# Patient Record
Sex: FEMALE | Race: WHITE | URBAN - METROPOLITAN AREA
[De-identification: names, ages, dates, MRNs, and addresses within clinical notes are randomized per-mention and may not be internally consistent; named-entity substitution may affect disease eponyms.]

---

## 2017-01-01 ENCOUNTER — APPOINTMENT (OUTPATIENT)
Dept: GENERAL RADIOLOGY | Facility: CLINIC | Age: 62
DRG: 003 | End: 2017-01-01
Attending: OTOLARYNGOLOGY
Payer: COMMERCIAL

## 2017-01-01 ENCOUNTER — APPOINTMENT (OUTPATIENT)
Dept: PHYSICAL THERAPY | Facility: CLINIC | Age: 62
DRG: 003 | End: 2017-01-01
Attending: OTOLARYNGOLOGY
Payer: COMMERCIAL

## 2017-01-01 ENCOUNTER — APPOINTMENT (OUTPATIENT)
Dept: ULTRASOUND IMAGING | Facility: CLINIC | Age: 62
DRG: 003 | End: 2017-01-01
Attending: OTOLARYNGOLOGY
Payer: COMMERCIAL

## 2017-01-01 ENCOUNTER — APPOINTMENT (OUTPATIENT)
Dept: GENERAL RADIOLOGY | Facility: CLINIC | Age: 62
DRG: 439 | End: 2017-01-01
Attending: PHYSICIAN ASSISTANT
Payer: COMMERCIAL

## 2017-01-01 ENCOUNTER — OFFICE VISIT (OUTPATIENT)
Dept: INTERNAL MEDICINE | Facility: CLINIC | Age: 62
End: 2017-01-01
Payer: COMMERCIAL

## 2017-01-01 ENCOUNTER — ANESTHESIA (OUTPATIENT)
Dept: SURGERY | Facility: CLINIC | Age: 62
DRG: 003 | End: 2017-01-01
Payer: COMMERCIAL

## 2017-01-01 ENCOUNTER — ANESTHESIA EVENT (OUTPATIENT)
Dept: SURGERY | Facility: CLINIC | Age: 62
DRG: 003 | End: 2017-01-01
Payer: COMMERCIAL

## 2017-01-01 ENCOUNTER — OFFICE VISIT (OUTPATIENT)
Dept: OTOLARYNGOLOGY | Facility: CLINIC | Age: 62
End: 2017-01-01

## 2017-01-01 ENCOUNTER — APPOINTMENT (OUTPATIENT)
Dept: GENERAL RADIOLOGY | Facility: CLINIC | Age: 62
DRG: 003 | End: 2017-01-01
Attending: PHYSICIAN ASSISTANT
Payer: COMMERCIAL

## 2017-01-01 ENCOUNTER — ANESTHESIA (OUTPATIENT)
Dept: SURGERY | Facility: CLINIC | Age: 62
End: 2017-01-01
Payer: COMMERCIAL

## 2017-01-01 ENCOUNTER — TRANSFERRED RECORDS (OUTPATIENT)
Dept: HEALTH INFORMATION MANAGEMENT | Facility: CLINIC | Age: 62
End: 2017-01-01

## 2017-01-01 ENCOUNTER — ANESTHESIA (OUTPATIENT)
Dept: SURGERY | Facility: CLINIC | Age: 62
DRG: 907 | End: 2017-01-01
Payer: COMMERCIAL

## 2017-01-01 ENCOUNTER — PRE VISIT (OUTPATIENT)
Dept: OTOLARYNGOLOGY | Facility: CLINIC | Age: 62
End: 2017-01-01

## 2017-01-01 ENCOUNTER — APPOINTMENT (OUTPATIENT)
Dept: OCCUPATIONAL THERAPY | Facility: CLINIC | Age: 62
DRG: 003 | End: 2017-01-01
Attending: OTOLARYNGOLOGY
Payer: COMMERCIAL

## 2017-01-01 ENCOUNTER — TELEPHONE (OUTPATIENT)
Dept: FAMILY MEDICINE | Facility: CLINIC | Age: 62
End: 2017-01-01

## 2017-01-01 ENCOUNTER — APPOINTMENT (OUTPATIENT)
Dept: GENERAL RADIOLOGY | Facility: CLINIC | Age: 62
DRG: 907 | End: 2017-01-01
Payer: COMMERCIAL

## 2017-01-01 ENCOUNTER — APPOINTMENT (OUTPATIENT)
Dept: GENERAL RADIOLOGY | Facility: CLINIC | Age: 62
DRG: 003 | End: 2017-01-01
Attending: NURSE PRACTITIONER
Payer: COMMERCIAL

## 2017-01-01 ENCOUNTER — APPOINTMENT (OUTPATIENT)
Dept: ULTRASOUND IMAGING | Facility: CLINIC | Age: 62
DRG: 003 | End: 2017-01-01
Attending: SURGERY
Payer: COMMERCIAL

## 2017-01-01 ENCOUNTER — CARE COORDINATION (OUTPATIENT)
Dept: OTOLARYNGOLOGY | Facility: CLINIC | Age: 62
End: 2017-01-01

## 2017-01-01 ENCOUNTER — APPOINTMENT (OUTPATIENT)
Dept: CT IMAGING | Facility: CLINIC | Age: 62
DRG: 439 | End: 2017-01-01
Attending: PHYSICIAN ASSISTANT
Payer: COMMERCIAL

## 2017-01-01 ENCOUNTER — APPOINTMENT (OUTPATIENT)
Dept: CARDIOLOGY | Facility: CLINIC | Age: 62
DRG: 003 | End: 2017-01-01
Attending: OTOLARYNGOLOGY
Payer: COMMERCIAL

## 2017-01-01 ENCOUNTER — APPOINTMENT (OUTPATIENT)
Dept: CARDIOLOGY | Facility: CLINIC | Age: 62
DRG: 003 | End: 2017-01-01
Attending: PHYSICIAN ASSISTANT
Payer: COMMERCIAL

## 2017-01-01 ENCOUNTER — HOSPITAL ENCOUNTER (INPATIENT)
Facility: CLINIC | Age: 62
LOS: 20 days | Discharge: NURSING, REHAB OR LONG-TERM CARE | DRG: 003 | End: 2017-12-11
Attending: OTOLARYNGOLOGY | Admitting: OTOLARYNGOLOGY
Payer: COMMERCIAL

## 2017-01-01 ENCOUNTER — OFFICE VISIT (OUTPATIENT)
Dept: OTOLARYNGOLOGY | Facility: CLINIC | Age: 62
End: 2017-01-01
Payer: COMMERCIAL

## 2017-01-01 ENCOUNTER — ANESTHESIA EVENT (OUTPATIENT)
Dept: SURGERY | Facility: CLINIC | Age: 62
DRG: 907 | End: 2017-01-01
Payer: COMMERCIAL

## 2017-01-01 ENCOUNTER — HOSPITAL ENCOUNTER (INPATIENT)
Facility: CLINIC | Age: 62
LOS: 2 days | Discharge: NURSING, REHAB OR LONG-TERM CARE | DRG: 907 | End: 2017-12-14
Attending: OTOLARYNGOLOGY | Admitting: ANESTHESIOLOGY
Payer: COMMERCIAL

## 2017-01-01 ENCOUNTER — ALLIED HEALTH/NURSE VISIT (OUTPATIENT)
Dept: NEUROLOGY | Facility: CLINIC | Age: 62
DRG: 003 | End: 2017-01-01
Attending: PSYCHIATRY & NEUROLOGY
Payer: COMMERCIAL

## 2017-01-01 ENCOUNTER — OFFICE VISIT (OUTPATIENT)
Dept: FAMILY MEDICINE | Facility: CLINIC | Age: 62
End: 2017-01-01
Payer: COMMERCIAL

## 2017-01-01 ENCOUNTER — APPOINTMENT (OUTPATIENT)
Dept: SPEECH THERAPY | Facility: CLINIC | Age: 62
DRG: 003 | End: 2017-01-01
Attending: PHYSICIAN ASSISTANT
Payer: COMMERCIAL

## 2017-01-01 ENCOUNTER — TEAM CONFERENCE (OUTPATIENT)
Dept: OTOLARYNGOLOGY | Facility: CLINIC | Age: 62
End: 2017-01-01

## 2017-01-01 ENCOUNTER — APPOINTMENT (OUTPATIENT)
Dept: INTERVENTIONAL RADIOLOGY/VASCULAR | Facility: CLINIC | Age: 62
DRG: 907 | End: 2017-01-01
Payer: COMMERCIAL

## 2017-01-01 ENCOUNTER — ANESTHESIA EVENT (OUTPATIENT)
Dept: SURGERY | Facility: CLINIC | Age: 62
End: 2017-01-01
Payer: COMMERCIAL

## 2017-01-01 ENCOUNTER — TEAM CONFERENCE (OUTPATIENT)
Dept: OTOLARYNGOLOGY | Facility: CLINIC | Age: 62
End: 2017-01-01
Attending: OTOLARYNGOLOGY

## 2017-01-01 ENCOUNTER — HOSPITAL ENCOUNTER (OUTPATIENT)
Facility: CLINIC | Age: 62
Discharge: HOME | End: 2017-11-09
Attending: OTOLARYNGOLOGY | Admitting: OTOLARYNGOLOGY
Payer: COMMERCIAL

## 2017-01-01 ENCOUNTER — HOSPITAL ENCOUNTER (INPATIENT)
Facility: CLINIC | Age: 62
LOS: 1 days | Discharge: LEFT BEFORE DISCHARGE | DRG: 439 | End: 2017-08-29
Attending: PHYSICIAN ASSISTANT | Admitting: FAMILY MEDICINE
Payer: COMMERCIAL

## 2017-01-01 VITALS
HEART RATE: 78 BPM | OXYGEN SATURATION: 96 % | TEMPERATURE: 98.4 F | HEIGHT: 61 IN | SYSTOLIC BLOOD PRESSURE: 151 MMHG | BODY MASS INDEX: 17.27 KG/M2 | DIASTOLIC BLOOD PRESSURE: 72 MMHG | WEIGHT: 91.49 LBS | RESPIRATION RATE: 36 BRPM

## 2017-01-01 VITALS
WEIGHT: 91.49 LBS | SYSTOLIC BLOOD PRESSURE: 120 MMHG | DIASTOLIC BLOOD PRESSURE: 70 MMHG | OXYGEN SATURATION: 95 % | BODY MASS INDEX: 17.27 KG/M2 | TEMPERATURE: 99.5 F | HEART RATE: 96 BPM | RESPIRATION RATE: 16 BRPM | HEIGHT: 61 IN

## 2017-01-01 VITALS
HEART RATE: 113 BPM | SYSTOLIC BLOOD PRESSURE: 102 MMHG | BODY MASS INDEX: 15.78 KG/M2 | HEIGHT: 61 IN | TEMPERATURE: 97.8 F | DIASTOLIC BLOOD PRESSURE: 58 MMHG | OXYGEN SATURATION: 97 % | WEIGHT: 83.6 LBS

## 2017-01-01 VITALS
RESPIRATION RATE: 16 BRPM | SYSTOLIC BLOOD PRESSURE: 151 MMHG | TEMPERATURE: 97.5 F | OXYGEN SATURATION: 99 % | HEART RATE: 84 BPM | DIASTOLIC BLOOD PRESSURE: 72 MMHG

## 2017-01-01 VITALS
TEMPERATURE: 96.5 F | WEIGHT: 85.8 LBS | OXYGEN SATURATION: 97 % | SYSTOLIC BLOOD PRESSURE: 150 MMHG | BODY MASS INDEX: 16.21 KG/M2 | DIASTOLIC BLOOD PRESSURE: 70 MMHG | HEART RATE: 72 BPM

## 2017-01-01 VITALS — BODY MASS INDEX: 17.69 KG/M2 | WEIGHT: 93.6 LBS | HEART RATE: 107 BPM | OXYGEN SATURATION: 92 %

## 2017-01-01 VITALS
HEIGHT: 61 IN | RESPIRATION RATE: 20 BRPM | SYSTOLIC BLOOD PRESSURE: 164 MMHG | WEIGHT: 84.44 LBS | TEMPERATURE: 98.2 F | BODY MASS INDEX: 15.94 KG/M2 | DIASTOLIC BLOOD PRESSURE: 85 MMHG | OXYGEN SATURATION: 92 %

## 2017-01-01 VITALS — HEIGHT: 61 IN | WEIGHT: 82 LBS | BODY MASS INDEX: 15.48 KG/M2

## 2017-01-01 DIAGNOSIS — Z01.818 PREOP GENERAL PHYSICAL EXAM: Primary | ICD-10-CM

## 2017-01-01 DIAGNOSIS — Z98.890 STATUS POST NECK DISSECTION: ICD-10-CM

## 2017-01-01 DIAGNOSIS — D53.9 MACROCYTIC ANEMIA: ICD-10-CM

## 2017-01-01 DIAGNOSIS — C02.9 TONGUE CANCER (H): Primary | ICD-10-CM

## 2017-01-01 DIAGNOSIS — F17.200 CURRENT SMOKER: ICD-10-CM

## 2017-01-01 DIAGNOSIS — F10.20 ALCOHOLISM (H): ICD-10-CM

## 2017-01-01 DIAGNOSIS — E87.6 HYPOKALEMIA: ICD-10-CM

## 2017-01-01 DIAGNOSIS — R19.7 DIARRHEA, UNSPECIFIED TYPE: ICD-10-CM

## 2017-01-01 DIAGNOSIS — C01 MALIGNANT NEOPLASM OF BASE OF TONGUE (H): Primary | ICD-10-CM

## 2017-01-01 DIAGNOSIS — I51.89 DIASTOLIC DYSFUNCTION: ICD-10-CM

## 2017-01-01 DIAGNOSIS — C01 MALIGNANT NEOPLASM OF BASE OF TONGUE (H): ICD-10-CM

## 2017-01-01 DIAGNOSIS — I70.90 ARTERIOSCLEROTIC VASCULAR DISEASE: ICD-10-CM

## 2017-01-01 DIAGNOSIS — Z71.89 ADVANCED DIRECTIVES, COUNSELING/DISCUSSION: ICD-10-CM

## 2017-01-01 DIAGNOSIS — Z98.890 POSTOPERATIVE STATE: Primary | ICD-10-CM

## 2017-01-01 DIAGNOSIS — J96.21 ACUTE AND CHRONIC RESPIRATORY FAILURE WITH HYPOXIA (H): ICD-10-CM

## 2017-01-01 DIAGNOSIS — E63.9 NUTRITIONAL DEFICIENCY: ICD-10-CM

## 2017-01-01 DIAGNOSIS — K85.20 ALCOHOL-INDUCED ACUTE PANCREATITIS, UNSPECIFIED COMPLICATION STATUS: ICD-10-CM

## 2017-01-01 DIAGNOSIS — F41.9 ANXIETY: ICD-10-CM

## 2017-01-01 DIAGNOSIS — K13.79 ORAL HEMORRHAGE: ICD-10-CM

## 2017-01-01 DIAGNOSIS — K21.9 GASTROESOPHAGEAL REFLUX DISEASE WITHOUT ESOPHAGITIS: ICD-10-CM

## 2017-01-01 DIAGNOSIS — R06.2 WHEEZING: ICD-10-CM

## 2017-01-01 DIAGNOSIS — G89.18 ACUTE POST-OPERATIVE PAIN: Primary | ICD-10-CM

## 2017-01-01 DIAGNOSIS — R40.0 SOMNOLENCE: Primary | ICD-10-CM

## 2017-01-01 DIAGNOSIS — G89.18 ACUTE POST-OPERATIVE PAIN: ICD-10-CM

## 2017-01-01 DIAGNOSIS — I10 BENIGN ESSENTIAL HYPERTENSION: ICD-10-CM

## 2017-01-01 DIAGNOSIS — C02.9 MALIGNANT NEOPLASM OF TONGUE (H): Primary | ICD-10-CM

## 2017-01-01 DIAGNOSIS — R10.12 LUQ ABDOMINAL PAIN: ICD-10-CM

## 2017-01-01 DIAGNOSIS — G47.00 INSOMNIA, UNSPECIFIED TYPE: Primary | ICD-10-CM

## 2017-01-01 DIAGNOSIS — J69.0 ASPIRATION PNEUMONIA, UNSPECIFIED ASPIRATION PNEUMONIA TYPE, UNSPECIFIED LATERALITY, UNSPECIFIED PART OF LUNG (H): ICD-10-CM

## 2017-01-01 DIAGNOSIS — K52.9 COLITIS: ICD-10-CM

## 2017-01-01 DIAGNOSIS — I10 ESSENTIAL HYPERTENSION: ICD-10-CM

## 2017-01-01 LAB
ABO + RH BLD: NORMAL
ALBUMIN SERPL-MCNC: 1.7 G/DL (ref 3.4–5)
ALBUMIN SERPL-MCNC: 2 G/DL (ref 3.4–5)
ALBUMIN SERPL-MCNC: 2.1 G/DL (ref 3.4–5)
ALBUMIN SERPL-MCNC: 2.2 G/DL (ref 3.4–5)
ALBUMIN SERPL-MCNC: 2.2 G/DL (ref 3.4–5)
ALBUMIN SERPL-MCNC: 2.6 G/DL (ref 3.4–5)
ALBUMIN SERPL-MCNC: 2.7 G/DL (ref 3.4–5)
ALBUMIN SERPL-MCNC: 2.8 G/DL (ref 3.4–5)
ALBUMIN SERPL-MCNC: 3 G/DL (ref 3.4–5)
ALBUMIN SERPL-MCNC: 3 G/DL (ref 3.4–5)
ALBUMIN SERPL-MCNC: 3.3 G/DL (ref 3.4–5)
ALBUMIN UR-MCNC: 10 MG/DL
ALBUMIN UR-MCNC: NEGATIVE MG/DL
ALP BONE CFR SERPL: 35 U/L (ref 0–55)
ALP LIVER SERPL-CCNC: 172 U/L (ref 0–94)
ALP OTHER CFR SERPL: 0 U/L
ALP SERPL-CCNC: 156 U/L (ref 40–150)
ALP SERPL-CCNC: 170 U/L (ref 40–150)
ALP SERPL-CCNC: 186 U/L (ref 40–150)
ALP SERPL-CCNC: 207 U/L (ref 40–120)
ALP SERPL-CCNC: 216 U/L (ref 40–150)
ALP SERPL-CCNC: 224 U/L (ref 40–150)
ALP SERPL-CCNC: 246 U/L (ref 40–150)
ALP SERPL-CCNC: 248 U/L (ref 40–150)
ALP SERPL-CCNC: 285 U/L (ref 40–150)
ALP SERPL-CCNC: 286 U/L (ref 40–150)
ALP SERPL-CCNC: 296 U/L (ref 40–150)
ALP SERPL-CCNC: 358 U/L (ref 40–150)
ALP SERPL-CCNC: 443 U/L (ref 40–150)
ALP SERPL-CCNC: 480 U/L (ref 40–150)
ALP SERPL-CCNC: 502 U/L (ref 40–150)
ALP SERPL-CCNC: 55 U/L (ref 40–150)
ALP SERPL-CCNC: 58 U/L (ref 40–150)
ALP SERPL-CCNC: 86 U/L (ref 40–150)
ALT SERPL W P-5'-P-CCNC: 16 U/L (ref 0–50)
ALT SERPL W P-5'-P-CCNC: 22 U/L (ref 0–50)
ALT SERPL W P-5'-P-CCNC: 23 U/L (ref 0–50)
ALT SERPL W P-5'-P-CCNC: 26 U/L (ref 0–50)
ALT SERPL W P-5'-P-CCNC: 27 U/L (ref 0–50)
ALT SERPL W P-5'-P-CCNC: 28 U/L (ref 0–50)
ALT SERPL W P-5'-P-CCNC: 28 U/L (ref 0–50)
ALT SERPL W P-5'-P-CCNC: 32 U/L (ref 0–50)
ALT SERPL W P-5'-P-CCNC: 34 U/L (ref 0–50)
ALT SERPL W P-5'-P-CCNC: 46 U/L (ref 0–50)
ALT SERPL W P-5'-P-CCNC: 53 U/L (ref 0–50)
ALT SERPL W P-5'-P-CCNC: 54 U/L (ref 0–50)
ALT SERPL W P-5'-P-CCNC: 63 U/L (ref 0–50)
ALT SERPL W P-5'-P-CCNC: 75 U/L (ref 0–50)
ALT SERPL W P-5'-P-CCNC: 75 U/L (ref 0–50)
AMMONIA PLAS-SCNC: 27 UMOL/L (ref 10–50)
AMYLASE SERPL-CCNC: 32 U/L (ref 30–110)
ANGLE RATE OF CLOT STRENGTH: 71.3 DEGREES (ref 53–72)
ANION GAP SERPL CALCULATED.3IONS-SCNC: 10 MMOL/L (ref 3–14)
ANION GAP SERPL CALCULATED.3IONS-SCNC: 11 MMOL/L (ref 3–14)
ANION GAP SERPL CALCULATED.3IONS-SCNC: 12 MMOL/L (ref 3–14)
ANION GAP SERPL CALCULATED.3IONS-SCNC: 13 MMOL/L (ref 3–14)
ANION GAP SERPL CALCULATED.3IONS-SCNC: 13 MMOL/L (ref 3–14)
ANION GAP SERPL CALCULATED.3IONS-SCNC: 14 MMOL/L (ref 3–14)
ANION GAP SERPL CALCULATED.3IONS-SCNC: 15 MMOL/L (ref 3–14)
ANION GAP SERPL CALCULATED.3IONS-SCNC: 5 MMOL/L (ref 3–14)
ANION GAP SERPL CALCULATED.3IONS-SCNC: 6 MMOL/L (ref 3–14)
ANION GAP SERPL CALCULATED.3IONS-SCNC: 7 MMOL/L (ref 3–14)
ANION GAP SERPL CALCULATED.3IONS-SCNC: 8 MMOL/L (ref 3–14)
ANION GAP SERPL CALCULATED.3IONS-SCNC: 9 MMOL/L (ref 3–14)
ANISOCYTOSIS BLD QL SMEAR: ABNORMAL
ANISOCYTOSIS BLD QL SMEAR: SLIGHT
APPEARANCE UR: ABNORMAL
APPEARANCE UR: CLEAR
APTT PPP: 30 SEC (ref 22–37)
APTT PPP: 31 SEC (ref 22–37)
AST SERPL W P-5'-P-CCNC: 108 U/L (ref 0–45)
AST SERPL W P-5'-P-CCNC: 110 U/L (ref 0–45)
AST SERPL W P-5'-P-CCNC: 123 U/L (ref 0–45)
AST SERPL W P-5'-P-CCNC: 124 U/L (ref 0–45)
AST SERPL W P-5'-P-CCNC: 136 U/L (ref 0–45)
AST SERPL W P-5'-P-CCNC: 28 U/L (ref 0–45)
AST SERPL W P-5'-P-CCNC: 34 U/L (ref 0–45)
AST SERPL W P-5'-P-CCNC: 41 U/L (ref 0–45)
AST SERPL W P-5'-P-CCNC: 44 U/L (ref 0–45)
AST SERPL W P-5'-P-CCNC: 51 U/L (ref 0–45)
AST SERPL W P-5'-P-CCNC: 51 U/L (ref 0–45)
AST SERPL W P-5'-P-CCNC: 52 U/L (ref 0–45)
AST SERPL W P-5'-P-CCNC: 58 U/L (ref 0–45)
AST SERPL W P-5'-P-CCNC: 59 U/L (ref 0–45)
AST SERPL W P-5'-P-CCNC: 62 U/L (ref 0–45)
AST SERPL W P-5'-P-CCNC: 63 U/L (ref 0–45)
AST SERPL W P-5'-P-CCNC: 65 U/L (ref 0–45)
BACTERIA SPEC CULT: ABNORMAL
BACTERIA SPEC CULT: NO GROWTH
BACTERIA SPEC CULT: NORMAL
BASE DEFICIT BLDA-SCNC: 1.5 MMOL/L
BASE DEFICIT BLDA-SCNC: 11.1 MMOL/L
BASE DEFICIT BLDA-SCNC: 5.2 MMOL/L
BASE DEFICIT BLDA-SCNC: 5.3 MMOL/L
BASE DEFICIT BLDA-SCNC: 6.1 MMOL/L
BASE DEFICIT BLDA-SCNC: 6.4 MMOL/L
BASE DEFICIT BLDA-SCNC: 6.4 MMOL/L
BASE DEFICIT BLDA-SCNC: 7 MMOL/L
BASE DEFICIT BLDA-SCNC: 7.2 MMOL/L
BASE DEFICIT BLDA-SCNC: 8.3 MMOL/L
BASE DEFICIT BLDA-SCNC: 8.4 MMOL/L
BASE DEFICIT BLDA-SCNC: 8.8 MMOL/L
BASE DEFICIT BLDA-SCNC: 8.9 MMOL/L
BASE DEFICIT BLDA-SCNC: 9.2 MMOL/L
BASE DEFICIT BLDA-SCNC: 9.2 MMOL/L
BASE DEFICIT BLDA-SCNC: 9.4 MMOL/L
BASE DEFICIT BLDV-SCNC: 0.1 MMOL/L
BASE DEFICIT BLDV-SCNC: 4.9 MMOL/L
BASE EXCESS BLDA CALC-SCNC: 1.2 MMOL/L
BASE EXCESS BLDA CALC-SCNC: 1.9 MMOL/L
BASE EXCESS BLDA CALC-SCNC: 2 MMOL/L
BASE EXCESS BLDA CALC-SCNC: 2.7 MMOL/L
BASE EXCESS BLDA CALC-SCNC: 3.6 MMOL/L
BASE EXCESS BLDA CALC-SCNC: 3.7 MMOL/L
BASE EXCESS BLDA CALC-SCNC: 4.6 MMOL/L
BASE EXCESS BLDA CALC-SCNC: 5.2 MMOL/L
BASE EXCESS BLDA CALC-SCNC: 5.8 MMOL/L
BASE EXCESS BLDA CALC-SCNC: 6.6 MMOL/L
BASE EXCESS BLDV CALC-SCNC: 0.7 MMOL/L
BASE EXCESS BLDV CALC-SCNC: 1.5 MMOL/L
BASOPHILS # BLD AUTO: 0 10E9/L (ref 0–0.2)
BASOPHILS # BLD AUTO: 0.1 10E9/L (ref 0–0.2)
BASOPHILS # BLD AUTO: 0.2 10E9/L (ref 0–0.2)
BASOPHILS NFR BLD AUTO: 0 %
BASOPHILS NFR BLD AUTO: 0 %
BASOPHILS NFR BLD AUTO: 0.2 %
BASOPHILS NFR BLD AUTO: 0.6 %
BASOPHILS NFR BLD AUTO: 0.9 %
BILIRUB DIRECT SERPL-MCNC: 0.6 MG/DL (ref 0–0.2)
BILIRUB DIRECT SERPL-MCNC: 2.2 MG/DL (ref 0–0.2)
BILIRUB DIRECT SERPL-MCNC: 2.3 MG/DL (ref 0–0.2)
BILIRUB DIRECT SERPL-MCNC: 2.8 MG/DL (ref 0–0.2)
BILIRUB DIRECT SERPL-MCNC: 3.5 MG/DL (ref 0–0.2)
BILIRUB DIRECT SERPL-MCNC: 3.9 MG/DL (ref 0–0.2)
BILIRUB SERPL-MCNC: 0.6 MG/DL (ref 0.2–1.3)
BILIRUB SERPL-MCNC: 1 MG/DL (ref 0.2–1.3)
BILIRUB SERPL-MCNC: 1 MG/DL (ref 0.2–1.3)
BILIRUB SERPL-MCNC: 1.2 MG/DL (ref 0.2–1.3)
BILIRUB SERPL-MCNC: 1.4 MG/DL (ref 0.2–1.3)
BILIRUB SERPL-MCNC: 1.6 MG/DL (ref 0.2–1.3)
BILIRUB SERPL-MCNC: 1.8 MG/DL (ref 0.2–1.3)
BILIRUB SERPL-MCNC: 1.8 MG/DL (ref 0.2–1.3)
BILIRUB SERPL-MCNC: 2.2 MG/DL (ref 0.2–1.3)
BILIRUB SERPL-MCNC: 2.6 MG/DL (ref 0.2–1.3)
BILIRUB SERPL-MCNC: 3.1 MG/DL (ref 0.2–1.3)
BILIRUB SERPL-MCNC: 3.1 MG/DL (ref 0.2–1.3)
BILIRUB SERPL-MCNC: 3.3 MG/DL (ref 0.2–1.3)
BILIRUB SERPL-MCNC: 3.4 MG/DL (ref 0.2–1.3)
BILIRUB SERPL-MCNC: 4.6 MG/DL (ref 0.2–1.3)
BILIRUB SERPL-MCNC: 4.6 MG/DL (ref 0.2–1.3)
BILIRUB SERPL-MCNC: 5.1 MG/DL (ref 0.2–1.3)
BILIRUB UR QL STRIP: NEGATIVE
BLD GP AB SCN SERPL QL: NORMAL
BLD PROD TYP BPU: NORMAL
BLD UNIT ID BPU: 0
BLOOD BANK CMNT PATIENT-IMP: NORMAL
BLOOD PRODUCT CODE: NORMAL
BPU ID: NORMAL
BUN SERPL-MCNC: 10 MG/DL (ref 7–30)
BUN SERPL-MCNC: 12 MG/DL (ref 7–30)
BUN SERPL-MCNC: 12 MG/DL (ref 7–30)
BUN SERPL-MCNC: 13 MG/DL (ref 7–30)
BUN SERPL-MCNC: 13 MG/DL (ref 7–30)
BUN SERPL-MCNC: 15 MG/DL (ref 7–30)
BUN SERPL-MCNC: 16 MG/DL (ref 7–30)
BUN SERPL-MCNC: 17 MG/DL (ref 7–30)
BUN SERPL-MCNC: 19 MG/DL (ref 7–30)
BUN SERPL-MCNC: 20 MG/DL (ref 7–30)
BUN SERPL-MCNC: 21 MG/DL (ref 7–30)
BUN SERPL-MCNC: 22 MG/DL (ref 7–30)
BUN SERPL-MCNC: 24 MG/DL (ref 7–30)
BUN SERPL-MCNC: 26 MG/DL (ref 7–30)
BUN SERPL-MCNC: 27 MG/DL (ref 7–30)
BUN SERPL-MCNC: 28 MG/DL (ref 7–30)
BUN SERPL-MCNC: 32 MG/DL (ref 7–30)
BUN SERPL-MCNC: 35 MG/DL (ref 7–30)
BUN SERPL-MCNC: 37 MG/DL (ref 7–30)
BUN SERPL-MCNC: 38 MG/DL (ref 7–30)
BUN SERPL-MCNC: 38 MG/DL (ref 7–30)
BUN SERPL-MCNC: 40 MG/DL (ref 7–30)
BUN SERPL-MCNC: 41 MG/DL (ref 7–30)
BUN SERPL-MCNC: 43 MG/DL (ref 7–30)
C COLI+JEJUNI+LARI FUSA STL QL NAA+PROBE: NOT DETECTED
C DIFF TOX B STL QL: NEGATIVE
CA-I BLD-MCNC: 4.2 MG/DL (ref 4.4–5.2)
CA-I BLD-MCNC: 4.2 MG/DL (ref 4.4–5.2)
CA-I BLD-MCNC: 4.4 MG/DL (ref 4.4–5.2)
CA-I BLD-MCNC: 4.7 MG/DL (ref 4.4–5.2)
CA-I BLD-MCNC: 4.8 MG/DL (ref 4.4–5.2)
CA-I BLD-MCNC: 4.9 MG/DL (ref 4.4–5.2)
CA-I BLD-MCNC: 5 MG/DL (ref 4.4–5.2)
CA-I BLD-MCNC: 5.1 MG/DL (ref 4.4–5.2)
CA-I BLD-MCNC: 5.1 MG/DL (ref 4.4–5.2)
CA-I BLD-MCNC: 5.5 MG/DL (ref 4.4–5.2)
CALCIUM SERPL-MCNC: 6.6 MG/DL (ref 8.5–10.1)
CALCIUM SERPL-MCNC: 6.7 MG/DL (ref 8.5–10.1)
CALCIUM SERPL-MCNC: 6.8 MG/DL (ref 8.5–10.1)
CALCIUM SERPL-MCNC: 7.2 MG/DL (ref 8.5–10.1)
CALCIUM SERPL-MCNC: 7.2 MG/DL (ref 8.5–10.1)
CALCIUM SERPL-MCNC: 7.4 MG/DL (ref 8.5–10.1)
CALCIUM SERPL-MCNC: 7.7 MG/DL (ref 8.5–10.1)
CALCIUM SERPL-MCNC: 7.8 MG/DL (ref 8.5–10.1)
CALCIUM SERPL-MCNC: 7.8 MG/DL (ref 8.5–10.1)
CALCIUM SERPL-MCNC: 7.9 MG/DL (ref 8.5–10.1)
CALCIUM SERPL-MCNC: 8 MG/DL (ref 8.5–10.1)
CALCIUM SERPL-MCNC: 8 MG/DL (ref 8.5–10.1)
CALCIUM SERPL-MCNC: 8.1 MG/DL (ref 8.5–10.1)
CALCIUM SERPL-MCNC: 8.2 MG/DL (ref 8.5–10.1)
CALCIUM SERPL-MCNC: 8.3 MG/DL (ref 8.5–10.1)
CALCIUM SERPL-MCNC: 8.4 MG/DL (ref 8.5–10.1)
CALCIUM SERPL-MCNC: 8.5 MG/DL (ref 8.5–10.1)
CALCIUM SERPL-MCNC: 8.6 MG/DL (ref 8.5–10.1)
CALCIUM SERPL-MCNC: 8.6 MG/DL (ref 8.5–10.1)
CALCIUM SERPL-MCNC: 8.8 MG/DL (ref 8.5–10.1)
CALCIUM SERPL-MCNC: 8.9 MG/DL (ref 8.5–10.1)
CHLORIDE SERPL-SCNC: 102 MMOL/L (ref 94–109)
CHLORIDE SERPL-SCNC: 103 MMOL/L (ref 94–109)
CHLORIDE SERPL-SCNC: 103 MMOL/L (ref 94–109)
CHLORIDE SERPL-SCNC: 106 MMOL/L (ref 94–109)
CHLORIDE SERPL-SCNC: 107 MMOL/L (ref 94–109)
CHLORIDE SERPL-SCNC: 108 MMOL/L (ref 94–109)
CHLORIDE SERPL-SCNC: 109 MMOL/L (ref 94–109)
CHLORIDE SERPL-SCNC: 110 MMOL/L (ref 94–109)
CHLORIDE SERPL-SCNC: 111 MMOL/L (ref 94–109)
CHLORIDE SERPL-SCNC: 112 MMOL/L (ref 94–109)
CHLORIDE SERPL-SCNC: 113 MMOL/L (ref 94–109)
CHLORIDE SERPL-SCNC: 114 MMOL/L (ref 94–109)
CHLORIDE SERPL-SCNC: 115 MMOL/L (ref 94–109)
CHLORIDE SERPL-SCNC: 116 MMOL/L (ref 94–109)
CHLORIDE SERPL-SCNC: 116 MMOL/L (ref 94–109)
CHLORIDE SERPL-SCNC: 117 MMOL/L (ref 94–109)
CHLORIDE SERPL-SCNC: 118 MMOL/L (ref 94–109)
CHLORIDE SERPL-SCNC: 119 MMOL/L (ref 94–109)
CI HYPERCOAGULATION INDEX: 0.9 RATIO (ref 0–3)
CO2 BLDCOV-SCNC: 31 MMOL/L (ref 21–28)
CO2 SERPL-SCNC: 16 MMOL/L (ref 20–32)
CO2 SERPL-SCNC: 17 MMOL/L (ref 20–32)
CO2 SERPL-SCNC: 18 MMOL/L (ref 20–32)
CO2 SERPL-SCNC: 18 MMOL/L (ref 20–32)
CO2 SERPL-SCNC: 19 MMOL/L (ref 20–32)
CO2 SERPL-SCNC: 20 MMOL/L (ref 20–32)
CO2 SERPL-SCNC: 22 MMOL/L (ref 20–32)
CO2 SERPL-SCNC: 24 MMOL/L (ref 20–32)
CO2 SERPL-SCNC: 24 MMOL/L (ref 20–32)
CO2 SERPL-SCNC: 25 MMOL/L (ref 20–32)
CO2 SERPL-SCNC: 26 MMOL/L (ref 20–32)
CO2 SERPL-SCNC: 27 MMOL/L (ref 20–32)
CO2 SERPL-SCNC: 28 MMOL/L (ref 20–32)
CO2 SERPL-SCNC: 28 MMOL/L (ref 20–32)
CO2 SERPL-SCNC: 29 MMOL/L (ref 20–32)
CO2 SERPL-SCNC: 29 MMOL/L (ref 20–32)
CO2 SERPL-SCNC: 30 MMOL/L (ref 20–32)
CO2 SERPL-SCNC: 31 MMOL/L (ref 20–32)
CO2 SERPL-SCNC: 31 MMOL/L (ref 20–32)
COLOR UR AUTO: ABNORMAL
COLOR UR AUTO: YELLOW
COMPREHEN DRUG ANALYSIS UR: NORMAL
COPATH REPORT: NORMAL
CREAT SERPL-MCNC: 0.48 MG/DL (ref 0.52–1.04)
CREAT SERPL-MCNC: 0.54 MG/DL (ref 0.52–1.04)
CREAT SERPL-MCNC: 0.55 MG/DL (ref 0.52–1.04)
CREAT SERPL-MCNC: 0.58 MG/DL (ref 0.52–1.04)
CREAT SERPL-MCNC: 0.6 MG/DL (ref 0.52–1.04)
CREAT SERPL-MCNC: 0.6 MG/DL (ref 0.52–1.04)
CREAT SERPL-MCNC: 0.61 MG/DL (ref 0.52–1.04)
CREAT SERPL-MCNC: 0.64 MG/DL (ref 0.52–1.04)
CREAT SERPL-MCNC: 0.64 MG/DL (ref 0.52–1.04)
CREAT SERPL-MCNC: 0.66 MG/DL (ref 0.52–1.04)
CREAT SERPL-MCNC: 0.67 MG/DL (ref 0.52–1.04)
CREAT SERPL-MCNC: 0.7 MG/DL (ref 0.52–1.04)
CREAT SERPL-MCNC: 0.7 MG/DL (ref 0.52–1.04)
CREAT SERPL-MCNC: 0.72 MG/DL (ref 0.52–1.04)
CREAT SERPL-MCNC: 0.72 MG/DL (ref 0.52–1.04)
CREAT SERPL-MCNC: 0.73 MG/DL (ref 0.52–1.04)
CREAT SERPL-MCNC: 0.74 MG/DL (ref 0.52–1.04)
CREAT SERPL-MCNC: 0.75 MG/DL (ref 0.52–1.04)
CREAT SERPL-MCNC: 0.76 MG/DL (ref 0.52–1.04)
CREAT SERPL-MCNC: 0.77 MG/DL (ref 0.52–1.04)
CREAT SERPL-MCNC: 0.78 MG/DL (ref 0.52–1.04)
CREAT SERPL-MCNC: 0.79 MG/DL (ref 0.52–1.04)
CREAT SERPL-MCNC: 0.8 MG/DL (ref 0.52–1.04)
CREAT SERPL-MCNC: 0.81 MG/DL (ref 0.52–1.04)
CREAT SERPL-MCNC: 0.85 MG/DL (ref 0.52–1.04)
CREAT SERPL-MCNC: 0.85 MG/DL (ref 0.52–1.04)
CREAT SERPL-MCNC: 0.86 MG/DL (ref 0.52–1.04)
CREAT SERPL-MCNC: 0.87 MG/DL (ref 0.52–1.04)
CREAT SERPL-MCNC: 0.87 MG/DL (ref 0.52–1.04)
CREAT SERPL-MCNC: 0.9 MG/DL (ref 0.52–1.04)
CREAT SERPL-MCNC: 0.91 MG/DL (ref 0.52–1.04)
CREAT SERPL-MCNC: 0.94 MG/DL (ref 0.52–1.04)
CREAT SERPL-MCNC: 0.96 MG/DL (ref 0.52–1.04)
CREAT SERPL-MCNC: 0.97 MG/DL (ref 0.52–1.04)
CREAT SERPL-MCNC: 0.98 MG/DL (ref 0.52–1.04)
CREAT SERPL-MCNC: 1 MG/DL (ref 0.52–1.04)
CREAT SERPL-MCNC: 1 MG/DL (ref 0.52–1.04)
CREAT SERPL-MCNC: 1.15 MG/DL (ref 0.52–1.04)
CREAT UR-MCNC: 60 MG/DL
CRP SERPL-MCNC: 140 MG/L (ref 0–8)
CRP SERPL-MCNC: 19 MG/L (ref 0–8)
CRP SERPL-MCNC: 210 MG/L (ref 0–8)
CRP SERPL-MCNC: 260 MG/L (ref 0–8)
CRP SERPL-MCNC: 68 MG/L (ref 0–8)
DIFFERENTIAL METHOD BLD: ABNORMAL
EC STX1 GENE STL QL NAA+PROBE: NOT DETECTED
EC STX2 GENE STL QL NAA+PROBE: NOT DETECTED
EJECTION FRACTION: 60
ELASTASE PANC STL-MCNT: 66 UG/G
ENTERIC PATHOGEN COMMENT: NORMAL
EOSINOPHIL # BLD AUTO: 0 10E9/L (ref 0–0.7)
EOSINOPHIL # BLD AUTO: 0.1 10E9/L (ref 0–0.7)
EOSINOPHIL # BLD AUTO: 0.2 10E9/L (ref 0–0.7)
EOSINOPHIL # BLD AUTO: 0.2 10E9/L (ref 0–0.7)
EOSINOPHIL # BLD AUTO: 0.3 10E9/L (ref 0–0.7)
EOSINOPHIL NFR BLD AUTO: 0.1 %
EOSINOPHIL NFR BLD AUTO: 0.8 %
EOSINOPHIL NFR BLD AUTO: 0.9 %
EOSINOPHIL NFR BLD AUTO: 1 %
EOSINOPHIL NFR BLD AUTO: 1.4 %
ERYTHROCYTE [DISTWIDTH] IN BLOOD BY AUTOMATED COUNT: 17.2 % (ref 10–15)
ERYTHROCYTE [DISTWIDTH] IN BLOOD BY AUTOMATED COUNT: 17.4 % (ref 10–15)
ERYTHROCYTE [DISTWIDTH] IN BLOOD BY AUTOMATED COUNT: 17.6 % (ref 10–15)
ERYTHROCYTE [DISTWIDTH] IN BLOOD BY AUTOMATED COUNT: 17.8 % (ref 10–15)
ERYTHROCYTE [DISTWIDTH] IN BLOOD BY AUTOMATED COUNT: 18.2 % (ref 10–15)
ERYTHROCYTE [DISTWIDTH] IN BLOOD BY AUTOMATED COUNT: 18.3 % (ref 10–15)
ERYTHROCYTE [DISTWIDTH] IN BLOOD BY AUTOMATED COUNT: 18.4 % (ref 10–15)
ERYTHROCYTE [DISTWIDTH] IN BLOOD BY AUTOMATED COUNT: 18.5 % (ref 10–15)
ERYTHROCYTE [DISTWIDTH] IN BLOOD BY AUTOMATED COUNT: 18.6 % (ref 10–15)
ERYTHROCYTE [DISTWIDTH] IN BLOOD BY AUTOMATED COUNT: 18.7 % (ref 10–15)
ERYTHROCYTE [DISTWIDTH] IN BLOOD BY AUTOMATED COUNT: 19.2 % (ref 10–15)
ERYTHROCYTE [DISTWIDTH] IN BLOOD BY AUTOMATED COUNT: 19.2 % (ref 10–15)
ERYTHROCYTE [DISTWIDTH] IN BLOOD BY AUTOMATED COUNT: 19.3 % (ref 10–15)
ERYTHROCYTE [DISTWIDTH] IN BLOOD BY AUTOMATED COUNT: 19.4 % (ref 10–15)
ERYTHROCYTE [DISTWIDTH] IN BLOOD BY AUTOMATED COUNT: 19.6 % (ref 10–15)
ERYTHROCYTE [DISTWIDTH] IN BLOOD BY AUTOMATED COUNT: 19.7 % (ref 10–15)
ERYTHROCYTE [DISTWIDTH] IN BLOOD BY AUTOMATED COUNT: 19.8 % (ref 10–15)
ERYTHROCYTE [DISTWIDTH] IN BLOOD BY AUTOMATED COUNT: 20.4 % (ref 10–15)
ERYTHROCYTE [DISTWIDTH] IN BLOOD BY AUTOMATED COUNT: 20.5 % (ref 10–15)
ERYTHROCYTE [DISTWIDTH] IN BLOOD BY AUTOMATED COUNT: 20.7 % (ref 10–15)
ERYTHROCYTE [DISTWIDTH] IN BLOOD BY AUTOMATED COUNT: 20.9 % (ref 10–15)
ERYTHROCYTE [DISTWIDTH] IN BLOOD BY AUTOMATED COUNT: 20.9 % (ref 10–15)
ERYTHROCYTE [DISTWIDTH] IN BLOOD BY AUTOMATED COUNT: 21 % (ref 10–15)
ERYTHROCYTE [DISTWIDTH] IN BLOOD BY AUTOMATED COUNT: 21.4 % (ref 10–15)
ERYTHROCYTE [DISTWIDTH] IN BLOOD BY AUTOMATED COUNT: 21.9 % (ref 10–15)
ERYTHROCYTE [DISTWIDTH] IN BLOOD BY AUTOMATED COUNT: 22.5 % (ref 10–15)
ERYTHROCYTE [DISTWIDTH] IN BLOOD BY AUTOMATED COUNT: 22.8 % (ref 10–15)
ERYTHROCYTE [DISTWIDTH] IN BLOOD BY AUTOMATED COUNT: 23.4 % (ref 10–15)
ERYTHROCYTE [DISTWIDTH] IN BLOOD BY AUTOMATED COUNT: 23.6 % (ref 10–15)
ERYTHROCYTE [DISTWIDTH] IN BLOOD BY AUTOMATED COUNT: 23.9 % (ref 10–15)
ERYTHROCYTE [DISTWIDTH] IN BLOOD BY AUTOMATED COUNT: 24 % (ref 10–15)
ERYTHROCYTE [DISTWIDTH] IN BLOOD BY AUTOMATED COUNT: ABNORMAL % (ref 10–15)
ETHANOL SERPL-MCNC: 0.1 G/DL
FIBRINOGEN PPP-MCNC: 347 MG/DL (ref 200–420)
FIBRINOGEN PPP-MCNC: 431 MG/DL (ref 200–420)
FRACT EXCRET NA UR+SERPL-RTO: 0.2 %
G ACTUAL CLOT STRENGTH: 9.4 KD/SC (ref 4.5–11)
GFR SERPL CREATININE-BSD FRML MDRD: 47 ML/MIN/1.7M2
GFR SERPL CREATININE-BSD FRML MDRD: 56 ML/MIN/1.7M2
GFR SERPL CREATININE-BSD FRML MDRD: 56 ML/MIN/1.7M2
GFR SERPL CREATININE-BSD FRML MDRD: 57 ML/MIN/1.7M2
GFR SERPL CREATININE-BSD FRML MDRD: 58 ML/MIN/1.7M2
GFR SERPL CREATININE-BSD FRML MDRD: 58 ML/MIN/1.7M2
GFR SERPL CREATININE-BSD FRML MDRD: 60 ML/MIN/1.7M2
GFR SERPL CREATININE-BSD FRML MDRD: 62 ML/MIN/1.7M2
GFR SERPL CREATININE-BSD FRML MDRD: 63 ML/MIN/1.7M2
GFR SERPL CREATININE-BSD FRML MDRD: 65 ML/MIN/1.7M2
GFR SERPL CREATININE-BSD FRML MDRD: 66 ML/MIN/1.7M2
GFR SERPL CREATININE-BSD FRML MDRD: 66 ML/MIN/1.7M2
GFR SERPL CREATININE-BSD FRML MDRD: 67 ML/MIN/1.7M2
GFR SERPL CREATININE-BSD FRML MDRD: 68 ML/MIN/1.7M2
GFR SERPL CREATININE-BSD FRML MDRD: 71 ML/MIN/1.7M2
GFR SERPL CREATININE-BSD FRML MDRD: 72 ML/MIN/1.7M2
GFR SERPL CREATININE-BSD FRML MDRD: 73 ML/MIN/1.7M2
GFR SERPL CREATININE-BSD FRML MDRD: 74 ML/MIN/1.7M2
GFR SERPL CREATININE-BSD FRML MDRD: 75 ML/MIN/1.7M2
GFR SERPL CREATININE-BSD FRML MDRD: 76 ML/MIN/1.7M2
GFR SERPL CREATININE-BSD FRML MDRD: 77 ML/MIN/1.7M2
GFR SERPL CREATININE-BSD FRML MDRD: 79 ML/MIN/1.7M2
GFR SERPL CREATININE-BSD FRML MDRD: 80 ML/MIN/1.7M2
GFR SERPL CREATININE-BSD FRML MDRD: 81 ML/MIN/1.7M2
GFR SERPL CREATININE-BSD FRML MDRD: 81 ML/MIN/1.7M2
GFR SERPL CREATININE-BSD FRML MDRD: 84 ML/MIN/1.7M2
GFR SERPL CREATININE-BSD FRML MDRD: 84 ML/MIN/1.7M2
GFR SERPL CREATININE-BSD FRML MDRD: 88 ML/MIN/1.7M2
GFR SERPL CREATININE-BSD FRML MDRD: >90 ML/MIN/1.7M2
GLUCOSE BLD-MCNC: 130 MG/DL (ref 70–99)
GLUCOSE BLD-MCNC: 143 MG/DL (ref 70–99)
GLUCOSE BLD-MCNC: 151 MG/DL (ref 70–99)
GLUCOSE BLD-MCNC: 171 MG/DL (ref 70–99)
GLUCOSE BLDC GLUCOMTR-MCNC: 101 MG/DL (ref 70–99)
GLUCOSE BLDC GLUCOMTR-MCNC: 103 MG/DL (ref 70–99)
GLUCOSE BLDC GLUCOMTR-MCNC: 103 MG/DL (ref 70–99)
GLUCOSE BLDC GLUCOMTR-MCNC: 105 MG/DL (ref 70–99)
GLUCOSE BLDC GLUCOMTR-MCNC: 106 MG/DL (ref 70–99)
GLUCOSE BLDC GLUCOMTR-MCNC: 113 MG/DL (ref 70–99)
GLUCOSE BLDC GLUCOMTR-MCNC: 114 MG/DL (ref 70–99)
GLUCOSE BLDC GLUCOMTR-MCNC: 116 MG/DL (ref 70–99)
GLUCOSE BLDC GLUCOMTR-MCNC: 118 MG/DL (ref 70–99)
GLUCOSE BLDC GLUCOMTR-MCNC: 118 MG/DL (ref 70–99)
GLUCOSE BLDC GLUCOMTR-MCNC: 119 MG/DL (ref 70–99)
GLUCOSE BLDC GLUCOMTR-MCNC: 122 MG/DL (ref 70–99)
GLUCOSE BLDC GLUCOMTR-MCNC: 122 MG/DL (ref 70–99)
GLUCOSE BLDC GLUCOMTR-MCNC: 124 MG/DL (ref 70–99)
GLUCOSE BLDC GLUCOMTR-MCNC: 129 MG/DL (ref 70–99)
GLUCOSE BLDC GLUCOMTR-MCNC: 133 MG/DL (ref 70–99)
GLUCOSE BLDC GLUCOMTR-MCNC: 137 MG/DL (ref 70–99)
GLUCOSE BLDC GLUCOMTR-MCNC: 139 MG/DL (ref 70–99)
GLUCOSE BLDC GLUCOMTR-MCNC: 142 MG/DL (ref 70–99)
GLUCOSE BLDC GLUCOMTR-MCNC: 142 MG/DL (ref 70–99)
GLUCOSE BLDC GLUCOMTR-MCNC: 145 MG/DL (ref 70–99)
GLUCOSE BLDC GLUCOMTR-MCNC: 146 MG/DL (ref 70–99)
GLUCOSE BLDC GLUCOMTR-MCNC: 149 MG/DL (ref 70–99)
GLUCOSE BLDC GLUCOMTR-MCNC: 157 MG/DL (ref 70–99)
GLUCOSE BLDC GLUCOMTR-MCNC: 162 MG/DL (ref 70–99)
GLUCOSE BLDC GLUCOMTR-MCNC: 163 MG/DL (ref 70–99)
GLUCOSE BLDC GLUCOMTR-MCNC: 166 MG/DL (ref 70–99)
GLUCOSE BLDC GLUCOMTR-MCNC: 170 MG/DL (ref 70–99)
GLUCOSE BLDC GLUCOMTR-MCNC: 180 MG/DL (ref 70–99)
GLUCOSE BLDC GLUCOMTR-MCNC: 180 MG/DL (ref 70–99)
GLUCOSE BLDC GLUCOMTR-MCNC: 224 MG/DL (ref 70–99)
GLUCOSE BLDC GLUCOMTR-MCNC: 76 MG/DL (ref 70–99)
GLUCOSE BLDC GLUCOMTR-MCNC: 85 MG/DL (ref 70–99)
GLUCOSE BLDC GLUCOMTR-MCNC: 85 MG/DL (ref 70–99)
GLUCOSE BLDC GLUCOMTR-MCNC: 92 MG/DL (ref 70–99)
GLUCOSE BLDC GLUCOMTR-MCNC: 92 MG/DL (ref 70–99)
GLUCOSE BLDC GLUCOMTR-MCNC: 95 MG/DL (ref 70–99)
GLUCOSE BLDC GLUCOMTR-MCNC: 96 MG/DL (ref 70–99)
GLUCOSE BLDC GLUCOMTR-MCNC: 96 MG/DL (ref 70–99)
GLUCOSE SERPL-MCNC: 100 MG/DL (ref 70–99)
GLUCOSE SERPL-MCNC: 102 MG/DL (ref 70–99)
GLUCOSE SERPL-MCNC: 102 MG/DL (ref 70–99)
GLUCOSE SERPL-MCNC: 103 MG/DL (ref 70–99)
GLUCOSE SERPL-MCNC: 104 MG/DL (ref 70–99)
GLUCOSE SERPL-MCNC: 104 MG/DL (ref 70–99)
GLUCOSE SERPL-MCNC: 106 MG/DL (ref 70–99)
GLUCOSE SERPL-MCNC: 107 MG/DL (ref 70–99)
GLUCOSE SERPL-MCNC: 110 MG/DL (ref 70–99)
GLUCOSE SERPL-MCNC: 118 MG/DL (ref 70–99)
GLUCOSE SERPL-MCNC: 120 MG/DL (ref 70–99)
GLUCOSE SERPL-MCNC: 120 MG/DL (ref 70–99)
GLUCOSE SERPL-MCNC: 122 MG/DL (ref 70–99)
GLUCOSE SERPL-MCNC: 131 MG/DL (ref 70–99)
GLUCOSE SERPL-MCNC: 132 MG/DL (ref 70–99)
GLUCOSE SERPL-MCNC: 132 MG/DL (ref 70–99)
GLUCOSE SERPL-MCNC: 133 MG/DL (ref 70–99)
GLUCOSE SERPL-MCNC: 136 MG/DL (ref 70–99)
GLUCOSE SERPL-MCNC: 137 MG/DL (ref 70–99)
GLUCOSE SERPL-MCNC: 138 MG/DL (ref 70–99)
GLUCOSE SERPL-MCNC: 139 MG/DL (ref 70–99)
GLUCOSE SERPL-MCNC: 145 MG/DL (ref 70–99)
GLUCOSE SERPL-MCNC: 146 MG/DL (ref 70–99)
GLUCOSE SERPL-MCNC: 150 MG/DL (ref 70–99)
GLUCOSE SERPL-MCNC: 150 MG/DL (ref 70–99)
GLUCOSE SERPL-MCNC: 161 MG/DL (ref 70–99)
GLUCOSE SERPL-MCNC: 165 MG/DL (ref 70–99)
GLUCOSE SERPL-MCNC: 175 MG/DL (ref 70–99)
GLUCOSE SERPL-MCNC: 177 MG/DL (ref 70–99)
GLUCOSE SERPL-MCNC: 187 MG/DL (ref 70–99)
GLUCOSE SERPL-MCNC: 83 MG/DL (ref 70–99)
GLUCOSE SERPL-MCNC: 88 MG/DL (ref 70–99)
GLUCOSE SERPL-MCNC: 89 MG/DL (ref 70–99)
GLUCOSE SERPL-MCNC: 91 MG/DL (ref 70–99)
GLUCOSE SERPL-MCNC: 94 MG/DL (ref 70–99)
GLUCOSE SERPL-MCNC: 98 MG/DL (ref 70–99)
GLUCOSE UR STRIP-MCNC: NEGATIVE MG/DL
GRAM STN SPEC: ABNORMAL
GRAM STN SPEC: ABNORMAL
GRAM STN SPEC: NORMAL
HBA1C MFR BLD: NORMAL % (ref 4.3–6)
HCO3 BLD-SCNC: 15 MMOL/L (ref 21–28)
HCO3 BLD-SCNC: 17 MMOL/L (ref 21–28)
HCO3 BLD-SCNC: 18 MMOL/L (ref 21–28)
HCO3 BLD-SCNC: 19 MMOL/L (ref 21–28)
HCO3 BLD-SCNC: 20 MMOL/L (ref 21–28)
HCO3 BLD-SCNC: 20 MMOL/L (ref 21–28)
HCO3 BLD-SCNC: 22 MMOL/L (ref 21–28)
HCO3 BLD-SCNC: 22 MMOL/L (ref 21–28)
HCO3 BLD-SCNC: 23 MMOL/L (ref 21–28)
HCO3 BLD-SCNC: 26 MMOL/L (ref 21–28)
HCO3 BLD-SCNC: 27 MMOL/L (ref 21–28)
HCO3 BLD-SCNC: 29 MMOL/L (ref 21–28)
HCO3 BLD-SCNC: 31 MMOL/L (ref 21–28)
HCO3 BLD-SCNC: 31 MMOL/L (ref 21–28)
HCO3 BLDV-SCNC: 24 MMOL/L (ref 21–28)
HCO3 BLDV-SCNC: 26 MMOL/L (ref 21–28)
HCO3 BLDV-SCNC: 26 MMOL/L (ref 21–28)
HCO3 BLDV-SCNC: 27 MMOL/L (ref 21–28)
HCT VFR BLD AUTO: 19.6 % (ref 35–47)
HCT VFR BLD AUTO: 21.5 % (ref 35–47)
HCT VFR BLD AUTO: 21.7 % (ref 35–47)
HCT VFR BLD AUTO: 21.9 % (ref 35–47)
HCT VFR BLD AUTO: 22.1 % (ref 35–47)
HCT VFR BLD AUTO: 22.5 % (ref 35–47)
HCT VFR BLD AUTO: 22.5 % (ref 35–47)
HCT VFR BLD AUTO: 22.8 % (ref 35–47)
HCT VFR BLD AUTO: 22.8 % (ref 35–47)
HCT VFR BLD AUTO: 22.9 % (ref 35–47)
HCT VFR BLD AUTO: 23.1 % (ref 35–47)
HCT VFR BLD AUTO: 23.8 % (ref 35–47)
HCT VFR BLD AUTO: 23.9 % (ref 35–47)
HCT VFR BLD AUTO: 24 % (ref 35–47)
HCT VFR BLD AUTO: 24.1 % (ref 35–47)
HCT VFR BLD AUTO: 24.2 % (ref 35–47)
HCT VFR BLD AUTO: 24.4 % (ref 35–47)
HCT VFR BLD AUTO: 24.4 % (ref 35–47)
HCT VFR BLD AUTO: 24.7 % (ref 35–47)
HCT VFR BLD AUTO: 24.7 % (ref 35–47)
HCT VFR BLD AUTO: 24.9 % (ref 35–47)
HCT VFR BLD AUTO: 25.1 % (ref 35–47)
HCT VFR BLD AUTO: 25.2 % (ref 35–47)
HCT VFR BLD AUTO: 25.4 % (ref 35–47)
HCT VFR BLD AUTO: 25.5 % (ref 35–47)
HCT VFR BLD AUTO: 25.7 % (ref 35–47)
HCT VFR BLD AUTO: 25.9 % (ref 35–47)
HCT VFR BLD AUTO: 26.2 % (ref 35–47)
HCT VFR BLD AUTO: 26.5 % (ref 35–47)
HCT VFR BLD AUTO: 26.5 % (ref 35–47)
HCT VFR BLD AUTO: 26.7 % (ref 35–47)
HCT VFR BLD AUTO: 27 % (ref 35–47)
HCT VFR BLD AUTO: 27.5 % (ref 35–47)
HCT VFR BLD AUTO: 29.5 % (ref 35–47)
HCT VFR BLD AUTO: 34.3 % (ref 35–47)
HCT VFR BLD AUTO: 35.9 % (ref 35–47)
HCT VFR BLD AUTO: 40.4 % (ref 35–47)
HGB BLD-MCNC: 10.9 G/DL (ref 11.7–15.7)
HGB BLD-MCNC: 11.2 G/DL (ref 11.7–15.7)
HGB BLD-MCNC: 11.7 G/DL (ref 11.7–15.7)
HGB BLD-MCNC: 13.6 G/DL (ref 11.7–15.7)
HGB BLD-MCNC: 5.6 G/DL (ref 11.7–15.7)
HGB BLD-MCNC: 6.3 G/DL (ref 11.7–15.7)
HGB BLD-MCNC: 6.9 G/DL (ref 11.7–15.7)
HGB BLD-MCNC: 7.1 G/DL (ref 11.7–15.7)
HGB BLD-MCNC: 7.2 G/DL (ref 11.7–15.7)
HGB BLD-MCNC: 7.3 G/DL (ref 11.7–15.7)
HGB BLD-MCNC: 7.4 G/DL (ref 11.7–15.7)
HGB BLD-MCNC: 7.5 G/DL (ref 11.7–15.7)
HGB BLD-MCNC: 7.6 G/DL (ref 11.7–15.7)
HGB BLD-MCNC: 7.6 G/DL (ref 11.7–15.7)
HGB BLD-MCNC: 7.7 G/DL (ref 11.7–15.7)
HGB BLD-MCNC: 7.8 G/DL (ref 11.7–15.7)
HGB BLD-MCNC: 7.9 G/DL (ref 11.7–15.7)
HGB BLD-MCNC: 7.9 G/DL (ref 11.7–15.7)
HGB BLD-MCNC: 8 G/DL (ref 11.7–15.7)
HGB BLD-MCNC: 8.1 G/DL (ref 11.7–15.7)
HGB BLD-MCNC: 8.1 G/DL (ref 11.7–15.7)
HGB BLD-MCNC: 8.2 G/DL (ref 11.7–15.7)
HGB BLD-MCNC: 8.3 G/DL (ref 11.7–15.7)
HGB BLD-MCNC: 8.4 G/DL (ref 11.7–15.7)
HGB BLD-MCNC: 8.7 G/DL (ref 11.7–15.7)
HGB BLD-MCNC: 8.7 G/DL (ref 11.7–15.7)
HGB BLD-MCNC: 8.9 G/DL (ref 11.7–15.7)
HGB BLD-MCNC: 9.1 G/DL (ref 11.7–15.7)
HGB BLD-MCNC: 9.3 G/DL (ref 11.7–15.7)
HGB BLD-MCNC: 9.6 G/DL (ref 11.7–15.7)
HGB UR QL STRIP: NEGATIVE
HYALINE CASTS #/AREA URNS LPF: 16 /LPF (ref 0–2)
IMM GRANULOCYTES # BLD: 0 10E9/L (ref 0–0.4)
IMM GRANULOCYTES # BLD: 0 10E9/L (ref 0–0.4)
IMM GRANULOCYTES # BLD: 0.5 10E9/L (ref 0–0.4)
IMM GRANULOCYTES NFR BLD: 0.2 %
IMM GRANULOCYTES NFR BLD: 0.2 %
IMM GRANULOCYTES NFR BLD: 2.4 %
INR PPP: 1.08 (ref 0.86–1.14)
INR PPP: 1.1 (ref 0.86–1.14)
INR PPP: 1.12 (ref 0.86–1.14)
INR PPP: 1.19 (ref 0.86–1.14)
INTERPRETATION ECG - MUSE: NORMAL
K TIME TO SPEC CLOT STRENGTH: 1.2 MINUTE (ref 1–3)
KETONES UR STRIP-MCNC: NEGATIVE MG/DL
LACTATE BLD-SCNC: 0.5 MMOL/L (ref 0.7–2)
LACTATE BLD-SCNC: 0.6 MMOL/L (ref 0.7–2)
LACTATE BLD-SCNC: 0.8 MMOL/L (ref 0.7–2)
LACTATE BLD-SCNC: 0.9 MMOL/L (ref 0.7–2)
LACTATE BLD-SCNC: 0.9 MMOL/L (ref 0.7–2.1)
LACTATE BLD-SCNC: 1 MMOL/L (ref 0.7–2)
LACTATE BLD-SCNC: 1.1 MMOL/L (ref 0.7–2)
LACTATE BLD-SCNC: 1.1 MMOL/L (ref 0.7–2)
LACTATE BLD-SCNC: 1.8 MMOL/L (ref 0.7–2)
LACTATE BLD-SCNC: 2.1 MMOL/L (ref 0.7–2)
LACTATE BLD-SCNC: 2.6 MMOL/L (ref 0.7–2)
LACTATE BLD-SCNC: 2.9 MMOL/L (ref 0.7–2)
LEUKOCYTE ESTERASE UR QL STRIP: ABNORMAL
LEUKOCYTE ESTERASE UR QL STRIP: ABNORMAL
LEUKOCYTE ESTERASE UR QL STRIP: NEGATIVE
LEUKOCYTE ESTERASE UR QL STRIP: NEGATIVE
LIPASE SERPL-CCNC: 24 U/L (ref 73–393)
LIPASE SERPL-CCNC: 449 U/L (ref 73–393)
LIPASE SERPL-CCNC: 699 U/L (ref 73–393)
LY30 LYSIS AT 30 MINUTES: 0 % (ref 0–8)
LY60 LYSIS AT 60 MINUTES: 0.4 % (ref 0–15)
LYMPHOCYTES # BLD AUTO: 0.2 10E9/L (ref 0.8–5.3)
LYMPHOCYTES # BLD AUTO: 0.5 10E9/L (ref 0.8–5.3)
LYMPHOCYTES # BLD AUTO: 0.8 10E9/L (ref 0.8–5.3)
LYMPHOCYTES # BLD AUTO: 1.4 10E9/L (ref 0.8–5.3)
LYMPHOCYTES # BLD AUTO: 2 10E9/L (ref 0.8–5.3)
LYMPHOCYTES NFR BLD AUTO: 0.9 %
LYMPHOCYTES NFR BLD AUTO: 11.4 %
LYMPHOCYTES NFR BLD AUTO: 2.6 %
LYMPHOCYTES NFR BLD AUTO: 2.6 %
LYMPHOCYTES NFR BLD AUTO: 24 %
Lab: NORMAL
MA MAXIMUM CLOT STRENGTH: 65.2 MM (ref 50–70)
MACROCYTES BLD QL SMEAR: PRESENT
MACROCYTES BLD QL SMEAR: PRESENT
MAGNESIUM SERPL-MCNC: 1.2 MG/DL (ref 1.6–2.3)
MAGNESIUM SERPL-MCNC: 1.4 MG/DL (ref 1.6–2.3)
MAGNESIUM SERPL-MCNC: 1.5 MG/DL (ref 1.6–2.3)
MAGNESIUM SERPL-MCNC: 1.6 MG/DL (ref 1.6–2.3)
MAGNESIUM SERPL-MCNC: 1.6 MG/DL (ref 1.6–2.3)
MAGNESIUM SERPL-MCNC: 1.7 MG/DL (ref 1.6–2.3)
MAGNESIUM SERPL-MCNC: 1.8 MG/DL (ref 1.6–2.3)
MAGNESIUM SERPL-MCNC: 1.9 MG/DL (ref 1.6–2.3)
MAGNESIUM SERPL-MCNC: 1.9 MG/DL (ref 1.6–2.3)
MAGNESIUM SERPL-MCNC: 2 MG/DL (ref 1.6–2.3)
MAGNESIUM SERPL-MCNC: 2.1 MG/DL (ref 1.6–2.3)
MAGNESIUM SERPL-MCNC: 2.2 MG/DL (ref 1.6–2.3)
MAGNESIUM SERPL-MCNC: 2.3 MG/DL (ref 1.6–2.3)
MAGNESIUM SERPL-MCNC: 2.3 MG/DL (ref 1.6–2.3)
MAGNESIUM SERPL-MCNC: 2.7 MG/DL (ref 1.6–2.3)
MAGNESIUM SERPL-MCNC: 3.1 MG/DL (ref 1.6–2.3)
MCH RBC QN AUTO: 30.4 PG (ref 26.5–33)
MCH RBC QN AUTO: 30.8 PG (ref 26.5–33)
MCH RBC QN AUTO: 30.9 PG (ref 26.5–33)
MCH RBC QN AUTO: 32 PG (ref 26.5–33)
MCH RBC QN AUTO: 32 PG (ref 26.5–33)
MCH RBC QN AUTO: 32.2 PG (ref 26.5–33)
MCH RBC QN AUTO: 32.4 PG (ref 26.5–33)
MCH RBC QN AUTO: 32.4 PG (ref 26.5–33)
MCH RBC QN AUTO: 32.5 PG (ref 26.5–33)
MCH RBC QN AUTO: 32.6 PG (ref 26.5–33)
MCH RBC QN AUTO: 32.7 PG (ref 26.5–33)
MCH RBC QN AUTO: 33.2 PG (ref 26.5–33)
MCH RBC QN AUTO: 33.3 PG (ref 26.5–33)
MCH RBC QN AUTO: 33.5 PG (ref 26.5–33)
MCH RBC QN AUTO: 33.6 PG (ref 26.5–33)
MCH RBC QN AUTO: 33.8 PG (ref 26.5–33)
MCH RBC QN AUTO: 33.9 PG (ref 26.5–33)
MCH RBC QN AUTO: 34 PG (ref 26.5–33)
MCH RBC QN AUTO: 34.1 PG (ref 26.5–33)
MCH RBC QN AUTO: 34.2 PG (ref 26.5–33)
MCH RBC QN AUTO: 34.3 PG (ref 26.5–33)
MCH RBC QN AUTO: 34.4 PG (ref 26.5–33)
MCH RBC QN AUTO: 34.5 PG (ref 26.5–33)
MCH RBC QN AUTO: 34.6 PG (ref 26.5–33)
MCH RBC QN AUTO: 34.8 PG (ref 26.5–33)
MCH RBC QN AUTO: 34.9 PG (ref 26.5–33)
MCH RBC QN AUTO: 35.8 PG (ref 26.5–33)
MCH RBC QN AUTO: 35.9 PG (ref 26.5–33)
MCH RBC QN AUTO: 36.2 PG (ref 26.5–33)
MCH RBC QN AUTO: 36.3 PG (ref 26.5–33)
MCH RBC QN AUTO: 36.4 PG (ref 26.5–33)
MCH RBC QN AUTO: 36.4 PG (ref 26.5–33)
MCH RBC QN AUTO: 36.5 PG (ref 26.5–33)
MCH RBC QN AUTO: 36.7 PG (ref 26.5–33)
MCH RBC QN AUTO: 37.2 PG (ref 26.5–33)
MCHC RBC AUTO-ENTMCNC: 30.5 G/DL (ref 31.5–36.5)
MCHC RBC AUTO-ENTMCNC: 30.7 G/DL (ref 31.5–36.5)
MCHC RBC AUTO-ENTMCNC: 30.8 G/DL (ref 31.5–36.5)
MCHC RBC AUTO-ENTMCNC: 30.9 G/DL (ref 31.5–36.5)
MCHC RBC AUTO-ENTMCNC: 31 G/DL (ref 31.5–36.5)
MCHC RBC AUTO-ENTMCNC: 31.2 G/DL (ref 31.5–36.5)
MCHC RBC AUTO-ENTMCNC: 31.3 G/DL (ref 31.5–36.5)
MCHC RBC AUTO-ENTMCNC: 31.5 G/DL (ref 31.5–36.5)
MCHC RBC AUTO-ENTMCNC: 31.6 G/DL (ref 31.5–36.5)
MCHC RBC AUTO-ENTMCNC: 31.7 G/DL (ref 31.5–36.5)
MCHC RBC AUTO-ENTMCNC: 31.8 G/DL (ref 31.5–36.5)
MCHC RBC AUTO-ENTMCNC: 31.8 G/DL (ref 31.5–36.5)
MCHC RBC AUTO-ENTMCNC: 31.9 G/DL (ref 31.5–36.5)
MCHC RBC AUTO-ENTMCNC: 32.1 G/DL (ref 31.5–36.5)
MCHC RBC AUTO-ENTMCNC: 32.2 G/DL (ref 31.5–36.5)
MCHC RBC AUTO-ENTMCNC: 32.3 G/DL (ref 31.5–36.5)
MCHC RBC AUTO-ENTMCNC: 32.4 G/DL (ref 31.5–36.5)
MCHC RBC AUTO-ENTMCNC: 32.4 G/DL (ref 31.5–36.5)
MCHC RBC AUTO-ENTMCNC: 32.6 G/DL (ref 31.5–36.5)
MCHC RBC AUTO-ENTMCNC: 32.8 G/DL (ref 31.5–36.5)
MCHC RBC AUTO-ENTMCNC: 32.9 G/DL (ref 31.5–36.5)
MCHC RBC AUTO-ENTMCNC: 32.9 G/DL (ref 31.5–36.5)
MCHC RBC AUTO-ENTMCNC: 33 G/DL (ref 31.5–36.5)
MCHC RBC AUTO-ENTMCNC: 33.1 G/DL (ref 31.5–36.5)
MCHC RBC AUTO-ENTMCNC: 33.1 G/DL (ref 31.5–36.5)
MCHC RBC AUTO-ENTMCNC: 33.3 G/DL (ref 31.5–36.5)
MCHC RBC AUTO-ENTMCNC: 33.5 G/DL (ref 31.5–36.5)
MCHC RBC AUTO-ENTMCNC: 33.5 G/DL (ref 31.5–36.5)
MCHC RBC AUTO-ENTMCNC: 33.6 G/DL (ref 31.5–36.5)
MCHC RBC AUTO-ENTMCNC: 33.6 G/DL (ref 31.5–36.5)
MCHC RBC AUTO-ENTMCNC: 33.7 G/DL (ref 31.5–36.5)
MCHC RBC AUTO-ENTMCNC: 33.8 G/DL (ref 31.5–36.5)
MCHC RBC AUTO-ENTMCNC: 34 G/DL (ref 31.5–36.5)
MCV RBC AUTO: 100 FL (ref 78–100)
MCV RBC AUTO: 101 FL (ref 78–100)
MCV RBC AUTO: 102 FL (ref 78–100)
MCV RBC AUTO: 102 FL (ref 78–100)
MCV RBC AUTO: 103 FL (ref 78–100)
MCV RBC AUTO: 104 FL (ref 78–100)
MCV RBC AUTO: 105 FL (ref 78–100)
MCV RBC AUTO: 106 FL (ref 78–100)
MCV RBC AUTO: 111 FL (ref 78–100)
MCV RBC AUTO: 114 FL (ref 78–100)
MCV RBC AUTO: 115 FL (ref 78–100)
MCV RBC AUTO: 116 FL (ref 78–100)
MCV RBC AUTO: 117 FL (ref 78–100)
MCV RBC AUTO: 117 FL (ref 78–100)
MCV RBC AUTO: 118 FL (ref 78–100)
MCV RBC AUTO: 92 FL (ref 78–100)
MCV RBC AUTO: 93 FL (ref 78–100)
MCV RBC AUTO: 97 FL (ref 78–100)
MCV RBC AUTO: 97 FL (ref 78–100)
METAMYELOCYTES # BLD: 0.3 10E9/L
METAMYELOCYTES NFR BLD MANUAL: 0.9 %
MONOCYTES # BLD AUTO: 0.5 10E9/L (ref 0–1.3)
MONOCYTES # BLD AUTO: 0.9 10E9/L (ref 0–1.3)
MONOCYTES # BLD AUTO: 1.1 10E9/L (ref 0–1.3)
MONOCYTES # BLD AUTO: 1.5 10E9/L (ref 0–1.3)
MONOCYTES # BLD AUTO: 1.6 10E9/L (ref 0–1.3)
MONOCYTES NFR BLD AUTO: 1.7 %
MONOCYTES NFR BLD AUTO: 13 %
MONOCYTES NFR BLD AUTO: 14 %
MONOCYTES NFR BLD AUTO: 3.5 %
MONOCYTES NFR BLD AUTO: 7.2 %
MRSA DNA SPEC QL NAA+PROBE: NEGATIVE
MUCOUS THREADS #/AREA URNS LPF: PRESENT /LPF
MUCOUS THREADS #/AREA URNS LPF: PRESENT /LPF
MYELOCYTES # BLD: 0.5 10E9/L
MYELOCYTES NFR BLD MANUAL: 1.7 %
NEUTROPHILS # BLD AUTO: 18.1 10E9/L (ref 1.6–8.3)
NEUTROPHILS # BLD AUTO: 24.7 10E9/L (ref 1.6–8.3)
NEUTROPHILS # BLD AUTO: 27.3 10E9/L (ref 1.6–8.3)
NEUTROPHILS # BLD AUTO: 4.9 10E9/L (ref 1.6–8.3)
NEUTROPHILS # BLD AUTO: 8.9 10E9/L (ref 1.6–8.3)
NEUTROPHILS NFR BLD AUTO: 60.2 %
NEUTROPHILS NFR BLD AUTO: 73.8 %
NEUTROPHILS NFR BLD AUTO: 87.7 %
NEUTROPHILS NFR BLD AUTO: 91.3 %
NEUTROPHILS NFR BLD AUTO: 93.9 %
NITRATE UR QL: NEGATIVE
NOROV GI+II ORF1-ORF2 JNC STL QL NAA+PR: NOT DETECTED
NRBC # BLD AUTO: 0.1 10*3/UL
NRBC BLD AUTO-RTO: 0 /100
NUM BPU REQUESTED: 1
NUM BPU REQUESTED: 14
NUM BPU REQUESTED: 2
NUM BPU REQUESTED: 3
O2/TOTAL GAS SETTING VFR VENT: 100 %
O2/TOTAL GAS SETTING VFR VENT: 40 %
O2/TOTAL GAS SETTING VFR VENT: 45 %
O2/TOTAL GAS SETTING VFR VENT: 50 %
O2/TOTAL GAS SETTING VFR VENT: 50 %
O2/TOTAL GAS SETTING VFR VENT: 60 %
O2/TOTAL GAS SETTING VFR VENT: 60 %
O2/TOTAL GAS SETTING VFR VENT: 70 %
O2/TOTAL GAS SETTING VFR VENT: NORMAL %
OXYHGB MFR BLD: 93 % (ref 92–100)
OXYHGB MFR BLDV: 27 %
PCO2 BLD: 29 MM HG (ref 35–45)
PCO2 BLD: 33 MM HG (ref 35–45)
PCO2 BLD: 34 MM HG (ref 35–45)
PCO2 BLD: 36 MM HG (ref 35–45)
PCO2 BLD: 38 MM HG (ref 35–45)
PCO2 BLD: 39 MM HG (ref 35–45)
PCO2 BLD: 39 MM HG (ref 35–45)
PCO2 BLD: 40 MM HG (ref 35–45)
PCO2 BLD: 40 MM HG (ref 35–45)
PCO2 BLD: 41 MM HG (ref 35–45)
PCO2 BLD: 41 MM HG (ref 35–45)
PCO2 BLD: 42 MM HG (ref 35–45)
PCO2 BLD: 42 MM HG (ref 35–45)
PCO2 BLD: 43 MM HG (ref 35–45)
PCO2 BLD: 44 MM HG (ref 35–45)
PCO2 BLD: 44 MM HG (ref 35–45)
PCO2 BLD: 45 MM HG (ref 35–45)
PCO2 BLD: 45 MM HG (ref 35–45)
PCO2 BLD: 49 MM HG (ref 35–45)
PCO2 BLD: 52 MM HG (ref 35–45)
PCO2 BLD: 54 MM HG (ref 35–45)
PCO2 BLD: 64 MM HG (ref 35–45)
PCO2 BLDV: 33 MM HG (ref 40–50)
PCO2 BLDV: 40 MM HG (ref 40–50)
PCO2 BLDV: 42 MM HG (ref 40–50)
PCO2 BLDV: 55 MM HG (ref 40–50)
PCO2 BLDV: 64 MM HG (ref 40–50)
PH BLD: 7.17 PH (ref 7.35–7.45)
PH BLD: 7.22 PH (ref 7.35–7.45)
PH BLD: 7.23 PH (ref 7.35–7.45)
PH BLD: 7.24 PH (ref 7.35–7.45)
PH BLD: 7.24 PH (ref 7.35–7.45)
PH BLD: 7.25 PH (ref 7.35–7.45)
PH BLD: 7.26 PH (ref 7.35–7.45)
PH BLD: 7.26 PH (ref 7.35–7.45)
PH BLD: 7.27 PH (ref 7.35–7.45)
PH BLD: 7.3 PH (ref 7.35–7.45)
PH BLD: 7.3 PH (ref 7.35–7.45)
PH BLD: 7.33 PH (ref 7.35–7.45)
PH BLD: 7.38 PH (ref 7.35–7.45)
PH BLD: 7.39 PH (ref 7.35–7.45)
PH BLD: 7.39 PH (ref 7.35–7.45)
PH BLD: 7.41 PH (ref 7.35–7.45)
PH BLD: 7.42 PH (ref 7.35–7.45)
PH BLD: 7.42 PH (ref 7.35–7.45)
PH BLD: 7.44 PH (ref 7.35–7.45)
PH BLD: 7.46 PH (ref 7.35–7.45)
PH BLD: 7.49 PH (ref 7.35–7.45)
PH BLD: 7.51 PH (ref 7.35–7.45)
PH BLDV: 7.18 PH (ref 7.32–7.43)
PH BLDV: 7.29 PH (ref 7.32–7.43)
PH BLDV: 7.4 PH (ref 7.32–7.43)
PH BLDV: 7.42 PH (ref 7.32–7.43)
PH BLDV: 7.58 PH (ref 7.32–7.43)
PH UR STRIP: 6 PH (ref 5–7)
PH UR STRIP: 8 PH (ref 5–7)
PHOSPHATE SERPL-MCNC: 2.2 MG/DL (ref 2.5–4.5)
PHOSPHATE SERPL-MCNC: 2.2 MG/DL (ref 2.5–4.5)
PHOSPHATE SERPL-MCNC: 2.4 MG/DL (ref 2.5–4.5)
PHOSPHATE SERPL-MCNC: 2.5 MG/DL (ref 2.5–4.5)
PHOSPHATE SERPL-MCNC: 2.6 MG/DL (ref 2.5–4.5)
PHOSPHATE SERPL-MCNC: 2.7 MG/DL (ref 2.5–4.5)
PHOSPHATE SERPL-MCNC: 2.7 MG/DL (ref 2.5–4.5)
PHOSPHATE SERPL-MCNC: 2.8 MG/DL (ref 2.5–4.5)
PHOSPHATE SERPL-MCNC: 2.8 MG/DL (ref 2.5–4.5)
PHOSPHATE SERPL-MCNC: 3.3 MG/DL (ref 2.5–4.5)
PHOSPHATE SERPL-MCNC: 3.4 MG/DL (ref 2.5–4.5)
PHOSPHATE SERPL-MCNC: 3.5 MG/DL (ref 2.5–4.5)
PHOSPHATE SERPL-MCNC: 3.7 MG/DL (ref 2.5–4.5)
PHOSPHATE SERPL-MCNC: 3.7 MG/DL (ref 2.5–4.5)
PHOSPHATE SERPL-MCNC: 3.8 MG/DL (ref 2.5–4.5)
PHOSPHATE SERPL-MCNC: 3.9 MG/DL (ref 2.5–4.5)
PHOSPHATE SERPL-MCNC: 3.9 MG/DL (ref 2.5–4.5)
PHOSPHATE SERPL-MCNC: 4 MG/DL (ref 2.5–4.5)
PHOSPHATE SERPL-MCNC: 4.1 MG/DL (ref 2.5–4.5)
PHOSPHATE SERPL-MCNC: 4.2 MG/DL (ref 2.5–4.5)
PHOSPHATE SERPL-MCNC: 4.3 MG/DL (ref 2.5–4.5)
PHOSPHATE SERPL-MCNC: 4.6 MG/DL (ref 2.5–4.5)
PHOSPHATE SERPL-MCNC: 4.8 MG/DL (ref 2.5–4.5)
PHOSPHATE SERPL-MCNC: 5.2 MG/DL (ref 2.5–4.5)
PLATELET # BLD AUTO: 111 10E9/L (ref 150–450)
PLATELET # BLD AUTO: 126 10E9/L (ref 150–450)
PLATELET # BLD AUTO: 135 10E9/L (ref 150–450)
PLATELET # BLD AUTO: 139 10E9/L (ref 150–450)
PLATELET # BLD AUTO: 144 10E9/L (ref 150–450)
PLATELET # BLD AUTO: 148 10E9/L (ref 150–450)
PLATELET # BLD AUTO: 156 10E9/L (ref 150–450)
PLATELET # BLD AUTO: 160 10E9/L (ref 150–450)
PLATELET # BLD AUTO: 161 10E9/L (ref 150–450)
PLATELET # BLD AUTO: 165 10E9/L (ref 150–450)
PLATELET # BLD AUTO: 170 10E9/L (ref 150–450)
PLATELET # BLD AUTO: 177 10E9/L (ref 150–450)
PLATELET # BLD AUTO: 182 10E9/L (ref 150–450)
PLATELET # BLD AUTO: 186 10E9/L (ref 150–450)
PLATELET # BLD AUTO: 200 10E9/L (ref 150–450)
PLATELET # BLD AUTO: 201 10E9/L (ref 150–450)
PLATELET # BLD AUTO: 204 10E9/L (ref 150–450)
PLATELET # BLD AUTO: 206 10E9/L (ref 150–450)
PLATELET # BLD AUTO: 213 10E9/L (ref 150–450)
PLATELET # BLD AUTO: 222 10E9/L (ref 150–450)
PLATELET # BLD AUTO: 223 10E9/L (ref 150–450)
PLATELET # BLD AUTO: 224 10E9/L (ref 150–450)
PLATELET # BLD AUTO: 233 10E9/L (ref 150–450)
PLATELET # BLD AUTO: 237 10E9/L (ref 150–450)
PLATELET # BLD AUTO: 243 10E9/L (ref 150–450)
PLATELET # BLD AUTO: 245 10E9/L (ref 150–450)
PLATELET # BLD AUTO: 246 10E9/L (ref 150–450)
PLATELET # BLD AUTO: 246 10E9/L (ref 150–450)
PLATELET # BLD AUTO: 247 10E9/L (ref 150–450)
PLATELET # BLD AUTO: 248 10E9/L (ref 150–450)
PLATELET # BLD AUTO: 254 10E9/L (ref 150–450)
PLATELET # BLD AUTO: 257 10E9/L (ref 150–450)
PLATELET # BLD AUTO: 262 10E9/L (ref 150–450)
PLATELET # BLD AUTO: 277 10E9/L (ref 150–450)
PLATELET # BLD AUTO: 282 10E9/L (ref 150–450)
PLATELET # BLD AUTO: 284 10E9/L (ref 150–450)
PLATELET # BLD AUTO: 288 10E9/L (ref 150–450)
PLATELET # BLD AUTO: 288 10E9/L (ref 150–450)
PLATELET # BLD AUTO: 304 10E9/L (ref 150–450)
PLATELET # BLD EST: ABNORMAL 10*3/UL
PLATELET # BLD EST: ABNORMAL 10*3/UL
PO2 BLD: 101 MM HG (ref 80–105)
PO2 BLD: 101 MM HG (ref 80–105)
PO2 BLD: 104 MM HG (ref 80–105)
PO2 BLD: 109 MM HG (ref 80–105)
PO2 BLD: 114 MM HG (ref 80–105)
PO2 BLD: 190 MM HG (ref 80–105)
PO2 BLD: 369 MM HG (ref 80–105)
PO2 BLD: 47 MM HG (ref 80–105)
PO2 BLD: 57 MM HG (ref 80–105)
PO2 BLD: 60 MM HG (ref 80–105)
PO2 BLD: 60 MM HG (ref 80–105)
PO2 BLD: 61 MM HG (ref 80–105)
PO2 BLD: 61 MM HG (ref 80–105)
PO2 BLD: 62 MM HG (ref 80–105)
PO2 BLD: 72 MM HG (ref 80–105)
PO2 BLD: 75 MM HG (ref 80–105)
PO2 BLD: 76 MM HG (ref 80–105)
PO2 BLD: 80 MM HG (ref 80–105)
PO2 BLD: 81 MM HG (ref 80–105)
PO2 BLD: 82 MM HG (ref 80–105)
PO2 BLD: 83 MM HG (ref 80–105)
PO2 BLD: 84 MM HG (ref 80–105)
PO2 BLD: 85 MM HG (ref 80–105)
PO2 BLD: 89 MM HG (ref 80–105)
PO2 BLD: 97 MM HG (ref 80–105)
PO2 BLDV: 25 MM HG (ref 25–47)
PO2 BLDV: 32 MM HG (ref 25–47)
PO2 BLDV: 39 MM HG (ref 25–47)
PO2 BLDV: 40 MM HG (ref 25–47)
PO2 BLDV: 41 MM HG (ref 25–47)
POTASSIUM BLD-SCNC: 3.1 MMOL/L (ref 3.4–5.3)
POTASSIUM BLD-SCNC: 3.7 MMOL/L (ref 3.4–5.3)
POTASSIUM BLD-SCNC: 3.9 MMOL/L (ref 3.4–5.3)
POTASSIUM BLD-SCNC: 4.5 MMOL/L (ref 3.4–5.3)
POTASSIUM SERPL-SCNC: 2.6 MMOL/L (ref 3.4–5.3)
POTASSIUM SERPL-SCNC: 2.9 MMOL/L (ref 3.4–5.3)
POTASSIUM SERPL-SCNC: 2.9 MMOL/L (ref 3.4–5.3)
POTASSIUM SERPL-SCNC: 3.1 MMOL/L (ref 3.4–5.3)
POTASSIUM SERPL-SCNC: 3.2 MMOL/L (ref 3.4–5.3)
POTASSIUM SERPL-SCNC: 3.2 MMOL/L (ref 3.4–5.3)
POTASSIUM SERPL-SCNC: 3.3 MMOL/L (ref 3.4–5.3)
POTASSIUM SERPL-SCNC: 3.4 MMOL/L (ref 3.4–5.3)
POTASSIUM SERPL-SCNC: 3.4 MMOL/L (ref 3.4–5.3)
POTASSIUM SERPL-SCNC: 3.5 MMOL/L (ref 3.4–5.3)
POTASSIUM SERPL-SCNC: 3.5 MMOL/L (ref 3.4–5.3)
POTASSIUM SERPL-SCNC: 3.6 MMOL/L (ref 3.4–5.3)
POTASSIUM SERPL-SCNC: 3.7 MMOL/L (ref 3.4–5.3)
POTASSIUM SERPL-SCNC: 3.8 MMOL/L (ref 3.4–5.3)
POTASSIUM SERPL-SCNC: 3.9 MMOL/L (ref 3.4–5.3)
POTASSIUM SERPL-SCNC: 3.9 MMOL/L (ref 3.4–5.3)
POTASSIUM SERPL-SCNC: 4 MMOL/L (ref 3.4–5.3)
POTASSIUM SERPL-SCNC: 4.1 MMOL/L (ref 3.4–5.3)
POTASSIUM SERPL-SCNC: 4.1 MMOL/L (ref 3.4–5.3)
POTASSIUM SERPL-SCNC: 4.2 MMOL/L (ref 3.4–5.3)
POTASSIUM SERPL-SCNC: 4.2 MMOL/L (ref 3.4–5.3)
POTASSIUM SERPL-SCNC: 4.3 MMOL/L (ref 3.4–5.3)
POTASSIUM SERPL-SCNC: 4.6 MMOL/L (ref 3.4–5.3)
POTASSIUM SERPL-SCNC: 4.6 MMOL/L (ref 3.4–5.3)
POTASSIUM SERPL-SCNC: 4.7 MMOL/L (ref 3.4–5.3)
POTASSIUM SERPL-SCNC: 4.7 MMOL/L (ref 3.4–5.3)
POTASSIUM SERPL-SCNC: 5 MMOL/L (ref 3.4–5.3)
POTASSIUM SERPL-SCNC: NORMAL MMOL/L (ref 3.4–5.3)
PROCALCITONIN SERPL-MCNC: 2.43 NG/ML
PROMYELOCYTES # BLD MANUAL: 0.3 10E9/L
PROMYELOCYTES NFR BLD MANUAL: 0.9 %
PROT SERPL-MCNC: 5.2 G/DL (ref 6.8–8.8)
PROT SERPL-MCNC: 5.2 G/DL (ref 6.8–8.8)
PROT SERPL-MCNC: 5.4 G/DL (ref 6.8–8.8)
PROT SERPL-MCNC: 5.4 G/DL (ref 6.8–8.8)
PROT SERPL-MCNC: 5.5 G/DL (ref 6.8–8.8)
PROT SERPL-MCNC: 5.6 G/DL (ref 6.8–8.8)
PROT SERPL-MCNC: 5.6 G/DL (ref 6.8–8.8)
PROT SERPL-MCNC: 5.8 G/DL (ref 6.8–8.8)
PROT SERPL-MCNC: 6 G/DL (ref 6.8–8.8)
PROT SERPL-MCNC: 6.1 G/DL (ref 6.8–8.8)
PROT SERPL-MCNC: 6.2 G/DL (ref 6.8–8.8)
PROT SERPL-MCNC: 6.3 G/DL (ref 6.8–8.8)
PROT SERPL-MCNC: 6.3 G/DL (ref 6.8–8.8)
PROT SERPL-MCNC: 6.9 G/DL (ref 6.8–8.8)
PROT SERPL-MCNC: 7.1 G/DL (ref 6.8–8.8)
PROT SERPL-MCNC: 7.4 G/DL (ref 6.8–8.8)
PROT SERPL-MCNC: 7.8 G/DL (ref 6.8–8.8)
R TIME UNTIL CLOT FORMS: 6.6 MINUTE (ref 5–10)
RBC # BLD AUTO: 1.9 10E12/L (ref 3.8–5.2)
RBC # BLD AUTO: 2.03 10E12/L (ref 3.8–5.2)
RBC # BLD AUTO: 2.09 10E12/L (ref 3.8–5.2)
RBC # BLD AUTO: 2.12 10E12/L (ref 3.8–5.2)
RBC # BLD AUTO: 2.12 10E12/L (ref 3.8–5.2)
RBC # BLD AUTO: 2.13 10E12/L (ref 3.8–5.2)
RBC # BLD AUTO: 2.15 10E12/L (ref 3.8–5.2)
RBC # BLD AUTO: 2.17 10E12/L (ref 3.8–5.2)
RBC # BLD AUTO: 2.18 10E12/L (ref 3.8–5.2)
RBC # BLD AUTO: 2.2 10E12/L (ref 3.8–5.2)
RBC # BLD AUTO: 2.21 10E12/L (ref 3.8–5.2)
RBC # BLD AUTO: 2.21 10E12/L (ref 3.8–5.2)
RBC # BLD AUTO: 2.25 10E12/L (ref 3.8–5.2)
RBC # BLD AUTO: 2.26 10E12/L (ref 3.8–5.2)
RBC # BLD AUTO: 2.28 10E12/L (ref 3.8–5.2)
RBC # BLD AUTO: 2.29 10E12/L (ref 3.8–5.2)
RBC # BLD AUTO: 2.31 10E12/L (ref 3.8–5.2)
RBC # BLD AUTO: 2.32 10E12/L (ref 3.8–5.2)
RBC # BLD AUTO: 2.36 10E12/L (ref 3.8–5.2)
RBC # BLD AUTO: 2.39 10E12/L (ref 3.8–5.2)
RBC # BLD AUTO: 2.4 10E12/L (ref 3.8–5.2)
RBC # BLD AUTO: 2.41 10E12/L (ref 3.8–5.2)
RBC # BLD AUTO: 2.43 10E12/L (ref 3.8–5.2)
RBC # BLD AUTO: 2.44 10E12/L (ref 3.8–5.2)
RBC # BLD AUTO: 2.44 10E12/L (ref 3.8–5.2)
RBC # BLD AUTO: 2.45 10E12/L (ref 3.8–5.2)
RBC # BLD AUTO: 2.49 10E12/L (ref 3.8–5.2)
RBC # BLD AUTO: 2.5 10E12/L (ref 3.8–5.2)
RBC # BLD AUTO: 2.57 10E12/L (ref 3.8–5.2)
RBC # BLD AUTO: 2.84 10E12/L (ref 3.8–5.2)
RBC # BLD AUTO: 3.54 10E12/L (ref 3.8–5.2)
RBC # BLD AUTO: 3.85 10E12/L (ref 3.8–5.2)
RBC # BLD AUTO: 4.4 10E12/L (ref 3.8–5.2)
RBC #/AREA URNS AUTO: 2 /HPF (ref 0–2)
RBC #/AREA URNS AUTO: <1 /HPF (ref 0–2)
RVA NSP5 STL QL NAA+PROBE: NOT DETECTED
SALMONELLA SP RPOD STL QL NAA+PROBE: NOT DETECTED
SAO2 % BLDV FROM PO2: 83 %
SHIGELLA SP+EIEC IPAH STL QL NAA+PROBE: NOT DETECTED
SODIUM BLD-SCNC: 140 MMOL/L (ref 133–144)
SODIUM BLD-SCNC: 141 MMOL/L (ref 133–144)
SODIUM BLD-SCNC: 143 MMOL/L (ref 133–144)
SODIUM BLD-SCNC: 150 MMOL/L (ref 133–144)
SODIUM SERPL-SCNC: 136 MMOL/L (ref 133–144)
SODIUM SERPL-SCNC: 139 MMOL/L (ref 133–144)
SODIUM SERPL-SCNC: 139 MMOL/L (ref 133–144)
SODIUM SERPL-SCNC: 140 MMOL/L (ref 133–144)
SODIUM SERPL-SCNC: 141 MMOL/L (ref 133–144)
SODIUM SERPL-SCNC: 142 MMOL/L (ref 133–144)
SODIUM SERPL-SCNC: 143 MMOL/L (ref 133–144)
SODIUM SERPL-SCNC: 144 MMOL/L (ref 133–144)
SODIUM SERPL-SCNC: 145 MMOL/L (ref 133–144)
SODIUM SERPL-SCNC: 146 MMOL/L (ref 133–144)
SODIUM SERPL-SCNC: 147 MMOL/L (ref 133–144)
SODIUM SERPL-SCNC: 148 MMOL/L (ref 133–144)
SODIUM SERPL-SCNC: 149 MMOL/L (ref 133–144)
SODIUM SERPL-SCNC: 150 MMOL/L (ref 133–144)
SODIUM SERPL-SCNC: 150 MMOL/L (ref 133–144)
SODIUM UR-SCNC: 19 MMOL/L
SOURCE: ABNORMAL
SP GR UR STRIP: 1.01 (ref 1–1.03)
SP GR UR STRIP: 1.01 (ref 1–1.03)
SP GR UR STRIP: 1.02 (ref 1–1.03)
SP GR UR STRIP: 1.02 (ref 1–1.03)
SPECIMEN EXP DATE BLD: NORMAL
SPECIMEN SOURCE: ABNORMAL
SPECIMEN SOURCE: NORMAL
SQUAMOUS #/AREA URNS AUTO: 1 /HPF (ref 0–1)
SQUAMOUS #/AREA URNS AUTO: 3 /HPF (ref 0–1)
SQUAMOUS #/AREA URNS AUTO: 4 /HPF (ref 0–1)
TARGETS BLD QL SMEAR: ABNORMAL
TRANS CELLS #/AREA URNS HPF: <1 /HPF (ref 0–1)
TRANSFUSION STATUS PATIENT QL: NORMAL
TROPONIN I SERPL-MCNC: 0.04 UG/L (ref 0–0.04)
TROPONIN I SERPL-MCNC: 0.07 UG/L (ref 0–0.04)
UPPER EUS: NORMAL
UROBILINOGEN UR STRIP-MCNC: 2 MG/DL (ref 0–2)
UROBILINOGEN UR STRIP-MCNC: 4 MG/DL (ref 0–2)
UROBILINOGEN UR STRIP-MCNC: NORMAL MG/DL (ref 0–2)
UROBILINOGEN UR STRIP-MCNC: NORMAL MG/DL (ref 0–2)
V CHOL+PARA RFBL+TRKH+TNAA STL QL NAA+PR: NOT DETECTED
WBC # BLD AUTO: 11.1 10E9/L (ref 4–11)
WBC # BLD AUTO: 11.1 10E9/L (ref 4–11)
WBC # BLD AUTO: 12 10E9/L (ref 4–11)
WBC # BLD AUTO: 12.1 10E9/L (ref 4–11)
WBC # BLD AUTO: 12.4 10E9/L (ref 4–11)
WBC # BLD AUTO: 12.7 10E9/L (ref 4–11)
WBC # BLD AUTO: 13.2 10E9/L (ref 4–11)
WBC # BLD AUTO: 14.2 10E9/L (ref 4–11)
WBC # BLD AUTO: 14.6 10E9/L (ref 4–11)
WBC # BLD AUTO: 15.2 10E9/L (ref 4–11)
WBC # BLD AUTO: 15.3 10E9/L (ref 4–11)
WBC # BLD AUTO: 15.8 10E9/L (ref 4–11)
WBC # BLD AUTO: 15.8 10E9/L (ref 4–11)
WBC # BLD AUTO: 16.3 10E9/L (ref 4–11)
WBC # BLD AUTO: 17.6 10E9/L (ref 4–11)
WBC # BLD AUTO: 18.1 10E9/L (ref 4–11)
WBC # BLD AUTO: 18.3 10E9/L (ref 4–11)
WBC # BLD AUTO: 18.6 10E9/L (ref 4–11)
WBC # BLD AUTO: 19.1 10E9/L (ref 4–11)
WBC # BLD AUTO: 19.3 10E9/L (ref 4–11)
WBC # BLD AUTO: 20 10E9/L (ref 4–11)
WBC # BLD AUTO: 20.4 10E9/L (ref 4–11)
WBC # BLD AUTO: 20.6 10E9/L (ref 4–11)
WBC # BLD AUTO: 21 10E9/L (ref 4–11)
WBC # BLD AUTO: 21 10E9/L (ref 4–11)
WBC # BLD AUTO: 22 10E9/L (ref 4–11)
WBC # BLD AUTO: 22.4 10E9/L (ref 4–11)
WBC # BLD AUTO: 23.8 10E9/L (ref 4–11)
WBC # BLD AUTO: 25.7 10E9/L (ref 4–11)
WBC # BLD AUTO: 26.3 10E9/L (ref 4–11)
WBC # BLD AUTO: 28.7 10E9/L (ref 4–11)
WBC # BLD AUTO: 29.3 10E9/L (ref 4–11)
WBC # BLD AUTO: 29.9 10E9/L (ref 4–11)
WBC # BLD AUTO: 3.4 10E9/L (ref 4–11)
WBC # BLD AUTO: 4.5 10E9/L (ref 4–11)
WBC # BLD AUTO: 6.9 10E9/L (ref 4–11)
WBC # BLD AUTO: 8.2 10E9/L (ref 4–11)
WBC #/AREA URNS AUTO: 2 /HPF (ref 0–2)
WBC #/AREA URNS AUTO: 4 /HPF (ref 0–2)
WBC #/AREA URNS AUTO: 7 /HPF (ref 0–2)
WBC #/AREA URNS AUTO: <1 /HPF (ref 0–2)
Y ENTERO RECN STL QL NAA+PROBE: NOT DETECTED

## 2017-01-01 PROCEDURE — 82803 BLOOD GASES ANY COMBINATION: CPT | Performed by: STUDENT IN AN ORGANIZED HEALTH CARE EDUCATION/TRAINING PROGRAM

## 2017-01-01 PROCEDURE — 87040 BLOOD CULTURE FOR BACTERIA: CPT | Performed by: STUDENT IN AN ORGANIZED HEALTH CARE EDUCATION/TRAINING PROGRAM

## 2017-01-01 PROCEDURE — 94640 AIRWAY INHALATION TREATMENT: CPT

## 2017-01-01 PROCEDURE — 87070 CULTURE OTHR SPECIMN AEROBIC: CPT | Performed by: STUDENT IN AN ORGANIZED HEALTH CARE EDUCATION/TRAINING PROGRAM

## 2017-01-01 PROCEDURE — 97530 THERAPEUTIC ACTIVITIES: CPT | Mod: GO | Performed by: OCCUPATIONAL THERAPIST

## 2017-01-01 PROCEDURE — 25000132 ZZH RX MED GY IP 250 OP 250 PS 637: Performed by: NURSE PRACTITIONER

## 2017-01-01 PROCEDURE — 80053 COMPREHEN METABOLIC PANEL: CPT | Performed by: FAMILY MEDICINE

## 2017-01-01 PROCEDURE — 86923 COMPATIBILITY TEST ELECTRIC: CPT | Performed by: STUDENT IN AN ORGANIZED HEALTH CARE EDUCATION/TRAINING PROGRAM

## 2017-01-01 PROCEDURE — 84100 ASSAY OF PHOSPHORUS: CPT | Performed by: STUDENT IN AN ORGANIZED HEALTH CARE EDUCATION/TRAINING PROGRAM

## 2017-01-01 PROCEDURE — 25000128 H RX IP 250 OP 636: Performed by: PSYCHIATRY & NEUROLOGY

## 2017-01-01 PROCEDURE — 25000128 H RX IP 250 OP 636: Performed by: STUDENT IN AN ORGANIZED HEALTH CARE EDUCATION/TRAINING PROGRAM

## 2017-01-01 PROCEDURE — 82803 BLOOD GASES ANY COMBINATION: CPT | Performed by: PHYSICIAN ASSISTANT

## 2017-01-01 PROCEDURE — 25000125 ZZHC RX 250: Performed by: NURSE ANESTHETIST, CERTIFIED REGISTERED

## 2017-01-01 PROCEDURE — 36415 COLL VENOUS BLD VENIPUNCTURE: CPT | Performed by: OTOLARYNGOLOGY

## 2017-01-01 PROCEDURE — 85018 HEMOGLOBIN: CPT | Performed by: ANESTHESIOLOGY

## 2017-01-01 PROCEDURE — 36600 WITHDRAWAL OF ARTERIAL BLOOD: CPT

## 2017-01-01 PROCEDURE — 94003 VENT MGMT INPAT SUBQ DAY: CPT

## 2017-01-01 PROCEDURE — 88309 TISSUE EXAM BY PATHOLOGIST: CPT | Performed by: OTOLARYNGOLOGY

## 2017-01-01 PROCEDURE — 85027 COMPLETE CBC AUTOMATED: CPT | Performed by: STUDENT IN AN ORGANIZED HEALTH CARE EDUCATION/TRAINING PROGRAM

## 2017-01-01 PROCEDURE — 25000132 ZZH RX MED GY IP 250 OP 250 PS 637: Performed by: PHYSICIAN ASSISTANT

## 2017-01-01 PROCEDURE — 71010 XR CHEST PORT 1 VW: CPT

## 2017-01-01 PROCEDURE — 96374 THER/PROPH/DIAG INJ IV PUSH: CPT | Performed by: EMERGENCY MEDICINE

## 2017-01-01 PROCEDURE — 93010 ELECTROCARDIOGRAM REPORT: CPT | Performed by: INTERNAL MEDICINE

## 2017-01-01 PROCEDURE — 25000128 H RX IP 250 OP 636: Performed by: NURSE PRACTITIONER

## 2017-01-01 PROCEDURE — 83735 ASSAY OF MAGNESIUM: CPT | Performed by: STUDENT IN AN ORGANIZED HEALTH CARE EDUCATION/TRAINING PROGRAM

## 2017-01-01 PROCEDURE — 20000004 ZZH R&B ICU UMMC

## 2017-01-01 PROCEDURE — 76705 ECHO EXAM OF ABDOMEN: CPT

## 2017-01-01 PROCEDURE — 40000196 ZZH STATISTIC RAPCV CVP MONITORING

## 2017-01-01 PROCEDURE — 80048 BASIC METABOLIC PNL TOTAL CA: CPT | Performed by: STUDENT IN AN ORGANIZED HEALTH CARE EDUCATION/TRAINING PROGRAM

## 2017-01-01 PROCEDURE — 87040 BLOOD CULTURE FOR BACTERIA: CPT | Performed by: INTERNAL MEDICINE

## 2017-01-01 PROCEDURE — 25000132 ZZH RX MED GY IP 250 OP 250 PS 637: Performed by: STUDENT IN AN ORGANIZED HEALTH CARE EDUCATION/TRAINING PROGRAM

## 2017-01-01 PROCEDURE — 94002 VENT MGMT INPAT INIT DAY: CPT

## 2017-01-01 PROCEDURE — 99207 ZZC NON-BILLABLE SERV PER CHARTING: CPT | Performed by: SURGERY

## 2017-01-01 PROCEDURE — 83735 ASSAY OF MAGNESIUM: CPT | Performed by: OTOLARYNGOLOGY

## 2017-01-01 PROCEDURE — 84132 ASSAY OF SERUM POTASSIUM: CPT | Performed by: STUDENT IN AN ORGANIZED HEALTH CARE EDUCATION/TRAINING PROGRAM

## 2017-01-01 PROCEDURE — 25000128 H RX IP 250 OP 636: Performed by: NURSE ANESTHETIST, CERTIFIED REGISTERED

## 2017-01-01 PROCEDURE — 25000125 ZZHC RX 250: Performed by: OTOLARYNGOLOGY

## 2017-01-01 PROCEDURE — 25000128 H RX IP 250 OP 636: Performed by: OTOLARYNGOLOGY

## 2017-01-01 PROCEDURE — 82570 ASSAY OF URINE CREATININE: CPT | Performed by: STUDENT IN AN ORGANIZED HEALTH CARE EDUCATION/TRAINING PROGRAM

## 2017-01-01 PROCEDURE — 87493 C DIFF AMPLIFIED PROBE: CPT | Performed by: STUDENT IN AN ORGANIZED HEALTH CARE EDUCATION/TRAINING PROGRAM

## 2017-01-01 PROCEDURE — 25000132 ZZH RX MED GY IP 250 OP 250 PS 637: Performed by: SURGERY

## 2017-01-01 PROCEDURE — 40000986 XR CHEST PORT 1 VW

## 2017-01-01 PROCEDURE — 80053 COMPREHEN METABOLIC PANEL: CPT | Performed by: OTOLARYNGOLOGY

## 2017-01-01 PROCEDURE — 85027 COMPLETE CBC AUTOMATED: CPT | Performed by: OTOLARYNGOLOGY

## 2017-01-01 PROCEDURE — 25000128 H RX IP 250 OP 636: Performed by: PHYSICIAN ASSISTANT

## 2017-01-01 PROCEDURE — 27210995 ZZH RX 272: Performed by: OTOLARYNGOLOGY

## 2017-01-01 PROCEDURE — 40000986 XR ABDOMEN PORT F1 VW

## 2017-01-01 PROCEDURE — 88331 PATH CONSLTJ SURG 1 BLK 1SPC: CPT | Performed by: OTOLARYNGOLOGY

## 2017-01-01 PROCEDURE — 99214 OFFICE O/P EST MOD 30 MIN: CPT | Performed by: INTERNAL MEDICINE

## 2017-01-01 PROCEDURE — 0BJ08ZZ INSPECTION OF TRACHEOBRONCHIAL TREE, VIA NATURAL OR ARTIFICIAL OPENING ENDOSCOPIC: ICD-10-PCS | Performed by: OTOLARYNGOLOGY

## 2017-01-01 PROCEDURE — 93306 TTE W/DOPPLER COMPLETE: CPT

## 2017-01-01 PROCEDURE — 0W338ZZ CONTROL BLEEDING IN ORAL CAVITY AND THROAT, VIA NATURAL OR ARTIFICIAL OPENING ENDOSCOPIC: ICD-10-PCS | Performed by: OTOLARYNGOLOGY

## 2017-01-01 PROCEDURE — 27210437 ZZH NUTRITION PRODUCT SEMIELEM INTERMED LITER

## 2017-01-01 PROCEDURE — 40000014 ZZH STATISTIC ARTERIAL MONITORING DAILY

## 2017-01-01 PROCEDURE — 84145 PROCALCITONIN (PCT): CPT | Performed by: STUDENT IN AN ORGANIZED HEALTH CARE EDUCATION/TRAINING PROGRAM

## 2017-01-01 PROCEDURE — 40000133 ZZH STATISTIC OT WARD VISIT: Performed by: OCCUPATIONAL THERAPIST

## 2017-01-01 PROCEDURE — 00000146 ZZHCL STATISTIC GLUCOSE BY METER IP

## 2017-01-01 PROCEDURE — 86901 BLOOD TYPING SEROLOGIC RH(D): CPT | Performed by: SURGERY

## 2017-01-01 PROCEDURE — 84484 ASSAY OF TROPONIN QUANT: CPT | Performed by: STUDENT IN AN ORGANIZED HEALTH CARE EDUCATION/TRAINING PROGRAM

## 2017-01-01 PROCEDURE — 84080 ASSAY ALKALINE PHOSPHATASES: CPT | Performed by: OTOLARYNGOLOGY

## 2017-01-01 PROCEDURE — 05LY0ZZ OCCLUSION OF UPPER VEIN, OPEN APPROACH: ICD-10-PCS | Performed by: OTOLARYNGOLOGY

## 2017-01-01 PROCEDURE — 40000275 ZZH STATISTIC RCP TIME EA 10 MIN

## 2017-01-01 PROCEDURE — 40000281 ZZH STATISTIC TRANSPORT TIME EA 15 MIN

## 2017-01-01 PROCEDURE — 99291 CRITICAL CARE FIRST HOUR: CPT | Performed by: PHYSICIAN ASSISTANT

## 2017-01-01 PROCEDURE — 80048 BASIC METABOLIC PNL TOTAL CA: CPT | Performed by: SURGERY

## 2017-01-01 PROCEDURE — 84295 ASSAY OF SERUM SODIUM: CPT | Performed by: OTOLARYNGOLOGY

## 2017-01-01 PROCEDURE — 76536 US EXAM OF HEAD AND NECK: CPT

## 2017-01-01 PROCEDURE — 36415 COLL VENOUS BLD VENIPUNCTURE: CPT | Performed by: ANESTHESIOLOGY

## 2017-01-01 PROCEDURE — 94640 AIRWAY INHALATION TREATMENT: CPT | Mod: 76

## 2017-01-01 PROCEDURE — 25000131 ZZH RX MED GY IP 250 OP 636 PS 637: Performed by: NURSE PRACTITIONER

## 2017-01-01 PROCEDURE — 25000128 H RX IP 250 OP 636: Performed by: ANESTHESIOLOGY

## 2017-01-01 PROCEDURE — 80048 BASIC METABOLIC PNL TOTAL CA: CPT | Performed by: OTOLARYNGOLOGY

## 2017-01-01 PROCEDURE — 40000047 ZZH STATISTIC CTO2 CONT OXYGEN TECH TIME EA 90 MIN

## 2017-01-01 PROCEDURE — 80053 COMPREHEN METABOLIC PANEL: CPT | Performed by: STUDENT IN AN ORGANIZED HEALTH CARE EDUCATION/TRAINING PROGRAM

## 2017-01-01 PROCEDURE — 82803 BLOOD GASES ANY COMBINATION: CPT | Performed by: OTOLARYNGOLOGY

## 2017-01-01 PROCEDURE — 27210794 ZZH OR GENERAL SUPPLY STERILE: Performed by: OTOLARYNGOLOGY

## 2017-01-01 PROCEDURE — 86900 BLOOD TYPING SEROLOGIC ABO: CPT | Performed by: STUDENT IN AN ORGANIZED HEALTH CARE EDUCATION/TRAINING PROGRAM

## 2017-01-01 PROCEDURE — 25000125 ZZHC RX 250: Performed by: PHYSICIAN ASSISTANT

## 2017-01-01 PROCEDURE — 74020 XR ABDOMEN 2 VW: CPT | Mod: TC

## 2017-01-01 PROCEDURE — 25000132 ZZH RX MED GY IP 250 OP 250 PS 637: Performed by: OTOLARYNGOLOGY

## 2017-01-01 PROCEDURE — 99291 CRITICAL CARE FIRST HOUR: CPT | Mod: GC | Performed by: ANESTHESIOLOGY

## 2017-01-01 PROCEDURE — 40000141 ZZH STATISTIC PERIPHERAL IV START W/O US GUIDANCE

## 2017-01-01 PROCEDURE — 82330 ASSAY OF CALCIUM: CPT | Performed by: SURGERY

## 2017-01-01 PROCEDURE — 80076 HEPATIC FUNCTION PANEL: CPT | Performed by: OTOLARYNGOLOGY

## 2017-01-01 PROCEDURE — 84100 ASSAY OF PHOSPHORUS: CPT | Performed by: OTOLARYNGOLOGY

## 2017-01-01 PROCEDURE — 86901 BLOOD TYPING SEROLOGIC RH(D): CPT | Performed by: STUDENT IN AN ORGANIZED HEALTH CARE EDUCATION/TRAINING PROGRAM

## 2017-01-01 PROCEDURE — 85384 FIBRINOGEN ACTIVITY: CPT | Performed by: OTOLARYNGOLOGY

## 2017-01-01 PROCEDURE — 27210765 ZZH GLUE EMBOLI CR34

## 2017-01-01 PROCEDURE — 86900 BLOOD TYPING SEROLOGIC ABO: CPT | Performed by: OTOLARYNGOLOGY

## 2017-01-01 PROCEDURE — 37000009 ZZH ANESTHESIA TECHNICAL FEE, EACH ADDTL 15 MIN: Performed by: OTOLARYNGOLOGY

## 2017-01-01 PROCEDURE — 5A1955Z RESPIRATORY VENTILATION, GREATER THAN 96 CONSECUTIVE HOURS: ICD-10-PCS | Performed by: OTOLARYNGOLOGY

## 2017-01-01 PROCEDURE — 25000125 ZZHC RX 250: Performed by: STUDENT IN AN ORGANIZED HEALTH CARE EDUCATION/TRAINING PROGRAM

## 2017-01-01 PROCEDURE — 85027 COMPLETE CBC AUTOMATED: CPT | Performed by: SURGERY

## 2017-01-01 PROCEDURE — 82140 ASSAY OF AMMONIA: CPT | Performed by: STUDENT IN AN ORGANIZED HEALTH CARE EDUCATION/TRAINING PROGRAM

## 2017-01-01 PROCEDURE — 25000125 ZZHC RX 250: Performed by: ANESTHESIOLOGY

## 2017-01-01 PROCEDURE — 40000077 ZZH STATISTIC ICP MONITORING

## 2017-01-01 PROCEDURE — 76377 3D RENDER W/INTRP POSTPROCES: CPT

## 2017-01-01 PROCEDURE — 25000128 H RX IP 250 OP 636: Performed by: SURGERY

## 2017-01-01 PROCEDURE — 84295 ASSAY OF SERUM SODIUM: CPT | Performed by: STUDENT IN AN ORGANIZED HEALTH CARE EDUCATION/TRAINING PROGRAM

## 2017-01-01 PROCEDURE — 99211 OFF/OP EST MAY X REQ PHY/QHP: CPT

## 2017-01-01 PROCEDURE — 99233 SBSQ HOSP IP/OBS HIGH 50: CPT | Performed by: PHYSICIAN ASSISTANT

## 2017-01-01 PROCEDURE — 86850 RBC ANTIBODY SCREEN: CPT | Performed by: STUDENT IN AN ORGANIZED HEALTH CARE EDUCATION/TRAINING PROGRAM

## 2017-01-01 PROCEDURE — 80048 BASIC METABOLIC PNL TOTAL CA: CPT | Performed by: INTERNAL MEDICINE

## 2017-01-01 PROCEDURE — 27210995 ZZH RX 272: Performed by: STUDENT IN AN ORGANIZED HEALTH CARE EDUCATION/TRAINING PROGRAM

## 2017-01-01 PROCEDURE — 99221 1ST HOSP IP/OBS SF/LOW 40: CPT | Performed by: NURSE PRACTITIONER

## 2017-01-01 PROCEDURE — 83605 ASSAY OF LACTIC ACID: CPT | Performed by: STUDENT IN AN ORGANIZED HEALTH CARE EDUCATION/TRAINING PROGRAM

## 2017-01-01 PROCEDURE — 84132 ASSAY OF SERUM POTASSIUM: CPT | Performed by: OTOLARYNGOLOGY

## 2017-01-01 PROCEDURE — 82803 BLOOD GASES ANY COMBINATION: CPT | Performed by: NURSE PRACTITIONER

## 2017-01-01 PROCEDURE — 05LN0ZZ OCCLUSION OF LEFT INTERNAL JUGULAR VEIN, OPEN APPROACH: ICD-10-PCS | Performed by: OTOLARYNGOLOGY

## 2017-01-01 PROCEDURE — 93321 DOPPLER ECHO F-UP/LMTD STD: CPT | Mod: 26 | Performed by: INTERNAL MEDICINE

## 2017-01-01 PROCEDURE — S0028 INJECTION, FAMOTIDINE, 20 MG: HCPCS | Performed by: PHYSICIAN ASSISTANT

## 2017-01-01 PROCEDURE — 83605 ASSAY OF LACTIC ACID: CPT | Performed by: SURGERY

## 2017-01-01 PROCEDURE — 25000128 H RX IP 250 OP 636

## 2017-01-01 PROCEDURE — 36000059 ZZH SURGERY LEVEL 3 EA 15 ADDTL MIN UMMC: Performed by: OTOLARYNGOLOGY

## 2017-01-01 PROCEDURE — 97110 THERAPEUTIC EXERCISES: CPT | Mod: GO | Performed by: OCCUPATIONAL THERAPIST

## 2017-01-01 PROCEDURE — 99285 EMERGENCY DEPT VISIT HI MDM: CPT | Mod: 25 | Performed by: EMERGENCY MEDICINE

## 2017-01-01 PROCEDURE — 40000276 ZZH STATISTIC RCP TIME ED VENT EA 10 MIN

## 2017-01-01 PROCEDURE — 97535 SELF CARE MNGMENT TRAINING: CPT | Mod: GO | Performed by: OCCUPATIONAL THERAPIST

## 2017-01-01 PROCEDURE — 25000565 ZZH ISOFLURANE, EA 15 MIN: Performed by: OTOLARYNGOLOGY

## 2017-01-01 PROCEDURE — 85018 HEMOGLOBIN: CPT | Performed by: NURSE PRACTITIONER

## 2017-01-01 PROCEDURE — 93000 ELECTROCARDIOGRAM COMPLETE: CPT | Performed by: INTERNAL MEDICINE

## 2017-01-01 PROCEDURE — 85049 AUTOMATED PLATELET COUNT: CPT | Performed by: OTOLARYNGOLOGY

## 2017-01-01 PROCEDURE — 87081 CULTURE SCREEN ONLY: CPT | Performed by: FAMILY MEDICINE

## 2017-01-01 PROCEDURE — 25000566 ZZH SEVOFLURANE, EA 15 MIN: Performed by: OTOLARYNGOLOGY

## 2017-01-01 PROCEDURE — C9399 UNCLASSIFIED DRUGS OR BIOLOG: HCPCS | Performed by: NURSE ANESTHETIST, CERTIFIED REGISTERED

## 2017-01-01 PROCEDURE — 27210436 ZZH NUTRITION PRODUCT SEMIELEM INTERMED CAN

## 2017-01-01 PROCEDURE — 85025 COMPLETE CBC W/AUTO DIFF WBC: CPT | Performed by: STUDENT IN AN ORGANIZED HEALTH CARE EDUCATION/TRAINING PROGRAM

## 2017-01-01 PROCEDURE — P9016 RBC LEUKOCYTES REDUCED: HCPCS | Performed by: STUDENT IN AN ORGANIZED HEALTH CARE EDUCATION/TRAINING PROGRAM

## 2017-01-01 PROCEDURE — 40000225 ZZH STATISTIC SLP WARD VISIT

## 2017-01-01 PROCEDURE — 99291 CRITICAL CARE FIRST HOUR: CPT | Mod: GC | Performed by: SURGERY

## 2017-01-01 PROCEDURE — 82803 BLOOD GASES ANY COMBINATION: CPT

## 2017-01-01 PROCEDURE — 27210995 ZZH RX 272: Performed by: FAMILY MEDICINE

## 2017-01-01 PROCEDURE — 37000008 ZZH ANESTHESIA TECHNICAL FEE, 1ST 30 MIN: Performed by: OTOLARYNGOLOGY

## 2017-01-01 PROCEDURE — 36000057 ZZH SURGERY LEVEL 3 1ST 30 MIN - UMMC: Performed by: OTOLARYNGOLOGY

## 2017-01-01 PROCEDURE — C1760 CLOSURE DEV, VASC: HCPCS

## 2017-01-01 PROCEDURE — P9041 ALBUMIN (HUMAN),5%, 50ML: HCPCS | Performed by: NURSE ANESTHETIST, CERTIFIED REGISTERED

## 2017-01-01 PROCEDURE — 36569 INSJ PICC 5 YR+ W/O IMAGING: CPT

## 2017-01-01 PROCEDURE — C1887 CATHETER, GUIDING: HCPCS

## 2017-01-01 PROCEDURE — 85610 PROTHROMBIN TIME: CPT | Performed by: OTOLARYNGOLOGY

## 2017-01-01 PROCEDURE — 85027 COMPLETE CBC AUTOMATED: CPT | Performed by: FAMILY MEDICINE

## 2017-01-01 PROCEDURE — 85730 THROMBOPLASTIN TIME PARTIAL: CPT | Performed by: OTOLARYNGOLOGY

## 2017-01-01 PROCEDURE — 93005 ELECTROCARDIOGRAM TRACING: CPT

## 2017-01-01 PROCEDURE — 81001 URINALYSIS AUTO W/SCOPE: CPT | Performed by: INTERNAL MEDICINE

## 2017-01-01 PROCEDURE — 71000027 ZZH RECOVERY PHASE 2 EACH 15 MINS: Performed by: OTOLARYNGOLOGY

## 2017-01-01 PROCEDURE — 40000344 ZZHCL STATISTIC THAWING COMPONENT

## 2017-01-01 PROCEDURE — 27210907 ZZH KIT CR9

## 2017-01-01 PROCEDURE — 37000009 ZZH ANESTHESIA TECHNICAL FEE, EACH ADDTL 15 MIN

## 2017-01-01 PROCEDURE — 86850 RBC ANTIBODY SCREEN: CPT | Performed by: OTOLARYNGOLOGY

## 2017-01-01 PROCEDURE — 74176 CT ABD & PELVIS W/O CONTRAST: CPT

## 2017-01-01 PROCEDURE — 25000128 H RX IP 250 OP 636: Performed by: FAMILY MEDICINE

## 2017-01-01 PROCEDURE — 82947 ASSAY GLUCOSE BLOOD QUANT: CPT | Performed by: OTOLARYNGOLOGY

## 2017-01-01 PROCEDURE — 0J9530Z DRAINAGE OF LEFT NECK SUBCUTANEOUS TISSUE AND FASCIA WITH DRAINAGE DEVICE, PERCUTANEOUS APPROACH: ICD-10-PCS | Performed by: OTOLARYNGOLOGY

## 2017-01-01 PROCEDURE — 99000 SPECIMEN HANDLING OFFICE-LAB: CPT | Performed by: INTERNAL MEDICINE

## 2017-01-01 PROCEDURE — 82330 ASSAY OF CALCIUM: CPT | Performed by: OTOLARYNGOLOGY

## 2017-01-01 PROCEDURE — 25000132 ZZH RX MED GY IP 250 OP 250 PS 637: Performed by: INTERNAL MEDICINE

## 2017-01-01 PROCEDURE — 27210429 ZZH NUTRITION PRODUCT INTERMEDIATE LITER

## 2017-01-01 PROCEDURE — 40000341 ZZHCL STATISTIC BB TRANSF RXN INVEST: Performed by: OTOLARYNGOLOGY

## 2017-01-01 PROCEDURE — 84300 ASSAY OF URINE SODIUM: CPT | Performed by: STUDENT IN AN ORGANIZED HEALTH CARE EDUCATION/TRAINING PROGRAM

## 2017-01-01 PROCEDURE — 85025 COMPLETE CBC W/AUTO DIFF WBC: CPT | Performed by: OTOLARYNGOLOGY

## 2017-01-01 PROCEDURE — 61626 TCAT PERM OCCLS/EMBOL NONCNS: CPT

## 2017-01-01 PROCEDURE — 82330 ASSAY OF CALCIUM: CPT | Performed by: STUDENT IN AN ORGANIZED HEALTH CARE EDUCATION/TRAINING PROGRAM

## 2017-01-01 PROCEDURE — 71010 XR CHEST PORT 1 VW: CPT | Mod: 77

## 2017-01-01 PROCEDURE — 85610 PROTHROMBIN TIME: CPT | Performed by: ANESTHESIOLOGY

## 2017-01-01 PROCEDURE — 86140 C-REACTIVE PROTEIN: CPT | Performed by: OTOLARYNGOLOGY

## 2017-01-01 PROCEDURE — 99291 CRITICAL CARE FIRST HOUR: CPT | Mod: Z6 | Performed by: EMERGENCY MEDICINE

## 2017-01-01 PROCEDURE — 87640 STAPH A DNA AMP PROBE: CPT | Performed by: INTERNAL MEDICINE

## 2017-01-01 PROCEDURE — P9016 RBC LEUKOCYTES REDUCED: HCPCS | Performed by: SURGERY

## 2017-01-01 PROCEDURE — 87186 SC STD MICRODIL/AGAR DIL: CPT | Performed by: STUDENT IN AN ORGANIZED HEALTH CARE EDUCATION/TRAINING PROGRAM

## 2017-01-01 PROCEDURE — 94681 O2 UPTK CO2 OUTP % O2 XTRC: CPT

## 2017-01-01 PROCEDURE — 80076 HEPATIC FUNCTION PANEL: CPT | Performed by: STUDENT IN AN ORGANIZED HEALTH CARE EDUCATION/TRAINING PROGRAM

## 2017-01-01 PROCEDURE — 86923 COMPATIBILITY TEST ELECTRIC: CPT

## 2017-01-01 PROCEDURE — 40000193 ZZH STATISTIC PT WARD VISIT: Performed by: REHABILITATION PRACTITIONER

## 2017-01-01 PROCEDURE — 86850 RBC ANTIBODY SCREEN: CPT

## 2017-01-01 PROCEDURE — 36000062 ZZH SURGERY LEVEL 4 1ST 30 MIN - UMMC: Performed by: OTOLARYNGOLOGY

## 2017-01-01 PROCEDURE — 87077 CULTURE AEROBIC IDENTIFY: CPT | Performed by: STUDENT IN AN ORGANIZED HEALTH CARE EDUCATION/TRAINING PROGRAM

## 2017-01-01 PROCEDURE — 99291 CRITICAL CARE FIRST HOUR: CPT | Performed by: NURSE PRACTITIONER

## 2017-01-01 PROCEDURE — 25000125 ZZHC RX 250: Performed by: FAMILY MEDICINE

## 2017-01-01 PROCEDURE — 40000985 XR CHEST PORT 1 VW

## 2017-01-01 PROCEDURE — 93325 DOPPLER ECHO COLOR FLOW MAPG: CPT | Mod: 26 | Performed by: INTERNAL MEDICINE

## 2017-01-01 PROCEDURE — 25000131 ZZH RX MED GY IP 250 OP 636 PS 637: Performed by: STUDENT IN AN ORGANIZED HEALTH CARE EDUCATION/TRAINING PROGRAM

## 2017-01-01 PROCEDURE — 97110 THERAPEUTIC EXERCISES: CPT | Mod: GP | Performed by: REHABILITATION PRACTITIONER

## 2017-01-01 PROCEDURE — 86140 C-REACTIVE PROTEIN: CPT | Performed by: STUDENT IN AN ORGANIZED HEALTH CARE EDUCATION/TRAINING PROGRAM

## 2017-01-01 PROCEDURE — 87205 SMEAR GRAM STAIN: CPT | Performed by: STUDENT IN AN ORGANIZED HEALTH CARE EDUCATION/TRAINING PROGRAM

## 2017-01-01 PROCEDURE — 83690 ASSAY OF LIPASE: CPT | Performed by: STUDENT IN AN ORGANIZED HEALTH CARE EDUCATION/TRAINING PROGRAM

## 2017-01-01 PROCEDURE — 87800 DETECT AGNT MULT DNA DIREC: CPT | Performed by: STUDENT IN AN ORGANIZED HEALTH CARE EDUCATION/TRAINING PROGRAM

## 2017-01-01 PROCEDURE — 75894 X-RAYS TRANSCATH THERAPY: CPT

## 2017-01-01 PROCEDURE — P9041 ALBUMIN (HUMAN),5%, 50ML: HCPCS

## 2017-01-01 PROCEDURE — 12000003 ZZH R&B CRITICAL UMMC

## 2017-01-01 PROCEDURE — 86900 BLOOD TYPING SEROLOGIC ABO: CPT

## 2017-01-01 PROCEDURE — 99207 ZZC CONSULT E&M CHANGED TO INITIAL LEVEL: CPT | Performed by: NURSE PRACTITIONER

## 2017-01-01 PROCEDURE — 97165 OT EVAL LOW COMPLEX 30 MIN: CPT | Mod: GO | Performed by: OCCUPATIONAL THERAPIST

## 2017-01-01 PROCEDURE — 03LN3DZ OCCLUSION OF LEFT EXTERNAL CAROTID ARTERY WITH INTRALUMINAL DEVICE, PERCUTANEOUS APPROACH: ICD-10-PCS | Performed by: NEUROLOGICAL SURGERY

## 2017-01-01 PROCEDURE — 97162 PT EVAL MOD COMPLEX 30 MIN: CPT | Mod: GP | Performed by: REHABILITATION PRACTITIONER

## 2017-01-01 PROCEDURE — 75898 FOLLOW-UP ANGIOGRAPHY: CPT

## 2017-01-01 PROCEDURE — 25000125 ZZHC RX 250: Performed by: SURGERY

## 2017-01-01 PROCEDURE — 85025 COMPLETE CBC W/AUTO DIFF WBC: CPT | Performed by: PHYSICIAN ASSISTANT

## 2017-01-01 PROCEDURE — 25000132 ZZH RX MED GY IP 250 OP 250 PS 637: Performed by: FAMILY MEDICINE

## 2017-01-01 PROCEDURE — P9041 ALBUMIN (HUMAN),5%, 50ML: HCPCS | Performed by: STUDENT IN AN ORGANIZED HEALTH CARE EDUCATION/TRAINING PROGRAM

## 2017-01-01 PROCEDURE — 25000565 ZZH ISOFLURANE, EA 15 MIN

## 2017-01-01 PROCEDURE — 80053 COMPREHEN METABOLIC PANEL: CPT | Performed by: PHYSICIAN ASSISTANT

## 2017-01-01 PROCEDURE — 27210738 ZZH ACCESSORY CR2

## 2017-01-01 PROCEDURE — 87506 IADNA-DNA/RNA PROBE TQ 6-11: CPT | Performed by: FAMILY MEDICINE

## 2017-01-01 PROCEDURE — 93325 DOPPLER ECHO COLOR FLOW MAPG: CPT

## 2017-01-01 PROCEDURE — 83735 ASSAY OF MAGNESIUM: CPT | Performed by: PHYSICIAN ASSISTANT

## 2017-01-01 PROCEDURE — 87106 FUNGI IDENTIFICATION YEAST: CPT | Performed by: STUDENT IN AN ORGANIZED HEALTH CARE EDUCATION/TRAINING PROGRAM

## 2017-01-01 PROCEDURE — 88172 CYTP DX EVAL FNA 1ST EA SITE: CPT | Performed by: OTOLARYNGOLOGY

## 2017-01-01 PROCEDURE — 27210802 ZZH SHEATH CR1

## 2017-01-01 PROCEDURE — 25800025 ZZH RX 258: Performed by: OTOLARYNGOLOGY

## 2017-01-01 PROCEDURE — 0CJS8ZZ INSPECTION OF LARYNX, VIA NATURAL OR ARTIFICIAL OPENING ENDOSCOPIC: ICD-10-PCS | Performed by: OTOLARYNGOLOGY

## 2017-01-01 PROCEDURE — 36227 PLACE CATH XTRNL CAROTID: CPT

## 2017-01-01 PROCEDURE — 88305 TISSUE EXAM BY PATHOLOGIST: CPT | Performed by: OTOLARYNGOLOGY

## 2017-01-01 PROCEDURE — 92507 TX SP LANG VOICE COMM INDIV: CPT | Mod: GN

## 2017-01-01 PROCEDURE — 71000016 ZZH RECOVERY PHASE 1 LEVEL 3 FIRST HR: Performed by: OTOLARYNGOLOGY

## 2017-01-01 PROCEDURE — 71000015 ZZH RECOVERY PHASE 1 LEVEL 2 EA ADDTL HR: Performed by: OTOLARYNGOLOGY

## 2017-01-01 PROCEDURE — P9059 PLASMA, FRZ BETWEEN 8-24HOUR: HCPCS

## 2017-01-01 PROCEDURE — C1769 GUIDE WIRE: HCPCS

## 2017-01-01 PROCEDURE — 25000128 H RX IP 250 OP 636: Performed by: INTERNAL MEDICINE

## 2017-01-01 PROCEDURE — 40000193 ZZH STATISTIC PT WARD VISIT: Performed by: PHYSICAL THERAPIST

## 2017-01-01 PROCEDURE — 71000014 ZZH RECOVERY PHASE 1 LEVEL 2 FIRST HR: Performed by: OTOLARYNGOLOGY

## 2017-01-01 PROCEDURE — 84132 ASSAY OF SERUM POTASSIUM: CPT | Performed by: ANESTHESIOLOGY

## 2017-01-01 PROCEDURE — 00000155 ZZHCL STATISTIC H-CELL BLOCK W/STAIN: Performed by: OTOLARYNGOLOGY

## 2017-01-01 PROCEDURE — 83690 ASSAY OF LIPASE: CPT | Performed by: PHYSICIAN ASSISTANT

## 2017-01-01 PROCEDURE — 71010 XR CHEST PORT 1 VW: CPT | Mod: 76

## 2017-01-01 PROCEDURE — 36000064 ZZH SURGERY LEVEL 4 EA 15 ADDTL MIN - UMMC: Performed by: OTOLARYNGOLOGY

## 2017-01-01 PROCEDURE — 86901 BLOOD TYPING SEROLOGIC RH(D): CPT

## 2017-01-01 PROCEDURE — 87086 URINE CULTURE/COLONY COUNT: CPT | Performed by: STUDENT IN AN ORGANIZED HEALTH CARE EDUCATION/TRAINING PROGRAM

## 2017-01-01 PROCEDURE — 40000802 ZZH SITE CHECK

## 2017-01-01 PROCEDURE — 36223 PLACE CATH CAROTID/INOM ART: CPT

## 2017-01-01 PROCEDURE — 81001 URINALYSIS AUTO W/SCOPE: CPT | Performed by: STUDENT IN AN ORGANIZED HEALTH CARE EDUCATION/TRAINING PROGRAM

## 2017-01-01 PROCEDURE — 83605 ASSAY OF LACTIC ACID: CPT | Performed by: OTOLARYNGOLOGY

## 2017-01-01 PROCEDURE — 97530 THERAPEUTIC ACTIVITIES: CPT | Mod: GP | Performed by: REHABILITATION PRACTITIONER

## 2017-01-01 PROCEDURE — 86901 BLOOD TYPING SEROLOGIC RH(D): CPT | Performed by: OTOLARYNGOLOGY

## 2017-01-01 PROCEDURE — 83605 ASSAY OF LACTIC ACID: CPT

## 2017-01-01 PROCEDURE — 40000171 ZZH STATISTIC PRE-PROCEDURE ASSESSMENT III: Performed by: OTOLARYNGOLOGY

## 2017-01-01 PROCEDURE — 83520 IMMUNOASSAY QUANT NOS NONAB: CPT | Performed by: SURGERY

## 2017-01-01 PROCEDURE — 83690 ASSAY OF LIPASE: CPT | Performed by: FAMILY MEDICINE

## 2017-01-01 PROCEDURE — 0CB7XZZ EXCISION OF TONGUE, EXTERNAL APPROACH: ICD-10-PCS | Performed by: OTOLARYNGOLOGY

## 2017-01-01 PROCEDURE — 93321 DOPPLER ECHO F-UP/LMTD STD: CPT

## 2017-01-01 PROCEDURE — 86850 RBC ANTIBODY SCREEN: CPT | Performed by: SURGERY

## 2017-01-01 PROCEDURE — 27210888 ZZH ACCESSORY CR7

## 2017-01-01 PROCEDURE — 27210889 ZZH ACCESSORY CR8

## 2017-01-01 PROCEDURE — 0B110F4 BYPASS TRACHEA TO CUTANEOUS WITH TRACHEOSTOMY DEVICE, OPEN APPROACH: ICD-10-PCS | Performed by: OTOLARYNGOLOGY

## 2017-01-01 PROCEDURE — 87641 MR-STAPH DNA AMP PROBE: CPT | Performed by: INTERNAL MEDICINE

## 2017-01-01 PROCEDURE — 99285 EMERGENCY DEPT VISIT HI MDM: CPT | Mod: Z6 | Performed by: EMERGENCY MEDICINE

## 2017-01-01 PROCEDURE — 12000000 ZZH R&B MED SURG/OB

## 2017-01-01 PROCEDURE — 36415 COLL VENOUS BLD VENIPUNCTURE: CPT | Performed by: SURGERY

## 2017-01-01 PROCEDURE — 86900 BLOOD TYPING SEROLOGIC ABO: CPT | Performed by: SURGERY

## 2017-01-01 PROCEDURE — 92597 ORAL SPEECH DEVICE EVAL: CPT | Mod: GN

## 2017-01-01 PROCEDURE — 87040 BLOOD CULTURE FOR BACTERIA: CPT | Performed by: NURSE PRACTITIONER

## 2017-01-01 PROCEDURE — 27210732 ZZH ACCESSORY CR1

## 2017-01-01 PROCEDURE — 36415 COLL VENOUS BLD VENIPUNCTURE: CPT | Performed by: INTERNAL MEDICINE

## 2017-01-01 PROCEDURE — 83036 HEMOGLOBIN GLYCOSYLATED A1C: CPT | Performed by: OTOLARYNGOLOGY

## 2017-01-01 PROCEDURE — 86923 COMPATIBILITY TEST ELECTRIC: CPT | Performed by: SURGERY

## 2017-01-01 PROCEDURE — 97530 THERAPEUTIC ACTIVITIES: CPT | Mod: GP | Performed by: PHYSICAL THERAPIST

## 2017-01-01 PROCEDURE — 97530 THERAPEUTIC ACTIVITIES: CPT | Mod: GO

## 2017-01-01 PROCEDURE — 96375 TX/PRO/DX INJ NEW DRUG ADDON: CPT | Performed by: EMERGENCY MEDICINE

## 2017-01-01 PROCEDURE — 36415 COLL VENOUS BLD VENIPUNCTURE: CPT | Performed by: FAMILY MEDICINE

## 2017-01-01 PROCEDURE — P9016 RBC LEUKOCYTES REDUCED: HCPCS

## 2017-01-01 PROCEDURE — 44500 INTRO GASTROINTESTINAL TUBE: CPT

## 2017-01-01 PROCEDURE — 95822 EEG COMA OR SLEEP ONLY: CPT | Mod: ZF

## 2017-01-01 PROCEDURE — 82805 BLOOD GASES W/O2 SATURATION: CPT | Performed by: STUDENT IN AN ORGANIZED HEALTH CARE EDUCATION/TRAINING PROGRAM

## 2017-01-01 PROCEDURE — 81001 URINALYSIS AUTO W/SCOPE: CPT | Performed by: FAMILY MEDICINE

## 2017-01-01 PROCEDURE — 86140 C-REACTIVE PROTEIN: CPT | Performed by: PHYSICIAN ASSISTANT

## 2017-01-01 PROCEDURE — 99285 EMERGENCY DEPT VISIT HI MDM: CPT | Mod: 25

## 2017-01-01 PROCEDURE — 82150 ASSAY OF AMYLASE: CPT | Performed by: STUDENT IN AN ORGANIZED HEALTH CARE EDUCATION/TRAINING PROGRAM

## 2017-01-01 PROCEDURE — 88307 TISSUE EXAM BY PATHOLOGIST: CPT | Performed by: OTOLARYNGOLOGY

## 2017-01-01 PROCEDURE — 37000008 ZZH ANESTHESIA TECHNICAL FEE, 1ST 30 MIN

## 2017-01-01 PROCEDURE — 36430 TRANSFUSION BLD/BLD COMPNT: CPT

## 2017-01-01 PROCEDURE — 87040 BLOOD CULTURE FOR BACTERIA: CPT | Performed by: SURGERY

## 2017-01-01 PROCEDURE — 40000133 ZZH STATISTIC OT WARD VISIT

## 2017-01-01 PROCEDURE — 99223 1ST HOSP IP/OBS HIGH 75: CPT | Mod: AI | Performed by: FAMILY MEDICINE

## 2017-01-01 PROCEDURE — 27210197 ZZH KIT POWER PICC TRIPLE LUMEN

## 2017-01-01 PROCEDURE — 82565 ASSAY OF CREATININE: CPT | Performed by: STUDENT IN AN ORGANIZED HEALTH CARE EDUCATION/TRAINING PROGRAM

## 2017-01-01 PROCEDURE — 93308 TTE F-UP OR LMTD: CPT | Mod: 26 | Performed by: INTERNAL MEDICINE

## 2017-01-01 PROCEDURE — 88173 CYTOPATH EVAL FNA REPORT: CPT | Performed by: OTOLARYNGOLOGY

## 2017-01-01 PROCEDURE — 25000128 H RX IP 250 OP 636: Performed by: NEUROLOGICAL SURGERY

## 2017-01-01 PROCEDURE — 80307 DRUG TEST PRSMV CHEM ANLYZR: CPT | Mod: 90 | Performed by: INTERNAL MEDICINE

## 2017-01-01 PROCEDURE — 25500064 ZZH RX 255 OP 636: Performed by: OTOLARYNGOLOGY

## 2017-01-01 PROCEDURE — 80320 DRUG SCREEN QUANTALCOHOLS: CPT | Performed by: PHYSICIAN ASSISTANT

## 2017-01-01 PROCEDURE — 97110 THERAPEUTIC EXERCISES: CPT | Mod: GP | Performed by: PHYSICAL THERAPIST

## 2017-01-01 PROCEDURE — 82962 GLUCOSE BLOOD TEST: CPT

## 2017-01-01 PROCEDURE — 99214 OFFICE O/P EST MOD 30 MIN: CPT | Performed by: OTOLARYNGOLOGY

## 2017-01-01 PROCEDURE — 87493 C DIFF AMPLIFIED PROBE: CPT | Performed by: FAMILY MEDICINE

## 2017-01-01 PROCEDURE — 07T20ZZ RESECTION OF LEFT NECK LYMPHATIC, OPEN APPROACH: ICD-10-PCS | Performed by: OTOLARYNGOLOGY

## 2017-01-01 PROCEDURE — 87493 C DIFF AMPLIFIED PROBE: CPT | Performed by: OTOLARYNGOLOGY

## 2017-01-01 PROCEDURE — 85025 COMPLETE CBC W/AUTO DIFF WBC: CPT | Performed by: FAMILY MEDICINE

## 2017-01-01 PROCEDURE — 82565 ASSAY OF CREATININE: CPT | Performed by: ANESTHESIOLOGY

## 2017-01-01 PROCEDURE — P9037 PLATE PHERES LEUKOREDU IRRAD: HCPCS

## 2017-01-01 PROCEDURE — 85027 COMPLETE CBC AUTOMATED: CPT | Performed by: INTERNAL MEDICINE

## 2017-01-01 PROCEDURE — 99214 OFFICE O/P EST MOD 30 MIN: CPT | Performed by: FAMILY MEDICINE

## 2017-01-01 PROCEDURE — 85396 CLOTTING ASSAY WHOLE BLOOD: CPT | Performed by: STUDENT IN AN ORGANIZED HEALTH CARE EDUCATION/TRAINING PROGRAM

## 2017-01-01 RX ORDER — OXYCODONE HCL 5 MG/5 ML
2.5 SOLUTION, ORAL ORAL EVERY 4 HOURS PRN
Qty: 90 ML | Refills: 0 | Status: ON HOLD | DISCHARGE
Start: 2017-01-01 | End: 2017-01-01

## 2017-01-01 RX ORDER — ONDANSETRON 2 MG/ML
4 INJECTION INTRAMUSCULAR; INTRAVENOUS EVERY 30 MIN PRN
Status: DISCONTINUED | OUTPATIENT
Start: 2017-01-01 | End: 2017-01-01

## 2017-01-01 RX ORDER — FUROSEMIDE 10 MG/ML
20 INJECTION INTRAMUSCULAR; INTRAVENOUS EVERY 12 HOURS
Status: DISCONTINUED | OUTPATIENT
Start: 2017-01-01 | End: 2017-01-01

## 2017-01-01 RX ORDER — LIDOCAINE 40 MG/G
CREAM TOPICAL
Status: DISCONTINUED | OUTPATIENT
Start: 2017-01-01 | End: 2017-01-01

## 2017-01-01 RX ORDER — OXYCODONE HCL 5 MG/5 ML
2.5 SOLUTION, ORAL ORAL EVERY 4 HOURS PRN
Status: DISCONTINUED | OUTPATIENT
Start: 2017-01-01 | End: 2017-01-01 | Stop reason: HOSPADM

## 2017-01-01 RX ORDER — NALOXONE HYDROCHLORIDE 0.4 MG/ML
.1-.4 INJECTION, SOLUTION INTRAMUSCULAR; INTRAVENOUS; SUBCUTANEOUS
Status: CANCELLED | OUTPATIENT
Start: 2017-01-01 | End: 2017-01-01

## 2017-01-01 RX ORDER — AMOXICILLIN 250 MG
1 CAPSULE ORAL 2 TIMES DAILY PRN
Status: DISCONTINUED | OUTPATIENT
Start: 2017-01-01 | End: 2017-01-01 | Stop reason: HOSPADM

## 2017-01-01 RX ORDER — ONDANSETRON 2 MG/ML
INJECTION INTRAMUSCULAR; INTRAVENOUS PRN
Status: DISCONTINUED | OUTPATIENT
Start: 2017-01-01 | End: 2017-01-01

## 2017-01-01 RX ORDER — PROCHLORPERAZINE 25 MG
25 SUPPOSITORY, RECTAL RECTAL EVERY 12 HOURS PRN
Status: DISCONTINUED | OUTPATIENT
Start: 2017-01-01 | End: 2017-01-01 | Stop reason: HOSPADM

## 2017-01-01 RX ORDER — ONDANSETRON 4 MG/1
4 TABLET, ORALLY DISINTEGRATING ORAL EVERY 30 MIN PRN
Status: DISCONTINUED | OUTPATIENT
Start: 2017-01-01 | End: 2017-01-01

## 2017-01-01 RX ORDER — NALOXONE HYDROCHLORIDE 0.4 MG/ML
.1-.4 INJECTION, SOLUTION INTRAMUSCULAR; INTRAVENOUS; SUBCUTANEOUS
Status: DISCONTINUED | OUTPATIENT
Start: 2017-01-01 | End: 2017-01-01

## 2017-01-01 RX ORDER — CETIRIZINE HYDROCHLORIDE 10 MG/1
10 TABLET ORAL AT BEDTIME
Status: DISCONTINUED | OUTPATIENT
Start: 2017-01-01 | End: 2017-01-01

## 2017-01-01 RX ORDER — NALOXONE HYDROCHLORIDE 0.4 MG/ML
.1-.4 INJECTION, SOLUTION INTRAMUSCULAR; INTRAVENOUS; SUBCUTANEOUS
Status: DISCONTINUED | OUTPATIENT
Start: 2017-01-01 | End: 2017-01-01 | Stop reason: HOSPADM

## 2017-01-01 RX ORDER — ONDANSETRON 2 MG/ML
4 INJECTION INTRAMUSCULAR; INTRAVENOUS EVERY 6 HOURS PRN
Status: DISCONTINUED | OUTPATIENT
Start: 2017-01-01 | End: 2017-01-01 | Stop reason: HOSPADM

## 2017-01-01 RX ORDER — POTASSIUM CHLORIDE 750 MG/1
20-40 TABLET, EXTENDED RELEASE ORAL
Status: DISCONTINUED | OUTPATIENT
Start: 2017-01-01 | End: 2017-01-01 | Stop reason: HOSPADM

## 2017-01-01 RX ORDER — SODIUM CHLORIDE 9 MG/ML
INJECTION, SOLUTION INTRAVENOUS CONTINUOUS
Status: DISCONTINUED | OUTPATIENT
Start: 2017-01-01 | End: 2017-01-01

## 2017-01-01 RX ORDER — FENTANYL CITRATE 50 UG/ML
INJECTION, SOLUTION INTRAMUSCULAR; INTRAVENOUS PRN
Status: DISCONTINUED | OUTPATIENT
Start: 2017-01-01 | End: 2017-01-01

## 2017-01-01 RX ORDER — BISACODYL 10 MG
10 SUPPOSITORY, RECTAL RECTAL DAILY
Status: DISCONTINUED | OUTPATIENT
Start: 2017-01-01 | End: 2017-01-01

## 2017-01-01 RX ORDER — POTASSIUM CHLORIDE 1.5 G/1.58G
20-40 POWDER, FOR SOLUTION ORAL
Status: DISCONTINUED | OUTPATIENT
Start: 2017-01-01 | End: 2017-01-01 | Stop reason: HOSPADM

## 2017-01-01 RX ORDER — ALBUTEROL SULFATE 90 UG/1
2 AEROSOL, METERED RESPIRATORY (INHALATION) 4 TIMES DAILY PRN
Status: DISCONTINUED | OUTPATIENT
Start: 2017-01-01 | End: 2017-01-01 | Stop reason: HOSPADM

## 2017-01-01 RX ORDER — POTASSIUM CHLORIDE 750 MG/1
CAPSULE, EXTENDED RELEASE ORAL
Qty: 14 CAPSULE | Refills: 0 | OUTPATIENT
Start: 2017-01-01

## 2017-01-01 RX ORDER — POTASSIUM CHLORIDE 750 MG/1
20 TABLET, EXTENDED RELEASE ORAL DAILY
Status: DISCONTINUED | OUTPATIENT
Start: 2017-01-01 | End: 2017-01-01

## 2017-01-01 RX ORDER — SODIUM CHLORIDE, SODIUM LACTATE, POTASSIUM CHLORIDE, CALCIUM CHLORIDE 600; 310; 30; 20 MG/100ML; MG/100ML; MG/100ML; MG/100ML
INJECTION, SOLUTION INTRAVENOUS CONTINUOUS
Status: DISCONTINUED | OUTPATIENT
Start: 2017-01-01 | End: 2017-01-01 | Stop reason: HOSPADM

## 2017-01-01 RX ORDER — ACETAMINOPHEN 10 MG/ML
500 INJECTION, SOLUTION INTRAVENOUS EVERY 4 HOURS
Status: DISCONTINUED | OUTPATIENT
Start: 2017-01-01 | End: 2017-01-01

## 2017-01-01 RX ORDER — CHLORHEXIDINE GLUCONATE ORAL RINSE 1.2 MG/ML
15 SOLUTION DENTAL 2 TIMES DAILY
Status: DISCONTINUED | OUTPATIENT
Start: 2017-01-01 | End: 2017-01-01

## 2017-01-01 RX ORDER — THIAMINE MONONITRATE (VIT B1) 100 MG
TABLET ORAL
Qty: 30 TABLET | Refills: 0 | Status: ON HOLD | OUTPATIENT
Start: 2017-01-01 | End: 2017-01-01

## 2017-01-01 RX ORDER — QUETIAPINE FUMARATE 50 MG/1
50 TABLET, FILM COATED ORAL EVERY EVENING
Status: DISCONTINUED | OUTPATIENT
Start: 2017-01-01 | End: 2017-01-01 | Stop reason: HOSPADM

## 2017-01-01 RX ORDER — MINERAL OIL/HYDROPHIL PETROLAT
OINTMENT (GRAM) TOPICAL EVERY 8 HOURS
Status: DISCONTINUED | OUTPATIENT
Start: 2017-01-01 | End: 2017-01-01

## 2017-01-01 RX ORDER — GABAPENTIN 250 MG/5ML
100 SOLUTION ORAL EVERY 12 HOURS SCHEDULED
Status: DISCONTINUED | OUTPATIENT
Start: 2017-01-01 | End: 2017-01-01 | Stop reason: CLARIF

## 2017-01-01 RX ORDER — ALBUTEROL SULFATE 90 UG/1
2 AEROSOL, METERED RESPIRATORY (INHALATION) 4 TIMES DAILY PRN
DISCHARGE
Start: 2017-01-01

## 2017-01-01 RX ORDER — OXYCODONE HCL 5 MG/5 ML
2.5-5 SOLUTION, ORAL ORAL EVERY 4 HOURS PRN
Status: DISCONTINUED | OUTPATIENT
Start: 2017-01-01 | End: 2017-01-01

## 2017-01-01 RX ORDER — ACETAMINOPHEN 325 MG/1
650 TABLET ORAL EVERY 8 HOURS PRN
Qty: 100 TABLET | DISCHARGE
Start: 2017-01-01 | End: 2018-01-01

## 2017-01-01 RX ORDER — LORAZEPAM 2 MG/ML
1-2 INJECTION INTRAMUSCULAR
Status: COMPLETED | OUTPATIENT
Start: 2017-01-01 | End: 2017-01-01

## 2017-01-01 RX ORDER — FENTANYL CITRATE 50 UG/ML
25-50 INJECTION, SOLUTION INTRAMUSCULAR; INTRAVENOUS
Status: DISCONTINUED | OUTPATIENT
Start: 2017-01-01 | End: 2017-01-01

## 2017-01-01 RX ORDER — DEXTROSE MONOHYDRATE 25 G/50ML
25-50 INJECTION, SOLUTION INTRAVENOUS
Status: DISCONTINUED | OUTPATIENT
Start: 2017-01-01 | End: 2017-01-01

## 2017-01-01 RX ORDER — VANCOMYCIN HYDROCHLORIDE 1 G/200ML
1000 INJECTION, SOLUTION INTRAVENOUS ONCE
Status: DISCONTINUED | OUTPATIENT
Start: 2017-01-01 | End: 2017-01-01

## 2017-01-01 RX ORDER — LABETALOL HYDROCHLORIDE 5 MG/ML
10 INJECTION, SOLUTION INTRAVENOUS
Status: DISCONTINUED | OUTPATIENT
Start: 2017-01-01 | End: 2017-01-01 | Stop reason: HOSPADM

## 2017-01-01 RX ORDER — ASCORBIC ACID 500 MG
500 TABLET ORAL DAILY
Status: DISCONTINUED | OUTPATIENT
Start: 2017-01-01 | End: 2017-01-01 | Stop reason: HOSPADM

## 2017-01-01 RX ORDER — DIPHENHYDRAMINE HYDROCHLORIDE 50 MG/ML
INJECTION INTRAMUSCULAR; INTRAVENOUS PRN
Status: DISCONTINUED | OUTPATIENT
Start: 2017-01-01 | End: 2017-01-01

## 2017-01-01 RX ORDER — GUAR GUM
3 PACKET (EA) ORAL 3 TIMES DAILY
DISCHARGE
Start: 2017-01-01 | End: 2018-01-01

## 2017-01-01 RX ORDER — LANOLIN ALCOHOL/MO/W.PET/CERES
100 CREAM (GRAM) TOPICAL DAILY
DISCHARGE
Start: 2017-01-01 | End: 2018-01-01

## 2017-01-01 RX ORDER — OXYCODONE HCL 5 MG/5 ML
2.5 SOLUTION, ORAL ORAL EVERY 4 HOURS PRN
Qty: 90 ML | Refills: 0 | Status: SHIPPED | DISCHARGE
Start: 2017-01-01 | End: 2018-01-01

## 2017-01-01 RX ORDER — AMOXICILLIN 250 MG
2 CAPSULE ORAL 2 TIMES DAILY PRN
Status: DISCONTINUED | OUTPATIENT
Start: 2017-01-01 | End: 2017-01-01 | Stop reason: HOSPADM

## 2017-01-01 RX ORDER — DIPHENHYDRAMINE HYDROCHLORIDE 50 MG/ML
25 INJECTION INTRAMUSCULAR; INTRAVENOUS EVERY 6 HOURS PRN
Status: DISCONTINUED | OUTPATIENT
Start: 2017-01-01 | End: 2017-01-01

## 2017-01-01 RX ORDER — GINSENG 100 MG
CAPSULE ORAL EVERY 8 HOURS
Status: COMPLETED | OUTPATIENT
Start: 2017-01-01 | End: 2017-01-01

## 2017-01-01 RX ORDER — METOPROLOL TARTRATE 1 MG/ML
1-2 INJECTION, SOLUTION INTRAVENOUS EVERY 5 MIN PRN
Status: DISCONTINUED | OUTPATIENT
Start: 2017-01-01 | End: 2017-01-01 | Stop reason: HOSPADM

## 2017-01-01 RX ORDER — CLONIDINE HYDROCHLORIDE 0.1 MG/1
0.05 TABLET ORAL 2 TIMES DAILY
Status: DISCONTINUED | OUTPATIENT
Start: 2017-01-01 | End: 2017-01-01

## 2017-01-01 RX ORDER — ONDANSETRON 4 MG/1
4 TABLET, ORALLY DISINTEGRATING ORAL EVERY 6 HOURS PRN
Status: DISCONTINUED | OUTPATIENT
Start: 2017-01-01 | End: 2017-01-01

## 2017-01-01 RX ORDER — HEPARIN SODIUM,PORCINE 10 UNIT/ML
2-5 VIAL (ML) INTRAVENOUS
Status: DISCONTINUED | OUTPATIENT
Start: 2017-01-01 | End: 2017-01-01 | Stop reason: HOSPADM

## 2017-01-01 RX ORDER — FOLIC ACID 1 MG/1
1 TABLET ORAL DAILY
Status: DISCONTINUED | OUTPATIENT
Start: 2017-01-01 | End: 2017-01-01 | Stop reason: HOSPADM

## 2017-01-01 RX ORDER — ALBUTEROL SULFATE 0.83 MG/ML
2.5 SOLUTION RESPIRATORY (INHALATION) EVERY 4 HOURS PRN
Status: DISCONTINUED | OUTPATIENT
Start: 2017-01-01 | End: 2017-01-01 | Stop reason: HOSPADM

## 2017-01-01 RX ORDER — PROCHLORPERAZINE MALEATE 5 MG
5-10 TABLET ORAL EVERY 6 HOURS PRN
Status: DISCONTINUED | OUTPATIENT
Start: 2017-01-01 | End: 2017-01-01 | Stop reason: HOSPADM

## 2017-01-01 RX ORDER — LOPERAMIDE HYDROCHLORIDE 1 MG/5ML
2 SOLUTION ORAL 3 TIMES DAILY
Qty: 200 ML | Status: ON HOLD | DISCHARGE
Start: 2017-01-01 | End: 2017-01-01

## 2017-01-01 RX ORDER — GUAR GUM
1 PACKET (EA) ORAL EVERY 4 HOURS
Status: DISCONTINUED | OUTPATIENT
Start: 2017-01-01 | End: 2017-01-01

## 2017-01-01 RX ORDER — ACETAMINOPHEN 325 MG/1
650 TABLET ORAL EVERY 4 HOURS PRN
Qty: 25 TABLET | Refills: 0 | Status: ON HOLD | OUTPATIENT
Start: 2017-01-01 | End: 2017-01-01

## 2017-01-01 RX ORDER — GABAPENTIN 600 MG/1
300 TABLET ORAL 3 TIMES DAILY
Status: DISCONTINUED | OUTPATIENT
Start: 2017-01-01 | End: 2017-01-01

## 2017-01-01 RX ORDER — HYDROMORPHONE HYDROCHLORIDE 1 MG/ML
.3-.5 INJECTION, SOLUTION INTRAMUSCULAR; INTRAVENOUS; SUBCUTANEOUS
Status: DISCONTINUED | OUTPATIENT
Start: 2017-01-01 | End: 2017-01-01

## 2017-01-01 RX ORDER — HYDROMORPHONE HYDROCHLORIDE 1 MG/ML
.3-.5 INJECTION, SOLUTION INTRAMUSCULAR; INTRAVENOUS; SUBCUTANEOUS EVERY 5 MIN PRN
Status: DISCONTINUED | OUTPATIENT
Start: 2017-01-01 | End: 2017-01-01 | Stop reason: HOSPADM

## 2017-01-01 RX ORDER — MULTIVITAMIN,THERAPEUTIC
1 TABLET ORAL DAILY
Status: DISCONTINUED | OUTPATIENT
Start: 2017-01-01 | End: 2017-01-01

## 2017-01-01 RX ORDER — QUETIAPINE FUMARATE 25 MG/1
25 TABLET, FILM COATED ORAL ONCE
Status: COMPLETED | OUTPATIENT
Start: 2017-01-01 | End: 2017-01-01

## 2017-01-01 RX ORDER — NALOXONE HYDROCHLORIDE 0.4 MG/ML
.1-.4 INJECTION, SOLUTION INTRAMUSCULAR; INTRAVENOUS; SUBCUTANEOUS
Status: ACTIVE | OUTPATIENT
Start: 2017-01-01 | End: 2017-01-01

## 2017-01-01 RX ORDER — ONDANSETRON 4 MG/1
4 TABLET, ORALLY DISINTEGRATING ORAL EVERY 6 HOURS PRN
Status: DISCONTINUED | OUTPATIENT
Start: 2017-01-01 | End: 2017-01-01 | Stop reason: HOSPADM

## 2017-01-01 RX ORDER — LORAZEPAM 1 MG/1
1-4 TABLET ORAL EVERY 30 MIN PRN
Status: DISCONTINUED | OUTPATIENT
Start: 2017-01-01 | End: 2017-01-01

## 2017-01-01 RX ORDER — MAGNESIUM SULFATE HEPTAHYDRATE 40 MG/ML
4 INJECTION, SOLUTION INTRAVENOUS EVERY 4 HOURS PRN
Status: DISCONTINUED | OUTPATIENT
Start: 2017-01-01 | End: 2017-01-01 | Stop reason: HOSPADM

## 2017-01-01 RX ORDER — NICOTINE 21 MG/24HR
1 PATCH, TRANSDERMAL 24 HOURS TRANSDERMAL DAILY
Status: DISCONTINUED | OUTPATIENT
Start: 2017-01-01 | End: 2017-01-01 | Stop reason: HOSPADM

## 2017-01-01 RX ORDER — FENTANYL CITRATE 50 UG/ML
25-50 INJECTION, SOLUTION INTRAMUSCULAR; INTRAVENOUS
Status: DISCONTINUED | OUTPATIENT
Start: 2017-01-01 | End: 2017-01-01 | Stop reason: HOSPADM

## 2017-01-01 RX ORDER — GABAPENTIN 250 MG/5ML
100 SOLUTION ORAL EVERY 8 HOURS SCHEDULED
Status: DISCONTINUED | OUTPATIENT
Start: 2017-01-01 | End: 2017-01-01

## 2017-01-01 RX ORDER — FLUMAZENIL 0.1 MG/ML
0.2 INJECTION, SOLUTION INTRAVENOUS
Status: CANCELLED | OUTPATIENT
Start: 2017-01-01 | End: 2017-01-01

## 2017-01-01 RX ORDER — LANOLIN ALCOHOL/MO/W.PET/CERES
1000 CREAM (GRAM) TOPICAL DAILY
Status: DISCONTINUED | OUTPATIENT
Start: 2017-01-01 | End: 2017-01-01

## 2017-01-01 RX ORDER — AMOXICILLIN 250 MG
1-2 CAPSULE ORAL 2 TIMES DAILY
Status: DISCONTINUED | OUTPATIENT
Start: 2017-01-01 | End: 2017-01-01

## 2017-01-01 RX ORDER — LABETALOL HYDROCHLORIDE 5 MG/ML
20 INJECTION, SOLUTION INTRAVENOUS EVERY 4 HOURS PRN
Status: DISCONTINUED | OUTPATIENT
Start: 2017-01-01 | End: 2017-01-01 | Stop reason: HOSPADM

## 2017-01-01 RX ORDER — SODIUM CHLORIDE, SODIUM LACTATE, POTASSIUM CHLORIDE, CALCIUM CHLORIDE 600; 310; 30; 20 MG/100ML; MG/100ML; MG/100ML; MG/100ML
INJECTION, SOLUTION INTRAVENOUS CONTINUOUS PRN
Status: DISCONTINUED | OUTPATIENT
Start: 2017-01-01 | End: 2017-01-01

## 2017-01-01 RX ORDER — DEXAMETHASONE SODIUM PHOSPHATE 4 MG/ML
INJECTION, SOLUTION INTRA-ARTICULAR; INTRALESIONAL; INTRAMUSCULAR; INTRAVENOUS; SOFT TISSUE PRN
Status: DISCONTINUED | OUTPATIENT
Start: 2017-01-01 | End: 2017-01-01

## 2017-01-01 RX ORDER — GABAPENTIN 250 MG/5ML
300 SOLUTION ORAL 3 TIMES DAILY
Status: DISCONTINUED | OUTPATIENT
Start: 2017-01-01 | End: 2017-01-01

## 2017-01-01 RX ORDER — GUAR GUM
3 PACKET (EA) ORAL 3 TIMES DAILY
Status: DISCONTINUED | OUTPATIENT
Start: 2017-01-01 | End: 2017-01-01 | Stop reason: HOSPADM

## 2017-01-01 RX ORDER — ONDANSETRON 4 MG/1
4 TABLET, ORALLY DISINTEGRATING ORAL EVERY 30 MIN PRN
Status: DISCONTINUED | OUTPATIENT
Start: 2017-01-01 | End: 2017-01-01 | Stop reason: HOSPADM

## 2017-01-01 RX ORDER — HEPARIN SODIUM 5000 [USP'U]/.5ML
5000 INJECTION, SOLUTION INTRAVENOUS; SUBCUTANEOUS EVERY 8 HOURS
Status: DISCONTINUED | OUTPATIENT
Start: 2017-01-01 | End: 2017-01-01

## 2017-01-01 RX ORDER — ASCORBIC ACID 500 MG
500 TABLET ORAL DAILY
Qty: 30 TABLET | Status: ON HOLD | DISCHARGE
Start: 2017-01-01 | End: 2017-01-01

## 2017-01-01 RX ORDER — LORAZEPAM 2 MG/ML
1-4 INJECTION INTRAMUSCULAR
Status: DISCONTINUED | OUTPATIENT
Start: 2017-01-01 | End: 2017-01-01

## 2017-01-01 RX ORDER — QUETIAPINE FUMARATE 25 MG/1
25 TABLET, FILM COATED ORAL 2 TIMES DAILY
Status: DISCONTINUED | OUTPATIENT
Start: 2017-01-01 | End: 2017-01-01

## 2017-01-01 RX ORDER — AMPICILLIN AND SULBACTAM 1; .5 G/1; G/1
1.5 INJECTION, POWDER, FOR SOLUTION INTRAMUSCULAR; INTRAVENOUS
Status: COMPLETED | OUTPATIENT
Start: 2017-01-01 | End: 2017-01-01

## 2017-01-01 RX ORDER — IPRATROPIUM BROMIDE AND ALBUTEROL SULFATE 2.5; .5 MG/3ML; MG/3ML
3 SOLUTION RESPIRATORY (INHALATION) 4 TIMES DAILY
Qty: 360 ML | DISCHARGE
Start: 2017-01-01 | End: 2018-01-01

## 2017-01-01 RX ORDER — LANOLIN ALCOHOL/MO/W.PET/CERES
100 CREAM (GRAM) TOPICAL DAILY
Status: DISCONTINUED | OUTPATIENT
Start: 2017-01-01 | End: 2017-01-01 | Stop reason: HOSPADM

## 2017-01-01 RX ORDER — MEPERIDINE HYDROCHLORIDE 25 MG/ML
12.5 INJECTION INTRAMUSCULAR; INTRAVENOUS; SUBCUTANEOUS
Status: DISCONTINUED | OUTPATIENT
Start: 2017-01-01 | End: 2017-01-01 | Stop reason: HOSPADM

## 2017-01-01 RX ORDER — IODIXANOL 320 MG/ML
150 INJECTION, SOLUTION INTRAVASCULAR ONCE
Status: COMPLETED | OUTPATIENT
Start: 2017-01-01 | End: 2017-01-01

## 2017-01-01 RX ORDER — ALBUTEROL SULFATE 0.83 MG/ML
2.5 SOLUTION RESPIRATORY (INHALATION) EVERY 4 HOURS PRN
Qty: 360 ML | Status: ON HOLD | DISCHARGE
Start: 2017-01-01 | End: 2017-01-01

## 2017-01-01 RX ORDER — NICOTINE 21 MG/24HR
1 PATCH, TRANSDERMAL 24 HOURS TRANSDERMAL DAILY
Qty: 30 PATCH | DISCHARGE
Start: 2017-01-01 | End: 2018-01-01

## 2017-01-01 RX ORDER — LORAZEPAM 2 MG/ML
1-4 INJECTION INTRAMUSCULAR EVERY 12 HOURS PRN
Status: DISCONTINUED | OUTPATIENT
Start: 2017-01-01 | End: 2017-01-01

## 2017-01-01 RX ORDER — MINERAL OIL/HYDROPHIL PETROLAT
OINTMENT (GRAM) TOPICAL 3 TIMES DAILY
Qty: 50 G | DISCHARGE
Start: 2017-01-01 | End: 2018-01-01

## 2017-01-01 RX ORDER — NICOTINE 21 MG/24HR
1 PATCH, TRANSDERMAL 24 HOURS TRANSDERMAL DAILY
Qty: 30 PATCH | Status: ON HOLD | DISCHARGE
Start: 2017-01-01 | End: 2017-01-01

## 2017-01-01 RX ORDER — ACETAMINOPHEN 325 MG/1
650 TABLET ORAL EVERY 8 HOURS PRN
Qty: 100 TABLET | Status: ON HOLD | DISCHARGE
Start: 2017-01-01 | End: 2017-01-01

## 2017-01-01 RX ORDER — POTASSIUM CHLORIDE 750 MG/1
CAPSULE, EXTENDED RELEASE ORAL
Qty: 30 CAPSULE | Refills: 0 | Status: SHIPPED | OUTPATIENT
Start: 2017-01-01 | End: 2017-01-01

## 2017-01-01 RX ORDER — HALOPERIDOL 5 MG/ML
5 INJECTION INTRAMUSCULAR EVERY 6 HOURS PRN
Qty: 90 ML | Status: ON HOLD | DISCHARGE
Start: 2017-01-01 | End: 2017-01-01

## 2017-01-01 RX ORDER — POTASSIUM CHLORIDE 7.45 MG/ML
10 INJECTION INTRAVENOUS
Status: DISCONTINUED | OUTPATIENT
Start: 2017-01-01 | End: 2017-01-01 | Stop reason: HOSPADM

## 2017-01-01 RX ORDER — POTASSIUM CHLORIDE 29.8 MG/ML
20 INJECTION INTRAVENOUS
Status: DISCONTINUED | OUTPATIENT
Start: 2017-01-01 | End: 2017-01-01 | Stop reason: HOSPADM

## 2017-01-01 RX ORDER — ALUMINA, MAGNESIA, AND SIMETHICONE 2400; 2400; 240 MG/30ML; MG/30ML; MG/30ML
30 SUSPENSION ORAL EVERY 4 HOURS PRN
Status: DISCONTINUED | OUTPATIENT
Start: 2017-01-01 | End: 2017-01-01

## 2017-01-01 RX ORDER — CHLORDIAZEPOXIDE HYDROCHLORIDE 25 MG/1
CAPSULE, GELATIN COATED ORAL
Qty: 25 CAPSULE | Refills: 0 | Status: CANCELLED | OUTPATIENT
Start: 2017-01-01

## 2017-01-01 RX ORDER — ALBUTEROL SULFATE 90 UG/1
2 AEROSOL, METERED RESPIRATORY (INHALATION) 4 TIMES DAILY PRN
Status: ON HOLD | DISCHARGE
Start: 2017-01-01 | End: 2017-01-01

## 2017-01-01 RX ORDER — POTASSIUM CHLORIDE 750 MG/1
20-40 TABLET, EXTENDED RELEASE ORAL
Status: DISCONTINUED | OUTPATIENT
Start: 2017-01-01 | End: 2017-01-01

## 2017-01-01 RX ORDER — POTASSIUM CL/LIDO/0.9 % NACL 10MEQ/0.1L
10 INTRAVENOUS SOLUTION, PIGGYBACK (ML) INTRAVENOUS
Status: DISCONTINUED | OUTPATIENT
Start: 2017-01-01 | End: 2017-01-01

## 2017-01-01 RX ORDER — GABAPENTIN 250 MG/5ML
100 SOLUTION ORAL 3 TIMES DAILY
Status: DISCONTINUED | OUTPATIENT
Start: 2017-01-01 | End: 2017-01-01

## 2017-01-01 RX ORDER — DIPHENHYDRAMINE HCL 25 MG
25 CAPSULE ORAL EVERY 6 HOURS PRN
Status: DISCONTINUED | OUTPATIENT
Start: 2017-01-01 | End: 2017-01-01

## 2017-01-01 RX ORDER — FUROSEMIDE 10 MG/ML
20 INJECTION INTRAMUSCULAR; INTRAVENOUS
Status: DISCONTINUED | OUTPATIENT
Start: 2017-01-01 | End: 2017-01-01

## 2017-01-01 RX ORDER — MINERAL OIL/HYDROPHIL PETROLAT
OINTMENT (GRAM) TOPICAL 3 TIMES DAILY
Status: DISCONTINUED | OUTPATIENT
Start: 2017-01-01 | End: 2017-01-01 | Stop reason: HOSPADM

## 2017-01-01 RX ORDER — CEFTRIAXONE SODIUM 1 G/50ML
1 INJECTION, SOLUTION INTRAVENOUS EVERY 24 HOURS
Status: DISCONTINUED | OUTPATIENT
Start: 2017-01-01 | End: 2017-01-01

## 2017-01-01 RX ORDER — DEXTROSE MONOHYDRATE, SODIUM CHLORIDE, AND POTASSIUM CHLORIDE 50; 1.49; 4.5 G/1000ML; G/1000ML; G/1000ML
INJECTION, SOLUTION INTRAVENOUS CONTINUOUS
Status: DISCONTINUED | OUTPATIENT
Start: 2017-01-01 | End: 2017-01-01

## 2017-01-01 RX ORDER — LOPERAMIDE HYDROCHLORIDE 1 MG/5ML
2 SOLUTION ORAL 3 TIMES DAILY
Qty: 200 ML | DISCHARGE
Start: 2017-01-01 | End: 2018-01-01

## 2017-01-01 RX ORDER — CALCIUM CHLORIDE 100 MG/ML
INJECTION INTRAVENOUS; INTRAVENTRICULAR PRN
Status: DISCONTINUED | OUTPATIENT
Start: 2017-01-01 | End: 2017-01-01

## 2017-01-01 RX ORDER — SODIUM CHLORIDE, SODIUM LACTATE, POTASSIUM CHLORIDE, CALCIUM CHLORIDE 600; 310; 30; 20 MG/100ML; MG/100ML; MG/100ML; MG/100ML
INJECTION, SOLUTION INTRAVENOUS
Status: COMPLETED
Start: 2017-01-01 | End: 2017-01-01

## 2017-01-01 RX ORDER — FERROUS SULFATE 325(65) MG
325 TABLET ORAL 2 TIMES DAILY
Status: DISCONTINUED | OUTPATIENT
Start: 2017-01-01 | End: 2017-01-01

## 2017-01-01 RX ORDER — HYDRALAZINE HYDROCHLORIDE 20 MG/ML
10 INJECTION INTRAMUSCULAR; INTRAVENOUS EVERY 6 HOURS PRN
Status: DISCONTINUED | OUTPATIENT
Start: 2017-01-01 | End: 2017-01-01 | Stop reason: HOSPADM

## 2017-01-01 RX ORDER — BISACODYL 10 MG
10 SUPPOSITORY, RECTAL RECTAL DAILY PRN
Status: DISCONTINUED | OUTPATIENT
Start: 2017-01-01 | End: 2017-01-01 | Stop reason: HOSPADM

## 2017-01-01 RX ORDER — GABAPENTIN 250 MG/5ML
300 SOLUTION ORAL EVERY 8 HOURS SCHEDULED
Status: DISCONTINUED | OUTPATIENT
Start: 2017-01-01 | End: 2017-01-01

## 2017-01-01 RX ORDER — LIDOCAINE 40 MG/G
CREAM TOPICAL
Status: DISCONTINUED | OUTPATIENT
Start: 2017-01-01 | End: 2017-01-01 | Stop reason: HOSPADM

## 2017-01-01 RX ORDER — OXYCODONE HCL 5 MG/5 ML
5 SOLUTION, ORAL ORAL EVERY 4 HOURS PRN
Status: DISCONTINUED | OUTPATIENT
Start: 2017-01-01 | End: 2017-01-01

## 2017-01-01 RX ORDER — PROPOFOL 10 MG/ML
INJECTION, EMULSION INTRAVENOUS CONTINUOUS PRN
Status: DISCONTINUED | OUTPATIENT
Start: 2017-01-01 | End: 2017-01-01

## 2017-01-01 RX ORDER — LABETALOL HYDROCHLORIDE 5 MG/ML
20 INJECTION, SOLUTION INTRAVENOUS EVERY 4 HOURS PRN
Status: DISCONTINUED | OUTPATIENT
Start: 2017-01-01 | End: 2017-01-01

## 2017-01-01 RX ORDER — DEXTROSE MONOHYDRATE 25 G/50ML
25-50 INJECTION, SOLUTION INTRAVENOUS
Status: DISCONTINUED | OUTPATIENT
Start: 2017-01-01 | End: 2017-01-01 | Stop reason: HOSPADM

## 2017-01-01 RX ORDER — QUETIAPINE FUMARATE 50 MG/1
50 TABLET, FILM COATED ORAL EVERY EVENING
Qty: 120 TABLET | DISCHARGE
Start: 2017-01-01 | End: 2018-01-01

## 2017-01-01 RX ORDER — FUROSEMIDE 20 MG
20 TABLET ORAL DAILY
Qty: 30 TABLET | DISCHARGE
Start: 2017-01-01 | End: 2018-01-01

## 2017-01-01 RX ORDER — ALBUMIN, HUMAN INJ 5% 5 %
50 SOLUTION INTRAVENOUS ONCE
Status: COMPLETED | OUTPATIENT
Start: 2017-01-01 | End: 2017-01-01

## 2017-01-01 RX ORDER — DIPHENHYDRAMINE HYDROCHLORIDE 50 MG/ML
25 INJECTION INTRAMUSCULAR; INTRAVENOUS ONCE
Status: COMPLETED | OUTPATIENT
Start: 2017-01-01 | End: 2017-01-01

## 2017-01-01 RX ORDER — MAGNESIUM OXIDE 400 MG/1
400 TABLET ORAL 3 TIMES DAILY
Status: DISCONTINUED | OUTPATIENT
Start: 2017-01-01 | End: 2017-01-01

## 2017-01-01 RX ORDER — DIAZEPAM 5 MG
10 TABLET ORAL EVERY 30 MIN PRN
Status: DISCONTINUED | OUTPATIENT
Start: 2017-01-01 | End: 2017-01-01 | Stop reason: HOSPADM

## 2017-01-01 RX ORDER — FOLIC ACID 5 MG/ML
1 INJECTION, SOLUTION INTRAMUSCULAR; INTRAVENOUS; SUBCUTANEOUS ONCE
Status: COMPLETED | OUTPATIENT
Start: 2017-01-01 | End: 2017-01-01

## 2017-01-01 RX ORDER — QUETIAPINE FUMARATE 25 MG/1
25 TABLET, FILM COATED ORAL DAILY
Status: COMPLETED | OUTPATIENT
Start: 2017-01-01 | End: 2017-01-01

## 2017-01-01 RX ORDER — HYDRALAZINE HYDROCHLORIDE 20 MG/ML
10 INJECTION INTRAMUSCULAR; INTRAVENOUS EVERY 4 HOURS PRN
Status: DISCONTINUED | OUTPATIENT
Start: 2017-01-01 | End: 2017-01-01

## 2017-01-01 RX ORDER — ACETAZOLAMIDE 500 MG/5ML
250 INJECTION, POWDER, LYOPHILIZED, FOR SOLUTION INTRAVENOUS ONCE
Status: COMPLETED | OUTPATIENT
Start: 2017-01-01 | End: 2017-01-01

## 2017-01-01 RX ORDER — UBIDECARENONE 75 MG
CAPSULE ORAL
Qty: 30 TABLET | Refills: 0 | Status: ON HOLD | OUTPATIENT
Start: 2017-01-01 | End: 2017-01-01

## 2017-01-01 RX ORDER — SODIUM CHLORIDE 450 MG/100ML
INJECTION, SOLUTION INTRAVENOUS CONTINUOUS
Status: DISCONTINUED | OUTPATIENT
Start: 2017-01-01 | End: 2017-01-01 | Stop reason: HOSPADM

## 2017-01-01 RX ORDER — DIAZEPAM 10 MG/2ML
5-20 INJECTION, SOLUTION INTRAMUSCULAR; INTRAVENOUS SEE ADMIN INSTRUCTIONS
Status: DISCONTINUED | OUTPATIENT
Start: 2017-01-01 | End: 2017-01-01

## 2017-01-01 RX ORDER — ALBUMIN, HUMAN INJ 5% 5 %
SOLUTION INTRAVENOUS CONTINUOUS PRN
Status: DISCONTINUED | OUTPATIENT
Start: 2017-01-01 | End: 2017-01-01

## 2017-01-01 RX ORDER — CHLORDIAZEPOXIDE HYDROCHLORIDE 25 MG/1
CAPSULE, GELATIN COATED ORAL
Qty: 25 CAPSULE | Refills: 0 | Status: ON HOLD | OUTPATIENT
Start: 2017-01-01 | End: 2017-01-01

## 2017-01-01 RX ORDER — HYDRALAZINE HYDROCHLORIDE 20 MG/ML
INJECTION INTRAMUSCULAR; INTRAVENOUS
Status: COMPLETED
Start: 2017-01-01 | End: 2017-01-01

## 2017-01-01 RX ORDER — HYDRALAZINE HYDROCHLORIDE 20 MG/ML
INJECTION INTRAMUSCULAR; INTRAVENOUS
Status: DISCONTINUED
Start: 2017-01-01 | End: 2017-01-01 | Stop reason: HOSPADM

## 2017-01-01 RX ORDER — FUROSEMIDE 20 MG
20 TABLET ORAL DAILY
Qty: 30 TABLET | Status: ON HOLD | DISCHARGE
Start: 2017-01-01 | End: 2017-01-01

## 2017-01-01 RX ORDER — LIDOCAINE HYDROCHLORIDE 20 MG/ML
INJECTION, SOLUTION INFILTRATION; PERINEURAL PRN
Status: DISCONTINUED | OUTPATIENT
Start: 2017-01-01 | End: 2017-01-01

## 2017-01-01 RX ORDER — DIPHENHYDRAMINE HYDROCHLORIDE 50 MG/ML
25 INJECTION INTRAMUSCULAR; INTRAVENOUS ONCE
Status: DISCONTINUED | OUTPATIENT
Start: 2017-01-01 | End: 2017-01-01 | Stop reason: HOSPADM

## 2017-01-01 RX ORDER — ONDANSETRON 2 MG/ML
4 INJECTION INTRAMUSCULAR; INTRAVENOUS EVERY 30 MIN PRN
Status: DISCONTINUED | OUTPATIENT
Start: 2017-01-01 | End: 2017-01-01 | Stop reason: HOSPADM

## 2017-01-01 RX ORDER — LANOLIN ALCOHOL/MO/W.PET/CERES
1000 CREAM (GRAM) TOPICAL DAILY
Status: DISCONTINUED | OUTPATIENT
Start: 2017-01-01 | End: 2017-01-01 | Stop reason: HOSPADM

## 2017-01-01 RX ORDER — ALBUMIN, HUMAN INJ 5% 5 %
25 SOLUTION INTRAVENOUS ONCE
Status: COMPLETED | OUTPATIENT
Start: 2017-01-01 | End: 2017-01-01

## 2017-01-01 RX ORDER — CEFAZOLIN SODIUM 1 G/3ML
INJECTION, POWDER, FOR SOLUTION INTRAMUSCULAR; INTRAVENOUS PRN
Status: DISCONTINUED | OUTPATIENT
Start: 2017-01-01 | End: 2017-01-01

## 2017-01-01 RX ORDER — MIDAZOLAM (PF) 1 MG/ML IN 0.9 % SODIUM CHLORIDE INTRAVENOUS SOLUTION
1-2 CONTINUOUS
Status: DISCONTINUED | OUTPATIENT
Start: 2017-01-01 | End: 2017-01-01

## 2017-01-01 RX ORDER — CLONIDINE HYDROCHLORIDE 0.1 MG/1
0.05 TABLET ORAL EVERY 8 HOURS
Status: DISCONTINUED | OUTPATIENT
Start: 2017-01-01 | End: 2017-01-01

## 2017-01-01 RX ORDER — QUETIAPINE FUMARATE 25 MG/1
25 TABLET, FILM COATED ORAL EVERY EVENING
Status: DISCONTINUED | OUTPATIENT
Start: 2017-01-01 | End: 2017-01-01

## 2017-01-01 RX ORDER — FUROSEMIDE 20 MG
20 TABLET ORAL DAILY
Status: DISCONTINUED | OUTPATIENT
Start: 2017-01-01 | End: 2017-01-01

## 2017-01-01 RX ORDER — CHLORHEXIDINE GLUCONATE ORAL RINSE 1.2 MG/ML
15 SOLUTION DENTAL 4 TIMES DAILY
Status: DISCONTINUED | OUTPATIENT
Start: 2017-01-01 | End: 2017-01-01 | Stop reason: HOSPADM

## 2017-01-01 RX ORDER — DIPHENHYDRAMINE HYDROCHLORIDE 50 MG/ML
25 INJECTION INTRAMUSCULAR; INTRAVENOUS 2 TIMES DAILY PRN
Status: DISCONTINUED | OUTPATIENT
Start: 2017-01-01 | End: 2017-01-01 | Stop reason: HOSPADM

## 2017-01-01 RX ORDER — FOLIC ACID 1 MG/1
1 TABLET ORAL DAILY
Status: DISCONTINUED | OUTPATIENT
Start: 2017-01-01 | End: 2017-01-01

## 2017-01-01 RX ORDER — LANOLIN ALCOHOL/MO/W.PET/CERES
100 CREAM (GRAM) TOPICAL DAILY
Status: DISCONTINUED | OUTPATIENT
Start: 2017-01-01 | End: 2017-01-01

## 2017-01-01 RX ORDER — KETOROLAC TROMETHAMINE 15 MG/ML
15 INJECTION, SOLUTION INTRAMUSCULAR; INTRAVENOUS ONCE
Status: COMPLETED | OUTPATIENT
Start: 2017-01-01 | End: 2017-01-01

## 2017-01-01 RX ORDER — FUROSEMIDE 20 MG
20 TABLET ORAL DAILY
Status: DISCONTINUED | OUTPATIENT
Start: 2017-01-01 | End: 2017-01-01 | Stop reason: HOSPADM

## 2017-01-01 RX ORDER — MAGNESIUM SULFATE HEPTAHYDRATE 40 MG/ML
4 INJECTION, SOLUTION INTRAVENOUS EVERY 4 HOURS PRN
Status: DISCONTINUED | OUTPATIENT
Start: 2017-01-01 | End: 2017-01-01

## 2017-01-01 RX ORDER — KETAMINE HYDROCHLORIDE 10 MG/ML
INJECTION, SOLUTION INTRAMUSCULAR; INTRAVENOUS PRN
Status: DISCONTINUED | OUTPATIENT
Start: 2017-01-01 | End: 2017-01-01

## 2017-01-01 RX ORDER — LORAZEPAM 2 MG/ML
0.5 INJECTION INTRAMUSCULAR ONCE
Status: COMPLETED | OUTPATIENT
Start: 2017-01-01 | End: 2017-01-01

## 2017-01-01 RX ORDER — CLONIDINE HYDROCHLORIDE 0.1 MG/1
0.1 TABLET ORAL EVERY 8 HOURS
Status: DISCONTINUED | OUTPATIENT
Start: 2017-01-01 | End: 2017-01-01

## 2017-01-01 RX ORDER — LOPERAMIDE HYDROCHLORIDE 1 MG/5ML
2 SOLUTION ORAL 3 TIMES DAILY
Status: DISCONTINUED | OUTPATIENT
Start: 2017-01-01 | End: 2017-01-01 | Stop reason: HOSPADM

## 2017-01-01 RX ORDER — FUROSEMIDE 10 MG/ML
20 INJECTION INTRAMUSCULAR; INTRAVENOUS EVERY 12 HOURS
Status: COMPLETED | OUTPATIENT
Start: 2017-01-01 | End: 2017-01-01

## 2017-01-01 RX ORDER — GUAR GUM
3 PACKET (EA) ORAL 3 TIMES DAILY
Status: ON HOLD | DISCHARGE
Start: 2017-01-01 | End: 2017-01-01

## 2017-01-01 RX ORDER — FENTANYL CITRATE 50 UG/ML
25-50 INJECTION, SOLUTION INTRAMUSCULAR; INTRAVENOUS
Status: CANCELLED | OUTPATIENT
Start: 2017-01-01

## 2017-01-01 RX ORDER — LORAZEPAM 2 MG/ML
1-4 INJECTION INTRAMUSCULAR EVERY 4 HOURS PRN
Status: DISCONTINUED | OUTPATIENT
Start: 2017-01-01 | End: 2017-01-01

## 2017-01-01 RX ORDER — DEXMEDETOMIDINE HYDROCHLORIDE 4 UG/ML
0.2-0.7 INJECTION, SOLUTION INTRAVENOUS CONTINUOUS
Status: DISCONTINUED | OUTPATIENT
Start: 2017-01-01 | End: 2017-01-01

## 2017-01-01 RX ORDER — PROPOFOL 10 MG/ML
INJECTION, EMULSION INTRAVENOUS PRN
Status: DISCONTINUED | OUTPATIENT
Start: 2017-01-01 | End: 2017-01-01

## 2017-01-01 RX ORDER — MINERAL OIL/HYDROPHIL PETROLAT
OINTMENT (GRAM) TOPICAL 3 TIMES DAILY
Status: ACTIVE | OUTPATIENT
Start: 2017-01-01 | End: 2017-01-01

## 2017-01-01 RX ORDER — ACETAMINOPHEN 325 MG/1
650 TABLET ORAL EVERY 8 HOURS PRN
Status: DISCONTINUED | OUTPATIENT
Start: 2017-01-01 | End: 2017-01-01 | Stop reason: HOSPADM

## 2017-01-01 RX ORDER — HYDROMORPHONE HYDROCHLORIDE 1 MG/ML
.3-.5 INJECTION, SOLUTION INTRAMUSCULAR; INTRAVENOUS; SUBCUTANEOUS EVERY 4 HOURS PRN
Status: DISCONTINUED | OUTPATIENT
Start: 2017-01-01 | End: 2017-01-01

## 2017-01-01 RX ORDER — FUROSEMIDE 10 MG/ML
20 INJECTION INTRAMUSCULAR; INTRAVENOUS
Status: COMPLETED | OUTPATIENT
Start: 2017-01-01 | End: 2017-01-01

## 2017-01-01 RX ORDER — POTASSIUM CHLORIDE 1.5 G/1.58G
20-40 POWDER, FOR SOLUTION ORAL
Status: DISCONTINUED | OUTPATIENT
Start: 2017-01-01 | End: 2017-01-01

## 2017-01-01 RX ORDER — ACETAMINOPHEN 650 MG/1
650 SUPPOSITORY RECTAL EVERY 8 HOURS PRN
Status: DISCONTINUED | OUTPATIENT
Start: 2017-01-01 | End: 2017-01-01 | Stop reason: HOSPADM

## 2017-01-01 RX ORDER — POTASSIUM CHLORIDE 1500 MG/1
20-40 TABLET, EXTENDED RELEASE ORAL
Status: DISCONTINUED | OUTPATIENT
Start: 2017-01-01 | End: 2017-01-01 | Stop reason: HOSPADM

## 2017-01-01 RX ORDER — OXYCODONE HYDROCHLORIDE 5 MG/1
5-10 TABLET ORAL EVERY 4 HOURS PRN
Status: DISCONTINUED | OUTPATIENT
Start: 2017-01-01 | End: 2017-01-01

## 2017-01-01 RX ORDER — CEFTRIAXONE SODIUM 1 G/50ML
1 INJECTION, SOLUTION INTRAVENOUS EVERY 24 HOURS
Status: DISCONTINUED | OUTPATIENT
Start: 2017-01-01 | End: 2017-01-01 | Stop reason: RX

## 2017-01-01 RX ORDER — HALOPERIDOL 5 MG/ML
5 INJECTION INTRAMUSCULAR EVERY 6 HOURS PRN
Qty: 90 ML | DISCHARGE
Start: 2017-01-01 | End: 2018-01-01

## 2017-01-01 RX ORDER — SODIUM CHLORIDE 9 MG/ML
INJECTION, SOLUTION INTRAVENOUS
Status: DISCONTINUED
Start: 2017-01-01 | End: 2017-01-01 | Stop reason: HOSPADM

## 2017-01-01 RX ORDER — OXYCODONE HCL 5 MG/5 ML
5 SOLUTION, ORAL ORAL EVERY 8 HOURS PRN
Status: DISCONTINUED | OUTPATIENT
Start: 2017-01-01 | End: 2017-01-01

## 2017-01-01 RX ORDER — FUROSEMIDE 10 MG/ML
10 INJECTION INTRAMUSCULAR; INTRAVENOUS
Status: DISCONTINUED | OUTPATIENT
Start: 2017-01-01 | End: 2017-01-01

## 2017-01-01 RX ORDER — HEPARIN SODIUM 1000 [USP'U]/ML
INJECTION, SOLUTION INTRAVENOUS; SUBCUTANEOUS PRN
Status: DISCONTINUED | OUTPATIENT
Start: 2017-01-01 | End: 2017-01-01

## 2017-01-01 RX ORDER — OXYCODONE HYDROCHLORIDE 5 MG/1
5-10 TABLET ORAL EVERY 4 HOURS PRN
Qty: 30 TABLET | Refills: 0 | Status: ON HOLD | OUTPATIENT
Start: 2017-01-01 | End: 2017-01-01

## 2017-01-01 RX ORDER — LANOLIN ALCOHOL/MO/W.PET/CERES
100 CREAM (GRAM) TOPICAL DAILY
Status: ON HOLD | DISCHARGE
Start: 2017-01-01 | End: 2017-01-01

## 2017-01-01 RX ORDER — THIAMINE HYDROCHLORIDE 100 MG/ML
100 INJECTION, SOLUTION INTRAMUSCULAR; INTRAVENOUS ONCE
Status: COMPLETED | OUTPATIENT
Start: 2017-01-01 | End: 2017-01-01

## 2017-01-01 RX ORDER — EPHEDRINE SULFATE 50 MG/ML
INJECTION, SOLUTION INTRAMUSCULAR; INTRAVENOUS; SUBCUTANEOUS PRN
Status: DISCONTINUED | OUTPATIENT
Start: 2017-01-01 | End: 2017-01-01

## 2017-01-01 RX ORDER — FUROSEMIDE 10 MG/ML
10 INJECTION INTRAMUSCULAR; INTRAVENOUS ONCE
Status: COMPLETED | OUTPATIENT
Start: 2017-01-01 | End: 2017-01-01

## 2017-01-01 RX ORDER — FENTANYL CITRATE 50 UG/ML
25 INJECTION, SOLUTION INTRAMUSCULAR; INTRAVENOUS
Status: DISCONTINUED | OUTPATIENT
Start: 2017-01-01 | End: 2017-01-01

## 2017-01-01 RX ORDER — CARBOXYMETHYLCELLULOSE SODIUM 5 MG/ML
1 SOLUTION/ DROPS OPHTHALMIC 3 TIMES DAILY PRN
Status: DISCONTINUED | OUTPATIENT
Start: 2017-01-01 | End: 2017-01-01

## 2017-01-01 RX ORDER — POTASSIUM CHLORIDE 750 MG/1
10 CAPSULE, EXTENDED RELEASE ORAL 2 TIMES DAILY
COMMUNITY
Start: 2017-01-01 | End: 2017-01-01

## 2017-01-01 RX ORDER — ASCORBIC ACID 500 MG
500 TABLET ORAL DAILY
Qty: 30 TABLET | DISCHARGE
Start: 2017-01-01 | End: 2018-01-01

## 2017-01-01 RX ORDER — POTASSIUM CHLORIDE 1.5 G/1.58G
20 POWDER, FOR SOLUTION ORAL DAILY
Status: DISCONTINUED | OUTPATIENT
Start: 2017-01-01 | End: 2017-01-01 | Stop reason: HOSPADM

## 2017-01-01 RX ORDER — ACETAMINOPHEN 325 MG/1
975 TABLET ORAL EVERY 8 HOURS
Status: DISCONTINUED | OUTPATIENT
Start: 2017-01-01 | End: 2017-01-01

## 2017-01-01 RX ORDER — DIAZEPAM 5 MG
5-20 TABLET ORAL SEE ADMIN INSTRUCTIONS
Status: DISCONTINUED | OUTPATIENT
Start: 2017-01-01 | End: 2017-01-01

## 2017-01-01 RX ORDER — LABETALOL HYDROCHLORIDE 5 MG/ML
INJECTION, SOLUTION INTRAVENOUS
Status: DISCONTINUED
Start: 2017-01-01 | End: 2017-01-01 | Stop reason: HOSPADM

## 2017-01-01 RX ORDER — LORAZEPAM 2 MG/ML
INJECTION INTRAMUSCULAR PRN
Status: DISCONTINUED | OUTPATIENT
Start: 2017-01-01 | End: 2017-01-01

## 2017-01-01 RX ORDER — POTASSIUM CL/LIDO/0.9 % NACL 10MEQ/0.1L
10 INTRAVENOUS SOLUTION, PIGGYBACK (ML) INTRAVENOUS
Status: DISCONTINUED | OUTPATIENT
Start: 2017-01-01 | End: 2017-01-01 | Stop reason: HOSPADM

## 2017-01-01 RX ORDER — HALOPERIDOL 5 MG/ML
INJECTION INTRAMUSCULAR
Status: COMPLETED
Start: 2017-01-01 | End: 2017-01-01

## 2017-01-01 RX ORDER — ACETAMINOPHEN 325 MG/1
650 TABLET ORAL EVERY 4 HOURS PRN
Status: DISCONTINUED | OUTPATIENT
Start: 2017-01-01 | End: 2017-01-01

## 2017-01-01 RX ORDER — SODIUM CHLORIDE, SODIUM LACTATE, POTASSIUM CHLORIDE, CALCIUM CHLORIDE 600; 310; 30; 20 MG/100ML; MG/100ML; MG/100ML; MG/100ML
INJECTION, SOLUTION INTRAVENOUS CONTINUOUS
Status: DISCONTINUED | OUTPATIENT
Start: 2017-01-01 | End: 2017-01-01

## 2017-01-01 RX ORDER — GLYCOPYRROLATE 0.2 MG/ML
INJECTION, SOLUTION INTRAMUSCULAR; INTRAVENOUS PRN
Status: DISCONTINUED | OUTPATIENT
Start: 2017-01-01 | End: 2017-01-01

## 2017-01-01 RX ORDER — PROPOFOL 10 MG/ML
75 INJECTION, EMULSION INTRAVENOUS CONTINUOUS
Status: DISCONTINUED | OUTPATIENT
Start: 2017-01-01 | End: 2017-01-01 | Stop reason: CLARIF

## 2017-01-01 RX ORDER — RISPERIDONE 1 MG/ML
1 SOLUTION ORAL 2 TIMES DAILY
Status: DISCONTINUED | OUTPATIENT
Start: 2017-01-01 | End: 2017-01-01

## 2017-01-01 RX ORDER — HYDRALAZINE HYDROCHLORIDE 20 MG/ML
2.5-5 INJECTION INTRAMUSCULAR; INTRAVENOUS EVERY 10 MIN PRN
Status: DISCONTINUED | OUTPATIENT
Start: 2017-01-01 | End: 2017-01-01 | Stop reason: HOSPADM

## 2017-01-01 RX ORDER — POTASSIUM CHLORIDE 29.8 MG/ML
20 INJECTION INTRAVENOUS
Status: DISCONTINUED | OUTPATIENT
Start: 2017-01-01 | End: 2017-01-01

## 2017-01-01 RX ORDER — QUETIAPINE FUMARATE 25 MG/1
25 TABLET, FILM COATED ORAL AT BEDTIME
Status: DISCONTINUED | OUTPATIENT
Start: 2017-01-01 | End: 2017-01-01

## 2017-01-01 RX ORDER — HYDROMORPHONE HCL/0.9% NACL/PF 0.2MG/0.2
0.2 SYRINGE (ML) INTRAVENOUS
Status: DISCONTINUED | OUTPATIENT
Start: 2017-01-01 | End: 2017-01-01 | Stop reason: HOSPADM

## 2017-01-01 RX ORDER — IPRATROPIUM BROMIDE AND ALBUTEROL SULFATE 2.5; .5 MG/3ML; MG/3ML
3 SOLUTION RESPIRATORY (INHALATION)
Status: DISCONTINUED | OUTPATIENT
Start: 2017-01-01 | End: 2017-01-01 | Stop reason: HOSPADM

## 2017-01-01 RX ORDER — SODIUM CHLORIDE 9 MG/ML
INJECTION, SOLUTION INTRAVENOUS CONTINUOUS
Status: DISCONTINUED | OUTPATIENT
Start: 2017-01-01 | End: 2017-01-01 | Stop reason: HOSPADM

## 2017-01-01 RX ORDER — HYDROMORPHONE HCL/0.9% NACL/PF 0.2MG/0.2
0.2 SYRINGE (ML) INTRAVENOUS
Refills: 0 | Status: SHIPPED | DISCHARGE
Start: 2017-01-01 | End: 2018-01-01

## 2017-01-01 RX ORDER — FENTANYL CITRATE 50 UG/ML
50 INJECTION, SOLUTION INTRAMUSCULAR; INTRAVENOUS
Status: DISCONTINUED | OUTPATIENT
Start: 2017-01-01 | End: 2017-01-01

## 2017-01-01 RX ORDER — CEFTRIAXONE 1 G/1
1000 INJECTION, POWDER, FOR SOLUTION INTRAMUSCULAR; INTRAVENOUS DAILY
Qty: 10 ML | Refills: 0 | Status: ON HOLD | DISCHARGE
Start: 2017-01-01 | End: 2017-01-01

## 2017-01-01 RX ORDER — HYDROMORPHONE HCL/0.9% NACL/PF 0.2MG/0.2
0.2 SYRINGE (ML) INTRAVENOUS
Refills: 0 | Status: ON HOLD | DISCHARGE
Start: 2017-01-01 | End: 2017-01-01

## 2017-01-01 RX ORDER — HYDROMORPHONE HYDROCHLORIDE 1 MG/ML
0.2 INJECTION, SOLUTION INTRAMUSCULAR; INTRAVENOUS; SUBCUTANEOUS
Status: DISCONTINUED | OUTPATIENT
Start: 2017-01-01 | End: 2017-01-01 | Stop reason: HOSPADM

## 2017-01-01 RX ORDER — DIAZEPAM 10 MG/2ML
5-10 INJECTION, SOLUTION INTRAMUSCULAR; INTRAVENOUS EVERY 30 MIN PRN
Status: DISCONTINUED | OUTPATIENT
Start: 2017-01-01 | End: 2017-01-01 | Stop reason: HOSPADM

## 2017-01-01 RX ORDER — POTASSIUM CHLORIDE 29.8 MG/ML
20 INJECTION INTRAVENOUS
Status: DISCONTINUED | OUTPATIENT
Start: 2017-01-01 | End: 2017-01-01 | Stop reason: RX

## 2017-01-01 RX ORDER — HALOPERIDOL 5 MG/ML
5 INJECTION INTRAMUSCULAR EVERY 6 HOURS PRN
Status: DISCONTINUED | OUTPATIENT
Start: 2017-01-01 | End: 2017-01-01 | Stop reason: HOSPADM

## 2017-01-01 RX ORDER — NICOTINE POLACRILEX 4 MG
15-30 LOZENGE BUCCAL
Status: DISCONTINUED | OUTPATIENT
Start: 2017-01-01 | End: 2017-01-01 | Stop reason: HOSPADM

## 2017-01-01 RX ORDER — LABETALOL HYDROCHLORIDE 5 MG/ML
20 INJECTION, SOLUTION INTRAVENOUS EVERY 4 HOURS PRN
Qty: 4 ML | DISCHARGE
Start: 2017-01-01 | End: 2018-01-01

## 2017-01-01 RX ORDER — PIPERACILLIN SODIUM, TAZOBACTAM SODIUM 3; .375 G/15ML; G/15ML
INJECTION, POWDER, LYOPHILIZED, FOR SOLUTION INTRAVENOUS PRN
Status: DISCONTINUED | OUTPATIENT
Start: 2017-01-01 | End: 2017-01-01

## 2017-01-01 RX ORDER — QUETIAPINE FUMARATE 50 MG/1
50 TABLET, FILM COATED ORAL AT BEDTIME
Status: DISCONTINUED | OUTPATIENT
Start: 2017-01-01 | End: 2017-01-01

## 2017-01-01 RX ORDER — MINERAL OIL/HYDROPHIL PETROLAT
OINTMENT (GRAM) TOPICAL EVERY 8 HOURS
Status: DISCONTINUED | OUTPATIENT
Start: 2017-01-01 | End: 2017-01-01 | Stop reason: HOSPADM

## 2017-01-01 RX ORDER — POTASSIUM CHLORIDE 1500 MG/1
40 TABLET, EXTENDED RELEASE ORAL
Status: DISPENSED | OUTPATIENT
Start: 2017-01-01 | End: 2017-01-01

## 2017-01-01 RX ORDER — LABETALOL HYDROCHLORIDE 5 MG/ML
20 INJECTION, SOLUTION INTRAVENOUS EVERY 4 HOURS PRN
Qty: 4 ML | Status: ON HOLD | DISCHARGE
Start: 2017-01-01 | End: 2017-01-01

## 2017-01-01 RX ORDER — ALBUMIN, HUMAN INJ 5% 5 %
SOLUTION INTRAVENOUS
Status: COMPLETED
Start: 2017-01-01 | End: 2017-01-01

## 2017-01-01 RX ORDER — HYDRALAZINE HYDROCHLORIDE 20 MG/ML
10 INJECTION INTRAMUSCULAR; INTRAVENOUS EVERY 4 HOURS PRN
Status: DISCONTINUED | OUTPATIENT
Start: 2017-01-01 | End: 2017-01-01 | Stop reason: HOSPADM

## 2017-01-01 RX ORDER — POTASSIUM CHLORIDE 7.45 MG/ML
10 INJECTION INTRAVENOUS
Status: DISCONTINUED | OUTPATIENT
Start: 2017-01-01 | End: 2017-01-01

## 2017-01-01 RX ORDER — IPRATROPIUM BROMIDE AND ALBUTEROL SULFATE 2.5; .5 MG/3ML; MG/3ML
3 SOLUTION RESPIRATORY (INHALATION) 4 TIMES DAILY
Qty: 360 ML | Status: ON HOLD | DISCHARGE
Start: 2017-01-01 | End: 2017-01-01

## 2017-01-01 RX ORDER — POTASSIUM CHLORIDE 1500 MG/1
20 TABLET, EXTENDED RELEASE ORAL DAILY
Qty: 7 TABLET | Refills: 0 | Status: SHIPPED | OUTPATIENT
Start: 2017-01-01 | End: 2017-01-01

## 2017-01-01 RX ORDER — QUETIAPINE FUMARATE 50 MG/1
50 TABLET, FILM COATED ORAL EVERY EVENING
Qty: 120 TABLET | Status: ON HOLD | DISCHARGE
Start: 2017-01-01 | End: 2017-01-01

## 2017-01-01 RX ORDER — LORATADINE 10 MG/1
10 TABLET ORAL ONCE
Status: COMPLETED | OUTPATIENT
Start: 2017-01-01 | End: 2017-01-01

## 2017-01-01 RX ORDER — DEXAMETHASONE SODIUM PHOSPHATE 10 MG/ML
8 INJECTION, SOLUTION INTRAMUSCULAR; INTRAVENOUS EVERY 8 HOURS
Status: COMPLETED | OUTPATIENT
Start: 2017-01-01 | End: 2017-01-01

## 2017-01-01 RX ORDER — PROPOFOL 10 MG/ML
INJECTION, EMULSION INTRAVENOUS
Status: DISCONTINUED
Start: 2017-01-01 | End: 2017-01-01 | Stop reason: HOSPADM

## 2017-01-01 RX ORDER — FERROUS GLUCONATE 324(38)MG
324 TABLET ORAL
Status: DISCONTINUED | OUTPATIENT
Start: 2017-01-01 | End: 2017-01-01

## 2017-01-01 RX ORDER — SODIUM CHLORIDE 9 MG/ML
INJECTION, SOLUTION INTRAVENOUS CONTINUOUS PRN
Status: DISCONTINUED | OUTPATIENT
Start: 2017-01-01 | End: 2017-01-01

## 2017-01-01 RX ORDER — HALOPERIDOL 5 MG/ML
2 INJECTION INTRAMUSCULAR ONCE
Status: COMPLETED | OUTPATIENT
Start: 2017-01-01 | End: 2017-01-01

## 2017-01-01 RX ORDER — ALBUTEROL SULFATE 0.83 MG/ML
2.5 SOLUTION RESPIRATORY (INHALATION) EVERY 4 HOURS PRN
Qty: 360 ML | DISCHARGE
Start: 2017-01-01 | End: 2018-01-01

## 2017-01-01 RX ORDER — QUETIAPINE FUMARATE 25 MG/1
12.5 TABLET, FILM COATED ORAL DAILY
Qty: 60 TABLET | Status: ON HOLD | DISCHARGE
Start: 2017-01-01 | End: 2017-01-01

## 2017-01-01 RX ORDER — FOLIC ACID 1 MG/1
1 TABLET ORAL DAILY
Qty: 30 TABLET | DISCHARGE
Start: 2017-01-01 | End: 2018-01-01

## 2017-01-01 RX ORDER — POLYETHYLENE GLYCOL 3350 17 G/17G
17 POWDER, FOR SOLUTION ORAL DAILY PRN
Status: DISCONTINUED | OUTPATIENT
Start: 2017-01-01 | End: 2017-01-01 | Stop reason: HOSPADM

## 2017-01-01 RX ORDER — LABETALOL HYDROCHLORIDE 5 MG/ML
20 INJECTION, SOLUTION INTRAVENOUS ONCE
Status: COMPLETED | OUTPATIENT
Start: 2017-01-01 | End: 2017-01-01

## 2017-01-01 RX ORDER — POTASSIUM CHLORIDE 1500 MG/1
20 TABLET, EXTENDED RELEASE ORAL DAILY
Qty: 7 TABLET | Refills: 0 | Status: SHIPPED | OUTPATIENT
Start: 2017-01-01 | End: 2017-01-01 | Stop reason: ALTCHOICE

## 2017-01-01 RX ORDER — SODIUM CHLORIDE, SODIUM LACTATE, POTASSIUM CHLORIDE, CALCIUM CHLORIDE 600; 310; 30; 20 MG/100ML; MG/100ML; MG/100ML; MG/100ML
INJECTION, SOLUTION INTRAVENOUS
Status: DISCONTINUED
Start: 2017-01-01 | End: 2017-01-01 | Stop reason: HOSPADM

## 2017-01-01 RX ORDER — CEFEPIME 1 G/50ML
2 INJECTION, SOLUTION INTRAVENOUS EVERY 24 HOURS
Status: DISCONTINUED | OUTPATIENT
Start: 2017-01-01 | End: 2017-01-01

## 2017-01-01 RX ORDER — FOLIC ACID 1 MG/1
1 TABLET ORAL DAILY
Qty: 30 TABLET | Refills: 0 | Status: ON HOLD | DISCHARGE
Start: 2017-01-01 | End: 2017-01-01

## 2017-01-01 RX ORDER — QUETIAPINE FUMARATE 25 MG/1
12.5 TABLET, FILM COATED ORAL DAILY
Qty: 60 TABLET | DISCHARGE
Start: 2017-01-01 | End: 2018-01-01

## 2017-01-01 RX ORDER — NICOTINE POLACRILEX 4 MG
15-30 LOZENGE BUCCAL
Status: DISCONTINUED | OUTPATIENT
Start: 2017-01-01 | End: 2017-01-01

## 2017-01-01 RX ORDER — PROCHLORPERAZINE MALEATE 5 MG
10 TABLET ORAL EVERY 6 HOURS PRN
Status: DISCONTINUED | OUTPATIENT
Start: 2017-01-01 | End: 2017-01-01

## 2017-01-01 RX ORDER — ALBUTEROL SULFATE 90 UG/1
2 AEROSOL, METERED RESPIRATORY (INHALATION) 4 TIMES DAILY
Status: DISCONTINUED | OUTPATIENT
Start: 2017-01-01 | End: 2017-01-01

## 2017-01-01 RX ADMIN — ACETAMINOPHEN 650 MG: 325 TABLET, FILM COATED ORAL at 16:09

## 2017-01-01 RX ADMIN — POTASSIUM & SODIUM PHOSPHATES POWDER PACK 280-160-250 MG 1 PACKET: 280-160-250 PACK at 11:14

## 2017-01-01 RX ADMIN — CHLORHEXIDINE GLUCONATE 15 ML: 1.2 RINSE ORAL at 15:41

## 2017-01-01 RX ADMIN — ALBUTEROL SULFATE 2 PUFF: 90 AEROSOL, METERED RESPIRATORY (INHALATION) at 12:15

## 2017-01-01 RX ADMIN — CYANOCOBALAMIN TAB 1000 MCG 1000 MCG: 1000 TAB at 07:31

## 2017-01-01 RX ADMIN — Medication 1 PACKET: at 03:50

## 2017-01-01 RX ADMIN — IPRATROPIUM BROMIDE AND ALBUTEROL SULFATE 3 ML: .5; 3 SOLUTION RESPIRATORY (INHALATION) at 15:58

## 2017-01-01 RX ADMIN — Medication 2 G: at 11:39

## 2017-01-01 RX ADMIN — DEXMEDETOMIDINE HYDROCHLORIDE 0.6 MCG/KG/HR: 4 INJECTION, SOLUTION INTRAVENOUS at 00:06

## 2017-01-01 RX ADMIN — HEPARIN SODIUM 5000 UNITS: 5000 INJECTION, SOLUTION INTRAVENOUS; SUBCUTANEOUS at 20:27

## 2017-01-01 RX ADMIN — CHLORHEXIDINE GLUCONATE 15 ML: 1.2 RINSE ORAL at 16:16

## 2017-01-01 RX ADMIN — Medication 0.2 MG: at 15:55

## 2017-01-01 RX ADMIN — KETAMINE HCL-NACL SOLN PREF SY 50 MG/5ML-0.9% (10MG/ML) 10 MG: 10 SOLUTION PREFILLED SYRINGE at 09:45

## 2017-01-01 RX ADMIN — ALBUTEROL SULFATE 2 PUFF: 90 AEROSOL, METERED RESPIRATORY (INHALATION) at 15:09

## 2017-01-01 RX ADMIN — OXYCODONE HYDROCHLORIDE 2.5 MG: 5 SOLUTION ORAL at 08:47

## 2017-01-01 RX ADMIN — CHLORHEXIDINE GLUCONATE 15 ML: 1.2 RINSE ORAL at 19:57

## 2017-01-01 RX ADMIN — HYDROMORPHONE HYDROCHLORIDE 0.3 MG: 1 INJECTION, SOLUTION INTRAMUSCULAR; INTRAVENOUS; SUBCUTANEOUS at 18:28

## 2017-01-01 RX ADMIN — Medication 12.5 MG: at 09:43

## 2017-01-01 RX ADMIN — OXYCODONE HYDROCHLORIDE 5 MG: 5 SOLUTION ORAL at 16:27

## 2017-01-01 RX ADMIN — CEFTRIAXONE 1 G: 10 INJECTION, POWDER, FOR SOLUTION INTRAVENOUS at 12:23

## 2017-01-01 RX ADMIN — NOREPINEPHRINE BITARTRATE 0.06 MCG: 1 INJECTION INTRAVENOUS at 11:24

## 2017-01-01 RX ADMIN — CHLORHEXIDINE GLUCONATE 15 ML: 1.2 RINSE ORAL at 08:17

## 2017-01-01 RX ADMIN — KETAMINE HCL-NACL SOLN PREF SY 50 MG/5ML-0.9% (10MG/ML) 10 MG: 10 SOLUTION PREFILLED SYRINGE at 09:27

## 2017-01-01 RX ADMIN — POTASSIUM CHLORIDE 40 MEQ: 1.5 POWDER, FOR SOLUTION ORAL at 14:33

## 2017-01-01 RX ADMIN — Medication 5 MG: at 19:59

## 2017-01-01 RX ADMIN — OXYCODONE HYDROCHLORIDE 10 MG: 5 TABLET ORAL at 04:12

## 2017-01-01 RX ADMIN — FENTANYL CITRATE 50 MCG: 50 INJECTION, SOLUTION INTRAMUSCULAR; INTRAVENOUS at 16:23

## 2017-01-01 RX ADMIN — OXYCODONE HYDROCHLORIDE 5 MG: 5 SOLUTION ORAL at 12:23

## 2017-01-01 RX ADMIN — ACETAMINOPHEN 650 MG: 325 TABLET, FILM COATED ORAL at 01:21

## 2017-01-01 RX ADMIN — PHENYLEPHRINE HYDROCHLORIDE 200 MCG: 10 INJECTION, SOLUTION INTRAMUSCULAR; INTRAVENOUS; SUBCUTANEOUS at 10:33

## 2017-01-01 RX ADMIN — FUROSEMIDE 20 MG: 10 INJECTION, SOLUTION INTRAVENOUS at 22:13

## 2017-01-01 RX ADMIN — Medication: at 15:28

## 2017-01-01 RX ADMIN — CHLORHEXIDINE GLUCONATE 15 ML: 1.2 RINSE ORAL at 08:12

## 2017-01-01 RX ADMIN — Medication: at 03:23

## 2017-01-01 RX ADMIN — Medication: at 11:56

## 2017-01-01 RX ADMIN — ACETAMINOPHEN 650 MG: 325 TABLET, FILM COATED ORAL at 21:50

## 2017-01-01 RX ADMIN — Medication: at 08:24

## 2017-01-01 RX ADMIN — NICOTINE 1 PATCH: 14 PATCH, EXTENDED RELEASE TRANSDERMAL at 08:38

## 2017-01-01 RX ADMIN — ROCURONIUM BROMIDE 10 MG: 10 INJECTION INTRAVENOUS at 13:30

## 2017-01-01 RX ADMIN — PANTOPRAZOLE SODIUM 40 MG: 40 TABLET, DELAYED RELEASE ORAL at 20:32

## 2017-01-01 RX ADMIN — POTASSIUM CHLORIDE 40 MEQ: 1.5 POWDER, FOR SOLUTION ORAL at 05:59

## 2017-01-01 RX ADMIN — OXYCODONE HYDROCHLORIDE 2.5 MG: 5 SOLUTION ORAL at 12:07

## 2017-01-01 RX ADMIN — Medication: at 00:51

## 2017-01-01 RX ADMIN — DEXMEDETOMIDINE HYDROCHLORIDE 0.6 MCG/KG/HR: 4 INJECTION, SOLUTION INTRAVENOUS at 00:54

## 2017-01-01 RX ADMIN — INSULIN ASPART 1 UNITS: 100 INJECTION, SOLUTION INTRAVENOUS; SUBCUTANEOUS at 04:11

## 2017-01-01 RX ADMIN — ALBUTEROL SULFATE 2 PUFF: 90 AEROSOL, METERED RESPIRATORY (INHALATION) at 11:57

## 2017-01-01 RX ADMIN — PANTOPRAZOLE SODIUM 40 MG: 40 INJECTION, POWDER, FOR SOLUTION INTRAVENOUS at 07:50

## 2017-01-01 RX ADMIN — Medication: at 21:01

## 2017-01-01 RX ADMIN — Medication 100 MG: at 08:03

## 2017-01-01 RX ADMIN — Medication 1 PACKET: at 23:12

## 2017-01-01 RX ADMIN — FOLIC ACID 1 MG: 1 TABLET ORAL at 07:45

## 2017-01-01 RX ADMIN — MULTIVIT AND MINERALS-FERROUS GLUCONATE 9 MG IRON/15 ML ORAL LIQUID 15 ML: at 08:06

## 2017-01-01 RX ADMIN — QUETIAPINE FUMARATE 25 MG: 25 TABLET, FILM COATED ORAL at 20:31

## 2017-01-01 RX ADMIN — Medication 100 MG: at 08:26

## 2017-01-01 RX ADMIN — IODIXANOL 60 ML: 320 INJECTION, SOLUTION INTRAVASCULAR at 12:45

## 2017-01-01 RX ADMIN — Medication 10 MG: at 09:15

## 2017-01-01 RX ADMIN — Medication 20 MG: at 03:12

## 2017-01-01 RX ADMIN — OXYCODONE HYDROCHLORIDE 10 MG: 5 TABLET ORAL at 00:15

## 2017-01-01 RX ADMIN — WHITE PETROLATUM: 1.75 OINTMENT TOPICAL at 00:34

## 2017-01-01 RX ADMIN — POLYETHYLENE GLYCOL 3350 17 G: 17 POWDER, FOR SOLUTION ORAL at 09:24

## 2017-01-01 RX ADMIN — CEFTRIAXONE 1 G: 10 INJECTION, POWDER, FOR SOLUTION INTRAVENOUS at 10:52

## 2017-01-01 RX ADMIN — CHLORHEXIDINE GLUCONATE 15 ML: 1.2 RINSE ORAL at 16:57

## 2017-01-01 RX ADMIN — Medication: at 20:27

## 2017-01-01 RX ADMIN — Medication 1 PACKET: at 07:47

## 2017-01-01 RX ADMIN — CEFAZOLIN 1 G: 1 INJECTION, POWDER, FOR SOLUTION INTRAMUSCULAR; INTRAVENOUS at 10:25

## 2017-01-01 RX ADMIN — ROCURONIUM BROMIDE 35 MG: 10 INJECTION INTRAVENOUS at 11:51

## 2017-01-01 RX ADMIN — IPRATROPIUM BROMIDE AND ALBUTEROL SULFATE 3 ML: .5; 3 SOLUTION RESPIRATORY (INHALATION) at 08:21

## 2017-01-01 RX ADMIN — SENNOSIDES AND DOCUSATE SODIUM 1 TABLET: 8.6; 5 TABLET ORAL at 20:54

## 2017-01-01 RX ADMIN — Medication: at 10:02

## 2017-01-01 RX ADMIN — CHLORHEXIDINE GLUCONATE 15 ML: 1.2 RINSE ORAL at 20:21

## 2017-01-01 RX ADMIN — METOPROLOL TARTRATE 25 MG: 100 TABLET ORAL at 20:02

## 2017-01-01 RX ADMIN — THERA TABS 1 TABLET: TAB at 12:46

## 2017-01-01 RX ADMIN — Medication 100 MG: at 07:44

## 2017-01-01 RX ADMIN — METOPROLOL TARTRATE 25 MG: 100 TABLET ORAL at 08:08

## 2017-01-01 RX ADMIN — WHITE PETROLATUM: 1.75 OINTMENT TOPICAL at 11:30

## 2017-01-01 RX ADMIN — HALOPERIDOL LACTATE 5 MG: 5 INJECTION, SOLUTION INTRAMUSCULAR at 23:12

## 2017-01-01 RX ADMIN — Medication 100 MG: at 08:44

## 2017-01-01 RX ADMIN — Medication 0.2 MG: at 15:16

## 2017-01-01 RX ADMIN — DIAZEPAM 10 MG: 5 INJECTION, SOLUTION INTRAMUSCULAR; INTRAVENOUS at 03:43

## 2017-01-01 RX ADMIN — Medication 3 PACKET: at 20:24

## 2017-01-01 RX ADMIN — Medication 20 MG: at 11:03

## 2017-01-01 RX ADMIN — Medication 2 G: at 05:13

## 2017-01-01 RX ADMIN — PROPOFOL 120 MG: 10 INJECTION, EMULSION INTRAVENOUS at 12:57

## 2017-01-01 RX ADMIN — Medication: at 23:26

## 2017-01-01 RX ADMIN — POTASSIUM CHLORIDE 20 MEQ: 1.5 POWDER, FOR SOLUTION ORAL at 12:33

## 2017-01-01 RX ADMIN — CYANOCOBALAMIN TAB 1000 MCG 1000 MCG: 1000 TAB at 08:03

## 2017-01-01 RX ADMIN — HYDROMORPHONE HYDROCHLORIDE 0.2 MG: 1 INJECTION, SOLUTION INTRAMUSCULAR; INTRAVENOUS; SUBCUTANEOUS at 06:20

## 2017-01-01 RX ADMIN — PANTOPRAZOLE SODIUM 40 MG: 40 TABLET, DELAYED RELEASE ORAL at 20:42

## 2017-01-01 RX ADMIN — SULFUR HEXAFLUORIDE 5 ML: KIT at 10:24

## 2017-01-01 RX ADMIN — Medication: at 20:14

## 2017-01-01 RX ADMIN — PANTOPRAZOLE SODIUM 40 MG: 40 TABLET, DELAYED RELEASE ORAL at 21:08

## 2017-01-01 RX ADMIN — PANTOPRAZOLE SODIUM 40 MG: 40 TABLET, DELAYED RELEASE ORAL at 19:56

## 2017-01-01 RX ADMIN — POTASSIUM CHLORIDE 20 MEQ: 1.5 POWDER, FOR SOLUTION ORAL at 08:06

## 2017-01-01 RX ADMIN — PANTOPRAZOLE SODIUM 40 MG: 40 TABLET, DELAYED RELEASE ORAL at 20:13

## 2017-01-01 RX ADMIN — DIPHENHYDRAMINE HYDROCHLORIDE 25 MG: 50 INJECTION, SOLUTION INTRAMUSCULAR; INTRAVENOUS at 00:03

## 2017-01-01 RX ADMIN — CHLORHEXIDINE GLUCONATE 15 ML: 1.2 RINSE ORAL at 09:01

## 2017-01-01 RX ADMIN — Medication 10 MEQ: at 00:14

## 2017-01-01 RX ADMIN — PANTOPRAZOLE SODIUM 40 MG: 40 TABLET, DELAYED RELEASE ORAL at 08:28

## 2017-01-01 RX ADMIN — CHLORHEXIDINE GLUCONATE 15 ML: 1.2 RINSE ORAL at 07:49

## 2017-01-01 RX ADMIN — ENOXAPARIN SODIUM 30 MG: 30 INJECTION SUBCUTANEOUS at 15:30

## 2017-01-01 RX ADMIN — HEPARIN SODIUM 5000 UNITS: 5000 INJECTION, SOLUTION INTRAVENOUS; SUBCUTANEOUS at 20:34

## 2017-01-01 RX ADMIN — Medication: at 08:54

## 2017-01-01 RX ADMIN — ROCURONIUM BROMIDE 25 MG: 10 INJECTION INTRAVENOUS at 10:22

## 2017-01-01 RX ADMIN — OXYCODONE HYDROCHLORIDE 5 MG: 5 TABLET ORAL at 17:26

## 2017-01-01 RX ADMIN — FENTANYL CITRATE 50 MCG: 50 INJECTION, SOLUTION INTRAMUSCULAR; INTRAVENOUS at 00:09

## 2017-01-01 RX ADMIN — FENTANYL CITRATE 100 MCG: 50 INJECTION, SOLUTION INTRAMUSCULAR; INTRAVENOUS at 08:18

## 2017-01-01 RX ADMIN — Medication 100 MG: at 12:45

## 2017-01-01 RX ADMIN — Medication 40 MG: at 07:33

## 2017-01-01 RX ADMIN — PHENYLEPHRINE HYDROCHLORIDE 150 MCG: 10 INJECTION, SOLUTION INTRAMUSCULAR; INTRAVENOUS; SUBCUTANEOUS at 11:16

## 2017-01-01 RX ADMIN — CHLORHEXIDINE GLUCONATE 15 ML: 1.2 RINSE ORAL at 08:37

## 2017-01-01 RX ADMIN — HYDROMORPHONE HYDROCHLORIDE 0.5 MG: 10 INJECTION, SOLUTION INTRAMUSCULAR; INTRAVENOUS; SUBCUTANEOUS at 21:44

## 2017-01-01 RX ADMIN — HEPARIN SODIUM 5000 UNITS: 5000 INJECTION, SOLUTION INTRAVENOUS; SUBCUTANEOUS at 12:32

## 2017-01-01 RX ADMIN — PANTOPRAZOLE SODIUM 40 MG: 40 TABLET, DELAYED RELEASE ORAL at 20:00

## 2017-01-01 RX ADMIN — Medication 100 MG: at 08:17

## 2017-01-01 RX ADMIN — CEFTRIAXONE SODIUM 1 G: 10 INJECTION, POWDER, FOR SOLUTION INTRAVENOUS at 09:01

## 2017-01-01 RX ADMIN — ALBUTEROL SULFATE 2 PUFF: 90 AEROSOL, METERED RESPIRATORY (INHALATION) at 09:13

## 2017-01-01 RX ADMIN — OXYCODONE HYDROCHLORIDE 2.5 MG: 5 SOLUTION ORAL at 23:08

## 2017-01-01 RX ADMIN — SODIUM CHLORIDE: 9 INJECTION, SOLUTION INTRAVENOUS at 07:29

## 2017-01-01 RX ADMIN — Medication 0.05 MG: at 20:13

## 2017-01-01 RX ADMIN — Medication: at 12:24

## 2017-01-01 RX ADMIN — MULTIVIT AND MINERALS-FERROUS GLUCONATE 9 MG IRON/15 ML ORAL LIQUID 15 ML: at 07:44

## 2017-01-01 RX ADMIN — OXYCODONE HYDROCHLORIDE 2.5 MG: 5 SOLUTION ORAL at 10:03

## 2017-01-01 RX ADMIN — HEPARIN SODIUM 5000 UNITS: 5000 INJECTION, SOLUTION INTRAVENOUS; SUBCUTANEOUS at 20:14

## 2017-01-01 RX ADMIN — SODIUM CHLORIDE, POTASSIUM CHLORIDE, SODIUM LACTATE AND CALCIUM CHLORIDE: 600; 310; 30; 20 INJECTION, SOLUTION INTRAVENOUS at 14:54

## 2017-01-01 RX ADMIN — MULTIVIT AND MINERALS-FERROUS GLUCONATE 9 MG IRON/15 ML ORAL LIQUID 15 ML: at 12:45

## 2017-01-01 RX ADMIN — SODIUM CHLORIDE: 9 INJECTION, SOLUTION INTRAVENOUS at 08:20

## 2017-01-01 RX ADMIN — CHLORHEXIDINE GLUCONATE 15 ML: 1.2 RINSE ORAL at 21:07

## 2017-01-01 RX ADMIN — SENNOSIDES AND DOCUSATE SODIUM 1 TABLET: 8.6; 5 TABLET ORAL at 12:47

## 2017-01-01 RX ADMIN — PHENYLEPHRINE HYDROCHLORIDE 100 MCG: 10 INJECTION, SOLUTION INTRAMUSCULAR; INTRAVENOUS; SUBCUTANEOUS at 15:24

## 2017-01-01 RX ADMIN — MULTIVIT AND MINERALS-FERROUS GLUCONATE 9 MG IRON/15 ML ORAL LIQUID 15 ML: at 07:47

## 2017-01-01 RX ADMIN — PANCRELIPASE 2 TABLET: 10440; 39150; 39150 TABLET ORAL at 16:07

## 2017-01-01 RX ADMIN — CHLORHEXIDINE GLUCONATE 15 ML: 1.2 RINSE ORAL at 12:26

## 2017-01-01 RX ADMIN — INSULIN ASPART 1 UNITS: 100 INJECTION, SOLUTION INTRAVENOUS; SUBCUTANEOUS at 20:15

## 2017-01-01 RX ADMIN — FOLIC ACID 1 MG: 1 TABLET ORAL at 08:02

## 2017-01-01 RX ADMIN — RISPERIDONE 1 MG: 1 SOLUTION ORAL at 19:56

## 2017-01-01 RX ADMIN — FUROSEMIDE 20 MG: 20 TABLET ORAL at 08:03

## 2017-01-01 RX ADMIN — GABAPENTIN 300 MG: 250 SOLUTION ORAL at 22:13

## 2017-01-01 RX ADMIN — Medication 12.5 MG: at 08:19

## 2017-01-01 RX ADMIN — CYANOCOBALAMIN TAB 1000 MCG 1000 MCG: 1000 TAB at 14:31

## 2017-01-01 RX ADMIN — KETOROLAC TROMETHAMINE 15 MG: 15 INJECTION, SOLUTION INTRAMUSCULAR; INTRAVENOUS at 15:16

## 2017-01-01 RX ADMIN — Medication: at 20:21

## 2017-01-01 RX ADMIN — NOREPINEPHRINE BITARTRATE 0.06 MCG: 1 INJECTION INTRAVENOUS at 11:25

## 2017-01-01 RX ADMIN — HEPARIN SODIUM 5000 UNITS: 5000 INJECTION, SOLUTION INTRAVENOUS; SUBCUTANEOUS at 11:54

## 2017-01-01 RX ADMIN — HEPARIN SODIUM 5000 UNITS: 5000 INJECTION, SOLUTION INTRAVENOUS; SUBCUTANEOUS at 01:31

## 2017-01-01 RX ADMIN — Medication 100 MG: at 11:28

## 2017-01-01 RX ADMIN — POTASSIUM & SODIUM PHOSPHATES POWDER PACK 280-160-250 MG 1 PACKET: 280-160-250 PACK at 08:06

## 2017-01-01 RX ADMIN — OXYCODONE HYDROCHLORIDE 2.5 MG: 5 SOLUTION ORAL at 16:09

## 2017-01-01 RX ADMIN — BACITRACIN: 500 OINTMENT TOPICAL at 21:11

## 2017-01-01 RX ADMIN — Medication 1 PACKET: at 21:07

## 2017-01-01 RX ADMIN — CYANOCOBALAMIN TAB 1000 MCG 1000 MCG: 1000 TAB at 08:17

## 2017-01-01 RX ADMIN — PANTOPRAZOLE SODIUM 40 MG: 40 TABLET, DELAYED RELEASE ORAL at 20:21

## 2017-01-01 RX ADMIN — HYDROMORPHONE HYDROCHLORIDE 0.5 MG: 10 INJECTION, SOLUTION INTRAMUSCULAR; INTRAVENOUS; SUBCUTANEOUS at 06:02

## 2017-01-01 RX ADMIN — Medication: at 05:16

## 2017-01-01 RX ADMIN — OXYCODONE HYDROCHLORIDE 2.5 MG: 5 SOLUTION ORAL at 21:26

## 2017-01-01 RX ADMIN — QUETIAPINE FUMARATE 25 MG: 25 TABLET, FILM COATED ORAL at 10:58

## 2017-01-01 RX ADMIN — PROPOFOL 30 MG: 10 INJECTION, EMULSION INTRAVENOUS at 15:26

## 2017-01-01 RX ADMIN — FUROSEMIDE 20 MG: 10 INJECTION, SOLUTION INTRAMUSCULAR; INTRAVENOUS at 09:07

## 2017-01-01 RX ADMIN — NOREPINEPHRINE BITARTRATE 0.03 MCG/KG/MIN: 1 INJECTION INTRAVENOUS at 11:03

## 2017-01-01 RX ADMIN — MAGNESIUM SULFATE IN WATER 4 G: 40 INJECTION, SOLUTION INTRAVENOUS at 17:53

## 2017-01-01 RX ADMIN — MULTIVIT AND MINERALS-FERROUS GLUCONATE 9 MG IRON/15 ML ORAL LIQUID 15 ML: at 07:58

## 2017-01-01 RX ADMIN — LIDOCAINE HYDROCHLORIDE 60 MG: 20 INJECTION, SOLUTION INFILTRATION; PERINEURAL at 12:57

## 2017-01-01 RX ADMIN — Medication 20 MG: at 03:17

## 2017-01-01 RX ADMIN — Medication 20 MG: at 08:46

## 2017-01-01 RX ADMIN — FOLIC ACID 1 MG: 1 TABLET ORAL at 07:31

## 2017-01-01 RX ADMIN — GABAPENTIN 300 MG: 250 SOLUTION ORAL at 05:59

## 2017-01-01 RX ADMIN — HEPARIN SODIUM 5000 UNITS: 5000 INJECTION, SOLUTION INTRAVENOUS; SUBCUTANEOUS at 04:12

## 2017-01-01 RX ADMIN — HEPARIN SODIUM 5000 UNITS: 5000 INJECTION, SOLUTION INTRAVENOUS; SUBCUTANEOUS at 20:19

## 2017-01-01 RX ADMIN — OXYCODONE HYDROCHLORIDE 2.5 MG: 5 SOLUTION ORAL at 20:00

## 2017-01-01 RX ADMIN — CYANOCOBALAMIN TAB 1000 MCG 1000 MCG: 1000 TAB at 08:26

## 2017-01-01 RX ADMIN — METOPROLOL TARTRATE 25 MG: 100 TABLET ORAL at 11:18

## 2017-01-01 RX ADMIN — CYANOCOBALAMIN TAB 1000 MCG 1000 MCG: 1000 TAB at 08:06

## 2017-01-01 RX ADMIN — METOPROLOL TARTRATE 1 MG: 5 INJECTION INTRAVENOUS at 10:15

## 2017-01-01 RX ADMIN — NICOTINE 1 PATCH: 14 PATCH, EXTENDED RELEASE TRANSDERMAL at 07:50

## 2017-01-01 RX ADMIN — HYDRALAZINE HYDROCHLORIDE 10 MG: 20 INJECTION INTRAMUSCULAR; INTRAVENOUS at 14:29

## 2017-01-01 RX ADMIN — POTASSIUM & SODIUM PHOSPHATES POWDER PACK 280-160-250 MG 1 PACKET: 280-160-250 PACK at 20:14

## 2017-01-01 RX ADMIN — CHLORHEXIDINE GLUCONATE 15 ML: 1.2 RINSE ORAL at 08:16

## 2017-01-01 RX ADMIN — PHENYLEPHRINE HYDROCHLORIDE 100 MCG: 10 INJECTION, SOLUTION INTRAMUSCULAR; INTRAVENOUS; SUBCUTANEOUS at 15:15

## 2017-01-01 RX ADMIN — PANTOPRAZOLE SODIUM 40 MG: 40 TABLET, DELAYED RELEASE ORAL at 08:20

## 2017-01-01 RX ADMIN — MIDAZOLAM 1 MG: 1 INJECTION INTRAMUSCULAR; INTRAVENOUS at 11:15

## 2017-01-01 RX ADMIN — CHLORHEXIDINE GLUCONATE 15 ML: 1.2 RINSE ORAL at 21:33

## 2017-01-01 RX ADMIN — MIDAZOLAM 2 MG: 1 INJECTION INTRAMUSCULAR; INTRAVENOUS at 07:40

## 2017-01-01 RX ADMIN — ACETAMINOPHEN 650 MG: 325 TABLET, FILM COATED ORAL at 05:23

## 2017-01-01 RX ADMIN — METOPROLOL TARTRATE 25 MG: 100 TABLET ORAL at 08:27

## 2017-01-01 RX ADMIN — WHITE PETROLATUM: 1.75 OINTMENT TOPICAL at 13:53

## 2017-01-01 RX ADMIN — CHLORHEXIDINE GLUCONATE 15 ML: 1.2 RINSE ORAL at 07:45

## 2017-01-01 RX ADMIN — MIDAZOLAM 2 MG: 1 INJECTION INTRAMUSCULAR; INTRAVENOUS at 12:07

## 2017-01-01 RX ADMIN — MIDAZOLAM HYDROCHLORIDE 1 MG: 1 INJECTION, SOLUTION INTRAMUSCULAR; INTRAVENOUS at 12:07

## 2017-01-01 RX ADMIN — OXYCODONE HYDROCHLORIDE 2.5 MG: 5 SOLUTION ORAL at 14:22

## 2017-01-01 RX ADMIN — CHLORHEXIDINE GLUCONATE 15 ML: 1.2 RINSE ORAL at 20:32

## 2017-01-01 RX ADMIN — HEPARIN SODIUM 5000 UNITS: 5000 INJECTION, SOLUTION INTRAVENOUS; SUBCUTANEOUS at 10:39

## 2017-01-01 RX ADMIN — HYDRALAZINE HYDROCHLORIDE 10 MG: 20 INJECTION INTRAMUSCULAR; INTRAVENOUS at 15:27

## 2017-01-01 RX ADMIN — CYANOCOBALAMIN TAB 1000 MCG 1000 MCG: 1000 TAB at 07:47

## 2017-01-01 RX ADMIN — HALOPERIDOL LACTATE 5 MG: 5 INJECTION, SOLUTION INTRAMUSCULAR at 08:30

## 2017-01-01 RX ADMIN — ACETAMINOPHEN 650 MG: 325 TABLET, FILM COATED ORAL at 04:37

## 2017-01-01 RX ADMIN — PANTOPRAZOLE SODIUM 40 MG: 40 TABLET, DELAYED RELEASE ORAL at 08:02

## 2017-01-01 RX ADMIN — Medication: at 04:39

## 2017-01-01 RX ADMIN — FOLIC ACID 1 MG: 1 TABLET ORAL at 07:44

## 2017-01-01 RX ADMIN — ALBUTEROL SULFATE 2 PUFF: 90 AEROSOL, METERED RESPIRATORY (INHALATION) at 20:06

## 2017-01-01 RX ADMIN — FOLIC ACID 1 MG: 1 TABLET ORAL at 08:31

## 2017-01-01 RX ADMIN — METOPROLOL TARTRATE 25 MG: 100 TABLET ORAL at 20:32

## 2017-01-01 RX ADMIN — FUROSEMIDE 20 MG: 20 TABLET ORAL at 14:32

## 2017-01-01 RX ADMIN — Medication 1 PACKET: at 11:07

## 2017-01-01 RX ADMIN — Medication 5 MG: at 15:37

## 2017-01-01 RX ADMIN — Medication 2 G: at 05:35

## 2017-01-01 RX ADMIN — HYDRALAZINE HYDROCHLORIDE 10 MG: 20 INJECTION INTRAMUSCULAR; INTRAVENOUS at 01:06

## 2017-01-01 RX ADMIN — OXYCODONE HYDROCHLORIDE 2.5 MG: 5 SOLUTION ORAL at 10:52

## 2017-01-01 RX ADMIN — MIDAZOLAM HYDROCHLORIDE 1 MG: 1 INJECTION, SOLUTION INTRAMUSCULAR; INTRAVENOUS at 08:46

## 2017-01-01 RX ADMIN — PANTOPRAZOLE SODIUM 40 MG: 40 TABLET, DELAYED RELEASE ORAL at 20:03

## 2017-01-01 RX ADMIN — OXYCODONE HYDROCHLORIDE AND ACETAMINOPHEN 500 MG: 500 TABLET ORAL at 07:57

## 2017-01-01 RX ADMIN — Medication: at 12:02

## 2017-01-01 RX ADMIN — POTASSIUM CHLORIDE 20 MEQ: 1.5 POWDER, FOR SOLUTION ORAL at 09:19

## 2017-01-01 RX ADMIN — SUGAMMADEX 90 MG: 100 INJECTION, SOLUTION INTRAVENOUS at 15:55

## 2017-01-01 RX ADMIN — HYDRALAZINE HYDROCHLORIDE 10 MG: 20 INJECTION INTRAMUSCULAR; INTRAVENOUS at 16:52

## 2017-01-01 RX ADMIN — CHLORHEXIDINE GLUCONATE 15 ML: 1.2 RINSE ORAL at 15:55

## 2017-01-01 RX ADMIN — POTASSIUM CHLORIDE 20 MEQ: 1.5 POWDER, FOR SOLUTION ORAL at 13:26

## 2017-01-01 RX ADMIN — FENTANYL CITRATE 50 MCG/HR: 50 INJECTION, SOLUTION INTRAMUSCULAR; INTRAVENOUS at 04:26

## 2017-01-01 RX ADMIN — CHLORHEXIDINE GLUCONATE 15 ML: 1.2 RINSE ORAL at 11:04

## 2017-01-01 RX ADMIN — PHENYLEPHRINE HYDROCHLORIDE 100 MCG: 10 INJECTION, SOLUTION INTRAMUSCULAR; INTRAVENOUS; SUBCUTANEOUS at 09:41

## 2017-01-01 RX ADMIN — LORAZEPAM 1 MG: 2 INJECTION INTRAMUSCULAR; INTRAVENOUS at 01:30

## 2017-01-01 RX ADMIN — Medication 1 PACKET: at 11:29

## 2017-01-01 RX ADMIN — MIDAZOLAM HYDROCHLORIDE 1 MG: 1 INJECTION, SOLUTION INTRAMUSCULAR; INTRAVENOUS at 21:00

## 2017-01-01 RX ADMIN — IPRATROPIUM BROMIDE AND ALBUTEROL SULFATE 3 ML: .5; 3 SOLUTION RESPIRATORY (INHALATION) at 19:47

## 2017-01-01 RX ADMIN — Medication: at 11:57

## 2017-01-01 RX ADMIN — Medication 10 MEQ: at 05:49

## 2017-01-01 RX ADMIN — Medication 0.2 MG: at 01:18

## 2017-01-01 RX ADMIN — POTASSIUM & SODIUM PHOSPHATES POWDER PACK 280-160-250 MG 1 PACKET: 280-160-250 PACK at 14:42

## 2017-01-01 RX ADMIN — SODIUM CHLORIDE: 4.5 INJECTION, SOLUTION INTRAVENOUS at 10:27

## 2017-01-01 RX ADMIN — LIDOCAINE HYDROCHLORIDE 80 MG: 20 INJECTION, SOLUTION INFILTRATION; PERINEURAL at 15:06

## 2017-01-01 RX ADMIN — Medication 0.2 MG: at 20:30

## 2017-01-01 RX ADMIN — INSULIN ASPART 1 UNITS: 100 INJECTION, SOLUTION INTRAVENOUS; SUBCUTANEOUS at 15:59

## 2017-01-01 RX ADMIN — QUETIAPINE 25 MG: 25 TABLET, FILM COATED ORAL at 01:19

## 2017-01-01 RX ADMIN — FOLIC ACID 1 MG: 1 TABLET ORAL at 08:03

## 2017-01-01 RX ADMIN — PHENYLEPHRINE HYDROCHLORIDE 50 MCG: 10 INJECTION, SOLUTION INTRAMUSCULAR; INTRAVENOUS; SUBCUTANEOUS at 07:40

## 2017-01-01 RX ADMIN — Medication 100 MG: at 07:47

## 2017-01-01 RX ADMIN — Medication 1 PACKET: at 03:24

## 2017-01-01 RX ADMIN — VASOPRESSIN 1 UNITS: 20 INJECTION, SOLUTION INTRAMUSCULAR; SUBCUTANEOUS at 11:24

## 2017-01-01 RX ADMIN — WHITE PETROLATUM: 1.75 OINTMENT TOPICAL at 08:02

## 2017-01-01 RX ADMIN — FENTANYL CITRATE 50 MCG: 50 INJECTION, SOLUTION INTRAMUSCULAR; INTRAVENOUS at 16:24

## 2017-01-01 RX ADMIN — MIDAZOLAM HYDROCHLORIDE 2 MG: 1 INJECTION, SOLUTION INTRAMUSCULAR; INTRAVENOUS at 14:54

## 2017-01-01 RX ADMIN — ALBUTEROL SULFATE 2 PUFF: 90 AEROSOL, METERED RESPIRATORY (INHALATION) at 20:13

## 2017-01-01 RX ADMIN — PANTOPRAZOLE SODIUM 40 MG: 40 TABLET, DELAYED RELEASE ORAL at 07:45

## 2017-01-01 RX ADMIN — OXYCODONE HYDROCHLORIDE AND ACETAMINOPHEN 500 MG: 500 TABLET ORAL at 11:28

## 2017-01-01 RX ADMIN — HALOPERIDOL LACTATE 5 MG: 5 INJECTION, SOLUTION INTRAMUSCULAR at 21:50

## 2017-01-01 RX ADMIN — HYDROMORPHONE HYDROCHLORIDE 0.5 MG: 1 INJECTION, SOLUTION INTRAMUSCULAR; INTRAVENOUS; SUBCUTANEOUS at 16:21

## 2017-01-01 RX ADMIN — FUROSEMIDE 20 MG: 10 INJECTION, SOLUTION INTRAMUSCULAR; INTRAVENOUS at 15:43

## 2017-01-01 RX ADMIN — Medication 20 MG: at 00:08

## 2017-01-01 RX ADMIN — CHLORHEXIDINE GLUCONATE 15 ML: 1.2 RINSE ORAL at 11:28

## 2017-01-01 RX ADMIN — POTASSIUM PHOSPHATE, MONOBASIC AND POTASSIUM PHOSPHATE, DIBASIC 15 MMOL: 224; 236 INJECTION, SOLUTION INTRAVENOUS at 10:18

## 2017-01-01 RX ADMIN — IPRATROPIUM BROMIDE AND ALBUTEROL SULFATE 3 ML: .5; 3 SOLUTION RESPIRATORY (INHALATION) at 15:50

## 2017-01-01 RX ADMIN — Medication 1 PACKET: at 17:41

## 2017-01-01 RX ADMIN — POTASSIUM PHOSPHATE, MONOBASIC AND POTASSIUM PHOSPHATE, DIBASIC 10 MMOL: 224; 236 INJECTION, SOLUTION INTRAVENOUS at 13:26

## 2017-01-01 RX ADMIN — ALBUTEROL SULFATE 2 PUFF: 90 AEROSOL, METERED RESPIRATORY (INHALATION) at 13:08

## 2017-01-01 RX ADMIN — POTASSIUM CHLORIDE 20 MEQ: 29.8 INJECTION, SOLUTION INTRAVENOUS at 07:54

## 2017-01-01 RX ADMIN — CEFTRIAXONE SODIUM 1 G: 10 INJECTION, POWDER, FOR SOLUTION INTRAVENOUS at 08:46

## 2017-01-01 RX ADMIN — OXYCODONE HYDROCHLORIDE AND ACETAMINOPHEN 500 MG: 500 TABLET ORAL at 11:03

## 2017-01-01 RX ADMIN — HALOPERIDOL LACTATE 5 MG: 5 INJECTION, SOLUTION INTRAMUSCULAR at 14:26

## 2017-01-01 RX ADMIN — GABAPENTIN 100 MG: 250 SOLUTION ORAL at 19:56

## 2017-01-01 RX ADMIN — BISACODYL 10 MG: 10 SUPPOSITORY RECTAL at 09:12

## 2017-01-01 RX ADMIN — HEPARIN SODIUM 5000 UNITS: 5000 INJECTION, SOLUTION INTRAVENOUS; SUBCUTANEOUS at 12:23

## 2017-01-01 RX ADMIN — HYDROMORPHONE HYDROCHLORIDE 0.5 MG: 10 INJECTION, SOLUTION INTRAMUSCULAR; INTRAVENOUS; SUBCUTANEOUS at 16:43

## 2017-01-01 RX ADMIN — CEFEPIME 2 G: 1 INJECTION, SOLUTION INTRAVENOUS at 12:43

## 2017-01-01 RX ADMIN — CHLORHEXIDINE GLUCONATE 15 ML: 1.2 RINSE ORAL at 16:34

## 2017-01-01 RX ADMIN — OXYCODONE HYDROCHLORIDE 2.5 MG: 5 SOLUTION ORAL at 08:45

## 2017-01-01 RX ADMIN — FOLIC ACID 1 MG: 1 TABLET ORAL at 14:32

## 2017-01-01 RX ADMIN — CYANOCOBALAMIN TAB 1000 MCG 1000 MCG: 1000 TAB at 08:30

## 2017-01-01 RX ADMIN — CEFTRIAXONE SODIUM 1 G: 10 INJECTION, POWDER, FOR SOLUTION INTRAVENOUS at 08:04

## 2017-01-01 RX ADMIN — FENTANYL CITRATE 50 MCG/HR: 50 INJECTION, SOLUTION INTRAMUSCULAR; INTRAVENOUS at 22:34

## 2017-01-01 RX ADMIN — ENOXAPARIN SODIUM 30 MG: 30 INJECTION SUBCUTANEOUS at 16:57

## 2017-01-01 RX ADMIN — CHLORHEXIDINE GLUCONATE 15 ML: 1.2 RINSE ORAL at 21:02

## 2017-01-01 RX ADMIN — NICOTINE 1 PATCH: 14 PATCH, EXTENDED RELEASE TRANSDERMAL at 08:17

## 2017-01-01 RX ADMIN — Medication: at 17:08

## 2017-01-01 RX ADMIN — ACETAZOLAMIDE 250 MG: 250 TABLET ORAL at 09:03

## 2017-01-01 RX ADMIN — MIDAZOLAM HYDROCHLORIDE 1 MG: 1 INJECTION, SOLUTION INTRAMUSCULAR; INTRAVENOUS at 10:10

## 2017-01-01 RX ADMIN — MULTIVIT AND MINERALS-FERROUS GLUCONATE 9 MG IRON/15 ML ORAL LIQUID 15 ML: at 08:18

## 2017-01-01 RX ADMIN — Medication 20 MG: at 16:00

## 2017-01-01 RX ADMIN — ACETAZOLAMIDE 250 MG: 500 INJECTION, POWDER, LYOPHILIZED, FOR SOLUTION INTRAVENOUS at 12:23

## 2017-01-01 RX ADMIN — Medication: at 20:34

## 2017-01-01 RX ADMIN — CHLORHEXIDINE GLUCONATE 15 ML: 1.2 RINSE ORAL at 07:33

## 2017-01-01 RX ADMIN — DIAZEPAM 10 MG: 5 INJECTION, SOLUTION INTRAMUSCULAR; INTRAVENOUS at 23:10

## 2017-01-01 RX ADMIN — HYDRALAZINE HYDROCHLORIDE 10 MG: 20 INJECTION INTRAMUSCULAR; INTRAVENOUS at 07:56

## 2017-01-01 RX ADMIN — DEXMEDETOMIDINE HYDROCHLORIDE 0.3 MCG/KG/HR: 4 INJECTION, SOLUTION INTRAVENOUS at 09:06

## 2017-01-01 RX ADMIN — ACETAMINOPHEN 650 MG: 325 TABLET, FILM COATED ORAL at 12:06

## 2017-01-01 RX ADMIN — HYDROMORPHONE HYDROCHLORIDE 0.5 MG: 10 INJECTION, SOLUTION INTRAMUSCULAR; INTRAVENOUS; SUBCUTANEOUS at 05:53

## 2017-01-01 RX ADMIN — Medication: at 13:31

## 2017-01-01 RX ADMIN — FUROSEMIDE 20 MG: 10 INJECTION, SOLUTION INTRAMUSCULAR; INTRAVENOUS at 09:35

## 2017-01-01 RX ADMIN — ALBUTEROL SULFATE 2 PUFF: 90 AEROSOL, METERED RESPIRATORY (INHALATION) at 07:41

## 2017-01-01 RX ADMIN — INSULIN ASPART 1 UNITS: 100 INJECTION, SOLUTION INTRAVENOUS; SUBCUTANEOUS at 07:49

## 2017-01-01 RX ADMIN — MAGNESIUM SULFATE IN WATER 4 G: 40 INJECTION, SOLUTION INTRAVENOUS at 06:08

## 2017-01-01 RX ADMIN — SODIUM CHLORIDE: 9 INJECTION, SOLUTION INTRAVENOUS at 14:44

## 2017-01-01 RX ADMIN — METOPROLOL TARTRATE 25 MG: 100 TABLET ORAL at 08:44

## 2017-01-01 RX ADMIN — CHLORHEXIDINE GLUCONATE 15 ML: 1.2 RINSE ORAL at 20:27

## 2017-01-01 RX ADMIN — LOPERAMIDE HYDROCHLORIDE 2 MG: 1 SOLUTION ORAL at 14:01

## 2017-01-01 RX ADMIN — PHENYLEPHRINE HYDROCHLORIDE 200 MCG: 10 INJECTION, SOLUTION INTRAMUSCULAR; INTRAVENOUS; SUBCUTANEOUS at 11:20

## 2017-01-01 RX ADMIN — DIPHENHYDRAMINE HYDROCHLORIDE 25 MG: 50 INJECTION, SOLUTION INTRAMUSCULAR; INTRAVENOUS at 17:08

## 2017-01-01 RX ADMIN — FUROSEMIDE 20 MG: 10 INJECTION, SOLUTION INTRAVENOUS at 10:55

## 2017-01-01 RX ADMIN — POTASSIUM CHLORIDE 20 MEQ: 1.5 POWDER, FOR SOLUTION ORAL at 05:31

## 2017-01-01 RX ADMIN — GABAPENTIN 300 MG: 250 SOLUTION ORAL at 22:57

## 2017-01-01 RX ADMIN — ALBUTEROL SULFATE 2 PUFF: 90 AEROSOL, METERED RESPIRATORY (INHALATION) at 08:06

## 2017-01-01 RX ADMIN — PHENYLEPHRINE HYDROCHLORIDE 200 MCG: 10 INJECTION, SOLUTION INTRAMUSCULAR; INTRAVENOUS; SUBCUTANEOUS at 09:23

## 2017-01-01 RX ADMIN — INSULIN ASPART 1 UNITS: 100 INJECTION, SOLUTION INTRAVENOUS; SUBCUTANEOUS at 21:03

## 2017-01-01 RX ADMIN — ACETAMINOPHEN 650 MG: 325 TABLET, FILM COATED ORAL at 15:10

## 2017-01-01 RX ADMIN — HEPARIN SODIUM 5000 UNITS: 5000 INJECTION, SOLUTION INTRAVENOUS; SUBCUTANEOUS at 11:14

## 2017-01-01 RX ADMIN — HEPARIN SODIUM 5000 UNITS: 5000 INJECTION, SOLUTION INTRAVENOUS; SUBCUTANEOUS at 20:42

## 2017-01-01 RX ADMIN — CHLORHEXIDINE GLUCONATE 15 ML: 1.2 RINSE ORAL at 07:58

## 2017-01-01 RX ADMIN — HEPARIN SODIUM 5000 UNITS: 5000 INJECTION, SOLUTION INTRAVENOUS; SUBCUTANEOUS at 21:01

## 2017-01-01 RX ADMIN — QUETIAPINE 25 MG: 25 TABLET, FILM COATED ORAL at 22:12

## 2017-01-01 RX ADMIN — KETAMINE HCL-NACL SOLN PREF SY 50 MG/5ML-0.9% (10MG/ML) 10 MG: 10 SOLUTION PREFILLED SYRINGE at 10:05

## 2017-01-01 RX ADMIN — GABAPENTIN 100 MG: 250 SOLUTION ORAL at 14:30

## 2017-01-01 RX ADMIN — MIDAZOLAM 2 MG: 1 INJECTION INTRAMUSCULAR; INTRAVENOUS at 16:08

## 2017-01-01 RX ADMIN — MAGNESIUM OXIDE TAB 400 MG (241.3 MG ELEMENTAL MG) 400 MG: 400 (241.3 MG) TAB at 07:31

## 2017-01-01 RX ADMIN — Medication: at 04:01

## 2017-01-01 RX ADMIN — Medication 100 MG: at 09:06

## 2017-01-01 RX ADMIN — FOLIC ACID 1 MG: 1 TABLET ORAL at 08:22

## 2017-01-01 RX ADMIN — LORAZEPAM 0.5 MG: 2 INJECTION INTRAMUSCULAR; INTRAVENOUS at 19:53

## 2017-01-01 RX ADMIN — Medication 20 MG: at 14:49

## 2017-01-01 RX ADMIN — PANTOPRAZOLE SODIUM 40 MG: 40 TABLET, DELAYED RELEASE ORAL at 20:27

## 2017-01-01 RX ADMIN — FENTANYL CITRATE 25 MCG: 50 INJECTION, SOLUTION INTRAMUSCULAR; INTRAVENOUS at 15:48

## 2017-01-01 RX ADMIN — AMPICILLIN SODIUM AND SULBACTAM SODIUM 1.5 G: 1; .5 INJECTION, POWDER, FOR SOLUTION INTRAMUSCULAR; INTRAVENOUS at 13:15

## 2017-01-01 RX ADMIN — ACETAMINOPHEN 650 MG: 325 TABLET, FILM COATED ORAL at 21:04

## 2017-01-01 RX ADMIN — OXYCODONE HYDROCHLORIDE 5 MG: 5 SOLUTION ORAL at 00:32

## 2017-01-01 RX ADMIN — ACETAMINOPHEN 650 MG: 325 TABLET, FILM COATED ORAL at 14:43

## 2017-01-01 RX ADMIN — Medication 2 G: at 12:33

## 2017-01-01 RX ADMIN — HYDROMORPHONE HYDROCHLORIDE 0.2 MG: 1 INJECTION, SOLUTION INTRAMUSCULAR; INTRAVENOUS; SUBCUTANEOUS at 15:24

## 2017-01-01 RX ADMIN — METOPROLOL TARTRATE 25 MG: 100 TABLET ORAL at 20:42

## 2017-01-01 RX ADMIN — PANTOPRAZOLE SODIUM 40 MG: 40 TABLET, DELAYED RELEASE ORAL at 16:08

## 2017-01-01 RX ADMIN — Medication 1 PACKET: at 15:17

## 2017-01-01 RX ADMIN — PANTOPRAZOLE SODIUM 40 MG: 40 TABLET, DELAYED RELEASE ORAL at 08:26

## 2017-01-01 RX ADMIN — HALOPERIDOL LACTATE 5 MG: 5 INJECTION, SOLUTION INTRAMUSCULAR at 07:45

## 2017-01-01 RX ADMIN — PANTOPRAZOLE SODIUM 40 MG: 40 TABLET, DELAYED RELEASE ORAL at 11:18

## 2017-01-01 RX ADMIN — ALBUTEROL SULFATE 2 PUFF: 90 AEROSOL, METERED RESPIRATORY (INHALATION) at 17:23

## 2017-01-01 RX ADMIN — ALBUTEROL SULFATE 2 PUFF: 90 AEROSOL, METERED RESPIRATORY (INHALATION) at 16:48

## 2017-01-01 RX ADMIN — PANTOPRAZOLE SODIUM 40 MG: 40 TABLET, DELAYED RELEASE ORAL at 07:46

## 2017-01-01 RX ADMIN — IPRATROPIUM BROMIDE AND ALBUTEROL SULFATE 3 ML: .5; 3 SOLUTION RESPIRATORY (INHALATION) at 20:26

## 2017-01-01 RX ADMIN — FOLIC ACID 1 MG: 1 TABLET ORAL at 08:44

## 2017-01-01 RX ADMIN — HEPARIN SODIUM 5000 UNITS: 5000 INJECTION, SOLUTION INTRAVENOUS; SUBCUTANEOUS at 05:15

## 2017-01-01 RX ADMIN — WHITE PETROLATUM: 1.75 OINTMENT TOPICAL at 20:14

## 2017-01-01 RX ADMIN — CYANOCOBALAMIN TAB 1000 MCG 1000 MCG: 1000 TAB at 12:45

## 2017-01-01 RX ADMIN — Medication 20 MG: at 11:53

## 2017-01-01 RX ADMIN — Medication 100 MG: at 08:30

## 2017-01-01 RX ADMIN — Medication: at 23:13

## 2017-01-01 RX ADMIN — POTASSIUM CHLORIDE 20 MEQ: 1.5 POWDER, FOR SOLUTION ORAL at 11:38

## 2017-01-01 RX ADMIN — PHENYLEPHRINE HYDROCHLORIDE 100 MCG: 10 INJECTION, SOLUTION INTRAMUSCULAR; INTRAVENOUS; SUBCUTANEOUS at 10:07

## 2017-01-01 RX ADMIN — Medication 100 MG: at 07:45

## 2017-01-01 RX ADMIN — CHLORHEXIDINE GLUCONATE 15 ML: 1.2 RINSE ORAL at 12:14

## 2017-01-01 RX ADMIN — ROCURONIUM BROMIDE 10 MG: 10 INJECTION INTRAVENOUS at 14:18

## 2017-01-01 RX ADMIN — OXYCODONE HYDROCHLORIDE 2.5 MG: 5 SOLUTION ORAL at 04:47

## 2017-01-01 RX ADMIN — FENTANYL CITRATE 50 MCG: 50 INJECTION, SOLUTION INTRAMUSCULAR; INTRAVENOUS at 09:03

## 2017-01-01 RX ADMIN — CHLORHEXIDINE GLUCONATE 15 ML: 1.2 RINSE ORAL at 08:03

## 2017-01-01 RX ADMIN — Medication: at 04:18

## 2017-01-01 RX ADMIN — RISPERIDONE 1 MG: 1 SOLUTION ORAL at 09:01

## 2017-01-01 RX ADMIN — Medication: at 20:20

## 2017-01-01 RX ADMIN — Medication 1 PACKET: at 16:10

## 2017-01-01 RX ADMIN — Medication 10 MEQ: at 01:16

## 2017-01-01 RX ADMIN — Medication 0.5 MG: at 09:58

## 2017-01-01 RX ADMIN — HYDROMORPHONE HYDROCHLORIDE 0.3 MG: 10 INJECTION, SOLUTION INTRAMUSCULAR; INTRAVENOUS; SUBCUTANEOUS at 07:58

## 2017-01-01 RX ADMIN — CHLORHEXIDINE GLUCONATE 15 ML: 1.2 RINSE ORAL at 12:32

## 2017-01-01 RX ADMIN — ACETAMINOPHEN 650 MG: 325 TABLET, FILM COATED ORAL at 04:11

## 2017-01-01 RX ADMIN — LORAZEPAM 2 MG: 2 INJECTION INTRAMUSCULAR; INTRAVENOUS at 15:01

## 2017-01-01 RX ADMIN — CHLORHEXIDINE GLUCONATE 15 ML: 1.2 RINSE ORAL at 20:34

## 2017-01-01 RX ADMIN — Medication 0.2 MG: at 20:31

## 2017-01-01 RX ADMIN — FERROUS SULFATE TAB 325 MG (65 MG ELEMENTAL FE) 325 MG: 325 (65 FE) TAB at 12:44

## 2017-01-01 RX ADMIN — SODIUM CHLORIDE, POTASSIUM CHLORIDE, SODIUM LACTATE AND CALCIUM CHLORIDE: 600; 310; 30; 20 INJECTION, SOLUTION INTRAVENOUS at 17:38

## 2017-01-01 RX ADMIN — OXYCODONE HYDROCHLORIDE 2.5 MG: 5 SOLUTION ORAL at 11:39

## 2017-01-01 RX ADMIN — CHLORHEXIDINE GLUCONATE 15 ML: 1.2 RINSE ORAL at 08:26

## 2017-01-01 RX ADMIN — Medication: at 04:12

## 2017-01-01 RX ADMIN — Medication: at 03:37

## 2017-01-01 RX ADMIN — Medication 1 PACKET: at 03:39

## 2017-01-01 RX ADMIN — ACETAMINOPHEN 650 MG: 325 TABLET, FILM COATED ORAL at 20:31

## 2017-01-01 RX ADMIN — Medication: at 23:08

## 2017-01-01 RX ADMIN — ACETAMINOPHEN 650 MG: 325 TABLET, FILM COATED ORAL at 20:01

## 2017-01-01 RX ADMIN — QUETIAPINE FUMARATE 25 MG: 25 TABLET, FILM COATED ORAL at 20:27

## 2017-01-01 RX ADMIN — Medication 12.5 MG: at 07:57

## 2017-01-01 RX ADMIN — CHLORHEXIDINE GLUCONATE 15 ML: 1.2 RINSE ORAL at 15:43

## 2017-01-01 RX ADMIN — ACETAMINOPHEN 650 MG: 325 TABLET, FILM COATED ORAL at 21:15

## 2017-01-01 RX ADMIN — LORAZEPAM 1 MG: 2 INJECTION INTRAMUSCULAR; INTRAVENOUS at 10:16

## 2017-01-01 RX ADMIN — LORATADINE 10 MG: 10 TABLET ORAL at 17:48

## 2017-01-01 RX ADMIN — Medication 1 PACKET: at 15:58

## 2017-01-01 RX ADMIN — CYANOCOBALAMIN TAB 1000 MCG 1000 MCG: 1000 TAB at 08:22

## 2017-01-01 RX ADMIN — ALBUMIN HUMAN 25 G: 0.05 INJECTION, SOLUTION INTRAVENOUS at 15:32

## 2017-01-01 RX ADMIN — Medication 10 MEQ: at 22:15

## 2017-01-01 RX ADMIN — POTASSIUM CHLORIDE 20 MEQ: 1.5 POWDER, FOR SOLUTION ORAL at 04:25

## 2017-01-01 RX ADMIN — ROCURONIUM BROMIDE 50 MG: 10 INJECTION INTRAVENOUS at 12:57

## 2017-01-01 RX ADMIN — PANTOPRAZOLE SODIUM 40 MG: 40 TABLET, DELAYED RELEASE ORAL at 19:59

## 2017-01-01 RX ADMIN — ALBUTEROL SULFATE 2 PUFF: 90 AEROSOL, METERED RESPIRATORY (INHALATION) at 19:48

## 2017-01-01 RX ADMIN — HEPARIN SODIUM 5000 UNITS: 5000 INJECTION, SOLUTION INTRAVENOUS; SUBCUTANEOUS at 03:23

## 2017-01-01 RX ADMIN — POTASSIUM PHOSPHATE, MONOBASIC AND POTASSIUM PHOSPHATE, DIBASIC 15 MMOL: 224; 236 INJECTION, SOLUTION INTRAVENOUS at 06:30

## 2017-01-01 RX ADMIN — POTASSIUM & SODIUM PHOSPHATES POWDER PACK 280-160-250 MG 1 PACKET: 280-160-250 PACK at 08:00

## 2017-01-01 RX ADMIN — PANCRELIPASE 2 TABLET: 10440; 39150; 39150 TABLET ORAL at 05:01

## 2017-01-01 RX ADMIN — CYANOCOBALAMIN TAB 1000 MCG 1000 MCG: 1000 TAB at 07:57

## 2017-01-01 RX ADMIN — Medication 100 MG: at 08:00

## 2017-01-01 RX ADMIN — CYANOCOBALAMIN TAB 1000 MCG 1000 MCG: 1000 TAB at 07:45

## 2017-01-01 RX ADMIN — LOPERAMIDE HYDROCHLORIDE 2 MG: 1 SOLUTION ORAL at 08:03

## 2017-01-01 RX ADMIN — DIPHENHYDRAMINE HYDROCHLORIDE 25 MG: 25 CAPSULE ORAL at 05:07

## 2017-01-01 RX ADMIN — Medication: at 04:21

## 2017-01-01 RX ADMIN — LIDOCAINE HYDROCHLORIDE 4 ML: 10 INJECTION, SOLUTION INFILTRATION; PERINEURAL at 17:20

## 2017-01-01 RX ADMIN — CYANOCOBALAMIN TAB 1000 MCG 1000 MCG: 1000 TAB at 09:01

## 2017-01-01 RX ADMIN — NOREPINEPHRINE BITARTRATE 0.06 MCG: 1 INJECTION INTRAVENOUS at 11:50

## 2017-01-01 RX ADMIN — OXYCODONE HYDROCHLORIDE 2.5 MG: 5 SOLUTION ORAL at 07:47

## 2017-01-01 RX ADMIN — SODIUM CHLORIDE, POTASSIUM CHLORIDE, SODIUM LACTATE AND CALCIUM CHLORIDE 500 ML: 600; 310; 30; 20 INJECTION, SOLUTION INTRAVENOUS at 14:42

## 2017-01-01 RX ADMIN — FENTANYL CITRATE 50 MCG: 50 INJECTION, SOLUTION INTRAMUSCULAR; INTRAVENOUS at 15:58

## 2017-01-01 RX ADMIN — RISPERIDONE 1 MG: 1 SOLUTION ORAL at 20:20

## 2017-01-01 RX ADMIN — Medication 2 G: at 05:31

## 2017-01-01 RX ADMIN — Medication: at 19:55

## 2017-01-01 RX ADMIN — SODIUM CHLORIDE, POTASSIUM CHLORIDE, SODIUM LACTATE AND CALCIUM CHLORIDE: 600; 310; 30; 20 INJECTION, SOLUTION INTRAVENOUS at 09:58

## 2017-01-01 RX ADMIN — QUETIAPINE FUMARATE 25 MG: 25 TABLET, FILM COATED ORAL at 20:30

## 2017-01-01 RX ADMIN — CHLORHEXIDINE GLUCONATE 15 ML: 1.2 RINSE ORAL at 08:27

## 2017-01-01 RX ADMIN — QUETIAPINE 50 MG: 50 TABLET ORAL at 20:00

## 2017-01-01 RX ADMIN — CYANOCOBALAMIN TAB 1000 MCG 1000 MCG: 1000 TAB at 08:00

## 2017-01-01 RX ADMIN — FOLIC ACID 1 MG: 1 TABLET ORAL at 08:06

## 2017-01-01 RX ADMIN — ALBUMIN (HUMAN) 50 G: 12.5 SOLUTION INTRAVENOUS at 20:07

## 2017-01-01 RX ADMIN — ACETAMINOPHEN 650 MG: 325 TABLET, FILM COATED ORAL at 07:46

## 2017-01-01 RX ADMIN — DIPHENHYDRAMINE HYDROCHLORIDE 25 MG: 50 INJECTION, SOLUTION INTRAMUSCULAR; INTRAVENOUS at 16:45

## 2017-01-01 RX ADMIN — HALOPERIDOL 2 MG: 5 INJECTION INTRAMUSCULAR at 16:50

## 2017-01-01 RX ADMIN — Medication 2 G: at 06:46

## 2017-01-01 RX ADMIN — DEXMEDETOMIDINE HYDROCHLORIDE 1 MCG/KG/HR: 100 INJECTION, SOLUTION INTRAVENOUS at 09:25

## 2017-01-01 RX ADMIN — NICOTINE 1 PATCH: 14 PATCH, EXTENDED RELEASE TRANSDERMAL at 12:04

## 2017-01-01 RX ADMIN — CHLORHEXIDINE GLUCONATE 15 ML: 1.2 RINSE ORAL at 16:56

## 2017-01-01 RX ADMIN — VASOPRESSIN 2.4 UNITS/HR: 20 INJECTION, SOLUTION INTRAMUSCULAR; SUBCUTANEOUS at 04:20

## 2017-01-01 RX ADMIN — Medication: at 04:15

## 2017-01-01 RX ADMIN — Medication 1 PACKET: at 04:15

## 2017-01-01 RX ADMIN — ACETAMINOPHEN 650 MG: 325 TABLET, FILM COATED ORAL at 16:13

## 2017-01-01 RX ADMIN — OXYCODONE HYDROCHLORIDE AND ACETAMINOPHEN 500 MG: 500 TABLET ORAL at 07:45

## 2017-01-01 RX ADMIN — MIDAZOLAM HYDROCHLORIDE 0.5 MG: 1 INJECTION, SOLUTION INTRAMUSCULAR; INTRAVENOUS at 13:00

## 2017-01-01 RX ADMIN — MIDAZOLAM HYDROCHLORIDE 1 MG: 1 INJECTION, SOLUTION INTRAMUSCULAR; INTRAVENOUS at 06:33

## 2017-01-01 RX ADMIN — FAMOTIDINE 20 MG: 10 INJECTION, SOLUTION INTRAVENOUS at 18:32

## 2017-01-01 RX ADMIN — FENTANYL CITRATE 100 MCG: 50 INJECTION, SOLUTION INTRAMUSCULAR; INTRAVENOUS at 09:00

## 2017-01-01 RX ADMIN — FENTANYL CITRATE 50 MCG: 50 INJECTION, SOLUTION INTRAMUSCULAR; INTRAVENOUS at 12:57

## 2017-01-01 RX ADMIN — METOPROLOL TARTRATE 25 MG: 100 TABLET ORAL at 19:46

## 2017-01-01 RX ADMIN — Medication: at 20:15

## 2017-01-01 RX ADMIN — PHENYLEPHRINE HYDROCHLORIDE 100 MCG: 10 INJECTION, SOLUTION INTRAMUSCULAR; INTRAVENOUS; SUBCUTANEOUS at 09:15

## 2017-01-01 RX ADMIN — NICOTINE 1 PATCH: 14 PATCH, EXTENDED RELEASE TRANSDERMAL at 14:46

## 2017-01-01 RX ADMIN — CLONIDINE HYDROCHLORIDE 0.1 MG: 0.1 TABLET ORAL at 04:18

## 2017-01-01 RX ADMIN — PANCRELIPASE 2 TABLET: 10440; 39150; 39150 TABLET ORAL at 11:56

## 2017-01-01 RX ADMIN — ACETAMINOPHEN 500 MG: 10 INJECTION, SOLUTION INTRAVENOUS at 00:54

## 2017-01-01 RX ADMIN — POTASSIUM PHOSPHATE, MONOBASIC AND POTASSIUM PHOSPHATE, DIBASIC 15 MMOL: 224; 236 INJECTION, SOLUTION INTRAVENOUS at 22:02

## 2017-01-01 RX ADMIN — HALOPERIDOL LACTATE 5 MG: 5 INJECTION, SOLUTION INTRAMUSCULAR at 21:16

## 2017-01-01 RX ADMIN — CHLORHEXIDINE GLUCONATE 15 ML: 1.2 RINSE ORAL at 21:12

## 2017-01-01 RX ADMIN — POTASSIUM CHLORIDE 40 MEQ: 1.5 POWDER, FOR SOLUTION ORAL at 05:13

## 2017-01-01 RX ADMIN — OXYCODONE HYDROCHLORIDE 5 MG: 5 SOLUTION ORAL at 19:19

## 2017-01-01 RX ADMIN — POTASSIUM CHLORIDE 20 MEQ: 1.5 POWDER, FOR SOLUTION ORAL at 08:43

## 2017-01-01 RX ADMIN — OXYCODONE HYDROCHLORIDE 2.5 MG: 5 SOLUTION ORAL at 01:20

## 2017-01-01 RX ADMIN — HALOPERIDOL LACTATE 5 MG: 5 INJECTION, SOLUTION INTRAMUSCULAR at 00:47

## 2017-01-01 RX ADMIN — Medication 0.2 MG: at 17:32

## 2017-01-01 RX ADMIN — Medication 1 PACKET: at 08:09

## 2017-01-01 RX ADMIN — FENTANYL CITRATE 50 MCG: 50 INJECTION, SOLUTION INTRAMUSCULAR; INTRAVENOUS at 16:21

## 2017-01-01 RX ADMIN — WHITE PETROLATUM: 1.75 OINTMENT TOPICAL at 21:32

## 2017-01-01 RX ADMIN — CALCIUM GLUCONATE 2 G: 94 INJECTION, SOLUTION INTRAVENOUS at 07:49

## 2017-01-01 RX ADMIN — ONDANSETRON 4 MG: 2 INJECTION INTRAMUSCULAR; INTRAVENOUS at 15:21

## 2017-01-01 RX ADMIN — ALBUTEROL SULFATE 2.5 MG: 2.5 SOLUTION RESPIRATORY (INHALATION) at 02:44

## 2017-01-01 RX ADMIN — Medication 1 PACKET: at 20:00

## 2017-01-01 RX ADMIN — IPRATROPIUM BROMIDE AND ALBUTEROL SULFATE 3 ML: .5; 3 SOLUTION RESPIRATORY (INHALATION) at 12:36

## 2017-01-01 RX ADMIN — CLONIDINE HYDROCHLORIDE 0.1 MG: 0.1 TABLET ORAL at 11:12

## 2017-01-01 RX ADMIN — LIDOCAINE HYDROCHLORIDE 5 ML: 20 SOLUTION ORAL; TOPICAL at 12:02

## 2017-01-01 RX ADMIN — ROCURONIUM BROMIDE 50 MG: 10 INJECTION INTRAVENOUS at 08:20

## 2017-01-01 RX ADMIN — SODIUM CHLORIDE: 9 INJECTION, SOLUTION INTRAVENOUS at 17:22

## 2017-01-01 RX ADMIN — OXYCODONE HYDROCHLORIDE 2.5 MG: 5 SOLUTION ORAL at 14:59

## 2017-01-01 RX ADMIN — FENTANYL CITRATE 100 MCG/HR: 50 INJECTION, SOLUTION INTRAMUSCULAR; INTRAVENOUS at 01:37

## 2017-01-01 RX ADMIN — CHLORHEXIDINE GLUCONATE 15 ML: 1.2 RINSE ORAL at 12:20

## 2017-01-01 RX ADMIN — CHLORHEXIDINE GLUCONATE 15 ML: 1.2 RINSE ORAL at 16:28

## 2017-01-01 RX ADMIN — MIDAZOLAM HYDROCHLORIDE 2 MG: 1 INJECTION, SOLUTION INTRAMUSCULAR; INTRAVENOUS at 12:45

## 2017-01-01 RX ADMIN — SODIUM CHLORIDE, POTASSIUM CHLORIDE, SODIUM LACTATE AND CALCIUM CHLORIDE: 600; 310; 30; 20 INJECTION, SOLUTION INTRAVENOUS at 14:30

## 2017-01-01 RX ADMIN — HALOPERIDOL LACTATE 5 MG: 5 INJECTION, SOLUTION INTRAMUSCULAR at 08:18

## 2017-01-01 RX ADMIN — INSULIN ASPART 1 UNITS: 100 INJECTION, SOLUTION INTRAVENOUS; SUBCUTANEOUS at 00:12

## 2017-01-01 RX ADMIN — Medication 0.05 MG: at 12:25

## 2017-01-01 RX ADMIN — CHLORHEXIDINE GLUCONATE 15 ML: 1.2 RINSE ORAL at 12:03

## 2017-01-01 RX ADMIN — Medication 100 MG: at 08:16

## 2017-01-01 RX ADMIN — HYDROMORPHONE HYDROCHLORIDE 0.5 MG: 10 INJECTION, SOLUTION INTRAMUSCULAR; INTRAVENOUS; SUBCUTANEOUS at 23:06

## 2017-01-01 RX ADMIN — Medication: at 01:12

## 2017-01-01 RX ADMIN — FENTANYL CITRATE 50 MCG: 50 INJECTION, SOLUTION INTRAMUSCULAR; INTRAVENOUS at 14:54

## 2017-01-01 RX ADMIN — ALBUTEROL SULFATE 2 PUFF: 90 AEROSOL, METERED RESPIRATORY (INHALATION) at 12:31

## 2017-01-01 RX ADMIN — Medication 1 PACKET: at 12:05

## 2017-01-01 RX ADMIN — POTASSIUM & SODIUM PHOSPHATES POWDER PACK 280-160-250 MG 1 PACKET: 280-160-250 PACK at 15:46

## 2017-01-01 RX ADMIN — FOLIC ACID 1 MG: 1 TABLET ORAL at 07:57

## 2017-01-01 RX ADMIN — METOPROLOL TARTRATE 50 MG: 100 TABLET, FILM COATED ORAL at 07:46

## 2017-01-01 RX ADMIN — CEFTRIAXONE 1 G: 10 INJECTION, POWDER, FOR SOLUTION INTRAVENOUS at 12:02

## 2017-01-01 RX ADMIN — PANTOPRAZOLE SODIUM 40 MG: 40 TABLET, DELAYED RELEASE ORAL at 07:47

## 2017-01-01 RX ADMIN — Medication: at 20:43

## 2017-01-01 RX ADMIN — METOPROLOL TARTRATE 37.5 MG: 100 TABLET, FILM COATED ORAL at 19:59

## 2017-01-01 RX ADMIN — DEXAMETHASONE SODIUM PHOSPHATE 8 MG: 10 INJECTION, SOLUTION INTRAMUSCULAR; INTRAVENOUS at 16:43

## 2017-01-01 RX ADMIN — WHITE PETROLATUM: 1.75 OINTMENT TOPICAL at 08:00

## 2017-01-01 RX ADMIN — KETAMINE HCL-NACL SOLN PREF SY 50 MG/5ML-0.9% (10MG/ML) 20 MG: 10 SOLUTION PREFILLED SYRINGE at 07:40

## 2017-01-01 RX ADMIN — CHLORHEXIDINE GLUCONATE 15 ML: 1.2 RINSE ORAL at 15:00

## 2017-01-01 RX ADMIN — OXYCODONE HYDROCHLORIDE AND ACETAMINOPHEN 500 MG: 500 TABLET ORAL at 08:03

## 2017-01-01 RX ADMIN — ACETAMINOPHEN 650 MG: 325 TABLET, FILM COATED ORAL at 22:55

## 2017-01-01 RX ADMIN — CHLORHEXIDINE GLUCONATE 15 ML: 1.2 RINSE ORAL at 16:39

## 2017-01-01 RX ADMIN — POTASSIUM CHLORIDE 10 MEQ: 7.46 INJECTION, SOLUTION INTRAVENOUS at 19:23

## 2017-01-01 RX ADMIN — CYANOCOBALAMIN TAB 1000 MCG 1000 MCG: 1000 TAB at 07:44

## 2017-01-01 RX ADMIN — OXYCODONE HYDROCHLORIDE 2.5 MG: 5 SOLUTION ORAL at 03:16

## 2017-01-01 RX ADMIN — WHITE PETROLATUM: 1.75 OINTMENT TOPICAL at 20:21

## 2017-01-01 RX ADMIN — IPRATROPIUM BROMIDE AND ALBUTEROL SULFATE 3 ML: .5; 3 SOLUTION RESPIRATORY (INHALATION) at 16:17

## 2017-01-01 RX ADMIN — METOPROLOL TARTRATE 25 MG: 100 TABLET ORAL at 20:00

## 2017-01-01 RX ADMIN — Medication 20 MG: at 07:42

## 2017-01-01 RX ADMIN — KETAMINE HCL-NACL SOLN PREF SY 50 MG/5ML-0.9% (10MG/ML) 10 MG: 10 SOLUTION PREFILLED SYRINGE at 09:58

## 2017-01-01 RX ADMIN — DIPHENHYDRAMINE HYDROCHLORIDE 25 MG: 50 INJECTION, SOLUTION INTRAMUSCULAR; INTRAVENOUS at 06:39

## 2017-01-01 RX ADMIN — DEXAMETHASONE SODIUM PHOSPHATE 4 MG: 4 INJECTION, SOLUTION INTRA-ARTICULAR; INTRALESIONAL; INTRAMUSCULAR; INTRAVENOUS; SOFT TISSUE at 08:36

## 2017-01-01 RX ADMIN — Medication: at 16:39

## 2017-01-01 RX ADMIN — Medication 100 MG: at 08:02

## 2017-01-01 RX ADMIN — IPRATROPIUM BROMIDE AND ALBUTEROL SULFATE 3 ML: .5; 3 SOLUTION RESPIRATORY (INHALATION) at 12:08

## 2017-01-01 RX ADMIN — ACETAMINOPHEN 650 MG: 325 TABLET, FILM COATED ORAL at 08:31

## 2017-01-01 RX ADMIN — OXYCODONE HYDROCHLORIDE AND ACETAMINOPHEN 500 MG: 500 TABLET ORAL at 08:43

## 2017-01-01 RX ADMIN — QUETIAPINE 50 MG: 50 TABLET ORAL at 20:27

## 2017-01-01 RX ADMIN — OXYCODONE HYDROCHLORIDE AND ACETAMINOPHEN 500 MG: 500 TABLET ORAL at 14:44

## 2017-01-01 RX ADMIN — Medication 0.2 MG: at 22:55

## 2017-01-01 RX ADMIN — METOPROLOL TARTRATE 25 MG: 100 TABLET ORAL at 07:45

## 2017-01-01 RX ADMIN — Medication 1 PACKET: at 00:50

## 2017-01-01 RX ADMIN — MULTIVIT AND MINERALS-FERROUS GLUCONATE 9 MG IRON/15 ML ORAL LIQUID 15 ML: at 09:01

## 2017-01-01 RX ADMIN — SODIUM CHLORIDE, POTASSIUM CHLORIDE, SODIUM LACTATE AND CALCIUM CHLORIDE: 600; 310; 30; 20 INJECTION, SOLUTION INTRAVENOUS at 15:40

## 2017-01-01 RX ADMIN — MIDAZOLAM 2 MG: 1 INJECTION INTRAMUSCULAR; INTRAVENOUS at 07:29

## 2017-01-01 RX ADMIN — FOLIC ACID 1 MG: 1 TABLET ORAL at 11:28

## 2017-01-01 RX ADMIN — CLONIDINE HYDROCHLORIDE 0.1 MG: 0.1 TABLET ORAL at 20:18

## 2017-01-01 RX ADMIN — SODIUM CHLORIDE, POTASSIUM CHLORIDE, SODIUM LACTATE AND CALCIUM CHLORIDE: 600; 310; 30; 20 INJECTION, SOLUTION INTRAVENOUS at 12:45

## 2017-01-01 RX ADMIN — FOLIC ACID 1 MG: 1 TABLET ORAL at 08:17

## 2017-01-01 RX ADMIN — FOLIC ACID 1 MG: 1 TABLET ORAL at 12:46

## 2017-01-01 RX ADMIN — HYDROMORPHONE HYDROCHLORIDE 1 MG: 1 INJECTION, SOLUTION INTRAMUSCULAR; INTRAVENOUS; SUBCUTANEOUS at 09:28

## 2017-01-01 RX ADMIN — Medication 2 G: at 10:30

## 2017-01-01 RX ADMIN — PANTOPRAZOLE SODIUM 40 MG: 40 TABLET, DELAYED RELEASE ORAL at 08:22

## 2017-01-01 RX ADMIN — CHLORHEXIDINE GLUCONATE 15 ML: 1.2 RINSE ORAL at 07:46

## 2017-01-01 RX ADMIN — Medication 1 PACKET: at 15:03

## 2017-01-01 RX ADMIN — HEPARIN SODIUM 5000 UNITS: 5000 INJECTION, SOLUTION INTRAVENOUS; SUBCUTANEOUS at 05:23

## 2017-01-01 RX ADMIN — Medication: at 21:32

## 2017-01-01 RX ADMIN — VASOPRESSIN 2.4 UNITS/HR: 20 INJECTION, SOLUTION INTRAMUSCULAR; SUBCUTANEOUS at 15:06

## 2017-01-01 RX ADMIN — ONDANSETRON 4 MG: 2 INJECTION INTRAMUSCULAR; INTRAVENOUS at 08:36

## 2017-01-01 RX ADMIN — CHLORHEXIDINE GLUCONATE 15 ML: 1.2 RINSE ORAL at 20:42

## 2017-01-01 RX ADMIN — FOLIC ACID: 5 INJECTION, SOLUTION INTRAMUSCULAR; INTRAVENOUS; SUBCUTANEOUS at 23:13

## 2017-01-01 RX ADMIN — Medication 10 MG: at 09:40

## 2017-01-01 RX ADMIN — PIPERACILLIN SODIUM AND TAZOBACTAM SODIUM 3.38 G: 36; 4.5 INJECTION, POWDER, FOR SOLUTION INTRAVENOUS at 13:37

## 2017-01-01 RX ADMIN — NOREPINEPHRINE BITARTRATE 0.06 MCG: 1 INJECTION INTRAVENOUS at 11:26

## 2017-01-01 RX ADMIN — RISPERIDONE 1 MG: 1 SOLUTION ORAL at 08:20

## 2017-01-01 RX ADMIN — ONDANSETRON 4 MG: 2 INJECTION INTRAMUSCULAR; INTRAVENOUS at 16:06

## 2017-01-01 RX ADMIN — HALOPERIDOL LACTATE 5 MG: 5 INJECTION, SOLUTION INTRAMUSCULAR at 17:37

## 2017-01-01 RX ADMIN — INSULIN ASPART 1 UNITS: 100 INJECTION, SOLUTION INTRAVENOUS; SUBCUTANEOUS at 00:28

## 2017-01-01 RX ADMIN — Medication 12.5 MG: at 07:47

## 2017-01-01 RX ADMIN — FOLIC ACID 1 MG: 1 TABLET ORAL at 08:16

## 2017-01-01 RX ADMIN — ENOXAPARIN SODIUM 30 MG: 30 INJECTION SUBCUTANEOUS at 16:07

## 2017-01-01 RX ADMIN — Medication 100 MG: at 07:32

## 2017-01-01 RX ADMIN — FENTANYL CITRATE 100 MCG: 50 INJECTION, SOLUTION INTRAMUSCULAR; INTRAVENOUS at 09:20

## 2017-01-01 RX ADMIN — CEFAZOLIN 2 G: 1 INJECTION, POWDER, FOR SOLUTION INTRAMUSCULAR; INTRAVENOUS at 08:25

## 2017-01-01 RX ADMIN — Medication 5 MG: at 20:31

## 2017-01-01 RX ADMIN — CLONIDINE HYDROCHLORIDE 0.1 MG: 0.1 TABLET ORAL at 12:29

## 2017-01-01 RX ADMIN — Medication: at 08:38

## 2017-01-01 RX ADMIN — ONDANSETRON 4 MG: 2 INJECTION INTRAMUSCULAR; INTRAVENOUS at 15:34

## 2017-01-01 RX ADMIN — HYDROMORPHONE HYDROCHLORIDE 0.5 MG: 1 INJECTION, SOLUTION INTRAMUSCULAR; INTRAVENOUS; SUBCUTANEOUS at 16:24

## 2017-01-01 RX ADMIN — INSULIN ASPART 1 UNITS: 100 INJECTION, SOLUTION INTRAVENOUS; SUBCUTANEOUS at 08:22

## 2017-01-01 RX ADMIN — CEFTRIAXONE 1 G: 10 INJECTION, POWDER, FOR SOLUTION INTRAVENOUS at 07:54

## 2017-01-01 RX ADMIN — MIDAZOLAM 2 MG: 1 INJECTION INTRAMUSCULAR; INTRAVENOUS at 14:10

## 2017-01-01 RX ADMIN — SODIUM CHLORIDE, POTASSIUM CHLORIDE, SODIUM LACTATE AND CALCIUM CHLORIDE: 600; 310; 30; 20 INJECTION, SOLUTION INTRAVENOUS at 11:18

## 2017-01-01 RX ADMIN — POTASSIUM CHLORIDE 20 MEQ: 1.5 POWDER, FOR SOLUTION ORAL at 05:10

## 2017-01-01 RX ADMIN — Medication 1 PACKET: at 03:59

## 2017-01-01 RX ADMIN — Medication: at 15:57

## 2017-01-01 RX ADMIN — SODIUM CHLORIDE, POTASSIUM CHLORIDE, SODIUM LACTATE AND CALCIUM CHLORIDE: 600; 310; 30; 20 INJECTION, SOLUTION INTRAVENOUS at 09:25

## 2017-01-01 RX ADMIN — Medication 1 PACKET: at 03:41

## 2017-01-01 RX ADMIN — FENTANYL CITRATE 50 MCG: 50 INJECTION, SOLUTION INTRAMUSCULAR; INTRAVENOUS at 09:09

## 2017-01-01 RX ADMIN — Medication 0.2 MG: at 07:40

## 2017-01-01 RX ADMIN — WHITE PETROLATUM: 1.75 OINTMENT TOPICAL at 08:24

## 2017-01-01 RX ADMIN — SODIUM CHLORIDE: 9 INJECTION, SOLUTION INTRAVENOUS at 08:29

## 2017-01-01 RX ADMIN — HYDROMORPHONE HYDROCHLORIDE 0.5 MG: 10 INJECTION, SOLUTION INTRAMUSCULAR; INTRAVENOUS; SUBCUTANEOUS at 13:26

## 2017-01-01 RX ADMIN — Medication 1 PACKET: at 20:33

## 2017-01-01 RX ADMIN — CALCIUM GLUCONATE 2 G: 94 INJECTION, SOLUTION INTRAVENOUS at 16:27

## 2017-01-01 RX ADMIN — VASOPRESSIN 1 UNITS/HR: 20 INJECTION INTRAVENOUS at 11:50

## 2017-01-01 RX ADMIN — RISPERIDONE 1 MG: 1 SOLUTION ORAL at 08:22

## 2017-01-01 RX ADMIN — ACETAMINOPHEN 650 MG: 325 TABLET, FILM COATED ORAL at 08:07

## 2017-01-01 RX ADMIN — VASOPRESSIN 2.4 UNITS/HR: 20 INJECTION, SOLUTION INTRAMUSCULAR; SUBCUTANEOUS at 20:54

## 2017-01-01 RX ADMIN — POTASSIUM CHLORIDE 40 MEQ: 1.5 POWDER, FOR SOLUTION ORAL at 06:24

## 2017-01-01 RX ADMIN — Medication: at 12:33

## 2017-01-01 RX ADMIN — FUROSEMIDE 20 MG: 10 INJECTION, SOLUTION INTRAMUSCULAR; INTRAVENOUS at 16:29

## 2017-01-01 RX ADMIN — Medication 20 MG: at 11:15

## 2017-01-01 RX ADMIN — HEPARIN SODIUM 5000 UNITS: 5000 INJECTION, SOLUTION INTRAVENOUS; SUBCUTANEOUS at 11:03

## 2017-01-01 RX ADMIN — FUROSEMIDE 20 MG: 20 TABLET ORAL at 07:45

## 2017-01-01 RX ADMIN — CHLORHEXIDINE GLUCONATE 15 ML: 1.2 RINSE ORAL at 11:19

## 2017-01-01 RX ADMIN — HALOPERIDOL LACTATE 5 MG: 5 INJECTION, SOLUTION INTRAMUSCULAR at 09:34

## 2017-01-01 RX ADMIN — Medication 3 PACKET: at 07:59

## 2017-01-01 RX ADMIN — CHLORHEXIDINE GLUCONATE 15 ML: 1.2 RINSE ORAL at 12:51

## 2017-01-01 RX ADMIN — QUETIAPINE 50 MG: 50 TABLET ORAL at 20:11

## 2017-01-01 RX ADMIN — SODIUM CHLORIDE: 9 INJECTION, SOLUTION INTRAVENOUS at 05:49

## 2017-01-01 RX ADMIN — PANTOPRAZOLE SODIUM 40 MG: 40 TABLET, DELAYED RELEASE ORAL at 21:32

## 2017-01-01 RX ADMIN — CYANOCOBALAMIN TAB 1000 MCG 1000 MCG: 1000 TAB at 07:46

## 2017-01-01 RX ADMIN — OXYCODONE HYDROCHLORIDE 2.5 MG: 5 SOLUTION ORAL at 00:54

## 2017-01-01 RX ADMIN — FUROSEMIDE 20 MG: 20 TABLET ORAL at 13:52

## 2017-01-01 RX ADMIN — HALOPERIDOL LACTATE 5 MG: 5 INJECTION, SOLUTION INTRAMUSCULAR at 22:38

## 2017-01-01 RX ADMIN — MULTIVITAMIN 15 ML: LIQUID ORAL at 07:32

## 2017-01-01 RX ADMIN — OXYCODONE HYDROCHLORIDE 5 MG: 5 TABLET ORAL at 19:40

## 2017-01-01 RX ADMIN — SUGAMMADEX 150 MG: 100 INJECTION, SOLUTION INTRAVENOUS at 14:36

## 2017-01-01 RX ADMIN — DIAZEPAM 10 MG: 5 INJECTION, SOLUTION INTRAMUSCULAR; INTRAVENOUS at 06:02

## 2017-01-01 RX ADMIN — MULTIVIT AND MINERALS-FERROUS GLUCONATE 9 MG IRON/15 ML ORAL LIQUID 15 ML: at 07:46

## 2017-01-01 RX ADMIN — ALBUTEROL SULFATE 2 PUFF: 90 AEROSOL, METERED RESPIRATORY (INHALATION) at 18:09

## 2017-01-01 RX ADMIN — FENTANYL CITRATE 100 MCG: 50 INJECTION, SOLUTION INTRAMUSCULAR; INTRAVENOUS at 07:40

## 2017-01-01 RX ADMIN — MIDAZOLAM HYDROCHLORIDE 1 MG: 1 INJECTION, SOLUTION INTRAMUSCULAR; INTRAVENOUS at 20:28

## 2017-01-01 RX ADMIN — Medication 1 PACKET: at 20:03

## 2017-01-01 RX ADMIN — Medication 1 PACKET: at 12:14

## 2017-01-01 RX ADMIN — BACITRACIN: 500 OINTMENT TOPICAL at 13:02

## 2017-01-01 RX ADMIN — FUROSEMIDE 10 MG: 10 INJECTION, SOLUTION INTRAMUSCULAR; INTRAVENOUS at 15:55

## 2017-01-01 RX ADMIN — Medication: at 07:46

## 2017-01-01 RX ADMIN — NOREPINEPHRINE BITARTRATE 0.06 MCG: 1 INJECTION INTRAVENOUS at 12:09

## 2017-01-01 RX ADMIN — CYANOCOBALAMIN TAB 1000 MCG 1000 MCG: 1000 TAB at 08:16

## 2017-01-01 RX ADMIN — OXYCODONE HYDROCHLORIDE 5 MG: 5 SOLUTION ORAL at 20:19

## 2017-01-01 RX ADMIN — HEPARIN SODIUM 5000 UNITS: 5000 INJECTION, SOLUTION INTRAVENOUS; SUBCUTANEOUS at 04:00

## 2017-01-01 RX ADMIN — DIAZEPAM 10 MG: 5 INJECTION, SOLUTION INTRAMUSCULAR; INTRAVENOUS at 13:20

## 2017-01-01 RX ADMIN — Medication 3 PACKET: at 14:02

## 2017-01-01 RX ADMIN — OXYCODONE HYDROCHLORIDE 2.5 MG: 5 SOLUTION ORAL at 05:42

## 2017-01-01 RX ADMIN — CEFTRIAXONE 1 G: 10 INJECTION, POWDER, FOR SOLUTION INTRAVENOUS at 11:39

## 2017-01-01 RX ADMIN — POTASSIUM CHLORIDE 20 MEQ: 1.5 POWDER, FOR SOLUTION ORAL at 07:03

## 2017-01-01 RX ADMIN — ENOXAPARIN SODIUM 30 MG: 30 INJECTION SUBCUTANEOUS at 15:28

## 2017-01-01 RX ADMIN — PANTOPRAZOLE SODIUM 40 MG: 40 TABLET, DELAYED RELEASE ORAL at 20:34

## 2017-01-01 RX ADMIN — Medication 10 MEQ: at 23:14

## 2017-01-01 RX ADMIN — SODIUM CHLORIDE, POTASSIUM CHLORIDE, SODIUM LACTATE AND CALCIUM CHLORIDE: 600; 310; 30; 20 INJECTION, SOLUTION INTRAVENOUS at 23:53

## 2017-01-01 RX ADMIN — Medication 20 MG: at 13:56

## 2017-01-01 RX ADMIN — PANTOPRAZOLE SODIUM 40 MG: 40 TABLET, DELAYED RELEASE ORAL at 15:43

## 2017-01-01 RX ADMIN — Medication: at 16:15

## 2017-01-01 RX ADMIN — FUROSEMIDE 20 MG: 20 TABLET ORAL at 08:30

## 2017-01-01 RX ADMIN — ONDANSETRON 4 MG: 2 INJECTION INTRAMUSCULAR; INTRAVENOUS at 14:32

## 2017-01-01 RX ADMIN — PANTOPRAZOLE SODIUM 40 MG: 40 TABLET, DELAYED RELEASE ORAL at 19:46

## 2017-01-01 RX ADMIN — CYANOCOBALAMIN TAB 1000 MCG 1000 MCG: 1000 TAB at 08:02

## 2017-01-01 RX ADMIN — CEFTRIAXONE 1 G: 10 INJECTION, POWDER, FOR SOLUTION INTRAVENOUS at 14:44

## 2017-01-01 RX ADMIN — METOPROLOL TARTRATE 25 MG: 100 TABLET ORAL at 07:47

## 2017-01-01 RX ADMIN — CEFTRIAXONE SODIUM 1 G: 10 INJECTION, POWDER, FOR SOLUTION INTRAVENOUS at 11:07

## 2017-01-01 RX ADMIN — Medication 12.5 MG: at 14:46

## 2017-01-01 RX ADMIN — CLONIDINE HYDROCHLORIDE 0.1 MG: 0.1 TABLET ORAL at 20:27

## 2017-01-01 RX ADMIN — ACETAMINOPHEN 500 MG: 10 INJECTION, SOLUTION INTRAVENOUS at 05:45

## 2017-01-01 RX ADMIN — HALOPERIDOL LACTATE 5 MG: 5 INJECTION, SOLUTION INTRAMUSCULAR at 14:29

## 2017-01-01 RX ADMIN — POTASSIUM CHLORIDE 40 MEQ: 1500 TABLET, EXTENDED RELEASE ORAL at 23:20

## 2017-01-01 RX ADMIN — Medication 1 PACKET: at 12:55

## 2017-01-01 RX ADMIN — SENNOSIDES AND DOCUSATE SODIUM 2 TABLET: 8.6; 5 TABLET ORAL at 05:14

## 2017-01-01 RX ADMIN — MAGNESIUM SULFATE IN WATER 4 G: 40 INJECTION, SOLUTION INTRAVENOUS at 05:49

## 2017-01-01 RX ADMIN — FOLIC ACID 1 MG: 1 TABLET ORAL at 07:47

## 2017-01-01 RX ADMIN — Medication 5 MG: at 10:07

## 2017-01-01 RX ADMIN — PROPOFOL 140 MG: 10 INJECTION, EMULSION INTRAVENOUS at 15:06

## 2017-01-01 RX ADMIN — CHLORHEXIDINE GLUCONATE 15 ML: 1.2 RINSE ORAL at 16:20

## 2017-01-01 RX ADMIN — DIAZEPAM 10 MG: 5 INJECTION, SOLUTION INTRAMUSCULAR; INTRAVENOUS at 16:12

## 2017-01-01 RX ADMIN — FUROSEMIDE 20 MG: 20 TABLET ORAL at 07:57

## 2017-01-01 RX ADMIN — HALOPERIDOL LACTATE 5 MG: 5 INJECTION, SOLUTION INTRAMUSCULAR at 11:29

## 2017-01-01 RX ADMIN — CEFTRIAXONE 1 G: 10 INJECTION, POWDER, FOR SOLUTION INTRAVENOUS at 11:20

## 2017-01-01 RX ADMIN — CHLORHEXIDINE GLUCONATE 15 ML: 1.2 RINSE ORAL at 12:12

## 2017-01-01 RX ADMIN — CHLORHEXIDINE GLUCONATE 15 ML: 1.2 RINSE ORAL at 20:02

## 2017-01-01 RX ADMIN — CHLORHEXIDINE GLUCONATE 15 ML: 1.2 RINSE ORAL at 11:52

## 2017-01-01 RX ADMIN — Medication: at 23:11

## 2017-01-01 RX ADMIN — ALBUTEROL SULFATE 2 PUFF: 90 AEROSOL, METERED RESPIRATORY (INHALATION) at 07:52

## 2017-01-01 RX ADMIN — FENTANYL CITRATE 25 MCG: 50 INJECTION, SOLUTION INTRAMUSCULAR; INTRAVENOUS at 15:36

## 2017-01-01 RX ADMIN — Medication: at 11:21

## 2017-01-01 RX ADMIN — Medication: at 16:58

## 2017-01-01 RX ADMIN — SODIUM CHLORIDE, POTASSIUM CHLORIDE, SODIUM LACTATE AND CALCIUM CHLORIDE: 600; 310; 30; 20 INJECTION, SOLUTION INTRAVENOUS at 13:37

## 2017-01-01 RX ADMIN — CHLORHEXIDINE GLUCONATE 15 ML: 1.2 RINSE ORAL at 13:03

## 2017-01-01 RX ADMIN — HALOPERIDOL LACTATE 5 MG: 5 INJECTION, SOLUTION INTRAMUSCULAR at 02:42

## 2017-01-01 RX ADMIN — CHLORHEXIDINE GLUCONATE 15 ML: 1.2 RINSE ORAL at 16:19

## 2017-01-01 RX ADMIN — Medication 12.5 MG: at 21:08

## 2017-01-01 RX ADMIN — METOPROLOL TARTRATE 25 MG: 100 TABLET ORAL at 08:37

## 2017-01-01 RX ADMIN — DIPHENHYDRAMINE HYDROCHLORIDE 50 MG: 50 INJECTION, SOLUTION INTRAMUSCULAR; INTRAVENOUS at 12:10

## 2017-01-01 RX ADMIN — POTASSIUM CHLORIDE 10 MEQ: 7.46 INJECTION, SOLUTION INTRAVENOUS at 05:20

## 2017-01-01 RX ADMIN — Medication 0.2 MG: at 00:28

## 2017-01-01 RX ADMIN — POTASSIUM CHLORIDE 20 MEQ: 1.5 POWDER, FOR SOLUTION ORAL at 05:49

## 2017-01-01 RX ADMIN — ACETAMINOPHEN 650 MG: 325 TABLET, FILM COATED ORAL at 22:13

## 2017-01-01 RX ADMIN — HEPARIN SODIUM 2000 UNITS: 1000 INJECTION, SOLUTION INTRAVENOUS; SUBCUTANEOUS at 13:36

## 2017-01-01 RX ADMIN — Medication 10 MEQ: at 06:56

## 2017-01-01 RX ADMIN — OXYCODONE HYDROCHLORIDE AND ACETAMINOPHEN 500 MG: 500 TABLET ORAL at 08:30

## 2017-01-01 RX ADMIN — MIDAZOLAM HYDROCHLORIDE 0.5 MG: 1 INJECTION, SOLUTION INTRAMUSCULAR; INTRAVENOUS at 17:40

## 2017-01-01 RX ADMIN — ALBUTEROL SULFATE 2 PUFF: 90 AEROSOL, METERED RESPIRATORY (INHALATION) at 21:04

## 2017-01-01 RX ADMIN — FOLIC ACID 1 MG: 5 INJECTION, SOLUTION INTRAMUSCULAR; INTRAVENOUS; SUBCUTANEOUS at 00:36

## 2017-01-01 RX ADMIN — CHLORHEXIDINE GLUCONATE 15 ML: 1.2 RINSE ORAL at 08:09

## 2017-01-01 RX ADMIN — HYDROMORPHONE HYDROCHLORIDE 0.5 MG: 10 INJECTION, SOLUTION INTRAMUSCULAR; INTRAVENOUS; SUBCUTANEOUS at 03:27

## 2017-01-01 RX ADMIN — CHLORHEXIDINE GLUCONATE 15 ML: 1.2 RINSE ORAL at 20:20

## 2017-01-01 RX ADMIN — POTASSIUM & SODIUM PHOSPHATES POWDER PACK 280-160-250 MG 1 PACKET: 280-160-250 PACK at 20:27

## 2017-01-01 RX ADMIN — Medication 3 PACKET: at 16:57

## 2017-01-01 RX ADMIN — ONDANSETRON 4 MG: 4 TABLET, ORALLY DISINTEGRATING ORAL at 18:08

## 2017-01-01 RX ADMIN — HYDROCORTISONE SODIUM SUCCINATE 200 MG: 100 INJECTION, POWDER, FOR SOLUTION INTRAMUSCULAR; INTRAVENOUS at 16:56

## 2017-01-01 RX ADMIN — PHENYLEPHRINE HYDROCHLORIDE 150 MCG: 10 INJECTION, SOLUTION INTRAMUSCULAR; INTRAVENOUS; SUBCUTANEOUS at 11:12

## 2017-01-01 RX ADMIN — PHENYLEPHRINE HYDROCHLORIDE 200 MCG: 10 INJECTION, SOLUTION INTRAMUSCULAR; INTRAVENOUS; SUBCUTANEOUS at 09:19

## 2017-01-01 RX ADMIN — FOLIC ACID 1 MG: 1 TABLET ORAL at 09:08

## 2017-01-01 RX ADMIN — MAGNESIUM OXIDE TAB 400 MG (241.3 MG ELEMENTAL MG) 400 MG: 400 (241.3 MG) TAB at 20:54

## 2017-01-01 RX ADMIN — CHLORHEXIDINE GLUCONATE 15 ML: 1.2 RINSE ORAL at 12:28

## 2017-01-01 RX ADMIN — ROCURONIUM BROMIDE 50 MG: 10 INJECTION INTRAVENOUS at 07:43

## 2017-01-01 RX ADMIN — IPRATROPIUM BROMIDE AND ALBUTEROL SULFATE 3 ML: .5; 3 SOLUTION RESPIRATORY (INHALATION) at 15:11

## 2017-01-01 RX ADMIN — Medication 1 PACKET: at 23:13

## 2017-01-01 RX ADMIN — PANTOPRAZOLE SODIUM 40 MG: 40 TABLET, DELAYED RELEASE ORAL at 08:07

## 2017-01-01 RX ADMIN — Medication 1 MG/HR: at 21:43

## 2017-01-01 RX ADMIN — Medication 1 PACKET: at 16:58

## 2017-01-01 RX ADMIN — SODIUM CHLORIDE, POTASSIUM CHLORIDE, SODIUM LACTATE AND CALCIUM CHLORIDE 1000 ML: 600; 310; 30; 20 INJECTION, SOLUTION INTRAVENOUS at 05:31

## 2017-01-01 RX ADMIN — POTASSIUM CHLORIDE 20 MEQ: 1.5 POWDER, FOR SOLUTION ORAL at 10:21

## 2017-01-01 RX ADMIN — ACETAMINOPHEN 650 MG: 325 TABLET, FILM COATED ORAL at 01:18

## 2017-01-01 RX ADMIN — SUGAMMADEX 200 MG: 100 INJECTION, SOLUTION INTRAVENOUS at 09:00

## 2017-01-01 RX ADMIN — POTASSIUM CHLORIDE 20 MEQ: 1.5 POWDER, FOR SOLUTION ORAL at 10:24

## 2017-01-01 RX ADMIN — ALBUTEROL SULFATE 2 PUFF: 90 AEROSOL, METERED RESPIRATORY (INHALATION) at 16:30

## 2017-01-01 RX ADMIN — CHLORHEXIDINE GLUCONATE 15 ML: 1.2 RINSE ORAL at 15:57

## 2017-01-01 RX ADMIN — POTASSIUM & SODIUM PHOSPHATES POWDER PACK 280-160-250 MG 1 PACKET: 280-160-250 PACK at 12:24

## 2017-01-01 RX ADMIN — GABAPENTIN 300 MG: 250 SOLUTION ORAL at 16:27

## 2017-01-01 RX ADMIN — HEPARIN SODIUM 5000 UNITS: 5000 INJECTION, SOLUTION INTRAVENOUS; SUBCUTANEOUS at 12:28

## 2017-01-01 RX ADMIN — OXYCODONE HYDROCHLORIDE 2.5 MG: 5 SOLUTION ORAL at 20:30

## 2017-01-01 RX ADMIN — METOPROLOL TARTRATE 50 MG: 100 TABLET, FILM COATED ORAL at 20:14

## 2017-01-01 RX ADMIN — MULTIVIT AND MINERALS-FERROUS GLUCONATE 9 MG IRON/15 ML ORAL LIQUID 15 ML: at 09:00

## 2017-01-01 RX ADMIN — THIAMINE HYDROCHLORIDE 100 MG: 100 INJECTION, SOLUTION INTRAMUSCULAR; INTRAVENOUS at 23:17

## 2017-01-01 RX ADMIN — FENTANYL CITRATE 50 MCG: 50 INJECTION, SOLUTION INTRAMUSCULAR; INTRAVENOUS at 12:45

## 2017-01-01 RX ADMIN — ACETAMINOPHEN 650 MG: 325 TABLET, FILM COATED ORAL at 11:25

## 2017-01-01 RX ADMIN — ALBUMIN HUMAN 25 G: 50 SOLUTION INTRAVENOUS at 15:32

## 2017-01-01 RX ADMIN — MIDAZOLAM HYDROCHLORIDE 0.5 MG: 1 INJECTION, SOLUTION INTRAMUSCULAR; INTRAVENOUS at 04:39

## 2017-01-01 RX ADMIN — OXYCODONE HYDROCHLORIDE 5 MG: 5 SOLUTION ORAL at 00:09

## 2017-01-01 RX ADMIN — CHLORHEXIDINE GLUCONATE 15 ML: 1.2 RINSE ORAL at 08:22

## 2017-01-01 RX ADMIN — Medication 1 PACKET: at 16:39

## 2017-01-01 RX ADMIN — METOPROLOL TARTRATE 1 MG: 5 INJECTION INTRAVENOUS at 10:31

## 2017-01-01 RX ADMIN — Medication 100 MG: at 08:22

## 2017-01-01 RX ADMIN — GABAPENTIN 300 MG: 250 SOLUTION ORAL at 05:14

## 2017-01-01 RX ADMIN — ALBUMIN HUMAN: 0.05 INJECTION, SOLUTION INTRAVENOUS at 08:25

## 2017-01-01 RX ADMIN — Medication 100 MG: at 07:57

## 2017-01-01 RX ADMIN — WHITE PETROLATUM: 1.75 OINTMENT TOPICAL at 14:30

## 2017-01-01 RX ADMIN — PANTOPRAZOLE SODIUM 40 MG: 40 TABLET, DELAYED RELEASE ORAL at 08:17

## 2017-01-01 RX ADMIN — DEXAMETHASONE SODIUM PHOSPHATE 8 MG: 10 INJECTION, SOLUTION INTRAMUSCULAR; INTRAVENOUS at 10:28

## 2017-01-01 RX ADMIN — MAGNESIUM OXIDE TAB 400 MG (241.3 MG ELEMENTAL MG) 400 MG: 400 (241.3 MG) TAB at 13:08

## 2017-01-01 RX ADMIN — HALOPERIDOL LACTATE 5 MG: 5 INJECTION, SOLUTION INTRAMUSCULAR at 07:40

## 2017-01-01 RX ADMIN — MULTIVIT AND MINERALS-FERROUS GLUCONATE 9 MG IRON/15 ML ORAL LIQUID 15 ML: at 09:03

## 2017-01-01 RX ADMIN — PANTOPRAZOLE SODIUM 40 MG: 40 TABLET, DELAYED RELEASE ORAL at 08:44

## 2017-01-01 RX ADMIN — Medication 0.3 MG: at 09:27

## 2017-01-01 RX ADMIN — POTASSIUM CHLORIDE 40 MEQ: 1.5 POWDER, FOR SOLUTION ORAL at 05:32

## 2017-01-01 RX ADMIN — QUETIAPINE FUMARATE 25 MG: 25 TABLET, FILM COATED ORAL at 04:47

## 2017-01-01 RX ADMIN — Medication 1 PACKET: at 08:46

## 2017-01-01 RX ADMIN — Medication 0.2 MG: at 05:08

## 2017-01-01 RX ADMIN — Medication 0.2 MG: at 20:11

## 2017-01-01 RX ADMIN — HALOPERIDOL LACTATE 5 MG: 5 INJECTION, SOLUTION INTRAMUSCULAR at 13:55

## 2017-01-01 RX ADMIN — CHLORHEXIDINE GLUCONATE 15 ML: 1.2 RINSE ORAL at 17:03

## 2017-01-01 RX ADMIN — PANTOPRAZOLE SODIUM 40 MG: 40 TABLET, DELAYED RELEASE ORAL at 07:58

## 2017-01-01 RX ADMIN — CHLORHEXIDINE GLUCONATE 15 ML: 1.2 RINSE ORAL at 19:46

## 2017-01-01 RX ADMIN — Medication 3 PACKET: at 14:00

## 2017-01-01 RX ADMIN — METOPROLOL TARTRATE 1 MG: 5 INJECTION INTRAVENOUS at 10:56

## 2017-01-01 RX ADMIN — FENTANYL CITRATE 50 MCG: 50 INJECTION, SOLUTION INTRAMUSCULAR; INTRAVENOUS at 13:22

## 2017-01-01 RX ADMIN — HALOPERIDOL LACTATE 5 MG: 5 INJECTION, SOLUTION INTRAMUSCULAR at 04:38

## 2017-01-01 RX ADMIN — CHLORHEXIDINE GLUCONATE 15 ML: 1.2 RINSE ORAL at 12:45

## 2017-01-01 RX ADMIN — FOLIC ACID 1 MG: 1 TABLET ORAL at 08:26

## 2017-01-01 RX ADMIN — HYDROMORPHONE HYDROCHLORIDE 0.5 MG: 10 INJECTION, SOLUTION INTRAMUSCULAR; INTRAVENOUS; SUBCUTANEOUS at 01:29

## 2017-01-01 RX ADMIN — ALBUTEROL SULFATE 2.5 MG: 2.5 SOLUTION RESPIRATORY (INHALATION) at 13:26

## 2017-01-01 RX ADMIN — ENOXAPARIN SODIUM 30 MG: 30 INJECTION SUBCUTANEOUS at 16:20

## 2017-01-01 RX ADMIN — CALCIUM CHLORIDE 400 MG: 100 INJECTION, SOLUTION INTRAVENOUS at 08:09

## 2017-01-01 RX ADMIN — SODIUM CHLORIDE, POTASSIUM CHLORIDE, SODIUM LACTATE AND CALCIUM CHLORIDE: 600; 310; 30; 20 INJECTION, SOLUTION INTRAVENOUS at 16:21

## 2017-01-01 RX ADMIN — ACETAMINOPHEN 325 MG: 325 SOLUTION ORAL at 17:23

## 2017-01-01 RX ADMIN — KETAMINE HCL-NACL SOLN PREF SY 50 MG/5ML-0.9% (10MG/ML) 10 MG: 10 SOLUTION PREFILLED SYRINGE at 09:25

## 2017-01-01 RX ADMIN — BISACODYL 10 MG: 10 SUPPOSITORY RECTAL at 07:51

## 2017-01-01 RX ADMIN — OXYCODONE HYDROCHLORIDE 2.5 MG: 5 SOLUTION ORAL at 18:35

## 2017-01-01 RX ADMIN — POTASSIUM PHOSPHATE, MONOBASIC AND POTASSIUM PHOSPHATE, DIBASIC 10 MMOL: 224; 236 INJECTION, SOLUTION INTRAVENOUS at 07:31

## 2017-01-01 RX ADMIN — PHENYLEPHRINE HYDROCHLORIDE 200 MCG: 10 INJECTION, SOLUTION INTRAMUSCULAR; INTRAVENOUS; SUBCUTANEOUS at 09:27

## 2017-01-01 RX ADMIN — Medication: at 08:19

## 2017-01-01 RX ADMIN — Medication: at 03:22

## 2017-01-01 RX ADMIN — PIPERACILLIN SODIUM AND TAZOBACTAM SODIUM 3.38 G: .375; 3 INJECTION, POWDER, LYOPHILIZED, FOR SOLUTION INTRAVENOUS at 10:29

## 2017-01-01 RX ADMIN — ALBUMIN (HUMAN): 12.5 SOLUTION INTRAVENOUS at 14:12

## 2017-01-01 RX ADMIN — ACETAMINOPHEN 650 MG: 325 TABLET, FILM COATED ORAL at 20:19

## 2017-01-01 RX ADMIN — IPRATROPIUM BROMIDE AND ALBUTEROL SULFATE 3 ML: .5; 3 SOLUTION RESPIRATORY (INHALATION) at 08:32

## 2017-01-01 RX ADMIN — OXYCODONE HYDROCHLORIDE AND ACETAMINOPHEN 500 MG: 500 TABLET ORAL at 08:02

## 2017-01-01 RX ADMIN — METOPROLOL TARTRATE 37.5 MG: 100 TABLET, FILM COATED ORAL at 14:45

## 2017-01-01 RX ADMIN — HALOPERIDOL LACTATE 5 MG: 5 INJECTION, SOLUTION INTRAMUSCULAR at 01:25

## 2017-01-01 RX ADMIN — MIDAZOLAM HYDROCHLORIDE 2 MG: 1 INJECTION, SOLUTION INTRAMUSCULAR; INTRAVENOUS at 01:45

## 2017-01-01 RX ADMIN — Medication: at 11:04

## 2017-01-01 RX ADMIN — CHLORHEXIDINE GLUCONATE 15 ML: 1.2 RINSE ORAL at 08:45

## 2017-01-01 RX ADMIN — CHLORHEXIDINE GLUCONATE 15 ML: 1.2 RINSE ORAL at 11:22

## 2017-01-01 RX ADMIN — FENTANYL CITRATE 100 MCG: 50 INJECTION, SOLUTION INTRAMUSCULAR; INTRAVENOUS at 08:34

## 2017-01-01 RX ADMIN — MULTIVIT AND MINERALS-FERROUS GLUCONATE 9 MG IRON/15 ML ORAL LIQUID 15 ML: at 08:21

## 2017-01-01 RX ADMIN — DIAZEPAM 10 MG: 5 INJECTION, SOLUTION INTRAMUSCULAR; INTRAVENOUS at 15:42

## 2017-01-01 RX ADMIN — Medication 0.2 MG: at 13:58

## 2017-01-01 RX ADMIN — OXYCODONE HYDROCHLORIDE 2.5 MG: 5 SOLUTION ORAL at 15:06

## 2017-01-01 RX ADMIN — POTASSIUM CHLORIDE 20 MEQ: 1.5 POWDER, FOR SOLUTION ORAL at 16:53

## 2017-01-01 RX ADMIN — ACETAMINOPHEN 650 MG: 325 TABLET, FILM COATED ORAL at 18:55

## 2017-01-01 RX ADMIN — Medication 0.2 MG: at 10:55

## 2017-01-01 RX ADMIN — HEPARIN SODIUM 5000 UNITS: 5000 INJECTION, SOLUTION INTRAVENOUS; SUBCUTANEOUS at 17:24

## 2017-01-01 RX ADMIN — NOREPINEPHRINE BITARTRATE 0.15 MCG/KG/MIN: 1 INJECTION INTRAVENOUS at 19:54

## 2017-01-01 RX ADMIN — ALBUTEROL SULFATE 2.5 MG: 2.5 SOLUTION RESPIRATORY (INHALATION) at 12:09

## 2017-01-01 RX ADMIN — HYDROMORPHONE HYDROCHLORIDE 0.5 MG: 10 INJECTION, SOLUTION INTRAMUSCULAR; INTRAVENOUS; SUBCUTANEOUS at 23:16

## 2017-01-01 RX ADMIN — GABAPENTIN 300 MG: 250 SOLUTION ORAL at 14:42

## 2017-01-01 RX ADMIN — CHLORHEXIDINE GLUCONATE 15 ML: 1.2 RINSE ORAL at 13:27

## 2017-01-01 RX ADMIN — OXYCODONE HYDROCHLORIDE AND ACETAMINOPHEN 500 MG: 500 TABLET ORAL at 07:46

## 2017-01-01 RX ADMIN — ALBUTEROL SULFATE 2.5 MG: 2.5 SOLUTION RESPIRATORY (INHALATION) at 23:43

## 2017-01-01 RX ADMIN — HEPARIN SODIUM 5000 UNITS: 5000 INJECTION, SOLUTION INTRAVENOUS; SUBCUTANEOUS at 04:16

## 2017-01-01 RX ADMIN — ALBUTEROL SULFATE 2.5 MG: 2.5 SOLUTION RESPIRATORY (INHALATION) at 17:55

## 2017-01-01 RX ADMIN — FENTANYL CITRATE 50 MCG: 50 INJECTION, SOLUTION INTRAMUSCULAR; INTRAVENOUS at 11:25

## 2017-01-01 RX ADMIN — METOPROLOL TARTRATE 25 MG: 100 TABLET ORAL at 09:03

## 2017-01-01 RX ADMIN — PANTOPRAZOLE SODIUM 40 MG: 40 TABLET, DELAYED RELEASE ORAL at 08:37

## 2017-01-01 RX ADMIN — SENNOSIDES AND DOCUSATE SODIUM 1 TABLET: 8.6; 5 TABLET ORAL at 07:32

## 2017-01-01 RX ADMIN — MULTIVIT AND MINERALS-FERROUS GLUCONATE 9 MG IRON/15 ML ORAL LIQUID 15 ML: at 08:37

## 2017-01-01 RX ADMIN — PANCRELIPASE 2 TABLET: 10440; 39150; 39150 TABLET ORAL at 22:55

## 2017-01-01 RX ADMIN — SODIUM CHLORIDE, POTASSIUM CHLORIDE, SODIUM LACTATE AND CALCIUM CHLORIDE: 600; 310; 30; 20 INJECTION, SOLUTION INTRAVENOUS at 11:00

## 2017-01-01 RX ADMIN — HYDROMORPHONE HYDROCHLORIDE 0.2 MG: 1 INJECTION, SOLUTION INTRAMUSCULAR; INTRAVENOUS; SUBCUTANEOUS at 20:02

## 2017-01-01 RX ADMIN — PANTOPRAZOLE SODIUM 40 MG: 40 TABLET, DELAYED RELEASE ORAL at 09:02

## 2017-01-01 RX ADMIN — BACITRACIN: 500 OINTMENT TOPICAL at 03:28

## 2017-01-01 RX ADMIN — CHLORHEXIDINE GLUCONATE 15 ML: 1.2 RINSE ORAL at 20:15

## 2017-01-01 RX ADMIN — OXYCODONE HYDROCHLORIDE 2.5 MG: 5 SOLUTION ORAL at 17:05

## 2017-01-01 RX ADMIN — HYDROCORTISONE SODIUM SUCCINATE 100 MG: 100 INJECTION, POWDER, FOR SOLUTION INTRAMUSCULAR; INTRAVENOUS at 12:11

## 2017-01-01 RX ADMIN — CYANOCOBALAMIN TAB 1000 MCG 1000 MCG: 1000 TAB at 11:28

## 2017-01-01 RX ADMIN — POTASSIUM CHLORIDE 40 MEQ: 1500 TABLET, EXTENDED RELEASE ORAL at 16:29

## 2017-01-01 RX ADMIN — CALCIUM CHLORIDE 300 MG: 100 INJECTION, SOLUTION INTRAVENOUS at 08:29

## 2017-01-01 RX ADMIN — PIPERACILLIN SODIUM AND TAZOBACTAM SODIUM 3.38 G: 36; 4.5 INJECTION, POWDER, FOR SOLUTION INTRAVENOUS at 09:27

## 2017-01-01 RX ADMIN — METOPROLOL TARTRATE 37.5 MG: 100 TABLET, FILM COATED ORAL at 08:39

## 2017-01-01 RX ADMIN — ROCURONIUM BROMIDE 15 MG: 10 INJECTION INTRAVENOUS at 12:56

## 2017-01-01 RX ADMIN — HYDROMORPHONE HYDROCHLORIDE 0.2 MG: 1 INJECTION, SOLUTION INTRAMUSCULAR; INTRAVENOUS; SUBCUTANEOUS at 13:19

## 2017-01-01 RX ADMIN — POLYETHYLENE GLYCOL 3350 17 G: 17 POWDER, FOR SOLUTION ORAL at 08:15

## 2017-01-01 RX ADMIN — Medication 0.2 MG: at 14:31

## 2017-01-01 RX ADMIN — BACITRACIN: 500 OINTMENT TOPICAL at 20:05

## 2017-01-01 RX ADMIN — MIDAZOLAM HYDROCHLORIDE 0.5 MG: 1 INJECTION, SOLUTION INTRAMUSCULAR; INTRAVENOUS at 01:16

## 2017-01-01 RX ADMIN — ACETAMINOPHEN 500 MG: 10 INJECTION, SOLUTION INTRAVENOUS at 21:35

## 2017-01-01 RX ADMIN — ACETAMINOPHEN 650 MG: 325 TABLET, FILM COATED ORAL at 09:21

## 2017-01-01 RX ADMIN — FERROUS SULFATE TAB 325 MG (65 MG ELEMENTAL FE) 325 MG: 325 (65 FE) TAB at 20:55

## 2017-01-01 RX ADMIN — FENTANYL CITRATE 50 MCG: 50 INJECTION, SOLUTION INTRAMUSCULAR; INTRAVENOUS at 13:25

## 2017-01-01 RX ADMIN — OXYCODONE HYDROCHLORIDE 2.5 MG: 5 SOLUTION ORAL at 16:40

## 2017-01-01 RX ADMIN — HEPARIN SODIUM 5000 UNITS: 5000 INJECTION, SOLUTION INTRAVENOUS; SUBCUTANEOUS at 12:10

## 2017-01-01 RX ADMIN — FERROUS SULFATE TAB 325 MG (65 MG ELEMENTAL FE) 325 MG: 325 (65 FE) TAB at 07:32

## 2017-01-01 RX ADMIN — IPRATROPIUM BROMIDE AND ALBUTEROL SULFATE 3 ML: .5; 3 SOLUTION RESPIRATORY (INHALATION) at 08:15

## 2017-01-01 RX ADMIN — CYANOCOBALAMIN TAB 1000 MCG 1000 MCG: 1000 TAB at 08:43

## 2017-01-01 RX ADMIN — HYDROMORPHONE HYDROCHLORIDE 0.5 MG: 10 INJECTION, SOLUTION INTRAMUSCULAR; INTRAVENOUS; SUBCUTANEOUS at 08:42

## 2017-01-01 RX ADMIN — METOPROLOL TARTRATE 37.5 MG: 100 TABLET, FILM COATED ORAL at 07:58

## 2017-01-01 RX ADMIN — HEPARIN SODIUM 5000 UNITS: 5000 INJECTION, SOLUTION INTRAVENOUS; SUBCUTANEOUS at 12:55

## 2017-01-01 RX ADMIN — SODIUM CHLORIDE, POTASSIUM CHLORIDE, SODIUM LACTATE AND CALCIUM CHLORIDE: 600; 310; 30; 20 INJECTION, SOLUTION INTRAVENOUS at 13:05

## 2017-01-01 RX ADMIN — WHITE PETROLATUM: 1.75 OINTMENT TOPICAL at 15:43

## 2017-01-01 RX ADMIN — CALCIUM CHLORIDE 300 MG: 100 INJECTION, SOLUTION INTRAVENOUS at 08:58

## 2017-01-01 RX ADMIN — Medication 100 MG: at 14:32

## 2017-01-01 RX ADMIN — Medication: at 19:46

## 2017-01-01 RX ADMIN — METOPROLOL TARTRATE 12.5 MG: 25 TABLET, FILM COATED ORAL at 09:43

## 2017-01-01 RX ADMIN — ALBUMIN (HUMAN) 25 G: 12.5 SOLUTION INTRAVENOUS at 17:14

## 2017-01-01 RX ADMIN — PANTOPRAZOLE SODIUM 40 MG: 40 TABLET, DELAYED RELEASE ORAL at 08:18

## 2017-01-01 RX ADMIN — Medication 1 PACKET: at 19:59

## 2017-01-01 RX ADMIN — LORAZEPAM 2 MG: 2 INJECTION, SOLUTION INTRAMUSCULAR; INTRAVENOUS at 22:11

## 2017-01-01 RX ADMIN — REMIFENTANIL HYDROCHLORIDE 0.08 MCG/KG/MIN: 1 INJECTION, POWDER, LYOPHILIZED, FOR SOLUTION INTRAVENOUS at 13:30

## 2017-01-01 RX ADMIN — MIDAZOLAM 1 MG: 1 INJECTION INTRAMUSCULAR; INTRAVENOUS at 11:25

## 2017-01-01 RX ADMIN — PIPERACILLIN SODIUM AND TAZOBACTAM SODIUM 3.38 G: 36; 4.5 INJECTION, POWDER, FOR SOLUTION INTRAVENOUS at 20:26

## 2017-01-01 RX ADMIN — ACETAMINOPHEN 650 MG: 325 TABLET, FILM COATED ORAL at 21:43

## 2017-01-01 RX ADMIN — MIDAZOLAM HYDROCHLORIDE 1 MG: 1 INJECTION, SOLUTION INTRAMUSCULAR; INTRAVENOUS at 04:57

## 2017-01-01 RX ADMIN — ALBUTEROL SULFATE 2 PUFF: 90 AEROSOL, METERED RESPIRATORY (INHALATION) at 12:23

## 2017-01-01 RX ADMIN — Medication 1 MG: at 16:05

## 2017-01-01 RX ADMIN — CHLORHEXIDINE GLUCONATE 15 ML: 1.2 RINSE ORAL at 15:28

## 2017-01-01 RX ADMIN — MULTIVIT AND MINERALS-FERROUS GLUCONATE 9 MG IRON/15 ML ORAL LIQUID 15 ML: at 14:45

## 2017-01-01 RX ADMIN — VASOPRESSIN 2.4 UNITS/HR: 20 INJECTION, SOLUTION INTRAMUSCULAR; SUBCUTANEOUS at 22:39

## 2017-01-01 RX ADMIN — POTASSIUM CHLORIDE, DEXTROSE MONOHYDRATE AND SODIUM CHLORIDE: 150; 5; 450 INJECTION, SOLUTION INTRAVENOUS at 17:08

## 2017-01-01 RX ADMIN — IPRATROPIUM BROMIDE AND ALBUTEROL SULFATE 3 ML: .5; 3 SOLUTION RESPIRATORY (INHALATION) at 07:45

## 2017-01-01 RX ADMIN — INSULIN ASPART 1 UNITS: 100 INJECTION, SOLUTION INTRAVENOUS; SUBCUTANEOUS at 13:07

## 2017-01-01 RX ADMIN — MIDAZOLAM HYDROCHLORIDE 1 MG: 1 INJECTION, SOLUTION INTRAMUSCULAR; INTRAVENOUS at 15:30

## 2017-01-01 RX ADMIN — ROCURONIUM BROMIDE 25 MG: 10 INJECTION INTRAVENOUS at 12:04

## 2017-01-01 RX ADMIN — DIPHENHYDRAMINE HYDROCHLORIDE 25 MG: 50 INJECTION, SOLUTION INTRAMUSCULAR; INTRAVENOUS at 19:55

## 2017-01-01 RX ADMIN — Medication 12.5 MG: at 20:01

## 2017-01-01 RX ADMIN — GABAPENTIN 100 MG: 250 SOLUTION ORAL at 08:20

## 2017-01-01 RX ADMIN — ALBUTEROL SULFATE 2.5 MG: 2.5 SOLUTION RESPIRATORY (INHALATION) at 06:06

## 2017-01-01 RX ADMIN — POTASSIUM CHLORIDE 20 MEQ: 29.8 INJECTION, SOLUTION INTRAVENOUS at 04:16

## 2017-01-01 RX ADMIN — ALBUTEROL SULFATE 2 PUFF: 90 AEROSOL, METERED RESPIRATORY (INHALATION) at 17:31

## 2017-01-01 RX ADMIN — CEFTRIAXONE SODIUM 1 G: 10 INJECTION, POWDER, FOR SOLUTION INTRAVENOUS at 08:48

## 2017-01-01 RX ADMIN — DIPHENHYDRAMINE HYDROCHLORIDE 25 MG: 50 INJECTION, SOLUTION INTRAMUSCULAR; INTRAVENOUS at 21:25

## 2017-01-01 RX ADMIN — CHLORHEXIDINE GLUCONATE 15 ML: 1.2 RINSE ORAL at 12:07

## 2017-01-01 RX ADMIN — HEPARIN SODIUM 5000 UNITS: 5000 INJECTION, SOLUTION INTRAVENOUS; SUBCUTANEOUS at 19:54

## 2017-01-01 RX ADMIN — HALOPERIDOL LACTATE 5 MG: 5 INJECTION, SOLUTION INTRAMUSCULAR at 19:02

## 2017-01-01 RX ADMIN — Medication 5 MG: at 20:27

## 2017-01-01 RX ADMIN — HYDROMORPHONE HYDROCHLORIDE 0.2 MG: 1 INJECTION, SOLUTION INTRAMUSCULAR; INTRAVENOUS; SUBCUTANEOUS at 00:00

## 2017-01-01 RX ADMIN — Medication 1 PACKET: at 16:19

## 2017-01-01 RX ADMIN — POTASSIUM CHLORIDE 20 MEQ: 1.5 POWDER, FOR SOLUTION ORAL at 06:37

## 2017-01-01 RX ADMIN — MULTIVIT AND MINERALS-FERROUS GLUCONATE 9 MG IRON/15 ML ORAL LIQUID 15 ML: at 08:00

## 2017-01-01 RX ADMIN — SODIUM CHLORIDE, POTASSIUM CHLORIDE, SODIUM LACTATE AND CALCIUM CHLORIDE: 600; 310; 30; 20 INJECTION, SOLUTION INTRAVENOUS at 08:05

## 2017-01-01 RX ADMIN — IPRATROPIUM BROMIDE AND ALBUTEROL SULFATE 3 ML: .5; 3 SOLUTION RESPIRATORY (INHALATION) at 20:03

## 2017-01-01 RX ADMIN — Medication 0.2 MG: at 02:31

## 2017-01-01 RX ADMIN — Medication 0.05 MG: at 03:23

## 2017-01-01 RX ADMIN — CHLORHEXIDINE GLUCONATE 15 ML: 1.2 RINSE ORAL at 20:13

## 2017-01-01 RX ADMIN — RISPERIDONE 1 MG: 1 SOLUTION ORAL at 13:32

## 2017-01-01 RX ADMIN — HALOPERIDOL LACTATE 2 MG: 5 INJECTION, SOLUTION INTRAMUSCULAR at 16:50

## 2017-01-01 RX ADMIN — METOPROLOL TARTRATE 25 MG: 100 TABLET ORAL at 21:08

## 2017-01-01 RX ADMIN — HEPARIN SODIUM 5000 UNITS: 5000 INJECTION, SOLUTION INTRAVENOUS; SUBCUTANEOUS at 03:37

## 2017-01-01 RX ADMIN — OXYCODONE HYDROCHLORIDE 2.5 MG: 5 SOLUTION ORAL at 01:16

## 2017-01-01 RX ADMIN — Medication 1 PACKET: at 03:37

## 2017-01-01 RX ADMIN — METOPROLOL TARTRATE 37.5 MG: 100 TABLET, FILM COATED ORAL at 20:26

## 2017-01-01 RX ADMIN — Medication 1 PACKET: at 23:26

## 2017-01-01 RX ADMIN — Medication 2 G: at 05:50

## 2017-01-01 RX ADMIN — FENTANYL CITRATE 50 MCG/HR: 50 INJECTION, SOLUTION INTRAMUSCULAR; INTRAVENOUS at 13:54

## 2017-01-01 RX ADMIN — WHITE PETROLATUM: 1.75 OINTMENT TOPICAL at 08:01

## 2017-01-01 RX ADMIN — HYDROMORPHONE HYDROCHLORIDE 0.2 MG: 1 INJECTION, SOLUTION INTRAMUSCULAR; INTRAVENOUS; SUBCUTANEOUS at 11:20

## 2017-01-01 RX ADMIN — ROCURONIUM BROMIDE 50 MG: 10 INJECTION INTRAVENOUS at 08:13

## 2017-01-01 RX ADMIN — EPINEPHRINE 0.06 MCG/KG/MIN: 1 INJECTION INTRAMUSCULAR; INTRAVENOUS; SUBCUTANEOUS at 20:34

## 2017-01-01 RX ADMIN — PANCRELIPASE 2 TABLET: 10440; 39150; 39150 TABLET ORAL at 20:13

## 2017-01-01 RX ADMIN — Medication 100 MG: at 15:06

## 2017-01-01 RX ADMIN — NOREPINEPHRINE BITARTRATE 0.06 MCG: 1 INJECTION INTRAVENOUS at 12:15

## 2017-01-01 RX ADMIN — NICOTINE 1 PATCH: 14 PATCH, EXTENDED RELEASE TRANSDERMAL at 07:47

## 2017-01-01 RX ADMIN — CHLORHEXIDINE GLUCONATE 15 ML: 1.2 RINSE ORAL at 15:14

## 2017-01-01 RX ADMIN — HEPARIN SODIUM 5000 UNITS: 5000 INJECTION, SOLUTION INTRAVENOUS; SUBCUTANEOUS at 04:15

## 2017-01-01 RX ADMIN — NICOTINE 1 PATCH: 14 PATCH, EXTENDED RELEASE TRANSDERMAL at 08:00

## 2017-01-01 RX ADMIN — PHENYLEPHRINE HYDROCHLORIDE 100 MCG: 10 INJECTION, SOLUTION INTRAMUSCULAR; INTRAVENOUS; SUBCUTANEOUS at 09:07

## 2017-01-01 RX ADMIN — Medication: at 15:07

## 2017-01-01 RX ADMIN — CHLORHEXIDINE GLUCONATE 15 ML: 1.2 RINSE ORAL at 16:33

## 2017-01-01 RX ADMIN — PANTOPRAZOLE SODIUM 40 MG: 40 TABLET, DELAYED RELEASE ORAL at 07:52

## 2017-01-01 RX ADMIN — Medication 0.2 MG: at 10:03

## 2017-01-01 RX ADMIN — INSULIN ASPART 1 UNITS: 100 INJECTION, SOLUTION INTRAVENOUS; SUBCUTANEOUS at 17:02

## 2017-01-01 RX ADMIN — WHITE PETROLATUM: 1.75 OINTMENT TOPICAL at 08:18

## 2017-01-01 RX ADMIN — FENTANYL CITRATE 75 MCG/HR: 50 INJECTION, SOLUTION INTRAMUSCULAR; INTRAVENOUS at 09:35

## 2017-01-01 RX ADMIN — CHLORHEXIDINE GLUCONATE 15 ML: 1.2 RINSE ORAL at 20:14

## 2017-01-01 RX ADMIN — Medication 5 MG: at 16:02

## 2017-01-01 RX ADMIN — NICOTINE 1 PATCH: 14 PATCH, EXTENDED RELEASE TRANSDERMAL at 07:48

## 2017-01-01 RX ADMIN — Medication 5 MG: at 20:11

## 2017-01-01 RX ADMIN — PROPOFOL 35 MCG/KG/MIN: 10 INJECTION, EMULSION INTRAVENOUS at 08:48

## 2017-01-01 RX ADMIN — NOREPINEPHRINE BITARTRATE 0.05 MCG/KG/MIN: 1 INJECTION INTRAVENOUS at 14:10

## 2017-01-01 RX ADMIN — WHITE PETROLATUM: 1.75 OINTMENT TOPICAL at 09:02

## 2017-01-01 RX ADMIN — GABAPENTIN 100 MG: 250 SOLUTION ORAL at 10:40

## 2017-01-01 RX ADMIN — Medication 1000 MG: at 20:27

## 2017-01-01 RX ADMIN — SODIUM CHLORIDE, POTASSIUM CHLORIDE, SODIUM LACTATE AND CALCIUM CHLORIDE: 600; 310; 30; 20 INJECTION, SOLUTION INTRAVENOUS at 11:15

## 2017-01-01 RX ADMIN — SODIUM CHLORIDE, POTASSIUM CHLORIDE, SODIUM LACTATE AND CALCIUM CHLORIDE: 600; 310; 30; 20 INJECTION, SOLUTION INTRAVENOUS at 09:09

## 2017-01-01 RX ADMIN — PIPERACILLIN SODIUM AND TAZOBACTAM SODIUM 3.38 G: 36; 4.5 INJECTION, POWDER, FOR SOLUTION INTRAVENOUS at 01:49

## 2017-01-01 RX ADMIN — WHITE PETROLATUM: 1.75 OINTMENT TOPICAL at 20:26

## 2017-01-01 RX ADMIN — CHLORHEXIDINE GLUCONATE 15 ML: 1.2 RINSE ORAL at 12:33

## 2017-01-01 RX ADMIN — IPRATROPIUM BROMIDE AND ALBUTEROL SULFATE 3 ML: .5; 3 SOLUTION RESPIRATORY (INHALATION) at 16:02

## 2017-01-01 RX ADMIN — POTASSIUM CHLORIDE, DEXTROSE MONOHYDRATE AND SODIUM CHLORIDE: 150; 5; 450 INJECTION, SOLUTION INTRAVENOUS at 22:18

## 2017-01-01 RX ADMIN — ACETAMINOPHEN 650 MG: 325 TABLET, FILM COATED ORAL at 11:28

## 2017-01-01 RX ADMIN — Medication 0.2 MG: at 02:39

## 2017-01-01 RX ADMIN — PHENYLEPHRINE HYDROCHLORIDE 150 MCG: 10 INJECTION, SOLUTION INTRAMUSCULAR; INTRAVENOUS; SUBCUTANEOUS at 11:08

## 2017-01-01 RX ADMIN — CHLORHEXIDINE GLUCONATE 15 ML: 1.2 RINSE ORAL at 11:56

## 2017-01-01 RX ADMIN — POTASSIUM CHLORIDE 10 MEQ: 7.46 INJECTION, SOLUTION INTRAVENOUS at 18:25

## 2017-01-01 RX ADMIN — NOREPINEPHRINE BITARTRATE 0.06 MCG: 1 INJECTION INTRAVENOUS at 11:23

## 2017-01-01 RX ADMIN — OXYCODONE HYDROCHLORIDE 2.5 MG: 5 SOLUTION ORAL at 06:49

## 2017-01-01 RX ADMIN — HEPARIN SODIUM 5000 UNITS: 5000 INJECTION, SOLUTION INTRAVENOUS; SUBCUTANEOUS at 19:45

## 2017-01-01 RX ADMIN — CETIRIZINE HYDROCHLORIDE 10 MG: 10 TABLET, FILM COATED ORAL at 22:13

## 2017-01-01 RX ADMIN — Medication: at 12:28

## 2017-01-01 RX ADMIN — HYDRALAZINE HYDROCHLORIDE 10 MG: 20 INJECTION INTRAMUSCULAR; INTRAVENOUS at 05:58

## 2017-01-01 RX ADMIN — Medication 1 MG/HR: at 07:59

## 2017-01-01 RX ADMIN — Medication 100 MG: at 08:06

## 2017-01-01 RX ADMIN — PHENYLEPHRINE HYDROCHLORIDE 100 MCG: 10 INJECTION, SOLUTION INTRAMUSCULAR; INTRAVENOUS; SUBCUTANEOUS at 09:11

## 2017-01-01 RX ADMIN — HYDROMORPHONE HYDROCHLORIDE 0.2 MG: 1 INJECTION, SOLUTION INTRAMUSCULAR; INTRAVENOUS; SUBCUTANEOUS at 08:29

## 2017-01-01 RX ADMIN — POTASSIUM CHLORIDE 10 MEQ: 7.46 INJECTION, SOLUTION INTRAVENOUS at 06:32

## 2017-01-01 RX ADMIN — HEPARIN SODIUM 5000 UNITS: 5000 INJECTION, SOLUTION INTRAVENOUS; SUBCUTANEOUS at 13:24

## 2017-01-01 RX ADMIN — DEXAMETHASONE SODIUM PHOSPHATE 8 MG: 4 INJECTION, SOLUTION INTRA-ARTICULAR; INTRALESIONAL; INTRAMUSCULAR; INTRAVENOUS; SOFT TISSUE at 13:48

## 2017-01-01 RX ADMIN — Medication 1 PACKET: at 23:36

## 2017-01-01 RX ADMIN — Medication 1 PACKET: at 01:12

## 2017-01-01 RX ADMIN — KETAMINE HCL-NACL SOLN PREF SY 50 MG/5ML-0.9% (10MG/ML) 30 MG: 10 SOLUTION PREFILLED SYRINGE at 07:51

## 2017-01-01 RX ADMIN — HALOPERIDOL LACTATE 5 MG: 5 INJECTION, SOLUTION INTRAMUSCULAR at 08:41

## 2017-01-01 RX ADMIN — MULTIVIT AND MINERALS-FERROUS GLUCONATE 9 MG IRON/15 ML ORAL LIQUID 15 ML: at 08:17

## 2017-01-01 RX ADMIN — VASOPRESSIN 2.4 UNITS/HR: 20 INJECTION, SOLUTION INTRAMUSCULAR; SUBCUTANEOUS at 13:17

## 2017-01-01 RX ADMIN — CEFTRIAXONE SODIUM 1 G: 10 INJECTION, POWDER, FOR SOLUTION INTRAVENOUS at 08:21

## 2017-01-01 RX ADMIN — CLONIDINE HYDROCHLORIDE 0.1 MG: 0.1 TABLET ORAL at 04:12

## 2017-01-01 RX ADMIN — FENTANYL CITRATE 50 MCG: 50 INJECTION, SOLUTION INTRAMUSCULAR; INTRAVENOUS at 15:06

## 2017-01-01 RX ADMIN — PHENYLEPHRINE HYDROCHLORIDE 100 MCG: 10 INJECTION, SOLUTION INTRAMUSCULAR; INTRAVENOUS; SUBCUTANEOUS at 09:04

## 2017-01-01 RX ADMIN — Medication 1 PACKET: at 23:08

## 2017-01-01 RX ADMIN — Medication 2 G: at 06:37

## 2017-01-01 RX ADMIN — PANTOPRAZOLE SODIUM 40 MG: 40 TABLET, DELAYED RELEASE ORAL at 21:01

## 2017-01-01 RX ADMIN — FUROSEMIDE 20 MG: 20 TABLET ORAL at 07:47

## 2017-01-01 RX ADMIN — Medication 20 MG: at 18:46

## 2017-01-01 RX ADMIN — FENTANYL CITRATE 50 MCG: 50 INJECTION, SOLUTION INTRAMUSCULAR; INTRAVENOUS at 13:43

## 2017-01-01 RX ADMIN — OXYCODONE HYDROCHLORIDE 2.5 MG: 5 SOLUTION ORAL at 08:43

## 2017-01-01 RX ADMIN — Medication: at 05:23

## 2017-01-01 RX ADMIN — ONDANSETRON 4 MG: 2 SOLUTION INTRAMUSCULAR; INTRAVENOUS at 18:29

## 2017-01-01 RX ADMIN — QUETIAPINE FUMARATE 25 MG: 25 TABLET, FILM COATED ORAL at 08:00

## 2017-01-01 RX ADMIN — FOLIC ACID 1 MG: 1 TABLET ORAL at 08:00

## 2017-01-01 RX ADMIN — Medication 1 PACKET: at 11:28

## 2017-01-01 RX ADMIN — ACETAMINOPHEN 650 MG: 325 TABLET, FILM COATED ORAL at 04:16

## 2017-01-01 RX ADMIN — PANCRELIPASE 2 TABLET: 10440; 39150; 39150 TABLET ORAL at 07:42

## 2017-01-01 RX ADMIN — SODIUM CHLORIDE, POTASSIUM CHLORIDE, SODIUM LACTATE AND CALCIUM CHLORIDE: 600; 310; 30; 20 INJECTION, SOLUTION INTRAVENOUS at 02:20

## 2017-01-01 RX ADMIN — PANTOPRAZOLE SODIUM 40 MG: 40 TABLET, DELAYED RELEASE ORAL at 20:31

## 2017-01-01 RX ADMIN — PANTOPRAZOLE SODIUM 40 MG: 40 TABLET, DELAYED RELEASE ORAL at 20:20

## 2017-01-01 RX ADMIN — Medication 40 MG: at 15:53

## 2017-01-01 RX ADMIN — ALBUTEROL SULFATE 2 PUFF: 90 AEROSOL, METERED RESPIRATORY (INHALATION) at 20:50

## 2017-01-01 RX ADMIN — MIDAZOLAM HYDROCHLORIDE 0.5 MG: 1 INJECTION, SOLUTION INTRAMUSCULAR; INTRAVENOUS at 22:30

## 2017-01-01 ASSESSMENT — VISUAL ACUITY
OU: NO VISION
OU: NO VISION
OU: OTHER (SEE COMMENT)
OU: NOT TESTABLE
OU: NOT TESTABLE
OU: OTHER (SEE COMMENT)
OU: NO VISION
OU: OTHER (SEE COMMENT)
OU: OTHER (SEE COMMENT)
OU: NOT TESTABLE
OU: NOT TESTABLE
OU: OTHER (SEE COMMENT)
OU: OTHER (SEE COMMENT)
OU: NOT TESTABLE
OU: NOT TESTABLE
OU: OTHER (SEE COMMENT)
OU: NO VISION
OU: OTHER (SEE COMMENT)
OU: NO VISION
OU: NOT TESTABLE
OU: OTHER (SEE COMMENT)
OU: NOT TESTABLE
OU: NO VISION
OU: OTHER (SEE COMMENT)
OU: OTHER (SEE COMMENT)
OU: NOT TESTABLE
OU: NO VISION
OU: OTHER (SEE COMMENT)
OU: NOT TESTABLE
OU: OTHER (SEE COMMENT)
OU: NOT TESTABLE
OU: OTHER (SEE COMMENT)
OU: NOT TESTABLE
OU: OTHER (SEE COMMENT)
OU: NOT TESTABLE
OU: OTHER (SEE COMMENT)
OU: OTHER (SEE COMMENT)
OU: NOT TESTABLE
OU: OTHER (SEE COMMENT)
OU: OTHER (SEE COMMENT)
OU: NOT TESTABLE
OU: OTHER (SEE COMMENT)
OU: OTHER (SEE COMMENT)
OU: NOT TESTABLE
OU: NOT TESTABLE
OU: NO VISION
OU: OTHER (SEE COMMENT)
OU: NO VISION
OU: OTHER (SEE COMMENT)
OU: NOT TESTABLE
OU: OTHER (SEE COMMENT)
OU: NOT TESTABLE
OU: NOT TESTABLE
OU: NO VISION
OU: NOT TESTABLE
OU: OTHER (SEE COMMENT)
OU: NOT TESTABLE
OU: OTHER (SEE COMMENT)
OU: NOT TESTABLE
OU: OTHER (SEE COMMENT)
OU: OTHER (SEE COMMENT)
OU: NOT TESTABLE
OU: OTHER (SEE COMMENT)
OU: NOT TESTABLE
OU: NO VISION
OU: OTHER (SEE COMMENT)
OU: NOT TESTABLE
OU: NOT TESTABLE
OU: OTHER (SEE COMMENT)

## 2017-01-01 ASSESSMENT — PAIN DESCRIPTION - DESCRIPTORS
DESCRIPTORS: DISCOMFORT
DESCRIPTORS: DISCOMFORT
DESCRIPTORS: ACHING
DESCRIPTORS: DISCOMFORT
DESCRIPTORS: ACHING
DESCRIPTORS: DISCOMFORT
DESCRIPTORS: ACHING
DESCRIPTORS: ACHING;SORE
DESCRIPTORS: ACHING;DISCOMFORT
DESCRIPTORS: ACHING
DESCRIPTORS: DISCOMFORT
DESCRIPTORS: ACHING;SORE
DESCRIPTORS: ACHING
DESCRIPTORS: ACHING;DISCOMFORT
DESCRIPTORS: ACHING;SORE
DESCRIPTORS: DISCOMFORT
DESCRIPTORS: DISCOMFORT
DESCRIPTORS: ACHING
DESCRIPTORS: DISCOMFORT

## 2017-01-01 ASSESSMENT — ENCOUNTER SYMPTOMS
FEVER: 0
ABDOMINAL PAIN: 1
CONSTIPATION: 1
SHORTNESS OF BREATH: 1
CHILLS: 1
BACK PAIN: 1
HEMATURIA: 0
VOMITING: 0
DYSURIA: 0
BLOOD IN STOOL: 0
WOUND: 1
NAUSEA: 1
APPETITE CHANGE: 1

## 2017-01-01 ASSESSMENT — ACTIVITIES OF DAILY LIVING (ADL)
DRESS: 0-->INDEPENDENT
RETIRED_EATING: 0-->INDEPENDENT
BATHING: 0-->INDEPENDENT
COGNITION: 0 - NO COGNITION ISSUES REPORTED
SWALLOWING: 0-->SWALLOWS FOODS/LIQUIDS WITHOUT DIFFICULTY
NUMBER_OF_TIMES_PATIENT_HAS_FALLEN_WITHIN_LAST_SIX_MONTHS: 0
TRANSFERRING: 0-->INDEPENDENT
FALL_HISTORY_WITHIN_LAST_SIX_MONTHS: NO
TOILETING: 0-->INDEPENDENT
RETIRED_COMMUNICATION: 0-->UNDERSTANDS/COMMUNICATES WITHOUT DIFFICULTY
AMBULATION: 0-->INDEPENDENT

## 2017-01-01 ASSESSMENT — COPD QUESTIONNAIRES
CAT_SEVERITY: MODERATE
COPD: 1
COPD: 1

## 2017-01-01 ASSESSMENT — LIFESTYLE VARIABLES
TOBACCO_USE: 1
TOBACCO_USE: 1

## 2017-01-01 ASSESSMENT — PAIN SCALES - GENERAL
PAINLEVEL: WORST PAIN (10)
PAINLEVEL: SEVERE PAIN (6)

## 2017-01-23 ENCOUNTER — PRE VISIT (OUTPATIENT)
Dept: OTOLARYNGOLOGY | Facility: CLINIC | Age: 62
End: 2017-01-23

## 2017-01-23 ENCOUNTER — TELEPHONE (OUTPATIENT)
Dept: FAMILY MEDICINE | Facility: CLINIC | Age: 62
End: 2017-01-23

## 2017-01-23 NOTE — TELEPHONE ENCOUNTER
Hospital follow up    Admission date: 1/13/17     Discharge date: 1/23/17    Hospital: [facility]    Reason for hospitalization: alcohol withdrawal/pneumonia/swallowing issues/malnutrition                     (Note to staff: cut and paste discharge summary here, or go into Care                                     Everywhere, or have Pt sign JA and call for fax of information)    Discharge summary reviewed:       Medication changes:       Follow up needed for today:       Current status:

## 2017-01-23 NOTE — TELEPHONE ENCOUNTER
ENT PRE VISIT NOTE    On the phone call:    Date of call: January 23, 2017   Reason for call:   Chief Complaint   Patient presents with     Pre Visit Planning - Done     neck pain    Alexia Flor is a 61 year old female   Who made the initial call: (patient, parent, referral source) name and relationship to pt: Tiana Boyd RN  Who is the referring provider: (name, address and phone # if available): Referring Provider: Dr Brendan Abdullahi  How long have they had this condition:   Was the pt seen in an Emergency room: NO  What testing has been done:  laryngoscopy    After the phone call:    Request medical records: NO  Medical records in epic:Yes  If new pt, has the new pt pkt and map been sent by call center?: UNKNOWN    Prep for clinic:    Have outside records been received: Yes  Have records been given to physician if required before clinic:No  Is information already in EMR: Yes    Clinical Staff:    Was a pre visit call made: No  If yes were medications reviewed:No  Other pertinent information given:  Any further pre done prior to visit:      Tiana Boyd RN

## 2017-01-24 NOTE — TELEPHONE ENCOUNTER
ED / Discharge Outreach Protocol    Patient Contact    Attempt # 1    Was call answered?  No.  Left message on voicemail with information to call me back.  Anya Bañuelos RN

## 2017-01-25 NOTE — TELEPHONE ENCOUNTER
ED / Discharge Outreach Protocol    Patient Contact    Attempt # 2    Was call answered?  No.      Gertrudis Stratton, RN, BSN

## 2017-01-26 NOTE — TELEPHONE ENCOUNTER
ED / Discharge Outreach Protocol    Patient Contact    Attempt # 3    Was call answered?  No.    Closing this encounter.  Anya Bañuelos RN

## 2017-01-29 ENCOUNTER — TRANSFERRED RECORDS (OUTPATIENT)
Dept: HEALTH INFORMATION MANAGEMENT | Facility: CLINIC | Age: 62
End: 2017-01-29

## 2017-01-31 DIAGNOSIS — K21.9 GASTROESOPHAGEAL REFLUX DISEASE WITHOUT ESOPHAGITIS: Primary | ICD-10-CM

## 2017-02-01 ENCOUNTER — OFFICE VISIT (OUTPATIENT)
Dept: FAMILY MEDICINE | Facility: CLINIC | Age: 62
End: 2017-02-01
Payer: COMMERCIAL

## 2017-02-01 VITALS
RESPIRATION RATE: 20 BRPM | HEIGHT: 61 IN | HEART RATE: 112 BPM | BODY MASS INDEX: 16.99 KG/M2 | DIASTOLIC BLOOD PRESSURE: 50 MMHG | SYSTOLIC BLOOD PRESSURE: 84 MMHG | WEIGHT: 90 LBS | TEMPERATURE: 97.5 F | OXYGEN SATURATION: 95 %

## 2017-02-01 DIAGNOSIS — F41.9 ANXIETY: ICD-10-CM

## 2017-02-01 DIAGNOSIS — D53.9 MACROCYTIC ANEMIA: ICD-10-CM

## 2017-02-01 DIAGNOSIS — N76.0 VAGINITIS AND VULVOVAGINITIS: ICD-10-CM

## 2017-02-01 DIAGNOSIS — F10.20 ALCOHOLISM (H): Primary | ICD-10-CM

## 2017-02-01 DIAGNOSIS — K21.9 GASTROESOPHAGEAL REFLUX DISEASE, ESOPHAGITIS PRESENCE NOT SPECIFIED: ICD-10-CM

## 2017-02-01 DIAGNOSIS — F33.0 MILD EPISODE OF RECURRENT MAJOR DEPRESSIVE DISORDER (H): ICD-10-CM

## 2017-02-01 LAB
ANION GAP SERPL CALCULATED.3IONS-SCNC: 8 MMOL/L (ref 3–14)
BASOPHILS # BLD AUTO: 0.1 10E9/L (ref 0–0.2)
BASOPHILS NFR BLD AUTO: 0.3 %
BUN SERPL-MCNC: 7 MG/DL (ref 7–30)
CALCIUM SERPL-MCNC: 7.9 MG/DL (ref 8.5–10.1)
CHLORIDE SERPL-SCNC: 98 MMOL/L (ref 94–109)
CO2 SERPL-SCNC: 35 MMOL/L (ref 20–32)
CREAT SERPL-MCNC: 1.2 MG/DL (ref 0.52–1.04)
DIFFERENTIAL METHOD BLD: ABNORMAL
EOSINOPHIL # BLD AUTO: 0.3 10E9/L (ref 0–0.7)
EOSINOPHIL NFR BLD AUTO: 2.1 %
ERYTHROCYTE [DISTWIDTH] IN BLOOD BY AUTOMATED COUNT: 19.3 % (ref 10–15)
GFR SERPL CREATININE-BSD FRML MDRD: 45 ML/MIN/1.7M2
GLUCOSE SERPL-MCNC: 118 MG/DL (ref 70–99)
HCT VFR BLD AUTO: 28.8 % (ref 35–47)
HGB BLD-MCNC: 9.4 G/DL (ref 11.7–15.7)
IMM GRANULOCYTES # BLD: 0.1 10E9/L (ref 0–0.4)
IMM GRANULOCYTES NFR BLD: 0.4 %
LYMPHOCYTES # BLD AUTO: 1.7 10E9/L (ref 0.8–5.3)
LYMPHOCYTES NFR BLD AUTO: 10.3 %
MAGNESIUM SERPL-MCNC: 1.2 MG/DL (ref 1.6–2.3)
MCH RBC QN AUTO: 33.3 PG (ref 26.5–33)
MCHC RBC AUTO-ENTMCNC: 32.6 G/DL (ref 31.5–36.5)
MCV RBC AUTO: 102 FL (ref 78–100)
MONOCYTES # BLD AUTO: 1.3 10E9/L (ref 0–1.3)
MONOCYTES NFR BLD AUTO: 7.8 %
NEUTROPHILS # BLD AUTO: 12.8 10E9/L (ref 1.6–8.3)
NEUTROPHILS NFR BLD AUTO: 79.1 %
PLATELET # BLD AUTO: 375 10E9/L (ref 150–450)
POTASSIUM SERPL-SCNC: 2.7 MMOL/L (ref 3.4–5.3)
RBC # BLD AUTO: 2.82 10E12/L (ref 3.8–5.2)
SODIUM SERPL-SCNC: 141 MMOL/L (ref 133–144)
URATE SERPL-MCNC: 8.6 MG/DL (ref 2.6–6)
WBC # BLD AUTO: 16.1 10E9/L (ref 4–11)

## 2017-02-01 PROCEDURE — 85025 COMPLETE CBC W/AUTO DIFF WBC: CPT | Performed by: FAMILY MEDICINE

## 2017-02-01 PROCEDURE — 80048 BASIC METABOLIC PNL TOTAL CA: CPT | Performed by: FAMILY MEDICINE

## 2017-02-01 PROCEDURE — 36415 COLL VENOUS BLD VENIPUNCTURE: CPT | Performed by: FAMILY MEDICINE

## 2017-02-01 PROCEDURE — 99213 OFFICE O/P EST LOW 20 MIN: CPT | Performed by: FAMILY MEDICINE

## 2017-02-01 PROCEDURE — 83735 ASSAY OF MAGNESIUM: CPT | Performed by: FAMILY MEDICINE

## 2017-02-01 PROCEDURE — 84550 ASSAY OF BLOOD/URIC ACID: CPT | Performed by: FAMILY MEDICINE

## 2017-02-01 RX ORDER — OMEPRAZOLE 40 MG/1
40 CAPSULE, DELAYED RELEASE ORAL DAILY
Qty: 30 CAPSULE | Refills: 1 | Status: SHIPPED | OUTPATIENT
Start: 2017-02-01 | End: 2017-02-23

## 2017-02-01 RX ORDER — FLUCONAZOLE 150 MG/1
150 TABLET ORAL ONCE
Qty: 1 TABLET | Refills: 0 | Status: SHIPPED | OUTPATIENT
Start: 2017-02-01 | End: 2017-02-01

## 2017-02-01 RX ORDER — MIRTAZAPINE 15 MG/1
7.5 TABLET, FILM COATED ORAL AT BEDTIME
Qty: 30 TABLET | Refills: 0 | Status: SHIPPED | OUTPATIENT
Start: 2017-02-01 | End: 2017-02-23

## 2017-02-01 RX ORDER — HYDROXYZINE PAMOATE 25 MG/1
25 CAPSULE ORAL 3 TIMES DAILY PRN
Qty: 30 CAPSULE | Refills: 0 | Status: SHIPPED | OUTPATIENT
Start: 2017-02-01 | End: 2017-02-25

## 2017-02-01 NOTE — TELEPHONE ENCOUNTER
OMEPRAZOLE       Last Written Prescription Date: 12/1/16  Last Fill Quantity: 30,  # refills: 0   Last Office Visit with [facility], [facility] or [facility] prescribing provider: 12/22/16                                         Next 5 appointments (look out 90 days)     Feb 01, 2017  1:00 PM   Office Visit with Karan Smith MD   [facility] ([address] [address]   [phone]            Feb 08, 2017 11:00 AM   Office Visit with Karan Smith MD   [facility]    [address] [place] [id number]   [phone]                      Chasity Quinn MA 2/1/2017

## 2017-02-01 NOTE — MR AVS SNAPSHOT
After Visit Summary   2/1/2017    Alexia Flor    MRN: [id number]           Patient Information     Date Of Birth          1955        Visit Information        Provider Department      2/1/2017 1:00 PM Karan Smith MD [facility]        Today's Diagnoses     Alcoholism (H)    -  1     Anxiety         Gastroesophageal reflux disease, esophagitis presence not specified         Mild episode of recurrent major depressive disorder (H)         Macrocytic anemia         Vaginitis and vulvovaginitis            Follow-ups after your visit        Your next 10 appointments already scheduled     Feb 08, 2017 11:00 AM   [facility] Visit with [facility] MD   [facility]    [address] [place] [id number] [phone]           Bring a current list of meds and any records pertaining to this visit.  For Physicals, please bring immunization records and any forms needing to be filled out.  Please arrive 10 minutes early to complete paperwork.              Who to contact     If you have questions or need follow up information about today's clinic visit or your schedule please contact [facility] directly at [phone].  Normal or non-critical lab and imaging results will be communicated to you by [employer], letter or phone within 4 business days after the clinic has received the results. If you do not hear from us within 7 days, please contact the clinic through [employer] or phone. If you have a critical or abnormal lab result, we will notify you by phone as soon as possible.  Submit refill requests through [employer] or call your pharmacy and they will forward the refill request to us. Please allow 3 business days for your refill to be completed.          Additional Information About Your Visit        [employer] Information     [employer] lets you send messages to your doctor, view your test results, renew your prescriptions,  "schedule appointments and more. To sign up, go to [email or web address] . Click on \"Log in\" on the left side of the screen, which will take you to the Welcome page. Then click on \"Sign up Now\" on the right side of the page.     You will be asked to enter the access code listed below, as well as some personal information. Please follow the directions to create your username and password.     Your access code is: [email or web address]  Expires: 2017  2:30 PM     Your access code will  in 90 days. If you need help or a new code, please call your [facility] clinic or [phone].        Care EveryWhere ID     This is your Care EveryWhere ID. This could be used by other organizations to access your [facility] medical records  [id number]        Your Vitals Were     Pulse Temperature Respirations Height BMI (Body Mass Index) Pulse Oximetry    112 97.5  F (36.4  C) (Temporal) 20 5' 1\" (1.549 m) 17.01 kg/m2 95%       Blood Pressure from Last 3 Encounters:   17 84/50   16 122/76   16 106/72    Weight from Last 3 Encounters:   17 90 lb (40.824 kg)   16 94 lb 9.6 oz (42.91 kg)   16 92 lb (41.731 kg)              We Performed the Following     Basic metabolic panel  (Ca, Cl, CO2, Creat, Gluc, K, Na, BUN)     CBC with platelets differential     Magnesium     Uric acid          Today's Medication Changes          These changes are accurate as of: 17  4:40 PM.  If you have any questions, ask your nurse or doctor.               Start taking these medicines.        Dose/Directions    fluconazole 150 MG tablet   Commonly known as:  DIFLUCAN   Used for:  Vaginitis and vulvovaginitis   Started by:  Karan Smith MD        Dose:  150 mg   Take 1 tablet (150 mg) by mouth once for 1 dose   Quantity:  1 tablet   Refills:  0       hydrOXYzine 25 MG capsule   Commonly known as:  VISTARIL   Used for:  Anxiety   Started by:  Karan Smith MD        Dose:  25 mg   Take 1 capsule (25 mg) by " mouth 3 times daily as needed for anxiety   Quantity:  30 capsule   Refills:  0            Where to get your medicines      These medications were sent to [facility] - [place], [place] - [address]  [address], [facility] [address]     Phone:  [phone]    - fluconazole 150 MG tablet  - hydrOXYzine 25 MG capsule  - mirtazapine 15 MG tablet  - omeprazole 40 MG capsule             Primary Care Provider    None Specified       No primary provider on file.        Thank you!     Thank you for choosing [facility]  for your care. Our goal is always to provide you with excellent care. Hearing back from our patients is one way we can continue to improve our services. Please take a few minutes to complete the written survey that you may receive in the mail after your visit with us. Thank you!             Your Updated Medication List - Protect others around you: Learn how to safely use, store and throw away your medicines at www.disposemymeds.org.          This list is accurate as of: 2/1/17  4:40 PM.  Always use your most recent med list.                   Brand Name Dispense Instructions for use    albuterol 108 (90 BASE) MCG/ACT Inhaler    PROAIR HFA/PROVENTIL HFA/VENTOLIN HFA    3 Inhaler    Inhale 2 puffs into the lungs every 4 hours as needed for shortness of breath / dyspnea       B-12 1000 MCG Tbcr     30 tablet    Take 1 tablet by mouth daily       docusate sodium 100 MG tablet    COLACE    60 tablet    Take 100 mg by mouth daily       famotidine 40 MG tablet    PEPCID    30 tablet    Take 1 tablet (40 mg) by mouth At Bedtime       ferrous gluconate 324 (38 FE) MG tablet    FERGON    100 tablet    Take 1 tablet (324 mg) by mouth daily (with breakfast)       fluconazole 150 MG tablet    DIFLUCAN    1 tablet    Take 1 tablet (150 mg) by mouth once for 1 dose       folic acid 400 MCG tablet    FOLVITE    30 tablet    Take 2.5 tablets (1 mg) by mouth daily       furosemide 20 MG  tablet    LASIX    30 tablet    Take 1 tablet (20 mg) by mouth every morning       hydrOXYzine 25 MG capsule    VISTARIL    30 capsule    Take 1 capsule (25 mg) by mouth 3 times daily as needed for anxiety       magnesium oxide 400 MG tablet    MAG-OX    90 tablet    Take 1 tablet (400 mg) by mouth 2 times daily       mirtazapine 15 MG tablet    REMERON    30 tablet    Take 0.5 tablets (7.5 mg) by mouth At Bedtime       omeprazole 40 MG capsule    priLOSEC    30 capsule    Take 1 capsule (40 mg) by mouth daily       thiamine 100 MG tablet     30 tablet    Take 1 tablet (100 mg) by mouth daily

## 2017-02-01 NOTE — PROGRESS NOTES
SUBJECTIVE:                                                    Alexia Flor is a 61 year old female who presents to clinic today for the following health issues:          Hospital Follow-up Visit:    Hospital/Nursing Home/ Rehab Facility: [facility]  Date of Admission: 2-3 days ago (patient reported, however she states she was in the hospital for ten days)  Date of Discharge: Unknown  Reason(s) for Admission: GI bleed            Problems taking medications regularly:  None       Medication changes since discharge: None       Problems adhering to non-medication therapy:  None    Summary of hospitalization:  CareEverywhere information obtained and reviewed. 98 pages!  Diagnostic Tests/Treatments reviewed.  Follow up needed: Apparently followed by home care and recommended chemical dependency treatment.   Other Healthcare Providers Involved in Patient s Care:         Homecare  Update since discharge: stable.     Post Discharge Medication Reconciliation: discharge medications reconciled and changed, per note/orders (see AVS).  Plan of care communicated with patient and family     Coding guidelines for this visit:  Type of Medical   Decision Making Face-to-Face Visit       within 7 Days of discharge Face-to-Face Visit        within 14 days of discharge   Moderate Complexity [id number]   High Complexity 81098 [id number]                Problem list and histories reviewed & adjusted, as indicated.  Additional history: as documented    SUBJECTIVE:  Alexia  is a 61 year old female who presents for:  Follow-up from hospitalization at an outside hospital for 10 days. She came in intoxicated. She went through withdrawal there. She had a total of 23 diagnoses on the discharge summary. 98 pages of records were sent. Best I can tell she was noted to be anemic from her previous GI bleed she was apparently treated for gout states her swelling is down in her feet now, she states she has a probable yeast vaginal infection. Looks like we  need to recheck her magnesium potassium hemoglobin. Still having stomach discomfort and wants some more Prilosec. Needs something for sleep and I see she's been on Remeron before. (Vistaril for nausea and anxiety.    Past Medical History   Diagnosis Date     Congestive heart failure, unspecified      Anxiety      Hypokalemia      Alcoholic pancreatitis      Schafer's esophagus      Alcoholic hepatitis      Alcohol abuse      Tobacco abuse      COPD (chronic obstructive pulmonary disease) (H)      Non-adherence to medical treatment      Macrocytic anemia      Breast cancer (H)      rt, s/p radiation.     Past Surgical History   Procedure Laterality Date     Lumpectomy rt breast  2006       Social History   Substance Use Topics     Smoking status: Current Every Day Smoker -- 0.50 packs/day for 44 years     Types: Cigarettes     Smokeless tobacco: Never Used     Alcohol Use: 0.5 oz/week     1 Glasses of wine per week      Comment: weekends (reported; smelled of alcohol during clinic visit 4/2016)     Current Outpatient Prescriptions   Medication Sig Dispense Refill     hydrOXYzine (VISTARIL) 25 MG capsule Take 1 capsule (25 mg) by mouth 3 times daily as needed for anxiety 30 capsule 0     omeprazole (PRILOSEC) 40 MG capsule Take 1 capsule (40 mg) by mouth daily 30 capsule 1     mirtazapine (REMERON) 15 MG tablet Take 0.5 tablets (7.5 mg) by mouth At Bedtime 30 tablet 0     magnesium oxide (MAG-OX) 400 MG tablet Take 1 tablet (400 mg) by mouth 2 times daily 90 tablet 3     famotidine (PEPCID) 40 MG tablet Take 1 tablet (40 mg) by mouth At Bedtime 30 tablet 1     folic acid (FOLVITE) 400 MCG tablet Take 2.5 tablets (1 mg) by mouth daily 30 tablet 3     Cyanocobalamin (B-12) 1000 MCG TBCR Take 1 tablet by mouth daily 30 tablet 3     thiamine 100 MG tablet Take 1 tablet (100 mg) by mouth daily 30 tablet 3     albuterol (PROAIR HFA, PROVENTIL HFA, VENTOLIN HFA) 108 (90 BASE) MCG/ACT inhaler Inhale 2 puffs into the lungs  "every 4 hours as needed for shortness of breath / dyspnea 3 Inhaler 4     docusate sodium (COLACE) 100 MG tablet Take 100 mg by mouth daily 60 tablet 1     furosemide (LASIX) 20 MG tablet Take 1 tablet (20 mg) by mouth every morning 30 tablet 1     ferrous gluconate (FERGON) 324 (38 FE) MG tablet Take 1 tablet (324 mg) by mouth daily (with breakfast) 100 tablet 1     [DISCONTINUED] omeprazole (PRILOSEC) 40 MG capsule Take 1 capsule (40 mg) by mouth daily 30 capsule 0     [DISCONTINUED] mirtazapine (REMERON) 15 MG tablet Take 0.5 tablets (7.5 mg) by mouth At Bedtime 30 tablet 0       REVIEW OF SYSTEMS:   5 point ROS negative except as noted above in HPI, including Gen., Resp, CV, GI &  system review.     OBJECTIVE:  Vitals: BP 84/50 mmHg  Pulse 112  Temp(Src) 97.5  F (36.4  C) (Temporal)  Resp 20  Ht 5' 1\" (1.549 m)  Wt 90 lb (40.824 kg)  BMI 17.01 kg/m2  SpO2 95%  BMI= Body mass index is 17.01 kg/(m^2).  Appears to be sober at this time. Mucous membranes are moist. Heart with regular rhythm. Lungs are clear. Abdomen with bowel sounds present. No particular tenderness. Her feet look normal now.    ASSESSMENT:  #1 alcoholism #2 anxiety #3 GERD #4 apparent GI bleed    PLAN:  Reviewed the records from [facility] his best I could. Going to put her on some Vistaril as noted above omeprazole and Remeron. Sending her to the lab to check potassium and magnesium electrolytes hemoglobin. Will notify her with results. Did call in Diflucan 150 mg tablet for her apparent yeast vaginitis.        Karan Smith MD  [facility]                "

## 2017-02-01 NOTE — NURSING NOTE
"Chief Complaint   Patient presents with     Hospital F/U       Initial BP 84/50 mmHg  Pulse 112  Temp(Src) 97.5  F (36.4  C) (Temporal)  Resp 20  Ht 5' 1\" (1.549 m)  Wt 90 lb (40.824 kg)  BMI 17.01 kg/m2  SpO2 95% Estimated body mass index is 17.01 kg/(m^2) as calculated from the following:    Height as of this encounter: 5' 1\" (1.549 m).    Weight as of this encounter: 90 lb (40.824 kg).  BP completed using cuff size: regular    "

## 2017-02-02 RX ORDER — OMEPRAZOLE 40 MG/1
CAPSULE, DELAYED RELEASE ORAL
Qty: 30 CAPSULE | Refills: 11 | Status: ON HOLD | OUTPATIENT
Start: 2017-02-02 | End: 2017-01-01

## 2017-02-02 NOTE — PROGRESS NOTES
Quick Note:    Her labs yesterday from yesterday show her magnesium is low at 1.2. She needs to be on supplement I believe she has some. Potassium is very low at 2.7. Needs to take potassium 20 mEq twice a day. We will call some in if she does not have any. Her hemoglobin is 9.4. This is still low she should be taking iron supplement. Her white blood count was slightly elevated. She needs to make sure she is on all of these medications and we should check her labs back in a week.  ______

## 2017-02-03 NOTE — TELEPHONE ENCOUNTER
Prescription approved per [facility] Refill Protocol....................ZUNILDA Addison

## 2017-02-06 ENCOUNTER — TELEPHONE (OUTPATIENT)
Dept: FAMILY MEDICINE | Facility: CLINIC | Age: 62
End: 2017-02-06

## 2017-02-06 ENCOUNTER — TRANSFERRED RECORDS (OUTPATIENT)
Dept: HEALTH INFORMATION MANAGEMENT | Facility: CLINIC | Age: 62
End: 2017-02-06

## 2017-02-06 NOTE — TELEPHONE ENCOUNTER
Reason for Call:  Other FYI    Detailed comments: Audelia nurse  @ [employer] wanted to let Dr. Smith know that pt has case management services through her insurance.     Phone Number Patient can be reached at: Other phone number:  Audelia  # [phone]      Best Time: anytime    Can we leave a detailed message on this number? YES    Call taken on 2/6/2017 at 4:08 PM by Lucia Cassidy

## 2017-02-08 ENCOUNTER — TELEPHONE (OUTPATIENT)
Dept: FAMILY MEDICINE | Facility: CLINIC | Age: 62
End: 2017-02-08

## 2017-02-08 DIAGNOSIS — F10.20 ALCOHOLISM (H): Primary | ICD-10-CM

## 2017-02-08 NOTE — TELEPHONE ENCOUNTER
Called patient back and left a message to call the clinic back.  When she does call back please schedule her an appintment for 02/09/2017. Otherwise transfer her back to me.    Zulma Marc, SHAQUILLE

## 2017-02-08 NOTE — TELEPHONE ENCOUNTER
Reason for Call:  Same Day Appointment, Requested Provider:  Artem Meeks MD    PCP: No primary care provider on file.    Reason for visit: Patient stated that she has really bad foot pain.    Duration of symptoms: about 8 months    Have you been treated for this in the past? Yes    Additional comments: please call patient.    Can we leave a detailed message on this number? YES    Phone number patient can be reached at: Home number on file [phone] (home)    Best Time: any    Call taken on 2/8/2017 at 3:19 PM by Citlaly Waller

## 2017-02-09 ENCOUNTER — OFFICE VISIT (OUTPATIENT)
Dept: FAMILY MEDICINE | Facility: CLINIC | Age: 62
End: 2017-02-09
Payer: COMMERCIAL

## 2017-02-09 ENCOUNTER — CARE COORDINATION (OUTPATIENT)
Dept: CARE COORDINATION | Facility: CLINIC | Age: 62
End: 2017-02-09

## 2017-02-09 VITALS
TEMPERATURE: 98 F | HEART RATE: 140 BPM | BODY MASS INDEX: 16.81 KG/M2 | RESPIRATION RATE: 22 BRPM | WEIGHT: 88.9 LBS | OXYGEN SATURATION: 99 %

## 2017-02-09 DIAGNOSIS — F10.20 ALCOHOLISM (H): ICD-10-CM

## 2017-02-09 DIAGNOSIS — M10.9 ACUTE GOUTY ARTHRITIS: Primary | ICD-10-CM

## 2017-02-09 PROBLEM — Z76.89 HEALTH CARE HOME: Status: ACTIVE | Noted: 2017-02-09

## 2017-02-09 LAB
ALBUMIN SERPL-MCNC: 2.5 G/DL (ref 3.4–5)
ALP SERPL-CCNC: 406 U/L (ref 40–150)
ALT SERPL W P-5'-P-CCNC: 23 U/L (ref 0–50)
ANION GAP SERPL CALCULATED.3IONS-SCNC: 10 MMOL/L (ref 3–14)
AST SERPL W P-5'-P-CCNC: 64 U/L (ref 0–45)
BASOPHILS # BLD AUTO: 0 10E9/L (ref 0–0.2)
BASOPHILS NFR BLD AUTO: 0.2 %
BILIRUB SERPL-MCNC: 0.5 MG/DL (ref 0.2–1.3)
BUN SERPL-MCNC: 10 MG/DL (ref 7–30)
CALCIUM SERPL-MCNC: 7.8 MG/DL (ref 8.5–10.1)
CHLORIDE SERPL-SCNC: 112 MMOL/L (ref 94–109)
CO2 SERPL-SCNC: 25 MMOL/L (ref 20–32)
CREAT SERPL-MCNC: 0.93 MG/DL (ref 0.52–1.04)
DIFFERENTIAL METHOD BLD: ABNORMAL
EOSINOPHIL # BLD AUTO: 0 10E9/L (ref 0–0.7)
EOSINOPHIL NFR BLD AUTO: 0.1 %
ERYTHROCYTE [DISTWIDTH] IN BLOOD BY AUTOMATED COUNT: 19.3 % (ref 10–15)
GFR SERPL CREATININE-BSD FRML MDRD: 61 ML/MIN/1.7M2
GLUCOSE SERPL-MCNC: 198 MG/DL (ref 70–99)
HCT VFR BLD AUTO: 28.2 % (ref 35–47)
HGB BLD-MCNC: 9 G/DL (ref 11.7–15.7)
IMM GRANULOCYTES # BLD: 0.1 10E9/L (ref 0–0.4)
IMM GRANULOCYTES NFR BLD: 0.3 %
LYMPHOCYTES # BLD AUTO: 0.6 10E9/L (ref 0.8–5.3)
LYMPHOCYTES NFR BLD AUTO: 3.7 %
MAGNESIUM SERPL-MCNC: 1 MG/DL (ref 1.6–2.3)
MCH RBC QN AUTO: 33.8 PG (ref 26.5–33)
MCHC RBC AUTO-ENTMCNC: 31.9 G/DL (ref 31.5–36.5)
MCV RBC AUTO: 106 FL (ref 78–100)
MONOCYTES # BLD AUTO: 0.1 10E9/L (ref 0–1.3)
MONOCYTES NFR BLD AUTO: 0.7 %
NEUTROPHILS # BLD AUTO: 16.3 10E9/L (ref 1.6–8.3)
NEUTROPHILS NFR BLD AUTO: 95 %
PLATELET # BLD AUTO: 307 10E9/L (ref 150–450)
POTASSIUM SERPL-SCNC: 3.8 MMOL/L (ref 3.4–5.3)
PROT SERPL-MCNC: 7.7 G/DL (ref 6.8–8.8)
RBC # BLD AUTO: 2.66 10E12/L (ref 3.8–5.2)
SODIUM SERPL-SCNC: 147 MMOL/L (ref 133–144)
WBC # BLD AUTO: 17.2 10E9/L (ref 4–11)

## 2017-02-09 PROCEDURE — 99214 OFFICE O/P EST MOD 30 MIN: CPT | Performed by: FAMILY MEDICINE

## 2017-02-09 PROCEDURE — 80053 COMPREHEN METABOLIC PANEL: CPT | Performed by: FAMILY MEDICINE

## 2017-02-09 PROCEDURE — 83735 ASSAY OF MAGNESIUM: CPT | Performed by: FAMILY MEDICINE

## 2017-02-09 PROCEDURE — 85025 COMPLETE CBC W/AUTO DIFF WBC: CPT | Performed by: FAMILY MEDICINE

## 2017-02-09 PROCEDURE — 36415 COLL VENOUS BLD VENIPUNCTURE: CPT | Performed by: FAMILY MEDICINE

## 2017-02-09 RX ORDER — HYDROCODONE BITARTRATE AND ACETAMINOPHEN 5; 325 MG/1; MG/1
1 TABLET ORAL EVERY 4 HOURS PRN
Qty: 20 TABLET | Refills: 0 | Status: SHIPPED | OUTPATIENT
Start: 2017-02-09 | End: 2017-02-25

## 2017-02-09 RX ORDER — INDOMETHACIN 25 MG/1
25 CAPSULE ORAL 2 TIMES DAILY WITH MEALS
Qty: 42 CAPSULE | Refills: 1 | Status: SHIPPED | OUTPATIENT
Start: 2017-02-09 | End: 2017-02-25

## 2017-02-09 ASSESSMENT — PAIN SCALES - GENERAL: PAINLEVEL: EXTREME PAIN (8)

## 2017-02-09 NOTE — PROGRESS NOTES
Clinic Care Coordination Contact 2/9/17  Care Team Conversations-Social Work    Referral received from pt's MD stating the pt had questions about her transportation. This writer met with the pt and introduced self and role. This writer had previously been trying to connect with the pt to assist with the same matter, but was not able to get in touch with her. Pt states she missed her apt yesterday as she showed up 45 minutes for her apt (which was at 11:00) due to transportation not showing up on time. She rescheduled for today. This writer got verbal permission from the pt to contact her [employer] insurance to inquire. Spoke with Marco Antonio from [employer] who states there records indicate the  was at the house from approx. 10:15 to 10:45 before leaving the house. Pt contacted her insurance at 11:08 stating the transport never showed up. The company that day was [employer]. Today, the pt used [employer] transportation which she was pleased with.     Marco Antonio stated typically transport will arrive 1 hour prior to her scheduled apt time and they have a window of 30 minutes.     Discussion about the pt being able to chose which companies she prefers and does not prefer to assist her with her needs. Pt liked this idea and agreed to verbalize her preference. She has a scheduled apt on 2/23/17. This writer will contact her on the 27th to see how transportation went for her and if there is any more problem solving we can do together to maker her more successful with this.     Layla Falk, [facility]  Care Coordinator Social Work    [facility]  [phone]  2/9/2017 2:06 PM

## 2017-02-09 NOTE — MR AVS SNAPSHOT
"              After Visit Summary   2/9/2017    Alexia Flor    MRN: [id number]           Patient Information     Date Of Birth          1955        Visit Information        Provider Department      2/9/2017 12:40 PM Artem Meeks MD [facility]        Today's Diagnoses     Acute gouty arthritis    -  1        Follow-ups after your visit        Your next 10 appointments already scheduled     Feb 23, 2017  1:00 PM   SHORT with Anu Evans Mai, MD   [facility]    [address] [place] [id number] [phone]              Future tests that were ordered for you today     Open Future Orders        Priority Expected Expires Ordered    CBC with platelets and differential Routine  2/8/2018 2/8/2017    Comprehensive metabolic panel Routine  2/9/2018 2/8/2017    Magnesium Routine  2/8/2018 2/8/2017            Who to contact     If you have questions or need follow up information about today's clinic visit or your schedule please contact [facility] directly at [phone].  Normal or non-critical lab and imaging results will be communicated to you by MyChart, letter or phone within 4 business days after the clinic has received the results. If you do not hear from us within 7 days, please contact the clinic through MyChart or phone. If you have a critical or abnormal lab result, we will notify you by phone as soon as possible.  Submit refill requests through [email or web address] or call your pharmacy and they will forward the refill request to us. Please allow 3 business days for your refill to be completed.          Additional Information About Your Visit        NephroPlushart Information     [email or web address] lets you send messages to your doctor, view your test results, renew your prescriptions, schedule appointments and more. To sign up, go to [email or web address] . Click on \"Log in\" on the left side of the screen, which will take you to the Welcome " "page. Then click on \"Sign up Now\" on the right side of the page.     You will be asked to enter the access code listed below, as well as some personal information. Please follow the directions to create your username and password.     Your access code is: [email or web address]  Expires: 2017  2:30 PM     Your access code will  in 90 days. If you need help or a new code, please call your [place] clinic or [phone].        Care EveryWhere ID     This is your Care EveryWhere ID. This could be used by other organizations to access your [place] medical records  [id number]        Your Vitals Were     Pulse Temperature Respirations Pulse Oximetry          140 98  F (36.7  C) (Temporal) 22 99%         Blood Pressure from Last 3 Encounters:   17 84/50   16 122/76   16 106/72    Weight from Last 3 Encounters:   17 88 lb 14.4 oz (40.325 kg)   17 90 lb (40.824 kg)   16 94 lb 9.6 oz (42.91 kg)              Today, you had the following     No orders found for display         Today's Medication Changes          These changes are accurate as of: 17  1:54 PM.  If you have any questions, ask your nurse or doctor.               Start taking these medicines.        Dose/Directions    HYDROcodone-acetaminophen 5-325 MG per tablet   Commonly known as:  NORCO   Used for:  Acute gouty arthritis   Started by:  Artem Meeks MD        Dose:  1 tablet   Take 1 tablet by mouth every 4 hours as needed for pain   Quantity:  20 tablet   Refills:  0       indomethacin 25 MG capsule   Commonly known as:  INDOCIN   Used for:  Acute gouty arthritis   Started by:  Artem Meeks MD        Dose:  25 mg   Take 1 capsule (25 mg) by mouth 2 times daily (with meals)   Quantity:  42 capsule   Refills:  1            Where to get your medicines      These medications were sent to [facility] - [place], [place] - [address] AT [facility] OF [address]  [address], [address] " [place] [address]     Phone:  [phone]    - indomethacin 25 MG capsule      Some of these will need a paper prescription and others can be bought over the counter.  Ask your nurse if you have questions.     Bring a paper prescription for each of these medications    - HYDROcodone-acetaminophen 5-325 MG per tablet             Primary Care Provider    None Specified       No primary provider on file.        Thank you!     Thank you for choosing [facility]  for your care. Our goal is always to provide you with excellent care. Hearing back from our patients is one way we can continue to improve our services. Please take a few minutes to complete the written survey that you may receive in the mail after your visit with us. Thank you!             Your Updated Medication List - Protect others around you: Learn how to safely use, store and throw away your medicines at www.disposemymeds.org.          This list is accurate as of: 2/9/17  1:54 PM.  Always use your most recent med list.                   Brand Name Dispense Instructions for use    albuterol 108 (90 BASE) MCG/ACT Inhaler    PROAIR HFA/PROVENTIL HFA/VENTOLIN HFA    3 Inhaler    Inhale 2 puffs into the lungs every 4 hours as needed for shortness of breath / dyspnea       B-12 1000 MCG Tbcr     30 tablet    Take 1 tablet by mouth daily       docusate sodium 100 MG tablet    COLACE    60 tablet    Take 100 mg by mouth daily       famotidine 40 MG tablet    PEPCID    30 tablet    Take 1 tablet (40 mg) by mouth At Bedtime       ferrous gluconate 324 (38 FE) MG tablet    FERGON    100 tablet    Take 1 tablet (324 mg) by mouth daily (with breakfast)       folic acid 400 MCG tablet    FOLVITE    30 tablet    Take 2.5 tablets (1 mg) by mouth daily       furosemide 20 MG tablet    LASIX    30 tablet    Take 1 tablet (20 mg) by mouth every morning       HYDROcodone-acetaminophen 5-325 MG per tablet    NORCO    20 tablet    Take 1 tablet by mouth every 4  hours as needed for pain       hydrOXYzine 25 MG capsule    VISTARIL    30 capsule    Take 1 capsule (25 mg) by mouth 3 times daily as needed for anxiety       indomethacin 25 MG capsule    INDOCIN    42 capsule    Take 1 capsule (25 mg) by mouth 2 times daily (with meals)       magnesium oxide 400 MG tablet    MAG-OX    90 tablet    Take 1 tablet (400 mg) by mouth 2 times daily       mirtazapine 15 MG tablet    REMERON    30 tablet    Take 0.5 tablets (7.5 mg) by mouth At Bedtime       * omeprazole 40 MG capsule    priLOSEC    30 capsule    Take 1 capsule (40 mg) by mouth daily       * omeprazole 40 MG capsule    priLOSEC    30 capsule    TAKE 1 CAPSULE(40 MG) BY MOUTH DAILY       thiamine 100 MG tablet     30 tablet    Take 1 tablet (100 mg) by mouth daily       * Notice:  This list has 2 medication(s) that are the same as other medications prescribed for you. Read the directions carefully, and ask your doctor or other care provider to review them with you.

## 2017-02-09 NOTE — NURSING NOTE
"Chief Complaint   Patient presents with     Musculoskeletal Problem     pain in left foot       Initial Pulse 140  Temp(Src) 98  F (36.7  C) (Temporal)  Resp 22  Wt 88 lb 14.4 oz (40.325 kg)  SpO2 99% Estimated body mass index is 16.81 kg/(m^2) as calculated from the following:    Height as of 2/1/17: 5' 1\" (1.549 m).    Weight as of this encounter: 88 lb 14.4 oz (40.325 kg).  Medication Reconciliation: complete   Zulma Marc, SHAQUILLE     "

## 2017-02-09 NOTE — PROGRESS NOTES
SUBJECTIVE:                                                    Alexia Flor is a 61 year old female who presents to clinic today for the following health issues:  Left Toe pain  Duration of pain-2 months  Intensity- Currently 8/10, Worst 10/10  Radiating- Up the Left leg  What relives the pain-advil takes the edge off  Cause-Was seen at [facility] and they told her it was gout.    New patient to me today. A glance at her chart shows a long-standing concern for her alcoholism. She is complaining of ongoing left ankle and foot pain. She complains of swelling. She has been using occasional Advil which helps. Reviewed the [facility] discharge summary which mentioned gout. She is also very concerned about hoarseness which is a new problem. She did have significant alcohol withdrawal at her last hospitalization, not clear if she was intubated. She believes she has been evaluated by ENT, but can't recall the details.    mnpmp clear, no recent prescription        Problem list and histories reviewed & adjusted, as indicated.  Additional history: as documented        ROS:  Constitutional, HEENT, cardiovascular, pulmonary, gi and gu systems are negative, except as otherwise noted.    OBJECTIVE:                                                    Pulse 140  Temp(Src) 98  F (36.7  C) (Temporal)  Resp 22  Wt 88 lb 14.4 oz (40.325 kg)  SpO2 99%  Body mass index is 16.81 kg/(m^2).  Chronically ill and thin appearing disheveled female who is in no distress. She has poor recall. Heart is regular without murmur. Extremities are thin. Lungs are clear unlabored. Her left ankle and foot has some diffuse subcutaneous nonpitting mild edema. Right is normal. Passive range and ankle normal. Diffuse tenderness. No redness or warmth.    Diagnostic Test Results:  none      ASSESSMENT/PLAN:                                                              ICD-10-CM    1. Acute gouty arthritis M10.9 HYDROcodone-acetaminophen (NORCO) 5-325  MG per tablet     indomethacin (INDOCIN) 25 MG capsule   2. Alcoholism (H) F10.20 CBC with platelets and differential     Comprehensive metabolic panel     Magnesium       Given her prior diagnosis this could be ongoing gouty arthritis. She has a history of anemia possible GI bleed, so a course of anti-inflammatories would have to be short. I placed her on indomethacin but also Norco for breakthrough pain. I gave her strict instructions that should only be for a week and she needs to completely abstain from alcohol which she agreed to. She has a history of hypo-magnesium related to her alcoholism as well as the macrocytic anemia, again consistent with alcoholism, and this will be rechecked today. Follow up with her primary in 2 weeks.    Artem Meeks MD  [facility]

## 2017-02-15 NOTE — PROGRESS NOTES
Lab results still way off. Blood sugar was very high at 198. Elevated liver function tests. A museum low white blood count elevated hemoglobin low at 9. Needs to come in in a fasting state and that means no alcohol the night before for recheck on her CBC and differential and comprehensive chemistry panel magnesium and a hemoglobin A1c

## 2017-02-16 DIAGNOSIS — R73.09 ELEVATED GLUCOSE: ICD-10-CM

## 2017-02-16 DIAGNOSIS — F10.20 ALCOHOLISM (H): Primary | ICD-10-CM

## 2017-02-23 ENCOUNTER — OFFICE VISIT (OUTPATIENT)
Dept: FAMILY MEDICINE | Facility: CLINIC | Age: 62
End: 2017-02-23
Payer: COMMERCIAL

## 2017-02-23 ENCOUNTER — TELEPHONE (OUTPATIENT)
Dept: FAMILY MEDICINE | Facility: CLINIC | Age: 62
End: 2017-02-23

## 2017-02-23 VITALS
BODY MASS INDEX: 17.18 KG/M2 | TEMPERATURE: 97.4 F | DIASTOLIC BLOOD PRESSURE: 60 MMHG | SYSTOLIC BLOOD PRESSURE: 102 MMHG | WEIGHT: 90.9 LBS | RESPIRATION RATE: 16 BRPM | HEART RATE: 101 BPM | OXYGEN SATURATION: 97 %

## 2017-02-23 DIAGNOSIS — M10.9 ACUTE GOUTY ARTHRITIS: Primary | ICD-10-CM

## 2017-02-23 DIAGNOSIS — E83.42 HYPOMAGNESEMIA: ICD-10-CM

## 2017-02-23 DIAGNOSIS — J18.9 PNEUMONIA DUE TO INFECTIOUS ORGANISM, UNSPECIFIED LATERALITY, UNSPECIFIED PART OF LUNG: ICD-10-CM

## 2017-02-23 DIAGNOSIS — K21.9 GASTROESOPHAGEAL REFLUX DISEASE, ESOPHAGITIS PRESENCE NOT SPECIFIED: ICD-10-CM

## 2017-02-23 DIAGNOSIS — E87.6 HYPOKALEMIA: ICD-10-CM

## 2017-02-23 DIAGNOSIS — R73.09 ELEVATED GLUCOSE: ICD-10-CM

## 2017-02-23 DIAGNOSIS — D53.9 MACROCYTIC ANEMIA: ICD-10-CM

## 2017-02-23 DIAGNOSIS — R64 CACHEXIA (H): ICD-10-CM

## 2017-02-23 DIAGNOSIS — F33.0 MILD EPISODE OF RECURRENT MAJOR DEPRESSIVE DISORDER (H): ICD-10-CM

## 2017-02-23 DIAGNOSIS — R94.5 LIVER FUNCTION ABNORMALITY: ICD-10-CM

## 2017-02-23 DIAGNOSIS — F10.20 ALCOHOLISM (H): ICD-10-CM

## 2017-02-23 LAB
ALBUMIN SERPL-MCNC: 2.9 G/DL (ref 3.4–5)
ALP SERPL-CCNC: 299 U/L (ref 40–150)
ALT SERPL W P-5'-P-CCNC: 18 U/L (ref 0–50)
ANION GAP SERPL CALCULATED.3IONS-SCNC: 11 MMOL/L (ref 3–14)
AST SERPL W P-5'-P-CCNC: 41 U/L (ref 0–45)
BASOPHILS # BLD AUTO: 0.1 10E9/L (ref 0–0.2)
BASOPHILS NFR BLD AUTO: 0.6 %
BILIRUB SERPL-MCNC: 0.4 MG/DL (ref 0.2–1.3)
BUN SERPL-MCNC: 12 MG/DL (ref 7–30)
CALCIUM SERPL-MCNC: 7.6 MG/DL (ref 8.5–10.1)
CHLORIDE SERPL-SCNC: 111 MMOL/L (ref 94–109)
CO2 SERPL-SCNC: 24 MMOL/L (ref 20–32)
CREAT SERPL-MCNC: 0.8 MG/DL (ref 0.52–1.04)
DIFFERENTIAL METHOD BLD: ABNORMAL
EOSINOPHIL # BLD AUTO: 0.2 10E9/L (ref 0–0.7)
EOSINOPHIL NFR BLD AUTO: 1.8 %
ERYTHROCYTE [DISTWIDTH] IN BLOOD BY AUTOMATED COUNT: 21 % (ref 10–15)
GFR SERPL CREATININE-BSD FRML MDRD: 73 ML/MIN/1.7M2
GLUCOSE SERPL-MCNC: 109 MG/DL (ref 70–99)
HBA1C MFR BLD: 5.2 % (ref 4.3–6)
HCT VFR BLD AUTO: 23.9 % (ref 35–47)
HGB BLD-MCNC: 7.8 G/DL (ref 11.7–15.7)
IMM GRANULOCYTES # BLD: 0.1 10E9/L (ref 0–0.4)
IMM GRANULOCYTES NFR BLD: 0.8 %
LYMPHOCYTES # BLD AUTO: 1.7 10E9/L (ref 0.8–5.3)
LYMPHOCYTES NFR BLD AUTO: 14.9 %
MAGNESIUM SERPL-MCNC: 1.3 MG/DL (ref 1.6–2.3)
MCH RBC QN AUTO: 35.5 PG (ref 26.5–33)
MCHC RBC AUTO-ENTMCNC: 32.6 G/DL (ref 31.5–36.5)
MCV RBC AUTO: 109 FL (ref 78–100)
MONOCYTES # BLD AUTO: 1.1 10E9/L (ref 0–1.3)
MONOCYTES NFR BLD AUTO: 9.6 %
NEUTROPHILS # BLD AUTO: 8 10E9/L (ref 1.6–8.3)
NEUTROPHILS NFR BLD AUTO: 72.3 %
PLATELET # BLD AUTO: 313 10E9/L (ref 150–450)
POTASSIUM SERPL-SCNC: 4 MMOL/L (ref 3.4–5.3)
PROT SERPL-MCNC: 7.2 G/DL (ref 6.8–8.8)
RBC # BLD AUTO: 2.2 10E12/L (ref 3.8–5.2)
SODIUM SERPL-SCNC: 146 MMOL/L (ref 133–144)
WBC # BLD AUTO: 11.1 10E9/L (ref 4–11)

## 2017-02-23 PROCEDURE — 80053 COMPREHEN METABOLIC PANEL: CPT | Performed by: FAMILY MEDICINE

## 2017-02-23 PROCEDURE — 85025 COMPLETE CBC W/AUTO DIFF WBC: CPT | Performed by: FAMILY MEDICINE

## 2017-02-23 PROCEDURE — 99214 OFFICE O/P EST MOD 30 MIN: CPT | Performed by: FAMILY MEDICINE

## 2017-02-23 PROCEDURE — 36415 COLL VENOUS BLD VENIPUNCTURE: CPT | Performed by: FAMILY MEDICINE

## 2017-02-23 PROCEDURE — 83735 ASSAY OF MAGNESIUM: CPT | Performed by: FAMILY MEDICINE

## 2017-02-23 PROCEDURE — 83036 HEMOGLOBIN GLYCOSYLATED A1C: CPT | Performed by: FAMILY MEDICINE

## 2017-02-23 ASSESSMENT — PAIN SCALES - GENERAL: PAINLEVEL: NO PAIN (0)

## 2017-02-23 NOTE — TELEPHONE ENCOUNTER
Reason for Call:  Other call back    Detailed comments: Lili calling from home care, would like to talk to Dr. Perez or his nurse about Alexia's appt today, please call     Phone Number  can be reached at: Other phone number:  [phone]    Best Time: any    Can we leave a detailed message on this number? YES    Call taken on 2/23/2017 at 3:35 PM by Emilee Garcia

## 2017-02-23 NOTE — MR AVS SNAPSHOT
"              After Visit Summary   2/23/2017    Alexia Flor    MRN: [id number]           Patient Information     Date Of Birth          1955        Visit Information        Provider Department      2/23/2017 1:00 PM Anu Perez MD [facility]        Today's Diagnoses     Acute gouty arthritis    -  1    Elevated glucose        Alcoholism (H)        Cachexia (HCC)        Liver function abnormality        Macrocytic anemia        Mild episode of recurrent major depressive disorder (H)        Pneumonia due to infectious organism, unspecified laterality, unspecified part of lung        Hypomagnesemia        Hypokalemia        Gastroesophageal reflux disease, esophagitis presence not specified        Alcoholism /alcohol abuse (H)           Follow-ups after your visit        Follow-up notes from your care team     Return if symptoms worsen or fail to improve.      Who to contact     If you have questions or need follow up information about today's clinic visit or your schedule please contact [facility] directly at [phone].  Normal or non-critical lab and imaging results will be communicated to you by PlumTVhart, letter or phone within 4 business days after the clinic has received the results. If you do not hear from us within 7 days, please contact the clinic through PlumTVhart or phone. If you have a critical or abnormal lab result, we will notify you by phone as soon as possible.  Submit refill requests through [employer] or call your pharmacy and they will forward the refill request to us. Please allow 3 business days for your refill to be completed.          Additional Information About Your Visit        MyChart Information     [employer] lets you send messages to your doctor, view your test results, renew your prescriptions, schedule appointments and more. To sign up, go to [email or web address] . Click on \"Log in\" on the left side of the screen, which will take you to the Welcome " "page. Then click on \"Sign up Now\" on the right side of the page.     You will be asked to enter the access code listed below, as well as some personal information. Please follow the directions to create your username and password.     Your access code is: [email or web address]  Expires: 2017  7:49 PM     Your access code will  in 90 days. If you need help or a new code, please call your [place] clinic or [phone].        Care EveryWhere ID     This is your Care EveryWhere ID. This could be used by other organizations to access your [place] medical records  [id number]        Your Vitals Were     Pulse Temperature Respirations Pulse Oximetry Breastfeeding? BMI (Body Mass Index)    101 97.4  F (36.3  C) (Temporal) 16 97% No 17.18 kg/m2       Blood Pressure from Last 3 Encounters:   17 102/60   17 (!) 84/50   16 122/76    Weight from Last 3 Encounters:   17 90 lb 14.4 oz (41.2 kg)   17 88 lb 14.4 oz (40.3 kg)   17 90 lb (40.8 kg)              We Performed the Following     CBC with platelets and differential     Comprehensive metabolic panel     Hemoglobin A1c     Magnesium          Today's Medication Changes          These changes are accurate as of: 17 11:59 PM.  If you have any questions, ask your nurse or doctor.               Start taking these medicines.        Dose/Directions    potassium chloride SA 20 MEQ CR tablet   Commonly known as:  K-DUR/KLOR-CON M   Used for:  Hypokalemia   Started by:  Anu Perez MD        Dose:  20 mEq   Take 1 tablet (20 mEq) by mouth daily   Quantity:  30 tablet   Refills:  0         These medicines have changed or have updated prescriptions.        Dose/Directions    magnesium oxide 400 MG tablet   Commonly known as:  MAG-OX   This may have changed:  when to take this   Used for:  Hypomagnesemia   Changed by:  Anu Perez MD        Dose:  400 mg   Take 1 tablet (400 mg) by mouth 3 times daily   Quantity:  90 tablet   Refills:  3 "         Stop taking these medicines if you haven't already. Please contact your care team if you have questions.     docusate sodium 100 MG tablet   Commonly known as:  COLACE   Stopped by:  Anu Perez MD           ferrous gluconate 324 (38 FE) MG tablet   Commonly known as:  FERGON   Stopped by:  Anu Perez MD           HYDROcodone-acetaminophen 5-325 MG per tablet   Commonly known as:  NORCO   Stopped by:  Anu Perez MD           hydrOXYzine 25 MG capsule   Commonly known as:  VISTARIL   Stopped by:  Anu Perez MD           indomethacin 25 MG capsule   Commonly known as:  INDOCIN   Stopped by:  Anu Perez MD           mirtazapine 15 MG tablet   Commonly known as:  REMERON   Stopped by:  Anu Perez MD                Where to get your medicines      These medications were sent to [facility] - [place], [place] - [address] AT [facility] OF [facility] [address]  [address], [facility] [address]     Phone:  [phone]     famotidine 40 MG tablet    folic acid 400 MCG tablet    magnesium oxide 400 MG tablet    potassium chloride SA 20 MEQ CR tablet                Primary Care Provider    None Specified       No primary provider on file.        Thank you!     Thank you for choosing [facility]  for your care. Our goal is always to provide you with excellent care. Hearing back from our patients is one way we can continue to improve our services. Please take a few minutes to complete the written survey that you may receive in the mail after your visit with us. Thank you!             Your Updated Medication List - Protect others around you: Learn how to safely use, store and throw away your medicines at www.disposemymeds.org.          This list is accurate as of: 2/23/17 11:59 PM.  Always use your most recent med list.                   Brand Name Dispense Instructions for use    albuterol 108 (90 BASE) MCG/ACT Inhaler    PROAIR HFA/PROVENTIL HFA/VENTOLIN HFA    3 Inhaler     Inhale 2 puffs into the lungs every 4 hours as needed for shortness of breath / dyspnea       B-12 1000 MCG Tbcr     30 tablet    Take 1 tablet by mouth daily       famotidine 40 MG tablet    PEPCID    30 tablet    Take 1 tablet (40 mg) by mouth At Bedtime       folic acid 400 MCG tablet    FOLVITE    75 tablet    Take 2.5 tablets (1 mg) by mouth daily       furosemide 20 MG tablet    LASIX    30 tablet    Take 1 tablet (20 mg) by mouth every morning       magnesium oxide 400 MG tablet    MAG-OX    90 tablet    Take 1 tablet (400 mg) by mouth 3 times daily       omeprazole 40 MG capsule    priLOSEC    30 capsule    TAKE 1 CAPSULE(40 MG) BY MOUTH DAILY       potassium chloride SA 20 MEQ CR tablet    K-DUR/KLOR-CON M    30 tablet    Take 1 tablet (20 mEq) by mouth daily       thiamine 100 MG tablet     30 tablet    Take 1 tablet (100 mg) by mouth daily

## 2017-02-23 NOTE — PROGRESS NOTES
SUBJECTIVE:                                                    Alexia Flor is a 61 year old female who presents to clinic today for the following health issues:    Recheck - labs    Gout/ single inflamed joint      Onset: 2/9/17    Description:   Location: big toe - left  Joint Swelling: no   Redness: no   Pain: no     Intensity: improved    Progression of Symptoms:  improving    Accompanying Signs & Symptoms:  Fevers: no    History:   Trauma to the area: no   Previous history of gout: no    Recent illness:  no     Precipitating factors:   Diet-rich in purine: no  Alcohol use: YES  Diuretic use: YES    Alleviating factors:       Therapies Tried and outcome: narcotics, patient states it has improved      Alexia is here today with her boyfriend for follow-up for gouty arthritis on her foot with couple of other concerns. She saw Dr. Meeks couple weeks ago for the foot pain and was treated for gout arthritis. Overall she's feeling better. The acid uric acid level was high.  No history of gout. She has a long history of alcoholism and continue to drink hard liquor on a daily basis. She also is known to have alcohol-induced gastritis.  She was given Indocin to take as needed. Hasn't been taking the Indocin.    She was hospitalized recently in [place] for pneumonia. Stated that she was in the hospitalized for about 10 days. Multiple medications changed at that time and she is not sure what they are. Has been having home health nurse who has been setting up medication for her weekly. Stated that she takes about 30 pills but not real sure what they are. She is not very happy with the amount of pills that she is taking. Cough is resolving. No chest pain or shortness of breath.    She is known to have hypo-magnesium. Her last magnesium level couple weeks ago was 1.0. She been told to take the magnesium supplement but hasn't been compliant with that. She is not really sure if she is taking it since she has the home  skilled nurse. She is not sure if she got the magnesium replaced when she was in the hospital. No heart palpitation.     She also was found to have low potassium which has been replaced. The last potassium check was normal.    She has a long history of alcohol and hasbeen drinking hard liquor daily. Failed multiple rehabilitation. Not interested rehabilitation at this point. Stated to continue drinking to avoid withdrawal. Denies melena or hematochezia. She has macrocytic anemia. Was recently put on iron sulfate and she thinks caused her constipation.    She was also put on Remeron for depression and increased appetite. She is cachectic. She refused to take the Remeron. Status has good appetite. She does not feel she has depression. She had no problem with sleeping. No other concern today.    Problem list and histories reviewed & adjusted, as indicated.  Additional history: as documented    Patient Active Problem List   Diagnosis     Liver function abnormality     CARDIOVASCULAR SCREENING; LDL GOAL LESS THAN 130     Cachexia (HCC)     Anxiety     Alcoholic fatty liver     Alcoholism (H)     Arteriosclerotic vascular disease     Cholelithiasis     Diastolic dysfunction     Electrolyte imbalance     Macrocytic anemia     Major depressive disorder, recurrent episode (H)     Tubular adenoma     Cobalamin deficiency     Iron deficiency anemia due to chronic blood loss     Personal history of malignant neoplasm of breast     Health Care Home     Past Surgical History   Procedure Laterality Date     Lumpectomy rt breast  2006         Social History   Substance Use Topics     Smoking status: Current Every Day Smoker     Packs/day: 0.50     Years: 44.00     Types: Cigarettes     Smokeless tobacco: Never Used     Alcohol use 0.5 oz/week     1 Glasses of wine per week      Comment: weekends (reported; smelled of alcohol during clinic visit 4/2016)     Family History   Problem Relation Age of Onset     Coronary Artery Disease  "Father      Emphysema Father      CANCER Mother      renal cancer     Breast Cancer Maternal Grandmother          Current Outpatient Prescriptions   Medication Sig Dispense Refill     indomethacin (INDOCIN) 25 MG capsule Take 1 capsule (25 mg) by mouth 2 times daily (with meals) 42 capsule 1     omeprazole (PRILOSEC) 40 MG capsule TAKE 1 CAPSULE(40 MG) BY MOUTH DAILY 30 capsule 11     hydrOXYzine (VISTARIL) 25 MG capsule Take 1 capsule (25 mg) by mouth 3 times daily as needed for anxiety 30 capsule 0     magnesium oxide (MAG-OX) 400 MG tablet Take 1 tablet (400 mg) by mouth 2 times daily 90 tablet 3     famotidine (PEPCID) 40 MG tablet Take 1 tablet (40 mg) by mouth At Bedtime 30 tablet 1     folic acid (FOLVITE) 400 MCG tablet Take 2.5 tablets (1 mg) by mouth daily 30 tablet 3     Cyanocobalamin (B-12) 1000 MCG TBCR Take 1 tablet by mouth daily 30 tablet 3     thiamine 100 MG tablet Take 1 tablet (100 mg) by mouth daily 30 tablet 3     albuterol (PROAIR HFA, PROVENTIL HFA, VENTOLIN HFA) 108 (90 BASE) MCG/ACT inhaler Inhale 2 puffs into the lungs every 4 hours as needed for shortness of breath / dyspnea 3 Inhaler 4     docusate sodium (COLACE) 100 MG tablet Take 100 mg by mouth daily 60 tablet 1     furosemide (LASIX) 20 MG tablet Take 1 tablet (20 mg) by mouth every morning 30 tablet 1     HYDROcodone-acetaminophen (NORCO) 5-325 MG per tablet Take 1 tablet by mouth every 4 hours as needed for pain 20 tablet 0     [DISCONTINUED] omeprazole (PRILOSEC) 40 MG capsule Take 1 capsule (40 mg) by mouth daily 30 capsule 1     Allergies   Allergen Reactions     Codeine Anaphylaxis     Contrast Dye Itching and Swelling     7/10/2009 Patient reports history of contrast reaction when first diagnosed with breast cancer. \"I almost .\"   (Hives, swelling.) 2009 CT to be done without IV contrast per Dr. KAVIN Live     Moxifloxacin Anaphylaxis     Avelox     Nickel Itching and Rash     Codeine Sulfate Other (See Comments)     " shaking       ROS:  Constitutional, HEENT, cardiovascular, pulmonary, gi and gu systems are negative, except as otherwise noted.    OBJECTIVE:                                                    /60 (BP Location: Left arm, Patient Position: Chair, Cuff Size: Child)  Pulse 101  Temp 97.4  F (36.3  C) (Temporal)  Resp 16  Wt 90 lb 14.4 oz (41.2 kg)  SpO2 97%  Breastfeeding? No  BMI 17.18 kg/m2  Body mass index is 17.18 kg/(m^2).   GENERAL: healthy, alert and no distress. Speaking full sentences..    HENT: ear canals and TM's normal.  Nares are non-congested. Oropharynx is pink and moist. No tender with palpation to the sinuses.   NECK: no adenopathy, supple and thyroid normal to palpation  RESP: lungs clear to auscultation - no rales, rhonchi or wheezes.  Decreased breath sounds throughout.  CV: regular rate and rhythm, no murmur, no peripheral edema and peripheral pulses strong  ABDOMEN: soft, nontender, no palpable masses and bowel sounds normal  MS: no gross musculoskeletal defects noted, no edema.  No focal weakness   NEURO: Normal strength and tone, mentation intact and speech normal.  No asterixis.  No focal neurological deficit   PSYCH: mentation appears normal, affect normal/bright    Diagnostic Test Results:  Results for orders placed or performed in visit on 02/23/17   Hemoglobin A1c   Result Value Ref Range    Hemoglobin A1C 5.2 4.3 - 6.0 %   CBC with platelets and differential   Result Value Ref Range    WBC 11.1 (H) 4.0 - 11.0 10e9/L    RBC Count 2.20 (L) 3.8 - 5.2 10e12/L    Hemoglobin 7.8 (L) 11.7 - 15.7 g/dL    Hematocrit 23.9 (L) 35.0 - 47.0 %     (H) 78 - 100 fl    MCH 35.5 (H) 26.5 - 33.0 pg    MCHC 32.6 31.5 - 36.5 g/dL    RDW 21.0 (H) 10.0 - 15.0 %    Platelet Count 313 150 - 450 10e9/L    Diff Method Automated Method     % Neutrophils 72.3 %    % Lymphocytes 14.9 %    % Monocytes 9.6 %    % Eosinophils 1.8 %    % Basophils 0.6 %    % Immature Granulocytes 0.8 %    Absolute Neutrophil  8.0 1.6 - 8.3 10e9/L    Absolute Lymphocytes 1.7 0.8 - 5.3 10e9/L    Absolute Monocytes 1.1 0.0 - 1.3 10e9/L    Absolute Eosinophils 0.2 0.0 - 0.7 10e9/L    Absolute Basophils 0.1 0.0 - 0.2 10e9/L    Abs Immature Granulocytes 0.1 0 - 0.4 10e9/L   Comprehensive metabolic panel   Result Value Ref Range    Sodium 146 (H) 133 - 144 mmol/L    Potassium 4.0 3.4 - 5.3 mmol/L    Chloride 111 (H) 94 - 109 mmol/L    Carbon Dioxide 24 20 - 32 mmol/L    Anion Gap 11 3 - 14 mmol/L    Glucose 109 (H) 70 - 99 mg/dL    Urea Nitrogen 12 7 - 30 mg/dL    Creatinine 0.80 0.52 - 1.04 mg/dL    GFR Estimate 73 >60 mL/min/1.7m2    GFR Estimate If Black 88 >60 mL/min/1.7m2    Calcium 7.6 (L) 8.5 - 10.1 mg/dL    Bilirubin Total 0.4 0.2 - 1.3 mg/dL    Albumin 2.9 (L) 3.4 - 5.0 g/dL    Protein Total 7.2 6.8 - 8.8 g/dL    Alkaline Phosphatase 299 (H) 40 - 150 U/L    ALT 18 0 - 50 U/L    AST 41 0 - 45 U/L   Magnesium   Result Value Ref Range    Magnesium 1.3 (L) 1.6 - 2.3 mg/dL        ASSESSMENT/PLAN:                                                    1. Acute gouty arthritis  First episode and symptom is resolving. Uric acid level is high. Hesitate to start propranolol due to chronic alcohol use. Encouraged to stop drinking alcohol. Stop the Indocin due to gastritis.  History was educated about symptoms limited to be seen to call in.    2. Elevated glucose  Last lab work showed her blood sugar was high. No history of diabetes. Hemoglobin A1c is normal today. We'll continue to monitor.  - Hemoglobin A1c  - Comprehensive metabolic panel    3. Alcoholism (H)  She has a long history of heavy alcohol consumption for years. Failed multiple rehabs. Encouraged to minimize the use of alcohol. Not interested rehabilitation at this point. Lab as ordered.  - CBC with platelets and differential  - Comprehensive metabolic panel  - Magnesium    4. Cachexia (HCC)  She is quiteunderweight. She however has no concern of it. Discussed about the benefit of Remeron  which could be used as a appetite stimulator. She fee;s she has good appetite. Refused Remeron and therefore will stop it. Encouraged healthy diet. Also recommend Ensure supplement as needed.    5. Liver function abnormality  CMP showed both ALT and AST are normal. Will monitor.  Again, encouraged to stop drinking alcohol.    6. Macrocytic anemia  Hemoglobin has been stable. This is a chronic condition. She has pernicious anemia from alcohol abuse.  Encouraged to continue with folic acid, thiamine and B12.  Will stop the iron sulfate due to constipation side effect.    7. Mild episode of recurrent major depressive disorder (H)  She denied of being depressed. Did not want counseling. Did not want to be on any medication. Stop the Remeron.    8. Pneumonia due to infectious organism, unspecified laterality, unspecified part of lung  Resolving.    9. Hypomagnesemia  She has had this for a while. The magnesium level increased from 1.0 at the last check couple weeks ago to 1.3 today. Most likely she has been taking magnesium sulfate. Will try to get the most updated medication list from her home health skilled nurse and will consider to adjust her magnesium dose appropriately.  Emphasized importance of getting her magnesium level corrected    10. Hypokalemia  Replaced and normal. Will monitor.      Anu Evans Mai, MD  [facility]

## 2017-02-24 ENCOUNTER — MEDICAL CORRESPONDENCE (OUTPATIENT)
Dept: HEALTH INFORMATION MANAGEMENT | Facility: CLINIC | Age: 62
End: 2017-02-24

## 2017-02-24 ENCOUNTER — TELEPHONE (OUTPATIENT)
Dept: FAMILY MEDICINE | Facility: CLINIC | Age: 62
End: 2017-02-24

## 2017-02-24 NOTE — TELEPHONE ENCOUNTER
Patient calling would like a call back with results at #[phone] Patient is worried and would like a call back asap

## 2017-02-24 NOTE — TELEPHONE ENCOUNTER
Reason for Call:  Request for results:    Name of test or procedure: labs    Date of test of procedure: 2/23/17    Location of the test or procedure: [place]    OK to leave the result message on voice mail or with a family member? YES    Phone number Patient can be reached at:  Home number on file [phone] (home)    Additional comments: Patient really wants to be called today with her results    Call taken on 2/24/2017 at 1:43 PM by Sandy Soto

## 2017-02-24 NOTE — TELEPHONE ENCOUNTER
Home nurse states that she will fax another med list again. Lili (Home Nurse) states that she was there today already and set up her medication and Alexia would not let her set up the iron, stool softener, mirtazapine and indomethacin.

## 2017-02-25 ENCOUNTER — TELEPHONE (OUTPATIENT)
Dept: FAMILY MEDICINE | Facility: CLINIC | Age: 62
End: 2017-02-25

## 2017-02-25 DIAGNOSIS — R64 CACHEXIA (H): Primary | ICD-10-CM

## 2017-02-25 DIAGNOSIS — E83.42 HYPOMAGNESEMIA: ICD-10-CM

## 2017-02-25 DIAGNOSIS — E87.8 ELECTROLYTE IMBALANCE: ICD-10-CM

## 2017-02-25 DIAGNOSIS — D50.0 IRON DEFICIENCY ANEMIA DUE TO CHRONIC BLOOD LOSS: ICD-10-CM

## 2017-02-25 RX ORDER — POTASSIUM CHLORIDE 1500 MG/1
20 TABLET, EXTENDED RELEASE ORAL DAILY
Qty: 30 TABLET | Refills: 0 | Status: SHIPPED | OUTPATIENT
Start: 2017-02-25 | End: 2017-01-01

## 2017-02-25 RX ORDER — LANOLIN ALCOHOL/MO/W.PET/CERES
1 CREAM (GRAM) TOPICAL DAILY
Qty: 75 TABLET | Refills: 3 | Status: ON HOLD | OUTPATIENT
Start: 2017-02-25 | End: 2017-01-01

## 2017-02-25 RX ORDER — FAMOTIDINE 40 MG/1
40 TABLET, FILM COATED ORAL AT BEDTIME
Qty: 30 TABLET | Refills: 1 | Status: SHIPPED | OUTPATIENT
Start: 2017-02-25 | End: 2017-03-29

## 2017-02-25 RX ORDER — MAGNESIUM OXIDE 400 MG/1
400 TABLET ORAL 3 TIMES DAILY
Qty: 90 TABLET | Refills: 3 | Status: ON HOLD
Start: 2017-02-25 | End: 2017-01-01

## 2017-02-25 NOTE — TELEPHONE ENCOUNTER
Visit the patient and her home nurse know I would like to start the following medications: Colace, ferrous gluconate, Protonix, prednisone. Also stop the potassium chloride and a new prescription was sent to once a day. The medication list is updated and is that the medication that she needs to be on. He may send a copy of the medication list to the patient and to her nurse. Recommend to check the potassium and magnesium in a month. Again, she should be on the medications that on the current med list only. Thank you

## 2017-02-27 ENCOUNTER — CARE COORDINATION (OUTPATIENT)
Dept: FAMILY MEDICINE | Facility: CLINIC | Age: 62
End: 2017-02-27

## 2017-02-27 NOTE — PROGRESS NOTES
Clinic Care Coordination Contact 2/27/17  Care Team Conversations-Social Work    Phone call made to the pt to discuss how her transportation issues were going on since our last conversation. Pt states she had an apt on 2/23/17 and used the same transport company as her previous (Family...) and was very happy with them. We called her insurance company (who sets her transport up) at her last apt and asked them to continue using this company. Discussion about how the pt has been feeling otherwise. She states she has been feeling well, but is upset that no one from [place] has called her to discuss her labs she had drawn on 2/23/17. Chart review indicates the pt does not have a PCP as she sees multiple providers. This appears to be the reason why she may not have received a call about her labs as it was a bit confusing to see who was driving the care for her. Pt agrees to establish care and wants to have a provider that works in clinic often.     Conversation with Dr. Smith who states he will contact the pt tomorrow to discuss her lab results. The pt may ask him to be her PCP at that time.     [facility] will plan on following up with the pt in 1 week to be sure she established care and to assist with other needs.    Layla Falk, SHANIKA  Care Coordinator Social Work    [facility] and [place]  [phone]  2/27/2017 3:54 PM

## 2017-02-27 NOTE — TELEPHONE ENCOUNTER
Patient called again regarding the message below. Patient is very anxious, and worried. Pt states she needs to know today.    Thank you,   Lucia Cassidy   for [facility]

## 2017-02-27 NOTE — TELEPHONE ENCOUNTER
Patient called back. She wants the results of her lab work asap. Please call her back at [phone].  Thank you,  Erma Johnson   for [facility]

## 2017-02-27 NOTE — TELEPHONE ENCOUNTER
She want to provider to call and address these things to her as well as her lab results from the 23rd that has not been resulted yet. Please advise. I will send the other telephone encounter as well.    Chasity Quinn MA 2/27/2017

## 2017-02-27 NOTE — TELEPHONE ENCOUNTER
Spoke to patient she is very upset with the clinic that they are changing her meds without asking her. We had issues on the phone envolving her swaering and getting upset.

## 2017-03-01 NOTE — TELEPHONE ENCOUNTER
This will be routed to RN line and if their discussion with her is insufficient for patient, she should see an MD to discuss her issues. Review of her chart indicates she has very serious health issues. Before any discussion takes place, her current medication list needs review. Our record indicates omeprazole and Dr. Perez's indicates Protonix. It should be one or the other. It also lifts famotidine or Pepcid. This would be okay. I suspect Colace was started because of some constipation. Ferrous gluconate was started because she has very low hemoglobin/iron probably from some ongoing blood loss from gastritis or irritation of her stomach and poor dietary intake of iron. His ferrous gluconate form red blood cells. Protonix is ordered to reduce to gastritis and irritation of her stomach lining. Low hemoglobin was noted on the laboratory February 23. Her potassium level was adequate and therefore it appears he wants once daily potassium supplementation.Magnesium oxide, folic acid, cyanocobalamin, thiamine, albuterol, and furosemide remained appropriate and should be on her list.    Laboratory on February 23 showed continued problems with hemoglobin and slightly worse. White count had improved. These are indication of her underlying gastritis. Electrolytes are better and her fasting blood sugar was 109. Therefore reduction in potassium order was given. Kidney function remains adequate. Liver functions indicate poor nutrition but otherwise are improved.    Patient should be scheduled for follow-up when  returns or someone else sooner if necessary. Rodrigo Taylor M.D.

## 2017-03-01 NOTE — TELEPHONE ENCOUNTER
RN tried to reach patient, but NA. Left message to please call the clinic back. [phone].   I did review her labs with Dr. Smith, in DR. Perez's absence from office, who has been dealing with her as well, and he would like her to come in the end of this week or next week to recheck her CBC, and BMP, and Magnesium levels.  We need to see how her Hemoglobin is and her potassium, sodium, magnesium are,  etc.   Future labs ordered........................................ZUNILDA Addison

## 2017-03-01 NOTE — TELEPHONE ENCOUNTER
Second attempt to contact pt today, but NA. Unable to leave message on unidentified answering machine. Will try again tomorrow...........................................ZUNILDA Addison

## 2017-03-02 ENCOUNTER — TRANSFERRED RECORDS (OUTPATIENT)
Dept: HEALTH INFORMATION MANAGEMENT | Facility: CLINIC | Age: 62
End: 2017-03-02

## 2017-03-02 LAB
CREAT SERPL-MCNC: 1 MG/DL (ref 0.57–1.11)
GFR SERPL CREATININE-BSD FRML MDRD: 56 ML/MIN/1.73M2
GLUCOSE SERPL-MCNC: 89 MG/DL (ref 70–100)
POTASSIUM SERPL-SCNC: 3.9 MMOL/L (ref 3.5–5.1)

## 2017-03-02 NOTE — TELEPHONE ENCOUNTER
"Patient notified by RN that she needs to come in the end of this week or next week sometime to have her labs rechecked for her Hemoglobin,( CBC) , Electrolytes ( BMP) and Magnesium.  Future orders have been placed. She has been scheduled for lab draw on 3/8/17 at noon with appt to follow with Dr. Smith at 1 PM.  \" I can't come in tomorrow as I have some things I have to do. How about next week on Wednesday?  I usually need to set up a ride as I don't drive. \"   This is all set for 3/8/17 to get it all done in one day for her.........................................ZUNILDA Addison    "

## 2017-03-08 ENCOUNTER — OFFICE VISIT (OUTPATIENT)
Dept: FAMILY MEDICINE | Facility: CLINIC | Age: 62
End: 2017-03-08
Payer: COMMERCIAL

## 2017-03-08 ENCOUNTER — TELEPHONE (OUTPATIENT)
Dept: FAMILY MEDICINE | Facility: CLINIC | Age: 62
End: 2017-03-08

## 2017-03-08 VITALS
DIASTOLIC BLOOD PRESSURE: 70 MMHG | SYSTOLIC BLOOD PRESSURE: 120 MMHG | OXYGEN SATURATION: 99 % | HEART RATE: 72 BPM | TEMPERATURE: 97.3 F | WEIGHT: 87 LBS | RESPIRATION RATE: 20 BRPM | BODY MASS INDEX: 16.44 KG/M2

## 2017-03-08 DIAGNOSIS — E87.8 ELECTROLYTE IMBALANCE: ICD-10-CM

## 2017-03-08 DIAGNOSIS — E83.42 HYPOMAGNESEMIA: ICD-10-CM

## 2017-03-08 DIAGNOSIS — R64 CACHEXIA (H): Primary | ICD-10-CM

## 2017-03-08 DIAGNOSIS — D50.0 IRON DEFICIENCY ANEMIA DUE TO CHRONIC BLOOD LOSS: ICD-10-CM

## 2017-03-08 DIAGNOSIS — F10.20 ALCOHOLISM (H): ICD-10-CM

## 2017-03-08 DIAGNOSIS — R64 CACHEXIA (H): ICD-10-CM

## 2017-03-08 LAB
ANION GAP SERPL CALCULATED.3IONS-SCNC: 11 MMOL/L (ref 3–14)
BUN SERPL-MCNC: 19 MG/DL (ref 7–30)
CALCIUM SERPL-MCNC: 8.6 MG/DL (ref 8.5–10.1)
CHLORIDE SERPL-SCNC: 103 MMOL/L (ref 94–109)
CO2 SERPL-SCNC: 27 MMOL/L (ref 20–32)
CREAT SERPL-MCNC: 0.97 MG/DL (ref 0.52–1.04)
ERYTHROCYTE [DISTWIDTH] IN BLOOD BY AUTOMATED COUNT: 21.6 % (ref 10–15)
GFR SERPL CREATININE-BSD FRML MDRD: 58 ML/MIN/1.7M2
GLUCOSE SERPL-MCNC: 155 MG/DL (ref 70–99)
HCT VFR BLD AUTO: 25.9 % (ref 35–47)
HGB BLD-MCNC: 8.5 G/DL (ref 11.7–15.7)
MAGNESIUM SERPL-MCNC: 1.6 MG/DL (ref 1.6–2.3)
MCH RBC QN AUTO: 36.8 PG (ref 26.5–33)
MCHC RBC AUTO-ENTMCNC: 32.8 G/DL (ref 31.5–36.5)
MCV RBC AUTO: 112 FL (ref 78–100)
PLATELET # BLD AUTO: 342 10E9/L (ref 150–450)
POTASSIUM SERPL-SCNC: 4 MMOL/L (ref 3.4–5.3)
RBC # BLD AUTO: 2.31 10E12/L (ref 3.8–5.2)
SODIUM SERPL-SCNC: 141 MMOL/L (ref 133–144)
WBC # BLD AUTO: 8.7 10E9/L (ref 4–11)

## 2017-03-08 PROCEDURE — 85027 COMPLETE CBC AUTOMATED: CPT | Performed by: FAMILY MEDICINE

## 2017-03-08 PROCEDURE — 36415 COLL VENOUS BLD VENIPUNCTURE: CPT | Performed by: FAMILY MEDICINE

## 2017-03-08 PROCEDURE — 80048 BASIC METABOLIC PNL TOTAL CA: CPT | Performed by: FAMILY MEDICINE

## 2017-03-08 PROCEDURE — 83735 ASSAY OF MAGNESIUM: CPT | Performed by: FAMILY MEDICINE

## 2017-03-08 PROCEDURE — 99214 OFFICE O/P EST MOD 30 MIN: CPT | Performed by: FAMILY MEDICINE

## 2017-03-08 NOTE — TELEPHONE ENCOUNTER
Per Dr. Smith patient needs to see an eye doctor to have this prescribed. Dr. Smith does not prescribe restasis. KB/CMA

## 2017-03-08 NOTE — NURSING NOTE
"Chief Complaint   Patient presents with     RECHECK     labs     Palpitations     Patient reports that her heart stops all the time and then starts again. [facility] told her it is nothing to worry about.      Mass     Patient has a lump on her left arm and she is concerned it is cancer. It is not painful.        Initial /70 (Cuff Size: Adult Regular)  Pulse 72  Temp 97.3  F (36.3  C) (Temporal)  Resp 20  Wt 87 lb (39.5 kg)  SpO2 99%  BMI 16.44 kg/m2 Estimated body mass index is 16.44 kg/(m^2) as calculated from the following:    Height as of 2/1/17: 5' 1\" (1.549 m).    Weight as of this encounter: 87 lb (39.5 kg).  Medication Reconciliation: complete    "

## 2017-03-08 NOTE — PROGRESS NOTES
SUBJECTIVE:                                                    Alexia Flor is a 61 year old female who presents to clinic today for the following health issues:      Chief Complaint   Patient presents with     RECHECK     labs     Palpitations     Patient reports that her heart stops all the time and then starts again. [facility] told her it is nothing to worry about.      Mass     Patient has a lump on her left arm and she is concerned it is cancer. It is not painful.              Problem list and histories reviewed & adjusted, as indicated.  Additional history: as documented        Reviewed and updated as needed this visit by clinical staff  Tobacco  Allergies  Meds       Reviewed and updated as needed this visit by Provider        SUBJECTIVE:  Alexia  is a 61 year old female who presents for:  Follow-up of her multiple issues much  related to alcoholism. She was recently in the [place] emergency room again with some palpitations recently her labs which showed low magnesium and low potassium and low hemoglobin. She has been losing a lot of weight. She states she has gained some now and she is eating better. She is taking supplemental magnesium and iron. She does take Lasix but takes a potassium along with it when she does take it. She states she is feeling much better.    Past Medical History   Diagnosis Date     Alcohol abuse      Alcoholic hepatitis      Alcoholic pancreatitis      Anxiety      Schafer's esophagus      Breast cancer (H)      rt, s/p radiation.     Congestive heart failure, unspecified      COPD (chronic obstructive pulmonary disease) (H)      Hypokalemia      Macrocytic anemia      Non-adherence to medical treatment      Tobacco abuse      Past Surgical History   Procedure Laterality Date     Lumpectomy rt breast  2006       Social History   Substance Use Topics     Smoking status: Current Every Day Smoker     Packs/day: 0.50     Years: 44.00     Types: Cigarettes     Smokeless  tobacco: Never Used     Alcohol use 0.5 oz/week     1 Glasses of wine per week      Comment: weekends (reported; smelled of alcohol during clinic visit 4/2016)     Current Outpatient Prescriptions   Medication Sig Dispense Refill     furosemide (LASIX) 20 MG tablet TAKE 1 TABLET(20 MG) BY MOUTH EVERY MORNING 30 tablet 0     magnesium oxide (MAG-OX) 400 MG tablet Take 1 tablet (400 mg) by mouth 3 times daily 90 tablet 3     famotidine (PEPCID) 40 MG tablet Take 1 tablet (40 mg) by mouth At Bedtime 30 tablet 1     folic acid (FOLVITE) 400 MCG tablet Take 2.5 tablets (1 mg) by mouth daily 75 tablet 3     potassium chloride SA (K-DUR/KLOR-CON M) 20 MEQ CR tablet Take 1 tablet (20 mEq) by mouth daily 30 tablet 0     omeprazole (PRILOSEC) 40 MG capsule TAKE 1 CAPSULE(40 MG) BY MOUTH DAILY 30 capsule 11     Cyanocobalamin (B-12) 1000 MCG TBCR Take 1 tablet by mouth daily 30 tablet 3     thiamine 100 MG tablet Take 1 tablet (100 mg) by mouth daily 30 tablet 3     albuterol (PROAIR HFA, PROVENTIL HFA, VENTOLIN HFA) 108 (90 BASE) MCG/ACT inhaler Inhale 2 puffs into the lungs every 4 hours as needed for shortness of breath / dyspnea 3 Inhaler 4       REVIEW OF SYSTEMS:   5 point ROS negative except as noted above in HPI, including Gen., Resp, CV, GI &  system review.     OBJECTIVE:  Vitals: /70 (Cuff Size: Adult Regular)  Pulse 72  Temp 97.3  F (36.3  C) (Temporal)  Resp 20  Wt 87 lb (39.5 kg)  SpO2 99%  BMI 16.44 kg/m2  BMI= Body mass index is 16.44 kg/(m^2).  She is alert oriented and not intoxicated. Eyes PERRLA show no jaundice. Mucous membranes are moist. Lungs are clear. Heart with a regular rhythm no murmur. Electrocardiogram was reviewed from last week and was normal. Extremities show a firm lesion about 1 cm x 5 mm subcutaneous the left volar surface of her forearm. Not tender feels attached to muscle tissue her hemoglobin is up to 8.5. Magnesium is 1.6. Potassium was normal. The sugar was elevated at 155 but  she is not fasting.    ASSESSMENT:  #1 cachexia stable #2 iron deficiency anemia #3 alcoholism #4 hypomagnesemia    PLAN:  Continue to upgrade her diet. Continue on her iron supplement although some of this is due to chronic disease and a macrocytic anemia. Continue iron continue on magnesium supplementation. We'll keep an eye on this lesion in her arm. Follow up in 2 weeks we'll check comprehensive chemistry panel again in magnesium and CBC. Tobacco Cessation Action Plan: Self help information given to patient      Karan Smith MD  [facility]

## 2017-03-08 NOTE — MR AVS SNAPSHOT
"              After Visit Summary   3/8/2017    Alexia Flor    MRN: [id number]           Patient Information     Date Of Birth          1955        Visit Information        Provider Department      3/8/2017 1:00 PM Karan Smith MD [facility]        Today's Diagnoses     Cachexia (HCC)    -  1    Iron deficiency anemia due to chronic blood loss        Alcoholism (H)        Hypomagnesemia           Follow-ups after your visit        Who to contact     If you have questions or need follow up information about today's clinic visit or your schedule please contact [facility] directly at [phone].  Normal or non-critical lab and imaging results will be communicated to you by Your Policy Managerhart, letter or phone within 4 business days after the clinic has received the results. If you do not hear from us within 7 days, please contact the clinic through Your Policy Managerhart or phone. If you have a critical or abnormal lab result, we will notify you by phone as soon as possible.  Submit refill requests through Gnip or call your pharmacy and they will forward the refill request to us. Please allow 3 business days for your refill to be completed.          Additional Information About Your Visit        MyChart Information     Gnip lets you send messages to your doctor, view your test results, renew your prescriptions, schedule appointments and more. To sign up, go to [email or web address] . Click on \"Log in\" on the left side of the screen, which will take you to the Welcome page. Then click on \"Sign up Now\" on the right side of the page.     You will be asked to enter the access code listed below, as well as some personal information. Please follow the directions to create your username and password.     Your access code is: [email or web address]  Expires: 2017  7:49 PM     Your access code will  in 90 days. If you need help or a new code, please call your [facility] or [phone].      "   Care EveryWhere ID     This is your Care EveryWhere ID. This could be used by other organizations to access your [place] medical records  [id number]        Your Vitals Were     Pulse Temperature Respirations Pulse Oximetry BMI (Body Mass Index)       72 97.3  F (36.3  C) (Temporal) 20 99% 16.44 kg/m2        Blood Pressure from Last 3 Encounters:   03/08/17 120/70   02/23/17 102/60   02/01/17 (!) 84/50    Weight from Last 3 Encounters:   03/08/17 87 lb (39.5 kg)   02/23/17 90 lb 14.4 oz (41.2 kg)   02/09/17 88 lb 14.4 oz (40.3 kg)              Today, you had the following     No orders found for display       Primary Care Provider    None Specified       No primary provider on file.        Thank you!     Thank you for choosing [facility]  for your care. Our goal is always to provide you with excellent care. Hearing back from our patients is one way we can continue to improve our services. Please take a few minutes to complete the written survey that you may receive in the mail after your visit with us. Thank you!             Your Updated Medication List - Protect others around you: Learn how to safely use, store and throw away your medicines at www.disposemymeds.org.          This list is accurate as of: 3/8/17  1:55 PM.  Always use your most recent med list.                   Brand Name Dispense Instructions for use    albuterol 108 (90 BASE) MCG/ACT Inhaler    PROAIR HFA/PROVENTIL HFA/VENTOLIN HFA    3 Inhaler    Inhale 2 puffs into the lungs every 4 hours as needed for shortness of breath / dyspnea       B-12 1000 MCG Tbcr     30 tablet    Take 1 tablet by mouth daily       famotidine 40 MG tablet    PEPCID    30 tablet    Take 1 tablet (40 mg) by mouth At Bedtime       folic acid 400 MCG tablet    FOLVITE    75 tablet    Take 2.5 tablets (1 mg) by mouth daily       furosemide 20 MG tablet    LASIX    30 tablet    TAKE 1 TABLET(20 MG) BY MOUTH EVERY MORNING       magnesium oxide 400 MG tablet     MAG-OX    90 tablet    Take 1 tablet (400 mg) by mouth 3 times daily       omeprazole 40 MG capsule    priLOSEC    30 capsule    TAKE 1 CAPSULE(40 MG) BY MOUTH DAILY       potassium chloride SA 20 MEQ CR tablet    K-DUR/KLOR-CON M    30 tablet    Take 1 tablet (20 mEq) by mouth daily       thiamine 100 MG tablet     30 tablet    Take 1 tablet (100 mg) by mouth daily

## 2017-03-08 NOTE — TELEPHONE ENCOUNTER
Reason for Call:  Medication or medication refill:  New Rx    Do you use a [facility]?  Name of the pharmacy and phone number for the current request:  [facility]    Name of the medication requested: Pt would like Dr Smith to prescribe Restasis for her    Other request: NA    Can we leave a detailed message on this number? YES    Phone number patient can be reached at: Home number on file [phone] (home)    Best Time: any    Call taken on 3/8/2017 at 2:56 PM by Alessia Bullard

## 2017-03-13 ENCOUNTER — CARE COORDINATION (OUTPATIENT)
Dept: CARE COORDINATION | Facility: CLINIC | Age: 62
End: 2017-03-13

## 2017-03-13 NOTE — PROGRESS NOTES
Clinic Care Coordination Contact 3/13/17  [facility]-Social Work    Referral Source: PCP  Clinical Data: Care Coordinator Outreach  Outreach attempted x 1.  Left message on [employer] with call back information and requested return call.  Plan:  Care Coordinator will try to reach patient again in 3-5 business days.    UPDATE: Pt returned this writer's phone call. Conversation about if she had talked with Dr. Smith about making him her PCP. She stated she had not but she would like to do that. This writer will notify him of her request. She states she has otherwise been feeling better as she had pneumonia that she was hospitalized for in [place] and than had gout. She said she felt good enough that she sang karaoke last night. 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 She prefers to sing Fariha Laws.     She mentioned that she has an RN coming out to her home one time weekly to setup her meds, etc. The home care is out of [facility]. Discussion about her new transportation company she uses and she states things are going well with them. This writer will plan on calling her in approx one month and will assess at that time if there is a need for CC to continue to follow her.     Layla Falk, SHANIKA  Care Coordinator Social Work    [facility]  [phone]  3/13/2017 12:15 PM

## 2017-03-22 ENCOUNTER — OFFICE VISIT (OUTPATIENT)
Dept: FAMILY MEDICINE | Facility: CLINIC | Age: 62
End: 2017-03-22
Payer: COMMERCIAL

## 2017-03-22 ENCOUNTER — TELEPHONE (OUTPATIENT)
Dept: FAMILY MEDICINE | Facility: CLINIC | Age: 62
End: 2017-03-22

## 2017-03-22 VITALS
DIASTOLIC BLOOD PRESSURE: 64 MMHG | TEMPERATURE: 96.1 F | RESPIRATION RATE: 20 BRPM | HEART RATE: 112 BPM | SYSTOLIC BLOOD PRESSURE: 98 MMHG | BODY MASS INDEX: 16.67 KG/M2 | OXYGEN SATURATION: 96 % | WEIGHT: 88.2 LBS

## 2017-03-22 DIAGNOSIS — R10.13 ABDOMINAL PAIN, EPIGASTRIC: Primary | ICD-10-CM

## 2017-03-22 DIAGNOSIS — F10.20 ALCOHOLISM (H): ICD-10-CM

## 2017-03-22 LAB
ALBUMIN SERPL-MCNC: 3.5 G/DL (ref 3.4–5)
ALP SERPL-CCNC: 237 U/L (ref 40–150)
ALT SERPL W P-5'-P-CCNC: 27 U/L (ref 0–50)
ANION GAP SERPL CALCULATED.3IONS-SCNC: 14 MMOL/L (ref 3–14)
AST SERPL W P-5'-P-CCNC: 69 U/L (ref 0–45)
BASOPHILS # BLD AUTO: 0.1 10E9/L (ref 0–0.2)
BASOPHILS NFR BLD AUTO: 0.7 %
BILIRUB SERPL-MCNC: 0.4 MG/DL (ref 0.2–1.3)
BUN SERPL-MCNC: 19 MG/DL (ref 7–30)
CALCIUM SERPL-MCNC: 9 MG/DL (ref 8.5–10.1)
CHLORIDE SERPL-SCNC: 102 MMOL/L (ref 94–109)
CO2 SERPL-SCNC: 25 MMOL/L (ref 20–32)
CREAT SERPL-MCNC: 1.05 MG/DL (ref 0.52–1.04)
DIFFERENTIAL METHOD BLD: ABNORMAL
EOSINOPHIL # BLD AUTO: 0.2 10E9/L (ref 0–0.7)
EOSINOPHIL NFR BLD AUTO: 2.6 %
ERYTHROCYTE [DISTWIDTH] IN BLOOD BY AUTOMATED COUNT: 18.8 % (ref 10–15)
GFR SERPL CREATININE-BSD FRML MDRD: 53 ML/MIN/1.7M2
GLUCOSE SERPL-MCNC: 119 MG/DL (ref 70–99)
HCT VFR BLD AUTO: 28.5 % (ref 35–47)
HGB BLD-MCNC: 9.1 G/DL (ref 11.7–15.7)
IMM GRANULOCYTES # BLD: 0 10E9/L (ref 0–0.4)
IMM GRANULOCYTES NFR BLD: 0.3 %
LIPASE SERPL-CCNC: 414 U/L (ref 73–393)
LYMPHOCYTES # BLD AUTO: 1.4 10E9/L (ref 0.8–5.3)
LYMPHOCYTES NFR BLD AUTO: 18.9 %
MCH RBC QN AUTO: 36.4 PG (ref 26.5–33)
MCHC RBC AUTO-ENTMCNC: 31.9 G/DL (ref 31.5–36.5)
MCV RBC AUTO: 114 FL (ref 78–100)
MONOCYTES # BLD AUTO: 1.1 10E9/L (ref 0–1.3)
MONOCYTES NFR BLD AUTO: 15.1 %
NEUTROPHILS # BLD AUTO: 4.5 10E9/L (ref 1.6–8.3)
NEUTROPHILS NFR BLD AUTO: 62.4 %
PLATELET # BLD AUTO: 216 10E9/L (ref 150–450)
POTASSIUM SERPL-SCNC: 4.5 MMOL/L (ref 3.4–5.3)
PROT SERPL-MCNC: 8 G/DL (ref 6.8–8.8)
RBC # BLD AUTO: 2.5 10E12/L (ref 3.8–5.2)
SODIUM SERPL-SCNC: 141 MMOL/L (ref 133–144)
WBC # BLD AUTO: 7.2 10E9/L (ref 4–11)

## 2017-03-22 PROCEDURE — 83690 ASSAY OF LIPASE: CPT | Performed by: FAMILY MEDICINE

## 2017-03-22 PROCEDURE — 99214 OFFICE O/P EST MOD 30 MIN: CPT | Performed by: FAMILY MEDICINE

## 2017-03-22 PROCEDURE — 85025 COMPLETE CBC W/AUTO DIFF WBC: CPT | Performed by: FAMILY MEDICINE

## 2017-03-22 PROCEDURE — 36415 COLL VENOUS BLD VENIPUNCTURE: CPT | Performed by: FAMILY MEDICINE

## 2017-03-22 PROCEDURE — 80053 COMPREHEN METABOLIC PANEL: CPT | Performed by: FAMILY MEDICINE

## 2017-03-22 RX ORDER — CHLORDIAZEPOXIDE HYDROCHLORIDE 25 MG/1
CAPSULE, GELATIN COATED ORAL
Qty: 25 CAPSULE | Refills: 0 | Status: SHIPPED | OUTPATIENT
Start: 2017-03-22 | End: 2017-01-01

## 2017-03-22 ASSESSMENT — PAIN SCALES - GENERAL: PAINLEVEL: MODERATE PAIN (4)

## 2017-03-22 NOTE — MR AVS SNAPSHOT
"              After Visit Summary   3/22/2017    Alexia Flor    MRN: [id number]           Patient Information     Date Of Birth          1955        Visit Information        Provider Department      3/22/2017 11:20 AM Artem Meeks MD [facility]        Today's Diagnoses     Abdominal pain, epigastric    -  1    Alcoholism (H)           Follow-ups after your visit        Your next 10 appointments already scheduled     Mar 24, 2017  2:40 PM CDT   SHORT with Artem Meeks MD   [facility]    [address] [place] [address] [id number] [phone]              Who to contact     If you have questions or need follow up information about today's clinic visit or your schedule please contact [facility] directly at [phone].  Normal or non-critical lab and imaging results will be communicated to you by Anodyne Healthhart, letter or phone within 4 business days after the clinic has received the results. If you do not hear from us within 7 days, please contact the clinic through Anody[employer] Healthhart or phone. If you have a critical or abnormal lab result, we will notify you by phone as soon as possible.  Submit refill requests through Framebridge or call your pharmacy and they will forward the refill request to us. Please allow 3 business days for your refill to be completed.          Additional Information About Your Visit        MyChart Information     Framebridge lets you send messages to your doctor, view your test results, renew your prescriptions, schedule appointments and more. To sign up, go to [email or web address] . Click on \"Log in\" on the left side of the screen, which will take you to the Welcome page. Then click on \"Sign up Now\" on the right side of the page.     You will be asked to enter the access code listed below, as well as some personal information. Please follow the directions to create your username and password.     Your " access code is: [email or web address]  Expires: 2017  8:49 PM     Your access code will  in 90 days. If you need help or a new code, please call your [facility] or [phone].        Care EveryWhere ID     This is your Care EveryWhere ID. This could be used by other organizations to access your [place] medical records  [id number]        Your Vitals Were     Pulse Temperature Respirations Pulse Oximetry BMI (Body Mass Index)       112 96.1  F (35.6  C) (Temporal) 20 96% 16.67 kg/m2        Blood Pressure from Last 3 Encounters:   17 98/64   17 120/70   17 102/60    Weight from Last 3 Encounters:   17 88 lb 3.2 oz (40 kg)   17 87 lb (39.5 kg)   17 90 lb 14.4 oz (41.2 kg)              We Performed the Following     CBC with platelets differential     Comprehensive metabolic panel     Lipase          Today's Medication Changes          These changes are accurate as of: 3/22/17  4:02 PM.  If you have any questions, ask your nurse or doctor.               Start taking these medicines.        Dose/Directions    chlordiazePOXIDE 25 MG capsule   Commonly known as:  LIBRIUM   Used for:  Alcoholism (H)   Started by:  Artem Meeks MD        Take 50mg twice daily for 2 days, then take 25mg bid for days 3-5   Quantity:  25 capsule   Refills:  0            Where to get your medicines      Some of these will need a paper prescription and others can be bought over the counter.  Ask your nurse if you have questions.     Bring a paper prescription for each of these medications     chlordiazePOXIDE 25 MG capsule                Primary Care Provider Office Phone # Fax #    Karan Smith [phone]       [facility] [address] [place] [id number]        Thank you!     Thank you for choosing [facility]  for your care. Our goal is always to provide you with excellent care. Hearing back from our patients is one way we can  continue to improve our services. Please take a few minutes to complete the written survey that you may receive in the mail after your visit with us. Thank you!             Your Updated Medication List - Protect others around you: Learn how to safely use, store and throw away your medicines at www.disposemymeds.org.          This list is accurate as of: 3/22/17  4:02 PM.  Always use your most recent med list.                   Brand Name Dispense Instructions for use    albuterol 108 (90 BASE) MCG/ACT Inhaler    PROAIR HFA/PROVENTIL HFA/VENTOLIN HFA    3 Inhaler    Inhale 2 puffs into the lungs every 4 hours as needed for shortness of breath / dyspnea       B-12 1000 MCG Tbcr     30 tablet    Take 1 tablet by mouth daily       chlordiazePOXIDE 25 MG capsule    LIBRIUM    25 capsule    Take 50mg twice daily for 2 days, then take 25mg bid for days 3-5       famotidine 40 MG tablet    PEPCID    30 tablet    Take 1 tablet (40 mg) by mouth At Bedtime       folic acid 400 MCG tablet    FOLVITE    75 tablet    Take 2.5 tablets (1 mg) by mouth daily       furosemide 20 MG tablet    LASIX    30 tablet    TAKE 1 TABLET(20 MG) BY MOUTH EVERY MORNING       magnesium oxide 400 MG tablet    MAG-OX    90 tablet    Take 1 tablet (400 mg) by mouth 3 times daily       omeprazole 40 MG capsule    priLOSEC    30 capsule    TAKE 1 CAPSULE(40 MG) BY MOUTH DAILY       potassium chloride SA 20 MEQ CR tablet    K-DUR/KLOR-CON M    30 tablet    Take 1 tablet (20 mEq) by mouth daily       thiamine 100 MG tablet     30 tablet    Take 1 tablet (100 mg) by mouth daily

## 2017-03-22 NOTE — PROGRESS NOTES
SUBJECTIVE:                                                    Alexia Flor is a 61 year old female who presents to clinic today for the following health issues:      Location:Upper abdomen  Duration of pain-2-3 days  Intensity- Currently 4/10, Worst 10/10  Radiating- n/a  What relives the pain-when she took a bowel movement she stated that the pain went down but hasn't gone away fully  Cause-unknown    61-year-old female who is a known alcoholic who comes in with recurrent epigastric pain. This started 2-3 days ago and was quite severe. Today it is much improved. She has cut back on her drinking quite a bit, maybe a pint a day of vodka. Mild nausea originally, now better. She is taking fluids plenty. She was sober a few years back for several months. It helped to be on benzodiazepine therapy. She also was committed to an AA group at that time. However, that has fallen away. She is interested in sobriety again.        Problem list and histories reviewed & adjusted, as indicated.  Additional history: as documented        Reviewed and updated as needed this visit by clinical staff  Tobacco  Allergies       Reviewed and updated as needed this visit by Provider         ROS:  Constitutional, HEENT, cardiovascular, pulmonary, gi and gu systems are negative, except as otherwise noted.    OBJECTIVE:                                                    BP 98/64 (BP Location: Left arm, Patient Position: Chair, Cuff Size: Adult Regular)  Pulse 112  Temp 96.1  F (35.6  C) (Temporal)  Resp 20  Wt 88 lb 3.2 oz (40 kg)  SpO2 96%  BMI 16.67 kg/m2  Body mass index is 16.67 kg/(m^2).  Very thin, almost cachectic female who is in no distress. Heart is regular. Lungs are clear and unlabored. External is without edema. She has moderate epigastric pain to palpation, no rigidity or guarding.    Diagnostic Test Results:  none      ASSESSMENT/PLAN:                                                              ICD-10-CM    1.  Abdominal pain, epigastric R10.13 Comprehensive metabolic panel     CBC with platelets differential     Lipase   2. Alcoholism (H) F10.20 chlordiazePOXIDE (LIBRIUM) 25 MG capsule       Her lipase is mildly elevated for 40. She also has mildly elevated liver enzymes. Hard to tell if this is an acute finding or chronic. Based on her symptoms, I think things are improving, but she needs to remain off alcohol. She would like to try outpatient benzodiazepine therapy. I will start her on 50 mg of Librium taken twice daily for 2 days, then reduce to 25 mg twice daily for the next 3-4 days. She agrees to start AA. I'll see her back in a few days for recheck.    Artem Meeks MD  [facility]

## 2017-03-22 NOTE — TELEPHONE ENCOUNTER
Reason for call:  [facility]ara has not received the medication for chlordiazePOXIDE (LIBRIUM) 25 MG capsule. This can get sent over later today or tomorrow. Please advise.

## 2017-03-22 NOTE — NURSING NOTE
"Chief Complaint   Patient presents with     Abdominal Pain       Initial BP 98/64 (BP Location: Left arm, Patient Position: Chair, Cuff Size: Adult Regular)  Pulse 112  Temp 96.1  F (35.6  C) (Temporal)  Resp 20  Wt 88 lb 3.2 oz (40 kg)  SpO2 96%  BMI 16.67 kg/m2 Estimated body mass index is 16.67 kg/(m^2) as calculated from the following:    Height as of 2/1/17: 5' 1\" (1.549 m).    Weight as of this encounter: 88 lb 3.2 oz (40 kg).  Medication Reconciliation: complete   Zulma Marc CMA     "

## 2017-03-23 DIAGNOSIS — K21.00 GASTROESOPHAGEAL REFLUX DISEASE WITH ESOPHAGITIS: Primary | ICD-10-CM

## 2017-03-23 RX ORDER — PANTOPRAZOLE SODIUM 40 MG/1
TABLET, DELAYED RELEASE ORAL
Qty: 60 TABLET | Refills: 0 | Status: SHIPPED | OUTPATIENT
Start: 2017-03-23 | End: 2017-03-29

## 2017-03-23 NOTE — TELEPHONE ENCOUNTER
Pantoprazole      Last Written Prescription Date:  ?  Last Fill Quantity: ?,   # refills: ?  Last Office Visit with [facility], P or [facility] prescribing provider: 3/22/17  Future Office visit:    Next 5 appointments (look out 90 days)     Mar 24, 2017  2:40 PM CDT   [facility]    [address] [place] [id number] [phone]                   Routing refill request to provider for review/approval because:  Drug not active on patient's medication list

## 2017-03-27 ENCOUNTER — TELEPHONE (OUTPATIENT)
Dept: FAMILY MEDICINE | Facility: CLINIC | Age: 62
End: 2017-03-27

## 2017-03-27 NOTE — TELEPHONE ENCOUNTER
Reason for Call:  Other call back    Detailed comments: Audelia from [employer] called asking to speak with Dr. Smith's regarding patient's transportation. Audelia states [employer] has been providing transportation for the patient, but last time when the  went to pick patient up for the appointment, the patient was hitting, and swearing at the . Audelia states a warning has been sent to the patient, but is asking for any other options for transportation for the patient. Audelia is asking for advice from Dr. Smith and his nurse please advise.     Phone Number Patient can be reached at: Other phone number:  [phone]    Best Time: anytime    Can we leave a detailed message on this number? YES    Call taken on 3/27/2017 at 1:47 PM by Lucia Cassidy

## 2017-03-27 NOTE — TELEPHONE ENCOUNTER
Reason for Call:  Request for results:    Name of test or procedure: lab work    Date of test of procedure: 3/22    Location of the test or procedure: [place] to leave the result message on voice mail or with a family member? YES    Phone number Patient can be reached at:  Home number on file [phone] (home)    Additional comments:     Call taken on 3/27/2017 at 3:00 PM by Teresita Johnson

## 2017-03-29 ENCOUNTER — TELEPHONE (OUTPATIENT)
Dept: FAMILY MEDICINE | Facility: CLINIC | Age: 62
End: 2017-03-29

## 2017-03-29 ENCOUNTER — OFFICE VISIT (OUTPATIENT)
Dept: FAMILY MEDICINE | Facility: CLINIC | Age: 62
End: 2017-03-29
Payer: COMMERCIAL

## 2017-03-29 VITALS
SYSTOLIC BLOOD PRESSURE: 110 MMHG | BODY MASS INDEX: 17.01 KG/M2 | HEART RATE: 100 BPM | OXYGEN SATURATION: 96 % | DIASTOLIC BLOOD PRESSURE: 60 MMHG | RESPIRATION RATE: 20 BRPM | TEMPERATURE: 96.4 F | WEIGHT: 90 LBS

## 2017-03-29 DIAGNOSIS — R94.5 LIVER FUNCTION ABNORMALITY: ICD-10-CM

## 2017-03-29 DIAGNOSIS — K85.20 ALCOHOL-INDUCED ACUTE PANCREATITIS, UNSPECIFIED COMPLICATION STATUS: Primary | ICD-10-CM

## 2017-03-29 DIAGNOSIS — D50.0 IRON DEFICIENCY ANEMIA DUE TO CHRONIC BLOOD LOSS: ICD-10-CM

## 2017-03-29 DIAGNOSIS — E83.42 HYPOMAGNESEMIA: ICD-10-CM

## 2017-03-29 LAB
ALBUMIN SERPL-MCNC: 3.5 G/DL (ref 3.4–5)
ALP SERPL-CCNC: 209 U/L (ref 40–150)
ALT SERPL W P-5'-P-CCNC: 27 U/L (ref 0–50)
ANION GAP SERPL CALCULATED.3IONS-SCNC: 11 MMOL/L (ref 3–14)
AST SERPL W P-5'-P-CCNC: 77 U/L (ref 0–45)
BILIRUB SERPL-MCNC: 0.3 MG/DL (ref 0.2–1.3)
BUN SERPL-MCNC: 38 MG/DL (ref 7–30)
CALCIUM SERPL-MCNC: 8.9 MG/DL (ref 8.5–10.1)
CHLORIDE SERPL-SCNC: 105 MMOL/L (ref 94–109)
CO2 SERPL-SCNC: 25 MMOL/L (ref 20–32)
CREAT SERPL-MCNC: 1.93 MG/DL (ref 0.52–1.04)
ERYTHROCYTE [DISTWIDTH] IN BLOOD BY AUTOMATED COUNT: 17.4 % (ref 10–15)
GFR SERPL CREATININE-BSD FRML MDRD: 26 ML/MIN/1.7M2
GLUCOSE SERPL-MCNC: 126 MG/DL (ref 70–99)
HCT VFR BLD AUTO: 26.5 % (ref 35–47)
HGB BLD-MCNC: 8.6 G/DL (ref 11.7–15.7)
LIPASE SERPL-CCNC: 483 U/L (ref 73–393)
MAGNESIUM SERPL-MCNC: 2.3 MG/DL (ref 1.6–2.3)
MCH RBC QN AUTO: 37.2 PG (ref 26.5–33)
MCHC RBC AUTO-ENTMCNC: 32.5 G/DL (ref 31.5–36.5)
MCV RBC AUTO: 115 FL (ref 78–100)
PLATELET # BLD AUTO: 208 10E9/L (ref 150–450)
POTASSIUM SERPL-SCNC: 5.5 MMOL/L (ref 3.4–5.3)
PROT SERPL-MCNC: 7.7 G/DL (ref 6.8–8.8)
RBC # BLD AUTO: 2.31 10E12/L (ref 3.8–5.2)
SODIUM SERPL-SCNC: 141 MMOL/L (ref 133–144)
WBC # BLD AUTO: 7.3 10E9/L (ref 4–11)

## 2017-03-29 PROCEDURE — 36415 COLL VENOUS BLD VENIPUNCTURE: CPT | Performed by: FAMILY MEDICINE

## 2017-03-29 PROCEDURE — 80053 COMPREHEN METABOLIC PANEL: CPT | Performed by: FAMILY MEDICINE

## 2017-03-29 PROCEDURE — 85027 COMPLETE CBC AUTOMATED: CPT | Performed by: FAMILY MEDICINE

## 2017-03-29 PROCEDURE — 99214 OFFICE O/P EST MOD 30 MIN: CPT | Performed by: FAMILY MEDICINE

## 2017-03-29 PROCEDURE — 83735 ASSAY OF MAGNESIUM: CPT | Performed by: FAMILY MEDICINE

## 2017-03-29 PROCEDURE — 83690 ASSAY OF LIPASE: CPT | Performed by: FAMILY MEDICINE

## 2017-03-29 NOTE — TELEPHONE ENCOUNTER
Reason for Call:  Other appointment    Detailed comments: pt seen in clinic today states calling for follow up on results from todays visit. Please advise and contact pt in regards.    Phone Number Patient can be reached at: Home number on file [phone] (home)    Best Time: ANY    Can we leave a detailed message on this number? YES    Call taken on 3/29/2017 at 4:45 PM by Ginger Ya

## 2017-03-29 NOTE — NURSING NOTE
"Chief Complaint   Patient presents with     RECHECK       Initial /60 (Cuff Size: Adult Regular)  Pulse 100  Temp 96.4  F (35.8  C) (Temporal)  Resp 20  Wt 90 lb (40.8 kg)  SpO2 96%  BMI 17.01 kg/m2 Estimated body mass index is 17.01 kg/(m^2) as calculated from the following:    Height as of 2/1/17: 5' 1\" (1.549 m).    Weight as of this encounter: 90 lb (40.8 kg).  Medication Reconciliation: complete    "

## 2017-03-29 NOTE — MR AVS SNAPSHOT
"              After Visit Summary   3/29/2017    Alexia Flor    MRN: [id number]           Patient Information     Date Of Birth          1955        Visit Information        Provider Department      3/29/2017 2:40 PM Karan Smith MD [facility]        Today's Diagnoses     Alcohol-induced acute pancreatitis, unspecified complication status    -  1    Iron deficiency anemia due to chronic blood loss        Liver function abnormality        Hypomagnesemia           Follow-ups after your visit        Who to contact     If you have questions or need follow up information about today's clinic visit or your schedule please contact [facility] directly at [phone].  Normal or non-critical lab and imaging results will be communicated to you by [employer], letter or phone within 4 business days after the clinic has received the results. If you do not hear from us within 7 days, please contact the clinic through [employer] or phone. If you have a critical or abnormal lab result, we will notify you by phone as soon as possible.  Submit refill requests through [employer] or call your pharmacy and they will forward the refill request to us. Please allow 3 business days for your refill to be completed.          Additional Information About Your Visit        MyChart Information     [employer] lets you send messages to your doctor, view your test results, renew your prescriptions, schedule appointments and more. To sign up, go to [email or web address] . Click on \"Log in\" on the left side of the screen, which will take you to the Welcome page. Then click on \"Sign up Now\" on the right side of the page.     You will be asked to enter the access code listed below, as well as some personal information. Please follow the directions to create your username and password.     Your access code is: [email or web address]  Expires: 2017  8:49 PM     Your access code will  in 90 days. If you need help or a " new code, please call your Home clinic or [phone].        Care EveryWhere ID     This is your Care EveryWhere ID. This could be used by other organizations to access your Home medical records  [id number]        Your Vitals Were     Pulse Temperature Respirations Pulse Oximetry BMI (Body Mass Index)       100 96.4  F (35.8  C) (Temporal) 20 96% 17.01 kg/m2        Blood Pressure from Last 3 Encounters:   03/29/17 110/60   03/22/17 98/64   03/08/17 120/70    Weight from Last 3 Encounters:   03/29/17 90 lb (40.8 kg)   03/22/17 88 lb 3.2 oz (40 kg)   03/08/17 87 lb (39.5 kg)              We Performed the Following     CBC with platelets     Comprehensive metabolic panel (BMP + Alb, Alk Phos, ALT, AST, Total. Bili, TP)     Lipase     Magnesium        Primary Care Provider Office Phone # Fax #    Karan Smith [phone]       [facility] [address] [place] [address]        Thank you!     Thank you for choosing [facility]  for your care. Our goal is always to provide you with excellent care. Hearing back from our patients is one way we can continue to improve our services. Please take a few minutes to complete the written survey that you may receive in the mail after your visit with us. Thank you!             Your Updated Medication List - Protect others around you: Learn how to safely use, store and throw away your medicines at www.disposemymeds.org.          This list is accurate as of: 3/29/17  3:21 PM.  Always use your most recent med list.                   Brand Name Dispense Instructions for use    albuterol 108 (90 BASE) MCG/ACT Inhaler    PROAIR HFA/PROVENTIL HFA/VENTOLIN HFA    3 Inhaler    Inhale 2 puffs into the lungs every 4 hours as needed for shortness of breath / dyspnea       B-12 1000 MCG Tbcr     30 tablet    Take 1 tablet by mouth daily       chlordiazePOXIDE 25 MG capsule    LIBRIUM    25 capsule    Take 50mg twice daily  for 2 days, then take 25mg bid for days 3-5       folic acid 400 MCG tablet    FOLVITE    75 tablet    Take 2.5 tablets (1 mg) by mouth daily       furosemide 20 MG tablet    LASIX    30 tablet    TAKE 1 TABLET(20 MG) BY MOUTH EVERY MORNING       magnesium oxide 400 MG tablet    MAG-OX    90 tablet    Take 1 tablet (400 mg) by mouth 3 times daily       omeprazole 40 MG capsule    priLOSEC    30 capsule    TAKE 1 CAPSULE(40 MG) BY MOUTH DAILY       potassium chloride SA 20 MEQ CR tablet    K-DUR/KLOR-CON M    30 tablet    Take 1 tablet (20 mEq) by mouth daily       thiamine 100 MG tablet     30 tablet    Take 1 tablet (100 mg) by mouth daily

## 2017-03-30 ENCOUNTER — TELEPHONE (OUTPATIENT)
Dept: FAMILY MEDICINE | Facility: CLINIC | Age: 62
End: 2017-03-30

## 2017-03-30 DIAGNOSIS — R60.9 EDEMA, UNSPECIFIED TYPE: ICD-10-CM

## 2017-03-30 DIAGNOSIS — K85.20 ALCOHOL-INDUCED ACUTE PANCREATITIS, UNSPECIFIED COMPLICATION STATUS: Primary | ICD-10-CM

## 2017-03-30 DIAGNOSIS — D50.0 IRON DEFICIENCY ANEMIA DUE TO CHRONIC BLOOD LOSS: ICD-10-CM

## 2017-03-30 RX ORDER — FERROUS SULFATE 325(65) MG
325 TABLET ORAL 2 TIMES DAILY
Qty: 1 TABLET | Refills: 0 | Status: ON HOLD | COMMUNITY
Start: 2017-03-30 | End: 2017-01-01

## 2017-03-30 NOTE — TELEPHONE ENCOUNTER
Lasix       Last Written Prescription Date: 3/7/2017  Last Fill Quantity: 30, # refills: 0  Last Office Visit with [facility], [facility] or [facility] prescribing provider: 3/30/2017       Potassium   Date Value Ref Range Status   03/29/2017 5.5 (H) 3.4 - 5.3 mmol/L Final     Creatinine   Date Value Ref Range Status   03/29/2017 1.93 (H) 0.52 - 1.04 mg/dL Final     BP Readings from Last 3 Encounters:   03/29/17 110/60   03/22/17 98/64   03/08/17 120/70

## 2017-03-30 NOTE — TELEPHONE ENCOUNTER
"Home Health Nurse is calling to report they are at patient's house today and patient is reporting she saw PCP yesterday, 3/29/17, and had \"lots of med changes\", but she cannot remember what they were.  RN went through med list with Home Care nurse, Monique, and the only question they had was Iron pill.  Message handled by Nurse Triage with [facility] - provider name: Dr. Smith.  Patient should be taking 325 mg BID of Fe if she can handle it.  If she cannot handle it, she can just do 1 pill daily.    Monique is called and informed of this med. addition on SVM.  Fe is added to patient's medication list.  Closing this encounter.  Anya Bañuelos RN        "

## 2017-03-30 NOTE — TELEPHONE ENCOUNTER
Per Dr. Smith stay on her iron. Her lipase is elevated due to drinking alcohol. She should have these checked again in a few weeks. KB/CMA

## 2017-03-31 ENCOUNTER — TELEPHONE (OUTPATIENT)
Dept: FAMILY MEDICINE | Facility: CLINIC | Age: 62
End: 2017-03-31

## 2017-03-31 ENCOUNTER — CARE COORDINATION (OUTPATIENT)
Dept: CARE COORDINATION | Facility: CLINIC | Age: 62
End: 2017-03-31

## 2017-03-31 NOTE — PROGRESS NOTES
"Clinic Care Coordination Contact 3/31/17  Care Team Conversations-MIKE    RN notified this writer that the pt called in the clinic today and was inquiring about how to quit drinking. This writer called the pt to discuss treatment options. Pt explained that she was crabby and didn't want to talk about it at the moment. She explained that she is interested in treatment and has been to 13 treatment facilities in the past. She asked if we could discuss this later. This writer asked her to call me back when she was ready. Pt agreed.     UPDATE: Pt called this writer back and stated she was ready to talk about CD treatment. She explained that she is \"drinking to much and wants to stop because her health depends on it\". We talked about getting a rule 25 done through the [facility] to get things started. She was given the phone number. I also gave her the phone number to the detox center in case she wanted help sobering up before her rule 25 could be completed. She explained that she has a lot of stressors in her life which cause her to drink. She explained that her mother is mean to her and says hurtful things. We talked about placing healthy boundaries with relationships like this to protect her self. Counseling was discussed. This writer explained that [facility] has two counselors that she could meet with should she feel she wanted to. She sounded interested but was not ready at this time. This writer explained how to make apt's with them if she was interested.   Phone number to [facility] human services [phone] to schedule a rule 25  Detox available through [facility] [phone]    [facility] will plan on following up with her in approx 2 weeks to see how she is feeling and assist as needed.    Layla Falk, [facility]  Care Coordinator Social Work    [facility] [place] [facility] [place]  [phone]  3/31/2017 2:38 PM              "

## 2017-03-31 NOTE — TELEPHONE ENCOUNTER
"Patient is calling to say she got a message on her VM that she could not understand.  Patient is wanting to know what Lipase is and why it is elevated.  She is informed this is a chemical that is released in her body if the pancreas is not functioning well.  She would like to know if she is going to die.  RN told her, \"yes, If you do not stop drinking, you will die from your organs not working.\"  RN suggests that patient get help through a treatment plan.  She states she cannot stop drinking because she will go into cardiac arrest.  She is informed that programs [place] help her to slowly decrease and help her along the way with any medical intervention she would need to keep her out of severe withdrawal.    Patient agrees to this plan.  RN went to talk to MIKE Falk, and patient will be called about this plan.  PCP is informed of the patient's plan to try treatment for alcohol detox.  Closing this encounter.  Anya Bañuelos RN        "

## 2017-04-03 NOTE — TELEPHONE ENCOUNTER
She has worsening kidney and liver disease a result of her alcoholism. Please relay message. Needs an ov with primary to discuss. Needs to pick one md to act as primary

## 2017-04-04 NOTE — TELEPHONE ENCOUNTER
Routing refill request to provider for review/approval because:  Labs out of range:  K, Cr  Anya Bañuelos RN

## 2017-04-05 RX ORDER — FUROSEMIDE 20 MG
TABLET ORAL
Qty: 30 TABLET | Refills: 0 | Status: SHIPPED | OUTPATIENT
Start: 2017-04-05 | End: 2017-04-27

## 2017-04-06 NOTE — PROGRESS NOTES
Her recent labs show her lipase is still up from her pancreas. Her potassium is too high and her kidney function is declining. She should discontinue the Lasix and discontinue potassium. Does not need to take them anymore as this level was normal. She needs to be well hydrated but not with alcohol. We should see her back in a week or 2 and recheck these labs.

## 2017-04-12 ENCOUNTER — OFFICE VISIT (OUTPATIENT)
Dept: FAMILY MEDICINE | Facility: CLINIC | Age: 62
End: 2017-04-12
Payer: COMMERCIAL

## 2017-04-12 VITALS
RESPIRATION RATE: 22 BRPM | TEMPERATURE: 96.2 F | SYSTOLIC BLOOD PRESSURE: 110 MMHG | DIASTOLIC BLOOD PRESSURE: 60 MMHG | WEIGHT: 93 LBS | OXYGEN SATURATION: 97 % | HEART RATE: 88 BPM | BODY MASS INDEX: 17.57 KG/M2

## 2017-04-12 DIAGNOSIS — B08.1 MOLLUSCUM CONTAGIOSUM: ICD-10-CM

## 2017-04-12 DIAGNOSIS — F10.20 ALCOHOLISM (H): Primary | ICD-10-CM

## 2017-04-12 DIAGNOSIS — E83.42 HYPOMAGNESEMIA: ICD-10-CM

## 2017-04-12 DIAGNOSIS — D53.9 MACROCYTIC ANEMIA: ICD-10-CM

## 2017-04-12 DIAGNOSIS — R94.5 LIVER FUNCTION ABNORMALITY: ICD-10-CM

## 2017-04-12 LAB
ALBUMIN SERPL-MCNC: 3.2 G/DL (ref 3.4–5)
ALP SERPL-CCNC: 194 U/L (ref 40–150)
ALT SERPL W P-5'-P-CCNC: 25 U/L (ref 0–50)
ANION GAP SERPL CALCULATED.3IONS-SCNC: 13 MMOL/L (ref 3–14)
AST SERPL W P-5'-P-CCNC: 78 U/L (ref 0–45)
BILIRUB SERPL-MCNC: 0.4 MG/DL (ref 0.2–1.3)
BUN SERPL-MCNC: 16 MG/DL (ref 7–30)
CALCIUM SERPL-MCNC: 8.5 MG/DL (ref 8.5–10.1)
CHLORIDE SERPL-SCNC: 105 MMOL/L (ref 94–109)
CO2 SERPL-SCNC: 26 MMOL/L (ref 20–32)
CREAT SERPL-MCNC: 0.97 MG/DL (ref 0.52–1.04)
ERYTHROCYTE [DISTWIDTH] IN BLOOD BY AUTOMATED COUNT: 16.2 % (ref 10–15)
GFR SERPL CREATININE-BSD FRML MDRD: 58 ML/MIN/1.7M2
GLUCOSE SERPL-MCNC: 107 MG/DL (ref 70–99)
HCT VFR BLD AUTO: 25 % (ref 35–47)
HGB BLD-MCNC: 8.2 G/DL (ref 11.7–15.7)
LIPASE SERPL-CCNC: 204 U/L (ref 73–393)
MAGNESIUM SERPL-MCNC: 1.5 MG/DL (ref 1.6–2.3)
MCH RBC QN AUTO: 37.6 PG (ref 26.5–33)
MCHC RBC AUTO-ENTMCNC: 32.8 G/DL (ref 31.5–36.5)
MCV RBC AUTO: 115 FL (ref 78–100)
PLATELET # BLD AUTO: 229 10E9/L (ref 150–450)
POTASSIUM SERPL-SCNC: 3.4 MMOL/L (ref 3.4–5.3)
PROT SERPL-MCNC: 7.1 G/DL (ref 6.8–8.8)
RBC # BLD AUTO: 2.18 10E12/L (ref 3.8–5.2)
SODIUM SERPL-SCNC: 144 MMOL/L (ref 133–144)
WBC # BLD AUTO: 5.8 10E9/L (ref 4–11)

## 2017-04-12 PROCEDURE — 85027 COMPLETE CBC AUTOMATED: CPT | Performed by: FAMILY MEDICINE

## 2017-04-12 PROCEDURE — 36415 COLL VENOUS BLD VENIPUNCTURE: CPT | Performed by: FAMILY MEDICINE

## 2017-04-12 PROCEDURE — 83690 ASSAY OF LIPASE: CPT | Performed by: FAMILY MEDICINE

## 2017-04-12 PROCEDURE — 80053 COMPREHEN METABOLIC PANEL: CPT | Performed by: FAMILY MEDICINE

## 2017-04-12 PROCEDURE — 99214 OFFICE O/P EST MOD 30 MIN: CPT | Performed by: FAMILY MEDICINE

## 2017-04-12 PROCEDURE — 83735 ASSAY OF MAGNESIUM: CPT | Performed by: FAMILY MEDICINE

## 2017-04-12 NOTE — NURSING NOTE
"Chief Complaint   Patient presents with     RECHECK       Initial /60  Pulse 88  Temp 96.2  F (35.7  C) (Temporal)  Resp 22  Wt 93 lb (42.2 kg)  SpO2 97%  BMI 17.57 kg/m2 Estimated body mass index is 17.57 kg/(m^2) as calculated from the following:    Height as of 2/1/17: 5' 1\" (1.549 m).    Weight as of this encounter: 93 lb (42.2 kg).  Medication Reconciliation: complete    "

## 2017-04-12 NOTE — PROGRESS NOTES
SUBJECTIVE:                                                    Alexia Flor is a 61 year old female who presents to clinic today for the following health issues:      Chief Complaint   Patient presents with     RECHECK             Problem list and histories reviewed & adjusted, as indicated.  Additional history: as documented        Reviewed and updated as needed this visit by clinical staff  Tobacco  Allergies  Meds       Reviewed and updated as needed this visit by Provider        SUBJECTIVE:  Alexia  is a 61 year old female who presents for:  Follow up of her alcoholism, episode of pancreatitis and lab abnormalities and anemia. Last time she was showing acute renal failure and we stopped her Lasix and potassium. She has not had any fluid retention. She's been told many times to discontinue her alcohol use she states she is tapering down to prevent withdrawal symptoms. She has been suffering from an anemia which is macrocytic she has been taking iron. She's had an elevated lipase and a low magnesium because of her alcohol use.    Past Medical History:   Diagnosis Date     Alcohol abuse      Alcoholic hepatitis      Alcoholic pancreatitis      Anxiety      Schafer's esophagus      Breast cancer (H)     rt, s/p radiation.     Congestive heart failure, unspecified      COPD (chronic obstructive pulmonary disease) (H)      Hypokalemia      Macrocytic anemia      Non-adherence to medical treatment      Tobacco abuse      Past Surgical History:   Procedure Laterality Date     lumpectomy Rt breast  2006       Social History   Substance Use Topics     Smoking status: Current Every Day Smoker     Packs/day: 0.50     Years: 44.00     Types: Cigarettes     Smokeless tobacco: Never Used     Alcohol use 0.5 oz/week     1 Glasses of wine per week      Comment: weekends (reported; smelled of alcohol during clinic visit 4/2016)     Current Outpatient Prescriptions   Medication Sig Dispense Refill     furosemide (LASIX) 20 MG  tablet TAKE 1 TABLET(20 MG) BY MOUTH EVERY MORNING 30 tablet 0     ferrous sulfate (IRON) 325 (65 FE) MG tablet Take 1 tablet (325 mg) by mouth 2 times daily 1 tablet 0     chlordiazePOXIDE (LIBRIUM) 25 MG capsule Take 50mg twice daily for 2 days, then take 25mg bid for days 3-5 25 capsule 0     magnesium oxide (MAG-OX) 400 MG tablet Take 1 tablet (400 mg) by mouth 3 times daily 90 tablet 3     folic acid (FOLVITE) 400 MCG tablet Take 2.5 tablets (1 mg) by mouth daily 75 tablet 3     potassium chloride SA (K-DUR/KLOR-CON M) 20 MEQ CR tablet Take 1 tablet (20 mEq) by mouth daily 30 tablet 0     omeprazole (PRILOSEC) 40 MG capsule TAKE 1 CAPSULE(40 MG) BY MOUTH DAILY 30 capsule 11     Cyanocobalamin (B-12) 1000 MCG TBCR Take 1 tablet by mouth daily 30 tablet 3     thiamine 100 MG tablet Take 1 tablet (100 mg) by mouth daily 30 tablet 3     albuterol (PROAIR HFA, PROVENTIL HFA, VENTOLIN HFA) 108 (90 BASE) MCG/ACT inhaler Inhale 2 puffs into the lungs every 4 hours as needed for shortness of breath / dyspnea 3 Inhaler 4       REVIEW OF SYSTEMS:   5 point ROS negative except as noted above in HPI, including Gen., Resp, CV, GI &  system review.     OBJECTIVE:  Vitals: /60  Pulse 88  Temp 96.2  F (35.7  C) (Temporal)  Resp 22  Wt 93 lb (42.2 kg)  SpO2 97%  BMI 17.57 kg/m2  BMI= Body mass index is 17.57 kg/(m^2).  She appears well today. Cooperative. Her throat is clear mucous membranes are moist. Neck is supple. Lungs are clear. Heart regular rhythm. Abdomen no tenderness. Bowel sounds present. Skin shows multiple molluscum very small about 1 mm scattered about the base of her neck and shoulders on both sides.. Hemoglobin is down to 8.2. Lipase is now normal. Kidney function is now normal. Liver function tests are stable. Magnesium is just below normal.    ASSESSMENT:  #1 alcoholism #2 anemia #3 status post pancreatitis #4 low magnesium #5 molluscum contagiosum    PLAN:  She was instructed to continue on her  magnesium. She is taking folic acid and will continue on this. She continue on her iron but I think it's mainly in macrocytic anemia. She will continue on omeprazole. We will treat her with some Aldara cream for the molluscum. She should stay off her Lasix and potassium as now her kidney function test is back to normal. She had mentioned she is looking to get into an inpatient alcohol program at [place]. This was strongly encouraged. I recommended she follow-up in 2-3 weeks with some labs again to see where she is at.  Tobacco Cessation Action Plan: Self help information given to patient      Karan Smith MD  [facility]

## 2017-04-12 NOTE — MR AVS SNAPSHOT
"              After Visit Summary   2017    Alexia Flor    MRN: [id number]           Patient Information     Date Of Birth          1955        Visit Information        Provider Department      2017 1:00 PM Karan Smith MD [facility]        Today's Diagnoses     Alcoholism (H)    -  1       Follow-ups after your visit        Who to contact     If you have questions or need follow up information about today's clinic visit or your schedule please contact [facility] directly at [phone].  Normal or non-critical lab and imaging results will be communicated to you by [employer]olino Grillehart, letter or phone within 4 business days after the clinic has received the results. If you do not hear from us within 7 days, please contact the clinic through [employer]ol[employer] Grillehart or phone. If you have a critical or abnormal lab result, we will notify you by phone as soon as possible.  Submit refill requests through [employer] or call your pharmacy and they will forward the refill request to us. Please allow 3 business days for your refill to be completed.          Additional Information About Your Visit        MyChart Information     Adimab lets you send messages to your doctor, view your test results, renew your prescriptions, schedule appointments and more. To sign up, go to [email or web address] . Click on \"Log in\" on the left side of the screen, which will take you to the Welcome page. Then click on \"Sign up Now\" on the right side of the page.     You will be asked to enter the access code listed below, as well as some personal information. Please follow the directions to create your username and password.     Your access code is: [email or web address]  Expires: 2017  8:49 PM     Your access code will  in 90 days. If you need help or a new code, please call your [facility] or [phone].        Care EveryWhere ID     This is your Care EveryWhere ID. This could be used by other organizations " to access your [place] medical records  [id number]        Your Vitals Were     Pulse Temperature Respirations Pulse Oximetry BMI (Body Mass Index)       88 96.2  F (35.7  C) (Temporal) 22 97% 17.57 kg/m2        Blood Pressure from Last 3 Encounters:   04/12/17 110/60   03/29/17 110/60   03/22/17 98/64    Weight from Last 3 Encounters:   04/12/17 93 lb (42.2 kg)   03/29/17 90 lb (40.8 kg)   03/22/17 88 lb 3.2 oz (40 kg)              We Performed the Following     CBC with platelets     Comprehensive metabolic panel (BMP + Alb, Alk Phos, ALT, AST, Total. Bili, TP)     Lipase     Magnesium        Primary Care Provider Office Phone # Fax #    Karan Smith [phone]       [facility] [address] [place] [address]        Thank you!     Thank you for choosing [facility]  for your care. Our goal is always to provide you with excellent care. Hearing back from our patients is one way we can continue to improve our services. Please take a few minutes to complete the written survey that you may receive in the mail after your visit with us. Thank you!             Your Updated Medication List - Protect others around you: Learn how to safely use, store and throw away your medicines at www.disposemymeds.org.          This list is accurate as of: 4/12/17  1:44 PM.  Always use your most recent med list.                   Brand Name Dispense Instructions for use    albuterol 108 (90 BASE) MCG/ACT Inhaler    PROAIR HFA/PROVENTIL HFA/VENTOLIN HFA    3 Inhaler    Inhale 2 puffs into the lungs every 4 hours as needed for shortness of breath / dyspnea       B-12 1000 MCG Tbcr     30 tablet    Take 1 tablet by mouth daily       chlordiazePOXIDE 25 MG capsule    LIBRIUM    25 capsule    Take 50mg twice daily for 2 days, then take 25mg bid for days 3-5       ferrous sulfate 325 (65 FE) MG tablet    IRON    1 tablet    Take 1 tablet (325 mg) by mouth 2 times daily       folic  acid 400 MCG tablet    FOLVITE    75 tablet    Take 2.5 tablets (1 mg) by mouth daily       furosemide 20 MG tablet    LASIX    30 tablet    TAKE 1 TABLET(20 MG) BY MOUTH EVERY MORNING       magnesium oxide 400 MG tablet    MAG-OX    90 tablet    Take 1 tablet (400 mg) by mouth 3 times daily       omeprazole 40 MG capsule    priLOSEC    30 capsule    TAKE 1 CAPSULE(40 MG) BY MOUTH DAILY       potassium chloride SA 20 MEQ CR tablet    K-DUR/KLOR-CON M    30 tablet    Take 1 tablet (20 mEq) by mouth daily       thiamine 100 MG tablet     30 tablet    Take 1 tablet (100 mg) by mouth daily

## 2017-04-13 RX ORDER — IMIQUIMOD 12.5 MG/.25G
CREAM TOPICAL
Qty: 12 PACKET | Refills: 3 | Status: SHIPPED | OUTPATIENT
Start: 2017-04-13 | End: 2017-07-19

## 2017-04-25 ENCOUNTER — TELEPHONE (OUTPATIENT)
Dept: FAMILY MEDICINE | Facility: CLINIC | Age: 62
End: 2017-04-25

## 2017-04-25 NOTE — TELEPHONE ENCOUNTER
Reason for Call:  Other call back    Detailed comments: Going to close Alexia to case management, and HC agency has her name and phone number if they need to contact her with questions. Please call back and advise if there's questions    Phone Number Patient can be reached at: Other phone number:      Best Time: anytime    Can we leave a detailed message on this number? YES    Call taken on 4/25/2017 at 10:21 AM by Clarissa Smith

## 2017-04-26 ENCOUNTER — OFFICE VISIT (OUTPATIENT)
Dept: FAMILY MEDICINE | Facility: CLINIC | Age: 62
End: 2017-04-26
Payer: COMMERCIAL

## 2017-04-26 ENCOUNTER — CARE COORDINATION (OUTPATIENT)
Dept: CARE COORDINATION | Facility: CLINIC | Age: 62
End: 2017-04-26

## 2017-04-26 VITALS
BODY MASS INDEX: 17.95 KG/M2 | HEART RATE: 90 BPM | OXYGEN SATURATION: 96 % | WEIGHT: 95 LBS | DIASTOLIC BLOOD PRESSURE: 72 MMHG | TEMPERATURE: 96.7 F | SYSTOLIC BLOOD PRESSURE: 160 MMHG | RESPIRATION RATE: 26 BRPM

## 2017-04-26 DIAGNOSIS — K70.0 ALCOHOLIC FATTY LIVER: ICD-10-CM

## 2017-04-26 DIAGNOSIS — K85.20 ALCOHOL-INDUCED ACUTE PANCREATITIS, UNSPECIFIED COMPLICATION STATUS: Primary | ICD-10-CM

## 2017-04-26 DIAGNOSIS — R10.13 EPIGASTRIC PAIN: ICD-10-CM

## 2017-04-26 DIAGNOSIS — R05.9 COUGH: ICD-10-CM

## 2017-04-26 DIAGNOSIS — D50.0 IRON DEFICIENCY ANEMIA DUE TO CHRONIC BLOOD LOSS: ICD-10-CM

## 2017-04-26 LAB
ALBUMIN SERPL-MCNC: 3.2 G/DL (ref 3.4–5)
ALP SERPL-CCNC: 238 U/L (ref 40–150)
ALT SERPL W P-5'-P-CCNC: 26 U/L (ref 0–50)
ANION GAP SERPL CALCULATED.3IONS-SCNC: 11 MMOL/L (ref 3–14)
AST SERPL W P-5'-P-CCNC: 66 U/L (ref 0–45)
BASOPHILS # BLD AUTO: 0 10E9/L (ref 0–0.2)
BASOPHILS NFR BLD AUTO: 0.4 %
BILIRUB SERPL-MCNC: 0.5 MG/DL (ref 0.2–1.3)
BUN SERPL-MCNC: 13 MG/DL (ref 7–30)
CALCIUM SERPL-MCNC: 8.9 MG/DL (ref 8.5–10.1)
CHLORIDE SERPL-SCNC: 103 MMOL/L (ref 94–109)
CO2 SERPL-SCNC: 31 MMOL/L (ref 20–32)
CREAT SERPL-MCNC: 0.79 MG/DL (ref 0.52–1.04)
DIFFERENTIAL METHOD BLD: ABNORMAL
EOSINOPHIL # BLD AUTO: 0.1 10E9/L (ref 0–0.7)
EOSINOPHIL NFR BLD AUTO: 2 %
ERYTHROCYTE [DISTWIDTH] IN BLOOD BY AUTOMATED COUNT: 16 % (ref 10–15)
GFR SERPL CREATININE-BSD FRML MDRD: 73 ML/MIN/1.7M2
GLUCOSE SERPL-MCNC: 107 MG/DL (ref 70–99)
HCT VFR BLD AUTO: 23.2 % (ref 35–47)
HGB BLD-MCNC: 7.2 G/DL (ref 11.7–15.7)
IMM GRANULOCYTES # BLD: 0 10E9/L (ref 0–0.4)
IMM GRANULOCYTES NFR BLD: 0.3 %
LIPASE SERPL-CCNC: 3434 U/L (ref 73–393)
LYMPHOCYTES # BLD AUTO: 1.3 10E9/L (ref 0.8–5.3)
LYMPHOCYTES NFR BLD AUTO: 18.6 %
MAGNESIUM SERPL-MCNC: 2.1 MG/DL (ref 1.6–2.3)
MCH RBC QN AUTO: 37.5 PG (ref 26.5–33)
MCHC RBC AUTO-ENTMCNC: 31 G/DL (ref 31.5–36.5)
MCV RBC AUTO: 121 FL (ref 78–100)
MONOCYTES # BLD AUTO: 1 10E9/L (ref 0–1.3)
MONOCYTES NFR BLD AUTO: 14.5 %
NEUTROPHILS # BLD AUTO: 4.6 10E9/L (ref 1.6–8.3)
NEUTROPHILS NFR BLD AUTO: 64.2 %
PLATELET # BLD AUTO: 217 10E9/L (ref 150–450)
POTASSIUM SERPL-SCNC: 3.3 MMOL/L (ref 3.4–5.3)
PROT SERPL-MCNC: 7.2 G/DL (ref 6.8–8.8)
RBC # BLD AUTO: 1.92 10E12/L (ref 3.8–5.2)
SODIUM SERPL-SCNC: 145 MMOL/L (ref 133–144)
WBC # BLD AUTO: 7.2 10E9/L (ref 4–11)

## 2017-04-26 PROCEDURE — 83735 ASSAY OF MAGNESIUM: CPT | Performed by: FAMILY MEDICINE

## 2017-04-26 PROCEDURE — 99214 OFFICE O/P EST MOD 30 MIN: CPT | Performed by: FAMILY MEDICINE

## 2017-04-26 PROCEDURE — 85025 COMPLETE CBC W/AUTO DIFF WBC: CPT | Performed by: FAMILY MEDICINE

## 2017-04-26 PROCEDURE — 36415 COLL VENOUS BLD VENIPUNCTURE: CPT | Performed by: FAMILY MEDICINE

## 2017-04-26 PROCEDURE — 80053 COMPREHEN METABOLIC PANEL: CPT | Performed by: FAMILY MEDICINE

## 2017-04-26 PROCEDURE — 83690 ASSAY OF LIPASE: CPT | Performed by: FAMILY MEDICINE

## 2017-04-26 RX ORDER — AMOXICILLIN 875 MG
875 TABLET ORAL 2 TIMES DAILY
Qty: 20 TABLET | Refills: 0 | Status: SHIPPED | OUTPATIENT
Start: 2017-04-26 | End: 2017-05-08

## 2017-04-26 NOTE — MR AVS SNAPSHOT
After Visit Summary   4/26/2017    Alexia Flor    MRN: [id number]           Patient Information     Date Of Birth          1955        Visit Information        Provider Department      4/26/2017 1:20 PM Karan Smith MD [facility]        Today's Diagnoses     Epigastric pain    -  1    Cough        Iron deficiency anemia due to chronic blood loss        Alcoholic fatty liver           Follow-ups after your visit        Your next 10 appointments already scheduled     May 01, 2017  9:00 AM CDT   US ABDOMEN COMPLETE with PHUS1   [facility] ([facility])    [address] [place] [id number] [phone]           Please bring a list of your medicines (including vitamins, minerals and over-the-counter drugs). Also, tell your doctor about any allergies you may have. Wear comfortable clothes and leave your valuables at home.  Adults: No eating or drinking for 8 hours before the exam. You may take medicine with a small sip of water.  Children: - Children 6+ years: No food or drink for 6 hours before exam. - Children 1-5 years: No food or drink for 4 hours before exam. - Infants, breast-fed: may have breast milk up to 2 hours before exam. - Infants, formula: may have bottle until 4 hours before exam.  Please call the Imaging Department at your exam site with any questions.              Future tests that were ordered for you today     Open Future Orders        Priority Expected Expires Ordered    US Abdomen Complete Routine 7/25/2017 4/26/2018 4/26/2017            Who to contact     If you have questions or need follow up information about today's clinic visit or your schedule please contact [facility] directly at [phone].  Normal or non-critical lab and imaging results will be communicated to you by MyChart, letter or phone within 4 business days after the clinic has received the results. If you do not hear from us  "within 7 days, please contact the clinic through [employer] or phone. If you have a critical or abnormal lab result, we will notify you by phone as soon as possible.  Submit refill requests through [employer] or call your pharmacy and they will forward the refill request to us. Please allow 3 business days for your refill to be completed.          Additional Information About Your Visit        CloudSwitchharSnowman Information     Orugga lets you send messages to your doctor, view your test results, renew your prescriptions, schedule appointments and more. To sign up, go to [email or web address] . Click on \"Log in\" on the left side of the screen, which will take you to the Welcome page. Then click on \"Sign up Now\" on the right side of the page.     You will be asked to enter the access code listed below, as well as some personal information. Please follow the directions to create your username and password.     Your access code is: [email or web address]  Expires: 2017  8:49 PM     Your access code will  in 90 days. If you need help or a new code, please call your [facility] clinic or [phone].        Care EveryWhere ID     This is your Care EveryWhere ID. This could be used by other organizations to access your [facility] medical records  [id number]        Your Vitals Were     Pulse Temperature Respirations Pulse Oximetry BMI (Body Mass Index)       90 96.7  F (35.9  C) (Temporal) 26 96% 17.95 kg/m2        Blood Pressure from Last 3 Encounters:   17 160/72   17 110/60   17 110/60    Weight from Last 3 Encounters:   17 95 lb (43.1 kg)   17 93 lb (42.2 kg)   17 90 lb (40.8 kg)              We Performed the Following     CBC with platelets differential     Comprehensive metabolic panel (BMP + Alb, Alk Phos, ALT, AST, Total. Bili, TP)     Lipase     Magnesium          Today's Medication Changes          These changes are accurate as of: 17  1:57 PM.  If you have any questions, ask your nurse " or doctor.               Start taking these medicines.        Dose/Directions    amoxicillin 875 MG tablet   Commonly known as:  AMOXIL   Used for:  Cough   Started by:  Karan Smith MD        Dose:  875 mg   Take 1 tablet (875 mg) by mouth 2 times daily   Quantity:  20 tablet   Refills:  0            Where to get your medicines      These medications were sent to [id number] - [place], [place] - [address] AT [facility] [address]  [address], [place] [address]     Phone:  [phone]     amoxicillin 875 MG tablet                Primary Care Provider Office Phone # Fax #    Karan Smith [phone]       [facility] [address] [place] [address]        Thank you!     Thank you for choosing [facility]  for your care. Our goal is always to provide you with excellent care. Hearing back from our patients is one way we can continue to improve our services. Please take a few minutes to complete the written survey that you may receive in the mail after your visit with us. Thank you!             Your Updated Medication List - Protect others around you: Learn how to safely use, store and throw away your medicines at www.disposemymeds.org.          This list is accurate as of: 4/26/17  1:57 PM.  Always use your most recent med list.                   Brand Name Dispense Instructions for use    albuterol 108 (90 BASE) MCG/ACT Inhaler    PROAIR HFA/PROVENTIL HFA/VENTOLIN HFA    3 Inhaler    Inhale 2 puffs into the lungs every 4 hours as needed for shortness of breath / dyspnea       amoxicillin 875 MG tablet    AMOXIL    20 tablet    Take 1 tablet (875 mg) by mouth 2 times daily       B-12 1000 MCG Tbcr     30 tablet    Take 1 tablet by mouth daily       chlordiazePOXIDE 25 MG capsule    LIBRIUM    25 capsule    Take 50mg twice daily for 2 days, then take 25mg bid for days 3-5       ferrous sulfate 325 (65 FE) MG tablet    IRON     1 tablet    Take 1 tablet (325 mg) by mouth 2 times daily       folic acid 400 MCG tablet    FOLVITE    75 tablet    Take 2.5 tablets (1 mg) by mouth daily       furosemide 20 MG tablet    LASIX    30 tablet    TAKE 1 TABLET(20 MG) BY MOUTH EVERY MORNING       imiquimod 5 % cream    ALDARA    12 packet    Apply a small sized amount to warts or molluscum three times weekly at bedtime.   Wash off after 8 hours.   May use for up to 16 weeks.       magnesium oxide 400 MG tablet    MAG-OX    90 tablet    Take 1 tablet (400 mg) by mouth 3 times daily       omeprazole 40 MG capsule    priLOSEC    30 capsule    TAKE 1 CAPSULE(40 MG) BY MOUTH DAILY       potassium chloride SA 20 MEQ CR tablet    K-DUR/KLOR-CON M    30 tablet    Take 1 tablet (20 mEq) by mouth daily       thiamine 100 MG tablet     30 tablet    Take 1 tablet (100 mg) by mouth daily

## 2017-04-26 NOTE — NURSING NOTE
"Chief Complaint   Patient presents with     Throat Pain     Patient reports she saw ENT and they did not do anything about her throat and it still hurts     Back Pain     Rib Pain     Patient reports that she would like a CT done because she is having rib and back pain that hurts so bad she can hardly walk. It is causing her to not be able to sleep.      Depression     Patient would like to discuss medical marijauna. She is aware Dr. Smith does not prescribe this. She is also wanting to discuss Wellbutrin.      Musculoskeletal Problem     Patient thinks she has gout in her feet. The left is worse than the right.     Cough     Patient is requesting amoxicllin for her cough     Generalized Body Aches     patient reports everything hurts and that it is causing her to be beside herself and is causing severe depression.        Initial /72  Pulse 90  Temp 96.7  F (35.9  C) (Temporal)  Resp 26  Wt 95 lb (43.1 kg)  SpO2 96%  BMI 17.95 kg/m2 Estimated body mass index is 17.95 kg/(m^2) as calculated from the following:    Height as of 2/1/17: 5' 1\" (1.549 m).    Weight as of this encounter: 95 lb (43.1 kg).  Medication Reconciliation: complete    "

## 2017-04-26 NOTE — PROGRESS NOTES
Clinic Care Coordination Contact 4/26/17  [facility]/Vicky-    Referral Source: PCP  Clinical Data: Care Coordinator Outreach  Outreach attempted x 1.  Left message on voicemail with call back information and requested return call.  Plan: Care Coordinator will try to reach patient again in 3-5 business days.    SHANIKA Duenas  Care Coordinator Social Work    [facility] [place] [facility] [place]  [phone]  4/26/2017 9:46 AM

## 2017-04-26 NOTE — PROGRESS NOTES
SUBJECTIVE:                                                    Alexia Flor is a 61 year old female who presents to clinic today for the following health issues:      Chief Complaint   Patient presents with     Throat Pain     Patient reports she saw ENT and they did not do anything about her throat and it still hurts     Back Pain     Rib Pain     Patient reports that she would like a CT done because she is having rib and back pain that hurts so bad she can hardly walk. It is causing her to not be able to sleep.      Depression     Patient would like to discuss medical marijauna. She is aware Dr. Smith does not prescribe this. She is also wanting to discuss Wellbutrin.      Musculoskeletal Problem     Patient thinks she has gout in her feet. The left is worse than the right.     Cough     Patient is requesting amoxicllin for her cough     Generalized Body Aches     patient reports everything hurts and that it is causing her to be beside herself and is causing severe depression.              Problem list and histories reviewed & adjusted, as indicated.  Additional history: as documented        Reviewed and updated as needed this visit by clinical staff  Tobacco  Allergies  Meds       Reviewed and updated as needed this visit by Provider        SUBJECTIVE:  Alexia  is a 61 year old female who presents for:  Multiple complaints again today. As noted above. Complaining of throat pain. She states she saw ENT person in [place]. I cannot find accurate documentation of this there is some mention. Is complaining of rib and back pain more on the left side states it hurts sometimes she can hardly walk. Her feet are bothering her and wonders if she has gout. She is achy stating everything hurts. She states she wanted to discuss medical marijuana use.    Past Medical History:   Diagnosis Date     Alcohol abuse      Alcoholic hepatitis      Alcoholic pancreatitis      Anxiety      Schafer's esophagus      Breast cancer  (H)     rt, s/p radiation.     Congestive heart failure, unspecified      COPD (chronic obstructive pulmonary disease) (H)      Hypokalemia      Macrocytic anemia      Non-adherence to medical treatment      Tobacco abuse      Past Surgical History:   Procedure Laterality Date     lumpectomy Rt breast  2006       Social History   Substance Use Topics     Smoking status: Current Every Day Smoker     Packs/day: 0.50     Years: 44.00     Types: Cigarettes     Smokeless tobacco: Never Used     Alcohol use 0.5 oz/week     1 Glasses of wine per week      Comment: weekends (reported; smelled of alcohol during clinic visit 4/2016)     Current Outpatient Prescriptions   Medication Sig Dispense Refill     amoxicillin (AMOXIL) 875 MG tablet Take 1 tablet (875 mg) by mouth 2 times daily 20 tablet 0     imiquimod (ALDARA) 5 % cream Apply a small sized amount to warts or molluscum three times weekly at bedtime.   Wash off after 8 hours.   May use for up to 16 weeks. 12 packet 3     ferrous sulfate (IRON) 325 (65 FE) MG tablet Take 1 tablet (325 mg) by mouth 2 times daily 1 tablet 0     chlordiazePOXIDE (LIBRIUM) 25 MG capsule Take 50mg twice daily for 2 days, then take 25mg bid for days 3-5 25 capsule 0     magnesium oxide (MAG-OX) 400 MG tablet Take 1 tablet (400 mg) by mouth 3 times daily 90 tablet 3     folic acid (FOLVITE) 400 MCG tablet Take 2.5 tablets (1 mg) by mouth daily 75 tablet 3     potassium chloride SA (K-DUR/KLOR-CON M) 20 MEQ CR tablet Take 1 tablet (20 mEq) by mouth daily 30 tablet 0     omeprazole (PRILOSEC) 40 MG capsule TAKE 1 CAPSULE(40 MG) BY MOUTH DAILY 30 capsule 11     Cyanocobalamin (B-12) 1000 MCG TBCR Take 1 tablet by mouth daily 30 tablet 3     thiamine 100 MG tablet Take 1 tablet (100 mg) by mouth daily 30 tablet 3     albuterol (PROAIR HFA, PROVENTIL HFA, VENTOLIN HFA) 108 (90 BASE) MCG/ACT inhaler Inhale 2 puffs into the lungs every 4 hours as needed for shortness of breath / dyspnea 3 Inhaler 4      furosemide (LASIX) 20 MG tablet TAKE 1 TABLET(20 MG) BY MOUTH EVERY MORNING 30 tablet 0       REVIEW OF SYSTEMS:   5 point ROS negative except as noted above in HPI, including Gen., Resp, CV, GI &  system review.     OBJECTIVE:  Vitals: /72  Pulse 90  Temp 96.7  F (35.9  C) (Temporal)  Resp 26  Wt 95 lb (43.1 kg)  SpO2 96%  BMI 17.95 kg/m2  BMI= Body mass index is 17.95 kg/(m^2).  She is alert and appears really in no distress. Looks better today than many times I have seen her. Her mucous membranes are moist. Neck is supple. Some distant breath sounds. No rales heard. Abdomen soft she is tender in the epigastric area. Tender along the rib cage in the left side. No rashes noted. Feet show no redness or warmth. After she left labs came back with her lipase at 3434 and her hemoglobin has dropped to 7.2.    ASSESSMENT:  #1 acute alcohol-induced pancreatitis #2 anemia #3 back pain #4cough    PLAN:  She was notified over the phone of the results. Concerning that she may need hospitalization to keep her from drinking alcohol and get liquids into her and watch her hemoglobin seems to be dropping again. She was notified of this. Ultrasound of her right upper quadrant liver and pancreas as ordered.        Karan Smith MD  [facility]

## 2017-04-28 ENCOUNTER — TRANSFERRED RECORDS (OUTPATIENT)
Dept: HEALTH INFORMATION MANAGEMENT | Facility: CLINIC | Age: 62
End: 2017-04-28

## 2017-04-28 ENCOUNTER — TELEPHONE (OUTPATIENT)
Dept: FAMILY MEDICINE | Facility: CLINIC | Age: 62
End: 2017-04-28

## 2017-04-28 LAB
ALT SERPL-CCNC: 19 U/L (ref 8–45)
AST SERPL-CCNC: 79 U/L (ref 5–41)
CREAT SERPL-MCNC: 0.85 MG/DL (ref 0.57–1.11)
GLUCOSE SERPL-MCNC: 111 MG/DL (ref 70–100)
POTASSIUM SERPL-SCNC: 3.2 MMOL/L (ref 3.5–5.1)

## 2017-04-28 NOTE — PROGRESS NOTES
Lipase which is pancreatic enzyme is way up which indicates probable pancreatitis. Hemoglobin is dropping down to 7.2. The pancreatitis may be the cause of your rib and back pain and I would recommend possibly reporting to an emergency room for further follow-up of these abnormal labs you may need some hospitalization

## 2017-04-28 NOTE — TELEPHONE ENCOUNTER
Left VM for patient notifying her to report to the ED and to call us with any questions. KB/SHAQUILLE

## 2017-04-28 NOTE — TELEPHONE ENCOUNTER
----- Message from Karan Smith MD sent at 4/28/2017  2:09 PM CDT -----  Lipase which is pancreatic enzyme is way up which indicates probable pancreatitis. Hemoglobin is dropping down to 7.2. The pancreatitis may be the cause of your rib and back pain and I would recommend possibly reporting to an emergency room for further follow-up of these abnormal labs you may need some hospitalization

## 2017-05-01 ENCOUNTER — TELEPHONE (OUTPATIENT)
Dept: FAMILY MEDICINE | Facility: CLINIC | Age: 62
End: 2017-05-01

## 2017-05-01 NOTE — TELEPHONE ENCOUNTER
"Patient is called and is reporting she is wanting to get in to be seen with her PCP.  She states she was in the ED 3 times this weekend and they told her she has severe pancreatitis and a cerotic liver.  She states she just \"cannot believe that\".  She is informed this has been discussed with her many times that if she does not stop drinking, she will die from this.  She states she is going to check herself into rehab after 5/15/17 due to a hearing she has to go to.  She states she has an US at the [facility] tomorrow for her chest and abdomen.  She is informed if she is wanting to see PCP for these issues, she has been seen for this already and unless she stops drinking, there is not much more Dr. Smith can do for her.    She states she has a terrible cough and thinks she has pneumonia.  She states when she was in the ED in [place] 3 times over the weekend, she had imaging and informed she has bronchitis and is currently on abx for this.  She believes she has something way worse because she is not better yet.  She is informed it will take a few days for these to kick in and for her to start feeling better.  She is informed that her PCP will not do anything further with the cough/bronchitis until she has been on abx for a correct amount of time to see if there is no improvement.    She is also reporting back pain that she would like PCP to refer her to PT for.  She is given options for Friday morning, but started yelling about how she needs to arrange a ride and can only be seen in the afternoon.  She then apologized for yelling due to her pain being so bad.  She is informed PCP will have to look at his schedule to see when she can best be seen for this, but that he is not in office today or tomorrow, so she will not get a call back until at least Wed, if not later.  Patient understands and agrees to this plan.  Routing to PCP for further advice.    Anya Bañuelos RN      "

## 2017-05-01 NOTE — TELEPHONE ENCOUNTER
Reason for call:  Patient reporting a symptom    Symptom or request: back pain    Duration (how long have symptoms been present): couple weeks and getting worse    Have you been treated for this before? No    Additional comments: Pt states she has terrible back pain. I sent a message to Dr. Smith to see if he can work her in on Wed afternoon. But this back pain is so bad, very painful. Please call back and advise. She states she's taking Advil but that's not working. She did states she's going to [facility] tomorrow and having an US to see if she has gull stones. Please call back and advise.     Phone Number patient can be reached at:  Home number on file [phone] (home)    Best Time:  anytime    Can we leave a detailed message on this number:  YES    Call taken on 5/1/2017 at 1:06 PM by Clarissa Smith

## 2017-05-01 NOTE — TELEPHONE ENCOUNTER
Reason for Call:  Same Day Appointment, Requested Provider:  Karan Smith M.D.    PCP: Karan Smith    Reason for visit: Pt was in ER 3 times on Friday night. The ED told her she needs to f/u with her primary in a couple days. Can you work her in Wednesday afternoon. Pt is coming from [place]    Pt is having terrible back pain as well and would like to maybe be referred to Physical Therapy. She's not sleeping    Duration of symptoms:     Have you been treated for this in the past? Yes    Additional comments: Please call pt and advise if you can work her in Wednesday 5/3/17?     Can we leave a detailed message on this number? YES    Phone number patient can be reached at: Home number on file [phone] (home)    Best Time: anytime    Call taken on 5/1/2017 at 12:57 PM by Clarissa Smith

## 2017-05-01 NOTE — TELEPHONE ENCOUNTER
Reason for Call: Request for an order or referral:    Order or referral being requested: [facility] de[facility] called and said they received an order for an ultrasound for tomorrow, but the order they received was not signed by the doctor. Please sign and refax to [phone] and if you have any questions you can call Jessica [phone].    Date needed: as soon as possible    Has the patient been seen by the PCP for this problem? YES    Additional comments:     Phone number Patient can be reached at:  Other phone number:  [phone].    Best Time:  any    Can we leave a detailed message on this number?  YES    Call taken on 5/1/2017 at 8:28 AM by Teresita Johnson

## 2017-05-01 NOTE — TELEPHONE ENCOUNTER
[facility]  Rolly Suarez MA

## 2017-05-02 ENCOUNTER — TRANSFERRED RECORDS (OUTPATIENT)
Dept: HEALTH INFORMATION MANAGEMENT | Facility: CLINIC | Age: 62
End: 2017-05-02

## 2017-05-03 NOTE — TELEPHONE ENCOUNTER
Called pt and left a msg that we can see him today at 3:40. I asked that she call back and confirm appt otherwise she will have to keep her appt for tomorrow.  Anya Sommer MA

## 2017-05-04 ENCOUNTER — TELEPHONE (OUTPATIENT)
Dept: FAMILY MEDICINE | Facility: CLINIC | Age: 62
End: 2017-05-04

## 2017-05-04 NOTE — TELEPHONE ENCOUNTER
Reason for Call:  Other info on patient    Detailed comments: patient was seen today, by homecare. Homecare states patient smelled of alcohol, also states that she is not taking her meds, was seen in the ER in [place] multiple times, until they called the  as she was intoxicated. patient states that she would like to go to treatment and go to detox today. Patient was told to tell Dr. Perez this. Patient states that alcohol is the reason she is not happy and not taking her meds. Patient would like to get help.    Phone Number can be reached at: Other phone number: [phone] [facility]    Best Time: any     Can we leave a detailed message on this number? Not Applicable    Call taken on 5/4/2017 at 12:58 PM by Emilee Garcia

## 2017-05-08 ENCOUNTER — TRANSFERRED RECORDS (OUTPATIENT)
Dept: HEALTH INFORMATION MANAGEMENT | Facility: CLINIC | Age: 62
End: 2017-05-08

## 2017-05-08 ENCOUNTER — OFFICE VISIT (OUTPATIENT)
Dept: FAMILY MEDICINE | Facility: CLINIC | Age: 62
End: 2017-05-08
Payer: COMMERCIAL

## 2017-05-08 VITALS
SYSTOLIC BLOOD PRESSURE: 108 MMHG | TEMPERATURE: 96.1 F | RESPIRATION RATE: 22 BRPM | HEART RATE: 92 BPM | DIASTOLIC BLOOD PRESSURE: 60 MMHG | WEIGHT: 90 LBS | OXYGEN SATURATION: 93 % | BODY MASS INDEX: 17.01 KG/M2

## 2017-05-08 DIAGNOSIS — J20.9 ACUTE BRONCHITIS, UNSPECIFIED ORGANISM: ICD-10-CM

## 2017-05-08 DIAGNOSIS — F10.20 ALCOHOLISM (H): Primary | ICD-10-CM

## 2017-05-08 LAB
ALBUMIN SERPL-MCNC: 3 G/DL (ref 3.4–5)
ALP SERPL-CCNC: 410 U/L (ref 40–150)
ALT SERPL W P-5'-P-CCNC: 40 U/L (ref 0–50)
ANION GAP SERPL CALCULATED.3IONS-SCNC: 18 MMOL/L (ref 3–14)
AST SERPL W P-5'-P-CCNC: 183 U/L (ref 0–45)
BASOPHILS # BLD AUTO: 0.1 10E9/L (ref 0–0.2)
BASOPHILS NFR BLD AUTO: 0.6 %
BILIRUB SERPL-MCNC: 1 MG/DL (ref 0.2–1.3)
BUN SERPL-MCNC: 18 MG/DL (ref 7–30)
CALCIUM SERPL-MCNC: 7.5 MG/DL (ref 8.5–10.1)
CHLORIDE SERPL-SCNC: 100 MMOL/L (ref 94–109)
CO2 SERPL-SCNC: 22 MMOL/L (ref 20–32)
CREAT SERPL-MCNC: 0.88 MG/DL (ref 0.52–1.04)
DIFFERENTIAL METHOD BLD: ABNORMAL
EOSINOPHIL # BLD AUTO: 0.1 10E9/L (ref 0–0.7)
EOSINOPHIL NFR BLD AUTO: 0.9 %
ERYTHROCYTE [DISTWIDTH] IN BLOOD BY AUTOMATED COUNT: 16.5 % (ref 10–15)
GFR SERPL CREATININE-BSD FRML MDRD: 65 ML/MIN/1.7M2
GLUCOSE SERPL-MCNC: 65 MG/DL (ref 70–99)
HCT VFR BLD AUTO: 24.7 % (ref 35–47)
HGB BLD-MCNC: 8.1 G/DL (ref 11.7–15.7)
IMM GRANULOCYTES # BLD: 0.1 10E9/L (ref 0–0.4)
IMM GRANULOCYTES NFR BLD: 1.4 %
LIPASE SERPL-CCNC: 135 U/L (ref 73–393)
LYMPHOCYTES # BLD AUTO: 1.4 10E9/L (ref 0.8–5.3)
LYMPHOCYTES NFR BLD AUTO: 17.9 %
MCH RBC QN AUTO: 37.3 PG (ref 26.5–33)
MCHC RBC AUTO-ENTMCNC: 32.8 G/DL (ref 31.5–36.5)
MCV RBC AUTO: 114 FL (ref 78–100)
MONOCYTES # BLD AUTO: 0.8 10E9/L (ref 0–1.3)
MONOCYTES NFR BLD AUTO: 9.5 %
NEUTROPHILS # BLD AUTO: 5.5 10E9/L (ref 1.6–8.3)
NEUTROPHILS NFR BLD AUTO: 69.7 %
PLATELET # BLD AUTO: 242 10E9/L (ref 150–450)
POTASSIUM SERPL-SCNC: 4.4 MMOL/L (ref 3.4–5.3)
PROT SERPL-MCNC: 7.4 G/DL (ref 6.8–8.8)
RBC # BLD AUTO: 2.17 10E12/L (ref 3.8–5.2)
SODIUM SERPL-SCNC: 140 MMOL/L (ref 133–144)
WBC # BLD AUTO: 7.9 10E9/L (ref 4–11)

## 2017-05-08 PROCEDURE — 99214 OFFICE O/P EST MOD 30 MIN: CPT | Performed by: FAMILY MEDICINE

## 2017-05-08 PROCEDURE — 36415 COLL VENOUS BLD VENIPUNCTURE: CPT | Performed by: FAMILY MEDICINE

## 2017-05-08 PROCEDURE — 85025 COMPLETE CBC W/AUTO DIFF WBC: CPT | Performed by: FAMILY MEDICINE

## 2017-05-08 PROCEDURE — 80053 COMPREHEN METABOLIC PANEL: CPT | Performed by: FAMILY MEDICINE

## 2017-05-08 PROCEDURE — 83690 ASSAY OF LIPASE: CPT | Performed by: FAMILY MEDICINE

## 2017-05-08 RX ORDER — AZITHROMYCIN 250 MG/1
TABLET, FILM COATED ORAL
Qty: 6 TABLET | Refills: 0 | Status: SHIPPED | OUTPATIENT
Start: 2017-05-08 | End: 2017-06-02

## 2017-05-08 NOTE — NURSING NOTE
"Chief Complaint   Patient presents with     Back Pain     Cough     referral     Patient would like a referral to go to treatment in [place].        Initial /60  Pulse 92  Temp 96.1  F (35.6  C) (Temporal)  Resp 22  Wt 90 lb (40.8 kg)  SpO2 93%  BMI 17.01 kg/m2 Estimated body mass index is 17.01 kg/(m^2) as calculated from the following:    Height as of 2/1/17: 5' 1\" (1.549 m).    Weight as of this encounter: 90 lb (40.8 kg).  Medication Reconciliation: complete    "

## 2017-05-08 NOTE — PROGRESS NOTES
SUBJECTIVE:                                                    Alexia Flor is a 61 year old female who presents to clinic today for the following health issues:      Chief Complaint   Patient presents with     Back Pain     Cough     referral     Patient would like a referral to go to treatment in [place].              Problem list and histories reviewed & adjusted, as indicated.  Additional history: as documented        Reviewed and updated as needed this visit by clinical staff  Tobacco  Allergies  Meds  Soc Hx      Reviewed and updated as needed this visit by Provider        SUBJECTIVE:  Alexia  is a 62 year old female who presents for:  Follow-up of her pancreatitis and alcoholism and weakness. She states she is no appetite and forces herself to eat. In for some lab work today. She states she is motivated to get into alcohol inpatient treatment. She states she is not prepared to die.. Also complaining of a harsh cough today.    Past Medical History:   Diagnosis Date     Alcohol abuse      Alcoholic hepatitis      Alcoholic pancreatitis      Anxiety      Schafer's esophagus      Breast cancer (H)     rt, s/p radiation.     Congestive heart failure, unspecified      COPD (chronic obstructive pulmonary disease) (H)      Hypokalemia      Macrocytic anemia      Non-adherence to medical treatment      Tobacco abuse      Past Surgical History:   Procedure Laterality Date     lumpectomy Rt breast  2006       Social History   Substance Use Topics     Smoking status: Current Every Day Smoker     Packs/day: 0.50     Years: 44.00     Types: Cigarettes     Smokeless tobacco: Never Used     Alcohol use 0.5 oz/week     1 Glasses of wine per week      Comment: weekends (reported; smelled of alcohol during clinic visit 4/2016)     Current Outpatient Prescriptions   Medication Sig Dispense Refill     azithromycin (ZITHROMAX) 250 MG tablet Two tablets first day, then one tablet daily for four days. 6 tablet 0     furosemide  (LASIX) 20 MG tablet TAKE 1 TABLET(20 MG) BY MOUTH EVERY MORNING 30 tablet 0     imiquimod (ALDARA) 5 % cream Apply a small sized amount to warts or molluscum three times weekly at bedtime.   Wash off after 8 hours.   May use for up to 16 weeks. 12 packet 3     ferrous sulfate (IRON) 325 (65 FE) MG tablet Take 1 tablet (325 mg) by mouth 2 times daily 1 tablet 0     chlordiazePOXIDE (LIBRIUM) 25 MG capsule Take 50mg twice daily for 2 days, then take 25mg bid for days 3-5 25 capsule 0     magnesium oxide (MAG-OX) 400 MG tablet Take 1 tablet (400 mg) by mouth 3 times daily 90 tablet 3     folic acid (FOLVITE) 400 MCG tablet Take 2.5 tablets (1 mg) by mouth daily 75 tablet 3     potassium chloride SA (K-DUR/KLOR-CON M) 20 MEQ CR tablet Take 1 tablet (20 mEq) by mouth daily 30 tablet 0     omeprazole (PRILOSEC) 40 MG capsule TAKE 1 CAPSULE(40 MG) BY MOUTH DAILY 30 capsule 11     Cyanocobalamin (B-12) 1000 MCG TBCR Take 1 tablet by mouth daily 30 tablet 3     thiamine 100 MG tablet Take 1 tablet (100 mg) by mouth daily 30 tablet 3     albuterol (PROAIR HFA, PROVENTIL HFA, VENTOLIN HFA) 108 (90 BASE) MCG/ACT inhaler Inhale 2 puffs into the lungs every 4 hours as needed for shortness of breath / dyspnea 3 Inhaler 4       REVIEW OF SYSTEMS:   5 point ROS negative except as noted above in HPI, including Gen., Resp, CV, GI &  system review.     OBJECTIVE:  Vitals: /60  Pulse 92  Temp 96.1  F (35.6  C) (Temporal)  Resp 22  Wt 90 lb (40.8 kg)  SpO2 93%  BMI 17.01 kg/m2  BMI= Body mass index is 17.01 kg/(m^2).  She is alert very anxious. Fidgety. Does not smell of alcohol daily. Mucous membranes are moist. Her lungs with a few wheezes. Heart regular rhythm. Abdomen soft some tenderness noted especially in the epigastric area. 70s with no edema. Skin clear. Awaiting future labs.    ASSESSMENT:  One alcoholism #2 bronchitis #3 resolving pancreatitis #4 anemia secondary to blood loss    PLAN:  We'll notify her with her  lab results. They were getting better. She looks stable today. She needs to get into inpatient alcohol treatment. We've spoken with . There moving forward. Follow her up on a regular basis.  Tobacco Cessation Action Plan: Self help information given to patient  Weight management plan noted, stable and monitoring    Karan Smith MD  [facility]

## 2017-05-08 NOTE — MR AVS SNAPSHOT
"              After Visit Summary   5/8/2017    Alexia Flor    MRN: [id number]           Patient Information     Date Of Birth          1955        Visit Information        Provider Department      5/8/2017 4:40 PM Karan Smith MD [facility]        Today's Diagnoses     Alcoholism (H)    -  1       Follow-ups after your visit        Your next 10 appointments already scheduled     May 08, 2017  4:40 PM CDT   [facility]    [address] [place] [id number] [phone]           Bring a current list of meds and any records pertaining to this visit.  For Physicals, please bring immunization records and any forms needing to be filled out.  Please arrive 10 minutes early to complete paperwork.              Who to contact     If you have questions or need follow up information about today's clinic visit or your schedule please contact [facility] directly at [phone].  Normal or non-critical lab and imaging results will be communicated to you by [facility], letter or phone within 4 business days after the clinic has received the results. If you do not hear from us within 7 days, please contact the clinic through [email or web address] or phone. If you have a critical or abnormal lab result, we will notify you by phone as soon as possible.  Submit refill requests through [employer] or call your pharmacy and they will forward the refill request to us. Please allow 3 business days for your refill to be completed.          Additional Information About Your Visit        [facility] Information     [employer] lets you send messages to your doctor, view your test results, renew your prescriptions, schedule appointments and more. To sign up, go to [email or web address] . Click on \"Log in\" on the left side of the screen, which will take you to the Welcome page. Then click on \"Sign up Now\" on the right side of " the page.     You will be asked to enter the access code listed below, as well as some personal information. Please follow the directions to create your username and password.     Your access code is: [email or web address]  Expires: 2017  8:49 PM     Your access code will  in 90 days. If you need help or a new code, please call your [facility] or [phone].        Care EveryWhere ID     This is your Care EveryWhere ID. This could be used by other organizations to access your [facility] medical records  [id number]        Your Vitals Were     Pulse Temperature Respirations Pulse Oximetry BMI (Body Mass Index)       92 96.1  F (35.6  C) (Temporal) 22 93% 17.01 kg/m2        Blood Pressure from Last 3 Encounters:   17 108/60   17 160/72   17 110/60    Weight from Last 3 Encounters:   17 90 lb (40.8 kg)   17 95 lb (43.1 kg)   17 93 lb (42.2 kg)              We Performed the Following     CBC with platelets and differential     Comprehensive metabolic panel (BMP + Alb, Alk Phos, ALT, AST, Total. Bili, TP)     Lipase        Primary Care Provider Office Phone # Fax #    Karan Smith [phone]       [facility] [address] [place] [id number]        Thank you!     Thank you for choosing [facility]  for your care. Our goal is always to provide you with excellent care. Hearing back from our patients is one way we can continue to improve our services. Please take a few minutes to complete the written survey that you may receive in the mail after your visit with us. Thank you!             Your Updated Medication List - Protect others around you: Learn how to safely use, store and throw away your medicines at www.disposemymeds.org.          This list is accurate as of: 17  3:22 PM.  Always use your most recent med list.                   Brand Name Dispense Instructions for use    albuterol 108 (90 BASE) MCG/ACT Inhaler     PROAIR HFA/PROVENTIL HFA/VENTOLIN HFA    3 Inhaler    Inhale 2 puffs into the lungs every 4 hours as needed for shortness of breath / dyspnea       B-12 1000 MCG Tbcr     30 tablet    Take 1 tablet by mouth daily       chlordiazePOXIDE 25 MG capsule    LIBRIUM    25 capsule    Take 50mg twice daily for 2 days, then take 25mg bid for days 3-5       ferrous sulfate 325 (65 FE) MG tablet    IRON    1 tablet    Take 1 tablet (325 mg) by mouth 2 times daily       folic acid 400 MCG tablet    FOLVITE    75 tablet    Take 2.5 tablets (1 mg) by mouth daily       furosemide 20 MG tablet    LASIX    30 tablet    TAKE 1 TABLET(20 MG) BY MOUTH EVERY MORNING       imiquimod 5 % cream    ALDARA    12 packet    Apply a small sized amount to warts or molluscum three times weekly at bedtime.   Wash off after 8 hours.   May use for up to 16 weeks.       magnesium oxide 400 MG tablet    MAG-OX    90 tablet    Take 1 tablet (400 mg) by mouth 3 times daily       omeprazole 40 MG capsule    priLOSEC    30 capsule    TAKE 1 CAPSULE(40 MG) BY MOUTH DAILY       potassium chloride SA 20 MEQ CR tablet    K-DUR/KLOR-CON M    30 tablet    Take 1 tablet (20 mEq) by mouth daily       thiamine 100 MG tablet     30 tablet    Take 1 tablet (100 mg) by mouth daily

## 2017-05-09 ENCOUNTER — TELEPHONE (OUTPATIENT)
Dept: FAMILY MEDICINE | Facility: CLINIC | Age: 62
End: 2017-05-09

## 2017-05-09 NOTE — TELEPHONE ENCOUNTER
Reason for Call: Request for a referral:    Order or referral being requested: Patient states she needs help & requesting to be sent to [place] for alcoholism inpatient treatment, please advise asap.  Patient was advised that Dr Smith is not in clinic today    Date needed: as soon as possible    Has the patient been seen by the PCP for this problem? YES    Additional comments:     Phone number Patient can be reached at:  Home number on file [phone] (home)    Best Time:  any    Can we leave a detailed message on this number?  YES    Call taken on 5/9/2017 at 4:03 PM by Dai Montgomery

## 2017-05-10 ENCOUNTER — CARE COORDINATION (OUTPATIENT)
Dept: CARE COORDINATION | Facility: CLINIC | Age: 62
End: 2017-05-10

## 2017-05-10 ENCOUNTER — TELEPHONE (OUTPATIENT)
Dept: FAMILY MEDICINE | Facility: CLINIC | Age: 62
End: 2017-05-10

## 2017-05-10 NOTE — PROGRESS NOTES
Clinic Care Coordination Contact 5/10/17  [facility]/Vicky-    Referral Source: PCP  Clinical Data: Care Coordinator Outreach  Outreach attempted x 2.  Left message on voicemail with call back information and requested return call.  Plan: Care Coordinator will try to reach patient again in 3-5 business days.    SHANIKA Duenas  Care Coordinator Social Work    [facility] [place]  [phone]  5/10/2017 3:34 PM

## 2017-05-10 NOTE — LETTER
[place] CARE COORDINATION  [address] [facility] [address] [facility] [address]  [phone]      May 15, 2017      Alexia [address]S [address]  [facility] [address]    Dear Alexia,  I am the Clinic Care Coordinator that works with your primary care provider's clinic. I have been trying to reach you recently to introduce Clinic Care Coordination and to see if there was anything I could assist you with.  Below is a description of what Clinic Care Coordination is and how I can further assist you.     The Clinic Care Coordinator role is a Registered Nurse and/or  who understands the health care system. The goal of Clinic Care Coordination is to help you manage your health and improve access to the [facility] system in the most efficient manner.  The Registered Nurse can assist you in meeting your health care goals by providing education, coordinating services, and strengthening the communication among your providers. The  can assist you with financial, behavioral, psychosocial, and chemical dependency and counseling/psychiatric resources.    Please feel free to keep this letter and contact information to contact me at [phone] with any further questions or concerns that may arise. We at [facility] are focused on providing you with the highest-quality healthcare experience possible and that all starts with you.       Sincerely,       SHANIKA Duenas   Clinical Care Coordinator  [phone]

## 2017-05-10 NOTE — PROGRESS NOTES
Clinic Care Coordination Contact 5/10/17  [facility]/Vicky-    Referral Source: PCP  Clinical Data: Care Coordinator Outreach as pt was in clinic 2 days ago and wanted CD resources.   Outreach attempted x 1.  Left message on voicemail with call back information and requested return call.  Plan: Care Coordinator will try to reach patient again in 3-5 business days.    SHANIKA Duenas  Care Coordinator Social Work    [facility] and [place]  [phone]  5/10/2017 11:23 AM

## 2017-05-10 NOTE — TELEPHONE ENCOUNTER
Patient returned call, states she told Dr Smith at the time of her office visit on 5-8-17 that she wanted a referral to an inpatient treatment in [place]. Patient was very angry, was yelling on the phone wanting to know why nobody has taken care of this referral for her yet.

## 2017-05-10 NOTE — TELEPHONE ENCOUNTER
Reason for Call:  Other call back    Detailed comments: [facility] nurse called asking to speak with Dr. Smith or his team to talk about patient. And to inform Lili of any updates on patient.     Phone Number Patient can be reached at: Other phone number:  [phone]    Best Time: anytime    Can we leave a detailed message on this number? YES    Call taken on 5/10/2017 at 9:50 AM by Lucia Cassidy

## 2017-05-11 NOTE — TELEPHONE ENCOUNTER
Called Lili and informed her that our care coordination has been trying to reach her to get her into treatment and she doesn't answer or call back. Lili stated that she has a nurse going out today and will inform her. She also stated that they have a contract with her that if she is drunk when they go out there, they will dismiss her.  Anya Sommer MA

## 2017-05-15 NOTE — PROGRESS NOTES
Clinic Care Coordination Contact 5/15/17  [facility]/Vicky-    Referral Source: PCP  Clinical Data: Care Coordinator Outreach  Outreach attempted x 3.  Left message on [employer] with call back information and requested return call.  Plan: Care Coordinator mailed out care coordination introduction letter on 5/15/17. Care Coordinator will try to reach patient again in 3-5 business days.    SHANIKA Duenas  Care Coordinator [facility] Work    [facility] [place] and [place]  [phone]  5/15/2017 3:07 PM

## 2017-05-17 NOTE — PROGRESS NOTES
Clinic Care Coordination Contact 5/17/17  [facility]/Voicemail    Referral Source: PCP  Clinical Data: Care Coordinator Outreach  Outreach attempted x 4.  Left message on [employer] with call back information and requested return call.  Plan: Care Coordinator mailed out care coordination introduction letter on 5/15/17. Care Coordinator will try to reach patient again in 3-5 business days.    UPDATE: Pt returned this writer's phone call. She states she has an apt to obtain a rule 25 at [place] on 5/22/17. She explained that her pancreas was hurting a lot. This number encouraged her to connect with the triage RN to discuss her symptoms and determine if she should be seen. Phone number was given to her. This writer will plan on connecting with the pt next week to see how the rule 25 went and what proposed treatment programs she will engage in, if any.      SHANIKA Duenas  Care Coordinator Social Work    [facility] [place]  [phone]  5/17/2017 8:45 AM

## 2017-05-19 ENCOUNTER — CARE COORDINATION (OUTPATIENT)
Dept: CARE COORDINATION | Facility: CLINIC | Age: 62
End: 2017-05-19

## 2017-05-19 NOTE — PROGRESS NOTES
Clinic Care Coordination Contact  Care Team Conversations    Pt left a message asking for a return call to know why Layla had called her.  Returned call to pt and reviewed last note with her.  She said that her appointment is on Tuesday the 23rd at [facility] to see if she can get into treatment there.      Pt asked how this writer could assist and she declined any needs.  Will route message to Layla as an FYI.     SHANIKA Slater, Clinic Care Coordinator 5/19/2017   1:14 PM  [phone]

## 2017-05-22 NOTE — PROGRESS NOTES
Clinic Care Coordination Contact  Care Team Conversations    This writer also received a voice message from pt on Friday afternoon (writer was out of the office) stating that Layla had previously left a message for her and wanted to know why.  This writer reviewed pt's EMR and discovered that Lizabeth, covering , had already contacted the pt and discussed pt's needs.    Daisy Caicedo  Social Work Care Coordinator  [facility]  [phone]

## 2017-05-31 ENCOUNTER — CARE COORDINATION (OUTPATIENT)
Dept: CARE COORDINATION | Facility: CLINIC | Age: 62
End: 2017-05-31

## 2017-05-31 DIAGNOSIS — D50.0 IRON DEFICIENCY ANEMIA DUE TO CHRONIC BLOOD LOSS: ICD-10-CM

## 2017-05-31 NOTE — PROGRESS NOTES
Clinic Care Coordination Contact 5/31/17  Care Team Conversations-SW    Phone call made to pt to find out how she was doing with obtaining her rule 25. Pt states she will begin the IP program at [facility] on or after 6/21/17. Pt states other than that she has been doing pretty good. She complained about feeling achy in general and having a runny nose and itchy eyes. She has an apt with Dr. Smith on Friday and will discuss the possibility of allergies.     Conversation about how the pt has been doing and how her mother has been. She said things have been pretty good and that she and her mother had a big supper on Memorial Day. She said she likes to cook when she has company.     [facility] will plan on calling the pt in approx 1 month. I encouraged her to connect with me if she needed assistance prior to than. Pt agreed.    Layla Falk, \[facility]\""  Care Coordinator Social Work    [facility] [place]  [phone]  5/31/2017 1:21 PM

## 2017-05-31 NOTE — TELEPHONE ENCOUNTER
ferrous gluconate       Last Written Prescription Date:  NA  Last Fill Quantity: NA,   # refills: NA  Last Office Visit with [facility], [facility] or [facility] prescribing provider:   Future Office visit:    Next 5 appointments (look out 90 days)     Jun 02, 2017  2:00 PM CDT   Office Visit with Karan Smith MD   [facility]    [address] [place] [id number]   [phone]                   Routing refill request to provider for review/approval because:  Drug not on the [facility], [facility] or [facility] refill protocol or controlled substance  Chasity Quinn MA 5/31/2017

## 2017-06-01 RX ORDER — FERROUS GLUCONATE 324(38)MG
TABLET ORAL
Qty: 100 TABLET | Refills: 0 | Status: ON HOLD | OUTPATIENT
Start: 2017-06-01 | End: 2017-01-01

## 2017-06-02 ENCOUNTER — TRANSFERRED RECORDS (OUTPATIENT)
Dept: HEALTH INFORMATION MANAGEMENT | Facility: CLINIC | Age: 62
End: 2017-06-02

## 2017-06-02 ENCOUNTER — OFFICE VISIT (OUTPATIENT)
Dept: FAMILY MEDICINE | Facility: CLINIC | Age: 62
End: 2017-06-02
Payer: COMMERCIAL

## 2017-06-02 ENCOUNTER — TELEPHONE (OUTPATIENT)
Dept: FAMILY MEDICINE | Facility: CLINIC | Age: 62
End: 2017-06-02

## 2017-06-02 VITALS
OXYGEN SATURATION: 93 % | HEART RATE: 94 BPM | WEIGHT: 91 LBS | TEMPERATURE: 96.9 F | RESPIRATION RATE: 20 BRPM | BODY MASS INDEX: 17.19 KG/M2 | DIASTOLIC BLOOD PRESSURE: 60 MMHG | SYSTOLIC BLOOD PRESSURE: 100 MMHG

## 2017-06-02 DIAGNOSIS — L29.9 PRURITIC DISORDER: ICD-10-CM

## 2017-06-02 DIAGNOSIS — R94.5 LIVER FUNCTION ABNORMALITY: ICD-10-CM

## 2017-06-02 DIAGNOSIS — D53.9 MACROCYTIC ANEMIA: Primary | ICD-10-CM

## 2017-06-02 DIAGNOSIS — R05.9 COUGH: ICD-10-CM

## 2017-06-02 DIAGNOSIS — F10.20 ALCOHOLISM (H): ICD-10-CM

## 2017-06-02 LAB
ALBUMIN SERPL-MCNC: 3.1 G/DL (ref 3.4–5)
ALP SERPL-CCNC: 302 U/L (ref 40–150)
ALT SERPL W P-5'-P-CCNC: 46 U/L (ref 0–50)
ANION GAP SERPL CALCULATED.3IONS-SCNC: 11 MMOL/L (ref 3–14)
AST SERPL W P-5'-P-CCNC: 124 U/L (ref 0–45)
BASOPHILS # BLD AUTO: 0.1 10E9/L (ref 0–0.2)
BASOPHILS NFR BLD AUTO: 0.6 %
BILIRUB SERPL-MCNC: 0.7 MG/DL (ref 0.2–1.3)
BUN SERPL-MCNC: 15 MG/DL (ref 7–30)
CALCIUM SERPL-MCNC: 8.7 MG/DL (ref 8.5–10.1)
CHLORIDE SERPL-SCNC: 110 MMOL/L (ref 94–109)
CO2 SERPL-SCNC: 24 MMOL/L (ref 20–32)
CREAT SERPL-MCNC: 0.93 MG/DL (ref 0.52–1.04)
DIFFERENTIAL METHOD BLD: ABNORMAL
EOSINOPHIL # BLD AUTO: 0.1 10E9/L (ref 0–0.7)
EOSINOPHIL NFR BLD AUTO: 1.7 %
ERYTHROCYTE [DISTWIDTH] IN BLOOD BY AUTOMATED COUNT: 21.5 % (ref 10–15)
GFR SERPL CREATININE-BSD FRML MDRD: 61 ML/MIN/1.7M2
GLUCOSE SERPL-MCNC: 91 MG/DL (ref 70–99)
HCT VFR BLD AUTO: 22.4 % (ref 35–47)
HGB BLD-MCNC: 6.8 G/DL (ref 11.7–15.7)
IMM GRANULOCYTES # BLD: 0 10E9/L (ref 0–0.4)
IMM GRANULOCYTES NFR BLD: 0.3 %
LYMPHOCYTES # BLD AUTO: 1.8 10E9/L (ref 0.8–5.3)
LYMPHOCYTES NFR BLD AUTO: 22.5 %
MCH RBC QN AUTO: 38.4 PG (ref 26.5–33)
MCHC RBC AUTO-ENTMCNC: 30.4 G/DL (ref 31.5–36.5)
MCV RBC AUTO: 127 FL (ref 78–100)
MONOCYTES # BLD AUTO: 0.9 10E9/L (ref 0–1.3)
MONOCYTES NFR BLD AUTO: 10.8 %
NEUTROPHILS # BLD AUTO: 5.1 10E9/L (ref 1.6–8.3)
NEUTROPHILS NFR BLD AUTO: 64.1 %
PLATELET # BLD AUTO: 221 10E9/L (ref 150–450)
POTASSIUM SERPL-SCNC: 3.7 MMOL/L (ref 3.4–5.3)
PROT SERPL-MCNC: 7.5 G/DL (ref 6.8–8.8)
RBC # BLD AUTO: 1.77 10E12/L (ref 3.8–5.2)
SODIUM SERPL-SCNC: 145 MMOL/L (ref 133–144)
WBC # BLD AUTO: 7.9 10E9/L (ref 4–11)

## 2017-06-02 PROCEDURE — 80053 COMPREHEN METABOLIC PANEL: CPT | Performed by: FAMILY MEDICINE

## 2017-06-02 PROCEDURE — 36415 COLL VENOUS BLD VENIPUNCTURE: CPT | Performed by: FAMILY MEDICINE

## 2017-06-02 PROCEDURE — 85025 COMPLETE CBC W/AUTO DIFF WBC: CPT | Performed by: FAMILY MEDICINE

## 2017-06-02 PROCEDURE — 99214 OFFICE O/P EST MOD 30 MIN: CPT | Performed by: FAMILY MEDICINE

## 2017-06-02 RX ORDER — AZITHROMYCIN 250 MG/1
TABLET, FILM COATED ORAL
Qty: 6 TABLET | Refills: 0 | Status: SHIPPED | OUTPATIENT
Start: 2017-06-02 | End: 2017-06-12

## 2017-06-02 RX ORDER — CETIRIZINE HYDROCHLORIDE 10 MG/1
10 TABLET ORAL EVERY EVENING
Qty: 14 TABLET | Refills: 1 | Status: SHIPPED | OUTPATIENT
Start: 2017-06-02 | End: 2017-07-17

## 2017-06-02 ASSESSMENT — PAIN SCALES - GENERAL: PAINLEVEL: NO PAIN (0)

## 2017-06-02 NOTE — PROGRESS NOTES
SUBJECTIVE:                                                    Alexia Flor is a 62 year old female who presents to clinic today for the following health issues:      Fatigue         Problem list and histories reviewed & adjusted, as indicated.  Additional history:         Reviewed and updated as needed this visit by clinical staff  Allergies  Meds       Reviewed and updated as needed this visit by Provider        SUBJECTIVE:  Alexia  is a 62 year old female who presents for:  Vague multiple complaints. She states she is just fatigued for. States she is cut down on her drinking. She is set up for intake into [facility] on June 21. Has a cough. Dates her appetite is been okay. Denies abdominal pain. Complains of pruritus.    Past Medical History:   Diagnosis Date     Alcohol abuse      Alcoholic hepatitis      Alcoholic pancreatitis      Anxiety      Schafer's esophagus      Breast cancer (H)     rt, s/p radiation.     Congestive heart failure, unspecified      COPD (chronic obstructive pulmonary disease) (H)      Hypokalemia      Macrocytic anemia      Non-adherence to medical treatment      Tobacco abuse      Past Surgical History:   Procedure Laterality Date     lumpectomy Rt breast  2006       Social History   Substance Use Topics     Smoking status: Current Every Day Smoker     Packs/day: 0.50     Years: 44.00     Types: Cigarettes     Smokeless tobacco: Never Used     Alcohol use 0.5 oz/week     1 Glasses of wine per week      Comment: weekends (reported; smelled of alcohol during clinic visit 4/2016)     Current Outpatient Prescriptions   Medication Sig Dispense Refill     azithromycin (ZITHROMAX) 250 MG tablet Two tablets first day, then one tablet daily for four days. 6 tablet 0     cetirizine (ZYRTEC) 10 MG tablet Take 1 tablet (10 mg) by mouth every evening 14 tablet 1     ferrous gluconate (FERGON) 324 (38 FE) MG tablet TAKE 1 TABLET(324 MG) BY MOUTH DAILY WITH BREAKFAST 100 tablet 0     ferrous sulfate  (IRON) 325 (65 FE) MG tablet Take 1 tablet (325 mg) by mouth 2 times daily 1 tablet 0     chlordiazePOXIDE (LIBRIUM) 25 MG capsule Take 50mg twice daily for 2 days, then take 25mg bid for days 3-5 25 capsule 0     Cyanocobalamin (B-12) 1000 MCG TBCR Take 1 tablet by mouth daily 30 tablet 3     albuterol (PROAIR HFA, PROVENTIL HFA, VENTOLIN HFA) 108 (90 BASE) MCG/ACT inhaler Inhale 2 puffs into the lungs every 4 hours as needed for shortness of breath / dyspnea 3 Inhaler 4     furosemide (LASIX) 20 MG tablet TAKE 1 TABLET(20 MG) BY MOUTH EVERY MORNING 30 tablet 0     imiquimod (ALDARA) 5 % cream Apply a small sized amount to warts or molluscum three times weekly at bedtime.   Wash off after 8 hours.   May use for up to 16 weeks. 12 packet 3     magnesium oxide (MAG-OX) 400 MG tablet Take 1 tablet (400 mg) by mouth 3 times daily 90 tablet 3     folic acid (FOLVITE) 400 MCG tablet Take 2.5 tablets (1 mg) by mouth daily 75 tablet 3     potassium chloride SA (K-DUR/KLOR-CON M) 20 MEQ CR tablet Take 1 tablet (20 mEq) by mouth daily 30 tablet 0     omeprazole (PRILOSEC) 40 MG capsule TAKE 1 CAPSULE(40 MG) BY MOUTH DAILY 30 capsule 11     thiamine 100 MG tablet Take 1 tablet (100 mg) by mouth daily 30 tablet 3       REVIEW OF SYSTEMS:   5 point ROS negative except as noted above in HPI, including Gen., Resp, CV, GI &  system review.     OBJECTIVE:  Vitals: /60  Pulse 94  Temp 96.9  F (36.1  C) (Tympanic)  Resp 20  Wt 91 lb (41.3 kg)  SpO2 93%  BMI 17.19 kg/m2  BMI= Body mass index is 17.19 kg/(m^2).  Frail and thin but appears in no distress. Alert and oriented. Smells somewhat of alcohol. Throat is clear. Neck is supple. Lungs are with a few wheezes and rhonchi. Abdomen is soft bowel sounds present not really tender. Skin with few areas of fine papular red rash. After she had left her hemoglobin came back at 6.8. Liver function actually looks better.    ASSESSMENT:  #1 anemia #2 alcoholism #3 liver function  abnormalities #4 cough #5 pruritic disorder    PLAN:  She doesn't have a cell phone we tried calling her soon after she left here. Had to call her at home. Also notified her mother who is emergency contact of her anemia which is getting to the critical point. She has been down to 7.2 before but not that I can see below 7. She needs to get a transfusion. She was called and became angry and belligerent with the nurse. We told her how important this was. States she refuses to go to [place] because she will be arrested because of problems she has caused there before. Has home care and will contact them about the situation as well.  Tobacco Cessation Action Plan: Self help information given to patient  Weight management plan noted, stable and monitoring    Karan Smith MD  [facility]

## 2017-06-02 NOTE — TELEPHONE ENCOUNTER
Alexia called back to ask about the lab result.  Patient states she does not have a ride back to [place] and refuses to go to [facility] closer to home.    Alexia became beligerant at RN because we did not have her HGB result while she was here. RN hung up on Alexia due to rudeness.  Kristyn Smith RN

## 2017-06-02 NOTE — NURSING NOTE
"Chief Complaint   Patient presents with     Fatigue       Initial /60  Pulse 94  Temp 96.9  F (36.1  C) (Tympanic)  Resp 20  Wt 91 lb (41.3 kg)  SpO2 (!) 86%  BMI 17.19 kg/m2 Estimated body mass index is 17.19 kg/(m^2) as calculated from the following:    Height as of 2/1/17: 5' 1\" (1.549 m).    Weight as of this encounter: 91 lb (41.3 kg).  BP completed using cuff size: derick Sommer MA      "

## 2017-06-02 NOTE — TELEPHONE ENCOUNTER
I called pt and left a msg on her voice mail that Per  she really needs to go to the ER cause her hemoglobin is low and needs a blood transfusion. She can either go to [facility] or back her to [place]. I did also call her mom and informed her of this so that Alexia will get this msg.  Anya Sommer MA

## 2017-06-02 NOTE — MR AVS SNAPSHOT
"              After Visit Summary   2017    Alexia Flor    MRN: [id number]           Patient Information     Date Of Birth          1955        Visit Information        Provider Department      2017 2:00 PM Karan Smith MD [facility]        Today's Diagnoses     Macrocytic anemia    -  1    Liver function abnormality        Cough        Alcoholism (H)        Pruritic disorder           Follow-ups after your visit        Who to contact     If you have questions or need follow up information about today's clinic visit or your schedule please contact [facility] directly at [phone].  Normal or non-critical lab and imaging results will be communicated to you by [employer], letter or phone within 4 business days after the clinic has received the results. If you do not hear from us within 7 days, please contact the clinic through [employer] or phone. If you have a critical or abnormal lab result, we will notify you by phone as soon as possible.  Submit refill requests through [employer] or call your pharmacy and they will forward the refill request to us. Please allow 3 business days for your refill to be completed.          Additional Information About Your Visit        MyChart Information     [employer] lets you send messages to your doctor, view your test results, renew your prescriptions, schedule appointments and more. To sign up, go to [email or web address] . Click on \"Log in\" on the left side of the screen, which will take you to the Welcome page. Then click on \"Sign up Now\" on the right side of the page.     You will be asked to enter the access code listed below, as well as some personal information. Please follow the directions to create your username and password.     Your access code is: [email or web address]  Expires: 2017  4:38 PM     Your access code will  in 90 days. If you need help or a new code, please call your [facility] or [phone].      "   Care EveryWhere ID     This is your Care EveryWhere ID. This could be used by other organizations to access your [place] medical records  [id number]        Your Vitals Were     Pulse Temperature Respirations Pulse Oximetry BMI (Body Mass Index)       94 96.9  F (36.1  C) (Tympanic) 20 93% 17.19 kg/m2        Blood Pressure from Last 3 Encounters:   06/02/17 100/60   05/08/17 108/60   04/26/17 160/72    Weight from Last 3 Encounters:   06/02/17 91 lb (41.3 kg)   05/08/17 90 lb (40.8 kg)   04/26/17 95 lb (43.1 kg)              We Performed the Following     CBC with platelets differential     Comprehensive metabolic panel (BMP + Alb, Alk Phos, ALT, AST, Total. Bili, TP)          Today's Medication Changes          These changes are accurate as of: 6/2/17  4:38 PM.  If you have any questions, ask your nurse or doctor.               Start taking these medicines.        Dose/Directions    azithromycin 250 MG tablet   Commonly known as:  ZITHROMAX   Used for:  Cough   Started by:  Karan Smith MD        Two tablets first day, then one tablet daily for four days.   Quantity:  6 tablet   Refills:  0       cetirizine 10 MG tablet   Commonly known as:  zyrTEC   Used for:  Pruritic disorder   Started by:  Karan Smith MD        Dose:  10 mg   Take 1 tablet (10 mg) by mouth every evening   Quantity:  14 tablet   Refills:  1            Where to get your medicines      These medications were sent to [id number] [address] [place] - [address] AT [address] [place]  [address], [place] [address]     Phone:  [phone]     azithromycin 250 MG tablet    cetirizine 10 MG tablet                Primary Care Provider Office Phone # Fax #    Karan Smith [phone]       [place] [address] [place] [address]        Thank you!     Thank you for choosing [facility]  for your care. Our goal is always to provide you with  excellent care. Hearing back from our patients is one way we can continue to improve our services. Please take a few minutes to complete the written survey that you may receive in the mail after your visit with us. Thank you!             Your Updated Medication List - Protect others around you: Learn how to safely use, store and throw away your medicines at www.disposemymeds.org.          This list is accurate as of: 6/2/17  4:38 PM.  Always use your most recent med list.                   Brand Name Dispense Instructions for use    albuterol 108 (90 BASE) MCG/ACT Inhaler    PROAIR HFA/PROVENTIL HFA/VENTOLIN HFA    3 Inhaler    Inhale 2 puffs into the lungs every 4 hours as needed for shortness of breath / dyspnea       azithromycin 250 MG tablet    ZITHROMAX    6 tablet    Two tablets first day, then one tablet daily for four days.       B-12 1000 MCG Tbcr     30 tablet    Take 1 tablet by mouth daily       cetirizine 10 MG tablet    zyrTEC    14 tablet    Take 1 tablet (10 mg) by mouth every evening       chlordiazePOXIDE 25 MG capsule    LIBRIUM    25 capsule    Take 50mg twice daily for 2 days, then take 25mg bid for days 3-5       ferrous gluconate 324 (38 FE) MG tablet    FERGON    100 tablet    TAKE 1 TABLET(324 MG) BY MOUTH DAILY WITH BREAKFAST       ferrous sulfate 325 (65 FE) MG tablet    IRON    1 tablet    Take 1 tablet (325 mg) by mouth 2 times daily       folic acid 400 MCG tablet    FOLVITE    75 tablet    Take 2.5 tablets (1 mg) by mouth daily       furosemide 20 MG tablet    LASIX    30 tablet    TAKE 1 TABLET(20 MG) BY MOUTH EVERY MORNING       imiquimod 5 % cream    ALDARA    12 packet    Apply a small sized amount to warts or molluscum three times weekly at bedtime.   Wash off after 8 hours.   May use for up to 16 weeks.       magnesium oxide 400 MG tablet    MAG-OX    90 tablet    Take 1 tablet (400 mg) by mouth 3 times daily       omeprazole 40 MG capsule    priLOSEC    30 capsule    TAKE 1  CAPSULE(40 MG) BY MOUTH DAILY       potassium chloride SA 20 MEQ CR tablet    K-DUR/KLOR-CON M    30 tablet    Take 1 tablet (20 mEq) by mouth daily       thiamine 100 MG tablet     30 tablet    Take 1 tablet (100 mg) by mouth daily

## 2017-06-05 ENCOUNTER — TELEPHONE (OUTPATIENT)
Dept: FAMILY MEDICINE | Facility: CLINIC | Age: 62
End: 2017-06-05

## 2017-06-05 NOTE — TELEPHONE ENCOUNTER
Patient called to schedule appointment for a hospital follow-up    Patient was admitted to [facility]  Discharged date: 6/5  Reason for hospital admission:  Feeling weak and fatigued  Does patient have future appointment scheduled with provider? Yes   Date of future appointment:  6/12    **Anya from [facility] scheduled appointment, didn't talk to Alexia myself**    This information will be used to help the care team plan for the patients upcoming visit.  The triage RN may determine that a follow up call is necessary and reach out to the patient via the phone number listed in the chart.     Please route this message on routine priority to the Triage RN pool.

## 2017-06-05 NOTE — TELEPHONE ENCOUNTER
Per Dr. Smith please do hospital follow up call as patient was at [facility]

## 2017-06-05 NOTE — TELEPHONE ENCOUNTER
This is a duplicate encounter.  See other encounter.  Closing this encounter.  Anya Bañuelos RN

## 2017-06-07 NOTE — TELEPHONE ENCOUNTER
ED / Discharge Outreach Protocol    Patient Contact    Attempt # 2    Was call answered?  No.  Left message on voicemail with information to call me back.  Anya Bañuelos RN

## 2017-06-07 NOTE — TELEPHONE ENCOUNTER
"Hospital/TCU/ED for chronic condition Discharge Protocol    \"Hi, my name is Kristyn Smith, a registered nurse, and I am calling from [facility].  I am calling to follow up and see how things are going for you after your recent emergency visit/hospital/TCU stay.\"    Tell me how you are doing now that you are home?\" Im recovering.      Discharge Instructions    \"Let's review your discharge instructions.  What is/are the follow-up recommendations?  Pt. Response:\"not sure\"    \"Has an appointment with your primary care provider been scheduled?\"   Yes. (confirm)    \"When you see the provider, I would recommend that you bring your medications with you.\"    Medications    \"Tell me what changed about your medicines when you discharged?\"    Changes to chronic meds?    0-1    \"What questions do you have about your medications?\"    None     New diagnoses of heart failure, COPD, diabetes, or MI?    No              Medication reconciliation completed? No, due to Patient states she no longer has her paperwork from [facility].  Was MTM referral placed (*Make sure to put transitions as reason for referral)?  No  She states she is on Prednisone and something else that she can't drink on.  Call Summary    \"What questions or concerns do you have about your recent visit and your follow-up care?\"     none    \"If you have questions or things don't continue to improve, we encourage you contact us through the main clinic number (give number).  Even if the clinic is not open, triage nurses are available 24/7 to help you.     We would like you to know that our clinic has extended hours (provide information).  We also have urgent care (provide details on closest location and hours/contact info)\"      \"Thank you for your time and take care!\"             "

## 2017-06-08 ENCOUNTER — TELEPHONE (OUTPATIENT)
Dept: PHARMACY | Facility: OTHER | Age: 62
End: 2017-06-08

## 2017-06-08 DIAGNOSIS — R05.9 COUGH: ICD-10-CM

## 2017-06-08 RX ORDER — AMOXICILLIN 875 MG
875 TABLET ORAL 2 TIMES DAILY
Qty: 20 TABLET | Refills: 0 | Status: SHIPPED | OUTPATIENT
Start: 2017-06-08 | End: 2017-07-19

## 2017-06-08 RX ORDER — AZITHROMYCIN 250 MG/1
TABLET, FILM COATED ORAL
Qty: 6 TABLET | Refills: 0 | OUTPATIENT
Start: 2017-06-08

## 2017-06-08 NOTE — TELEPHONE ENCOUNTER
Alexia called stating that she needs her antibiotics refilled and then she continued to swear at me and scream that I don't know how to do my job. Patient states she has better things to do than be on the phone with people who don't know what they are doing. Please advise

## 2017-06-08 NOTE — TELEPHONE ENCOUNTER
MTM referral from: Transitions of Care (recent hospital discharge or ED visit)    MTM referral outreach attempt #1 on June 8, 2017 at 1:30 PM      Outcome: Spoke with patient who was confrontational and did not want MTM. She said she lost her antibiotics and needs a refill. She swore at me on and off  for  about 5 minutes and I eventually had to hang up. I told her I would send the message that she is missing her antibiotic back to Dr. Smith.    Sera Saeed MTM Coordinator

## 2017-06-08 NOTE — TELEPHONE ENCOUNTER
azithromycin       Last Written Prescription Date:  6/2/17  Last Fill Quantity: 6,   # refills: 0  Last Office Visit with [facility] or [facility] prescribing provider: 6/2/17  Future Office visit:    Next 5 appointments (look out 90 days)     Jun 12, 2017  1:00 PM CDT   Office Visit with Karan Smith MD   [facility] ([facility]    [address] [place] [id number] [phone]                   Routing refill request to provider for review/approval because:  Drug not on the [facility], [facility] or [facility] refill protocol or controlled substance  Chasity Quinn MA 6/8/2017

## 2017-06-09 ENCOUNTER — TELEPHONE (OUTPATIENT)
Dept: FAMILY MEDICINE | Facility: CLINIC | Age: 62
End: 2017-06-09

## 2017-06-09 NOTE — TELEPHONE ENCOUNTER
"RN has attempted to contact this patient by phone to return their call, but there is no response.  Left message to \"call clinic at earliest convenience\".  Will try again later.     Anya Bañuelos RN      "

## 2017-06-09 NOTE — TELEPHONE ENCOUNTER
Reason for Call:  Other call back    Detailed comments: Patient is requesting a call back. She stated that she put ear plugs in her ears really hard and is worried that she punctured her ear drums. She states that she can hardly hear. She stated she called ENT and they can't get her in till the 15th or 16th and she can't wait that long. She is requesting to talk to a nurse to find out if she could have punctured her ear drums or not. Please advise.    Phone Number Patient can be reached at: Home number on file [phone] (home)    Best Time: any     Can we leave a detailed message on this number? YES    Call taken on 6/9/2017 at 3:17 PM by Sedrick Navarrete

## 2017-06-12 ENCOUNTER — OFFICE VISIT (OUTPATIENT)
Dept: FAMILY MEDICINE | Facility: CLINIC | Age: 62
End: 2017-06-12
Payer: COMMERCIAL

## 2017-06-12 VITALS
WEIGHT: 90 LBS | OXYGEN SATURATION: 92 % | HEART RATE: 98 BPM | DIASTOLIC BLOOD PRESSURE: 60 MMHG | SYSTOLIC BLOOD PRESSURE: 100 MMHG | TEMPERATURE: 96.9 F | RESPIRATION RATE: 24 BRPM | BODY MASS INDEX: 17.01 KG/M2

## 2017-06-12 DIAGNOSIS — D53.9 MACROCYTIC ANEMIA: Primary | ICD-10-CM

## 2017-06-12 DIAGNOSIS — B37.31 YEAST INFECTION OF THE VAGINA: ICD-10-CM

## 2017-06-12 DIAGNOSIS — F10.20 ALCOHOLISM (H): ICD-10-CM

## 2017-06-12 DIAGNOSIS — J20.9 ACUTE BRONCHITIS, UNSPECIFIED ORGANISM: ICD-10-CM

## 2017-06-12 LAB — HGB BLD-MCNC: 9.4 G/DL (ref 11.7–15.7)

## 2017-06-12 PROCEDURE — 85018 HEMOGLOBIN: CPT | Performed by: FAMILY MEDICINE

## 2017-06-12 PROCEDURE — 99495 TRANSJ CARE MGMT MOD F2F 14D: CPT | Performed by: FAMILY MEDICINE

## 2017-06-12 PROCEDURE — 36415 COLL VENOUS BLD VENIPUNCTURE: CPT | Performed by: FAMILY MEDICINE

## 2017-06-12 RX ORDER — FLUCONAZOLE 150 MG/1
150 TABLET ORAL ONCE
Qty: 1 TABLET | Refills: 1 | Status: SHIPPED | OUTPATIENT
Start: 2017-06-12 | End: 2017-06-12

## 2017-06-12 NOTE — MR AVS SNAPSHOT
"              After Visit Summary   2017    Alexia Flor    MRN: [id number]           Patient Information     Date Of Birth          1955        Visit Information        Provider Department      2017 1:00 PM Karan Smith MD [facility]        Today's Diagnoses     Yeast infection of the vagina    -  1       Follow-ups after your visit        Who to contact     If you have questions or need follow up information about today's clinic visit or your schedule please contact [facility] directly at [phone].  Normal or non-critical lab and imaging results will be communicated to you by [employer], letter or phone within 4 business days after the clinic has received the results. If you do not hear from us within 7 days, please contact the clinic through [employer] or phone. If you have a critical or abnormal lab result, we will notify you by phone as soon as possible.  Submit refill requests through [employer] or call your pharmacy and they will forward the refill request to us. Please allow 3 business days for your refill to be completed.          Additional Information About Your Visit        MyChart Information     [employer] lets you send messages to your doctor, view your test results, renew your prescriptions, schedule appointments and more. To sign up, go to [email or web address] . Click on \"Log in\" on the left side of the screen, which will take you to the Welcome page. Then click on \"Sign up Now\" on the right side of the page.     You will be asked to enter the access code listed below, as well as some personal information. Please follow the directions to create your username and password.     Your access code is: [email or web address]  Expires: 2017  4:38 PM     Your access code will  in 90 days. If you need help or a new code, please call your [facility] or [phone].        Care EveryWhere ID     This is your Care EveryWhere ID. This could be used by other " organizations to access your [facility] medical records  [id number]        Your Vitals Were     Pulse Temperature Respirations Pulse Oximetry BMI (Body Mass Index)       98 96.9  F (36.1  C) (Temporal) 24 92% 17.01 kg/m2        Blood Pressure from Last 3 Encounters:   06/12/17 100/60   06/02/17 100/60   05/08/17 108/60    Weight from Last 3 Encounters:   06/12/17 90 lb (40.8 kg)   06/02/17 91 lb (41.3 kg)   05/08/17 90 lb (40.8 kg)              Today, you had the following     No orders found for display         Today's Medication Changes          These changes are accurate as of: 6/12/17  5:22 PM.  If you have any questions, ask your nurse or doctor.               Start taking these medicines.        Dose/Directions    fluconazole 150 MG tablet   Commonly known as:  DIFLUCAN   Used for:  Yeast infection of the vagina   Started by:  Karan Smith MD        Dose:  150 mg   Take 1 tablet (150 mg) by mouth once for 1 dose Repeat if necessary.   Quantity:  1 tablet   Refills:  1            Where to get your medicines      These medications were sent to [id number] - [place] - [address] AT [facility] [address] [facility] [address]  [address] [address]     Phone:  [phone]     fluconazole 150 MG tablet                Primary Care Provider Office Phone # Fax #    Karan Smith [phone]       [facility] [address] [place] [address]        Thank you!     Thank you for choosing [facility]  for your care. Our goal is always to provide you with excellent care. Hearing back from our patients is one way we can continue to improve our services. Please take a few minutes to complete the written survey that you may receive in the mail after your visit with us. Thank you!             Your Updated Medication List - Protect others around you: Learn how to safely use, store and throw away your medicines at www.disposemymeds.org.           This list is accurate as of: 6/12/17  5:22 PM.  Always use your most recent med list.                   Brand Name Dispense Instructions for use    albuterol 108 (90 BASE) MCG/ACT Inhaler    PROAIR HFA/PROVENTIL HFA/VENTOLIN HFA    3 Inhaler    Inhale 2 puffs into the lungs every 4 hours as needed for shortness of breath / dyspnea       amoxicillin 875 MG tablet    AMOXIL    20 tablet    Take 1 tablet (875 mg) by mouth 2 times daily       B-12 1000 MCG Tbcr     30 tablet    Take 1 tablet by mouth daily       cetirizine 10 MG tablet    zyrTEC    14 tablet    Take 1 tablet (10 mg) by mouth every evening       chlordiazePOXIDE 25 MG capsule    LIBRIUM    25 capsule    Take 50mg twice daily for 2 days, then take 25mg bid for days 3-5       ferrous gluconate 324 (38 FE) MG tablet    FERGON    100 tablet    TAKE 1 TABLET(324 MG) BY MOUTH DAILY WITH BREAKFAST       ferrous sulfate 325 (65 FE) MG tablet    IRON    1 tablet    Take 1 tablet (325 mg) by mouth 2 times daily       fluconazole 150 MG tablet    DIFLUCAN    1 tablet    Take 1 tablet (150 mg) by mouth once for 1 dose Repeat if necessary.       folic acid 400 MCG tablet    FOLVITE    75 tablet    Take 2.5 tablets (1 mg) by mouth daily       furosemide 20 MG tablet    LASIX    30 tablet    TAKE 1 TABLET(20 MG) BY MOUTH EVERY MORNING       imiquimod 5 % cream    ALDARA    12 packet    Apply a small sized amount to warts or molluscum three times weekly at bedtime.   Wash off after 8 hours.   May use for up to 16 weeks.       magnesium oxide 400 MG tablet    MAG-OX    90 tablet    Take 1 tablet (400 mg) by mouth 3 times daily       omeprazole 40 MG capsule    priLOSEC    30 capsule    TAKE 1 CAPSULE(40 MG) BY MOUTH DAILY       potassium chloride SA 20 MEQ CR tablet    K-DUR/KLOR-CON M    30 tablet    Take 1 tablet (20 mEq) by mouth daily       thiamine 100 MG tablet     30 tablet    Take 1 tablet (100 mg) by mouth daily

## 2017-06-12 NOTE — PROGRESS NOTES
SUBJECTIVE:                                                    Alexia Flor is a 62 year old female who presents to clinic today for the following health issues:          Hospital Follow-up Visit:    Hospital/Nursing Home/IP Rehab Facility: [facility]  Date of Admission: 6/2/17  Date of Discharge: 6/5/17  Reason(s) for Admission: Macrocytic anemia - given 1 unit of blood            Problems taking medications regularly:  None       Medication changes since discharge: None       Problems adhering to non-medication therapy:  None    Summary of hospitalization:  CareEverywhere information obtained and reviewed  Diagnostic Tests/Treatments reviewed.  Follow up needed: Inpatient chemical dependency treatment  Other Healthcare Providers Involved in Patient s Care:         None  Update since discharge: improved.     Post Discharge Medication Reconciliation: discharge medications reconciled and changed, per note/orders (see AVS).  Plan of care communicated with patient     Coding guidelines for this visit:  Type of Medical   Decision Making Face-to-Face Visit       within 7 Days of discharge Face-to-Face Visit        within 14 days of discharge   Moderate Complexity 27195 [id number]   High Complexity 27951 36028                Problem list and histories reviewed & adjusted, as indicated.  Additional history: as documented        Reviewed and updated as needed this visit by clinical staff  Allergies  Meds       Reviewed and updated as needed this visit by Provider        SUBJECTIVE:  Alexia  is a 62 year old female who presents for:  Follow-up of her hospitalization for anemia and also bronchial infection. She had been in our clinic last week and left before labs came back. Turned out her hemoglobin was 6.8. She has a history of alcoholism COPD. It is felt that mostly her anemia is due to alcoholism. She denied any melena or hematochezia. She had a cough and was supposed to start on an antibiotic. She was admitted to  the hospital in [place] for blood transfusion and monitoring of alcohol withdrawal. She is feeling much better after the transfusion. Still has a cough and some discomfort around her lower chest. She never started the antibiotic I issued her but we called in amoxicillin 5 days ago when she started on that. she is planning on entering chemical dependency treatment for alcoholism the 21st of this month. Complains of probable vaginal yeast infection    Past Medical History:   Diagnosis Date     Alcohol abuse      Alcoholic hepatitis      Alcoholic pancreatitis      Anxiety      Schafer's esophagus      Breast cancer (H)     rt, s/p radiation.     Congestive heart failure, unspecified      COPD (chronic obstructive pulmonary disease) (H)      Hypokalemia      Macrocytic anemia      Non-adherence to medical treatment      Tobacco abuse      Past Surgical History:   Procedure Laterality Date     lumpectomy Rt breast  2006       Social History   Substance Use Topics     Smoking status: Current Every Day Smoker     Packs/day: 0.50     Years: 44.00     Types: Cigarettes     Smokeless tobacco: Never Used     Alcohol use 0.5 oz/week     1 Glasses of wine per week      Comment: weekends (reported; smelled of alcohol during clinic visit 4/2016)     Current Outpatient Prescriptions   Medication Sig Dispense Refill     amoxicillin (AMOXIL) 875 MG tablet Take 1 tablet (875 mg) by mouth 2 times daily 20 tablet 0     cetirizine (ZYRTEC) 10 MG tablet Take 1 tablet (10 mg) by mouth every evening 14 tablet 1     ferrous gluconate (FERGON) 324 (38 FE) MG tablet TAKE 1 TABLET(324 MG) BY MOUTH DAILY WITH BREAKFAST 100 tablet 0     furosemide (LASIX) 20 MG tablet TAKE 1 TABLET(20 MG) BY MOUTH EVERY MORNING 30 tablet 0     imiquimod (ALDARA) 5 % cream Apply a small sized amount to warts or molluscum three times weekly at bedtime.   Wash off after 8 hours.   May use for up to 16 weeks. 12 packet 3     ferrous sulfate (IRON) 325 (65 FE) MG  tablet Take 1 tablet (325 mg) by mouth 2 times daily 1 tablet 0     chlordiazePOXIDE (LIBRIUM) 25 MG capsule Take 50mg twice daily for 2 days, then take 25mg bid for days 3-5 25 capsule 0     magnesium oxide (MAG-OX) 400 MG tablet Take 1 tablet (400 mg) by mouth 3 times daily 90 tablet 3     folic acid (FOLVITE) 400 MCG tablet Take 2.5 tablets (1 mg) by mouth daily 75 tablet 3     potassium chloride SA (K-DUR/KLOR-CON M) 20 MEQ CR tablet Take 1 tablet (20 mEq) by mouth daily 30 tablet 0     omeprazole (PRILOSEC) 40 MG capsule TAKE 1 CAPSULE(40 MG) BY MOUTH DAILY 30 capsule 11     Cyanocobalamin (B-12) 1000 MCG TBCR Take 1 tablet by mouth daily 30 tablet 3     thiamine 100 MG tablet Take 1 tablet (100 mg) by mouth daily 30 tablet 3     albuterol (PROAIR HFA, PROVENTIL HFA, VENTOLIN HFA) 108 (90 BASE) MCG/ACT inhaler Inhale 2 puffs into the lungs every 4 hours as needed for shortness of breath / dyspnea 3 Inhaler 4       REVIEW OF SYSTEMS:   5 point ROS negative except as noted above in HPI, including Gen., Resp, CV, GI &  system review.     OBJECTIVE:  Vitals: /60  Pulse 98  Temp 96.9  F (36.1  C) (Temporal)  Resp 24  Wt 90 lb (40.8 kg)  SpO2 92%  BMI 17.01 kg/m2  BMI= Body mass index is 17.01 kg/(m^2).  Thin frail appearing she is alert and oriented. No smell of alcohol on her today. Throat is clear. Her lungs with bilateral wheezing and rhonchi. Heart was regular rhythm. Skin is clear. Extremities with no edema. Hemoglobin is up to 9.5.    ASSESSMENT:  #1 anemia #2 bronchitis #3 alcoholism #4 yeast infection    PLAN:  She continues on the amoxicillin. She is to eat a high iron diet. She is complaining of yeast vaginitis so we will order her some Diflucan. Emphasized again she needs to follow-up with her plans to voluntarily enter alcohol treatment on the 21st.  Tobacco Cessation Action Plan: Self help information given to patient      Karan Smith MD  [facility]

## 2017-06-12 NOTE — NURSING NOTE
"Chief Complaint   Patient presents with     Hospital F/U     [facility] for anemia with blood transfusion       Initial /60  Pulse 98  Temp 96.9  F (36.1  C) (Temporal)  Resp 24  Wt 90 lb (40.8 kg)  SpO2 92%  BMI 17.01 kg/m2 Estimated body mass index is 17.01 kg/(m^2) as calculated from the following:    Height as of 2/1/17: 5' 1\" (1.549 m).    Weight as of this encounter: 90 lb (40.8 kg).  Medication Reconciliation: complete    "

## 2017-06-12 NOTE — NURSING NOTE
RN sent prescription of Diflucan 15o mg #1 with 1 refill to pharmacy of choice for PCP.  Anya Bañuelos RN

## 2017-06-13 ENCOUNTER — TELEPHONE (OUTPATIENT)
Dept: FAMILY MEDICINE | Facility: CLINIC | Age: 62
End: 2017-06-13

## 2017-06-13 DIAGNOSIS — L65.9 HAIR LOSS: Primary | ICD-10-CM

## 2017-06-13 NOTE — TELEPHONE ENCOUNTER
Reason for Call: Request for an order or referral:    Order or referral being requested: Thyroid lab    Date needed: as soon as possible    Has the patient been seen by the PCP for this problem? No    Additional comments: Pt states her hair has been falling out and would like her thyroid checked. Can you place that order? Please call and advise.     Phone number Patient can be reached at:  Home number on file [phone] (home)    Best Time:  anytime    Can we leave a detailed message on this number?  YES    Call taken on 6/13/2017 at 12:07 PM by Clarissa Oreilly

## 2017-06-14 ENCOUNTER — TRANSFERRED RECORDS (OUTPATIENT)
Dept: HEALTH INFORMATION MANAGEMENT | Facility: CLINIC | Age: 62
End: 2017-06-14

## 2017-06-14 NOTE — TELEPHONE ENCOUNTER
Patient called back. Info was given. She does not want to wait to have lab work done. Can you please place orders now so she can have her thyroid checked. Please call her back at [phone].  Thank you,  Erma Johnson   for [facility]

## 2017-06-14 NOTE — TELEPHONE ENCOUNTER
Her TSH was normal in 11/2016. She is probably losing hair due to being anemic and poor nutrition. Dr. Smith said he may consider a TSH at her next lab draw. Please notify patient. KB/CMA

## 2017-06-14 NOTE — TELEPHONE ENCOUNTER
Tried calling patient to notify her that Per Dr. Smith ok for TSH but insurance may not pay for it since she just had one done in November 2016. He again said that this is probably due to poor nutrition and being anemic. However she did not answer and the phone was picked up and hung up. Will try again tomorrow when we are back in clinic. TWAN/SHAQUILLE

## 2017-06-16 NOTE — TELEPHONE ENCOUNTER
Message below relayed to patient. Patient scheduled for LAB.     Thank you,   Lucia Cassidy   for [facility]

## 2017-06-18 DIAGNOSIS — R60.9 EDEMA, UNSPECIFIED TYPE: ICD-10-CM

## 2017-06-19 NOTE — TELEPHONE ENCOUNTER
Lasix       Last Written Prescription Date: 4/27/2017  Last Fill Quantity: 30, # refills: 0  Last Office Visit with [facility] or [facility] prescribing provider: 6/12/2017       Potassium   Date Value Ref Range Status   06/02/2017 3.7 3.4 - 5.3 mmol/L Final     Creatinine   Date Value Ref Range Status   06/02/2017 0.93 0.52 - 1.04 mg/dL Final     BP Readings from Last 3 Encounters:   06/12/17 100/60   06/02/17 100/60   05/08/17 108/60

## 2017-06-20 DIAGNOSIS — L65.9 HAIR LOSS: ICD-10-CM

## 2017-06-20 LAB
T4 FREE SERPL-MCNC: 0.82 NG/DL (ref 0.76–1.46)
TSH SERPL DL<=0.005 MIU/L-ACNC: 5.08 MU/L (ref 0.4–4)

## 2017-06-20 PROCEDURE — 84439 ASSAY OF FREE THYROXINE: CPT | Performed by: FAMILY MEDICINE

## 2017-06-20 PROCEDURE — 84443 ASSAY THYROID STIM HORMONE: CPT | Performed by: FAMILY MEDICINE

## 2017-06-20 PROCEDURE — 36415 COLL VENOUS BLD VENIPUNCTURE: CPT | Performed by: FAMILY MEDICINE

## 2017-06-20 RX ORDER — FUROSEMIDE 20 MG
TABLET ORAL
Qty: 30 TABLET | Refills: 10 | Status: ON HOLD | OUTPATIENT
Start: 2017-06-20 | End: 2017-01-01

## 2017-06-20 NOTE — TELEPHONE ENCOUNTER
Lasix  Prescription approved per [facility] Refill Protocol.    Trino Hartmann RN, BSN

## 2017-06-23 ENCOUNTER — TELEPHONE (OUTPATIENT)
Dept: FAMILY MEDICINE | Facility: CLINIC | Age: 62
End: 2017-06-23

## 2017-06-23 NOTE — TELEPHONE ENCOUNTER
Reason for Call:  Request for results:    Name of test or procedure: thyroid      Date of test of procedure: 6/20/17    Location of the test or procedure: [facility]    OK to leave the result message on voice mail or with a family member? NO    Phone number Patient can be reached at:  Home number on file [phone] (home)    Additional comments: Requesting lab results     Call taken on 6/23/2017 at 11:12 AM by Tatiana Smith

## 2017-06-23 NOTE — TELEPHONE ENCOUNTER
Karan Smith MD Blomgren, Kristi, MA                   Thyroid test was just borderline off . This is not often enough to cause any symptoms but we would want to recheck this in a month or so when you get out of treatment

## 2017-06-23 NOTE — PROGRESS NOTES
Thyroid test was just borderline off . This is not often enough to cause any symptoms but we would want to recheck this in a month or so when you get out of treatment

## 2017-07-11 ENCOUNTER — CARE COORDINATION (OUTPATIENT)
Dept: CARE COORDINATION | Facility: CLINIC | Age: 62
End: 2017-07-11

## 2017-07-11 NOTE — PROGRESS NOTES
Clinic Care Coordination Contact 7/11/17  [facility]/Vicky-    Referral Source: PCP  Clinical Data: Care Coordinator Outreach  Outreach attempted x 1.  Left message on voicemail with call back information and requested return call.  Plan:  Care Coordinator will try to reach patient again in 3-5 business days.    SHANIKA Deunas  Care Coordinator Social Work    [facility]  [phone]  7/11/2017 10:23 AM

## 2017-07-17 DIAGNOSIS — L29.9 PRURITIC DISORDER: ICD-10-CM

## 2017-07-18 NOTE — TELEPHONE ENCOUNTER
cetirizine (ZYRTEC) 10 MG tablet      Last Written Prescription Date: 6/2/17  Last Fill Quantity: 14,  # refills: 1   Last Office Visit with [facility], [facility] or [facility] prescribing provider: 6/12/17                                         Next 5 appointments (look out 90 days)     Jul 19, 2017  2:40 PM CDT   Office Visit with Karan Smith MD   [facility] ([facility])    [address] [place] [id number] [phone]

## 2017-07-19 ENCOUNTER — OFFICE VISIT (OUTPATIENT)
Dept: FAMILY MEDICINE | Facility: CLINIC | Age: 62
End: 2017-07-19
Payer: COMMERCIAL

## 2017-07-19 ENCOUNTER — HOSPITAL ENCOUNTER (OUTPATIENT)
Dept: MAMMOGRAPHY | Facility: CLINIC | Age: 62
Discharge: HOME | End: 2017-07-19
Attending: FAMILY MEDICINE | Admitting: FAMILY MEDICINE
Payer: COMMERCIAL

## 2017-07-19 VITALS
TEMPERATURE: 98.1 F | BODY MASS INDEX: 16.71 KG/M2 | HEART RATE: 80 BPM | SYSTOLIC BLOOD PRESSURE: 116 MMHG | WEIGHT: 88.5 LBS | DIASTOLIC BLOOD PRESSURE: 64 MMHG | HEIGHT: 61 IN | RESPIRATION RATE: 16 BRPM

## 2017-07-19 DIAGNOSIS — R05.9 COUGH: ICD-10-CM

## 2017-07-19 DIAGNOSIS — R07.0 THROAT PAIN: ICD-10-CM

## 2017-07-19 DIAGNOSIS — Z12.31 VISIT FOR SCREENING MAMMOGRAM: ICD-10-CM

## 2017-07-19 DIAGNOSIS — R79.89 ABNORMAL TSH: ICD-10-CM

## 2017-07-19 DIAGNOSIS — D50.0 IRON DEFICIENCY ANEMIA DUE TO CHRONIC BLOOD LOSS: Primary | ICD-10-CM

## 2017-07-19 DIAGNOSIS — R94.5 LIVER FUNCTION ABNORMALITY: ICD-10-CM

## 2017-07-19 LAB
ALBUMIN SERPL-MCNC: 3.3 G/DL (ref 3.4–5)
ALP SERPL-CCNC: 198 U/L (ref 40–150)
ALT SERPL W P-5'-P-CCNC: 35 U/L (ref 0–50)
ANION GAP SERPL CALCULATED.3IONS-SCNC: 12 MMOL/L (ref 3–14)
AST SERPL W P-5'-P-CCNC: 87 U/L (ref 0–45)
BASOPHILS # BLD AUTO: 0.1 10E9/L (ref 0–0.2)
BASOPHILS NFR BLD AUTO: 0.9 %
BILIRUB SERPL-MCNC: 0.4 MG/DL (ref 0.2–1.3)
BUN SERPL-MCNC: 15 MG/DL (ref 7–30)
CALCIUM SERPL-MCNC: 8.5 MG/DL (ref 8.5–10.1)
CHLORIDE SERPL-SCNC: 107 MMOL/L (ref 94–109)
CO2 SERPL-SCNC: 26 MMOL/L (ref 20–32)
CREAT SERPL-MCNC: 0.74 MG/DL (ref 0.52–1.04)
DIFFERENTIAL METHOD BLD: ABNORMAL
EOSINOPHIL # BLD AUTO: 0.2 10E9/L (ref 0–0.7)
EOSINOPHIL NFR BLD AUTO: 2.2 %
ERYTHROCYTE [DISTWIDTH] IN BLOOD BY AUTOMATED COUNT: 17.7 % (ref 10–15)
GFR SERPL CREATININE-BSD FRML MDRD: 79 ML/MIN/1.7M2
GLUCOSE SERPL-MCNC: 157 MG/DL (ref 70–99)
HCT VFR BLD AUTO: 29.5 % (ref 35–47)
HGB BLD-MCNC: 9.2 G/DL (ref 11.7–15.7)
IMM GRANULOCYTES # BLD: 0 10E9/L (ref 0–0.4)
IMM GRANULOCYTES NFR BLD: 0.1 %
LYMPHOCYTES # BLD AUTO: 1.9 10E9/L (ref 0.8–5.3)
LYMPHOCYTES NFR BLD AUTO: 24 %
MCH RBC QN AUTO: 36.8 PG (ref 26.5–33)
MCHC RBC AUTO-ENTMCNC: 31.2 G/DL (ref 31.5–36.5)
MCV RBC AUTO: 118 FL (ref 78–100)
MONOCYTES # BLD AUTO: 1 10E9/L (ref 0–1.3)
MONOCYTES NFR BLD AUTO: 13.4 %
NEUTROPHILS # BLD AUTO: 4.6 10E9/L (ref 1.6–8.3)
NEUTROPHILS NFR BLD AUTO: 59.4 %
PLATELET # BLD AUTO: 184 10E9/L (ref 150–450)
POTASSIUM SERPL-SCNC: 3.5 MMOL/L (ref 3.4–5.3)
PROT SERPL-MCNC: 7.3 G/DL (ref 6.8–8.8)
RBC # BLD AUTO: 2.5 10E12/L (ref 3.8–5.2)
SODIUM SERPL-SCNC: 145 MMOL/L (ref 133–144)
TSH SERPL DL<=0.005 MIU/L-ACNC: 3.87 MU/L (ref 0.4–4)
WBC # BLD AUTO: 7.7 10E9/L (ref 4–11)

## 2017-07-19 PROCEDURE — 99214 OFFICE O/P EST MOD 30 MIN: CPT | Performed by: FAMILY MEDICINE

## 2017-07-19 PROCEDURE — 80050 GENERAL HEALTH PANEL: CPT | Performed by: FAMILY MEDICINE

## 2017-07-19 PROCEDURE — G0202 SCR MAMMO BI INCL CAD: HCPCS

## 2017-07-19 PROCEDURE — 36415 COLL VENOUS BLD VENIPUNCTURE: CPT | Performed by: FAMILY MEDICINE

## 2017-07-19 RX ORDER — CETIRIZINE HYDROCHLORIDE 10 MG/1
TABLET ORAL
Qty: 14 TABLET | Refills: 5 | Status: ON HOLD | OUTPATIENT
Start: 2017-07-19 | End: 2017-01-01

## 2017-07-19 RX ORDER — ALBUTEROL SULFATE 90 UG/1
2 AEROSOL, METERED RESPIRATORY (INHALATION) EVERY 4 HOURS PRN
Qty: 3 INHALER | Refills: 4 | Status: SHIPPED | OUTPATIENT
Start: 2017-07-19 | End: 2017-01-01

## 2017-07-19 NOTE — TELEPHONE ENCOUNTER
Zyrtec  Prescription approved per [facility] Refill Protocol.    Trino Hartmann RN, BSN

## 2017-07-19 NOTE — PROGRESS NOTES
SUBJECTIVE:                                                    Alexia Flor is a 62 year old female who presents to clinic today for the following health issues:      Recheck on thyroid labs, still has left side throat pain. Would like medication to help with withdrawal before going to treatment in [place].        Problem list and histories reviewed & adjusted, as indicated.  Additional history: as documented        Reviewed and updated as needed this visit by clinical staff       Reviewed and updated as needed this visit by Provider        SUBJECTIVE:  Alexia  is a 62 year old female who presents for:  Follow-up of some abnormal lab plus she wants to pursue further evaluation by ENT. She continues to have throat pain. She has a lesion and had seen otolaryngology in [facility]. They were planning on doing surgery last summer but the day of the surgery she had a drink. She became very upset she hasn't followed up but still complaining of throat pain and wants to see ENT here. His been followed with low hemoglobin secondary to her alcoholism and other lab abnormalities. She did not go into treatment for her alcoholism like she said she was she stated they did not have a bed.    Past Medical History:   Diagnosis Date     Alcohol abuse      Alcoholic hepatitis      Alcoholic pancreatitis      Anxiety      Schafer's esophagus      Breast cancer (H)     rt, s/p radiation.     Congestive heart failure, unspecified      COPD (chronic obstructive pulmonary disease) (H)      Hypokalemia      Macrocytic anemia      Non-adherence to medical treatment      Tobacco abuse      Past Surgical History:   Procedure Laterality Date     lumpectomy Rt breast  2006       Social History   Substance Use Topics     Smoking status: Current Every Day Smoker     Packs/day: 0.50     Years: 44.00     Types: Cigarettes     Smokeless tobacco: Never Used     Alcohol use 0.5 oz/week     1 Glasses of wine per week      Comment: weekends (reported;  "smelled of alcohol during clinic visit 4/2016)     Current Outpatient Prescriptions   Medication Sig Dispense Refill     albuterol (PROAIR HFA/PROVENTIL HFA/VENTOLIN HFA) 108 (90 BASE) MCG/ACT Inhaler Inhale 2 puffs into the lungs every 4 hours as needed for shortness of breath / dyspnea 3 Inhaler 4     B-12 TR 1000 MCG TBCR TAKE 1 TABLET BY MOUTH DAILY 30 tablet 0     furosemide (LASIX) 20 MG tablet TAKE 1 TABLET(20 MG) BY MOUTH EVERY MORNING 30 tablet 10     ferrous gluconate (FERGON) 324 (38 FE) MG tablet TAKE 1 TABLET(324 MG) BY MOUTH DAILY WITH BREAKFAST 100 tablet 0     ferrous sulfate (IRON) 325 (65 FE) MG tablet Take 1 tablet (325 mg) by mouth 2 times daily 1 tablet 0     chlordiazePOXIDE (LIBRIUM) 25 MG capsule Take 50mg twice daily for 2 days, then take 25mg bid for days 3-5 25 capsule 0     magnesium oxide (MAG-OX) 400 MG tablet Take 1 tablet (400 mg) by mouth 3 times daily 90 tablet 3     potassium chloride SA (K-DUR/KLOR-CON M) 20 MEQ CR tablet Take 1 tablet (20 mEq) by mouth daily 30 tablet 0     omeprazole (PRILOSEC) 40 MG capsule TAKE 1 CAPSULE(40 MG) BY MOUTH DAILY 30 capsule 11     cetirizine (ZYRTEC) 10 MG tablet TAKE 1 TABLET BY MOUTH EVERY EVENING 14 tablet 5     folic acid (FOLVITE) 400 MCG tablet Take 2.5 tablets (1 mg) by mouth daily 75 tablet 3     thiamine 100 MG tablet Take 1 tablet (100 mg) by mouth daily (Patient not taking: Reported on 7/19/2017) 30 tablet 3       REVIEW OF SYSTEMS:   5 point ROS negative except as noted above in HPI, including Gen., Resp, CV, GI &  system review.     OBJECTIVE:  Vitals: /64  Pulse 80  Temp 98.1  F (36.7  C) (Temporal)  Resp 16  Ht 5' 1\" (1.549 m)  Wt 88 lb 8 oz (40.1 kg)  BMI 16.72 kg/m2  BMI= Body mass index is 16.72 kg/(m^2).  She is alert and appears sober today. Her throat shows no obvious abnormality in her neck is supple no thyromegaly no adenopathy. Lungs with some distant breath sounds and a few wheezes.. Heart regular rhythm. " Skin clear. Extremities normal.    ASSESSMENT:  #1 throat pain #2 anemia #3 liver function abnormalities #4 abnormal TSH #5 cough    PLAN:  We will redraw labs today and notify her with results. We will set her up for ENT consult. Again pressed her on the importance of getting her into an inpatient alcohol treatment plan.        Karan Smith MD  [facility]

## 2017-07-19 NOTE — MR AVS SNAPSHOT
After Visit Summary   7/19/2017    Alexia Flor    MRN: [id number]           Patient Information     Date Of Birth          1955        Visit Information        Provider Department      7/19/2017 2:40 PM Karan Smith MD [facility]        Today's Diagnoses     Iron deficiency anemia due to chronic blood loss    -  1    Cough        Throat pain        Abnormal TSH        Liver function abnormality          Care Instructions                      .                        Follow-ups after your visit        Additional Services     OTOLARYNGOLOGY REFERRAL       Your provider has referred you to: [facility]: [facility] [phone]   [email or web address]    Please be aware that coverage of these services is subject to the terms and limitations of your health insurance plan.  Call member services at your health plan with any benefit or coverage questions.      Please bring the following with you to your appointment:    (1) Any X-Rays, CTs or MRIs which have been performed.  Contact the facility where they were done to arrange for  prior to your scheduled appointment.   (2) List of current medications  (3) This referral request   (4) Any documents/labs given to you for this referral                  Your next 10 appointments already scheduled     Aug 21, 2017  2:30 PM CDT   [facility]   [facility]    [address] [place] [address] [phone]              Who to contact     If you have questions or need follow up information about today's clinic visit or your schedule please contact [facility] directly at [phone].  Normal or non-critical lab and imaging results will be communicated to you by MyChart, letter or phone within 4 business days after the clinic has received the results. If you do not hear from us within 7 days,  "please contact the clinic through KwiClick or phone. If you have a critical or abnormal lab result, we will notify you by phone as soon as possible.  Submit refill requests through KwiClick or call your pharmacy and they will forward the refill request to us. Please allow 3 business days for your refill to be completed.          Additional Information About Your Visit        ShnergleharRepair Report Information     KwiClick lets you send messages to your doctor, view your test results, renew your prescriptions, schedule appointments and more. To sign up, go to [email or web address] . Click on \"Log in\" on the left side of the screen, which will take you to the Welcome page. Then click on \"Sign up Now\" on the right side of the page.     You will be asked to enter the access code listed below, as well as some personal information. Please follow the directions to create your username and password.     Your access code is: [email or web address]  Expires: 2017  4:38 PM     Your access code will  in 90 days. If you need help or a new code, please call your [place] clinic or [phone].        Care EveryWhere ID     This is your Care EveryWhere ID. This could be used by other organizations to access your [facility] medical records  [id number]        Your Vitals Were     Pulse Temperature Respirations Height BMI (Body Mass Index)       80 98.1  F (36.7  C) (Temporal) 16 5' 1\" (1.549 m) 16.72 kg/m2        Blood Pressure from Last 3 Encounters:   17 116/64   17 100/60   17 100/60    Weight from Last 3 Encounters:   17 88 lb 8 oz (40.1 kg)   17 90 lb (40.8 kg)   17 91 lb (41.3 kg)              We Performed the Following     CBC with platelets differential     Comprehensive metabolic panel (BMP + Alb, Alk Phos, ALT, AST, Total. Bili, TP)     OTOLARYNGOLOGY REFERRAL     TSH with free T4 reflex          Where to get your medicines      These medications were sent to [facility], " [place] - [address]   [address] [facility] [place] [address]     Phone:  [phone]     albuterol 108 (90 BASE) MCG/ACT Inhaler          Primary Care Provider Office Phone # Fax #    Karan Smith [phone]       [address] [place] [address]        Equal Access to Services     [facility]: Hadii aad ku hadasho Soomaali, waaxda luqadaha, qaybta kaalmada adeegyada, waxay idiin hayaan adeeg kharash la'aan ah. So [facility] [phone].    ATENCIÓN: Si habla español, tiene a savage disposición servicios gratuitos de asistencia lingüística. Juliana al [phone].    We comply with applicable federal civil rights laws and [place] laws. We do not discriminate on the basis of race, color, national origin, age, disability sex, sexual orientation or gender identity.            Thank you!     Thank you for choosing [facility]  for your care. Our goal is always to provide you with excellent care. Hearing back from our patients is one way we can continue to improve our services. Please take a few minutes to complete the written survey that you may receive in the mail after your visit with us. Thank you!             Your Updated Medication List - Protect others around you: Learn how to safely use, store and throw away your medicines at www.disposemymeds.org.          This list is accurate as of: 7/19/17  3:47 PM.  Always use your most recent med list.                   Brand Name Dispense Instructions for use Diagnosis    albuterol 108 (90 BASE) MCG/ACT Inhaler    PROAIR HFA/PROVENTIL HFA/VENTOLIN HFA    3 Inhaler    Inhale 2 puffs into the lungs every 4 hours as needed for shortness of breath / dyspnea    Cough       B-12 TR 1000 MCG Tbcr   Generic drug:  cyanocobalamin     30 tablet    TAKE 1 TABLET BY MOUTH DAILY    Alcoholism /alcohol abuse (H)       cetirizine 10 MG tablet    zyrTEC    14 tablet    Take 1 tablet (10 mg) by mouth every evening     Pruritic disorder       chlordiazePOXIDE 25 MG capsule    LIBRIUM    25 capsule    Take 50mg twice daily for 2 days, then take 25mg bid for days 3-5    Alcoholism (H)       ferrous gluconate 324 (38 FE) MG tablet    FERGON    100 tablet    TAKE 1 TABLET(324 MG) BY MOUTH DAILY WITH BREAKFAST    Iron deficiency anemia due to chronic blood loss       ferrous sulfate 325 (65 FE) MG tablet    IRON    1 tablet    Take 1 tablet (325 mg) by mouth 2 times daily    Alcohol-induced acute pancreatitis, unspecified complication status, Iron deficiency anemia due to chronic blood loss       folic acid 400 MCG tablet    FOLVITE    75 tablet    Take 2.5 tablets (1 mg) by mouth daily    Alcoholism /alcohol abuse (H)       furosemide 20 MG tablet    LASIX    30 tablet    TAKE 1 TABLET(20 MG) BY MOUTH EVERY MORNING    Edema, unspecified type       magnesium oxide 400 MG tablet    MAG-OX    90 tablet    Take 1 tablet (400 mg) by mouth 3 times daily    Hypomagnesemia       omeprazole 40 MG capsule    priLOSEC    30 capsule    TAKE 1 CAPSULE(40 MG) BY MOUTH DAILY    Gastroesophageal reflux disease without esophagitis       potassium chloride SA 20 MEQ CR tablet    K-DUR/KLOR-CON M    30 tablet    Take 1 tablet (20 mEq) by mouth daily    Hypokalemia       thiamine 100 MG tablet     30 tablet    Take 1 tablet (100 mg) by mouth daily    Alcoholism /alcohol abuse (H)

## 2017-07-19 NOTE — NURSING NOTE
"Chief Complaint   Patient presents with     RECHECK       Initial /64  Pulse 80  Temp 98.1  F (36.7  C) (Temporal)  Resp 16  Ht 5' 1\" (1.549 m)  Wt 88 lb 8 oz (40.1 kg)  BMI 16.72 kg/m2 Estimated body mass index is 16.72 kg/(m^2) as calculated from the following:    Height as of this encounter: 5' 1\" (1.549 m).    Weight as of this encounter: 88 lb 8 oz (40.1 kg).  Medication Reconciliation: complete   Mavis Jalloh CMA    "

## 2017-07-20 ENCOUNTER — TELEPHONE (OUTPATIENT)
Dept: FAMILY MEDICINE | Facility: CLINIC | Age: 62
End: 2017-07-20

## 2017-07-20 NOTE — TELEPHONE ENCOUNTER
Patient already notified of results. Mammogram result letter was mailed to patient from radiology as well.  Cortney Khalil CMA ([facility])

## 2017-07-20 NOTE — TELEPHONE ENCOUNTER
Reason for Call:  Request for results:    Name of test or procedure: Lab & Mammo    Date of test of procedure: 7.19    Location of the test or procedure: [facility] to leave the result message on voice mail or with a family member? YES    Phone number Patient can be reached at:  Home number on file [phone] (home)    Additional comments:     Call taken on 7/20/2017 at 2:21 PM by Dai Montgomery

## 2017-07-20 NOTE — PROGRESS NOTES
Thyroid test was now normal. Liver tests were improved. Blood sugar was very high at 157. Hemoglobin is 9.2 which is about the same as it was a month ago.

## 2017-07-26 DIAGNOSIS — K21.00 GASTROESOPHAGEAL REFLUX DISEASE WITH ESOPHAGITIS: ICD-10-CM

## 2017-07-26 RX ORDER — PANTOPRAZOLE SODIUM 40 MG/1
TABLET, DELAYED RELEASE ORAL
Qty: 60 TABLET | Refills: 0 | Status: ON HOLD | OUTPATIENT
Start: 2017-07-26 | End: 2017-01-01

## 2017-07-26 NOTE — TELEPHONE ENCOUNTER
protonix      Last Written Prescription Date:  3/23/17  Last Fill Quantity: 60,   # refills: 0  Last Office Visit with G, P or [facility] prescribing provider: 7/19/17  Future Office visit:       Routing refill request to provider for review/approval because:  Drug not active on patient's medication list

## 2017-07-27 ENCOUNTER — TRANSFERRED RECORDS (OUTPATIENT)
Dept: HEALTH INFORMATION MANAGEMENT | Facility: CLINIC | Age: 62
End: 2017-07-27

## 2017-07-27 ENCOUNTER — TELEPHONE (OUTPATIENT)
Dept: FAMILY MEDICINE | Facility: CLINIC | Age: 62
End: 2017-07-27

## 2017-07-27 LAB
ALT SERPL-CCNC: 21 IU/L (ref 8–45)
AST SERPL-CCNC: 71 U/L (ref 5–41)
CREAT SERPL-MCNC: 0.74 MG/DL (ref 0.57–1.11)
GFR SERPL CREATININE-BSD FRML MDRD: >60 ML/MIN/1.73M2
GLUCOSE SERPL-MCNC: 80 MG/DL (ref 70–100)
POTASSIUM SERPL-SCNC: 3 MMOL/L (ref 3.5–5.1)

## 2017-07-27 NOTE — TELEPHONE ENCOUNTER
Reason for Call:  Same Day Appointment, Requested Provider:  Karan Smith M.D.    PCP: Karan Smith    Reason for visit: Emergency room follow up     Duration of symptoms: n/a    Have you been treated for this in the past? She didn't say     Additional comments: Patient called and needs to schedule an emergency room follow up with Dr. Smith. She states she was seen at the [facility] Emergency Department last night and was instructed to call to schedule an appointment with her primary in one to two days. Alexia is wondering if Dr. Smith would be able to work her into his schedule. She didn't state what she was seen for. Please advise.     Can we leave a detailed message on this number? YES    Phone number patient can be reached at: Home number on file [phone] (home)    Best Time: any     Call taken on 7/27/2017 at 9:07 AM by Sedrick Navarrete

## 2017-07-31 NOTE — TELEPHONE ENCOUNTER
B-12 TR 1000 MCG TBCR        Last Written Prescription Date: 6/21/17  Last Fill Quantity: 30,    # refills: 0  Last Office Visit with G, P or  Health prescribing provider:  7/19/17   Next 5 appointments (look out 90 days)     Aug 03, 2017  2:20 PM CDT   Office Visit with Karan Smith MD   [facility] ([facility]    [address] [place] [address] [phone]                   Lab Results   Component Value Date    WBC 7.7 07/19/2017     Lab Results   Component Value Date    RBC 2.50 07/19/2017     Lab Results   Component Value Date    HGB 9.2 07/19/2017     Lab Results   Component Value Date    HCT 29.5 07/19/2017     No components found for: MCT  Lab Results   Component Value Date     07/19/2017     Lab Results   Component Value Date    MCH 36.8 07/19/2017     Lab Results   Component Value Date    MCHC 31.2 07/19/2017     Lab Results   Component Value Date    RDW 17.7 07/19/2017     Lab Results   Component Value Date     07/19/2017     Lab Results   Component Value Date    AST 87 07/19/2017     Lab Results   Component Value Date    ALT 35 07/19/2017     Creatinine   Date Value Ref Range Status   07/19/2017 0.74 0.52 - 1.04 mg/dL Final

## 2017-08-01 RX ORDER — UBIDECARENONE 75 MG
CAPSULE ORAL
Qty: 30 TABLET | Refills: 0 | Status: SHIPPED | OUTPATIENT
Start: 2017-08-01 | End: 2017-01-01

## 2017-08-01 NOTE — TELEPHONE ENCOUNTER
Vitamin B12  Routing refill request to provider for review/approval because:  Drug not on the [facility] refill protocol   Labs out of range:  Hgb    Trino Hartmann RN, BSN

## 2017-08-01 NOTE — PROGRESS NOTES
Clinic Care Coordination Contact 8/1/17  [facility]-    Referral Source: PCP  Clinical Data: Care Coordinator Outreach  Outreach attempted x 2.  Left message on voicemail with call back information and requested return call.  Plan:  Care Coordinator will try to reach patient again in 3-5 business days.    SHANIKA Duenas  Care Coordinator Social Work    [facility] [place]  [phone]  8/1/2017 3:58 PM

## 2017-08-03 ENCOUNTER — OFFICE VISIT (OUTPATIENT)
Dept: FAMILY MEDICINE | Facility: CLINIC | Age: 62
End: 2017-08-03
Payer: COMMERCIAL

## 2017-08-03 VITALS
BODY MASS INDEX: 17.01 KG/M2 | HEART RATE: 108 BPM | SYSTOLIC BLOOD PRESSURE: 128 MMHG | RESPIRATION RATE: 16 BRPM | OXYGEN SATURATION: 95 % | DIASTOLIC BLOOD PRESSURE: 60 MMHG | WEIGHT: 90 LBS | TEMPERATURE: 97.6 F

## 2017-08-03 DIAGNOSIS — F10.20 ALCOHOLISM (H): Primary | ICD-10-CM

## 2017-08-03 DIAGNOSIS — R07.0 THROAT PAIN: ICD-10-CM

## 2017-08-03 DIAGNOSIS — E87.8 ELECTROLYTE IMBALANCE: ICD-10-CM

## 2017-08-03 LAB
HGB BLD-MCNC: 9.1 G/DL (ref 11.7–15.7)
POTASSIUM SERPL-SCNC: 4 MMOL/L (ref 3.4–5.3)

## 2017-08-03 PROCEDURE — 84132 ASSAY OF SERUM POTASSIUM: CPT | Performed by: FAMILY MEDICINE

## 2017-08-03 PROCEDURE — 99214 OFFICE O/P EST MOD 30 MIN: CPT | Performed by: FAMILY MEDICINE

## 2017-08-03 PROCEDURE — 36415 COLL VENOUS BLD VENIPUNCTURE: CPT | Performed by: FAMILY MEDICINE

## 2017-08-03 PROCEDURE — 85018 HEMOGLOBIN: CPT | Performed by: FAMILY MEDICINE

## 2017-08-03 RX ORDER — LORAZEPAM 0.5 MG/1
0.5 TABLET ORAL EVERY 8 HOURS PRN
Qty: 20 TABLET | Refills: 0 | OUTPATIENT
Start: 2017-08-03 | End: 2017-08-03

## 2017-08-03 RX ORDER — LORAZEPAM 0.5 MG/1
0.5 TABLET ORAL EVERY 8 HOURS PRN
Qty: 20 TABLET | Refills: 0 | Status: ON HOLD | OUTPATIENT
Start: 2017-08-03 | End: 2017-01-01

## 2017-08-03 NOTE — MR AVS SNAPSHOT
"              After Visit Summary   8/3/2017    Alexia Flor    MRN: [id number]           Patient Information     Date Of Birth          1955        Visit Information        Provider Department      8/3/2017 2:20 PM Karan Smith MD [facility]        Today's Diagnoses     Alcoholism (H)    -  1    Electrolyte imbalance        Throat pain           Follow-ups after your visit        Your next 10 appointments already scheduled     Aug 21, 2017  2:30 PM CDT   New Visit with Roddy Madden MD   [facility] ([facility])    [address] [place] [address] [phone]              Who to contact     If you have questions or need follow up information about today's clinic visit or your schedule please contact [facility] directly at [phone].  Normal or non-critical lab and imaging results will be communicated to you by Hashgohart, letter or phone within 4 business days after the clinic has received the results. If you do not hear from us within 7 days, please contact the clinic through MyChart or phone. If you have a critical or abnormal lab result, we will notify you by phone as soon as possible.  Submit refill requests through Accellos or call your pharmacy and they will forward the refill request to us. Please allow 3 business days for your refill to be completed.          Additional Information About Your Visit        MyChart Information     Accellos lets you send messages to your doctor, view your test results, renew your prescriptions, schedule appointments and more. To sign up, go to [email or web address] . Click on \"Log in\" on the left side of the screen, which will take you to the Welcome page. Then click on \"Sign up Now\" on the right side of the page.     You will be asked to enter the access code listed below, as well as some personal information. Please follow the directions to create your username and password.   "   Your access code is: [email or web address]  Expires: 2017  4:38 PM     Your access code will  in 90 days. If you need help or a new code, please call your [place] [facility] clinic or [phone].        Care EveryWhere ID     This is your Care EveryWhere ID. This could be used by other organizations to access your [facility] medical records  [id number]        Your Vitals Were     Pulse Temperature Respirations Pulse Oximetry BMI (Body Mass Index)       108 97.6  F (36.4  C) (Temporal) 16 95% 17.01 kg/m2        Blood Pressure from Last 3 Encounters:   17 128/60   17 116/64   17 100/60    Weight from Last 3 Encounters:   17 90 lb (40.8 kg)   17 88 lb 8 oz (40.1 kg)   17 90 lb (40.8 kg)              We Performed the Following     Hemoglobin     Potassium          Today's Medication Changes          These changes are accurate as of: 8/3/17 11:59 PM.  If you have any questions, ask your nurse or doctor.               Start taking these medicines.        Dose/Directions    LORazepam 0.5 MG tablet   Commonly known as:  ATIVAN   Used for:  Alcoholism (H)   Started by:  Karan Smith MD        Dose:  0.5 mg   Take 1 tablet (0.5 mg) by mouth every 8 hours as needed (for withdrawl)   Quantity:  20 tablet   Refills:  0            Where to get your medicines      Some of these will need a paper prescription and others can be bought over the counter.  Ask your nurse if you have questions.     Bring a paper prescription for each of these medications     LORazepam 0.5 MG tablet                Primary Care Provider Office Phone # Fax #    Karan Smith [phone]       [facility] [address] [place] [address]        Equal Access to Services     JANIE GRAY [facility]: Hadii dhruv liuo Soradha, waaxda luqadaha, qaybta kaalmada jennifer, michelle harris. So [facility] [phone].    ATENCIÓN: Si habla español, tiene a savage disposición  servicios gratuitos de asistencia lingüística. Juliana romero [phone].    We comply with applicable federal civil rights laws and [place] laws. We do not discriminate on the basis of race, color, national origin, age, disability sex, sexual orientation or gender identity.            Thank you!     Thank you for choosing [facility]  for your care. Our goal is always to provide you with excellent care. Hearing back from our patients is one way we can continue to improve our services. Please take a few minutes to complete the written survey that you may receive in the mail after your visit with us. Thank you!             Your Updated Medication List - Protect others around you: Learn how to safely use, store and throw away your medicines at www.disposemymeds.org.          This list is accurate as of: 8/3/17 11:59 PM.  Always use your most recent med list.                   Brand Name Dispense Instructions for use Diagnosis    albuterol 108 (90 BASE) MCG/ACT Inhaler    PROAIR HFA/PROVENTIL HFA/VENTOLIN HFA    3 Inhaler    Inhale 2 puffs into the lungs every 4 hours as needed for shortness of breath / dyspnea    Cough       B-12 TR 1000 MCG Tbcr   Generic drug:  cyanocobalamin     30 tablet    TAKE 1 TABLET BY MOUTH DAILY    Alcoholism /alcohol abuse (H)       cetirizine 10 MG tablet    zyrTEC    14 tablet    TAKE 1 TABLET BY MOUTH EVERY EVENING    Pruritic disorder       chlordiazePOXIDE 25 MG capsule    LIBRIUM    25 capsule    Take 50mg twice daily for 2 days, then take 25mg bid for days 3-5    Alcoholism (H)       ferrous gluconate 324 (38 FE) MG tablet    FERGON    100 tablet    TAKE 1 TABLET(324 MG) BY MOUTH DAILY WITH BREAKFAST    Iron deficiency anemia due to chronic blood loss       ferrous sulfate 325 (65 FE) MG tablet    IRON    1 tablet    Take 1 tablet (325 mg) by mouth 2 times daily    Alcohol-induced acute pancreatitis, unspecified complication status, Iron deficiency anemia due to chronic  blood loss       folic acid 400 MCG tablet    FOLVITE    75 tablet    Take 2.5 tablets (1 mg) by mouth daily    Alcoholism /alcohol abuse (H)       furosemide 20 MG tablet    LASIX    30 tablet    TAKE 1 TABLET(20 MG) BY MOUTH EVERY MORNING    Edema, unspecified type       LORazepam 0.5 MG tablet    ATIVAN    20 tablet    Take 1 tablet (0.5 mg) by mouth every 8 hours as needed (for withdrawl)    Alcoholism (H)       magnesium oxide 400 MG tablet    MAG-OX    90 tablet    Take 1 tablet (400 mg) by mouth 3 times daily    Hypomagnesemia       omeprazole 40 MG capsule    priLOSEC    30 capsule    TAKE 1 CAPSULE(40 MG) BY MOUTH DAILY    Gastroesophageal reflux disease without esophagitis       pantoprazole 40 MG EC tablet    PROTONIX    60 tablet    TAKE 1 TABLET(40 MG) BY MOUTH EVERY 12 HOURS    Gastroesophageal reflux disease with esophagitis       potassium chloride SA 20 MEQ CR tablet    K-DUR/KLOR-CON M    30 tablet    Take 1 tablet (20 mEq) by mouth daily    Hypokalemia       thiamine 100 MG tablet     30 tablet    TAKE 1 TABLET(100 MG) BY MOUTH DAILY    Alcoholism /alcohol abuse (H)

## 2017-08-03 NOTE — PROGRESS NOTES
SUBJECTIVE:                                                    Alexia Flor is a 62 year old female who presents to clinic today for the following health issues:       SUBJECTIVE:  Alexia  is a 62 year old female who presents for:  Follow-up in another emergency room visit to [place] where she was intoxicated. She is better today. Still hasn't got into any kind of rehabilitation. Her hemoglobin was stable her potassium is down again. States she's cut down on the alcohol. Asking for some Ativan to help her.    Past Medical History:   Diagnosis Date     Alcohol abuse      Alcoholic hepatitis      Alcoholic pancreatitis      Anxiety      Schafer's esophagus      Breast cancer (H)     rt, s/p radiation.     Congestive heart failure, unspecified      COPD (chronic obstructive pulmonary disease) (H)      Hypokalemia      Macrocytic anemia      Non-adherence to medical treatment      Tobacco abuse      Past Surgical History:   Procedure Laterality Date     lumpectomy Rt breast  2006       Social History   Substance Use Topics     Smoking status: Current Every Day Smoker     Packs/day: 0.50     Years: 44.00     Types: Cigarettes     Smokeless tobacco: Never Used     Alcohol use 0.5 oz/week     1 Glasses of wine per week      Comment: weekends (reported; smelled of alcohol during clinic visit 4/2016)     Current Outpatient Prescriptions   Medication Sig Dispense Refill     LORazepam (ATIVAN) 0.5 MG tablet Take 1 tablet (0.5 mg) by mouth every 8 hours as needed (for withdrawl) 20 tablet 0     B-12 TR 1000 MCG TBCR TAKE 1 TABLET BY MOUTH DAILY 30 tablet 0     pantoprazole (PROTONIX) 40 MG EC tablet TAKE 1 TABLET(40 MG) BY MOUTH EVERY 12 HOURS 60 tablet 0     VITAMIN B-1 100 MG tablet TAKE 1 TABLET(100 MG) BY MOUTH DAILY 30 tablet 0     cetirizine (ZYRTEC) 10 MG tablet TAKE 1 TABLET BY MOUTH EVERY EVENING 14 tablet 5     albuterol (PROAIR HFA/PROVENTIL HFA/VENTOLIN HFA) 108 (90 BASE) MCG/ACT Inhaler Inhale 2 puffs into the  lungs every 4 hours as needed for shortness of breath / dyspnea 3 Inhaler 4     furosemide (LASIX) 20 MG tablet TAKE 1 TABLET(20 MG) BY MOUTH EVERY MORNING 30 tablet 10     ferrous gluconate (FERGON) 324 (38 FE) MG tablet TAKE 1 TABLET(324 MG) BY MOUTH DAILY WITH BREAKFAST 100 tablet 0     ferrous sulfate (IRON) 325 (65 FE) MG tablet Take 1 tablet (325 mg) by mouth 2 times daily 1 tablet 0     chlordiazePOXIDE (LIBRIUM) 25 MG capsule Take 50mg twice daily for 2 days, then take 25mg bid for days 3-5 25 capsule 0     magnesium oxide (MAG-OX) 400 MG tablet Take 1 tablet (400 mg) by mouth 3 times daily 90 tablet 3     folic acid (FOLVITE) 400 MCG tablet Take 2.5 tablets (1 mg) by mouth daily 75 tablet 3     potassium chloride SA (K-DUR/KLOR-CON M) 20 MEQ CR tablet Take 1 tablet (20 mEq) by mouth daily 30 tablet 0     omeprazole (PRILOSEC) 40 MG capsule TAKE 1 CAPSULE(40 MG) BY MOUTH DAILY 30 capsule 11       REVIEW OF SYSTEMS:   5 point ROS negative except as noted above in HPI, including Gen., Resp, CV, GI &  system review.     OBJECTIVE:  Vitals: /60 (BP Location: Right arm, Patient Position: Chair, Cuff Size: Adult Regular)  Pulse 108  Temp 97.6  F (36.4  C) (Temporal)  Resp 16  Wt 90 lb (40.8 kg)  SpO2 95%  BMI 17.01 kg/m2  BMI= Body mass index is 17.01 kg/(m^2).  She is alert and oriented today. Frail and thin. No sign of intoxication at this time. Mucous membranes moist. Throat with no lesions that can be seen. Lungs are clear.  Heart Regular rhythm. Extremities with no edema. Skin generally clear some bruising.    ASSESSMENT:  One alcoholism #2 electrolyte imbalance #3 throat problem    PLAN:  We will repeat her hemoglobin the day and her potassium she increased her potassium per our instructions. She needs to get herself establish with a inpatient treatment program. Again emphasized the importance of this. She is to follow up with ENT for her throat problem she does have an appointment I told her  she needs to keep that.        Kraan Smith MD  [facility]

## 2017-08-04 ENCOUNTER — TELEPHONE (OUTPATIENT)
Dept: FAMILY MEDICINE | Facility: CLINIC | Age: 62
End: 2017-08-04

## 2017-08-04 DIAGNOSIS — L29.9 PRURITIC DISORDER: ICD-10-CM

## 2017-08-04 DIAGNOSIS — L65.9 HAIR LOSS: Primary | ICD-10-CM

## 2017-08-04 NOTE — TELEPHONE ENCOUNTER
Reason for call:  Patient reporting a symptom    Symptom or request: Patient would like to know who to discuss hair loss with, should she see a Family practice doctor or a specialist.  Please advise    Duration (how long have symptoms been present): since spring    Have you been treated for this before? Yes    Additional comments:     Phone Number patient can be reached at:  Home number on file [phone] (home)    Best Time:  any    Can we leave a detailed message on this number:  YES    Call taken on 8/4/2017 at 7:10 AM by Dai Montgomery

## 2017-08-04 NOTE — TELEPHONE ENCOUNTER
zyrtec      Last Written Prescription Date: 7/19/17  Last Fill Quantity: 14,  # refills: 5   Last Office Visit with G, [facility] or [facility] prescribing provider: 8/3/17

## 2017-08-04 NOTE — TELEPHONE ENCOUNTER
Jackelin spoke with patient to let her know she can go to [facility].

## 2017-08-07 ENCOUNTER — TELEPHONE (OUTPATIENT)
Dept: FAMILY MEDICINE | Facility: CLINIC | Age: 62
End: 2017-08-07

## 2017-08-07 NOTE — TELEPHONE ENCOUNTER
Reason for Call: Request for an order or referral:    Order or referral being requested: A1C    Date needed: as soon as possible    Has the patient been seen by the PCP for this problem? YES    Additional comments: Pt was told in the [facility] that she had high blood sugars. When she had her f/u appt with Luis, she's wondering why that never got checked? If it did, can you call and verify that with her? If not, can you place the order for her? She's worried she has diabetes. Please call and advise.     Phone number Patient can be reached at:  Home number on file [phone] (home)    Best Time:  anytime    Can we leave a detailed message on this number?  YES    Call taken on 8/7/2017 at 9:18 AM by Clarissa Oreilly

## 2017-08-07 NOTE — TELEPHONE ENCOUNTER
Lm for pt to call clinic back. Per Dr. Smith A1c in Feb. Was 5.2 which is normal, no diabetes. Blood sugars that have been tested in clinic in the past were generally normal. Sugars will be high when patient has been drinking. Mavis Jalloh, CMA

## 2017-08-08 RX ORDER — CETIRIZINE HYDROCHLORIDE 10 MG/1
TABLET ORAL
Qty: 14 TABLET | Refills: 5 | OUTPATIENT
Start: 2017-08-08

## 2017-08-14 ENCOUNTER — TELEPHONE (OUTPATIENT)
Dept: FAMILY MEDICINE | Facility: CLINIC | Age: 62
End: 2017-08-14

## 2017-08-14 ENCOUNTER — CARE COORDINATION (OUTPATIENT)
Dept: CARE COORDINATION | Facility: CLINIC | Age: 62
End: 2017-08-14

## 2017-08-14 NOTE — TELEPHONE ENCOUNTER
"Reason for Call:  Other call back    Detailed comments: patient is calling the clinic screaming, yelling and very upset because Layla called her. Patient states that she doesn't agree with someone calling her and asking if whatever they were speaking about was \"accurate.\" She continued to say \"why would she call to get me upset?\" I reminded her that it is never our intent to make her upset and that Layla was most likely just calling to make sure that everything was going well. Patient continued to mock me, swear at me, belittle me and continue to harass me for personal information which I told her I wouldn't discuss with a patient. She continued to ask \"what kind of doctor is Dr. Smith?\" and \"is he even an actual doctor?\" She continued to ask if I would see him and when I reminded her again that I don't discuss my personal life with people she called me a f**isaac c*nt why won't you just tell me? I ended the phone call when she wouldn't stop screaming, yelling and swearing at me. Requesting Dr. Smith's nurse to call her      Phone Number Patient can be reached at: Home number on file [phone] (home)    Best Time: any    Can we leave a detailed message on this number? YES    Call taken on 8/14/2017 at 11:04 AM by Maya Estrada      "

## 2017-08-14 NOTE — PROGRESS NOTES
"Clinic Care Coordination Contact 8/14/17  Care Team Conversations-SW    Phone call made to pt today to see how she was doing with her OP treatment. Pt states she has not attended yet as she had other things to do first. Discussion about how she was feeling. Pt states, in a sarcastic tone, that all of her levels were fine and the 's at [place] keep drawing on her. This writer asked her if she felt better than she had been feeling or if she was feeling ill as she had been for the past few months. She became defensive and asked this writer to clarify what she meant by that. She began yelling at this writer and asked, \"are you trying to scare me? You fu**ing B**t*...\" This writer explained that I was going to hang up. Phone call ended.    [facility] will no longer follow this pt. Pt often times forgets who I am upon my monthly calls and is confused by my VM's as she leaves me return messages asking who I am and what I am wanting.     Layla Falk, [facility]  Care Coordinator Social Work    [facility]  [phone]  8/14/2017 10:44 AM            "

## 2017-08-15 NOTE — TELEPHONE ENCOUNTER
Alexia called today. She said she would like to apologize to Layla for getting so upset with her yesterday.     Thank you  Sedrick Navarrete  Patient Representative

## 2017-08-21 ENCOUNTER — OFFICE VISIT (OUTPATIENT)
Dept: OTOLARYNGOLOGY | Facility: CLINIC | Age: 62
End: 2017-08-21
Payer: COMMERCIAL

## 2017-08-21 VITALS — OXYGEN SATURATION: 95 % | WEIGHT: 91.2 LBS | BODY MASS INDEX: 17.23 KG/M2 | HEART RATE: 107 BPM

## 2017-08-21 DIAGNOSIS — M54.2 CERVICALGIA: ICD-10-CM

## 2017-08-21 DIAGNOSIS — K14.8 TONGUE LESION: Primary | ICD-10-CM

## 2017-08-21 PROCEDURE — 88305 TISSUE EXAM BY PATHOLOGIST: CPT | Performed by: OTOLARYNGOLOGY

## 2017-08-21 PROCEDURE — 31575 DIAGNOSTIC LARYNGOSCOPY: CPT | Performed by: OTOLARYNGOLOGY

## 2017-08-21 PROCEDURE — 41105 BIOPSY OF TONGUE: CPT | Performed by: OTOLARYNGOLOGY

## 2017-08-21 PROCEDURE — 99203 OFFICE O/P NEW LOW 30 MIN: CPT | Mod: 25 | Performed by: OTOLARYNGOLOGY

## 2017-08-21 RX ORDER — KETOROLAC TROMETHAMINE 10 MG/1
10 TABLET, FILM COATED ORAL EVERY 6 HOURS PRN
Qty: 20 TABLET | Refills: 0 | Status: SHIPPED | OUTPATIENT
Start: 2017-08-21 | End: 2017-01-01

## 2017-08-21 NOTE — NURSING NOTE
"Chief Complaint   Patient presents with     Consult     Referring Dr.Steven Garcia     Throat Problem     Pain       Initial Pulse 107  Wt 41.4 kg (91 lb 3.2 oz)  SpO2 95%  BMI 17.23 kg/m2 Estimated body mass index is 17.23 kg/(m^2) as calculated from the following:    Height as of 7/19/17: 1.549 m (5' 1\").    Weight as of this encounter: 41.4 kg (91 lb 3.2 oz).  Medication Reconciliation: complete  "

## 2017-08-21 NOTE — PROGRESS NOTES
ENT Consultation    Alexia Flor is a 62 year old female who is seen in consultation at the request of Dr.Steven Smith.      History of Present Illness - Alexia Flor is a 62 year old female who presents with a sense of something in the throat since 18 months. The sensation is constant. It is worse nothing. Ibuprofen and sleep seems to make it better. The patient confirms heartburn or regurgitation, and has completed at least a 6 week trial of proton pump inhibitors twice daily. The patient confirms associated hoarseness.   The patient is a long time smoker and user of significant ETOH. She had prior evaluation with other ENTs and a CT of soft tissues of the neck which did not show any soft tissue disease but only degenerative disc disease. This scan was done over a year ago.  She admits to weight loss as well.   She is a lot of of pain sharp in submandibular area on the left.  Past Medical History -   Past Medical History:   Diagnosis Date     Alcohol abuse      Alcoholic hepatitis      Alcoholic pancreatitis      Anxiety      Schafer's esophagus      Breast cancer (H)     rt, s/p radiation.     Congestive heart failure, unspecified      COPD (chronic obstructive pulmonary disease) (H)      Hypokalemia      Macrocytic anemia      Non-adherence to medical treatment      Tobacco abuse        Current Medications -   Current Outpatient Prescriptions:      LORazepam (ATIVAN) 0.5 MG tablet, Take 1 tablet (0.5 mg) by mouth every 8 hours as needed (for withdrawl), Disp: 20 tablet, Rfl: 0     B-12 TR 1000 MCG TBCR, TAKE 1 TABLET BY MOUTH DAILY, Disp: 30 tablet, Rfl: 0     pantoprazole (PROTONIX) 40 MG EC tablet, TAKE 1 TABLET(40 MG) BY MOUTH EVERY 12 HOURS, Disp: 60 tablet, Rfl: 0     VITAMIN B-1 100 MG tablet, TAKE 1 TABLET(100 MG) BY MOUTH DAILY, Disp: 30 tablet, Rfl: 0     cetirizine (ZYRTEC) 10 MG tablet, TAKE 1 TABLET BY MOUTH EVERY EVENING, Disp: 14 tablet, Rfl: 5     albuterol (PROAIR HFA/PROVENTIL HFA/VENTOLIN  "HFA) 108 (90 BASE) MCG/ACT Inhaler, Inhale 2 puffs into the lungs every 4 hours as needed for shortness of breath / dyspnea, Disp: 3 Inhaler, Rfl: 4     furosemide (LASIX) 20 MG tablet, TAKE 1 TABLET(20 MG) BY MOUTH EVERY MORNING, Disp: 30 tablet, Rfl: 10     ferrous gluconate (FERGON) 324 (38 FE) MG tablet, TAKE 1 TABLET(324 MG) BY MOUTH DAILY WITH BREAKFAST, Disp: 100 tablet, Rfl: 0     ferrous sulfate (IRON) 325 (65 FE) MG tablet, Take 1 tablet (325 mg) by mouth 2 times daily, Disp: 1 tablet, Rfl: 0     chlordiazePOXIDE (LIBRIUM) 25 MG capsule, Take 50mg twice daily for 2 days, then take 25mg bid for days 3-5, Disp: 25 capsule, Rfl: 0     magnesium oxide (MAG-OX) 400 MG tablet, Take 1 tablet (400 mg) by mouth 3 times daily, Disp: 90 tablet, Rfl: 3     folic acid (FOLVITE) 400 MCG tablet, Take 2.5 tablets (1 mg) by mouth daily, Disp: 75 tablet, Rfl: 3     potassium chloride SA (K-DUR/KLOR-CON M) 20 MEQ CR tablet, Take 1 tablet (20 mEq) by mouth daily, Disp: 30 tablet, Rfl: 0     omeprazole (PRILOSEC) 40 MG capsule, TAKE 1 CAPSULE(40 MG) BY MOUTH DAILY, Disp: 30 capsule, Rfl: 11    Allergies -   Allergies   Allergen Reactions     Codeine Anaphylaxis     Contrast Dye Itching and Swelling     7/10/2009 Patient reports history of contrast reaction when first diagnosed with breast cancer. \"I almost .\"   (Hives, swelling.) 2009 CT to be done without IV contrast per Dr. KAVIN Live     Moxifloxacin Anaphylaxis     Avelox     Nickel Itching and Rash     Codeine Sulfate Other (See Comments)     shaking     Gabapentin Other (See Comments)     Patient reports that she got very shaky and she \"felt like she was going to die\"       Social History -   Social History     Social History     Marital status: Single     Spouse name: N/A     Number of children: N/A     Years of education: N/A     Social History Main Topics     Smoking status: Current Every Day Smoker     Packs/day: 0.50     Years: 44.00     Types: Cigarettes     " Smokeless tobacco: Never Used     Alcohol use 0.5 oz/week     1 Glasses of wine per week      Comment: weekends (reported; smelled of alcohol during clinic visit 4/2016)     Drug use: No     Sexual activity: Not Currently     Partners: Male     Other Topics Concern     Not on file     Social History Narrative    Lives in [place] in an apartment. Does not own a car.       Family History -   Family History   Problem Relation Age of Onset     Coronary Artery Disease Father      Emphysema Father      CANCER Mother      renal cancer     Breast Cancer Maternal Grandmother         Review of Systems -  General: negative  ENT: ear pain left    Physical Exam  There were no vitals taken for this visit.    General - The patient is well nourished and well developed, and appears to have good nutritional status.  Alert and oriented to person and place, answers questions and cooperates with examination appropriately.     SKIN - No suspicious lesions or rashes.  Respiration - No respiratory distress.    Head and Face - Normocephalic and atraumatic, with no gross asymmetry noted of the contour of the facial features.  The facial nerve is intact, with strong symmetric movements.    Voice and Breathing - The patient was breathing comfortably without the use of accessory muscles. There was no wheezing, stridor, or stertor.  The patients voice was raspy..    Ears - Bilateral pinna and EACs with normal appearing overlying skin. Tympanic membrane intact with good mobility on pneumatic otoscopy bilaterally. Bony landmarks of the ossicular chain are normal. The tympanic membranes are normal in appearance. No retraction, perforation, or masses.  No fluid or purulence was seen in the external canal or the middle ear.     Eyes - Extraocular movements intact.  Sclera were not icteric or injected, conjunctiva were pink and moist.    Mouth - Examination of the oral cavity showed pink, healthy oral mucosa. Left post tongue lesion about 1/2 cm  with leucoplakia  noted.  The tongue was mobile and midline, and the dentition were in good condition.      Throat - The walls of the oropharynx were smooth, pink, moist, symmetric, and had no lesions or ulcerations.  The tonsillar pillars and soft palate were symmetric.  The uvula was midline on elevation.    Neck - Normal midline excursion of the laryngotracheal complex during swallowing.  Full range of motion on passive movement.  Palpation of the occipital, submental, submandibular, internal jugular chain, and supraclavicular nodes did not demonstrate any abnormal lymph nodes or masses.  The carotid pulse was palpable bilaterally.  Palpation of the thyroid was soft and smooth, with no nodules or goiter appreciated.  The trachea was mobile and midline.  Pain elicited on palpation of left submandibular area that was otherwise supple and soft.      Nose - External contour is symmetric, no gross deflection or scars.  Nasal mucosa is pink and moist with no abnormal mucus.  The septum was midline and non-obstructive, turbinates of normal size and position.  No polyps, masses, or purulence noted on examination.    Procedure: Flexible Endoscopy  Indication: neck pain    Performed in clinic today:  Attempts at mirror laryngoscopy were not possible due to gag reflex.  Therefore I proceeded with a fiberoptic examination.  First I sprayed both sides of the nose with a mixture of lidocaine and neosynephrine.  I then passed the scope through the nasal cavity.  The nasal cavity was unremarkable.  The nasopharynx was mucosally covered and symmetric.  The Eustachian tube openings were unobstructed.  Going further down I had a clear view of the base of tongue which had normal appearing lingual tonsillar tissue.  The base of tongue was free of lesions, and the vallecula was open.  The epiglottis was smooth and mucosally covered.  The supraglottic larynx was then clearly visualized.  The vocal cords moved smoothly and  symmetrically, they were pearly white and no lesions were seen.  The pyriform sinuses were open, and the limited view of the postcricoid region did not show any lesions. Yellow - [id number] Optim ENTity  The only findigs were slight enlargement of right tongue base tongue area without ulceration or mucosal changes and slight Jose F's edema of the cords symmetrical.      Tongue biopsy:  After topical anasthetic with cetacaine and local with 1% lido and 1/100,000 epi 0.5 ml, we use punch biopsy to remove center of the lesion and cauterize with silvernitrate.      A/P - Alexia Flor is a 62 year old female with left tongue lesion and neck pain possibly related. Will await results of biopsy to make further plans of w/u  And therapy.   Will give her Toradol for 6 days and Magic mpouth wash in the meantime. Will see her back in 1 week.       smoking cessation deiscussed      Roddy Madden MD

## 2017-08-21 NOTE — MR AVS SNAPSHOT
"              After Visit Summary   8/21/2017    Alexia Flor    MRN: [id number]           Patient Information     Date Of Birth          1955        Visit Information        Provider Department      8/21/2017 2:30 PM Roddy Madden MD [facility]        Today's Diagnoses     Tongue lesion    -  1    Cervicalgia           Follow-ups after your visit        Your next 10 appointments already scheduled     Aug 28, 2017  1:00 PM CDT   Return Visit with Roddy Madden MD   [facility] ([facility]    [address] [place] [address] [id number] [phone]              Who to contact     If you have questions or need follow up information about today's clinic visit or your schedule please contact [facility] directly at [phone].  Normal or non-critical lab and imaging results will be communicated to you by MyChart, letter or phone within 4 business days after the clinic has received the results. If you do not hear from us within 7 days, please contact the clinic through MyChart or phone. If you have a critical or abnormal lab result, we will notify you by phone as soon as possible.  Submit refill requests through [employer] or call your pharmacy and they will forward the refill request to us. Please allow 3 business days for your refill to be completed.          Additional Information About Your Visit        MyChart Information     [employer] lets you send messages to your doctor, view your test results, renew your prescriptions, schedule appointments and more. To sign up, go to [email or web address] . Click on \"Log in\" on the left side of the screen, which will take you to the Welcome page. Then click on \"Sign up Now\" on the right side of the page.     You will be asked to enter the access code listed below, as well as some personal information. Please follow the directions to create your username and password.     Your access code is: " [email or web address]  Expires: 2017  4:38 PM     Your access code will  in 90 days. If you need help or a new code, please call your [facility] clinic or [phone].        Care EveryWhere ID     This is your Care EveryWhere ID. This could be used by other organizations to access your [facility] medical records  [id number]        Your Vitals Were     Pulse Pulse Oximetry BMI (Body Mass Index)             107 95% 17.23 kg/m2          Blood Pressure from Last 3 Encounters:   17 128/60   17 116/64   17 100/60    Weight from Last 3 Encounters:   17 41.4 kg (91 lb 3.2 oz)   17 40.8 kg (90 lb)   17 40.1 kg (88 lb 8 oz)              We Performed the Following     BIOPSY TONGUE, POSTERIOR 1/3     LARYNGOSCOPY FLEX FIBEROPTIC, DIAGNOSTIC     Surgical pathology exam          Today's Medication Changes          These changes are accurate as of: 17  3:48 PM.  If you have any questions, ask your nurse or doctor.               Start taking these medicines.        Dose/Directions    ketorolac 10 MG tablet   Commonly known as:  TORADOL   Used for:  Cervicalgia, Tongue lesion   Started by:  Roddy Madden MD        Dose:  10 mg   Take 1 tablet (10 mg) by mouth every 6 hours as needed for moderate pain   Quantity:  20 tablet   Refills:  0       MAGIC MOUTHWASH (ENTER INGREDIENTS IN COMMENTS)   Used for:  Tongue lesion   Started by:  Roddy Madden MD        Dose:  10 mL   Take 10 mLs by mouth every 4 hours as needed   Quantity:  480 mL   Refills:  1            Where to get your medicines      These medications were sent to [facility], [place] - [address], [facility] [address]     Phone:  [phone]     ketorolac 10 MG tablet         Some of these will need a paper prescription and others can be bought over the counter.  Ask your nurse if you have questions.     Bring a paper prescription for each of these medications     MAGIC MOUTHWASH  (ENTER INGREDIENTS IN COMMENTS)                Primary Care Provider Office Phone # Fax #    Karan Smith [phone]       [facility] [address] [place] [address]        Equal Access to Services     BA GRAY : Hadii dhruv ku hadfrancoo Soomaali, waaxda luqadaha, qaybta kaalmada adeegyada, michelle duboisn meli marcial ladomitilanae harris. So [facility] [phone].    ATENCIÓN: Si habla español, tiene a savage disposición servicios gratuitos de asistencia lingüística. Llame al [phone].    We comply with applicable federal civil rights laws and [place] laws. We do not discriminate on the basis of race, color, national origin, age, disability sex, sexual orientation or gender identity.            Thank you!     Thank you for choosing [facility]  for your care. Our goal is always to provide you with excellent care. Hearing back from our patients is one way we can continue to improve our services. Please take a few minutes to complete the written survey that you may receive in the mail after your visit with us. Thank you!             Your Updated Medication List - Protect others around you: Learn how to safely use, store and throw away your medicines at www.disposemymeds.org.          This list is accurate as of: 8/21/17  3:48 PM.  Always use your most recent med list.                   Brand Name Dispense Instructions for use Diagnosis    albuterol 108 (90 BASE) MCG/ACT Inhaler    PROAIR HFA/PROVENTIL HFA/VENTOLIN HFA    3 Inhaler    Inhale 2 puffs into the lungs every 4 hours as needed for shortness of breath / dyspnea    Cough       B-12 TR 1000 MCG Tbcr   Generic drug:  cyanocobalamin     30 tablet    TAKE 1 TABLET BY MOUTH DAILY    Alcoholism /alcohol abuse (H)       cetirizine 10 MG tablet    zyrTEC    14 tablet    TAKE 1 TABLET BY MOUTH EVERY EVENING    Pruritic disorder       chlordiazePOXIDE 25 MG capsule    LIBRIUM    25 capsule    Take 50mg twice daily  for 2 days, then take 25mg bid for days 3-5    Alcoholism (H)       ferrous gluconate 324 (38 FE) MG tablet    FERGON    100 tablet    TAKE 1 TABLET(324 MG) BY MOUTH DAILY WITH BREAKFAST    Iron deficiency anemia due to chronic blood loss       ferrous sulfate 325 (65 FE) MG tablet    IRON    1 tablet    Take 1 tablet (325 mg) by mouth 2 times daily    Alcohol-induced acute pancreatitis, unspecified complication status, Iron deficiency anemia due to chronic blood loss       folic acid 400 MCG tablet    FOLVITE    75 tablet    Take 2.5 tablets (1 mg) by mouth daily    Alcoholism /alcohol abuse (H)       furosemide 20 MG tablet    LASIX    30 tablet    TAKE 1 TABLET(20 MG) BY MOUTH EVERY MORNING    Edema, unspecified type       ketorolac 10 MG tablet    TORADOL    20 tablet    Take 1 tablet (10 mg) by mouth every 6 hours as needed for moderate pain    Cervicalgia, Tongue lesion       LORazepam 0.5 MG tablet    ATIVAN    20 tablet    Take 1 tablet (0.5 mg) by mouth every 8 hours as needed (for withdrawl)    Alcoholism (H)       MAGIC MOUTHWASH (ENTER INGREDIENTS IN COMMENTS)     480 mL    Take 10 mLs by mouth every 4 hours as needed    Tongue lesion       magnesium oxide 400 MG tablet    MAG-OX    90 tablet    Take 1 tablet (400 mg) by mouth 3 times daily    Hypomagnesemia       omeprazole 40 MG capsule    priLOSEC    30 capsule    TAKE 1 CAPSULE(40 MG) BY MOUTH DAILY    Gastroesophageal reflux disease without esophagitis       pantoprazole 40 MG EC tablet    PROTONIX    60 tablet    TAKE 1 TABLET(40 MG) BY MOUTH EVERY 12 HOURS    Gastroesophageal reflux disease with esophagitis       potassium chloride SA 20 MEQ CR tablet    K-DUR/KLOR-CON M    30 tablet    Take 1 tablet (20 mEq) by mouth daily    Hypokalemia       thiamine 100 MG tablet     30 tablet    TAKE 1 TABLET(100 MG) BY MOUTH DAILY    Alcoholism /alcohol abuse (H)

## 2017-08-23 LAB — COPATH REPORT: NORMAL

## 2017-08-25 NOTE — PROGRESS NOTES
History of Present Illness - Alexia Flor is a 62 year old female presenting in clinic today for a recheck on tongue lesion biopsy and Pathology results.        Present Symptoms include: painful swallowing and neck and facial pain and they are   getting worse .       Past Medical History -   Past Medical History:   Diagnosis Date     Alcohol abuse      Alcoholic hepatitis      Alcoholic pancreatitis      Anxiety      Schafer's esophagus      Breast cancer (H)     rt, s/p radiation.     Congestive heart failure, unspecified      COPD (chronic obstructive pulmonary disease) (H)      Hypokalemia      Macrocytic anemia      Non-adherence to medical treatment      Tobacco abuse        Current Medications -   Current Outpatient Prescriptions:      ketorolac (TORADOL) 10 MG tablet, Take 1 tablet (10 mg) by mouth every 6 hours as needed for moderate pain, Disp: 20 tablet, Rfl: 0     MAGIC MOUTHWASH, ENTER INGREDIENTS IN COMMENTS,, Take 10 mLs by mouth every 4 hours as needed, Disp: 480 mL, Rfl: 1     LORazepam (ATIVAN) 0.5 MG tablet, Take 1 tablet (0.5 mg) by mouth every 8 hours as needed (for withdrawl), Disp: 20 tablet, Rfl: 0     B-12 TR 1000 MCG TBCR, TAKE 1 TABLET BY MOUTH DAILY, Disp: 30 tablet, Rfl: 0     pantoprazole (PROTONIX) 40 MG EC tablet, TAKE 1 TABLET(40 MG) BY MOUTH EVERY 12 HOURS, Disp: 60 tablet, Rfl: 0     VITAMIN B-1 100 MG tablet, TAKE 1 TABLET(100 MG) BY MOUTH DAILY, Disp: 30 tablet, Rfl: 0     cetirizine (ZYRTEC) 10 MG tablet, TAKE 1 TABLET BY MOUTH EVERY EVENING, Disp: 14 tablet, Rfl: 5     albuterol (PROAIR HFA/PROVENTIL HFA/VENTOLIN HFA) 108 (90 BASE) MCG/ACT Inhaler, Inhale 2 puffs into the lungs every 4 hours as needed for shortness of breath / dyspnea, Disp: 3 Inhaler, Rfl: 4     furosemide (LASIX) 20 MG tablet, TAKE 1 TABLET(20 MG) BY MOUTH EVERY MORNING, Disp: 30 tablet, Rfl: 10     ferrous gluconate (FERGON) 324 (38 FE) MG tablet, TAKE 1 TABLET(324 MG) BY MOUTH DAILY WITH BREAKFAST, Disp:  "100 tablet, Rfl: 0     ferrous sulfate (IRON) 325 (65 FE) MG tablet, Take 1 tablet (325 mg) by mouth 2 times daily, Disp: 1 tablet, Rfl: 0     chlordiazePOXIDE (LIBRIUM) 25 MG capsule, Take 50mg twice daily for 2 days, then take 25mg bid for days 3-5, Disp: 25 capsule, Rfl: 0     magnesium oxide (MAG-OX) 400 MG tablet, Take 1 tablet (400 mg) by mouth 3 times daily, Disp: 90 tablet, Rfl: 3     folic acid (FOLVITE) 400 MCG tablet, Take 2.5 tablets (1 mg) by mouth daily, Disp: 75 tablet, Rfl: 3     potassium chloride SA (K-DUR/KLOR-CON M) 20 MEQ CR tablet, Take 1 tablet (20 mEq) by mouth daily, Disp: 30 tablet, Rfl: 0     omeprazole (PRILOSEC) 40 MG capsule, TAKE 1 CAPSULE(40 MG) BY MOUTH DAILY, Disp: 30 capsule, Rfl: 11    Allergies -   Allergies   Allergen Reactions     Codeine Anaphylaxis     Contrast Dye Itching and Swelling     7/10/2009 Patient reports history of contrast reaction when first diagnosed with breast cancer. \"I almost .\"   (Hives, swelling.) 2009 CT to be done without IV contrast per Dr. KAVIN Live     Moxifloxacin Anaphylaxis     Avelox     Nickel Itching and Rash     Codeine Sulfate Other (See Comments)     shaking     Gabapentin Other (See Comments)     Patient reports that she got very shaky and she \"felt like she was going to die\"       Social History -   Social History     Social History     Marital status: Single     Spouse name: N/A     Number of children: N/A     Years of education: N/A     Social History Main Topics     Smoking status: Current Every Day Smoker     Packs/day: 0.50     Years: 44.00     Types: Cigarettes     Smokeless tobacco: Never Used     Alcohol use 0.5 oz/week     1 Glasses of wine per week      Comment: weekends (reported; smelled of alcohol during clinic visit 2016)     Drug use: No     Sexual activity: Not Currently     Partners: Male     Other Topics Concern     Not on file     Social History Narrative    Lives in [place] in an apartment. Does not own a " car.       Family History -   Family History   Problem Relation Age of Onset     Coronary Artery Disease Father      Emphysema Father      CANCER Mother      renal cancer     Breast Cancer Maternal Grandmother        Review of Systems - As per HPI and PMHx, otherwise review of system review of the head and neck negative.    Physical Exam  There were no vitals taken for this visit.  BMI: There is no height or weight on file to calculate BMI.    General - The patient is well nourished and well developed, and appears to have good nutritional status.  Alert and oriented to person and place, answers questions and cooperates with examination appropriately.    SKIN - No suspicious lesions or rashes.  Respiration - No respiratory distress.     Head and Face - Normocephalic and atraumatic, with no gross asymmetry noted of the contour of the facial features.  The facial nerve is intact, with strong symmetric movements.    Voice and Breathing - The patient was breathing comfortably without the use of accessory muscles. There was no wheezing, stridor, or stertor.  The patients voice was clear and strong, and had appropriate pitch and quality.    Ears - Bilateral pinna and EACs with normal appearing overlying skin. Tympanic membrane intact with good mobility on pneumatic otoscopy bilaterally. Bony landmarks of the ossicular chain are normal. The tympanic membranes are normal in appearance. No retraction, perforation, or masses.  No fluid or purulence was seen in the external canal or the middle ear.     Eyes - Extraocular movements intact.  Sclera were not icteric or injected, conjunctiva were pink and moist.    Mouth - Examination of the oral cavity showed pink, healthy oral mucosa. Tongue biopsy site has some fibrin filling in the defect with similar irregularity to initial exam seen.    Throat - The walls of the oropharynx were smooth, pink, moist, symmetric, and had no lesions or ulcerations.  The tonsillar pillars and soft  palate were symmetric.  The uvula was midline on elevation.    Neck - Normal midline excursion of the laryngotracheal complex during swallowing.  Full range of motion on passive movement.  Palpation of the occipital, submental, submandibular, internal jugular chain, and supraclavicular nodes did not demonstrate any abnormal lymph nodes or masses.  The carotid pulse was palpable bilaterally.  Palpation of the thyroid was soft and smooth, with no nodules or goiter appreciated.  The trachea was mobile and midline.    Nose - External contour is symmetric, no gross deflection or scars.  Nasal mucosa is pink and moist with no abnormal mucus.  The septum was midline and non-obstructive, turbinates of normal size and position.  No polyps, masses, or purulence noted on examination.    Neuro - Nonfocal neuro exam is normal, CN 2 through 12 intact, normal gait and muscle tone.      Performed in clinic today:  No procedures preformed in clinic today.          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    Alexia should follow up in [facility] cancer clinic.      I spent 25 min out of 40 min visit counseling the patient on the findings and potential treatment option of her cancer as wellas smoking cessation.    Roddy Madden MD

## 2017-08-28 PROBLEM — K85.90 PANCREATITIS: Status: ACTIVE | Noted: 2017-01-01

## 2017-08-28 PROBLEM — C01 MALIGNANT NEOPLASM OF BASE OF TONGUE (H): Status: ACTIVE | Noted: 2017-01-01

## 2017-08-28 PROBLEM — K52.9 COLITIS: Status: ACTIVE | Noted: 2017-01-01

## 2017-08-28 PROBLEM — F17.200 CURRENT SMOKER: Status: ACTIVE | Noted: 2017-01-01

## 2017-08-28 PROBLEM — E87.6 HYPOKALEMIA: Status: ACTIVE | Noted: 2017-01-01

## 2017-08-28 NOTE — ED PROVIDER NOTES
"  History     Chief Complaint   Patient presents with     Abdominal Pain     The history is provided by the patient.     Alexia Flor is a 62 year old female who presents to the emergency department with complaints of left sided abdominal pain. The patient states that the pain goes through to her back and it hurts to breathe and cough. She says that she has the chills. She reports taking Milk of Magnesia yesterday but it didn't really do anything for her. She endorses that her last normal bowel movement was a couple of weeks ago and since has been having small \"rabbit pellet\" like bowel movements. She says that the abdominal pain started late last night and she hasn't been able to eat since yesterday because of the pain. The patient rates her pain as a 10 out of 10. She expresses that she has felt nauseated and hasn't been drinking much fluids, but was able to drink some juice this morning. The patient denies having blood in her stool, vomiting, dysuria, hematuria, previous kidney stones, current skin rashes and any known kidney problems or cardiac problems. She reports being a smoker. She says that she has taken nothing for pain today.   When asked about alcohol consumption, the patient does admit to drinking vodka daily. She was very vague in discussing the amount that she consumes. She did admit to drinking upwards of 0.75 L daily. She states that she has not had any alcoholic drinks today yet. She denies using any illicit drugs.     I have reviewed the Medications, Allergies, Past Medical and Surgical History, and Social History in the Epic system.    Patient Active Problem List   Diagnosis     Liver function abnormality     CARDIOVASCULAR SCREENING; LDL GOAL LESS THAN 130     Cachexia (HCC)     Anxiety     Alcoholic fatty liver     Alcoholism (H)     Arteriosclerotic vascular disease     Cholelithiasis     Diastolic dysfunction     Electrolyte imbalance     Macrocytic anemia     Major depressive disorder, " recurrent episode (H)     Tubular adenoma     Cobalamin deficiency     Iron deficiency anemia due to chronic blood loss     Personal history of malignant neoplasm of breast     Health Care Home     Hypomagnesemia     Alcohol-induced acute pancreatitis, unspecified complication status     Pancreatitis     Colitis     Hypokalemia     Malignant neoplasm of base of tongue (H) squamous cell per biopsy     Current smoker     Past Medical History:   Diagnosis Date     Alcohol abuse      Alcoholic hepatitis      Alcoholic pancreatitis      Anxiety      Schafer's esophagus      Breast cancer (H)     rt, s/p radiation.     Congestive heart failure, unspecified      COPD (chronic obstructive pulmonary disease) (H)      Hypokalemia      Macrocytic anemia      Non-adherence to medical treatment      Tobacco abuse      Past Surgical History:   Procedure Laterality Date     lumpectomy Rt breast  2006       Family History   Problem Relation Age of Onset     Coronary Artery Disease Father      Emphysema Father      CANCER Mother      renal cancer     Breast Cancer Maternal Grandmother      Social History   Substance Use Topics     Smoking status: Current Every Day Smoker     Packs/day: 0.50     Years: 44.00     Types: Cigarettes     Smokeless tobacco: Never Used     Alcohol use 0.5 oz/week     1 Glasses of wine per week      Comment: weekends (reported; smelled of alcohol during clinic visit 4/2016)      Immunization History   Administered Date(s) Administered     Influenza (IIV3) 10/30/2006, 10/15/2007, 09/29/2008, 08/24/2009, 10/01/2011     Influenza Vaccine, 3 YRS +, IM (QUADRIVALENT W/PRESERVATIVES) 10/10/2013, 11/15/2014     Pneumococcal 23 valent 10/13/2003, 10/10/2013     TD (ADULT, 7+) 01/01/2000     Tdap (Adacel,Boostrix) 08/14/2009     Allergies   Allergen Reactions     Codeine Anaphylaxis     Contrast Dye Itching and Swelling     7/10/2009 Patient reports history of contrast reaction when first diagnosed with breast  "cancer. \"I almost .\"   (Hives, swelling.) 2009 CT to be done without IV contrast per Dr. KAVIN Live     Moxifloxacin Anaphylaxis     Avelox     Nickel Itching and Rash     Codeine Sulfate Other (See Comments)     shaking     Gabapentin Other (See Comments)     Patient reports that she got very shaky and she \"felt like she was going to die\"     Current Outpatient Prescriptions   Medication Sig Dispense Refill     potassium chloride SA (K-DUR/KLOR-CON M) 20 MEQ CR tablet Take 1 tablet (20 mEq) by mouth daily 7 tablet 0     Review of Systems   Constitutional: Positive for appetite change and chills.   Gastrointestinal: Positive for abdominal pain, constipation and nausea. Negative for blood in stool and vomiting.   Genitourinary: Negative for dysuria and hematuria.   Musculoskeletal: Positive for back pain.   Skin: Negative for rash.   All other systems reviewed and are negative.    Physical Exam   BP: 142/89  Pulse: 101  Temp: 98.2  F (36.8  C)  Resp: 22  Weight: 43.5 kg (96 lb)  SpO2: 98 %  Physical Exam   Constitutional: She is oriented to person, place, and time. She appears well-developed and well-nourished. She appears distressed.   cachectic   HENT:   Head: Normocephalic and atraumatic.   Right Ear: External ear normal.   Left Ear: External ear normal.   Nose: Nose normal.   Mouth/Throat: Oropharynx is clear and moist. Mucous membranes are dry. No oropharyngeal exudate.   Eyes: Conjunctivae and EOM are normal. Pupils are equal, round, and reactive to light. Right eye exhibits no discharge. Left eye exhibits no discharge. No scleral icterus.   Neck: Normal range of motion. Neck supple.   Cardiovascular: Normal rate, regular rhythm, normal heart sounds and intact distal pulses.    No murmur heard.  Pulmonary/Chest: Effort normal and breath sounds normal. No respiratory distress. She has no wheezes. She has no rales. She exhibits no tenderness.   Abdominal: Soft. Bowel sounds are normal. She exhibits no " "mass. There is tenderness. There is rebound and guarding.   Musculoskeletal: Normal range of motion. She exhibits no edema, tenderness or deformity.   Lymphadenopathy:     She has no cervical adenopathy.   Neurological: She is alert and oriented to person, place, and time. She has normal reflexes. No cranial nerve deficit. Coordination normal.   Skin: Skin is warm and dry. No rash noted. No erythema. No pallor.   Psychiatric: She has a normal mood and affect. Her behavior is normal.   Nursing note and vitals reviewed.        ED Course     ED Course     5:37 PM - I spent 20 minutes with the patient discussing her CT, x-ray, and laboratory findings. We discussed that she has a case of pancreatitis, hypokalemia, and colitis. She only has received one of her potassium pills, and is not quite due to receive her second dose yet. She should have a potassium lab repeated 4 hours after administration of the potassium. We discussed that she needs inpatient treatment for her condition. She needs to be n.p.o., receive IV fluids, and IV pain medication to help calm her intestines and pancreas. She likely would need IV antibiotics as well. I stressed with her that she should be admitted for this concern. She stated \"I am going home\". We discussed that she has had significant risk for health decline and/or even death if her symptoms were to worsen without receiving appropriate medical treatment for her condition. Despite discussing the risks of her decision, she still wanted to return home. She states, \"I am going home no matter what!\". \"You are not going to change my mind \"   I questioned her if her desire to return home is that she can start her evening alcohol consumption. She stated no. I did discuss openly with her that we have medications to treat any withdrawal symptoms that she may experience well in the hospital. This did not change her decision at all. She does not appear intoxicated at this time. She speaks in clear " sentences and does not have any slurring of her speech. She does not appear impaired currently.   After our conversation, Dr. Jade came into the room as well to discuss the risks of leaving AMA. Please see her note for further details. Despite an additional 15 minutes of time spent with patient regarding her condition, the patient still states that she is leaving and she has not open to any other options.  She states that she feels itchy. This could possibly be related to the narcotic medication. I do not want to induce too much sedation by giving IV Benadryl prior to her leaving AMA. She was working on getting out of the bed even when we were in the exam room. Therefore, I will administer one-time dose of Claritin 10 mg p.o before she leaves the ED.      Procedures             Critical Care time:  none     Results for orders placed or performed during the hospital encounter of 08/28/17   XR Abdomen 2 Views    Narrative    ABDOMEN TWO VIEWS   8/28/2017 3:27 PM     HISTORY: Abdominal pain.    COMPARISON: Limited abdominal ultrasound dated 5/28/2013, chest x-rays  dated 12/22/2016.    FINDINGS:  There are no abnormally dilated, gas filled loops of bowel  to suggest obstruction.  Metallic densities projected over the left  upper abdomen was not definitely seen on the prior chest x-rays. This  is of uncertain clinical significance and etiology but could represent  an ingested structure. Probable calcified right intrarenal calculi  number at least 3 measure up to 1.0 cm. No definite left renal  calculus is identified. Calcific densities projected over the pelvis  likely represent phleboliths and less likely distal ureteral calculi.  Dextroconvex curvature of lower lumbar spine is noted. Visualized  portions of the adjacent lung are grossly clear.      Impression    IMPRESSION:  1. Probable right intrarenal calculi measure up to 1.0 cm.  2. Metallic foreign body projected over left upper abdomen could  represent a  postoperative clip or other structure. Recommend clinical  correlation. This was not seen on the prior chest x-rays dating as  recently as 12/22/2016.  3. No evidence for bowel obstruction or free intraperitoneal air.    ZACKERY JACKSON MD   CT Abdomen Pelvis w/o Contrast    Narrative    CT ABDOMEN AND PELVIS WITHOUT CONTRAST   8/28/2017  4:56 PM     HISTORY: Left-sided abdominal pain.    TECHNIQUE: Volumetric helical sections were acquired from the lung  bases through the ischial tuberosities without IV contrast. No IV  contrast was administered due to contrast allergy. Coronal images were  also reconstructed. Radiation dose for this scan was reduced using  automated exposure control, adjustment of the mA and/or kV according  to patient size, or iterative reconstruction technique.    COMPARISON: None.    FINDINGS: There is mild bowel wall thickening and moderate surrounding  fat stranding involving the splenic flexure of the colon, as well as a  small amount of fluid in the left paracolic gutter. Findings suggest a  focal colitis. Just proximal to this area of bowel wall thickening in  the splenic flexure, there is a dense ovoid structure in the lumen of  the distal transverse colon (series 3 image 18) measuring 1.4 cm,  which is of uncertain clinical significance. No evidence for bowel  obstruction. The appendix is not visualized. No free fluid in the  pelvis. Moderate atherosclerotic aortoiliac calcification. Several  calcified gallstones are noted within the gallbladder. There is  diffuse fatty infiltration of the liver. The liver, spleen, adrenal  glands, pancreas, and kidneys have otherwise unremarkable noncontrast  appearances. No hydronephrosis. Mild scattered scarring and/or  atelectasis at both lung bases.      Impression    IMPRESSION:   1. Focal bowel wall thickening with moderate associated fat stranding  involving the splenic flexure of the colon suggests a focal colitis,  and may be infectious,  inflammatory, or ischemic in etiology.  2. A 1.4 cm calcified structure within the colonic lumen just proximal  to the thickened splenic flexure is of uncertain clinical  significance, but does not appear to cause obstruction. This finding  appears calcified, and could possibly represent a medication tablet.  3. Cholelithiasis.  4. Diffuse fatty infiltration of the liver.                JAIME HERNANDEZ MD   UA with Microscopic   Result Value Ref Range    Color Urine Viviane     Appearance Urine Slightly Cloudy     Glucose Urine Negative NEG^Negative mg/dL    Bilirubin Urine Negative NEG^Negative    Ketones Urine Negative NEG^Negative mg/dL    Specific Gravity Urine 1.018 1.003 - 1.035    Blood Urine Negative NEG^Negative    pH Urine 6.0 5.0 - 7.0 pH    Protein Albumin Urine Negative NEG^Negative mg/dL    Urobilinogen mg/dL 2.0 0.0 - 2.0 mg/dL    Nitrite Urine Negative NEG^Negative    Leukocyte Esterase Urine Trace (A) NEG^Negative    Source Midstream Urine     WBC Urine 4 (H) 0 - 2 /HPF    RBC Urine <1 0 - 2 /HPF    Squamous Epithelial /HPF Urine 3 (H) 0 - 1 /HPF   CBC with platelets differential   Result Value Ref Range    WBC 12.1 (H) 4.0 - 11.0 10e9/L    RBC Count 2.31 (L) 3.8 - 5.2 10e12/L    Hemoglobin 8.4 (L) 11.7 - 15.7 g/dL    Hematocrit 26.7 (L) 35.0 - 47.0 %     (H) 78 - 100 fl    MCH 36.4 (H) 26.5 - 33.0 pg    MCHC 31.5 31.5 - 36.5 g/dL    RDW 17.2 (H) 10.0 - 15.0 %    Platelet Count 177 150 - 450 10e9/L    Diff Method Automated Method     % Neutrophils 73.8 %    % Lymphocytes 11.4 %    % Monocytes 13.0 %    % Eosinophils 1.4 %    % Basophils 0.2 %    % Immature Granulocytes 0.2 %    Absolute Neutrophil 8.9 (H) 1.6 - 8.3 10e9/L    Absolute Lymphocytes 1.4 0.8 - 5.3 10e9/L    Absolute Monocytes 1.6 (H) 0.0 - 1.3 10e9/L    Absolute Eosinophils 0.2 0.0 - 0.7 10e9/L    Absolute Basophils 0.0 0.0 - 0.2 10e9/L    Abs Immature Granulocytes 0.0 0 - 0.4 10e9/L   Comprehensive metabolic panel   Result Value  Ref Range    Sodium 143 133 - 144 mmol/L    Potassium 2.6 (LL) 3.4 - 5.3 mmol/L    Chloride 102 94 - 109 mmol/L    Carbon Dioxide 31 20 - 32 mmol/L    Anion Gap 10 3 - 14 mmol/L    Glucose 104 (H) 70 - 99 mg/dL    Urea Nitrogen 15 7 - 30 mg/dL    Creatinine 0.87 0.52 - 1.04 mg/dL    GFR Estimate 66 >60 mL/min/1.7m2    GFR Estimate If Black 80 >60 mL/min/1.7m2    Calcium 8.3 (L) 8.5 - 10.1 mg/dL    Bilirubin Total 1.0 0.2 - 1.3 mg/dL    Albumin 3.3 (L) 3.4 - 5.0 g/dL    Protein Total 7.4 6.8 - 8.8 g/dL    Alkaline Phosphatase 186 (H) 40 - 150 U/L    ALT 28 0 - 50 U/L    AST 62 (H) 0 - 45 U/L   Lipase   Result Value Ref Range    Lipase 699 (H) 73 - 393 U/L   CRP inflammation   Result Value Ref Range    CRP Inflammation 19.0 (H) 0.0 - 8.0 mg/L   Magnesium   Result Value Ref Range    Magnesium 1.7 1.6 - 2.3 mg/dL   Alcohol ethyl   Result Value Ref Range    Ethanol g/dL 0.10 (H) <0.01 g/dL          Medications   lidocaine 1 % 1 mL (not administered)   lidocaine (LMX4) kit (not administered)   sodium chloride (PF) 0.9% PF flush 3 mL (3 mLs Intracatheter Given 8/28/17 2038)   sodium chloride (PF) 0.9% PF flush 3 mL ( Intracatheter Canceled Entry 8/28/17 2253)   potassium chloride SA (K-DUR/KLOR-CON M) CR tablet 40 mEq (40 mEq Oral Not Given 8/28/17 2050)   naloxone (NARCAN) injection 0.1-0.4 mg (not administered)   HYDROmorphone (PF) (DILAUDID) injection 0.2 mg (0.2 mg Intravenous Given 8/29/17 0000)   ondansetron (ZOFRAN-ODT) ODT tab 4 mg (not administered)     Or   ondansetron (ZOFRAN) injection 4 mg (not administered)   prochlorperazine (COMPAZINE) injection 5-10 mg (not administered)     Or   prochlorperazine (COMPAZINE) tablet 5-10 mg (not administered)     Or   prochlorperazine (COMPAZINE) Suppository 25 mg (not administered)   diazepam (VALIUM) tablet 10 mg (not administered)     Or   diazepam (VALIUM) injection 5-10 mg (not administered)   NaCl 0.9 % 1,000 mL with multivitamin-ADULT (INFUVITE) 10 mL, thiamine 100  mg, folic acid 1 mg infusion ( Intravenous New Bag 8/28/17 2313)   potassium chloride SA (K-DUR/KLOR-CON M) CR tablet 20-40 mEq (40 mEq Oral Given 8/28/17 2320)   potassium chloride (KLOR-CON) Packet 20-40 mEq (not administered)   potassium chloride 10 mEq in 100 mL intermittent infusion (not administered)   potassium chloride 10 mEq in 100 mL intermittent infusion with 10 mg lidocaine (not administered)   0.9% sodium chloride infusion ( Intravenous Stopped 8/28/17 2309)   nicotine Patch in Place ( Transdermal Given 8/28/17 2318)   nicotine patch REMOVAL (not administered)   nicotine (NICODERM CQ) 14 MG/24HR 24 hr patch 1 patch (1 patch Transdermal Not Given 8/28/17 2314)   ketorolac (TORADOL) injection 15 mg (15 mg Intravenous Given 8/28/17 1516)   loratadine (CLARITIN) tablet 10 mg (10 mg Oral Given 8/28/17 1748)   famotidine (PEPCID) injection 20 mg (20 mg Intravenous Given 8/28/17 1832)   diphenhydrAMINE (BENADRYL) injection 25 mg (25 mg Intravenous Given 8/28/17 1955)   LORazepam (ATIVAN) injection 0.5 mg (0.5 mg Intravenous Given 8/1953)     Assessments & Plan (with Medical Decision Making)     Alcohol-induced acute pancreatitis, unspecified complication status  Colitis  Hypokalemia  LUQ abdominal pain     62 year old female presents for evaluation of left upper quadrant and left lower quadrant abdominal pain starting last evening. She rates the pain 10 on a scale of 10. She denies fevers, chills, vomiting, or diarrhea. She states that she has not had a bowel movement for the past 7 days, and she is concerned that she is constipated. On exam, the patient appears to be in distress secondary to pain. She is cachectic appearing. Vital signs show blood pressure 131/61, pulse 89, temperature 97.8, and oxygen saturations of 95%. She is very tender to palpation in the left upper quadrant and left lower quadrant with some guarding but no rebound. No masses palpated. Patient is neurologically intact and is not  slurring her words. X-ray performed given the concern for possible constipation, and she did not have significant stool retention. Laboratory levels performed and she had a mild elevation in her white blood cell count to 12,100. Hemoglobin low at 8.4. Hypokalemia down to 2.6. Further questioning regarding this displays that she has not taken her regular oral home potassium for over one week, as she states that her PCP did not refill it for her. This is the likely cause for her hypokalemia. Alkaline phosphatase level mildly elevated at 186. AST mildly elevated to 62. Lipase elevation to 699. EtOH level of 0.10 despite patient states that she has not had an EtOH drink this morning. On further questioning, the patient does admit to drinking up towards of 0.75 L of vodka on a daily basis. She states that she is going to admit herself to inpatient therapy sometime soon. She was given IV Dilaudid for pain management, and this did help her pain significantly. She did develop some itching after this, which could have been related to the Dilaudid. The itching was managed with IV Benadryl, and IV famotidine. She continued to be quite anxious during her ED stay, and she was eventually given 0.5 mg of IV Ativan, which did help calm her. The patient was insistent on leaving the ED. Please see the ED course notes above for full details of her visit. She wanted to leave [facility] as noted above. When the nurse was having her sign the forms, the patient changed her mind. This is a wise decision, as we had recommended inpatient evaluation for her condition. She'll need to be n.p.o., receive IV fluid, and manage her pain with IV medications.  Given that she does not have any fevers and has not had any diarrhea, it is unlikely that the colitis is related to infection. We will hold off on anti-antibiotics at this time. Later on during her ED stay, she did develop vomiting. It is likely that her oral potassium came up with that emesis, so she  will need IV potassium replacement as an inpatient.  Her nausea was improved with Zofran and Ativan. We will need to monitor for withdrawals for a closely given her chronic EtOH intake. I spoke with Dr. Longo, inpatient hospitalist, regarding the patient and he agreed to accept her care. Dr. Jade, ANDREI AMIN, was involved with her care as well and also evaluated the patient when the patient was requesting to leave AMA.      I have reviewed the nursing notes.    I have reviewed the findings, diagnosis, plan and need for follow up with the patient.       Current Discharge Medication List          Final diagnoses:   Alcohol-induced acute pancreatitis, unspecified complication status   Colitis   Hypokalemia   LUQ abdominal pain     This document serves as a record of services personally performed by Emeka Jean-Baptiste PA-C. It was created on their behalf by Lila Arroyo, a trained medical scribe. The creation of this record is based on the provider's personal observations and the statements of the patient. This document has been checked and approved by the attending provider.    Note: Chart documentation done in part with Dragon Voice Recognition software. Although reviewed after completion, some word and grammatical errors may remain.    8/28/2017   Emeka Jean-Baptiste PA-C   [facility] EMERGENCY DEPARTMENT     Emeka Jean-Baptiste PA-C  08/29/17 0033

## 2017-08-28 NOTE — ED NOTES
DATE:  8/28/2017   TIME OF RECEIPT FROM LAB:  1530  LAB TEST:  Potassium  LAB VALUE:  2.6  RESULTS GIVEN WITH READ-BACK TO (PROVIDER):  Emeka Jean-Baptiste PA*  TIME LAB VALUE REPORTED TO PROVIDER:   1535

## 2017-08-28 NOTE — NURSING NOTE
"Chief Complaint   Patient presents with     RECHECK     Tongue Lesion     Results     Biopsy path results       Initial Pulse 107  Wt 42.5 kg (93 lb 9.6 oz)  SpO2 92%  BMI 17.69 kg/m2 Estimated body mass index is 17.69 kg/(m^2) as calculated from the following:    Height as of 7/19/17: 1.549 m (5' 1\").    Weight as of this encounter: 42.5 kg (93 lb 9.6 oz).  Medication Reconciliation: complete  "

## 2017-08-28 NOTE — MR AVS SNAPSHOT
After Visit Summary   8/28/2017    Alexia Flor    MRN: [id number]           Patient Information     Date Of Birth          1955        Visit Information        Provider Department      8/28/2017 1:00 PM Roddy Madden MD [facility]        Today's Diagnoses     Malignant neoplasm of tongue (H)    -  1       Follow-ups after your visit        Additional Services     OTOLARYNGOLOGY REFERRAL       Your provider has referred you to: [facility]: Adult [facility] N[facility] Throat Clinic (Otolaryngology) [facility] [phone]  [email or web address]    Please be aware that coverage of these services is subject to the terms and limitations of your health insurance plan.  Call member services at your health plan with any benefit or coverage questions.      Please bring the following with you to your appointment:    (1) Any X-Rays, CTs or MRIs which have been performed.  Contact the facility where they were done to arrange for  prior to your scheduled appointment.   (2) List of current medications  (3) This referral request   (4) Any documents/labs given to you for this referral                  Your next 10 appointments already scheduled     Sep 01, 2017  1:15 PM CDT   MR SOFT TISSUE NECK W/O & W CONTRAST with PHMR1   [facility] ([facility])    [address] [place] [id number] [phone]           Take your medicines as usual, unless your doctor tells you not to. Bring a list of your current medicines to your exam (including vitamins, minerals and over-the-counter drugs).  You will be given intravenous contrast for this exam. To prepare:   The day before your exam, drink extra fluids at least six 8-ounce glasses (unless your doctor tells you to restrict your fluids).   Have a blood test (creatinine test) within 30 days of your exam. Go to your clinic or Diagnostic Imaging Department for this test.  The  MRI machine uses a strong magnet. Please wear clothes without metal (snaps, zippers). A sweatsuit works well, or we may give you a hospital gown.  Please remove any body piercings and hair extensions before you arrive. You will also remove watches, jewelry, hairpins, wallets, dentures, partial dental plates and hearing aids. You may wear contact lenses, and you may be able to wear your rings. We have a safe place to keep your personal items, but it is safer to leave them at home.   **IMPORTANT** THE INSTRUCTIONS BELOW ARE ONLY FOR THOSE PATIENTS WHO HAVE BEEN TOLD THEY WILL RECEIVE SEDATION OR GENERAL ANESTHESIA DURING THEIR MRI PROCEDURE:  IF YOU WILL RECEIVE SEDATION (take medicine to help you relax during your exam):   You must get the medicine from your doctor before you arrive. Bring the medicine to the exam. Do not take it at home.   Arrive one hour early. Bring someone who can take you home after the test. Your medicine will make you sleepy. After the exam, you may not drive, take a bus or take a taxi by yourself.   No eating 8 hours before your exam. You may have clear liquids up until 4 hours before your exam. (Clear liquids include water, clear tea, black coffee and fruit juice without pulp.)  IF YOU WILL RECEIVE ANESTHESIA (be asleep for your exam):   Arrive 1 1/2 hours early. Bring someone who can take you home after the test. You may not drive, take a bus or take a taxi by yourself.   No eating 8 hours before your exam. You may have clear liquids up until 4 hours before your exam. (Clear liquids include water, clear tea, black coffee and fruit juice without pulp.)  Please call the Imaging Department at your exam site with any questions.            Sep 01, 2017  2:00 PM CDT   XR CHEST 2 VIEWS with PHXR1   [facility] ([facility])    [address] [place] [id number] [phone]           Please bring a list of your current medicines to your exam. (Include  "vitamins, minerals and over-thecounter medicines.) Leave your valuables at home.  Tell your doctor if there is a chance you may be pregnant.  You do not need to do anything special for this exam.              Future tests that were ordered for you today     Open Future Orders        Priority Expected Expires Ordered    XR Chest 2 Views Routine 2017    MR Soft Tissue Neck w/o & w Contrast Routine  2018    MRI Neck face w/o contrast Routine  2018            Who to contact     If you have questions or need follow up information about today's clinic visit or your schedule please contact [facility] directly at [phone].  Normal or non-critical lab and imaging results will be communicated to you by Yebhihart, letter or phone within 4 business days after the clinic has received the results. If you do not hear from us within 7 days, please contact the clinic through Yebhihart or phone. If you have a critical or abnormal lab result, we will notify you by phone as soon as possible.  Submit refill requests through Matchpoint or call your pharmacy and they will forward the refill request to us. Please allow 3 business days for your refill to be completed.          Additional Information About Your Visit        Matchpoint Information     Matchpoint lets you send messages to your doctor, view your test results, renew your prescriptions, schedule appointments and more. To sign up, go to [email or web address] . Click on \"Log in\" on the left side of the screen, which will take you to the Welcome page. Then click on \"Sign up Now\" on the right side of the page.     You will be asked to enter the access code listed below, as well as some personal information. Please follow the directions to create your username and password.     Your access code is: [email or web address]  Expires: 2017  4:38 PM     Your access code will  in 90 days. If you need help or a new code, please " call your [place] clinic or [phone].        Care EveryWhere ID     This is your Care EveryWhere ID. This could be used by other organizations to access your [place] medical records  [id number]        Your Vitals Were     Pulse Pulse Oximetry BMI (Body Mass Index)             107 92% 17.69 kg/m2          Blood Pressure from Last 3 Encounters:   08/03/17 128/60   07/19/17 116/64   06/12/17 100/60    Weight from Last 3 Encounters:   08/28/17 42.5 kg (93 lb 9.6 oz)   08/21/17 41.4 kg (91 lb 3.2 oz)   08/03/17 40.8 kg (90 lb)              We Performed the Following     OTOLARYNGOLOGY REFERRAL        Primary Care Provider Office Phone # Fax #    Karan Smith [phone]       [facility] [address] [place] [address]        Equal Access to Services     BA GRAY : Hadii aad ku hadasho Soomaali, waaxda luqadaha, qaybta kaalmada adeegyada, waxay idiin haycoletten meli davalosarashagufta orellana . So [facility] [phone].    ATENCIÓN: Si habla español, tiene a savage disposición servicios gratuitos de asistencia lingüística. Llame al [phone].    We comply with applicable federal civil rights laws and [place] laws. We do not discriminate on the basis of race, color, national origin, age, disability sex, sexual orientation or gender identity.            Thank you!     Thank you for choosing [facility]  for your care. Our goal is always to provide you with excellent care. Hearing back from our patients is one way we can continue to improve our services. Please take a few minutes to complete the written survey that you may receive in the mail after your visit with us. Thank you!             Your Updated Medication List - Protect others around you: Learn how to safely use, store and throw away your medicines at www.disposemymeds.org.          This list is accurate as of: 8/28/17  2:14 PM.  Always use your most recent med list.                   Brand Name Dispense  Instructions for use Diagnosis    albuterol 108 (90 BASE) MCG/ACT Inhaler    PROAIR HFA/PROVENTIL HFA/VENTOLIN HFA    3 Inhaler    Inhale 2 puffs into the lungs every 4 hours as needed for shortness of breath / dyspnea    Cough       B-12 TR 1000 MCG Tbcr   Generic drug:  cyanocobalamin     30 tablet    TAKE 1 TABLET BY MOUTH DAILY    Alcoholism /alcohol abuse (H)       cetirizine 10 MG tablet    zyrTEC    14 tablet    TAKE 1 TABLET BY MOUTH EVERY EVENING    Pruritic disorder       chlordiazePOXIDE 25 MG capsule    LIBRIUM    25 capsule    Take 50mg twice daily for 2 days, then take 25mg bid for days 3-5    Alcoholism (H)       ferrous gluconate 324 (38 FE) MG tablet    FERGON    100 tablet    TAKE 1 TABLET(324 MG) BY MOUTH DAILY WITH BREAKFAST    Iron deficiency anemia due to chronic blood loss       ferrous sulfate 325 (65 FE) MG tablet    IRON    1 tablet    Take 1 tablet (325 mg) by mouth 2 times daily    Alcohol-induced acute pancreatitis, unspecified complication status, Iron deficiency anemia due to chronic blood loss       folic acid 400 MCG tablet    FOLVITE    75 tablet    Take 2.5 tablets (1 mg) by mouth daily    Alcoholism /alcohol abuse (H)       furosemide 20 MG tablet    LASIX    30 tablet    TAKE 1 TABLET(20 MG) BY MOUTH EVERY MORNING    Edema, unspecified type       ketorolac 10 MG tablet    TORADOL    20 tablet    Take 1 tablet (10 mg) by mouth every 6 hours as needed for moderate pain    Cervicalgia, Tongue lesion       LORazepam 0.5 MG tablet    ATIVAN    20 tablet    Take 1 tablet (0.5 mg) by mouth every 8 hours as needed (for withdrawl)    Alcoholism (H)       MAGIC MOUTHWASH (ENTER INGREDIENTS IN COMMENTS)     480 mL    Take 10 mLs by mouth every 4 hours as needed    Tongue lesion       magnesium oxide 400 MG tablet    MAG-OX    90 tablet    Take 1 tablet (400 mg) by mouth 3 times daily    Hypomagnesemia       omeprazole 40 MG capsule    priLOSEC    30 capsule    TAKE 1 CAPSULE(40 MG) BY MOUTH  DAILY    Gastroesophageal reflux disease without esophagitis       pantoprazole 40 MG EC tablet    PROTONIX    60 tablet    TAKE 1 TABLET(40 MG) BY MOUTH EVERY 12 HOURS    Gastroesophageal reflux disease with esophagitis       potassium chloride SA 20 MEQ CR tablet    K-DUR/KLOR-CON M    30 tablet    Take 1 tablet (20 mEq) by mouth daily    Hypokalemia       thiamine 100 MG tablet     30 tablet    TAKE 1 TABLET(100 MG) BY MOUTH DAILY    Alcoholism /alcohol abuse (H)

## 2017-08-29 NOTE — PROGRESS NOTES
"S-(situation): patient left AMA    B-(background): pancreatitis    A-(assessment): patient threatening to leave AMA multiple times today. Writer and charge nurse talked with patient of importance of staying and risks of leaving AMA, but patient adamant to leave today. Patient also found drinking out of a bottle she had in her purse, see previous note. Patient drops oxygen sats to mid 80's while sleeping on room air. Patient informed of this also but states \"didn't you hear me the before?! I am leaving today at 4:30 when my friend gets here!\", and \"I will just go to [facility] if I have any trouble.\" Patient had many outbursts where she would yell at and call writer and multiple staff members derogatory names throughout her stay, and resistive to many cares.     R-(recommendations): patient left AMA with friend.    "

## 2017-08-29 NOTE — PROGRESS NOTES
"S-(situation): Patient arrives to room 268 via cart from ED    B-(background): Abdominal pain, alcohol induced pancreatitis    A-(assessment): VSS on 4L O2 per nc.  Requesting pain meds upon transfer to [facility].  Disoriented to day, place and situation.  Unable to stay awake for admission questions, Pt vocalized frustration regarding \"being here, you being so loud, won't leave me alone.\"  Pt uncooperative with assessment, refusing to allow nurse to assess buttocks/hips.  CIWA- 7.  Placed Pt on capnography due to lethargy and refusal to keep Oxygen on, O2 sats dropped to 73% on RA.  Triggering bed alarm and yelling out, not using call light appropriately.  Adamant about getting ice water! Threatening to leave.  Charge RN and MD notified, denied ice and water.  Pain meds admin when Pt became more alert.  LS diminished.  Abdomen soft, nontender, bowel sounds normoactive x4Q.      R-(recommendations): Orders reviewed with Pt, RN unsure of what Pt retained, will review when more awake. Will monitor patient per MD orders.     Inpatient nursing criteria listed below were met:    Health care directives status obtained and documented: Yes  Core Measures assessed (SSI): Yes  SCD's Documented: Yes, refusing  Vaccine assessment done and vaccines ordered if appropriate: Yes  Skin issues/needs documented:No, Pt uncooperative and refusing to allow full skin assessment, unable to see buttocks, perineal area and hips, RN expressed reasoning to assess for any concerning areas especially along bony areas.    Isolation needs addressed, if appropriate: Yes  Fall Prevention: Care plan updated, Education given and documented Yes  MRSA swab completed for patient 55 years and older (exclude BONY and TKA): Pt refused.   My Chart patient sign up addressed and documented: Pt refused to answer  Care Plan initiated: Yes  Education Assessment documented:Partially, Pt uncooperative and lethargic  Education Documented (Pre-existing chronic infection " such as, MRSA/VRE need education on admission): Yes  New medication patient education completed and documented (Possible Side Effects of Common Medications handout): Yes  Home medications if not able to send immediately home with family stored here: Pt denies having any   Reminder note placed in discharge instructions: NA  Discharge planning review completed (admission navigator) Yes

## 2017-08-29 NOTE — DISCHARGE SUMMARY
Patient has been very resistant to treatment plan - was found to have vodka she was drinking in the room and is very upset at this being taken away from her, yet has also been demanding more pain medication because of abdominal pain is severe - requesting at least 1-2 mg of Dilaudid because it is the only thing that helps her pain.  As we are unwilling to give her the increased pain doses and allow her to continue to drink alcohol to avoid withdrawal, she has elected to leave [place] and is not interested in any further discussion.  Patient will not be formally seen today and may leave A when a ride arrives.      Electronically Signed:  Corrina Bentley MD

## 2017-08-29 NOTE — PROGRESS NOTES
RN notified writer that patient is drinking something that she took out of her purse and will not give or show to nurse.  Patient has orders for NPO. Writer approached patient and she stated that if she does not drink then it will be my fault that she goes through withdrawal.  Reviewed diagnosis of pancreatitis and that ETOH will contribute to her illness and prevent her from getting better. After several minutes of conversation about diagnosis, ability to choose to leave AMA and the prescribed treatment plan the patient did hand over a cranberry juice bottle from purse that had clear fluid in it that smell like vodka.  This was dumped down the drain. And purse and cigarettes placed in closet and patient agreed not to smoke in room.  Sedrick Alvarado RN

## 2017-08-29 NOTE — PLAN OF CARE
"Problem: Goal Outcome Summary  Goal: Goal Outcome Summary  Outcome: No Change  See admission note, Pt continues to yell out needing help.  Has not used the call light, despite education.  Pt has now started to have frequent loose stools, with urgency, triggering bed alarm.  Charge RN notified, initiated enteric precautions until cdiff sample collected.  CIWAs have been anywhere from 2-7.  Pt having behavioral episodes, cursing all staff for disbelief and anger towards plan of care.  IV dilaudid admin as needed, Pt lethargic first half of shift, this morning Pt more alert and upset with poor pain control.  Pt threatening to leave unless staff gives her ice water, charge RN notified and discussed with Pt her options.  Capnography has not alarmed at all with O2 on, but Pt had been much more lethargic and refusing to wear O2- O2 sats dipped to 74%.  Pt originally requested nicotine patch, then refused it when brought in, stating \"that nearly killed me!\"  At one point during the earlier part of the night, Pt having a discussion, pleading with female to give her \"one more drink, please!\" no one in the room, nor on the phone at this time, Pt was rudely but easily reoriented.  Refused MRSA swab and SCD application.  Refused full skin assessment, stating \"who, do you think you are!\" Staff explained importance of assessing skin on every Pt upon coming to the hospital, especially along bony prominences to ensure no wounds/ skin breakdown.  At shift change, Pt arguing with nurse whether or not staff admin pain meds at 0630.  Pt left in care of day nurse, will continue to monitor.        "

## 2017-08-29 NOTE — H&P
[facility]    History and Physical  Hospitalist       Date of Admission:  8/28/2017  Date of Service (when I saw the patient): 08/28/17    Assessment & Plan   Alexia Flor is a 62 year old female who presents with left-sided abdominal pain that started earlier today. Radiating to the back associated nausea, vomiting and describes having no stools for the past 2 weeks. Patient is a known alcoholic, drinking up to a half liter per day. In the emergency department she is mildly tachycardic with exam showing point tenderness in the left side of the abdomen.  showing a mild elevation or lipase at 699 and has a low potassium at 2.6. CT of the abdomen is showing bowel wall thickening with stranding involving a splenic flexure and gallstones without signs of cholecystitis. This time she either has pancreatitis or possible colitis. This, located by underlying alcoholism, poor nutritional status, smoking and the recent diagnosis of tongue cancer. Patient will be admitted to inpatient status with bowel rest with IV fluids, IV narcotics for pain, antinausea medications. Will monitor for alcohol withdrawal and will cover with IV vitamins at this time. Expect she will be in hospital for at least 2 days as it defines what's going on. We will replace her potassium tonight. Encouraged strongly to stop smoking and will start using a nicotine patch.    Active Problems:    Pancreatitis    Assessment: presenting with abdominal pain with nausea vomiting and chills with a mild elevation in her lipase. Likely induced by alcoholism. There were tests are mildly abnormal, CT of the abdomen is showing gallstones without signs of gallbladder duct dilatation.    Plan: bowel rest tonight. IV fluids with IV narcotics for pain. Recheck lipase in the morning.    Colitis    Assessment: noted on CT scan with localized pain there.    Plan: follow    Alcoholism (H)    Assessment: drinking daily. Unclear  whether she really wants to stop    Plan: cover with [facility] protocol tonight. 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    Raji Longo MD    Primary Care Physician   Karan Smith    Chief Complaint   52-year-old female with left-sided abdominal pain    History is obtained from the patient, electronic health record and emergency department physician    History of Present Illness   Alexia Flor is a 62 year old female who presents with left-sided abdominal pain that started today. Radiates to the back, hurts to breathe associated with chills and some nausea. She denies any diarrhea, rather stated she's not had a bowel movement for 2 weeks, last episode was described as rabbit pellets. He's an alcoholic, drinking 1/2 L vodka per day. She smokes and has been recently diagnosed with tongue cancer, squamous cell cancer, at the base of her tongue, being followed by ENT. She denies cough, shortness of breath, history of heart problems. Chart indicates that she had a history of low hemoglobin and low potassium in the past. In the room she is somewhat sleepy and cannot give a full review of symptoms due to sedation, probably from pain meds    Past Medical History    I have reviewed this patient's medical history and updated it with pertinent information if needed.   Past Medical History:   Diagnosis Date     Alcohol abuse      Alcoholic hepatitis       Alcoholic pancreatitis      Anxiety      Schafer's esophagus      Breast cancer (H)     rt, s/p radiation.     Congestive heart failure, unspecified      COPD (chronic obstructive pulmonary disease) (H)      Hypokalemia      Macrocytic anemia      Non-adherence to medical treatment      Tobacco abuse        Past Surgical History   I have reviewed this patient's surgical history and updated it with pertinent information if needed.  Past Surgical History:   Procedure Laterality Date     lumpectomy Rt breast  2006         Prior to Admission Medications   Prior to Admission Medications   Prescriptions Last Dose Informant Patient Reported? Taking?   B-12 TR 1000 MCG TBCR 8/27/2017 at 1200  No Yes   Sig: TAKE 1 TABLET BY MOUTH DAILY   LORazepam (ATIVAN) 0.5 MG tablet Past Week at Unknown time  No Yes   Sig: Take 1 tablet (0.5 mg) by mouth every 8 hours as needed (for withdrawl)   MAGIC MOUTHWASH, ENTER INGREDIENTS IN COMMENTS, not started  No No   Sig: Take 10 mLs by mouth every 4 hours as needed   VITAMIN B-1 100 MG tablet 8/27/2017 at f  No Yes   Sig: TAKE 1 TABLET(100 MG) BY MOUTH DAILY   albuterol (PROAIR HFA/PROVENTIL HFA/VENTOLIN HFA) 108 (90 BASE) MCG/ACT Inhaler More than a month at Unknown time  No No   Sig: Inhale 2 puffs into the lungs every 4 hours as needed for shortness of breath / dyspnea   cetirizine (ZYRTEC) 10 MG tablet 8/27/2017 at 1200  No Yes   Sig: TAKE 1 TABLET BY MOUTH EVERY EVENING   chlordiazePOXIDE (LIBRIUM) 25 MG capsule More than a month at Unknown time  No No   Sig: Take 50mg twice daily for 2 days, then take 25mg bid for days 3-5   ferrous gluconate (FERGON) 324 (38 FE) MG tablet Past Week at Unknown time  No Yes   Sig: TAKE 1 TABLET(324 MG) BY MOUTH DAILY WITH BREAKFAST   ferrous sulfate (IRON) 325 (65 FE) MG tablet 8/27/2017 at 1200  Yes Yes   Sig: Take 1 tablet (325 mg) by mouth 2 times daily   folic acid (FOLVITE) 400 MCG tablet   No No   Sig: Take 2.5 tablets (1 mg) by mouth daily  "  furosemide (LASIX) 20 MG tablet 2017 at 1200  No Yes   Sig: TAKE 1 TABLET(20 MG) BY MOUTH EVERY MORNING   ketorolac (TORADOL) 10 MG tablet 2017 at 2100  No Yes   Sig: Take 1 tablet (10 mg) by mouth every 6 hours as needed for moderate pain   magnesium oxide (MAG-OX) 400 MG tablet 2017 at 1200  No Yes   Sig: Take 1 tablet (400 mg) by mouth 3 times daily   omeprazole (PRILOSEC) 40 MG capsule 2017 at 1200  No Yes   Sig: TAKE 1 CAPSULE(40 MG) BY MOUTH DAILY   pantoprazole (PROTONIX) 40 MG EC tablet 2017 at 1200  No Yes   Sig: TAKE 1 TABLET(40 MG) BY MOUTH EVERY 12 HOURS      Facility-Administered Medications: None     Allergies   Allergies   Allergen Reactions     Codeine Anaphylaxis     Contrast Dye Itching and Swelling     7/10/2009 Patient reports history of contrast reaction when first diagnosed with breast cancer. \"I almost .\"   (Hives, swelling.) 2009 CT to be done without IV contrast per Dr. KAVIN Live     Moxifloxacin Anaphylaxis     Avelox     Nickel Itching and Rash     Codeine Sulfate Other (See Comments)     shaking     Gabapentin Other (See Comments)     Patient reports that she got very shaky and she \"felt like she was going to die\"       Social History   I have reviewed this patient's social history and updated it with pertinent information if needed. Alexia Flor  reports that she has been smoking Cigarettes.  She has a 22.00 pack-year smoking history. She has never used smokeless tobacco. She reports that she drinks about 0.5 oz of alcohol per week  She reports that she does not use illicit drugs.    Family History   I have reviewed this patient's family history and updated it with pertinent information if needed.   Family History   Problem Relation Age of Onset     Coronary Artery Disease Father      Emphysema Father      CANCER Mother      renal cancer     Breast Cancer Maternal Grandmother        Review of Systems   Review of systems not obtained due to patient " factors - sedation    Physical Exam   Temp: 97.3  F (36.3  C) Temp src: Axillary BP: 144/68 Pulse: 90 Heart Rate: 90 Resp: 18 SpO2: 96 % O2 Device: Nasal cannula Oxygen Delivery: 4 LPM  Vital Signs with Ranges  Temp:  [97.3  F (36.3  C)-98.2  F (36.8  C)] 97.3  F (36.3  C)  Pulse:  [] 90  Heart Rate:  [79-90] 90  Resp:  [16-22] 18  BP: (110-144)/(65-89) 144/68  SpO2:  [74 %-98 %] 96 %  96 lbs 0 oz    EXAM:  Constitutional: alert, pale and very thin, somewhat sleepy was slurred words   Cardiovascular: PMI normal. No lifts, heaves, or thrills. RRR. No murmurs, clicks gallops or rub  Respiratory: Percussion normal. Good diaphragmatic excursion. Lungs clear  Psychiatric: fatigued  Head: Normocephalic. No masses, lesions, tenderness or abnormalities  Neck: Neck supple. No adenopathy. Thyroid symmetric, normal size,  ENT: did not examine mouth  Abdomen: tender on the left side with mild guarding. No masses palpated  NEURO: nonfocal, unable to do full testing  SKIN: no suspicious lesions or rashes  LYMPH: Normal cervical lymph nodes  JOINT/EXTREMITIES: extremities normal- no gross deformities noted and normal muscle tone     Data   Data reviewed today:  I personally reviewed the abdominal x-ray image(s) showing No  maladies and the abdominal CT image(s) showing Per radiology, showing bowel wall thickening with stranding involving the splenic flexure. Incidental gallstones.    Recent Labs  Lab 08/28/17  1505   WBC 12.1*   HGB 8.4*   *         POTASSIUM 2.6*   CHLORIDE 102   CO2 31   BUN 15   CR 0.87   ANIONGAP 10   SARAH 8.3*   *   ALBUMIN 3.3*   PROTTOTAL 7.4   BILITOTAL 1.0   ALKPHOS 186*   ALT 28   AST 62*   LIPASE 699*       Recent Results (from the past 24 hour(s))   XR Abdomen 2 Views    Narrative    ABDOMEN TWO VIEWS   8/28/2017 3:27 PM     HISTORY: Abdominal pain.    COMPARISON: Limited abdominal ultrasound dated 5/28/2013, chest x-rays  dated 12/22/2016.    FINDINGS:  There are  no abnormally dilated, gas filled loops of bowel  to suggest obstruction.  Metallic densities projected over the left  upper abdomen was not definitely seen on the prior chest x-rays. This  is of uncertain clinical significance and etiology but could represent  an ingested structure. Probable calcified right intrarenal calculi  number at least 3 measure up to 1.0 cm. No definite left renal  calculus is identified. Calcific densities projected over the pelvis  likely represent phleboliths and less likely distal ureteral calculi.  Dextroconvex curvature of lower lumbar spine is noted. Visualized  portions of the adjacent lung are grossly clear.      Impression    IMPRESSION:  1. Probable right intrarenal calculi measure up to 1.0 cm.  2. Metallic foreign body projected over left upper abdomen could  represent a postoperative clip or other structure. Recommend clinical  correlation. This was not seen on the prior chest x-rays dating as  recently as 12/22/2016.  3. No evidence for bowel obstruction or free intraperitoneal air.    ZACKERY JACKSON MD   CT Abdomen Pelvis w/o Contrast    Narrative    CT ABDOMEN AND PELVIS WITHOUT CONTRAST   8/28/2017  4:56 PM     HISTORY: Left-sided abdominal pain.    TECHNIQUE: Volumetric helical sections were acquired from the lung  bases through the ischial tuberosities without IV contrast. No IV  contrast was administered due to contrast allergy. Coronal images were  also reconstructed. Radiation dose for this scan was reduced using  automated exposure control, adjustment of the mA and/or kV according  to patient size, or iterative reconstruction technique.    COMPARISON: None.    FINDINGS: There is mild bowel wall thickening and moderate surrounding  fat stranding involving the splenic flexure of the colon, as well as a  small amount of fluid in the left paracolic gutter. Findings suggest a  focal colitis. Just proximal to this area of bowel wall thickening in  the splenic flexure, there  is a dense ovoid structure in the lumen of  the distal transverse colon (series 3 image 18) measuring 1.4 cm,  which is of uncertain clinical significance. No evidence for bowel  obstruction. The appendix is not visualized. No free fluid in the  pelvis. Moderate atherosclerotic aortoiliac calcification. Several  calcified gallstones are noted within the gallbladder. There is  diffuse fatty infiltration of the liver. The liver, spleen, adrenal  glands, pancreas, and kidneys have otherwise unremarkable noncontrast  appearances. No hydronephrosis. Mild scattered scarring and/or  atelectasis at both lung bases.      Impression    IMPRESSION:   1. Focal bowel wall thickening with moderate associated fat stranding  involving the splenic flexure of the colon suggests a focal colitis,  and may be infectious, inflammatory, or ischemic in etiology.  2. A 1.4 cm calcified structure within the colonic lumen just proximal  to the thickened splenic flexure is of uncertain clinical  significance, but does not appear to cause obstruction. This finding  appears calcified, and could possibly represent a medication tablet.  3. Cholelithiasis.  4. Diffuse fatty infiltration of the liver.                JAIME HERNANDEZ MD     '

## 2017-08-29 NOTE — ED NOTES
Patient reports drinking 1/2 pint of vodka daily and she reports last drink at 0700. O2 4L/NC applied for 72% sats on RA while sleeping.

## 2017-08-29 NOTE — PROGRESS NOTES
Alexia Hernandez [facility]  Gender: female  : 1955  [address]   [facility] [address]  [phone] (home) none (work)    Medical Record: [id number]  Pharmacy:    [id number] [place] [facility], [place] - [address] [facility]  [id number] [place] [facility] - [address] [facility] - [place], [place] - [address] [facility] - [place] - [address]  [facility] - [facility] [place], [place] - [address] [place] [address] [id number] - [place], [place] - [address] [facility] - [place], [place] - [address] AT [facility] [address]  Primary Care Provider: Karan Smith    Parent's names are: Gloria Pearce (mother) and Data Unavailable (father).      [facility]  2017     Discharge Phone Call:  Key Words/Key Times    Left against medical advice.  Sedrick Alvarado RN

## 2017-08-30 NOTE — TELEPHONE ENCOUNTER
Inpatient Visit Date: 08/29/17  Diagnosis / Reason for Visit: Alcohol-Induced Acute Pancreatitis, Unspecified Complication Status

## 2017-08-30 NOTE — TELEPHONE ENCOUNTER
Patient has been very resistant to treatment plan - was found to have vodka she was drinking in the room and is very upset at this being taken away from her, yet has also been demanding more pain medication because of abdominal pain is severe - requesting at least 1-2 mg of Dilaudid because it is the only thing that helps her pain.  As we are unwilling to give her the increased pain doses and allow her to continue to drink alcohol to avoid withdrawal, she has elected to leave AMA and is not interested in any further discussion.  Patient will not be formally seen today and may leave AMA when a ride arrives.       Electronically Signed:  Corrina Bentley MD    ED / Discharge Outreach Protocol    Patient Contact    Attempt # 1    Was call answered?  No.  Left message on voicemail with information to call me back.  Anya Bañuelos RN

## 2017-10-09 NOTE — TELEPHONE ENCOUNTER
Vitamin B-1      Last Written Prescription Date: 7/24/2017  Last Fill Quantity: 30,  # refills: 0   Last Office Visit with [facility], [facility] or [facility] prescribing provider: 8/03/2017                                         Next 5 appointments (look out 90 days)     Oct 23, 2017  1:45 PM CDT   [facility]dy Madden [facility]   [facility]    [address] [place] [id number] [phone]

## 2017-10-10 NOTE — TELEPHONE ENCOUNTER
1.  Date/reason for appt: 10/16/2017, tongue cancer    2.  Referring provider: Maryanne    3.  Call to patient (Yes / No - short description): Yes, called patient to schedule consult    4.  Previous care at / records requested from: [facility] (requested slides be sent to [facility]), [facility] (records previously faxed - sent to HIM for scanning, PET scan pushed to pacs viewer - report sent to HIM for scanning)    5.  Other: All other records are in Epic

## 2017-10-10 NOTE — TELEPHONE ENCOUNTER
Yara asking for refill on potassium chloride SA (K-DUR/KLOR-CON M) 20 MEQ CR tablet as well.    Last fill was 8/28/17 quantity of 7

## 2017-10-12 NOTE — TELEPHONE ENCOUNTER
Routing refill request to provider for review/approval because:  Break in medication  Pharmacy also asking for potassium chloride. This was ordered by an ER physician  Elizabeth Alcantara RN              Potassium   Date Value Ref Range Status   08/29/2017 3.4 3.4 - 5.3 mmol/L Final

## 2017-10-16 NOTE — MR AVS SNAPSHOT
After Visit Summary   10/16/2017    Alexia Hernandez [facility]    MRN: [id number]           Patient Information     Date Of Birth          1955        Visit Information        Provider Department      10/16/2017 10:45 AM Heriberto George MD [facility] Ear Nose and Throat        Today's Diagnoses     Malignant neoplasm of base of tongue (H)    -  1      Care Instructions    Surgery to be scheduled  You will be called with date and time     Please call your primary MD to have a pre-op physical done asap  Call me if ?'s arise [phone]  Anya Solorzano RN          Follow-ups after your visit        Additional Services     GASTROENTEROLOGY ADULT REF CONSULT ONLY       Preferred Location: [facility] [phone]  Pt with tongue cancer   PET shows concern esophagus cancer  Please evaluate with upper endoscopy      Please be aware that coverage of these services is subject to the terms and limitations of your health insurance plan.  Call member services at your health plan with any benefit or coverage questions.  Any procedures must be performed at a [place] facility OR coordinated by your clinic's referral office.    Please bring the following with you to your appointment:    (1) Any X-Rays, CTs or MRIs which have been performed.  Contact the facility where they were done to arrange for  prior to your scheduled appointment.    (2) List of current medications   (3) This referral request   (4) Any documents/labs given to you for this referral            GASTROENTEROLOGY ADULT REF PROCEDURE ONLY       Pt with tongue cancer  PET shows concern for cancer esophagus   eval for maligancy    Last Lab Result: Creatinine (mg/dL)       Date                     Value                 08/29/2017               0.90             ----------  There is no height or weight on file to calculate BMI.     Needed:  No  Language:  English    Patient will be contacted to schedule procedure.     Please be aware that  coverage of these services is subject to the terms and limitations of your health insurance plan.  Call member services at your health plan with any benefit or coverage questions.  Any procedures must be performed at a [facility] OR coordinated by your clinic's referral office.    Please bring the following with you to your appointment:    (1) Any X-Rays, CTs or MRIs which have been performed.  Contact the facility where they were done to arrange for  prior to your scheduled appointment.    (2) List of current medications   (3) This referral request   (4) Any documents/labs given to you for this referral                  Your next 10 appointments already scheduled     2017   Procedure with Heriberto George MD   [facility] [place], Same Day Surgery (--)    [address] [facility] [place] [address] [phone]              Who to contact     Please call your clinic at [phone] to:    Ask questions about your health    Make or cancel appointments    Discuss your medicines    Learn about your test results    Speak to your doctor   If you have compliments or concerns about an experience at your clinic, or if you wish to file a complaint, please contact [facility] Patient Relations at [phone] or email us at [email or web address]         Additional Information About Your Visit        [employer] Information     WildBluet is an electronic gateway that provides easy, online access to your medical records. With BigTwist, you can request a clinic appointment, read your test results, renew a prescription or communicate with your care team.     To sign up for WildBluet visit the website at [email or web address]   You will be asked to enter the access code listed below, as well as some personal information. Please follow the directions to create your username and password.     Your access code is: [email or web address]  Expires: 2018  3:12 PM     Your access code will   "in 90 days. If you need help or a new code, please contact your [facility] or call [phone] for assistance.        Care EveryWhere ID     This is your Care EveryWhere ID. This could be used by other organizations to access your [facility] medical records  [id number]        Your Vitals Were     Height BMI (Body Mass Index)                1.537 m (5' 0.5\") 15.75 kg/m2           Blood Pressure from Last 3 Encounters:   10/26/17 102/58   08/29/17 120/70   08/03/17 128/60    Weight from Last 3 Encounters:   10/26/17 37.9 kg (83 lb 9.6 oz)   10/16/17 37.2 kg (82 lb)   08/28/17 41.5 kg (91 lb 7.9 oz)              We Performed the Following     GASTROENTEROLOGY ADULT REF CONSULT ONLY     GASTROENTEROLOGY ADULT REF PROCEDURE ONLY     Lety-Operative Worksheet (Head & Neck)        Primary Care Provider Office Phone # Fax #    Karan Smith [phone]       [place] [address] [place] [address]        Equal Access to Services     BA GRAY AH: Hadii aad ku hadasho Soomaali, waaxda luqadaha, qaybta kaalmada adeegyada, michelle byers haykaren orellana . So [place] [phone].    ATENCIÓN: Si habla español, tiene a savage disposición servicios gratuitos de asistencia lingüística. [facility] [phone].    We comply with applicable federal civil rights laws and [place] laws. We do not discriminate on the basis of race, color, national origin, age, disability, sex, sexual orientation, or gender identity.            Thank you!     Thank you for choosing [facility] EAR NOSE AND THROAT  for your care. Our goal is always to provide you with excellent care. Hearing back from our patients is one way we can continue to improve our services. Please take a few minutes to complete the written survey that you may receive in the mail after your visit with us. Thank you!             Your Updated Medication List - Protect others around you: Learn how to " safely use, store and throw away your medicines at www.disposemymeds.org.          This list is accurate as of: 10/16/17 11:59 PM.  Always use your most recent med list.                   Brand Name Dispense Instructions for use Diagnosis    albuterol 108 (90 BASE) MCG/ACT Inhaler    PROAIR HFA/PROVENTIL HFA/VENTOLIN HFA    3 Inhaler    Inhale 2 puffs into the lungs every 4 hours as needed for shortness of breath / dyspnea    Cough       B-12 TR 1000 MCG Tbcr   Generic drug:  cyanocobalamin     30 tablet    TAKE 1 TABLET BY MOUTH DAILY    Alcoholism /alcohol abuse (H)       cetirizine 10 MG tablet    zyrTEC    14 tablet    TAKE 1 TABLET BY MOUTH EVERY EVENING    Pruritic disorder       ferrous gluconate 324 (38 FE) MG tablet    FERGON    100 tablet    TAKE 1 TABLET(324 MG) BY MOUTH DAILY WITH BREAKFAST    Iron deficiency anemia due to chronic blood loss       ferrous sulfate 325 (65 FE) MG tablet    IRON    1 tablet    Take 1 tablet (325 mg) by mouth 2 times daily    Alcohol-induced acute pancreatitis, unspecified complication status, Iron deficiency anemia due to chronic blood loss       folic acid 400 MCG tablet    FOLVITE    75 tablet    Take 2.5 tablets (1 mg) by mouth daily    Alcoholism /alcohol abuse (H)       furosemide 20 MG tablet    LASIX    30 tablet    TAKE 1 TABLET(20 MG) BY MOUTH EVERY MORNING    Edema, unspecified type       ketorolac 10 MG tablet    TORADOL    20 tablet    Take 1 tablet (10 mg) by mouth every 6 hours as needed for moderate pain    Cervicalgia, Tongue lesion       LORazepam 0.5 MG tablet    ATIVAN    20 tablet    Take 1 tablet (0.5 mg) by mouth every 8 hours as needed (for withdrawl)    Alcoholism (H)       MAGIC MOUTHWASH (ENTER INGREDIENTS IN COMMENTS)     480 mL    Take 10 mLs by mouth every 4 hours as needed    Tongue lesion       magnesium oxide 400 MG tablet    MAG-OX    90 tablet    Take 1 tablet (400 mg) by mouth 3 times daily    Hypomagnesemia       omeprazole 40 MG capsule     priLOSEC    30 capsule    TAKE 1 CAPSULE(40 MG) BY MOUTH DAILY    Gastroesophageal reflux disease without esophagitis       pantoprazole 40 MG EC tablet    PROTONIX    60 tablet    TAKE 1 TABLET(40 MG) BY MOUTH EVERY 12 HOURS    Gastroesophageal reflux disease with esophagitis       thiamine 100 MG tablet     30 tablet    TAKE 1 TABLET(100 MG) BY MOUTH DAILY    Alcoholism /alcohol abuse (H)

## 2017-10-16 NOTE — LETTER
10/16/2017     RE: Alexia Flor  [address]  [place] [facility] [facility] [address]     Dear Colleague,    Thank you for referring your patient, Alexia Flor, to the [facility] EAR NOSE AND THROAT at [facility]. Please see a copy of my visit note below.    HISTORY OF PRESENT ILLNESS:  I had the pleasure of meeting Alexia Flor today.  She is a 62-year-old woman who was recently diagnosed with a left-sided posterior mobile tongue cancer.  The patient has had pain on that side for several weeks and had a biopsy performed by Dr. Madden.  She has not noticed any lumps or bumps in her neck but does have some recent weight loss although she is unsure of how much she has lost.  She has not noticed any lumps or bumps in her neck, but she does have otalgia and some odynophagia as a result.         Past Medical History:   Diagnosis Date     Alcohol abuse      Alcoholic hepatitis      Alcoholic pancreatitis      Anxiety      Schafer's esophagus      Breast cancer (H)     rt, s/p radiation.     Chemical dependency (H)      Congestive heart failure, unspecified      COPD (chronic obstructive pulmonary disease) (H)      Depressive disorder      History of radiation therapy      Hoarseness      Hypokalemia      Macrocytic anemia      Non-adherence to medical treatment      Tobacco abuse      Past Surgical History:   Procedure Laterality Date     lumpectomy Rt breast  2006         Current Outpatient Prescriptions:      VITAMIN B-1 100 MG tablet, TAKE 1 TABLET(100 MG) BY MOUTH DAILY, Disp: 30 tablet, Rfl: 0     potassium chloride (MICRO-K) 10 MEQ CR capsule, Take 1 capsule (10 mEq) by mouth 2 times daily Take 2 caps by mouth once daily to equal 20 meq Verbal order given to pharmacy per Dr. Smith., Disp: , Rfl:      ketorolac (TORADOL) 10 MG tablet, Take 1 tablet (10 mg) by mouth every 6 hours as needed for moderate pain, Disp: 20 tablet, Rfl: 0     MAGIC MOUTHWASH, ENTER INGREDIENTS IN COMMENTS,,  "Take 10 mLs by mouth every 4 hours as needed, Disp: 480 mL, Rfl: 1     LORazepam (ATIVAN) 0.5 MG tablet, Take 1 tablet (0.5 mg) by mouth every 8 hours as needed (for withdrawl), Disp: 20 tablet, Rfl: 0     B-12 TR 1000 MCG TBCR, TAKE 1 TABLET BY MOUTH DAILY, Disp: 30 tablet, Rfl: 0     pantoprazole (PROTONIX) 40 MG EC tablet, TAKE 1 TABLET(40 MG) BY MOUTH EVERY 12 HOURS, Disp: 60 tablet, Rfl: 0     cetirizine (ZYRTEC) 10 MG tablet, TAKE 1 TABLET BY MOUTH EVERY EVENING, Disp: 14 tablet, Rfl: 5     albuterol (PROAIR HFA/PROVENTIL HFA/VENTOLIN HFA) 108 (90 BASE) MCG/ACT Inhaler, Inhale 2 puffs into the lungs every 4 hours as needed for shortness of breath / dyspnea, Disp: 3 Inhaler, Rfl: 4     furosemide (LASIX) 20 MG tablet, TAKE 1 TABLET(20 MG) BY MOUTH EVERY MORNING, Disp: 30 tablet, Rfl: 10     ferrous gluconate (FERGON) 324 (38 FE) MG tablet, TAKE 1 TABLET(324 MG) BY MOUTH DAILY WITH BREAKFAST, Disp: 100 tablet, Rfl: 0     ferrous sulfate (IRON) 325 (65 FE) MG tablet, Take 1 tablet (325 mg) by mouth 2 times daily, Disp: 1 tablet, Rfl: 0     chlordiazePOXIDE (LIBRIUM) 25 MG capsule, Take 50mg twice daily for 2 days, then take 25mg bid for days 3-5, Disp: 25 capsule, Rfl: 0     magnesium oxide (MAG-OX) 400 MG tablet, Take 1 tablet (400 mg) by mouth 3 times daily, Disp: 90 tablet, Rfl: 3     omeprazole (PRILOSEC) 40 MG capsule, TAKE 1 CAPSULE(40 MG) BY MOUTH DAILY, Disp: 30 capsule, Rfl: 11     folic acid (FOLVITE) 400 MCG tablet, Take 2.5 tablets (1 mg) by mouth daily, Disp: 75 tablet, Rfl: 3  Allergies   Allergen Reactions     Codeine Anaphylaxis     Contrast Dye Itching and Swelling     7/10/2009 Patient reports history of contrast reaction when first diagnosed with breast cancer. \"I almost .\"   (Hives, swelling.) 2009 CT to be done without IV contrast per Dr. K. Live     Moxifloxacin Anaphylaxis     Avelox     Nickel Itching and Rash     Codeine Sulfate Other (See Comments)     shaking     Gabapentin " "Other (See Comments)     Patient reports that she got very shaky and she \"felt like she was going to die\"     Social History   Substance Use Topics     Smoking status: Heavy Tobacco Smoker     Packs/day: 1.00     Years: 40.00     Types: Cigarettes     Smokeless tobacco: Current User     Alcohol use 0.5 oz/week      Comment: weekends (reported; smelled of alcohol during clinic visit 4/2016)     Review Of Systems  Skin: negative  Eyes: negative  Ears/Nose/Throat: negative  Respiratory: No shortness of breath, dyspnea on exertion, cough, or hemoptysis  Cardiovascular: negative  Gastrointestinal: negative  Genitourinary: negative  Musculoskeletal: negative  Neurologic: negative  Psychiatric: negative  Hematologic/Lymphatic/Immunologic: negative  Endocrine: negative    PHYSICAL EXAMINATION:  She is well appearing and in no distress.  Her weight is 89 pounds.  Examination of the oral cavity reveals she has an approximately 1.5 cm lesion involving the posterior mobile tongue on the left side.  The area is extremely tender and very difficult to examine as a result. I am not able to assess the depth.  The anterior half of the mobile tongue appears unaffected, but again my exam is cursory because she is so tender here.  It does not appear to involve the palate although it does come towards the anterior pillar.  I am unable to assess the lateral floor of mouth due to pain.  The remainder of the oral cavity is unremarkable.  She cannot tolerate mirror exam.  Examination of the neck reveals no mass or lymphadenopathy.      IMAGING:  The patient has a PET scan and CT scan which was reviewed at our Tumor Board last week and was also concerning for some thickening of the esophagus distally.      IMPRESSION:   1.  Newly diagnosed mobile tongue cancer, T1 versus T2.   2.  Thickening of the esophagus, requiring biopsy.      PLAN:   1.  I think it would best for us to take the patient to the operating room for examination under " anesthesia just to get a better exam of the tongue tumor and also for an EGD at that time to assess the esophagus and determine if biopsy is needed as it is possible she could have a synchronous primary in her esophagus.  This could also be varices due to her significant alcohol use although she currently says she is drinking very little.  We will schedule this in the near future and then determine the nature of her treatment  which will most certainly include a partial glossectomy, neck dissection, and possibly a free flap.     Again, thank you for allowing me to participate in the care of your patient.      Sincerely,    Heriberto George MD

## 2017-10-16 NOTE — TELEPHONE ENCOUNTER
Reason for Call:  Same Day Appointment, Requested Provider:  Karan Smith M.D.    PCP: Karan Smith    Reason for visit: preop date of surgery not scheduled need pre op first    Duration of symptoms:     Have you been treated for this in the past? Yes    Additional comments: patient needs this ASAP, and it has to be faxed to the [facility].    Can we leave a detailed message on this number? YES    Phone number patient can be reached at: Home number on file [phone] (home)    Best Time:     Call taken on 10/16/2017 at 4:39 PM by Sandy Soto

## 2017-10-16 NOTE — NURSING NOTE
No chief complaint on file.    Chief Complaint   Patient presents with     Consult     tongue crescencio [facility]

## 2017-10-16 NOTE — PROGRESS NOTES
HISTORY OF PRESENT ILLNESS:  I had the pleasure of meeting Alexia Flor today.  She is a 62-year-old woman who was recently diagnosed with a left-sided posterior mobile tongue cancer.  The patient has had pain on that side for several weeks and had a biopsy performed by Dr. Madden.  She has not noticed any lumps or bumps in her neck but does have some recent weight loss although she is unsure of how much she has lost.  She has not noticed any lumps or bumps in her neck, but she does have otalgia and some odynophagia as a result.         Past Medical History:   Diagnosis Date     Alcohol abuse      Alcoholic hepatitis      Alcoholic pancreatitis      Anxiety      Schafer's esophagus      Breast cancer (H)     rt, s/p radiation.     Chemical dependency (H)      Congestive heart failure, unspecified      COPD (chronic obstructive pulmonary disease) (H)      Depressive disorder      History of radiation therapy      Hoarseness      Hypokalemia      Macrocytic anemia      Non-adherence to medical treatment      Tobacco abuse      Past Surgical History:   Procedure Laterality Date     lumpectomy Rt breast  2006         Current Outpatient Prescriptions:      VITAMIN B-1 100 MG tablet, TAKE 1 TABLET(100 MG) BY MOUTH DAILY, Disp: 30 tablet, Rfl: 0     potassium chloride (MICRO-K) 10 MEQ CR capsule, Take 1 capsule (10 mEq) by mouth 2 times daily Take 2 caps by mouth once daily to equal 20 meq Verbal order given to pharmacy per Dr. Smith., Disp: , Rfl:      ketorolac (TORADOL) 10 MG tablet, Take 1 tablet (10 mg) by mouth every 6 hours as needed for moderate pain, Disp: 20 tablet, Rfl: 0     MAGIC MOUTHWASH, ENTER INGREDIENTS IN COMMENTS,, Take 10 mLs by mouth every 4 hours as needed, Disp: 480 mL, Rfl: 1     LORazepam (ATIVAN) 0.5 MG tablet, Take 1 tablet (0.5 mg) by mouth every 8 hours as needed (for withdrawl), Disp: 20 tablet, Rfl: 0     B-12 TR 1000 MCG TBCR, TAKE 1 TABLET BY MOUTH DAILY, Disp: 30 tablet, Rfl: 0      "pantoprazole (PROTONIX) 40 MG EC tablet, TAKE 1 TABLET(40 MG) BY MOUTH EVERY 12 HOURS, Disp: 60 tablet, Rfl: 0     cetirizine (ZYRTEC) 10 MG tablet, TAKE 1 TABLET BY MOUTH EVERY EVENING, Disp: 14 tablet, Rfl: 5     albuterol (PROAIR HFA/PROVENTIL HFA/VENTOLIN HFA) 108 (90 BASE) MCG/ACT Inhaler, Inhale 2 puffs into the lungs every 4 hours as needed for shortness of breath / dyspnea, Disp: 3 Inhaler, Rfl: 4     furosemide (LASIX) 20 MG tablet, TAKE 1 TABLET(20 MG) BY MOUTH EVERY MORNING, Disp: 30 tablet, Rfl: 10     ferrous gluconate (FERGON) 324 (38 FE) MG tablet, TAKE 1 TABLET(324 MG) BY MOUTH DAILY WITH BREAKFAST, Disp: 100 tablet, Rfl: 0     ferrous sulfate (IRON) 325 (65 FE) MG tablet, Take 1 tablet (325 mg) by mouth 2 times daily, Disp: 1 tablet, Rfl: 0     chlordiazePOXIDE (LIBRIUM) 25 MG capsule, Take 50mg twice daily for 2 days, then take 25mg bid for days 3-5, Disp: 25 capsule, Rfl: 0     magnesium oxide (MAG-OX) 400 MG tablet, Take 1 tablet (400 mg) by mouth 3 times daily, Disp: 90 tablet, Rfl: 3     omeprazole (PRILOSEC) 40 MG capsule, TAKE 1 CAPSULE(40 MG) BY MOUTH DAILY, Disp: 30 capsule, Rfl: 11     folic acid (FOLVITE) 400 MCG tablet, Take 2.5 tablets (1 mg) by mouth daily, Disp: 75 tablet, Rfl: 3  Allergies   Allergen Reactions     Codeine Anaphylaxis     Contrast Dye Itching and Swelling     7/10/2009 Patient reports history of contrast reaction when first diagnosed with breast cancer. \"I almost .\"   (Hives, swelling.) 2009 CT to be done without IV contrast per Dr. KAVIN Live     Moxifloxacin Anaphylaxis     Avelox     Nickel Itching and Rash     Codeine Sulfate Other (See Comments)     shaking     Gabapentin Other (See Comments)     Patient reports that she got very shaky and she \"felt like she was going to die\"     Social History   Substance Use Topics     Smoking status: Heavy Tobacco Smoker     Packs/day: 1.00     Years: 40.00     Types: Cigarettes     Smokeless tobacco: Current User     " Alcohol use 0.5 oz/week      Comment: weekends (reported; smelled of alcohol during clinic visit 4/2016)     Review Of Systems  Skin: negative  Eyes: negative  Ears/Nose/Throat: negative  Respiratory: No shortness of breath, dyspnea on exertion, cough, or hemoptysis  Cardiovascular: negative  Gastrointestinal: negative  Genitourinary: negative  Musculoskeletal: negative  Neurologic: negative  Psychiatric: negative  Hematologic/Lymphatic/Immunologic: negative  Endocrine: negative    PHYSICAL EXAMINATION:  She is well appearing and in no distress.  Her weight is 89 pounds.  Examination of the oral cavity reveals she has an approximately 1.5 cm lesion involving the posterior mobile tongue on the left side.  The area is extremely tender and very difficult to examine as a result. I am not able to assess the depth.  The anterior half of the mobile tongue appears unaffected, but again my exam is cursory because she is so tender here.  It does not appear to involve the palate although it does come towards the anterior pillar.  I am unable to assess the lateral floor of mouth due to pain.  The remainder of the oral cavity is unremarkable.  She cannot tolerate mirror exam.  Examination of the neck reveals no mass or lymphadenopathy.      IMAGING:  The patient has a PET scan and CT scan which was reviewed at our Tumor Board last week and was also concerning for some thickening of the esophagus distally.      IMPRESSION:   1.  Newly diagnosed mobile tongue cancer, T1 versus T2.   2.  Thickening of the esophagus, requiring biopsy.      PLAN:   1.  I think it would best for us to take the patient to the operating room for examination under anesthesia just to get a better exam of the tongue tumor and also for an EGD at that time to assess the esophagus and determine if biopsy is needed as it is possible she could have a synchronous primary in her esophagus.  This could also be varices due to her significant alcohol use although  she currently says she is drinking very little.  We will schedule this in the near future and then determine the nature of her treatment  which will most certainly include a partial glossectomy, neck dissection, and possibly a free flap.

## 2017-10-16 NOTE — PATIENT INSTRUCTIONS
Surgery to be scheduled  You will be called with date and time     Please call your primary MD to have a pre-op physical done asap  Call me if ?'s arise [phone]  Anya Solorzano RN

## 2017-10-17 NOTE — TELEPHONE ENCOUNTER
Patient called back and stated that she needed a physical and not a per-op. She didn't want to argue with me and told me she knows what she needs. I told her I can schedule the physical but that is all the doctor will go over with her and nothing to do with the per-op if that is what she needs. I have her scheduled with Dr. Smith next week.     Thank you,  Citlaly Waller   for [facility]

## 2017-10-17 NOTE — TELEPHONE ENCOUNTER
Lm that we need to know that date of the surgery before we can schedule an appt as preops are only good for 30 days.

## 2017-10-26 NOTE — PROGRESS NOTES
[address] [address]  [phone]  Dept: [phone]    PRE-OP EVALUATION:  Today's date: 10/26/2017    Alexia Flor (: 1955) presents for pre-operative evaluation assessment as requested by Dr. Georeg.  She requires evaluation and anesthesia risk assessment prior to undergoing surgery/procedure for treatment of cancer .  Proposed procedure: LARYNGOSCOPY WITH BIOPSY(IES).Direct Laryngoscopy with Biopsies, Upper Endoscopic Ultrasound    Date of Surgery/ Procedure: 2018  Time of Surgery/ Procedure: 730  Hospital/Surgical Facility: [facility]     Primary Physician: Karan Smith  Type of Anesthesia Anticipated: General    Patient has a Health Care Directive or Living Will:  NO    1. NO - Do you have a history of heart attack, stroke, stent, bypass or surgery on an artery in the head, neck, heart or legs?  2. NO - Do you ever have any pain or discomfort in your chest?  3. NO - Do you have a history of  Heart Failure?  4. NO - Are you troubled by shortness of breath when: walking on the level, up a slight hill or at night?  5. NO - Do you currently have a cold, bronchitis or other respiratory infection?  6. NO - Do you have a cough, shortness of breath or wheezing?  7. NO - Do you sometimes get pains in the calves of your legs when you walk?  8. NO - Do you or anyone in your family have previous history of blood clots?  9. NO - Do you or does anyone in your family have a serious bleeding problem such as prolonged bleeding following surgeries or cuts?  10. NO - Have you ever had problems with anemia or been told to take iron pills?  11. NO - Have you had any abnormal blood loss such as black, tarry or bloody stools, or abnormal vaginal bleeding?  12. YES - Have you ever had a blood transfusion?  13. NO - Have you or any of your relatives ever had problems with anesthesia?  14. NO - Do you have sleep apnea, excessive snoring or daytime  drowsiness?  15. NO - Do you have any prosthetic heart valves?  16. NO - Do you have prosthetic joints?  17. NO - Is there any chance that you may be pregnant?        HPI:                                                      Brief HPI related to upcoming procedure: Has had pain with eating for quite some time finally saw ENT diagnosed tongue cancer on her left side. Plan is to do a more thorough evaluation under general anesthesia along with EGD.          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) squamous cell per biopsy 08/28/2017     Priority: Medium     Current smoker 08/28/2017     Priority: Medium     Alcohol-induced acute pancreatitis, unspecified complication status 03/29/2017     Priority: Medium     Hypomagnesemia 02/23/2017     Priority: Medium     Health Care Home 02/09/2017     Priority: Medium     Personal history of malignant neoplasm of breast 11/26/2016     Priority: Medium     Iron deficiency anemia due to chronic blood loss 10/17/2016     Priority: Medium     Alcoholism (H) 04/22/2016     Priority: Medium     Diastolic dysfunction 04/22/2016     Priority: Medium     Electrolyte imbalance 04/06/2015     Priority: Medium     H/o hypokalemia and hypomagnesemia, non-adherent with previously ordered supplementation.       Alcoholic fatty liver 03/20/2015     Priority: Medium     Cholelithiasis 03/20/2015     Priority: Medium     Arteriosclerotic vascular disease 02/12/2015     Priority: Medium     Overview:   Noted in aorta without AAA.       Cobalamin deficiency 02/12/2015     Priority: Medium     Tubular adenoma 01/23/2015     Priority: Medium     Overview:   Needs colonoscopy in 2016       Major depressive disorder, recurrent episode (H)  04/07/2014     Priority: Medium     Cachexia (HCC) 05/04/2013     Priority: Medium     Liver function abnormality 05/03/2013     Priority: Medium     CARDIOVASCULAR SCREENING; LDL GOAL LESS THAN 130 05/03/2013     Priority: Medium     Anxiety 02/21/2013     Priority: Medium     Macrocytic anemia 07/24/2012     Priority: Medium      Past Medical History:   Diagnosis Date     Alcohol abuse      Alcoholic hepatitis      Alcoholic pancreatitis      Anxiety      Schafer's esophagus      Breast cancer (H)     rt, s/p radiation.     Chemical dependency (H)      Congestive heart failure, unspecified      COPD (chronic obstructive pulmonary disease) (H)      Depressive disorder      History of radiation therapy      Hoarseness      Hypokalemia      Macrocytic anemia      Non-adherence to medical treatment      Tobacco abuse      Past Surgical History:   Procedure Laterality Date     lumpectomy Rt breast  2006       Current Outpatient Prescriptions   Medication Sig Dispense Refill     VITAMIN B-1 100 MG tablet TAKE 1 TABLET(100 MG) BY MOUTH DAILY 30 tablet 0     potassium chloride (MICRO-K) 10 MEQ CR capsule Take 1 capsule (10 mEq) by mouth 2 times daily Take 2 caps by mouth once daily to equal 20 meq Verbal order given to pharmacy per Dr. Smith.       ketorolac (TORADOL) 10 MG tablet Take 1 tablet (10 mg) by mouth every 6 hours as needed for moderate pain 20 tablet 0     MAGIC MOUTHWASH, ENTER INGREDIENTS IN COMMENTS, Take 10 mLs by mouth every 4 hours as needed 480 mL 1     LORazepam (ATIVAN) 0.5 MG tablet Take 1 tablet (0.5 mg) by mouth every 8 hours as needed (for withdrawl) 20 tablet 0     B-12 TR 1000 MCG TBCR TAKE 1 TABLET BY MOUTH DAILY 30 tablet 0     pantoprazole (PROTONIX) 40 MG EC tablet TAKE 1 TABLET(40 MG) BY MOUTH EVERY 12 HOURS 60 tablet 0     cetirizine (ZYRTEC) 10 MG tablet TAKE 1 TABLET BY MOUTH EVERY EVENING 14 tablet 5     albuterol (PROAIR HFA/PROVENTIL HFA/VENTOLIN HFA) 108 (90 BASE) MCG/ACT Inhaler  "Inhale 2 puffs into the lungs every 4 hours as needed for shortness of breath / dyspnea 3 Inhaler 4     furosemide (LASIX) 20 MG tablet TAKE 1 TABLET(20 MG) BY MOUTH EVERY MORNING 30 tablet 10     ferrous gluconate (FERGON) 324 (38 FE) MG tablet TAKE 1 TABLET(324 MG) BY MOUTH DAILY WITH BREAKFAST 100 tablet 0     ferrous sulfate (IRON) 325 (65 FE) MG tablet Take 1 tablet (325 mg) by mouth 2 times daily 1 tablet 0     chlordiazePOXIDE (LIBRIUM) 25 MG capsule Take 50mg twice daily for 2 days, then take 25mg bid for days 3-5 25 capsule 0     magnesium oxide (MAG-OX) 400 MG tablet Take 1 tablet (400 mg) by mouth 3 times daily 90 tablet 3     folic acid (FOLVITE) 400 MCG tablet Take 2.5 tablets (1 mg) by mouth daily 75 tablet 3     omeprazole (PRILOSEC) 40 MG capsule TAKE 1 CAPSULE(40 MG) BY MOUTH DAILY 30 capsule 11     OTC products: None, except as noted above    Allergies   Allergen Reactions     Codeine Anaphylaxis     Contrast Dye Itching and Swelling     7/10/2009 Patient reports history of contrast reaction when first diagnosed with breast cancer. \"I almost .\"   (Hives, swelling.) 2009 CT to be done without IV contrast per Dr. KAVIN Live     Moxifloxacin Anaphylaxis     Avelox     Nickel Itching and Rash     Codeine Sulfate Other (See Comments)     shaking     Gabapentin Other (See Comments)     Patient reports that she got very shaky and she \"felt like she was going to die\"      Latex Allergy: NO    Social History   Substance Use Topics     Smoking status: Heavy Tobacco Smoker     Packs/day: 1.00     Years: 40.00     Types: Cigarettes     Smokeless tobacco: Current User     Alcohol use 0.5 oz/week      Comment: weekends (reported; smelled of alcohol during clinic visit 2016)     History   Drug Use No       REVIEW OF SYSTEMS:                                                    C: NEGATIVE for fever, chills, change in weight  I: NEGATIVE for worrisome rashes, moles or lesions  E: NEGATIVE for vision changes " or irritation  ENT/MOUTH: Pain with eating  R: NEGATIVE for significant cough or SOB  B: NEGATIVE for masses, tenderness or discharge  CV: NEGATIVE for chest pain, palpitations or peripheral edema  GI: NEGATIVE for nausea, abdominal pain, heartburn, or change in bowel habits  : NEGATIVE for frequency, dysuria, or hematuria  M: NEGATIVE for significant arthralgias or myalgia  N: NEGATIVE for weakness, dizziness or paresthesias  E: NEGATIVE for temperature intolerance, skin/hair changes  H: NEGATIVE for bleeding problems  P: NEGATIVE for changes in mood or affect    EXAM:                                                    There were no vitals taken for this visit.    GENERAL APPEARANCE: cooperative and alert thin and frail     EYES: EOMI, PERRL     HENT: ear canals and TM's normal and usually not on the left side of tongue     NECK: no adenopathy, no asymmetry, masses, or scars and thyroid normal to palpation     RESP: lungs clear to auscultation - no rales, rhonchi or wheezes     CV: regular rates and rhythm, normal S1 S2, no S3 or S4 and no murmur, click or rub     ABDOMEN:  soft, nontender, no HSM or masses and bowel sounds normal     MS: extremities normal- no gross deformities noted, no evidence of inflammation in joints, FROM in all extremities.     SKIN: no suspicious lesions or rashes     NEURO: Normal strength and tone, sensory exam grossly normal, mentation intact and speech normal     PSYCH: mentation appears normal. and affect normal/bright     LYMPHATICS: No axillary, cervical, or supraclavicular nodes    DIAGNOSTICS:                                                      Labs Resulted Today:   Results for orders placed or performed in visit on 10/26/17   CBC with platelets differential   Result Value Ref Range    WBC 8.2 4.0 - 11.0 10e9/L    RBC Count 2.43 (L) 3.8 - 5.2 10e12/L    Hemoglobin 8.7 (L) 11.7 - 15.7 g/dL    Hematocrit 27.0 (L) 35.0 - 47.0 %     (H) 78 - 100 fl    MCH 35.8 (H) 26.5 -  33.0 pg    MCHC 32.2 31.5 - 36.5 g/dL    RDW 23.6 (H) 10.0 - 15.0 %    Platelet Count 243 150 - 450 10e9/L    Diff Method Automated Method     % Neutrophils 60.2 %    % Lymphocytes 24.0 %    % Monocytes 14.0 %    % Eosinophils 1.0 %    % Basophils 0.6 %    % Immature Granulocytes 0.2 %    Absolute Neutrophil 4.9 1.6 - 8.3 10e9/L    Absolute Lymphocytes 2.0 0.8 - 5.3 10e9/L    Absolute Monocytes 1.1 0.0 - 1.3 10e9/L    Absolute Eosinophils 0.1 0.0 - 0.7 10e9/L    Absolute Basophils 0.1 0.0 - 0.2 10e9/L    Abs Immature Granulocytes 0.0 0 - 0.4 10e9/L   Comprehensive metabolic panel (BMP + Alb, Alk Phos, ALT, AST, Total. Bili, TP)   Result Value Ref Range    Sodium 141 133 - 144 mmol/L    Potassium 4.3 3.4 - 5.3 mmol/L    Chloride 107 94 - 109 mmol/L    Carbon Dioxide 24 20 - 32 mmol/L    Anion Gap 10 3 - 14 mmol/L    Glucose 98 70 - 99 mg/dL    Urea Nitrogen 10 7 - 30 mg/dL    Creatinine 1.15 (H) 0.52 - 1.04 mg/dL    GFR Estimate 47 (L) >60 mL/min/1.7m2    GFR Estimate If Black 57 (L) >60 mL/min/1.7m2    Calcium 7.2 (L) 8.5 - 10.1 mg/dL    Bilirubin Total 0.6 0.2 - 1.3 mg/dL    Albumin 2.7 (L) 3.4 - 5.0 g/dL    Protein Total 7.8 6.8 - 8.8 g/dL    Alkaline Phosphatase 248 (H) 40 - 150 U/L    ALT 16 0 - 50 U/L    AST 41 0 - 45 U/L       Recent Labs   Lab Test  08/29/17   0317  08/28/17   1505   02/23/17   1351   05/07/13   1326   HGB  8.2*  8.4*   < >  7.8*   < >   --    PLT  161  177   < >  313   < >   --    INR   --    --    --    --    --   0.96   NA  149*  143   < >  146*   < >   --    POTASSIUM  3.4  2.6*   < >  4.0   < >   --    CR  0.90  0.87   < >  0.80   < >   --    A1C   --    --    --   5.2   --    --     < > = values in this interval not displayed.        IMPRESSION:                                                    Reason for surgery/procedure: Tongue cancer     The proposed surgical procedure is considered LOW risk.    REVISED CARDIAC RISK INDEX  The patient has the following serious cardiovascular risks  for perioperative complications such as (MI, PE, VFib and 3  AV Block):  No serious cardiac risks  INTERPRETATION: 0 risks: Class I (very low risk - 0.4% complication rate)    The patient has the following additional risks for perioperative complications:  Alcohol abuse with risk of withdrawal    No diagnosis found.    RECOMMENDATIONS:                                                              APPROVAL GIVEN to proceed with proposed procedure, without further diagnostic evaluation       Signed Electronically by: Karan Smith MD    Copy of this evaluation report is provided to requesting physician.    [place] Preop Guidelines

## 2017-10-26 NOTE — PROGRESS NOTES
"   SUBJECTIVE:   CC: Alexia Flor is an 62 year old woman who presents for preventive health visit.     Healthy Habits:    Do you get at least three servings of calcium containing foods daily (dairy, green leafy vegetables, etc.)? {YES/NO, DAIRY INTAKE:494718::\"yes\"}    Amount of exercise or daily activities, outside of work: {AMOUNT EXERCISE:898872}    Problems taking medications regularly {Yes /No default:994335::\"No\"}    Medication side effects: {Yes /No default.:448847::\"No\"}    Have you had an eye exam in the past two years? {YESNOBLANK:130890}    Do you see a dentist twice per year? {YESNOBLANK:114595}    Do you have sleep apnea, excessive snoring or daytime drowsiness?{YESNOBLANK:448679}    {Outside tests to abstract? :987389}    {additional problems to add (Optional):371247}    Today's PHQ-2 Score:   PHQ-2 ( 1999 [employer]) 2/23/2017 12/22/2016   Q1: Little interest or pleasure in doing things 1 2   Q2: Feeling down, depressed or hopeless 1 1   PHQ-2 Score 2 3     {PHQ-2 LOOK IN ASSESSMENTS (Optional) :412776}  Abuse: Current or Past(Physical, Sexual or Emotional)- {YES/NO/NA:533071}  Do you feel safe in your environment - {YES/NO/NA:909273}    Social History   Substance Use Topics     Smoking status: Heavy Tobacco Smoker     Packs/day: 1.00     Years: 40.00     Types: Cigarettes     Smokeless tobacco: Current User     Alcohol use 0.5 oz/week      Comment: weekends (reported; smelled of alcohol during clinic visit 4/2016)     {ETOH AUDIT:125662}    Reviewed orders with patient.  Reviewed health maintenance and updated orders accordingly - {Yes/No:362343::\"Yes\"}  {Chronicprobdata (Optional):979553}    {Mammo Decision Support (Optional):579927}    Pertinent mammograms are reviewed under the imaging tab.  History of abnormal Pap smear: {PAP HX:495213}    Reviewed and updated as needed this visit by clinical staff         Reviewed and updated as needed this visit by Provider        {HISTORY OPTIONS " "(Optional):657985}    ROS:  {FEMALE PREVENTATIVE ROS:370455}    OBJECTIVE:   There were no vitals taken for this visit.  EXAM:  {Exam Choices:547395}    ASSESSMENT/PLAN:   {Diag Picklist:693052}    COUNSELING:   {FEMALE COUNSELING MESSAGES:023558::\"Reviewed preventive health counseling, as reflected in patient instructions\"}    {BP Counseling- Complete if BP >= 120/80  (Optional):771563}     reports that she has been smoking Cigarettes.  She has a 40.00 pack-year smoking history. She uses smokeless tobacco.  {Tobacco Cessation -- Complete if patient is a smoker (Optional):152464}  Estimated body mass index is 15.75 kg/(m^2) as calculated from the following:    Height as of 10/16/17: 5' 0.5\" (1.537 m).    Weight as of 10/16/17: 82 lb (37.2 kg).   {Weight Management Plan (ACO) Complete if BMI is abnormal-  Ages 18-64  BMI >24.9.  Age 65+ with BMI <23 or >30 (Optional):472415}    Counseling Resources:  ATP IV Guidelines  Pooled Cohorts Equation Calculator  Breast Cancer Risk Calculator  FRAX Risk Assessment  ICSI Preventive Guidelines  Dietary Guidelines for Americans, 2010  USDA's MyPlate  ASA Prophylaxis  Lung CA Screening    Karan Smith MD  [facility]  "

## 2017-10-26 NOTE — NURSING NOTE
"Chief Complaint   Patient presents with     Pre-Op Exam     LARYNGOSCOPY WITH BIOPSY(IES).Direct Laryngoscopy with Biopsies, Upper Endoscopic Ultrasound         Initial /58 (BP Location: Right arm, Patient Position: Chair, Cuff Size: Adult Regular)  Pulse 113  Temp 97.8  F (36.6  C) (Tympanic)  Ht 5' 0.5\" (1.537 m)  Wt 83 lb 9.6 oz (37.9 kg)  SpO2 97%  BMI 16.06 kg/m2 Estimated body mass index is 16.06 kg/(m^2) as calculated from the following:    Height as of this encounter: 5' 0.5\" (1.537 m).    Weight as of this encounter: 83 lb 9.6 oz (37.9 kg).  Medication Reconciliation: complete    "

## 2017-10-26 NOTE — MR AVS SNAPSHOT
After Visit Summary   10/26/2017    Alexia Flor    MRN: [id number]           Patient Information     Date Of Birth          1955        Visit Information        Provider Department      10/26/2017 11:40 AM Karan Smith MD [facility]        Today's Diagnoses     Preop general physical exam    -  1    Malignant neoplasm of base of tongue (H) squamous cell per biopsy        Alcoholism (H)          Care Instructions      Preventive Health Recommendations  Female Ages 50 - 64    Yearly exam: See your health care provider every year in order to  o Review health changes.   o Discuss preventive care.    o Review your medicines if your doctor has prescribed any.      Get a Pap test every three years (unless you have an abnormal result and your provider advises testing more often).    If you get Pap tests with HPV test, you only need to test every 5 years, unless you have an abnormal result.     You do not need a Pap test if your uterus was removed (hysterectomy) and you have not had cancer.    You should be tested each year for STDs (sexually transmitted diseases) if you're at risk.     Have a mammogram every 1 to 2 years.    Have a colonoscopy at age 50, or have a yearly FIT test (stool test). These exams screen for colon cancer.      Have a cholesterol test every 5 years, or more often if advised.    Have a diabetes test (fasting glucose) every three years. If you are at risk for diabetes, you should have this test more often.     If you are at risk for osteoporosis (brittle bone disease), think about having a bone density scan (DEXA).    Shots: Get a flu shot each year. Get a tetanus shot every 10 years.    Nutrition:     Eat at least 5 servings of fruits and vegetables each day.    Eat whole-grain bread, whole-wheat pasta and brown rice instead of white grains and rice.    Talk to your provider about Calcium and Vitamin D.     Lifestyle    Exercise at least 150 minutes a week  (30 minutes a day, 5 days a week). This will help you control your weight and prevent disease.    Limit alcohol to one drink per day.    No smoking.     Wear sunscreen to prevent skin cancer.     See your dentist every six months for an exam and cleaning.    See your eye doctor every 1 to 2 years.    Before Your Surgery      Call your surgeon if there is any change in your health. This includes signs of a cold or flu (such as a sore throat, runny nose, cough, rash or fever).    Do not smoke, drink alcohol or take over the counter medicine (unless your surgeon or primary care doctor tells you to) for the 24 hours before and after surgery.    If you take prescribed drugs: Follow your doctor s orders about which medicines to take and which to stop until after surgery.    Eating and drinking prior to surgery: follow the instructions from your surgeon    Take a shower or bath the night before surgery. Use the soap your surgeon gave you to gently clean your skin. If you do not have soap from your surgeon, use your regular soap. Do not shave or scrub the surgery site.  Wear clean pajamas and have clean sheets on your bed.           Follow-ups after your visit        Your next 10 appointments already scheduled     Nov 09, 2017   Procedure with Heriberto George MD   [facility], [place], Same Day Surgery (--)    [address] [facility] [address] [phone]              Who to contact     If you have questions or need follow up information about today's clinic visit or your schedule please contact [facility] directly at [phone].  Normal or non-critical lab and imaging results will be communicated to you by MyChart, letter or phone within 4 business days after the clinic has received the results. If you do not hear from us within 7 days, please contact the clinic through MyChart or phone. If you have a critical or abnormal lab result, we will notify you by phone as soon as possible.  Submit refill  "requests through [employer] or call your pharmacy and they will forward the refill request to us. Please allow 3 business days for your refill to be completed.          Additional Information About Your Visit        [employer] Information     [employer] lets you send messages to your doctor, view your test results, renew your prescriptions, schedule appointments and more. To sign up, go to [email or web address] . Click on \"Log in\" on the left side of the screen, which will take you to the Welcome page. Then click on \"Sign up Now\" on the right side of the page.     You will be asked to enter the access code listed below, as well as some personal information. Please follow the directions to create your username and password.     Your access code is: [email or web address]  Expires: 2018  3:12 PM     Your access code will  in 90 days. If you need help or a new code, please call your [facility] clinic or [phone].        Care EveryWhere ID     This is your Care EveryWhere ID. This could be used by other organizations to access your [facility] medical records  [id number]        Your Vitals Were     Pulse Temperature Height Pulse Oximetry BMI (Body Mass Index)       113 97.8  F (36.6  C) (Tympanic) 5' 0.5\" (1.537 m) 97% 16.06 kg/m2        Blood Pressure from Last 3 Encounters:   10/26/17 102/58   17 120/70   17 128/60    Weight from Last 3 Encounters:   10/26/17 83 lb 9.6 oz (37.9 kg)   10/16/17 82 lb (37.2 kg)   17 91 lb 7.9 oz (41.5 kg)              We Performed the Following     CBC with platelets differential     Comprehensive metabolic panel (BMP + Alb, Alk Phos, ALT, AST, Total. Bili, TP)          Where to get your medicines      Some of these will need a paper prescription and others can be bought over the counter.  Ask your nurse if you have questions.     Bring a paper prescription for each of these medications     chlordiazePOXIDE 25 MG capsule          Primary Care Provider Office Phone # Fax #    " Karan Smith [phone]       [facility] [place] [facility] [address] [place] [address]        Equal Access to Services     BA GRAY : Hadii dhruv sosa alexao Sorosalbaali, waaxda luqadaha, qaybta kaalmada adegerada, michelle lyles maria del carmenmateusz marcial ladomitilanae steven. So [facility] [phone].    ATENCIÓN: Si habla español, tiene a savage disposición servicios gratuitos de asistencia lingüística. Llame al [phone].    We comply with applicable federal civil rights laws and [place] laws. We do not discriminate on the basis of race, color, national origin, age, disability, sex, sexual orientation, or gender identity.            Thank you!     Thank you for choosing [facility]  for your care. Our goal is always to provide you with excellent care. Hearing back from our patients is one way we can continue to improve our services. Please take a few minutes to complete the written survey that you may receive in the mail after your visit with us. Thank you!             Your Updated Medication List - Protect others around you: Learn how to safely use, store and throw away your medicines at www.disposemymeds.org.          This list is accurate as of: 10/26/17  1:17 PM.  Always use your most recent med list.                   Brand Name Dispense Instructions for use Diagnosis    albuterol 108 (90 BASE) MCG/ACT Inhaler    PROAIR HFA/PROVENTIL HFA/VENTOLIN HFA    3 Inhaler    Inhale 2 puffs into the lungs every 4 hours as needed for shortness of breath / dyspnea    Cough       B-12 TR 1000 MCG Tbcr   Generic drug:  cyanocobalamin     30 tablet    TAKE 1 TABLET BY MOUTH DAILY    Alcoholism /alcohol abuse (H)       cetirizine 10 MG tablet    zyrTEC    14 tablet    TAKE 1 TABLET BY MOUTH EVERY EVENING    Pruritic disorder       chlordiazePOXIDE 25 MG capsule    LIBRIUM    25 capsule    Take 50mg twice daily for 2 days, then take 25mg bid for days 3-5    Alcoholism (H)       ferrous gluconate 324  (38 FE) MG tablet    FERGON    100 tablet    TAKE 1 TABLET(324 MG) BY MOUTH DAILY WITH BREAKFAST    Iron deficiency anemia due to chronic blood loss       ferrous sulfate 325 (65 FE) MG tablet    IRON    1 tablet    Take 1 tablet (325 mg) by mouth 2 times daily    Alcohol-induced acute pancreatitis, unspecified complication status, Iron deficiency anemia due to chronic blood loss       folic acid 400 MCG tablet    FOLVITE    75 tablet    Take 2.5 tablets (1 mg) by mouth daily    Alcoholism /alcohol abuse (H)       furosemide 20 MG tablet    LASIX    30 tablet    TAKE 1 TABLET(20 MG) BY MOUTH EVERY MORNING    Edema, unspecified type       ketorolac 10 MG tablet    TORADOL    20 tablet    Take 1 tablet (10 mg) by mouth every 6 hours as needed for moderate pain    Cervicalgia, Tongue lesion       LORazepam 0.5 MG tablet    ATIVAN    20 tablet    Take 1 tablet (0.5 mg) by mouth every 8 hours as needed (for withdrawl)    Alcoholism (H)       MAGIC MOUTHWASH (ENTER INGREDIENTS IN COMMENTS)     480 mL    Take 10 mLs by mouth every 4 hours as needed    Tongue lesion       magnesium oxide 400 MG tablet    MAG-OX    90 tablet    Take 1 tablet (400 mg) by mouth 3 times daily    Hypomagnesemia       omeprazole 40 MG capsule    priLOSEC    30 capsule    TAKE 1 CAPSULE(40 MG) BY MOUTH DAILY    Gastroesophageal reflux disease without esophagitis       pantoprazole 40 MG EC tablet    PROTONIX    60 tablet    TAKE 1 TABLET(40 MG) BY MOUTH EVERY 12 HOURS    Gastroesophageal reflux disease with esophagitis       potassium chloride 10 MEQ CR capsule    MICRO-K     Take 1 capsule (10 mEq) by mouth 2 times daily Take 2 caps by mouth once daily to equal 20 meq Verbal order given to pharmacy per Dr. Smith.    Hypokalemia       thiamine 100 MG tablet     30 tablet    TAKE 1 TABLET(100 MG) BY MOUTH DAILY    Alcoholism /alcohol abuse (H)

## 2017-10-27 NOTE — TELEPHONE ENCOUNTER
Reason for Call:  Request for results:    Name of test or procedure: lab work    Date of test of procedure: 10/27/17    Location of the test or procedure: [facility]    OK to leave the result message on voice mail or with a family member? YES    Phone number Patient can be reached at:  Home number on file [phone] (home)    Additional comments: Alexia is wanting to make sure she gets her results as soon as possible and that they will be sent to the [facility].     Call taken on 10/27/2017 at 11:20 AM by Huan Tello

## 2017-10-27 NOTE — PROGRESS NOTES
Hemoglobin is 8.7 up from 8.2.  Potassium is normal. One of the kidney function test is off just a little bit and one of the liver function tests as going up again. Should be okay for the surgery though

## 2017-10-27 NOTE — TELEPHONE ENCOUNTER
potassium chloride (MICRO-K) 10 MEQ CR capsule     Last Written Prescription Date:  10/12/17  Last Fill Quantity: unknown,   # refills: unknown  Future Office visit:   No appointment scheduled at this time.    Routing refill request to provider for review/approval because:  Drug not on the [facility] or [facility] refill protocol or controlled substance

## 2017-10-27 NOTE — TELEPHONE ENCOUNTER
This has been taken care of.  Does not need to be faxed anywhere as its [facility]. Patient given lab results over the phone

## 2017-10-27 NOTE — TELEPHONE ENCOUNTER
Reason for Call:  Other prescription    Detailed comments: patient calling to inquire about the status of this refill request    Phone Number Patient can be reached at: Home number on file [phone] (home)    Best Time: anytime    Can we leave a detailed message on this number? YES    Call taken on 10/27/2017 at 2:37 PM by Berta Wilson

## 2017-10-30 NOTE — TELEPHONE ENCOUNTER
Reason for Call:  Medication or medication refill: chlordiazePOXIDE (LIBRIUM) 25 MG capsule    Name of the pharmacy and phone number for the current request:  [facility] [phone]    Name of the medication requested: chlordiazePOXIDE (LIBRIUM) 25 MG capsule Chelsea from [facility] calling to request the medication be sent there. Originally sent to [facility] but since controlled medication, it needs to be sent to the new pharmacy.      Other request:     Can we leave a detailed message on this number? YES    Phone number patient can be reached at: Other phone number:  [phone]    Best Time:     Call taken on 10/30/2017 at 11:43 AM by Berta Wilson

## 2017-11-02 NOTE — TELEPHONE ENCOUNTER
Dr. Smith will not be filling this medication as this was meant to be short term use for help with alcohol withdrawal.  Also, he has never perscribed this to the patient and doesn't know who did.

## 2017-11-02 NOTE — TELEPHONE ENCOUNTER
B-12 TR 1000 MCG TBCR        Last Written Prescription Date: 8/1/17  Last Fill Quantity: 30,    # refills: 0  Last Office Visit with [facility] or [facility] prescribing provider:  10/26/17      Lab Results   Component Value Date    WBC 8.2 10/26/2017     Lab Results   Component Value Date    RBC 2.43 10/26/2017     Lab Results   Component Value Date    HGB 8.7 10/26/2017     Lab Results   Component Value Date    HCT 27.0 10/26/2017     No components found for: MCT  Lab Results   Component Value Date     10/26/2017     Lab Results   Component Value Date    MCH 35.8 10/26/2017     Lab Results   Component Value Date    MCHC 32.2 10/26/2017     Lab Results   Component Value Date    RDW 23.6 10/26/2017     Lab Results   Component Value Date     10/26/2017     Lab Results   Component Value Date    AST 41 10/26/2017     Lab Results   Component Value Date    ALT 16 10/26/2017     Creatinine   Date Value Ref Range Status   10/26/2017 1.15 (H) 0.52 - 1.04 mg/dL Final

## 2017-11-07 NOTE — TELEPHONE ENCOUNTER
Routing refill request to provider for review/approval because:  Labs out of range:  Hemoglobin    Will route to covering provider to review in absence of PCP.     Radha Shipman RN  [facility] [place]

## 2017-11-09 NOTE — PROGRESS NOTES
Patient showed up to the clinic to check in for her surgery with Dr. George. Patient upset and agitated. Went to lobby to discuss with patient. Advised her that her biopsies today are at the hospital. Directed patient and family member to the shuttle to take them to the hospital. Patient indicated that she has consumed a lot of water this morning and also just took a Librium tablet. Discussed with patient that water is okay until 11:00am but unsure of the Librium tablet. Called to report to 3C nurse to make them aware that patient took the Librium tablet at 10:30am. Patient proceeded to the shuttle to go to the hospital to check in for her surgery.     Elizabeth Barrientos, RN, BSN

## 2017-11-09 NOTE — ANESTHESIA PREPROCEDURE EVALUATION
"ANESTHESIA PREOP EVALUATION    HPI: Alexia Flor is a 62 year old female who presents for Procedure(s):  Direct Laryngoscopy with Biopsies, Upper Endoscopic Ultrasound  - Wound Class: II-Clean Contaminated   - Wound Class: II-Clean Contaminated      No personal or family h/o anesthesia problems.  GERD well-controlled with PPI. Has ETOH dependence, but denies hospitalization nor seizure as a complication of ETOH withdrawal.      PMHx/PSHx/ROS:  Past Medical History:   Diagnosis Date     Alcohol abuse      Alcoholic hepatitis      Alcoholic pancreatitis      Anxiety      Schafer's esophagus      Breast cancer (H)     rt, s/p radiation.     Chemical dependency (H)      Congestive heart failure, unspecified      COPD (chronic obstructive pulmonary disease) (H)      Depressive disorder      History of radiation therapy      Hoarseness      Hypokalemia      Macrocytic anemia      Non-adherence to medical treatment      Tobacco abuse      Tongue cancer (H)        Past Surgical History:   Procedure Laterality Date     lumpectomy Rt breast  2006         Soc Hx:   Social History   Substance Use Topics     Smoking status: Heavy Tobacco Smoker     Packs/day: 0.50     Years: 40.00     Types: Cigarettes     Smokeless tobacco: Current User     Alcohol use 0.5 oz/week      Comment: weekends (reported; smelled of alcohol during clinic visit 2016)       Allergies:   Allergies   Allergen Reactions     Codeine Anaphylaxis     Contrast Dye Itching and Swelling     7/10/2009 Patient reports history of contrast reaction when first diagnosed with breast cancer. \"I almost .\"   (Hives, swelling.) 2009 CT to be done without IV contrast per Dr. KAVIN Live     Moxifloxacin Anaphylaxis     Avelox     Nickel Itching and Rash     Codeine Sulfate Other (See Comments)     shaking     Gabapentin Other (See Comments)     Patient reports that she got very shaky and she \"felt like she was going to die\"       Meds:     No current " facility-administered medications on file prior to encounter.   Current Outpatient Prescriptions on File Prior to Encounter:  VITAMIN B-1 100 MG tablet TAKE 1 TABLET(100 MG) BY MOUTH DAILY   MAGIC MOUTHWASH, ENTER INGREDIENTS IN COMMENTS, Take 10 mLs by mouth every 4 hours as needed   LORazepam (ATIVAN) 0.5 MG tablet Take 1 tablet (0.5 mg) by mouth every 8 hours as needed (for withdrawl)   pantoprazole (PROTONIX) 40 MG EC tablet TAKE 1 TABLET(40 MG) BY MOUTH EVERY 12 HOURS   cetirizine (ZYRTEC) 10 MG tablet TAKE 1 TABLET BY MOUTH EVERY EVENING (Patient not taking: Reported on 10/26/2017)   furosemide (LASIX) 20 MG tablet TAKE 1 TABLET(20 MG) BY MOUTH EVERY MORNING   ferrous gluconate (FERGON) 324 (38 FE) MG tablet TAKE 1 TABLET(324 MG) BY MOUTH DAILY WITH BREAKFAST   ferrous sulfate (IRON) 325 (65 FE) MG tablet Take 1 tablet (325 mg) by mouth 2 times daily   magnesium oxide (MAG-OX) 400 MG tablet Take 1 tablet (400 mg) by mouth 3 times daily   folic acid (FOLVITE) 400 MCG tablet Take 2.5 tablets (1 mg) by mouth daily   omeprazole (PRILOSEC) 40 MG capsule TAKE 1 CAPSULE(40 MG) BY MOUTH DAILY       Labs:    Blood Bank:  Lab Results   Component Value Date    ABO A 10/17/2016    RH  Pos 10/17/2016    AS Neg 10/17/2016     BMP:  Recent Labs   Lab Test  10/26/17   1215   NA  141   POTASSIUM  4.3   CHLORIDE  107   CO2  24   BUN  10   CR  1.15*   GLC  98   SARAH  7.2*     CBC:   Recent Labs   Lab Test  10/26/17   1215   WBC  8.2   RBC  2.43*   HGB  8.7*   HCT  27.0*   MCV  111*   MCH  35.8*   MCHC  32.2   RDW  23.6*   PLT  243     Coags:  Recent Labs   Lab Test  05/07/13   1326   INR  0.96       No results found for this or any previous visit.  No results found for this or any previous visit (from the past 8760 hour(s)).                                Anesthesia Plan      History & Physical Review  History and physical reviewed and following examination; no interval change.    ASA Status:  3 .    NPO Status:  > 8 hours (>2h  for clear liquids (vodka))    Plan for General, ETT and RSI with Intravenous and Propofol induction. Maintenance will be Balanced.    PONV prophylaxis:  Ondansetron (or other 5HT-3) and Dexamethasone or Solumedrol  Pt with known EtOH dependence on librium that misunderstood that she was to have surgery today.  Drank 1/2 pint vodka at 1030am DOS.  Despite this, given her likely tumor, cachexia, and difficulty with finding responsible adults to stay with her and her transportation issues in getting from [place], it seems prudent to continue with the case.  She is answering questions appropriately and has decisionmaking capacity to consent to anesthesia.  I discussed the risks and benefits of general anesthesia with the patient.  Questions were sought and answered.      Ramesh Garcia MD  Attending Anesthesiologist        Postoperative Care  Postoperative pain management:  IV analgesics and Oral pain medications.      Consents  Anesthetic plan, risks, benefits and alternatives discussed with:  Patient..                          .

## 2017-11-09 NOTE — IP AVS SNAPSHOT
Post Anesthesia Care Unit [address] [address]    Phone:  [phone]                                       After Visit Summary   11/9/2017    Alexia Flor    MRN: [id number]           After Visit Summary Signature Page     I have received my discharge instructions, and my questions have been answered. I have discussed any challenges I see with this plan with the nurse or doctor.    ..........................................................................................................................................  Patient/Patient Representative Signature      ..........................................................................................................................................  Patient Representative Print Name and Relationship to Patient    ..................................................               ................................................  Date                                            Time    ..........................................................................................................................................  Reviewed by Signature/Title    ...................................................              ..............................................  Date                                                            Time

## 2017-11-09 NOTE — BRIEF OP NOTE
[facility]    Brief Operative Note    Pre-operative diagnosis: Oral Cancer, rule out Esophageal Cancer   Post-operative diagnosis * Gastrohepatic ligament lymph nodes. Narrow esophageal inlet. Normal esophagus *  Procedure: Procedure(s):  Direct Laryngoscopy with Biopsies, Upper Endoscopic Ultrasound and Fine Needle Aspiration - Wound Class: II-Clean Contaminated   - Wound Class: II-Clean Contaminated  Surgeon: Surgeon(s) and Role:  Panel 1:     * Heriberto George MD - Primary    Panel 2:     * Yo Bhat MD - Primary  Anesthesia: General   Estimated blood loss: Minimal  Drains: None  Specimens: * No specimens in log *  Findings:   The esophageal inlet was narrow with fullness in the region but normal overlying mucosa. A mass was seen involving the left posterior tongue - see ENT note.  Pediatric endscope and endobronchial ultrasound scopes were needed to complete the exam due to the narrowing.  The esophagus was endoscopically and sonographically normal without suggestion of mass or esophagitis.  Several relatively isoechoic lymph nodes were seen the gastrohepatic ligament/left gastric artery region. Needle aspiration was performed using a 22 ga Olympus needle. Doppler excluded intervening vessels.  Four passes were made. Preliminary cytology showed lesional tissue.  No focal lesions were seen in the left lobe of the liver.  The left adrenal was not seen.  Several small gastric varices were seen. It was not possible to reliably assess the splenic vein.    The pancreas was markedly abnormal with lobularity suggestive of chronic pancreatitis. The tip of the tail was rounded and more homogenous. This was not amenable to needle aspiration due to the intervening splenic vessels.  Complications: None.  Implants: None.    TARYN Bhat MD  Associate Professor of Medicine  Division of Gastroenterology, Hepatology and Nutrition  [facility]

## 2017-11-09 NOTE — PROGRESS NOTES
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 Staging PET CT demonstrated activity in distal esophagus as well as enlarged perigastric lymph nodes. Scheduled for evaluation under anesthesia by ENT. Also scheduled for endoscopy and endoscopic ultrasound by myself due to the PET findings.    Pt initially presented to the wrong institution this am and was directed here. Has significant issues with chronic anxiety, chemical dependency, chronic benzodiazepine use and alcohol use. Pt reported to intake nurse, somewhere around 10 am, that on way here she stopped at home and had a drink of vodka. Initially I was told that pt also took a librium tablet, however she has denied this to me. Reports 1-2 inches in a glass, however clearly unable to reliably assess.    On arrival, pt was anxious and agitated. After reassurance and settling she was cooperative. Had absolutely no clinic signs of inebriation. Clear speech, alert, oriented x 3, cooperative. We discussed the planned procedure, including rationale. She recalled that this was as a follow-up to an abnormal scan showing an abnormality in her esophagus. Denies prior history of esophageal disease and was unaware of listed diagnosis of Schafer's. Denied heartburn since taking omeprazole past few years. We discussed the planned procedure (EGD + EUS) and she was able to repeat this. Was able to re-list the three main complications discussed (perforation, bleeding and reaction to medication).     In my opinion, pt was competent to consent and safe for the planned procedure. Despite reported ingestion, she demonstrated no signs of intoxication. Was also assessed by ENT staff and anesthesia staff (Dr. Garcia) who on hearing the initial story was hesitant but after meeting the patient agreed that she was competent, unimpaired and able to consent and proceed.    There was delay in performing the procedure due to questions raised re competency  and a request for discussion with , both from [facility] and [place]. During the delay, she became progressively agitated. On reassessment, however, I continue to believe that she is competent. She was cranky and uncooperative with questions re the procedure and risk, however then during the conversation clearly alluded to performing a camera examination of the esophagus, risk of bleeding and risk of perforation. When asked to repeat these, she would not comply I believe out of beligerance and frustration. I repeatedly told her that I was planning on performing the procedure. She then stated that she would leave. When I finally stated that we would reschedule the exam, she became tearful, apologized and requested the procedure.    I believe that delaying the procedure is unlikely to result in her reliably admitting to any substance ingestion prior to any rescheduled exam. If the desire is to guarantee absolutely no ingestion of benzodiazepines or alcohol, she would likely need to pursue inpatient chemical dependency treatment due to likely withdrawal and this would unnecessarily delay treatment of her cancer and would not be in her best interest. I believe the most medically ethical and safe approach is to proceed today. Again, I have no doubts whatsoever that she understands the planned procedure, risks and alternatives (no procedure, with delay in cancer therapy) and am convinced that she is not clinically intoxicated/impaired to make this decision. I believe she has emotional/anxiety issues which may interfere with her care but do not preclude proceeding.    TARYN Bhat MD  Associate Professor of Medicine  Division of Gastroenterology, Hepatology and Nutrition  [facility]

## 2017-11-09 NOTE — OR NURSING
"Patient first went to clinic for procedure.  When she was told she needed to come to hospital she told me \"to be honest, I was going into withdrawal so I drank some alcohol \"  She stated she  had  1/2 pint of vodka and a librium at 10:30.  Dr Valdes notified.  "

## 2017-11-09 NOTE — IP AVS SNAPSHOT
MRN:[id number]                      After Visit Summary   11/9/2017    Alexia Flor    MRN: [id number]           Thank you!     Thank you for choosing [facility] for your care. Our goal is always to provide you with excellent care. Hearing back from our patients is one way we can continue to improve our services. Please take a few minutes to complete the written survey that you may receive in the mail after you visit with us. Thank you!        Patient Information     Date Of Birth          1955        About your hospital stay     You were admitted on:  November 9, 2017 You last received care in the:  Post Anesthesia Care Unit [facility]    You were discharged on:  November 9, 2017       Who to Call     For medical emergencies, please call 911.  For non-urgent questions about your medical care, please call your primary care provider or clinic, [phone]  For questions related to your surgery, please call your surgery clinic        Attending Provider     Provider Heriberto Tran MD Otolaryngology       Primary Care Provider Office Phone # Fax #    Karan Smith [phone]      After Care Instructions     Diet Instructions       Resume pre procedure diet            Discharge Instructions       No driving or operating machinery until the day after procedure.            Discharge Instructions       Recommend that a responsible adult remain with the patient at home for 24 hours post discharge.            Discharge Instructions       Start with clear liquids, sips of water 1 hour after procedure. If no abdominal pain and gag reflex has returned, advance as tolerated to pre-procedure diet.              Discharge Instructions       Restart home medications.            Discharge Instructions       Check with your Provider when to start anticoagulant medication.            Discharge Instructions       No ALCOHOL 24 hours post procedure.            No Alcohol        For 24 hours following procedure or while using narcotic pain medications            No driving or operating machinery       until the day after procedure or while using narcotic pain medications                  Further instructions from your care team       [facility], [place]  Same-Day Surgery   Adult Discharge Orders & Instructions     For 24 hours after surgery    1. Get plenty of rest.  A responsible adult must stay with you for at least 24 hours after you leave the hospital.   2. Do not drive or use heavy equipment.  If you have weakness or tingling, don't drive or use heavy equipment until this feeling goes away.  3. Do not drink alcohol.  4. Avoid strenuous or risky activities.  Ask for help when climbing stairs.   5. You may feel lightheaded.  IF so, sit for a few minutes before standing.  Have someone help you get up.   6. If you have nausea (feel sick to your stomach): Drink only clear liquids such as apple juice, ginger ale, broth or 7-Up.  Rest may also help.  Be sure to drink enough fluids.  Move to a regular diet as you feel able.  7. You may have a slight fever. Call the doctor if your fever is over 100 F (37.7 C) (taken under the tongue) or lasts longer than 24 hours.  8. You may have a dry mouth, a sore throat, muscle aches or trouble sleeping.  These should go away after 24 hours.  9. Do not make important or legal decisions.   Call your doctor for any of the followin.  Signs of infection (fever, growing tenderness at the surgery site, a large amount of drainage or bleeding, severe pain, foul-smelling drainage, redness, swelling).    2. It has been over 8 to 10 hours since surgery and you are still not able to urinate (pass water).    3.  Headache for over 24 hours.    4.  Numbness, tingling or weakness the day after surgery (if you had spinal anesthesia).  To contact a doctor, call Dr Bhat's office at [phone]  or:        [phone] and ask for  "the resident on call for   Gastroenterology (answered 24 hours a day)      Emergency Department:    [facility]: [phone]       (TTY for hearing impaired: [phone])          Pending Results     No orders found from 2017 to 11/10/2017.            Admission Information     Date & Time Provider Department Dept. Phone    2017 Heriberto George MD Post Anesthesia Care Unit [facility] [phone]      Your Vitals Were     Blood Pressure Temperature Respirations Height Weight Pulse Oximetry    127/86 96.8  F (36  C) 18 1.549 m (5' 1\") 38.3 kg (84 lb 7 oz) 93%    BMI (Body Mass Index)                   15.95 kg/m2           [facility] Information     [employer] lets you send messages to your doctor, view your test results, renew your prescriptions, schedule appointments and more. To sign up, go to [email or web address] . Click on \"Log in\" on the left side of the screen, which will take you to the Welcome page. Then click on \"Sign up Now\" on the right side of the page.     You will be asked to enter the access code listed below, as well as some personal information. Please follow the directions to create your username and password.     Your access code is: [email or web address]  Expires: 2018  2:12 PM     Your access code will  in 90 days. If you need help or a new code, please call your [place] clinic or [phone].        Care EveryWhere ID     This is your Care EveryWhere ID. This could be used by other organizations to access your [place] medical records  [id number]        Equal Access to Services     JANIE GRAY : Hadii dhruv Velasco, wamichael barrios, qaybta chelsialmichelle stafford. So [facility] [place] [facility] [phone].    ATENCIÓN: Si habla español, tiene a savage disposición servicios gratuitos de asistencia lingüística. Llame al [phone].    We comply with applicable federal civil rights laws and [place] laws. We do not discriminate on the basis of " race, color, national origin, age, disability, sex, sexual orientation, or gender identity.               Review of your medicines      START taking        Dose / Directions    acetaminophen 325 MG tablet   Commonly known as:  TYLENOL   Used for:  Postoperative state        Dose:  650 mg   Take 2 tablets (650 mg) by mouth every 4 hours as needed for other (mild pain)   Quantity:  25 tablet   Refills:  0         CONTINUE these medicines which have NOT CHANGED        Dose / Directions    B-12 TR 1000 MCG Tbcr   Used for:  Alcoholism /alcohol abuse (H)   Generic drug:  cyanocobalamin        TAKE 1 TABLET BY MOUTH DAILY   Quantity:  30 tablet   Refills:  0       cetirizine 10 MG tablet   Commonly known as:  zyrTEC   Used for:  Pruritic disorder        TAKE 1 TABLET BY MOUTH EVERY EVENING   Quantity:  14 tablet   Refills:  5       chlordiazePOXIDE 25 MG capsule   Commonly known as:  LIBRIUM   Used for:  Alcoholism (H)        Take 50mg twice daily for 2 days, then take 25mg bid for days 3-5   Quantity:  25 capsule   Refills:  0       ferrous gluconate 324 (38 FE) MG tablet   Commonly known as:  FERGON   Used for:  Iron deficiency anemia due to chronic blood loss        TAKE 1 TABLET(324 MG) BY MOUTH DAILY WITH BREAKFAST   Quantity:  100 tablet   Refills:  0       ferrous sulfate 325 (65 FE) MG tablet   Commonly known as:  IRON   Used for:  Alcohol-induced acute pancreatitis, unspecified complication status, Iron deficiency anemia due to chronic blood loss        Dose:  325 mg   Take 1 tablet (325 mg) by mouth 2 times daily   Quantity:  1 tablet   Refills:  0       folic acid 400 MCG tablet   Commonly known as:  FOLVITE   Used for:  Alcoholism /alcohol abuse (H)        Dose:  1 mg   Take 2.5 tablets (1 mg) by mouth daily   Quantity:  75 tablet   Refills:  3       furosemide 20 MG tablet   Commonly known as:  LASIX   Used for:  Edema, unspecified type        TAKE 1 TABLET(20 MG) BY MOUTH EVERY MORNING   Quantity:  30 tablet    Refills:  10       LORazepam 0.5 MG tablet   Commonly known as:  ATIVAN   Used for:  Alcoholism (H)        Dose:  0.5 mg   Take 1 tablet (0.5 mg) by mouth every 8 hours as needed (for withdrawl)   Quantity:  20 tablet   Refills:  0       MAGIC MOUTHWASH (ENTER INGREDIENTS IN COMMENTS)   Used for:  Tongue lesion        Dose:  10 mL   Take 10 mLs by mouth every 4 hours as needed   Quantity:  480 mL   Refills:  1       magnesium oxide 400 MG tablet   Commonly known as:  MAG-OX   Used for:  Hypomagnesemia        Dose:  400 mg   Take 1 tablet (400 mg) by mouth 3 times daily   Quantity:  90 tablet   Refills:  3       omeprazole 40 MG capsule   Commonly known as:  priLOSEC   Used for:  Gastroesophageal reflux disease without esophagitis        TAKE 1 CAPSULE(40 MG) BY MOUTH DAILY   Quantity:  30 capsule   Refills:  11       pantoprazole 40 MG EC tablet   Commonly known as:  PROTONIX   Used for:  Gastroesophageal reflux disease with esophagitis        TAKE 1 TABLET(40 MG) BY MOUTH EVERY 12 HOURS   Quantity:  60 tablet   Refills:  0       potassium chloride 10 MEQ CR capsule   Commonly known as:  MICRO-K   Used for:  Hypokalemia        TAKE 2 CAPSULES BY MOUTH DAILY   Quantity:  30 capsule   Refills:  0       thiamine 100 MG tablet   Used for:  Alcoholism /alcohol abuse (H)        TAKE 1 TABLET(100 MG) BY MOUTH DAILY   Quantity:  30 tablet   Refills:  0            Where to get your medicines      These medications were sent to [facility] [place], [place] - [address] [place] [address]     Phone:  [phone]     acetaminophen 325 MG tablet                Protect others around you: Learn how to safely use, store and throw away your medicines at www.disposemymeds.org.             Medication List: This is a list of all your medications and when to take them. Check marks below indicate your daily home schedule. Keep this list as a reference.      Medications            Morning Afternoon Evening Bedtime As Needed    acetaminophen 325 MG tablet   Commonly known as:  TYLENOL   Take 2 tablets (650 mg) by mouth every 4 hours as needed for other (mild pain)                                B-12 TR 1000 MCG Tbcr   TAKE 1 TABLET BY MOUTH DAILY   Generic drug:  cyanocobalamin                                cetirizine 10 MG tablet   Commonly known as:  zyrTEC   TAKE 1 TABLET BY MOUTH EVERY EVENING                                chlordiazePOXIDE 25 MG capsule   Commonly known as:  LIBRIUM   Take 50mg twice daily for 2 days, then take 25mg bid for days 3-5                                ferrous gluconate 324 (38 FE) MG tablet   Commonly known as:  FERGON   TAKE 1 TABLET(324 MG) BY MOUTH DAILY WITH BREAKFAST                                ferrous sulfate 325 (65 FE) MG tablet   Commonly known as:  IRON   Take 1 tablet (325 mg) by mouth 2 times daily                                folic acid 400 MCG tablet   Commonly known as:  FOLVITE   Take 2.5 tablets (1 mg) by mouth daily                                furosemide 20 MG tablet   Commonly known as:  LASIX   TAKE 1 TABLET(20 MG) BY MOUTH EVERY MORNING                                LORazepam 0.5 MG tablet   Commonly known as:  ATIVAN   Take 1 tablet (0.5 mg) by mouth every 8 hours as needed (for withdrawl)                                MAGIC MOUTHWASH (ENTER INGREDIENTS IN COMMENTS)   Take 10 mLs by mouth every 4 hours as needed                                magnesium oxide 400 MG tablet   Commonly known as:  MAG-OX   Take 1 tablet (400 mg) by mouth 3 times daily                                omeprazole 40 MG capsule   Commonly known as:  priLOSEC   TAKE 1 CAPSULE(40 MG) BY MOUTH DAILY                                pantoprazole 40 MG EC tablet   Commonly known as:  PROTONIX   TAKE 1 TABLET(40 MG) BY MOUTH EVERY 12 HOURS                                potassium chloride 10 MEQ CR capsule   Commonly known as:  MICRO-K   TAKE 2 CAPSULES BY  MOUTH DAILY                                thiamine 100 MG tablet   TAKE 1 TABLET(100 MG) BY MOUTH DAILY

## 2017-11-09 NOTE — ANESTHESIA POSTPROCEDURE EVALUATION
Patient: Alexia Flor    Procedure(s):  Direct Laryngoscopy,  Upper Endoscopic Ultrasound and Fine Needle Aspiration - Wound Class: II-Clean Contaminated   - Wound Class: II-Clean Contaminated    Diagnosis:Oral Cancer, rule out Esophageal Cancer   Diagnosis Additional Information: No value filed.    Anesthesia Type:  General, ETT, RSI    Note:  Anesthesia Post Evaluation    Patient location during evaluation: PACU  Patient participation: Able to fully participate in evaluation  Level of consciousness: awake and alert  Pain management: satisfactory to patient  Airway patency: patent  Cardiovascular status: blood pressure returned to baseline and hemodynamically stable  Respiratory status: room air  Hydration status: euvolemic  PONV: none     Anesthetic complications: None          Last vitals:  Vitals:    11/09/17 1645 11/09/17 1700 11/09/17 1715   BP: 138/83 127/86 (!) 132/91   Resp: 18 18 18   Temp: 36.5  C (97.7  F) 36  C (96.8  F) 36.7  C (98  F)   SpO2: 100% 93% 94%         Electronically Signed By: Madi Velazquez MD  November 9, 2017  5:47 PM

## 2017-11-09 NOTE — OR NURSING
Dr Bhat came and talked with patient at approx 1420 to determine if patient understood procedure being done today. He stated he would  Go ahead with the surgery. OR desk notified

## 2017-11-09 NOTE — BRIEF OP NOTE
[facility], [place]    Brief Operative Note    Pre-operative diagnosis: Oral Cancer, rule out Esophageal Cancer   Post-operative diagnosis: Oral Cancer, rule out Esophageal Cancer  Procedure: Procedure(s):  Direct Laryngoscopy,  Upper Endoscopic Ultrasound and Fine Needle Aspiration - Wound Class: II-Clean Contaminated   - Wound Class: II-Clean Contaminated  Surgeon: Surgeon(s) and Role:  Panel 1:     * Heriberto George MD - Primary     * Joe Orozco MD - Resident - Assisting    Panel 2:     * Yo Bhat MD - Primary  Anesthesia: General   Estimated blood loss: Minimal  Drains: None  Specimens: * No specimens in log *  Findings:   Posterolateral tongue mass ~ 2cm in diameter. See full operative report for details..  Complications: None.  Implants: None.

## 2017-11-09 NOTE — LETTER
Patient:  Peyton Flor  :   1955  MRN:     [id number]        Ms.Lana Mary Anthony  [address]   [place] [address]        2017    Dear ,    We are writing to inform you of your test results. The biopsies from your lymph node that I performed during your recent endoscopic examination returned and showed normal lymph node tissue. There was no evidence of tumor cells in the lymph node. This is very reassuring. Although a normal biopsy can never completely prove that no tumor is present, my level of suspicion for this lymph node is very low and I do not believe that this requires additional testing.    Please follow-up with your ENT team as previously scheduled.    Please feel free to call if you have any questions about this letter. I can be reached at [phone].    TARYN Chowdhury MD  Associate Professor of Medicine  Division of Gastroenterology, Hepatology and Nutrition  [facility]      Resulted Orders   Fine needle aspiration   Result Value Ref Range    Copath Report       Patient Name: PEYTON FLOR  MR#: [id number]  Specimen #: [id number]  Collected: 2017  Received: 11/10/2017  Reported: 2017 14:27  Ordering Phy(s): JOSEPH GRAY  Additional Phy(s): CRAIG CHOWDHURY    For improved result formatting, select 'View Enhanced Report Format'  under Linked Documents section.    SPECIMEN/STAIN PROCESS:  FNA-EUS guided, Gastro Hepatic Ligament Lymph Node       Pap-Cyto x 2, Diff Quick Stain-cyto x 2, Cell Block w/ H&E-Cyto x  1, Save Ribbon, 1 x 1, Cell Block, Level 2 x 1, Save Ribbon, 2 x 1, Cell  Block, Level 3 x 1    ----------------------------------------------------------------    CYTOLOGIC INTERPRETATION:    Lymph node, Gastro hepatic ligament, Endoscopic ultrasound guided fine  needle aspiration:   Polymorphous lymphoid population consistent with  lymph node. Negative for malignancy  Specimen Adequacy: Satisfactory for  evaluation.    I have personally reviewed all specimens and/or slides, including the  listed special stains, and used  them with my medical judgement to  determine or confirm the final diagnosis.    Electronically signed out by:  Cade Lund M.D., [facility]    Processed and screened at [facility]    CLINICAL HISTORY:  The patient is a 62-year-old female with a history of tongue cancer.    ,    GROSS:  FNA-EUS guided, Gastro Hepatic Ligament Lymph Node: Received are 2 fixed  slides, processed for Pap stain, 2 air dried slides, processed for Diff  Quik stain, and material in formalin, processed for one hematoxylin  stained cell block.    INTRAOPERATIVE CONSULTATION:  FNA Performance: Fine needle aspiration was not performed by [facility],   Pathology staff.  Immediate Adequacy: On site specimen adequacy evaluation was performed  by Dr. MICHELLE Lazo MD via telepathology.    Onsite adequacy/interpretation:  Pass A1-A2 adequate. Pass A3-A4 put directly into formalin.    MICROSCOPIC:  Microscopic examination is performed.    Cedric Lopez MD, [facility] opathology Fellow, and Cade Lund MD    CPT Codes:  A: 68844-QFVZ, 95010-FHQJ-JMX, 15009-TWFD-KSX, 36443-TWO, HCB    TESTING LAB LOCATION:  [facility], [address]   [id number] [phone]    COLLECTION SITE:  Client:  [facility]  Location:  [facility] ([place])    Resident  IF

## 2017-11-09 NOTE — DISCHARGE INSTRUCTIONS
[facility]  Same-Day Surgery   Adult Discharge Orders & Instructions     For 24 hours after surgery    1. Get plenty of rest.  A responsible adult must stay with you for at least 24 hours after you leave the hospital.   2. Do not drive or use heavy equipment.  If you have weakness or tingling, don't drive or use heavy equipment until this feeling goes away.  3. Do not drink alcohol.  4. Avoid strenuous or risky activities.  Ask for help when climbing stairs.   5. You may feel lightheaded.  IF so, sit for a few minutes before standing.  Have someone help you get up.   6. If you have nausea (feel sick to your stomach): Drink only clear liquids such as apple juice, ginger ale, broth or 7-Up.  Rest may also help.  Be sure to drink enough fluids.  Move to a regular diet as you feel able.  7. You may have a slight fever. Call the doctor if your fever is over 100 F (37.7 C) (taken under the tongue) or lasts longer than 24 hours.  8. You may have a dry mouth, a sore throat, muscle aches or trouble sleeping.  These should go away after 24 hours.  9. Do not make important or legal decisions.   Call your doctor for any of the followin.  Signs of infection (fever, growing tenderness at the surgery site, a large amount of drainage or bleeding, severe pain, foul-smelling drainage, redness, swelling).    2. It has been over 8 to 10 hours since surgery and you are still not able to urinate (pass water).    3.  Headache for over 24 hours.    4.  Numbness, tingling or weakness the day after surgery (if you had spinal anesthesia).  To contact a doctor, call Dr Bhat's office at [phone]  or:        [phone] and ask for the resident on call for   Gastroenterology (answered 24 hours a day)      Emergency Department:    [facility]: [phone]       (TTY for hearing impaired: [phone])

## 2017-11-09 NOTE — TELEPHONE ENCOUNTER
Reason for Call:  Other call back    Detailed comments: Priti from  spoke with Alexia and is wondering what her care plan is and if Priti can offer some support. Priti states claims and incidents are chaotic and just wants to discuss further with Dr. Smith. Priti was informed Dr. Smith will be back in clinic next week.      Phone Number Patient can be reached at: Other phone number:  [phone]    Best Time: any    Can we leave a detailed message on this number? YES    Call taken on 11/9/2017 at 3:36 PM by Priti Wilson

## 2017-11-09 NOTE — OR NURSING
Dr Valdes Talked with both  Dr doty and Dr. Angeles about the alcohol intake.  Dr Valdes and Dr Angeles have talked with patient and at this time plan is to continue with the planned surgery.

## 2017-11-09 NOTE — ANESTHESIA CARE TRANSFER NOTE
Patient: Alexia Flor    Procedure(s):  Direct Laryngoscopy with Biopsies, Upper Endoscopic Ultrasound and Fine Needle Aspiration - Wound Class: II-Clean Contaminated   - Wound Class: II-Clean Contaminated    Diagnosis: Oral Cancer, rule out Esophageal Cancer   Diagnosis Additional Information: No value filed.    Anesthesia Type:   General, ETT, RSI     Note:  Airway :Face Mask  Patient transferred to:PACU  Comments: Anesthesia Care Transfer Note    Patient: Alexia Flor    Transferred to: PACU    Patient vital signs: stable    Airway: none    Monitors on, VSS, pt. Stable, Report given to PACU RN.     Magdi Mejia CRNA  11/9/2017 4:44 PM      Handoff Report: Identifed the Patient, Identified the Reponsible Provider, Reviewed the pertinent medical history, Discussed the surgical course, Reviewed Intra-OP anesthesia mangement and issues during anesthesia, Set expectations for post-procedure period and Allowed opportunity for questions and acknowledgement of understanding      Vitals: (Last set prior to Anesthesia Care Transfer)    CRNA VITALS  11/9/2017 1605 - 11/9/2017 1644      11/9/2017             Pulse: 75    SpO2: 100 %    Resp Rate (observed): 27                Electronically Signed By: ANA LAURA Bradley CRNA  November 9, 2017  4:44 PM

## 2017-11-10 NOTE — TELEPHONE ENCOUNTER
Head & Neck Tumor Conference Note     Status: New  Staff: Dr. doty    Tumor Site: Left base of tongue  Tumor Stage: T2N0M0    Brief History: 62 y.o woman with history of alcohol dependence and tobacco use has biopsy proven cancer of the tongue. She underwent a direct laryngoscopy on 11/9/17 which revealed Left posterolateral tongue lesion- ~ 2cm in diameter, firm, ulcerated. Patient also underwent GI endoscopy with ultrasound guided fine needle aspiration of nodes on 11/10/17- pathology pending    Reason for Review: Review Path and POC    Imaging:   No new imaging    Pathology:   Collected: 10/11/2017   Received: 10/11/2017   Reported: 10/12/2017 10:54   Ordering Phy(s): BRENDAN HERNANDEZ     For improved result formatting, select 'View Enhanced Report Format'   under Linked Documents section.     TEST(S):   3 Slides, case #[id number]     FINAL DIAGNOSIS:   CASE FROM [facility], [place], [place]   ([id number], OBTAINED 08/21/2017):   TONGUE, LEFT POSTERIOR, BIOPSY   - INVASIVE SQUAMOUS CELL CARCINOMA, moderately differentiated.     I have personally reviewed all specimens and/or slides, including the   listed special stains, and used them with my medical judgement to   determine or confirm the final diagnosis    Tumor Board Recommendation:   Plan: Will discuss next week    Mavis Loredo MD PGY-3  Otolaryngology- Head and Neck Surgery

## 2017-11-10 NOTE — OP NOTE
DATE OF SERVICE:  11/09/2017       ATTENDING SURGEON:  Heriberto George MD       ASSISTANT: Joe Orozco MD - Resident      PREOPERATIVE DIAGNOSIS: Oral cancer      POSTOPERATIVE DIAGNOSIS: Oral cancer       PROCEDURES:   1. Direct laryngoscopy      ANESTHESIA:  General.       OPERATIVE INDICATIONS:  Ms. Flor is a 62-year-old female with a PMH significant for left posterior oral tongue cancer. Patient initialy presented with tongue pain, otalgia, odynophagia, and weight loss. Biopsy of the tongue revealed invasive SCC. Patient was discussed at tumor board and the decision was made to proceed with direct laryngoscopy and examination of her oral cavity, as she does not tolerate examination in the clinic setting.      OPERATIVE FINDINGS:  1. Left posterolateral tongue lesion- ~ 2cm in diameter, firm, ulcerated      OPERATIVE PROCEDURE:  Written consent was obtained in the preoperative holding area. Dr. Bhat of Gastroenterology performed the first part of the procedure in the operating room. Once Dr. Bhat had completed his portions, visual inspection and manual examination of patient's oral cavity was performed. A bite block was placed to facilitate examination. Examination revealed a firm, ulcerative lesion ~ 2cm in diameter in the left posterolateral aspect of the oral tongue. No other masses or lesions were appreciated on examination of the oral cavity. Direct laryngoscopy was performed, which revealed no masses or lesions. The laryngoscope and bite block were removed. This concluded the procedure. Patient's care was turned over to Anesthesia. The patient was extubated without complication and transferred to PACU in stable condition.    ESTIMATED BLOOD LOSS: Minimal      Dr. George was present for the entire procedure.

## 2017-11-10 NOTE — OR NURSING
Patient refusing to wear blood pressure cuff or continuous pulse oximeter. Patient refusing to have her temp taken again. Patient states she wishes to go home at this time. States she will be staying with friend Phu Solano.

## 2017-11-10 NOTE — OR NURSING
Both ENT and GI surgical MDs spoke with patient in 3C. Patient signed out from Cecil ESPITIA. RN reviewed discharge instructions with patient's friend Phu Solano [phone] and the patient herself. States he will be staying with her tonight. Patient agrees to plan of care and is anxious to discharge at this time.

## 2017-11-16 NOTE — TELEPHONE ENCOUNTER
Per Dr. Smith patient can be seen at 1020 AM on Monday 11/20/17  as she is having surgery on Tuesday. She needs to be seen for a PREOP. She needs to be here by 10 AM to do labs. Attempted to call patient but her VM is ful. Please inform her of this when she calls back

## 2017-11-16 NOTE — TELEPHONE ENCOUNTER
Reason for Call:  Same Day Appointment, Requested Provider:  Karan Smith M.D.    PCP: Karan Smith    Reason for visit: Check her pancreas to make sure there's no infection before her surgery on 11/21    Duration of symptoms:     Have you been treated for this in the past? Yes    Additional comments: Can you work her in on Monday? Anytime works, but she needs about an hour notice. Please call her and advise. She is nervous about this.     Can we leave a detailed message on this number? YES    Phone number patient can be reached at: Home number on file [phone] (home)    Best Time: anytime    Call taken on 11/16/2017 at 1:31 PM by Clarissa Oreilly

## 2017-11-16 NOTE — PROGRESS NOTES
Dr. George reviewed Terrence Lymph node, Gastro hepatic ligament biopsy results   Alexia was called and notified of the results.  We discussed surgical procedure for tongue cancer   She wrote down date time and location   I reinforced NPO after midnight including alcohol.      Relevant Diagnosis: tongue cancer  Teaching Topic:partial glossectomy neck dissection and possible feeding tube placement    Person(s) involved in teaching:  Patient     Teaching Concerns Addressed:  Pre op teaching included the need for an H&P, NPO status pre op, hospital routines, expected recovery, activity  restrictions, antimicrobial scrub, s/s of infection, pain control methods and the importance of follow up appointments.  The patient voiced an understanding of all instructions and will call with questions.     Motivation Level:  Asks Questions:   Yes  Eager to Learn:   Yes  Cooperative:   Yes  Receptive (willing/able to accept information):   Yes     Patient  demonstrates understanding of the following:  Reason for the appointment, diagnosis and treatment plan:   Yes  Knowledge of proper use of medications and conditions for which they are ordered (with special attention to potential side effects or drug interactions):   Yes  Which situations necessitate calling provider and whom to contact:   Yes        Proper use and care of  (medical equip, care aids, etc.):   NA  Nutritional needs and diet plan:   Yes  Pain management techniques:   Yes  Patient instructed on hand hygiene:  Yes  How and/when to access community resources:   NA     Infection Prevention:  Patient   demonstrates understanding of the following:  Surgical procedure site care taught   Signs and symptoms of infection taught Yes  Wound care taught Yes     Instructional Materials Used/Given: Pre op booklet.

## 2017-11-17 NOTE — TELEPHONE ENCOUNTER
Alexia calls back to state she needs to know when her appt is going to be ASAP because she needs to order her ride today for Monday.

## 2017-11-17 NOTE — TELEPHONE ENCOUNTER
Head & Neck Tumor Conference Note     Status: Return  Staff: Dr. doty    Tumor Site: Left base of tongue  Tumor Stage: T2N0M0    Brief History: 62 y.o woman with history of alcohol dependence and tobacco use has biopsy proven cancer of the tongue.   Reason for Review: Review Path and POC    Imaging:   No new imaging    Pathology:   Received: 11/10/2017   Reported: 11/13/2017 14:27   Ordering Phy(s): JOSEPH DOTY   Additional Phy(s): CRAIG CHOWDHURY     For improved result formatting, select 'View Enhanced Report Format'   under Linked Documents section.     SPECIMEN/STAIN PROCESS:   FNA-EUS guided, Gastro Hepatic Ligament Lymph Node        Pap-Cyto x 2, Diff Quick Stain-cyto x 2, Cell Block w/ H&E-Cyto x   1, Save Ribbon, 1 x 1, Cell Block, Level 2 x 1, Save Ribbon, 2 x 1, Cell   Block, Level 3 x 1     ----------------------------------------------------------------     CYTOLOGIC INTERPRETATION:     Lymph node, Gastro hepatic ligament, Endoscopic ultrasound guided fine   needle aspiration:   Polymorphous lymphoid population consistent with   lymph node. Negative for malignancy   Specimen Adequacy: Satisfactory for evaluation.     Received: 10/11/2017   Reported: 10/12/2017 10:54   Ordering Phy(s): BRENDAN HERNANDEZ     For improved result formatting, select 'View Enhanced Report Format'   under Linked Documents section.     TEST(S):   3 Slides, case #[id number]     FINAL DIAGNOSIS:   CASE FROM [facility], [place], [place]   ([id number], OBTAINED 08/21/2017):   TONGUE, LEFT POSTERIOR, BIOPSY   - INVASIVE SQUAMOUS CELL CARCINOMA, moderately differentiated.     I have personally reviewed all specimens and/or slides, including the   listed special stains, and used them with my medical judgement to   determine or confirm the final diagnosis.   Tumor Board Recommendation:   Discussion:   Anticipated plan:   -Proceed with partial glossectomy and neck dissection  - Note: patient has  significant history of alcohol use and is at high risk of withdrawal post-op      Mavis Loredo MD PGY-3  Otolaryngology- Head and Neck Surgery

## 2017-11-17 NOTE — TELEPHONE ENCOUNTER
Patient will not pedro able to come into the clinic for her pre op Monday morning. She is demanding that it be in the afternoon. Mornings are too hard. Will forward this message on to Dr. Smith to see where else she may be worked in.  Patient was surprised and angry to know that she needed a preop. States she was never told that she needed another one.

## 2017-11-17 NOTE — MR AVS SNAPSHOT
After Visit Summary   11/10/2017    Alexia Flor    MRN: [id number]           Patient Information     Date Of Birth          1955        Visit Information        Provider Department      11/10/2017 11:30 AM Heriberto George MD ENT Tumor Conference         Follow-ups after your visit        Your next 10 appointments already scheduled     2017   Procedure with Heriberto George MD   [facility], [place], Same Day Surgery (--)    [address]  [place] [id number] [phone]              Who to contact     Please call your clinic at [phone] to:    Ask questions about your health    Make or cancel appointments    Discuss your medicines    Learn about your test results    Speak to your doctor   If you have compliments or concerns about an experience at your clinic, or if you wish to file a complaint, please contact [facility] Patient Relations at [phone] or email us at [email or web address]         Additional Information About Your Visit        MyChart Information     [employer] is an electronic gateway that provides easy, online access to your medical records. With [employer], you can request a clinic appointment, read your test results, renew a prescription or communicate with your care team.     To sign up for [employer] visit the website at [email or web address]   You will be asked to enter the access code listed below, as well as some personal information. Please follow the directions to create your username and password.     Your access code is: [email or web address]  Expires: 2018  2:12 PM     Your access code will  in 90 days. If you need help or a new code, please contact your [facility] Clinic or call [phone] for assistance.        Care EveryWhere ID     This is your Care EveryWhere ID. This could be used by other organizations to access your [place] medical records  [id number]         Blood Pressure from  Last 3 Encounters:   10/26/17 102/58   08/29/17 120/70   08/03/17 128/60    Weight from Last 3 Encounters:   10/26/17 37.9 kg (83 lb 9.6 oz)   10/16/17 37.2 kg (82 lb)   08/28/17 41.5 kg (91 lb 7.9 oz)              Today, you had the following     No orders found for display       Primary Care Provider Office Phone # Fax #    Karan Smith [phone]       [facility] [address] [place] [id number]        Equal Access to Services     [facility]: Hadii aad ku hadasho Soomaali, waaxda luqadaha, qaybta kaalmada adeegyada, michelle orellana . So [facility] [phone].    ATENCIÓN: Si habla español, tiene a savage disposición servicios gratuitos de asistencia lingüística. Ll[facility] [phone].    We comply with applicable federal civil rights laws and [place] laws. We do not discriminate on the basis of race, color, national origin, age, disability, sex, sexual orientation, or gender identity.            Thank you!     Thank you for choosing [facility]  for your care. Our goal is always to provide you with excellent care. Hearing back from our patients is one way we can continue to improve our services. Please take a few minutes to complete the written survey that you may receive in the mail after your visit with us. Thank you!             Your Updated Medication List - Protect others around you: Learn how to safely use, store and throw away your medicines at www.disposemymeds.org.          This list is accurate as of: 11/9/17 10:04 AM.  Always use your most recent med list.                   Brand Name Dispense Instructions for use Diagnosis    B-12 TR 1000 MCG Tbcr   Generic drug:  cyanocobalamin     30 tablet    TAKE 1 TABLET BY MOUTH DAILY    Alcoholism /alcohol abuse (H)       cetirizine 10 MG tablet    zyrTEC    14 tablet    TAKE 1 TABLET BY MOUTH EVERY EVENING    Pruritic disorder       chlordiazePOXIDE 25 MG capsule    LIBRIUM    25  capsule    Take 50mg twice daily for 2 days, then take 25mg bid for days 3-5    Alcoholism (H)       ferrous gluconate 324 (38 FE) MG tablet    FERGON    100 tablet    TAKE 1 TABLET(324 MG) BY MOUTH DAILY WITH BREAKFAST    Iron deficiency anemia due to chronic blood loss       ferrous sulfate 325 (65 FE) MG tablet    IRON    1 tablet    Take 1 tablet (325 mg) by mouth 2 times daily    Alcohol-induced acute pancreatitis, unspecified complication status, Iron deficiency anemia due to chronic blood loss       folic acid 400 MCG tablet    FOLVITE    75 tablet    Take 2.5 tablets (1 mg) by mouth daily    Alcoholism /alcohol abuse (H)       furosemide 20 MG tablet    LASIX    30 tablet    TAKE 1 TABLET(20 MG) BY MOUTH EVERY MORNING    Edema, unspecified type       LORazepam 0.5 MG tablet    ATIVAN    20 tablet    Take 1 tablet (0.5 mg) by mouth every 8 hours as needed (for withdrawl)    Alcoholism (H)       MAGIC MOUTHWASH (ENTER INGREDIENTS IN COMMENTS)     480 mL    Take 10 mLs by mouth every 4 hours as needed    Tongue lesion       magnesium oxide 400 MG tablet    MAG-OX    90 tablet    Take 1 tablet (400 mg) by mouth 3 times daily    Hypomagnesemia       omeprazole 40 MG capsule    priLOSEC    30 capsule    TAKE 1 CAPSULE(40 MG) BY MOUTH DAILY    Gastroesophageal reflux disease without esophagitis       pantoprazole 40 MG EC tablet    PROTONIX    60 tablet    TAKE 1 TABLET(40 MG) BY MOUTH EVERY 12 HOURS    Gastroesophageal reflux disease with esophagitis       potassium chloride 10 MEQ CR capsule    MICRO-K    30 capsule    TAKE 2 CAPSULES BY MOUTH DAILY    Hypokalemia       thiamine 100 MG tablet     30 tablet    TAKE 1 TABLET(100 MG) BY MOUTH DAILY    Alcoholism /alcohol abuse (H)

## 2017-11-20 PROBLEM — Z71.89 ADVANCED DIRECTIVES, COUNSELING/DISCUSSION: Status: ACTIVE | Noted: 2017-01-01

## 2017-11-20 NOTE — PROGRESS NOTES
Called pt for pre-op call--pt states she does not think she is able to get a ride to pre-op exam.  Pt became increasingly agitated and did not want to continue pre-op instructions--she became belligerent and the call was terminated by this writer due to foul language.  Service has been updated

## 2017-11-20 NOTE — MR AVS SNAPSHOT
After Visit Summary   11/20/2017    Alexia Flor    MRN: [id number]           Patient Information     Date Of Birth          1955        Visit Information        Provider Department      11/20/2017 1:40 PM Kyaw Shaikh DO [facility]        Today's Diagnoses     Preop general physical exam    -  1    Malignant neoplasm of base of tongue (H) squamous cell per biopsy        Alcoholism (H)        Arteriosclerotic vascular disease        Macrocytic anemia        Advanced directives, counseling/discussion          Care Instructions      Before Your Surgery      Call your surgeon if there is any change in your health. This includes signs of a cold or flu (such as a sore throat, runny nose, cough, rash or fever).    Do not smoke, drink alcohol or take over the counter medicine (unless your surgeon or primary care doctor tells you to) for the 24 hours before and after surgery.    If you take prescribed drugs: Follow your doctor s orders about which medicines to take and which to stop until after surgery.    Eating and drinking prior to surgery: follow the instructions from your surgeon    Take a shower or bath the night before surgery. Use the soap your surgeon gave you to gently clean your skin. If you do not have soap from your surgeon, use your regular soap. Do not shave or scrub the surgery site.  Wear clean pajamas and have clean sheets on your bed.           Follow-ups after your visit        Your next 10 appointments already scheduled     Nov 21, 2017   Procedure with Heriberto George MD   [facility], Same Day Surgery (--)    [address] [address]   [phone]            Dec 04, 2017 12:00 PM CST   (Arrive by 11:45 AM)   RETURN TUMOR VISIT with Heriberto George MD   [facility] ([facility])    [address]  [address] [facility] [id number] [phone]              Who to contact     If you  "have questions or need follow up information about today's clinic visit or your schedule please contact [facility] directly at [phone].  Normal or non-critical lab and imaging results will be communicated to you by MyChart, letter or phone within 4 business days after the clinic has received the results. If you do not hear from us within 7 days, please contact the clinic through [employer] or phone. If you have a critical or abnormal lab result, we will notify you by phone as soon as possible.  Submit refill requests through [employer] or call your pharmacy and they will forward the refill request to us. Please allow 3 business days for your refill to be completed.          Additional Information About Your Visit        [employer] Information     [employer] lets you send messages to your doctor, view your test results, renew your prescriptions, schedule appointments and more. To sign up, go to [email or web address] . Click on \"Log in\" on the left side of the screen, which will take you to the Welcome page. Then click on \"Sign up Now\" on the right side of the page.     You will be asked to enter the access code listed below, as well as some personal information. Please follow the directions to create your username and password.     Your access code is: [email or web address]  Expires: 2018  2:12 PM     Your access code will  in 90 days. If you need help or a new code, please call your [place] clinic or [phone].        Care EveryWhere ID     This is your Care EveryWhere ID. This could be used by other organizations to access your [place] medical records  [id number]        Your Vitals Were     Pulse Temperature Pulse Oximetry BMI (Body Mass Index)          72 96.5  F (35.8  C) (Temporal) 97% 16.21 kg/m2         Blood Pressure from Last 3 Encounters:   17 150/70   17 164/85   10/26/17 102/58    Weight from Last 3 Encounters:   17 85 lb 12.8 oz (38.9 kg)   17 84 lb 7 oz (38.3 " kg)   10/26/17 83 lb 9.6 oz (37.9 kg)              We Performed the Following     *UA reflex to Microscopic and Culture ([place]; [facility]; [facility]; [facility]; [facility]; [facility]; [place]; [place])     Basic metabolic panel     CBC with platelets     Drug  Screen Comprehensive, Urine w/o Reported Meds (Pain Care Package)     EKG 12-lead complete w/read - Clinics        Primary Care Provider Office Phone # Fax #    Kraan Smith [phone]       [facility] [address] [place] [address]        Equal Access to Services     BA GRAY : Hadii aad ku hadasho Soomaali, waaxda luqadaha, qaybta kaalmada adeegyada, waxjovita orellana . So [facility] [phone].    ATENCIÓN: Si habla español, tiene a savage disposición servicios gratuitos de asistencia lingüística. Llame al [phone].    We comply with applicable federal civil rights laws and [place] laws. We do not discriminate on the basis of race, color, national origin, age, disability, sex, sexual orientation, or gender identity.            Thank you!     Thank you for choosing [facility]  for your care. Our goal is always to provide you with excellent care. Hearing back from our patients is one way we can continue to improve our services. Please take a few minutes to complete the written survey that you may receive in the mail after your visit with us. Thank you!             Your Updated Medication List - Protect others around you: Learn how to safely use, store and throw away your medicines at www.disposemymeds.org.          This list is accurate as of: 11/20/17  3:15 PM.  Always use your most recent med list.                   Brand Name Dispense Instructions for use Diagnosis    acetaminophen 325 MG tablet    TYLENOL    25 tablet    Take 2 tablets (650 mg) by mouth every 4 hours as needed for other (mild pain)    Postoperative state       B-12 TR 1000  MCG Tbcr   Generic drug:  cyanocobalamin     30 tablet    TAKE 1 TABLET BY MOUTH DAILY    Alcoholism /alcohol abuse (H)       cetirizine 10 MG tablet    zyrTEC    14 tablet    TAKE 1 TABLET BY MOUTH EVERY EVENING    Pruritic disorder       chlordiazePOXIDE 25 MG capsule    LIBRIUM    25 capsule    Take 50mg twice daily for 2 days, then take 25mg bid for days 3-5    Alcoholism (H)       ferrous gluconate 324 (38 FE) MG tablet    FERGON    100 tablet    TAKE 1 TABLET(324 MG) BY MOUTH DAILY WITH BREAKFAST    Iron deficiency anemia due to chronic blood loss       ferrous sulfate 325 (65 FE) MG tablet    IRON    1 tablet    Take 1 tablet (325 mg) by mouth 2 times daily    Alcohol-induced acute pancreatitis, unspecified complication status, Iron deficiency anemia due to chronic blood loss       folic acid 400 MCG tablet    FOLVITE    75 tablet    Take 2.5 tablets (1 mg) by mouth daily    Alcoholism /alcohol abuse (H)       furosemide 20 MG tablet    LASIX    30 tablet    TAKE 1 TABLET(20 MG) BY MOUTH EVERY MORNING    Edema, unspecified type       LORazepam 0.5 MG tablet    ATIVAN    20 tablet    Take 1 tablet (0.5 mg) by mouth every 8 hours as needed (for withdrawl)    Alcoholism (H)       MAGIC MOUTHWASH (ENTER INGREDIENTS IN COMMENTS)     480 mL    Take 10 mLs by mouth every 4 hours as needed    Tongue lesion       magnesium oxide 400 MG tablet    MAG-OX    90 tablet    Take 1 tablet (400 mg) by mouth 3 times daily    Hypomagnesemia       omeprazole 40 MG capsule    priLOSEC    30 capsule    TAKE 1 CAPSULE(40 MG) BY MOUTH DAILY    Gastroesophageal reflux disease without esophagitis       pantoprazole 40 MG EC tablet    PROTONIX    60 tablet    TAKE 1 TABLET(40 MG) BY MOUTH EVERY 12 HOURS    Gastroesophageal reflux disease with esophagitis       potassium chloride 10 MEQ CR capsule    MICRO-K    30 capsule    TAKE 2 CAPSULES BY MOUTH DAILY    Hypokalemia       thiamine 100 MG tablet     30 tablet    TAKE 1 TABLET(100 MG)  BY MOUTH DAILY    Alcoholism /alcohol abuse (H)

## 2017-11-20 NOTE — NURSING NOTE
"Chief Complaint   Patient presents with     Pre-Op Exam       Initial /70 (BP Location: Right arm, Patient Position: Chair, Cuff Size: Adult Small)  Pulse 72  Temp 96.5  F (35.8  C) (Temporal)  Wt 85 lb 12.8 oz (38.9 kg)  SpO2 97%  BMI 16.21 kg/m2 Estimated body mass index is 16.21 kg/(m^2) as calculated from the following:    Height as of 11/9/17: 5' 1\" (1.549 m).    Weight as of this encounter: 85 lb 12.8 oz (38.9 kg).  Medication Reconciliation: complete  Cira NINO    "

## 2017-11-20 NOTE — PROGRESS NOTES
[address] [address]  [phone]  Dept: [phone]    PRE-OP EVALUATION:  Today's date: 2017    Alexia Flor (: 1955) presents for pre-operative evaluation assessment as requested by Dr. George.  She requires evaluation and anesthesia risk assessment prior to undergoing surgery/procedure for treatment of tongue cancer .  Proposed procedure: Left Partial Glossectomy And Left Neck Dissection, Feeding Tube Placement     Date of Surgery/ Procedure: 17  Time of Surgery/ Procedure: 12:20  Hospital/Surgical Facility: [facility]  Primary Physician: Karan Smith  Type of Anesthesia Anticipated: General    Patient has a Health Care Directive or Living Will:  NO    1. NO - Do you have a history of heart attack, stroke, stent, bypass or surgery on an artery in the head, neck, heart or legs?  2. NO - Do you ever have any pain or discomfort in your chest?  3. NO - Do you have a history of  Heart Failure?  4. NO - Are you troubled by shortness of breath when: walking on the level, up a slight hill or at night?  5. NO - Do you currently have a cold, bronchitis or other respiratory infection?  6. NO - Do you have a cough, shortness of breath or wheezing?  7. NO - Do you sometimes get pains in the calves of your legs when you walk?  8. NO - Do you or anyone in your family have previous history of blood clots?  9. NO - Do you or does anyone in your family have a serious bleeding problem such as prolonged bleeding following surgeries or cuts?  10. NO - Have you ever had problems with anemia or been told to take iron pills?  11. NO - Have you had any abnormal blood loss such as black, tarry or bloody stools, or abnormal vaginal bleeding?  12. YES - Have you ever had a blood transfusion?  13. NO - Have you or any of your relatives ever had problems with anesthesia?  14. NO - Do you have sleep apnea, excessive snoring or daytime drowsiness?  15.  NO - Do you have any prosthetic heart valves?  16. NO - Do you have prosthetic joints?  17. NO - Is there any chance that you may be pregnant?           HPI:                                                      Brief HPI related to upcoming procedure: Patient has a history of recently documented malignant neoplasm of the base of the tongue.  It is squamous cell per biopsy and requires surgical intervention.      See problem list for active medical problems.  Problems all longstanding and stable, except as noted/documented.  See ROS for pertinent symptoms related to these conditions.                                                                                                  .    MEDICAL HISTORY:                                                    Patient Active Problem List    Diagnosis Date Noted     Advanced directives, counseling/discussion 11/20/2017     Priority: Medium     declined       Pancreatitis 08/28/2017     Priority: Medium     Colitis 08/28/2017     Priority: Medium     Hypokalemia 08/28/2017     Priority: Medium     Malignant neoplasm of base of tongue (H) squamous cell per biopsy 08/28/2017     Priority: Medium     Current smoker 08/28/2017     Priority: Medium     Alcohol-induced acute pancreatitis, unspecified complication status 03/29/2017     Priority: Medium     Hypomagnesemia 02/23/2017     Priority: Medium     Health Care Home 02/09/2017     Priority: Medium     Personal history of malignant neoplasm of breast 11/26/2016     Priority: Medium     Iron deficiency anemia due to chronic blood loss 10/17/2016     Priority: Medium     Alcoholism (H) 04/22/2016     Priority: Medium     Diastolic dysfunction 04/22/2016     Priority: Medium     Electrolyte imbalance 04/06/2015     Priority: Medium     H/o hypokalemia and hypomagnesemia, non-adherent with previously ordered supplementation.       Alcoholic fatty liver 03/20/2015     Priority: Medium     Cholelithiasis 03/20/2015     Priority: Medium      Arteriosclerotic vascular disease 02/12/2015     Priority: Medium     Overview:   Noted in aorta without AAA.       Cobalamin deficiency 02/12/2015     Priority: Medium     Tubular adenoma 01/23/2015     Priority: Medium     Overview:   Needs colonoscopy in 2016       Major depressive disorder, recurrent episode (H) 04/07/2014     Priority: Medium     Cachexia (HCC) 05/04/2013     Priority: Medium     Liver function abnormality 05/03/2013     Priority: Medium     CARDIOVASCULAR SCREENING; LDL GOAL LESS THAN 130 05/03/2013     Priority: Medium     Anxiety 02/21/2013     Priority: Medium     Macrocytic anemia 07/24/2012     Priority: Medium      Past Medical History:   Diagnosis Date     Alcohol abuse      Alcoholic hepatitis      Alcoholic pancreatitis      Anxiety      Schafer's esophagus      Breast cancer (H)     rt, s/p radiation.     Chemical dependency (H)      Congestive heart failure, unspecified      COPD (chronic obstructive pulmonary disease) (H)      Depressive disorder      History of radiation therapy      Hoarseness      Hypokalemia      Macrocytic anemia      Non-adherence to medical treatment      Tobacco abuse      Tongue cancer (H)      Past Surgical History:   Procedure Laterality Date     ESOPHAGOSCOPY, GASTROSCOPY, DUODENOSCOPY (EGD), COMBINED N/A 11/9/2017    Procedure: COMBINED ENDOSCOPIC ULTRASOUND, ESOPHAGOSCOPY, GASTROSCOPY, DUODENOSCOPY (EGD), FINE NEEDLE ASPIRATE/BIOPSY;;  Surgeon: Yo Bhat MD;  Location: [facility] OR     LARYNGOSCOPY WITH BIOPSY(IES) N/A 11/9/2017    Procedure: LARYNGOSCOPY WITH BIOPSY(IES);  Direct Laryngoscopy,  Upper Endoscopic Ultrasound and Fine Needle Aspiration;  Surgeon: Heriberto George MD;  Location: [facility] OR     lumpectomy Rt breast  2006       Current Outpatient Prescriptions   Medication Sig Dispense Refill     acetaminophen (TYLENOL) 325 MG tablet Take 2 tablets (650 mg) by mouth every 4 hours as needed for other (mild pain) 25 tablet 0     B-12  "TR 1000 MCG TBCR TAKE 1 TABLET BY MOUTH DAILY 30 tablet 0     potassium chloride (MICRO-K) 10 MEQ CR capsule TAKE 2 CAPSULES BY MOUTH DAILY 30 capsule 0     chlordiazePOXIDE (LIBRIUM) 25 MG capsule Take 50mg twice daily for 2 days, then take 25mg bid for days 3-5 25 capsule 0     VITAMIN B-1 100 MG tablet TAKE 1 TABLET(100 MG) BY MOUTH DAILY 30 tablet 0     MAGIC MOUTHWASH, ENTER INGREDIENTS IN COMMENTS, Take 10 mLs by mouth every 4 hours as needed 480 mL 1     LORazepam (ATIVAN) 0.5 MG tablet Take 1 tablet (0.5 mg) by mouth every 8 hours as needed (for withdrawl) 20 tablet 0     pantoprazole (PROTONIX) 40 MG EC tablet TAKE 1 TABLET(40 MG) BY MOUTH EVERY 12 HOURS 60 tablet 0     cetirizine (ZYRTEC) 10 MG tablet TAKE 1 TABLET BY MOUTH EVERY EVENING 14 tablet 5     furosemide (LASIX) 20 MG tablet TAKE 1 TABLET(20 MG) BY MOUTH EVERY MORNING 30 tablet 10     ferrous gluconate (FERGON) 324 (38 FE) MG tablet TAKE 1 TABLET(324 MG) BY MOUTH DAILY WITH BREAKFAST 100 tablet 0     ferrous sulfate (IRON) 325 (65 FE) MG tablet Take 1 tablet (325 mg) by mouth 2 times daily 1 tablet 0     magnesium oxide (MAG-OX) 400 MG tablet Take 1 tablet (400 mg) by mouth 3 times daily 90 tablet 3     omeprazole (PRILOSEC) 40 MG capsule TAKE 1 CAPSULE(40 MG) BY MOUTH DAILY 30 capsule 11     folic acid (FOLVITE) 400 MCG tablet Take 2.5 tablets (1 mg) by mouth daily 75 tablet 3     OTC products: None, except as noted above    Allergies   Allergen Reactions     Codeine Anaphylaxis     Contrast Dye Itching and Swelling     7/10/2009 Patient reports history of contrast reaction when first diagnosed with breast cancer. \"I almost .\"   (Hives, swelling.) 2009 CT to be done without IV contrast per Dr. KAVIN Live     Moxifloxacin Anaphylaxis     Avelox     Nickel Itching and Rash     Codeine Sulfate Other (See Comments)     shaking     Gabapentin Other (See Comments)     Patient reports that she got very shaky and she \"felt like she was going to " "die\"      Latex Allergy: NO    Social History   Substance Use Topics     Smoking status: Heavy Tobacco Smoker     Packs/day: 0.50     Years: 40.00     Types: Cigarettes     Smokeless tobacco: Current User     Alcohol use 0.5 oz/week      Comment: weekends (reported; smelled of alcohol during clinic visit 4/2016)     History   Drug Use No       REVIEW OF SYSTEMS:                                                    CONSTITUTIONAL:Patient notes that she frequently experiences symptoms alcoholic withdrawal.  She has been given some Librium to take to the hospital with her in case she goes into withdrawal.  I've explained to her that the hospitalist will be more than happy to manage her medications and that she should not take her medications.  I: NEGATIVE for worrisome rashes, moles or lesions  E: NEGATIVE for vision changes or irritation  E/M: NEGATIVE for ear, mouth and throat problems  R: NEGATIVE for significant cough or SOB  B: NEGATIVE for masses, tenderness or discharge  CV: NEGATIVE for chest pain, palpitations or peripheral edema  GI: NEGATIVE for nausea, abdominal pain, heartburn, or change in bowel habits  : NEGATIVE for frequency, dysuria, or hematuria  M: NEGATIVE for significant arthralgias or myalgia  N: NEGATIVE for weakness, dizziness or paresthesias  E: NEGATIVE for temperature intolerance, skin/hair changes  H: NEGATIVE for bleeding problems  PSYCHIATRIC: Patient has a history of significant alcoholism.  She notes that she drinks \"5 ounces\" a day.  I anticipate this may be a bit of an underestimate.    EXAM:                                                    /70 (BP Location: Right arm, Patient Position: Chair, Cuff Size: Adult Small)  Pulse 72  Temp 96.5  F (35.8  C) (Temporal)  Wt 85 lb 12.8 oz (38.9 kg)  SpO2 97%  BMI 16.21 kg/m2    GENERAL APPEARANCE: The patient is alert and appropriate.  She is underweight and rather disheveled     EYES: EOMI, PERRL     HENT: ear canals and TM's " normal and nose and mouth without ulcers or lesions     NECK: no adenopathy, no asymmetry, masses, or scars and thyroid normal to palpation     RESP: Breath sounds are decreased in all fields without stridor or distress breath sounds are clear     CV: regular rates and rhythm, normal S1 S2, no S3 or S4 and no murmur, click or rub     ABDOMEN:  soft, nontender, no HSM or masses and bowel sounds normal     MS: extremities normal- no gross deformities noted, no evidence of inflammation in joints, FROM in all extremities.     SKIN: no suspicious lesions or rashes     NEURO: Normal strength and tone, sensory exam grossly normal, mentation intact and speech normal     PSYCH: The patient is alert and appropriate.  She is extremely defensive when it comes to discussing her alcohol use.  I've explained to her that this is necessary for her postoperative care.     LYMPHATICS: No axillary, cervical, or supraclavicular nodes    DIAGNOSTICS:                                                    EKG: appears normal, NSR, normal axis, normal intervals, no acute ST/T changes c/w ischemia, no LVH by voltage criteria, unchanged from previous tracings    Pending , But soon to be available on the EMR  IMPRESSION:                                                    Reason for surgery/procedure: Squamous cell carcinoma of the base of the tongue requiring surgical intervention  Diagnosis/reason for consult: Perioperative risk stratification in a 62-year-old female with:  1. Preop general physical exam  - EKG 12-lead complete w/read - Clinics  - CBC with platelets  - Drug  Screen Comprehensive, Urine w/o Reported Meds (Pain Care Package)  - *UA reflex to Microscopic and Culture ([place]; [facility]; [facility]; [facility]; [facility]; [facility]; [place]; [place])  2. Malignant neoplasm of base of tongue (H) squamous cell per biopsy  3. Alcoholism (H)  - Basic metabolic panel  4. Arteriosclerotic vascular disease  Stable  at this time.  5. Macrocytic anemia  Hemoglobin pending  6. Advanced directives, counseling/discussion  Discussed with nursing staff          The proposed surgical procedure is considered INTERMEDIATE risk.    REVISED CARDIAC RISK INDEX  The patient has the following serious cardiovascular risks for perioperative complications such as (MI, PE, VFib and 3  AV Block):  No serious cardiac risks  INTERPRETATION: 2 risks: Class III (moderate risk - 6.6% complication rate)    The patient has the following additional risks for perioperative complications:  Alcohol abuse with risk of withdrawal      RECOMMENDATIONS:                                                      --Consult hospital rounder / IM to assist post-op medical management    --Patient is to take all scheduled medications on the day of surgery.      APPROVAL GIVEN to proceed with proposed procedure, without further diagnostic evaluation       Signed Electronically by: Kyaw Shaikh DO    Copy of this evaluation report is provided to requesting physician.    [facility] Preop Guidelines

## 2017-11-20 NOTE — TELEPHONE ENCOUNTER
Pt is Luis's patient and he is out of clinic today, pt is scheduled at 320 today for preop, can you work her in today around the 330 time? So she can keep her ride. Please call her as soon as you know.

## 2017-11-21 PROBLEM — Z98.890 STATUS POST NECK DISSECTION: Status: ACTIVE | Noted: 2017-01-01

## 2017-11-21 NOTE — IP AVS SNAPSHOT
MRN:[id number]                      After Visit Summary   11/21/2017    Alexia Flor    MRN: [id number]           Thank you!     Thank you for choosing [facility] for your care. Our goal is always to provide you with excellent care. Hearing back from our patients is one way we can continue to improve our services. Please take a few minutes to complete the written survey that you may receive in the mail after you visit with us. Thank you!        Patient Information     Date Of Birth          1955        About your hospital stay     You were admitted on:  November 21, 2017 You last received care in the:  [address] [facility]    You were discharged on:  December 11, 2017        Reason for your hospital stay       Post-operative care following partial glossectomy and neck dissection complicated by alcohol withdrawal, multiple hematomas at the surgical site, and aspiration pneumonia.                  Who to Call     For medical emergencies, please call 911.  For non-urgent questions about your medical care, please call your primary care provider or clinic, [phone]  For questions related to your surgery, please call your surgery clinic        Attending Provider     Provider Specialty    Heriberto George MD Otolaryngology       Primary Care Provider Office Phone # Fax #    Karan Smith [phone]      After Care Instructions     Activity - Up with nursing assistance           Advance Diet as Tolerated       Follow this diet upon discharge: Orders Placed This Encounter      Adult Formula Drip Feeding: Continuous Peptamen 1.5; Nasojejunal; Goal Rate: 45; mL/hr; Medication - Tube Feeding Flush Frequency: At least 15-30 mL water before and after medication administration and with tube clogging; Amout to Send (Nutrition ...      NPO for Medical/Clinical Reasons Except for: Meds, Ice Chips            Daily weights           General info for SNF       Length of Stay Estimate:  Short Term Care: Estimated # of Days <30  Condition at Discharge: Improving  Level of care:skilled   Rehabilitation Potential: Good  Admission H&P remains valid and up-to-date: Yes  Recent Chemotherapy: N/A  Use LTACH Standing Orders: Yes            Intake and output       Every shift            Oxygen - Trach dome       With humidity to keep O2 sats % >  90%. Must wear humidified trach dome at all times, if patient does not require supplement oxygen then should have humidified room air to keep trach secretions thin.            Tracheostomy care by nursing       Standard Cares. Suction trach every 8 hours and as needed for secretion management. Change inner cannula at least once every 8 hours and as needed for secretion build up.            Ventilator       Ventilator support as needed: Pressure support via tracheostomy tube. PEEP 5. Minimum pressure support 5, maximum pressure support 15. Wean FiO2 as able to keep spO2 saturations > 92%            Wound care (specify)       Site:   Trach  Instructions:  Place Optifoam dressing under tracheostomy tube flange and change daily and as needed if soiled by secretions. Place another piece of Optifoam or mepilex under the trach ties over the left neck to prevent further skin breakdown.    Site: Neck  Instructions: Apply Aquaphor ointment to neck incision three times daily.                  Follow-up Appointments     Follow Up and recommended labs and tests       Follow up in ENT clinic with Dr. George once discharged from [facility]. Please call the clinic with to schedule this appointment or with questions/concerns: [phone].    Otolaryngology/ENT Clinic:  [facility]  [address] [place] [id number]    Follow up on pancreatic elastace fecal test placed on 12/11.    If LFTs do not fully normalize, follow up PCP at discharge from [facility]                  Additional Services     Nutrition Services Adult IP  "Consult       Reason:  Continuous tube feeding via nasojejunostomy tube, Formula: Peptamen 1.5, rate of 45 mL/h. Nothing to eat or drink by mouth until cleared by speech language pathology.            Occupational Therapy Adult Consult       Evaluate and treat as clinically indicated.    Reason:  Physical deconditioning            Physical Therapy Adult Consult       Evaluate and treat as clinically indicated.    Reason:  Physical deconditioning            Speech Language Path Adult Consult       Evaluate and treat as clinically indicated.    Reason:  Passy shanel speaking valve as tolerated. Once medically appropriate perform bedside swallow evaluation prior to initiating any oral intake.                  Additional Information     If you use hormonal birth control (such as the pill, patch, ring or implants): You'll need a second form of birth control for 7 days (condoms, a diaphragm or contraceptive foam). While in the hospital, you received a medicine called Bridion. Your normal birth control will not work as well for a week after taking this medicine.          Pending Results     Date and Time Order Name Status Description    12/10/2017 0835 EKG 12-lead, complete Preliminary     11/27/2017 0001 ABO/Rh type and screen In process     11/23/2017 1945 Transfusion reaction evaluation In process             Statement of Approval     Ordered          12/11/17 1322  I have reviewed and agree with all the recommendations and orders detailed in this document.  EFFECTIVE NOW     Approved and electronically signed by:  Jessica Hernández, [place]             Admission Information     Date & Time Provider Department Dept. Phone    11/21/2017 Heriberto George MD [address] [facility] [phone]      Your Vitals Were     Blood Pressure Pulse Temperature Respirations Height Weight    138/67 78 98.6  F (37  C) (Axillary) 21 1.55 m (5' 1.02\") 41.5 kg (91 lb 7.9 oz)    Pulse Oximetry BMI (Body Mass Index)                98% 17.27 " "kg/m2          MyChart Information     Amen. lets you send messages to your doctor, view your test results, renew your prescriptions, schedule appointments and more. To sign up, go to [email or web address] . Click on \"Log in\" on the left side of the screen, which will take you to the Welcome page. Then click on \"Sign up Now\" on the right side of the page.     You will be asked to enter the access code listed below, as well as some personal information. Please follow the directions to create your username and password.     Your access code is: [email or web address]  Expires: 2018  2:12 PM     Your access code will  in 90 days. If you need help or a new code, please call your [place] clinic or [phone].        Care EveryWhere ID     This is your Care EveryWhere ID. This could be used by other organizations to access your [facility] medical records  [id number]        Equal Access to Services     JANIE GRAY : Hadii dhruv Velasco, waaxda luosiel, qaybta kaalmada adeneelima, michelle orellana . So [facility] [phone].    ATENCIÓN: Si habla español, tiene a savage disposición servicios gratuitos de asistencia lingüística. Llame al [phone].    We comply with applicable federal civil rights laws and [place] laws. We do not discriminate on the basis of race, color, national origin, age, disability, sex, sexual orientation, or gender identity.               Review of your medicines      START taking        Dose / Directions    * albuterol 108 (90 BASE) MCG/ACT Inhaler   Commonly known as:  PROAIR HFA/PROVENTIL HFA/VENTOLIN HFA   Used for:  Acute and chronic respiratory failure with hypoxia (H)        Dose:  2 puff   Inhale 2 puffs into the lungs 4 times daily as needed for other (dyspnea)   Refills:  0       * albuterol (2.5 MG/3ML) 0.083% neb solution   Used for:  Acute and chronic respiratory failure with hypoxia (H)        Dose:  2.5 mg   Take 1 vial (2.5 mg) by nebulization " every 4 hours as needed for wheezing   Quantity:  360 mL   Refills:  0       ascorbic acid 500 MG Tabs        Dose:  500 mg   Start taking on:  12/12/2017   1 tablet (500 mg) by Oral or Feeding Tube route daily   Quantity:  30 tablet   Refills:  0       cefTRIAXone 1 GM vial   Commonly known as:  ROCEPHIN   Used for:  Aspiration pneumonia, unspecified aspiration pneumonia type, unspecified laterality, unspecified part of lung (H)        Dose:  1000 mg   Inject 1 g (1,000 mg) into the vein daily for 2 days   Quantity:  10 mL   Refills:  0       cyanocobalamin 1000 MCG Tabs   Replaces:  B-12 TR 1000 MCG Tbcr        Dose:  1000 mcg   Start taking on:  12/12/2017   1,000 mcg by Oral or NG Tube route daily   Quantity:  30 tablet   Refills:  0       enoxaparin 30 MG/0.3ML injection   Commonly known as:  LOVENOX        Dose:  30 mg   Inject 0.3 mLs (30 mg) Subcutaneous every 24 hours   Refills:  0       fiber modular packet   Used for:  Diarrhea, unspecified type        Dose:  3 packet   3 packets by Per Feeding Tube route 3 times daily   Refills:  0       haloperidol lactate 5 MG/ML injection   Commonly known as:  HALDOL   Used for:  Anxiety        Dose:  5 mg   Inject 1 mL (5 mg) into the vein every 6 hours as needed for agitation   Quantity:  90 mL   Refills:  0       HYDROmorphone 0.2 MG/0.2 ML Soln injection   Commonly known as:  DILAUDID   Used for:  Acute post-operative pain        Dose:  0.2 mg   Inject 0.2 mLs (0.2 mg) into the vein every 2 hours as needed for moderate to severe pain   Refills:  0       ipratropium - albuterol 0.5 mg/2.5 mg/3 mL 0.5-2.5 (3) MG/3ML neb solution   Commonly known as:  DUONEB   Used for:  Acute and chronic respiratory failure with hypoxia (H)        Dose:  3 mL   Take 1 vial (3 mLs) by nebulization 4 times daily   Quantity:  360 mL   Refills:  0       labetalol 5 MG/ML injection   Commonly known as:  NORMODYNE/TRANDATE   Used for:  Essential hypertension        Dose:  20 mg   Inject 4  mLs (20 mg) into the vein every 4 hours as needed for high blood pressure (SBP > 160)   Quantity:  4 mL   Refills:  0       loperamide 1 MG/5ML liquid   Commonly known as:  IMODIUM   Used for:  Diarrhea, unspecified type        Dose:  2 mg   Take 10 mLs (2 mg) by mouth 3 times daily   Quantity:  200 mL   Refills:  0       melatonin 5 MG tablet        Dose:  5 mg   1 tablet (5 mg) by Oral or Feeding Tube route every evening   Refills:  0       metoprolol 10 mg/mL Susp   Commonly known as:  LOPRESSOR   Used for:  Essential hypertension        Dose:  37.5 mg   3.75 mLs (37.5 mg) by Oral or Feeding Tube route 2 times daily   Refills:  0       multivitamins with minerals Liqd liquid        Dose:  15 mL   Start taking on:  12/12/2017   15 mLs by Per Feeding Tube route daily   Refills:  0       nicotine 14 MG/24HR 24 hr patch   Commonly known as:  NICODERM CQ   Used for:  Current smoker        Dose:  1 patch   Start taking on:  12/12/2017   Place 1 patch onto the skin daily   Quantity:  30 patch   Refills:  0       * oxyCODONE IR 5 MG tablet   Commonly known as:  ROXICODONE   Used for:  Acute post-operative pain        Dose:  5-10 mg   Take 1-2 tablets (5-10 mg) by mouth every 4 hours as needed for moderate to severe pain   Quantity:  30 tablet   Refills:  0       * oxyCODONE 5 MG/5ML solution   Commonly known as:  ROXICODONE   Used for:  Acute post-operative pain        Dose:  2.5 mg   Take 2.5 mLs (2.5 mg) by mouth every 4 hours as needed for moderate to severe pain   Quantity:  90 mL   Refills:  0       pantoprazole Susp suspension   Commonly known as:  PROTONIX   Replaces:  pantoprazole 40 MG EC tablet        Dose:  40 mg   20 mLs (40 mg) by Oral or Feeding Tube route 2 times daily (before meals)   Refills:  0       * QUEtiapine 50 MG tablet   Commonly known as:  SEROquel   Used for:  Anxiety        Dose:  50 mg   1 tablet (50 mg) by Oral or Feeding Tube route every evening   Quantity:  120 tablet   Refills:  0       *  QUEtiapine 25 MG tablet   Commonly known as:  SEROquel   Used for:  Anxiety        Dose:  12.5 mg   Start taking on:  12/12/2017   0.5 tablets (12.5 mg) by Oral or Feeding Tube route daily   Quantity:  60 tablet   Refills:  0       * Notice:  This list has 6 medication(s) that are the same as other medications prescribed for you. Read the directions carefully, and ask your doctor or other care provider to review them with you.      CONTINUE these medicines which may have CHANGED, or have new prescriptions. If we are uncertain of the size of tablets/capsules you have at home, strength may be listed as something that might have changed.        Dose / Directions    acetaminophen 325 MG tablet   Commonly known as:  TYLENOL   This may have changed:    - how to take this  - when to take this  - reasons to take this   Used for:  Acute post-operative pain        Dose:  650 mg   2 tablets (650 mg) by Oral or Feeding Tube route every 8 hours as needed for mild pain   Quantity:  100 tablet   Refills:  0       folic acid 1 MG tablet   Commonly known as:  FOLVITE   This may have changed:    - medication strength  - how to take this        Dose:  1 mg   Start taking on:  12/12/2017   1 tablet (1 mg) by Oral or Feeding Tube route daily   Quantity:  30 tablet   Refills:  0       furosemide 20 MG tablet   Commonly known as:  LASIX   This may have changed:  See the new instructions.   Used for:  Diastolic dysfunction        Dose:  20 mg   Start taking on:  12/12/2017   1 tablet (20 mg) by Oral or Feeding Tube route daily   Quantity:  30 tablet   Refills:  0       thiamine 100 MG tablet   This may have changed:  See the new instructions.   Used for:  Alcohol-induced acute pancreatitis, unspecified complication status        Dose:  100 mg   Start taking on:  12/12/2017   1 tablet (100 mg) by Oral or Feeding Tube route daily   Refills:  0         STOP taking     B-12 TR 1000 MCG Tbcr   Generic drug:  cyanocobalamin   Replaced by:   cyanocobalamin 1000 MCG Tabs           cetirizine 10 MG tablet   Commonly known as:  zyrTEC           chlordiazePOXIDE 25 MG capsule   Commonly known as:  LIBRIUM           ferrous gluconate 324 (38 FE) MG tablet   Commonly known as:  FERGON           ferrous sulfate 325 (65 FE) MG tablet   Commonly known as:  IRON           IBUPROFEN PO           LORazepam 0.5 MG tablet   Commonly known as:  ATIVAN           MAGIC MOUTHWASH (ENTER INGREDIENTS IN COMMENTS)           magnesium oxide 400 MG tablet   Commonly known as:  MAG-OX           omeprazole 40 MG capsule   Commonly known as:  priLOSEC           pantoprazole 40 MG EC tablet   Commonly known as:  PROTONIX   Replaced by:  pantoprazole Susp suspension           potassium chloride 10 MEQ CR capsule   Commonly known as:  MICRO-K                Where to get your medicines      Some of these will need a paper prescription and others can be bought over the counter. Ask your nurse if you have questions.     Bring a paper prescription for each of these medications     oxyCODONE IR 5 MG tablet       You don't need a prescription for these medications     acetaminophen 325 MG tablet    albuterol (2.5 MG/3ML) 0.083% neb solution    albuterol 108 (90 BASE) MCG/ACT Inhaler    ascorbic acid 500 MG Tabs    cefTRIAXone 1 GM vial    cyanocobalamin 1000 MCG Tabs    enoxaparin 30 MG/0.3ML injection    fiber modular packet    folic acid 1 MG tablet    furosemide 20 MG tablet    haloperidol lactate 5 MG/ML injection    ipratropium - albuterol 0.5 mg/2.5 mg/3 mL 0.5-2.5 (3) MG/3ML neb solution    labetalol 5 MG/ML injection    loperamide 1 MG/5ML liquid    melatonin 5 MG tablet    metoprolol 10 mg/mL Susp    multivitamins with minerals Liqd liquid    nicotine 14 MG/24HR 24 hr patch    pantoprazole Susp suspension    QUEtiapine 25 MG tablet    QUEtiapine 50 MG tablet    thiamine 100 MG tablet         Information about where to get these medications is not yet available     ! Ask your  nurse or doctor about these medications     HYDROmorphone 0.2 MG/0.2 ML Soln injection    oxyCODONE 5 MG/5ML solution               ANTIBIOTIC INSTRUCTION     You've Been Prescribed an Antibiotic - Now What?  Your healthcare team thinks that you or your loved one might have an infection. Some infections can be treated with antibiotics, which are powerful, life-saving drugs. Like all medications, antibiotics have side effects and should only be used when necessary. There are some important things you should know about your antibiotic treatment.      Your healthcare team may run tests before you start taking an antibiotic.    Your team may take samples (e.g., from your blood, urine or other areas) to run tests to look for bacteria. These test can be important to determine if you need an antibiotic at all and, if you do, which antibiotic will work best.      Within a few days, your healthcare team might change or even stop your antibiotic.    Your team may start you on an antibiotic while they are working to find out what is making you sick.    Your team might change your antibiotic because test results show that a different antibiotic would be better to treat your infection.    In some cases, once your team has more information, they learn that you do not need an antibiotic at all. They may find out that you don't have an infection, or that the antibiotic you're taking won't work against your infection. For example, an infection caused by a virus can't be treated with antibiotics. Staying on an antibiotic when you don't need it is more likely to be harmful than helpful.      You may experience side effects from your antibiotic.    Like all medications, antibiotics have side effects. Some of these can be serious.    Let you healthcare team know if you have any known allergies when you are admitted to the hospital.    One significant side effect of nearly all antibiotics is the risk of severe and sometimes deadly  diarrhea caused by Clostridium difficile (C. Difficile). This occurs when a person takes antibiotics because some good germs are destroyed. Antibiotic use allows C. diificile to take over, putting patients at high risk for this serious infection.    As a patient or caregiver, it is important to understand your or your loved one's antibiotic treatment. It is especially important for caregivers to speak up when patients can't speak for themselves. Here are some important questions to ask your healthcare team.    What infection is this antibiotic treating and how do you know I have that infection?    What side effects might occur from this antibiotic?    How long will I need to take this antibiotic?    Is it safe to take this antibiotic with other medications or supplements (e.g., vitamins) that I am taking?     Are there any special directions I need to know about taking this antibiotic? For example, should I take it with food?    How will I be monitored to know whether my infection is responding to the antibiotic?    What tests may help to make sure the right antibiotic is prescribed for me?      Information provided by:  www.cdc.gov/getsmart  U.S. Department of Health and Human Services  Centers for disease Control and Prevention  National Center for Emerging and Zoonotic Infectious Diseases  Division of Healthcare Quality Promotion         Protect others around you: Learn how to safely use, store and throw away your medicines at www.disposemymeds.org.             Medication List: This is a list of all your medications and when to take them. Check marks below indicate your daily home schedule. Keep this list as a reference.      Medications           Morning Afternoon Evening Bedtime As Needed    acetaminophen 325 MG tablet   Commonly known as:  TYLENOL   2 tablets (650 mg) by Oral or Feeding Tube route every 8 hours as needed for mild pain   Last time this was given:  650 mg on 12/11/2017  4:37 AM                                 * albuterol 108 (90 BASE) MCG/ACT Inhaler   Commonly known as:  PROAIR HFA/PROVENTIL HFA/VENTOLIN HFA   Inhale 2 puffs into the lungs 4 times daily as needed for other (dyspnea)   Last time this was given:  2 puffs on 12/9/2017  7:52 AM                                * albuterol (2.5 MG/3ML) 0.083% neb solution   Take 1 vial (2.5 mg) by nebulization every 4 hours as needed for wheezing   Last time this was given:  2.5 mg on 12/11/2017  2:44 AM                                ascorbic acid 500 MG Tabs   1 tablet (500 mg) by Oral or Feeding Tube route daily   Start taking on:  12/12/2017   Last time this was given:  500 mg on 12/11/2017  8:03 AM                                cefTRIAXone 1 GM vial   Commonly known as:  ROCEPHIN   Inject 1 g (1,000 mg) into the vein daily for 2 days   Last time this was given:  1 g on 12/11/2017 12:23 PM                                cyanocobalamin 1000 MCG Tabs   1,000 mcg by Oral or NG Tube route daily   Start taking on:  12/12/2017   Last time this was given:  1,000 mcg on 12/11/2017  8:03 AM                                enoxaparin 30 MG/0.3ML injection   Commonly known as:  LOVENOX   Inject 0.3 mLs (30 mg) Subcutaneous every 24 hours   Last time this was given:  30 mg on 12/10/2017  3:30 PM                                fiber modular packet   3 packets by Per Feeding Tube route 3 times daily   Last time this was given:  3 packets on 12/11/2017  2:02 PM                                folic acid 1 MG tablet   Commonly known as:  FOLVITE   1 tablet (1 mg) by Oral or Feeding Tube route daily   Start taking on:  12/12/2017   Last time this was given:  1 mg on 12/11/2017  8:03 AM                                furosemide 20 MG tablet   Commonly known as:  LASIX   1 tablet (20 mg) by Oral or Feeding Tube route daily   Start taking on:  12/12/2017   Last time this was given:  20 mg on 12/11/2017  8:03 AM                                haloperidol lactate 5 MG/ML injection    Commonly known as:  HALDOL   Inject 1 mL (5 mg) into the vein every 6 hours as needed for agitation   Last time this was given:  5 mg on 12/11/2017  4:38 AM                                HYDROmorphone 0.2 MG/0.2 ML Soln injection   Commonly known as:  DILAUDID   Inject 0.2 mLs (0.2 mg) into the vein every 2 hours as needed for moderate to severe pain   Last time this was given:  0.2 mg on 12/10/2017 10:03 AM                                ipratropium - albuterol 0.5 mg/2.5 mg/3 mL 0.5-2.5 (3) MG/3ML neb solution   Commonly known as:  DUONEB   Take 1 vial (3 mLs) by nebulization 4 times daily   Last time this was given:  3 mLs on 12/11/2017 12:36 PM                                labetalol 5 MG/ML injection   Commonly known as:  NORMODYNE/TRANDATE   Inject 4 mLs (20 mg) into the vein every 4 hours as needed for high blood pressure (SBP > 160)   Last time this was given:  20 mg on 12/11/2017  1:56 PM                                loperamide 1 MG/5ML liquid   Commonly known as:  IMODIUM   Take 10 mLs (2 mg) by mouth 3 times daily   Last time this was given:  2 mg on 12/11/2017  2:01 PM                                melatonin 5 MG tablet   1 tablet (5 mg) by Oral or Feeding Tube route every evening   Last time this was given:  5 mg on 12/10/2017  7:59 PM                                metoprolol 10 mg/mL Susp   Commonly known as:  LOPRESSOR   3.75 mLs (37.5 mg) by Oral or Feeding Tube route 2 times daily   Last time this was given:  37.5 mg on 12/11/2017  8:39 AM                                multivitamins with minerals Liqd liquid   15 mLs by Per Feeding Tube route daily   Start taking on:  12/12/2017   Last time this was given:  15 mLs on 12/11/2017  8:18 AM                                nicotine 14 MG/24HR 24 hr patch   Commonly known as:  NICODERM CQ   Place 1 patch onto the skin daily   Start taking on:  12/12/2017   Last time this was given:  1 patch on 12/11/2017  8:17 AM                                *  oxyCODONE IR 5 MG tablet   Commonly known as:  ROXICODONE   Take 1-2 tablets (5-10 mg) by mouth every 4 hours as needed for moderate to severe pain   Last time this was given:  10 mg on 11/23/2017  4:12 AM                                * oxyCODONE 5 MG/5ML solution   Commonly known as:  ROXICODONE   Take 2.5 mLs (2.5 mg) by mouth every 4 hours as needed for moderate to severe pain   Last time this was given:  2.5 mg on 12/11/2017  8:45 AM                                pantoprazole Susp suspension   Commonly known as:  PROTONIX   20 mLs (40 mg) by Oral or Feeding Tube route 2 times daily (before meals)   Last time this was given:  40 mg on 12/11/2017  8:18 AM                                * QUEtiapine 50 MG tablet   Commonly known as:  SEROquel   1 tablet (50 mg) by Oral or Feeding Tube route every evening   Last time this was given:  12.5 mg on 12/11/2017  8:19 AM                                * QUEtiapine 25 MG tablet   Commonly known as:  SEROquel   0.5 tablets (12.5 mg) by Oral or Feeding Tube route daily   Start taking on:  12/12/2017   Last time this was given:  12.5 mg on 12/11/2017  8:19 AM                                thiamine 100 MG tablet   1 tablet (100 mg) by Oral or Feeding Tube route daily   Start taking on:  12/12/2017   Last time this was given:  100 mg on 12/11/2017  8:03 AM                                * Notice:  This list has 6 medication(s) that are the same as other medications prescribed for you. Read the directions carefully, and ask your doctor or other care provider to review them with you.

## 2017-11-21 NOTE — ANESTHESIA PREPROCEDURE EVALUATION
Anesthesia Evaluation     . Pt has had prior anesthetic. Type: General           ROS/MED HX    ENT/Pulmonary:     (+)vocal cord abnormalities- Horseness, other ENT (tongue cancer)- tobacco use, Current use 1 packs/day  , . .    Neurologic:     (+)dementia,     Cardiovascular:         METS/Exercise Tolerance:     Hematologic:     (+) History of Transfusion no previous transfusion reaction -      Musculoskeletal:         GI/Hepatic:     (+) Other GI/Hepatic pamcreatitis      Renal/Genitourinary:  - ROS Renal section negative       Endo:  - neg endo ROS       Psychiatric:     (+) psychiatric history       Infectious Disease:  - neg infectious disease ROS       Malignancy:         Other:                     Physical Exam  Normal systems: cardiovascular and pulmonary    Airway   Mallampati: II  TM distance: >3 FB  Neck ROM: full    Dental   (+) missing    Cardiovascular       Pulmonary                     Anesthesia Plan      History & Physical Review  History and physical reviewed and following examination; no interval change.    ASA Status:  4 .    NPO Status:  > 4 hours    Plan for General, RSI and ETT with Propofol induction. Maintenance will be Balanced.    PONV prophylaxis:  Ondansetron (or other 5HT-3)  Additional equipment: 2nd IV and Arterial Line Pt drank 4 oz vodka at 1000 because she felt she was withdrawing.  Spoke with surgeon who feels compelled to proceed on with surgery; pt is irresponsible and may not return for surgery.  We discussed high risk for alcohol withdrawal seizures and the need for post-op ICU for monitoring.  I will proceed with inducing anesthesia although I have concerns.      Postoperative Care  Postoperative pain management:  IV analgesics.      Consents  Anesthetic plan, risks, benefits and alternatives discussed with:  Patient.  Use of blood products discussed: Yes.   Use of blood products discussed with Patient.  Consented to blood products.  .                          .

## 2017-11-21 NOTE — CONSULTS
[facility], [place]    Internal Medicine Consult Note - [facility] Service    Requesting clinician - Javy Wong MD  Reason for consultation - Concern for alcohol withdrawal, general medical management       Date of Admission:  11/21/2017    Assessment & Plan   Alexia Flor is a 62 year old female  admitted on 11/21/2017. She has a history of alcohol abuse, diastolic heart failure, COPD, breast cancer s/p radiation and chemotherapy and is admitted following a left partial glossectomy and left neck dissection for invasive SCC.  Medicine has been consulted for general medical management but in particular due to concerns for alcohol withdrawal.    1) S/p left partial glossectomy and left neck dissection - Patient has a history of SCC of the base of her tongue with concern for activity on PET scan in her distal esophagus and a perigastric lymph node.  - ENT managing as primary team    2) History of alcohol abuse - Reported drinking 5 oz of alcohol per day during pre-op eval.  A review of her chart suggests a history of alcohol and benzodiazapine abuse and she has shown up to procedures after drinking vodka.  Nursing has already found rum stashed in her belongings.  Very high potential for alcohol withdrawal with potential for ICU admission.  Patient currently too sedated to discuss if she has required intubation in the past.  Last drink was 10 am 11/21.  - [facility] protocol for now (ordered)  - ENT is also considering ordering whiskey TID but given her level of use this may not entirely avoid withdrawal.    3) History of COPD   - Does not appear to be on any medications.  Medicine team will readdress if she uses any inhalers in the morning.    4) History of diastolic heart failure - Euvolemic on exam.  EF 60% on latest echo.  - Continue lasix, potassium (already ordered)    5) Nutritional status - Patient appears borderline emaciated.  Given her alcohol use I suspect she is significantly  malnourished.  Will need to discuss nutrition options with ENT service.  - Multivitamin, folic acid, thiamine (ordered)    Diet: NPO per Anesthesia Guidelines for Procedure/Surgery Except for: Meds  Fluids: None  DVT Prophylaxis: Defer to primary service    Jamari Hughes NP  Internal Medicine Staff Hospitalist Service  [facility]  Pager: [phone]  Please see sticky note for cross cover information  ______________________________________________________________________    Chief Complaint   S/p left partial glossectomy and left neck dissection for invasive SCC    History is obtained from the chart as the patient is currently very sedated.    History of Present Illness   Alexia Flor is a 62 year old female  admitted on 11/21/2017. She has a history of alcohol abuse, diastolic heart failure, COPD, breast cancer s/p radiation and chemotherapy and is admitted following a left partial glossectomy and left neck dissection for invasive SCC.  Medicine has been consulted for general medical management but in particular due to concerns for alcohol withdrawal.    When I saw the patient she had just received fentanyl in PACU and was only able to waken briefly and was not interested in speaking with me.  I reviewed her chart and spoke with nursing staff.    From the records I have available it appears she was diagnosed with invasive SCC of the tongue and came in today for a left partial glossectomy and left neck dissection.  Post op she notes some itching.    Of particular concern, she has a history of alcohol and benzodiazepine abuse with concerning behavior including drinking prior to appointments, sneaking alcohol into the hospital and drinking prior to procedures.  She spilled rum out of her purse in pre-op today which she apparently brought in case we didn't have any.  She has previously left AMA and is very resistant to discussions of treatment.  She has reported ~5 oz of alcohol daily at times but  also reported half a liter daily.    Review of Systems   Review of systems not obtained due to patient factors - sedation    Past Medical History    I have reviewed this patient's medical history and updated it with pertinent information if needed.   Past Medical History:   Diagnosis Date     Alcohol abuse      Alcoholic hepatitis      Alcoholic pancreatitis      Anxiety      Schafer's esophagus      Breast cancer (H)     rt, s/p radiation.     Chemical dependency (H)      Congestive heart failure, unspecified      COPD (chronic obstructive pulmonary disease) (H)      Depressive disorder      History of radiation therapy      Hoarseness      Hypokalemia      Macrocytic anemia      Non-adherence to medical treatment      Tobacco abuse      Tongue cancer (H)       Past Surgical History   I have reviewed this patient's surgical history and updated it with pertinent information if needed.  Past Surgical History:   Procedure Laterality Date     ESOPHAGOSCOPY, GASTROSCOPY, DUODENOSCOPY (EGD), COMBINED N/A 11/9/2017    Procedure: COMBINED ENDOSCOPIC ULTRASOUND, ESOPHAGOSCOPY, GASTROSCOPY, DUODENOSCOPY (EGD), FINE NEEDLE ASPIRATE/BIOPSY;;  Surgeon: Yo Bhat MD;  Location: [facility] OR     LARYNGOSCOPY WITH BIOPSY(IES) N/A 11/9/2017    Procedure: LARYNGOSCOPY WITH BIOPSY(IES);  Direct Laryngoscopy,  Upper Endoscopic Ultrasound and Fine Needle Aspiration;  Surgeon: Heriberto George MD;  Location: [facility] OR     lumpectomy Rt breast  2006        Social History   Social History   Substance Use Topics     Smoking status: Heavy Tobacco Smoker     Packs/day: 0.50     Years: 40.00     Types: Cigarettes     Smokeless tobacco: Current User     Alcohol use 0.5 oz/week      Comment: weekends (reported; smelled of alcohol during clinic visit 4/2016)       Family History   I have reviewed this patient's family history and updated it with pertinent information if needed.   Family History   Problem Relation Age of Onset     Coronary  Artery Disease Father      Emphysema Father      CANCER Mother      renal cancer     Breast Cancer Maternal Grandmother      CANCER Maternal Grandmother      Substance Abuse Maternal Grandfather      CANCER Maternal Grandfather      Substance Abuse Cousin      CANCER Cousin      Substance Abuse Brother      DIABETES Brother      Prior to Admission Medications   Prior to Admission Medications   Prescriptions Last Dose Informant Patient Reported? Taking?   B-12 TR 1000 MCG TBCR 11/20/2017 at 0800  No Yes   Sig: TAKE 1 TABLET BY MOUTH DAILY   IBUPROFEN PO 11/20/2017 at 2000  Yes Yes   Sig: Take 1 tablet by mouth At Bedtime   LORazepam (ATIVAN) 0.5 MG tablet More than a month at Unknown time  No No   Sig: Take 1 tablet (0.5 mg) by mouth every 8 hours as needed (for withdrawl)   MAGIC MOUTHWASH, ENTER INGREDIENTS IN COMMENTS, More than a month at Unknown time  No No   Sig: Take 10 mLs by mouth every 4 hours as needed   VITAMIN B-1 100 MG tablet 11/20/2017 at 0800  No Yes   Sig: TAKE 1 TABLET(100 MG) BY MOUTH DAILY   acetaminophen (TYLENOL) 325 MG tablet More than a month at Unknown time  No No   Sig: Take 2 tablets (650 mg) by mouth every 4 hours as needed for other (mild pain)   cetirizine (ZYRTEC) 10 MG tablet 11/20/2017 at 2000  No Yes   Sig: TAKE 1 TABLET BY MOUTH EVERY EVENING   chlordiazePOXIDE (LIBRIUM) 25 MG capsule Unknown at Unknown time  No No   Sig: Take 50mg twice daily for 2 days, then take 25mg bid for days 3-5   ferrous gluconate (FERGON) 324 (38 FE) MG tablet Past Week at Unknown time  No Yes   Sig: TAKE 1 TABLET(324 MG) BY MOUTH DAILY WITH BREAKFAST   ferrous sulfate (IRON) 325 (65 FE) MG tablet Past Week at Unknown time  Yes Yes   Sig: Take 1 tablet (325 mg) by mouth 2 times daily   folic acid (FOLVITE) 400 MCG tablet 11/20/2017 at 2000  No Yes   Sig: Take 2.5 tablets (1 mg) by mouth daily   furosemide (LASIX) 20 MG tablet Past Week at Unknown time  No Yes   Sig: TAKE 1 TABLET(20 MG) BY MOUTH EVERY  "MORNING   magnesium oxide (MAG-OX) 400 MG tablet 2017 at 0800  No Yes   Sig: Take 1 tablet (400 mg) by mouth 3 times daily   omeprazole (PRILOSEC) 40 MG capsule 2017 at 0800  No Yes   Sig: TAKE 1 CAPSULE(40 MG) BY MOUTH DAILY   pantoprazole (PROTONIX) 40 MG EC tablet More than a month at Unknown time  No No   Sig: TAKE 1 TABLET(40 MG) BY MOUTH EVERY 12 HOURS   potassium chloride (MICRO-K) 10 MEQ CR capsule Unknown at Unknown time  No No   Sig: TAKE 2 CAPSULES BY MOUTH DAILY      Facility-Administered Medications: None     Allergies   Allergies   Allergen Reactions     Codeine Anaphylaxis     Contrast Dye Itching and Swelling     7/10/2009 Patient reports history of contrast reaction when first diagnosed with breast cancer. \"I almost .\"   (Hives, swelling.) 2009 CT to be done without IV contrast per Dr. KAVIN Live     Moxifloxacin Anaphylaxis     Avelox     Nickel Itching and Rash     Codeine Sulfate Other (See Comments)     shaking     Gabapentin Other (See Comments)     Patient reports that she got very shaky and she \"felt like she was going to die\"       Physical Exam   Vital Signs: Temp: 99.9  F (37.7  C) Temp src: Oral BP: 139/81   Heart Rate: 85 Resp: 16 SpO2: 98 % O2 Device: None (Room air)    Weight: 85 lbs 15.67 oz    Physical Exam   Constitutional:   Emaciated older woman lying in bed   Head: Dried blood in mouth.  Neck:   No adenopathy, no bony tenderness  Cardiovascular: Regular rate and rhythm without murmurs or gallops  Pulmonary/Chest: Clear to auscultation bilaterally, with no wheezes or retractions. No respiratory distress.  GI: Soft with good bowel sounds.  Non-tender, non-distended, with no guarding, no rebound, no peritoneal signs.  Back:  No bony or CVA tenderness   Musculoskeletal:  No edema or clubbing   Skin: Skin is warm and dry. No rash noted.   Neurological: Sedated post-op.  Wakes to voice and shoulder squeezing but quickly falls asleep.  Moves all " extremities.  Psychiatric:  Unable to assess.      Data   Data reviewed today: I reviewed all medications, new labs and imaging results over the last 24 hours.

## 2017-11-21 NOTE — BRIEF OP NOTE
[facility], [place]    Brief Operative Note    Pre-operative diagnosis: Tongue Cancer   Post-operative diagnosis Same  Procedure: Procedure(s):  Left Partial Glossectomy And Left Neck Dissection,  - Wound Class: II-Clean Contaminated   - Wound Class: I-Clean  Surgeon: Surgeon(s) and Role:     * Heriberto George MD - Primary     * Thien Ewing MD - Resident - Assisting  Anesthesia: General   Estimated blood loss: 50 ml  Drains: Fred-Bedolla  Specimens:   ID Type Source Tests Collected by Time Destination   A : Left Partial Glossectomy-Stitch Anterior Tissue Tongue SURGICAL PATHOLOGY EXAM Heriberto George MD 11/21/2017  1:34 PM    B : Anterior Lateral Margin Tissue Tongue SURGICAL PATHOLOGY EXAM Heriberto George MD 11/21/2017  1:36 PM    C : Posterior Lateral Margin Tissue Tongue SURGICAL PATHOLOGY EXAM Heriberto George MD 11/21/2017  1:36 PM    D : Anterior Margin Tissue Tongue SURGICAL PATHOLOGY EXAM Heriberto George MD 11/21/2017  1:37 PM    E : Anterior Medial Margin Tissue Tongue SURGICAL PATHOLOGY EXAM Heriberto George MD 11/21/2017  1:37 PM    F : Posterior Medial Margin Tissue Tongue SURGICAL PATHOLOGY EXAM Heriberto George MD 11/21/2017  1:37 PM    G : Posterior Margin Tissue Tongue SURGICAL PATHOLOGY EXAM Heriberto George MD 11/21/2017  1:38 PM    H : Deep Margin Tissue Tongue SURGICAL PATHOLOGY EXAM Heriberto George MD 11/21/2017  1:38 PM    I : Left Neck Dissection Level 2A Tissue Neck SURGICAL PATHOLOGY EXAM Heriberto George MD 11/21/2017  3:03 PM    J : Left Neck Dissection, Level 2B Tissue Neck SURGICAL PATHOLOGY EXAM Heriberto George MD 11/21/2017  3:16 PM    K : Left Neck Dissection Level 3 Tissue Neck SURGICAL PATHOLOGY EXAM Heriberto George MD 11/21/2017  3:03 PM    L : Left Neck Dissection Level 4 Tissue Neck SURGICAL PATHOLOGY EXAM Heriberto George MD 11/21/2017  3:03 PM    M : Left Neck Dissection, Level 1B  Tissue Neck SURGICAL PATHOLOGY EXAM Heriberto George MD 11/21/2017  3:03 PM      Findings:   Left lateral mobile tongue mass  Complications: None.  Implants: None.

## 2017-11-21 NOTE — ANESTHESIA CARE TRANSFER NOTE
Patient: Alexia Hernandez [facility]    Procedure(s):  Left Partial Glossectomy And Left Neck Dissection,  - Wound Class: II-Clean Contaminated   - Wound Class: I-Clean    Diagnosis: Tongue Cancer   Diagnosis Additional Information: No value filed.    Anesthesia Type:   General, RSI, ETT     Note:  Airway :Nasal Cannula  Patient transferred to:PACU  Comments: Pt transferred to PACU.  Maintaining airway and oxygenation on NC.  VSS.  Pt agitated.  1mg of lorazepam given upon arrival to PACU.  Report to RN.  All questions answered. Handoff Report: Identifed the Patient, Identified the Reponsible Provider, Reviewed the pertinent medical history, Discussed the surgical course, Reviewed Intra-OP anesthesia mangement and issues during anesthesia, Set expectations for post-procedure period and Allowed opportunity for questions and acknowledgement of understanding      Vitals: (Last set prior to Anesthesia Care Transfer)    CRNA VITALS  11/21/2017 1532 - 11/21/2017 1619      11/21/2017             Pulse: 136    SpO2: 99 %    EKG: Sinus rhythm                Electronically Signed By: ANA LAURA Torre CRNA  November 21, 2017  4:19 PM

## 2017-11-21 NOTE — OR NURSING
Dr. George aware pt had 4oz vodka at 10:30 this AM.  Per Dr. George, pt is an alcoholic and he is okay with proceeding with surgery.

## 2017-11-21 NOTE — ANESTHESIA POSTPROCEDURE EVALUATION
Patient: Alexia Anthony    Procedure(s):  Left Partial Glossectomy And Left Neck Dissection,  - Wound Class: II-Clean Contaminated   - Wound Class: I-Clean    Diagnosis:Tongue Cancer   Diagnosis Additional Information: No value filed.    Anesthesia Type:  General, RSI, ETT    Note:  Anesthesia Post Evaluation    Patient location during evaluation: PACU  Patient participation: Able to fully participate in evaluation  Level of consciousness: awake  Pain management: adequate  Airway patency: patent  Cardiovascular status: blood pressure returned to baseline and hemodynamically stable  Respiratory status: nasal cannula  Hydration status: euvolemic  PONV: none     Anesthetic complications: None          Last vitals:  Vitals:    11/21/17 1615 11/21/17 1630 11/21/17 1645   BP: 116/88 (!) 83/59 107/75   Resp: 18 12 12   Temp:  36.7  C (98  F)    SpO2: 92% 92% 92%         Electronically Signed By: Madi Velazquez MD  November 21, 2017  4:58 PM

## 2017-11-22 NOTE — H&P
SURGICAL ICU ADMISSION NOTE  11/22/2017    PMH:  Alcohol use disorder  MDD  Breast cancer  Tongue cancer  Alcoholic fatty liver  COPD  Diastolic Heart Failure    ASSESSMENT: Alexia Flor is a 62-year-old female with PMH alcohol use disorder, MDD, Breast Cancer, tongue cancer, alcoholic fatty liver, COPD, and diastolic heart failure now POD #1 s/p left partial glossectomy with primary closure and left neck dissection for invasive SCC.  Admitted to SICU for assistance with EtOH withdrawal, possible need for ativan gtt, and respiratory monitoring.    PLAN:   Neuro/ pain/ sedation:  -Monitor neurological status. Notify the MD for any acute changes in exam.  -Scheduled tylenol, PRN oxycodone, PRN dilaudid for pain.  -EtOH withdrawal: patient unable to tolerate PO whiskey TID as prescribed on floor. Placing NG tube and will administer enterally once placement confirmed. Ativan IV ordered as well if needed.     Pulmonary care:   -Supplemental oxygen to keep saturation above 92 %.  -Incentive spirometer every 15- 30 minutes when awake.  -Patient on 9L oxymask on admission to SICU  -ABG pending  -No indication for antibiotics at this time     HEENT:  -Incision cares: clean with 0.9% nacl and apply bacitracin x24h then transition to Aquaphor q8h  -Monitor and record CAMACHO drain output qshift  -Peridex oral rinses 4x daily  -PTA zyrtec  -Decadron for tongue swelling     Cardiovascular:    -Monitor hemodynamic status.  -Hx diastolic heart failure. EF 60% on latest echo 9/3/2017. Holding home lasix, K supplementation.     GI care:   -NPO except ice chips and medications  -Omeprazole 40 mg daily -- PTA med  -Senna-docusate bowel regimen  -NG placement for alcohol administration     Renal/  Fluids/ Electrolytes/ Nutrition:     -Urine output not closely tracked over last 24 hours.  Will continue to monitor.  -D5-1/2 NS + 20 meq KCL @ 75 cc/hr for IV fluid hydration  -No indication for parenteral nutrition.  -Will continue to monitor  intake and output.  -Thiamine/Folate/multivitamin -- poor nutritional status at baseline  -If patient unable to tolerate PO once stabilized will consider TFs via NJ.  -Nutrition consult     Endocrine:    -No management indication.  -Sliding scale for diabetes management.     ID/ Antibiotics:  -Received Unasyn perioperatively.  -Continue to monitor  -WBC 20, but postop. Tmax 98.7.     Heme:     -Hemoglobin 8.0, stable postoperatively  -Platelets 200     Prophylaxis:    -Mechanical prophylaxis for DVT.  -Heparin subq DVT ppx, per primary     MSK:   -PT/OT Ordered     Lines/ tubes/ drains:  -PIVs  -NG  -CAMACHO x2     Disposition:  -Surgical ICU.    Patient seen, findings and plan discussed with surgical ICU staff Veronica.    Hans Rizo MD  PGY-1  Orthopaedic Surgery  - - - - - - - - - - - - - - - - - - - - - - - - - - - - - - - - - - - - - - - - - - - - - - - - - - - - - - - - - - - - - - - - - - - - - -     PRIMARY TEAM: ENT  PRIMARY PHYSICIAN: Dr. George    REASON FOR CRITICAL CARE ADMISSION: Alcohol withdrawal with possible need for ativan gtt and respiratory distress  ADMITTING PHYSICIAN: Dr. Martin    HISTORY PRESENTING ILLNESS: Alexia Flor is a 62-year-old female with PMH alcohol use disorder, MDD, Breast Cancer, tongue cancer, alcoholic fatty liver, COPD, and diastolic heart failure now POD #1 s/p left partial glossectomy with primary closure and left neck dissection for invasive SCC.    Providers were called to the bedside this morning as she was needing increased O2 requirements and was unable to tolerate PO alcohol for withdrawal.  She was becoming increasingly agitated as well.  Given her possible requirement for ativan gtt given her inability to tolerate PO alcohol the decision was made to transfer her to the SICU for close cardiorespiratory monitoring, NG placement for administration of EtOH and possible benzodiazepine gtt.      REVIEW OF SYSTEMS: As noted above.  She denies chest pain,  "abdominal pain.  She is tremulous and agitated.    PAST MEDICAL HISTORY:   Past Medical History:   Diagnosis Date     Alcohol abuse      Alcoholic hepatitis      Alcoholic pancreatitis      Anxiety      Schafer's esophagus      Breast cancer (H)     rt, s/p radiation.     Chemical dependency (H)      Congestive heart failure, unspecified      COPD (chronic obstructive pulmonary disease) (H)      Depressive disorder      History of radiation therapy      Hoarseness      Hypokalemia      Macrocytic anemia      Non-adherence to medical treatment      Tobacco abuse      Tongue cancer (H)        SURGICAL HISTORY:   Past Surgical History:   Procedure Laterality Date     DISSECTION RADICAL NECK Left 2017    Procedure: DISSECTION RADICAL NECK;;  Surgeon: Heriberto George MD;  Location: [facility] OR     ESOPHAGOSCOPY, GASTROSCOPY, DUODENOSCOPY (EGD), COMBINED N/A 2017    Procedure: COMBINED ENDOSCOPIC ULTRASOUND, ESOPHAGOSCOPY, GASTROSCOPY, DUODENOSCOPY (EGD), FINE NEEDLE ASPIRATE/BIOPSY;;  Surgeon: Yo Bhat MD;  Location: [facility] OR     GLOSSECTOMY PARTIAL N/A 2017    Procedure: GLOSSECTOMY PARTIAL;  Left Partial Glossectomy And Left Neck Dissection, ;  Surgeon: Heriberto George MD;  Location: [facility] OR     LARYNGOSCOPY WITH BIOPSY(IES) N/A 2017    Procedure: LARYNGOSCOPY WITH BIOPSY(IES);  Direct Laryngoscopy,  Upper Endoscopic Ultrasound and Fine Needle Aspiration;  Surgeon: Heriberto George MD;  Location: [facility] OR     lumpectomy Rt breast  2006         SOCIAL HISTORY: Tobacco 20 pack years . Etoh - 1/2 liter per day.    FAMILY HISTORY: Mom with hx of renal cancer, Dad w/ CAD and emphysema.    ALLERGIES:      Allergies   Allergen Reactions     Codeine Anaphylaxis     Contrast Dye Itching and Swelling     7/10/2009 Patient reports history of contrast reaction when first diagnosed with breast cancer. \"I almost .\"   (Hives, swelling.) 2009 CT to be done without IV contrast per Dr. KAVIN Live " "    Moxifloxacin Anaphylaxis     Avelox     Nickel Itching and Rash     Codeine Sulfate Other (See Comments)     shaking     Gabapentin Other (See Comments)     Patient reports that she got very shaky and she \"felt like she was going to die\"       MEDICATIONS:    No current facility-administered medications on file prior to encounter.   Current Outpatient Prescriptions on File Prior to Encounter:  B-12 TR 1000 MCG TBCR TAKE 1 TABLET BY MOUTH DAILY   VITAMIN B-1 100 MG tablet TAKE 1 TABLET(100 MG) BY MOUTH DAILY   cetirizine (ZYRTEC) 10 MG tablet TAKE 1 TABLET BY MOUTH EVERY EVENING   furosemide (LASIX) 20 MG tablet TAKE 1 TABLET(20 MG) BY MOUTH EVERY MORNING   ferrous gluconate (FERGON) 324 (38 FE) MG tablet TAKE 1 TABLET(324 MG) BY MOUTH DAILY WITH BREAKFAST   ferrous sulfate (IRON) 325 (65 FE) MG tablet Take 1 tablet (325 mg) by mouth 2 times daily   magnesium oxide (MAG-OX) 400 MG tablet Take 1 tablet (400 mg) by mouth 3 times daily   folic acid (FOLVITE) 400 MCG tablet Take 2.5 tablets (1 mg) by mouth daily   omeprazole (PRILOSEC) 40 MG capsule TAKE 1 CAPSULE(40 MG) BY MOUTH DAILY   acetaminophen (TYLENOL) 325 MG tablet Take 2 tablets (650 mg) by mouth every 4 hours as needed for other (mild pain)   chlordiazePOXIDE (LIBRIUM) 25 MG capsule Take 50mg twice daily for 2 days, then take 25mg bid for days 3-5   MAGIC MOUTHWASH, ENTER INGREDIENTS IN COMMENTS, Take 10 mLs by mouth every 4 hours as needed   LORazepam (ATIVAN) 0.5 MG tablet Take 1 tablet (0.5 mg) by mouth every 8 hours as needed (for withdrawl)   pantoprazole (PROTONIX) 40 MG EC tablet TAKE 1 TABLET(40 MG) BY MOUTH EVERY 12 HOURS       PHYSICAL EXAMINATION:  Temp:  [97.3  F (36.3  C)-98.7  F (37.1  C)] 98.7  F (37.1  C)  Heart Rate:  [] 97  Resp:  [12-24] 22  BP: ()/() 130/64  SpO2:  [90 %-97 %] 93 %  General: Lying in hospital bed.  HEENT: Normocephalic, atraumatic. oxymask in place, NG tube in place.  Neck: 2 CAMACHO drains in place with " serosanguinous output.  Chest wall: Symmetric thorax. No ttp.  Respiratory: 9L NC.  Non-labored breathing.  CTAB.  Cardiovascular: Tachycardic, regular rhythm.  Gastrointestinal: Abdomen soft, non-distended, non-tender to palpation.  Extremities: Moving all four extremities. No limb deformities. No pedal edema.  Skin: As noted above. No rashes or lesions appreciated.  Neuro: dysarthric 2/2 tongue surgery. Hands are tremulous.    LABS: Reviewed.   Arterial Blood Gases     Recent Labs  Lab 11/22/17  1141   PH 7.39   PCO2 45   PO2 97   HCO3 27     Complete Blood Count     Recent Labs  Lab 11/22/17  1157 11/21/17  2125 11/20/17  1421   WBC 20.0*  --  6.9   HGB 8.0*  --  9.3*    237 248     Basic Metabolic Panel    Recent Labs  Lab 11/20/17  1421      POTASSIUM 3.2*   CHLORIDE 103   CO2 26   BUN 12   CR 0.78   GLC 83     Liver Function Tests  No lab results found in last 7 days.  Pancreatic Enzymes  No lab results found in last 7 days.  Coagulation Profile  No lab results found in last 7 days.  Lactate  Invalid input(s): LACTATE    IMAGING:  Results for orders placed or performed during the hospital encounter of 11/21/17   XR Chest Port 1 View    Narrative    Exam: XR CHEST PORT 1 VW, 11/22/2017 1:38 AM    Indication: respiratory distress;     Comparison: 12/20/2016    Findings:   Bibasilar streaky opacity. Small right pleural effusion which appears  new from prior. Central airways midline. Cardiac mediastinal  silhouette is within normal limits. Scarring over the right pleural  apex. Emphysematous changes of the lungs. Subtle nodules over the  lower lobes bilaterally. Surgical clips over the right hemithorax and  left upper quadrant.      Impression    Impression:   1. Bibasilar streaky opacities, favoring atelectasis or consolidation.  2. New small right pleural effusion.  3. Partially visualized bilateral lower lung pulmonary nodules as seen  on PET/CT.  4. Lung emphysema.    I have personally reviewed  the examination and initial interpretation  and I agree with the findings.    SHAWNA OTT MD   XR Chest Port 1 View    Narrative    1. Bibasilar opacities with interval increase in right-sided basilar  and perihilar opacity, which may represent atelectasis versus  infection versus aspiration.  2. Small right pleural effusion.

## 2017-11-22 NOTE — PROGRESS NOTES
[facility]  Internal Medicine Progress Note - [facility]    Patient: Alexia Flor  MRN: [id number]  Admission Date: 11/21/2017  Hospital Day # 1    Assessment & Plan   Alexia Flor is a 62 year old female  admitted on 11/21/2017. She has a history of alcohol abuse, diastolic heart failure, COPD, breast cancer s/p radiation and chemotherapy and is admitted following a left partial glossectomy and left neck dissection for invasive SCC.  Medicine has been consulted for general medical management but in particular due to concerns for alcohol withdrawal.     S/p left partial glossectomy and left neck dissection. Patient has a history of SCC of the base of her tongue with concern for activity on PET scan in her distal esophagus and a perigastric lymph node. ENT primary.      History of alcohol abuse. Reported drinking 5 oz of alcohol per day during pre-op eval.  A review of her chart suggests a history of alcohol and benzodiazapine abuse and she showed up to procedures after drinking vodka. Nursing also found rum in her belongings. Patient with worsening withdrawal and agitation overnight. She required IV ativan twice, with increased somnolence following. She was started on TID whiskey this morning, but pt could not swallow fully and there was concern for aspiration. ENT placed NG bedside to start TID whiskey via feeding tube. She continues to be intermittently agitated, confused, and somnolent. Placed in soft restraints. I do not believe she is safe to stay on the floor while requiring IV ativan and with worsening hypoxia and ongoing agitation. Patient will transfer to ICU for further cares. May need precedex gtt.     Acute hypoxic respiratory failure. Likely multifactorial from med effect (benzos), likely aspiration pneumonia, atelectasis. Patient with post-op hypoxia that was expected. Her O2 needs improved from requiring 6L overnight to 4L this morning, but then up to 10-15L  facemask currently. Patient attempted to drink whiskey this morning to help with withdrawals, but RN reported that patient felt she could not swallow and there was definite concern for aspiration. She also is becoming more and more agitated with withdrawal and requiring increasing doses of benzodiazepines, which is likely suppressing her breathing. She also becomes agitated and starts screaming out, which is associate with acute drop in O2 sats, but then recovers to previous baseline slowly. Currently requiring 10L facemask. Rhonchi and scant crackles appreciated in bases R>L. CXR today showed increase in R sided basilar and perihilar opacity concerning for atelectasis vs aspiration vs infection. Patient will transfer to ICU for further cares. Will likely need abx for aspiration pneumonia, will defer to ICU team as she is transferring now.      History of COPD. Not on any medications PTA. Unable to address with patient her baseline respiratory status. Concerning worsening hypoxia as above with concern for aspiration.  No wheezing on exam.      History of diastolic heart failure. Euvolemic on exam.  EF 60% on latest echo. Maintained on lasix and potassium supp prior to admission, would hold for now given soft BPs, no po intake, and acute withdrawal.     Nutritional status. Patient appears borderline emaciated, she is likely severely malnourished in setting of her alcohol abuse. Started MV, thiamine and folic acid 11/21.If unable to tolerate po intake, consider TFs via NJ.     Diet: NPO  Fluids: none  DVT Prophylaxis: per primary team  Code Status: FULL code    The patient's care was discussed with the Attending Physician, Dr. Cole, Bedside Nurse, Patient and ENT Team.    Monique Malloy  Internal Medicine Staff Hospitalist Service  [facility]  Pager: [phone]  Please see sticky note for cross cover information    Interval History   Patient is very agitated at this time. She is not  "answering questions appropriately. She intermittent screaming out in pain and sometimes very somnolent.     Data reviewed today: I reviewed all medications, new labs and imaging results over the last 24 hours. I personally reviewed labs, imaging, and vitals.     Physical Exam   /57 (BP Location: Right arm)  Temp 97.9  F (36.6  C) (Axillary)  Resp 24  Ht 1.55 m (5' 1.02\")  Wt 39 kg (85 lb 15.7 oz)  SpO2 93%  Breastfeeding? No  BMI 16.23 kg/m2     GENERAL: Intermittently agitated and intermittently somnolent. Responds to name. Arouses to touch.   HEENT: NJ in place, bridled. See ENT note.   CV: RRR.  RESPIRATORY: Effort normal on 10L facemask. Lungs CTAB with no wheezing and rhonchi in bases R>L.   GI: Abdomen soft and non distended with normoactive bowel sounds present in all quadrants. No tenderness, rebound, guarding.   NEUROLOGICAL: No focal deficits. Moves all extremities. Mild tremors in UE.    EXTREMITIES: No peripheral edema.   SKIN: No jaundice or rashes on exposed areas of skin .       "

## 2017-11-22 NOTE — OP NOTE
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      PROCEDURES:   1.  Left partial glossectomy.   2.  Left selective neck dissection of levels 1B, 2A, 2B, 3 and 4.      INDICATIONS FOR PROCEDURE:  Ms. Alexia Flor is a 62-year-old female who was noted to have a squamous cell carcinoma of her tongue.  The lesion size was almost 2 cm hence the decision was made to also perform a neck dissection at the time of wide local excision of the left posterior lateral tongue lesion.  Risks and benefits of surgery were discussed in length with the patient and she elected to proceed with surgery and consent was obtained.      ANESTHESIA:  General endotracheal.      FINDINGS:  Lesion involving left posterior lateral tongue.      ESTIMATED BLOOD LOSS:  50 mL.      SPECIMENS:   1.  Left partial glossectomy.   2.  Anterior lateral margin.   3.  Posterior lateral margin.   4.  Anterior margin.   5.  Anterior medial margin.   6.  Posterior medial margin.   7.  Posterior margin.   8.  Deep margin.   9.  Left neck dissection level 1B.   10.  Left neck dissection, level 2A.   11.  Left and dissection level to 2B   12.  Left neck dissection, level 3.   13.  Left neck dissection level 4.      COMPLICATIONS:  None.      CONDITION AT THE END OF SURGERY:  Stable.      DESCRIPTION OF PROCEDURE:  The patient was brought to the operating room and laid supine on the operating table.  General anesthesia was induced by the anesthesia team and patient was orotracheally intubated.  The bed was then turned 180 degrees.  A shoulder roll was then placed.  An incision was then marked in a neck crease and in this incision was injected with 1:100,000 epinephrine.  The patient was then prepped and draped in the usual  sterile fashion.  A timeout was conducted, identifying patient and procedure and all were in agreement.  A stitch was placed anteriorly on her tongue to use for retraction and the lesion was palpated and a wide local excision was performed with the use of Bovie cautery going around the mass circumferentially with 1 cm margins .  Hemostasis was achieved with the use of bipolar cautery.  Margins were then taken from wound bed and the specimen and margins were sent off for pathology.  The margins were sent for frozen section and while awaiting results we ensured that we had complete hemostasis with the use of bipolar.  The margins were clear hence, we began primary closuree tongue.  Interrupted  horizontal mattress sutures with the use of 3-0 Vicryls was used to close the defect in a linear fashion.  This completed our wide local excision of the tongue lesion and we turned our attention to the neck dissection.  Incision was made in the neck with the use of a 15 blade.  Subplatysmal flaps were raised inferiorly and superiorly.  We then began dissection of marginal mandibular nerve and this was encountered, found and protected.  We then began our level 1B dissection.  Anteriorly we used anterior belly of digastric as our level and we began to dissect out the perifacial lymph nodes, ensuring that we protected the marginal mandibular nerve.  We then used a Bovie cautery and cautery along the anterior belly of the digastric muscle.  We then started to free up the submandibular gland inferiorly.  We then encountered mylohyoid muscle and this was retracted and we found the lingual nerve and submandibular ganglion and the submandibular ganglion was divided.  Surgical clips were used.  We then found and protected hypoglossal nerve.  We then used finger dissection and isolated the duct and this was clipped and divided.  We then continued dissection until we had level 1B contents freed anteriorly and superiorly.  We then turned  our attention to performing levels 2 through 4.  We began dissecting along the SCM fascia and we continued to dissect medially.  Cranial nerve XI was found and protected.  We followed this up to digastric freeing the tissue superiorly.  We followed the internal jugular vein superiorly and we began to divide the tissue and start to pull it inferiorly.  We then continued our dissection inferiorly until we identified omohyoid muscle and continued further inferiorly to include level 4.  We continued our dissection medially until we encountered cervical roots and then we made our conscious fascial cut.  We continued to free the lymph node packet anteriorly and freed off the great vessels.  The ansa cervicalis and ansa hypoglossi were encountered and divided as needed.  We continued to free the lymph node packet inferiorly we identified the internal jugular vein.  We ensured that we ligated tissue inferiorly to prevent a chyle leak.  We continued to free the tissue off the great vessels and then medially freed the lymph packet off the strap musculature.  The specimen was then handed off to be sent to pathology for permanent section.  The wound was then irrigated and suctioned out.  Valsalva was performed and we did not see any bleeding or signs of chyle leak.  Two CAMACHO drains were placed in the neck, one in the SCM gutter and one superficially.  The wound was then closed in multiple layers.  The platysma layer was closed and then the subcutaneous tissue layer was closed with 3-0 vicryl and then skin was closed with a running 4-0 nylon.  This completed the procedure.  The patient was handed back over to Anesthesia, awoken, extubated and taken to PACU in stable condition.      Dr. Gray was present for all critical portions of this procedure.         JOSEPH GRAY MD       As dictated by NEVILLE RAMESH MD            D: 11/22/2017 09:42   T: 11/22/2017 10:06   MT: YESICA      Name:     PEYTON TORRES   MRN:       [id number]        Account:        [id number]   :      1955           Procedure Date: 2017      Document: [id number]

## 2017-11-22 NOTE — PROGRESS NOTES
ENT Brief Progress Note  11/22/2017     Paged by RN regarding multiple issues. Patient unable to tolerate anything PO. She's also very agitated and appeared to be delirious, concerning for alcohol withdrawal. She tried taking alcohol per order but coughed up. She also had difficulty keeping oxygen saturation requiring 6L of nasal cannula oxygen. Due to the her coughing and concern of aspiration, NPO for now. Changed PO meds to IV including Thiamine, Folic acid, and Ativan. Hold off sedating medications due to poor respiratory status. Ordered stat chest XR to evaluate etiology of respiratory distress. Also request 1:1 sitting to re-orient patient.    Addendum:  CXR showed likely atelectasis and small right pleural effusion. Patient calmed and more comfortable after 1 mg of IV Ativan. Plan to continue current plan and continue monitoring.    Panchito Mendes  Otolaryngology Resident - [id number]  [phone]  Please page ENT with questions by dialing [phone] and entering job code [phone] when prompted

## 2017-11-22 NOTE — PLAN OF CARE
Problem: Patient Care Overview  Goal: Plan of Care/Patient Progress Review  Outcome: Declining  Pt POD #1 left neck dissection and left partial glossectomy. Incision with sutures and dried drainage, scheduled bacitracin applied as ordered. x2 CAMACHO's to left neck. Tongue swollen. Garbled speech. D/o to time. Incontinent urine x1. No BM. PRN IV dilaudid given for c/o neck pain. MSSA score of 9. IV ativan given for coverage. Pt had episode of agitation, anxiety, and low oxygenation. Pt stated she needed more whiskey (recieving scheduled alcohol) because she felt she was going through withdrawals. O2 stats low 80's at this time. Face mask applied at 15L/min. Was still stating upper 80's after 5 minutes. Rapid response called. RT NT suctioned for large amounts of bloody, thick sputum. Lungs still sound coarse with RR of 24. IV infusing 75ml/hr. NG placed as pt is high aspiration risk if pt did not already aspirate at some point throughout admission. Chest xray ordered. MD's decided to tx patient to ICU d/t withdrawals and respiratory status. Report given to RN and pt assisted down with ICU RN and MD's. Continue with POC.

## 2017-11-22 NOTE — PROVIDER NOTIFICATION
Dr. Mendes notified regarding pt agitation, inability to swallow without coughing/gurgling, and declining respiratory status.  Pt yelling at staff, thrashing in bed at times, difficult to calm down.  IV Benadryl given by floor RN, Hermes, for itching with minimal relief.  Required 6L 02 via NC to keep sats in low 90's for extended period of time while trying to drink alcohol (which was ordered).  Each swallow resulted in pt gurgling, coughing, and requiring suctioning (small blood present as well).  Pt refused to stop drinking even though at risk for aspiration.  Cxray obtained.  MSSA score high but Ativan held due to 02 sats.  Once 02 sats improved and re-discussed with Dr. Mendes, 1 mg IV Ativan given for agitation/high MSSA score with good results.  Sitter at bedside for 2 hours for pt safety and then removed as pt calmed down.  Will continue to monitor.

## 2017-11-22 NOTE — PROGRESS NOTES
ENT overhead paged STAT to bedside, rapid response called due to patient desaturation.     On ENT arrival patient on 15L O2 by face mask satting 93%, no stridor or stertor. /59, tachycardic with . Patient agitated and pulling at lines. Calmed with 1mg Ativan given IV and oxygenation improved, weaned to 11L O2. Asking for whiskey. Per RN and RT at bedside patient was suctioned orally and nasally with return of thick bloody secretions.     - STAT CXR, EKG, and ABG  - Transfer to SICU for close airway and EtOH withdrawal monitoring. Unable to administer PRN IV ativan for withdrawal on the floor.   - Patient and above plan discussed with SICU and internal medicine teams  - ENT to place NG tube at bedside and bridle. Will continue whiskey 45ml TID given EtOH history   - ENT will continue to monitor closely     Dr. George updated on change in patient status and transfer    Jessica Hernández PA-C  Otolaryngology-Head & Neck Surgery  Please contact ENT by dialing [phone] and entering job code [phone].

## 2017-11-22 NOTE — PROGRESS NOTES
"Otolaryngology Progress Note  November 22, 2017    S: Patient agitated overnight. Unable to take any oral intake due to difficulty swallowing and spitting up. She received ~1/2 of her 45 mL alcohol last evening and spit almost all up this morning. Calmed after 1mg IV ativan overnight. Continues to require supplemental O2, weaned from 6L to 4L this morning.     O: BP 91/56 (BP Location: Right arm)  Temp 97.9  F (36.6  C) (Axillary)  Resp 16  Ht 1.55 m (5' 1.02\")  Wt 39 kg (85 lb 15.7 oz)  SpO2 91%  Breastfeeding? No  BMI 16.23 kg/m2   General: Alert and oriented x 3, mildly agitated when attempting to examine the oral cavity but otherwise no acute distress   HEENT: EOMI. No facial droop. Oral tongue edematous. Left lateral tongue incision c/d/i. Left neck incision c/d/i, neck soft and non-fluctuant. CAMACHO drains x 2 holding bulb suction with sanguinous drainage.    Pulmonary: Breathing non-labored on 3.5L O2 via NC, no stridor, no accessory muscle use.      Intake/Output Summary (Last 24 hours) at 11/22/17 1004  Last data filed at 11/22/17 0800   Gross per 24 hour   Intake          2090.42 ml   Output              517 ml   Net          1573.42 ml     CAMACHO drain output(s): (last 24 hours)/(last shift)  1 Left neck: 45 / 50 = 95 mL  2 Left neck 22 / 50 = 72 mL    LABS:  ROUTINE IP LABS (Last four results)  BMP  Recent Labs  Lab 11/20/17  1421      POTASSIUM 3.2*   CHLORIDE 103   SARAH 7.8*   CO2 26   BUN 12   CR 0.78   GLC 83     CBC  Recent Labs  Lab 11/21/17  2125 11/20/17  1421   WBC  --  6.9   RBC  --  2.57*   HGB  --  9.3*   HCT  --  29.5*   MCV  --  115*   MCH  --  36.2*   MCHC  --  31.5   RDW  --  18.7*    248     INRNo lab results found in last 7 days.    UA: negative  A/P: Alexia Flor is a 62 year old female with a past medical history of alcohol abuse, diastolic heart failure, COPD, breast cancer s/p chemoradiation, and a left tongue SCC now POD#1 s/p left partial glossectomy with primary " closure and left MRND.     Neuro:  - Pain control:  - Alcoholism: high concern for EtOH withdrawal.    - MSSA protocol ordered.    - Whiskey 45 mL TID - may consider increasing as needed   - Thiamine and folate, B12, multivitamin     HEENT:  - Incision cares: clean with 0.9% sodium chloride and apply bacitracin x 24h, then transition to Aquaphor Q8H   - Monitor and record CAMACHO drain output Qshift  - Peridex oral rinses 4 times daily   - PTA Zyrtec    Respiratory:  - supplemental O2 PRN to keep sats >92%, wean as able  - Hx COPD, no PTA inhalers/meds in EPIC. Appreciate medicine recommendations.     CV/heme:  - hemodynamically stable  - Acute on chronic anemia: continue PTA Iron supplement  - Hx diastolic heart failure: resume PRA lasix     FEN/GI:  - Clear liquid diet - not tolerating well due to difficulty swallowing 2/2 tongue swelling. Consider placing NG tube today until PO intake improves  - mIVF: D5 1/2NS +KCl 20 @ 75 mL/h   - Bowel regimen: pericolace BID  - omeprazole   - Hx hypokalemia: resume PTA potassium chloride tab 20 mEq daily   - Electrolyte replacement per protocol     :  - voiding independently    Endo  - Decadron 8mg Q8H x 2 doses for tongue swelling    ID:  - Perioperative Unasyn, complete  - Monitor for s/s of infection     PPX:  - SQ  Heparin 5,000 units Q8H  - SCDs  - IS    Disposition Plan   Expected discharge in 1-2 days to prior living arrangement once weaned off supplemental oxygen, tolerating oral intake and/or tube feeding at goal, pain controlled on oral medication only, drain outputs decreasing, surgical wounds stable.     Entered: Jessica Hernández 11/22/2017, 10:14 AM       -- Patient and above plan discussed with Dr. Doris Hernández, PA-C  Otolaryngology-Head & Neck Surgery  Please contact ENT with questions by dialing [phone] and entering job code [phone] when prompted.

## 2017-11-22 NOTE — PROGRESS NOTES
CLINICAL NUTRITION SERVICES - ASSESSMENT NOTE     Nutrition Prescription    RECOMMENDATIONS FOR MDs/PROVIDERS TO ORDER:  1.  If Phos < 2.5 mg/dL, recommend order enteral Phos supplementation (NeutraPhos 1 pkt TID-QID) in addition to IV replacement.     2.  ADAT only per MD/ENT discretion back to Mechanical Soft.      3.  ONLY once diet able to advance > CLs, order Ensure Plus Shake supplement BID between meals (each supplement provides 480 kcals, 15 gms PRO)    4.  ONLY once diet able to advance > CLs, order Calorie Counts to help evaluate oral intakes and ongoing approp of TF therapy.     Malnutrition Status:    Severe malnutrition in the context of acute on chronic illness (and possibly social/environmental circumstances with EtOH abuse)    Recommendations already ordered by Registered Dietitian (RD):  1.  Ordered to begin/slowly adv (high risk for refeeding) to the following initial goal TF:  --Only once K+ and Mg++ WNL and Phos remains >1.9 mg/dL (once BMP labs available and recheck Mg++ post completion of replacement), begin Isosource 1.5 @ 10 ml/hr and adv by 10 ml q 12 hrs to initial goal 40 ml/hr (960 ml/day) to provide 1440 kcals (37 kcal/kg/day), 65 g PRO (1.7 g/kg/day), 730 ml free H2O, 169 g CHO and 14 g Fiber daily.   --Ordered to HOLD advance of TF rate if K+/Mg++ < nrml and Phos <2 mg/dL.      2.  Ensure K+/Mg++/Phos ordered daily until TFs adv to goal infusion to evaluate for sx of refeeding with nutrition received, need for lyte replacement per already-ordered lyte protocols and approp to continue to adv nutritional provisions.     3.  Continue with enteral Folate/Thiamine.  Changed Theravite-M to suspension multivitamin/mineral (Certavite 15 ml/day via FT) to help ensure micronutrient needs being met with suspected hypermetabolic demands, EtOH hx/suspected deficiencies/highr refeeding risk and potential interruptions to TF infusions.     Future/Additional Recommendations:  Once tolerating  continuous goal TF and approp to transition to [place] Bolus regimen, recommend do so as follows: begin first bolus with 0.5 cans (125 ml) and if tolerates after approx 4 hrs without GI complaints and/or residuals < 500 ml, rec adv each subsequent bolus by 0.5 cans (125 ml) every ~4 hrs until reach goal regimen of Isosource 1.5, 1 cans (250 ml) QID = 4 cans daily (1000 ml/day) = 1500 kcals (38 kcal/kg), 68 g PRO (1.7 g/kg/day), 760 ml H2O, 176 g CHO and 15 g Fiber daily.   *Do not give feedings at night while pt asleep (during the day only).  *Change H2O flushes to 60 ml H2O before and after each bolus QID (to provide an addtl 480 ml H2O) to meet est fld needs.        REASON FOR ASSESSMENT  Alexia Flor is a/an 62 year old female assessed by the dietitian for Provider Order - Registered Dietitian to Assess and Order TF per Medical Nutrition Therapy Protocol    NUTRITION HISTORY  -Unable to obtain hx from pt with altered mentation/EtOH withdrawal now warranting sedation + administration of EtOH down FT.    CURRENT NUTRITION ORDERS  -Diet: NPO (changed to today with EtOH withdrawal warranting transfer/admission to the ICU) - noted postop status (op note reviewed) and previous had Mechanical Soft diet ordered but only briefly (11/21-11/22/17)    -Enteral access: ENT just placed NGT (12 Fr, 109 cm long Entriflex) + Bridle (12 Fr) today and AXR confirmed in stomach.    LABS  Mg++ 1.2, Phos 2.7 (recheck today ~11:57); no BMP since 11/20 but note just ordered BMP with events warranting transfer to the SICU    MEDICATIONS  Spirits (noted in ENT notes providing Whiskey) 45 ml TID - to be delivered now via FT as pt noted no plans to quit drinking.    Thiamine (previously received IV dose x1 early this AM, now changed to FT/enteral route today - 100 mg daily, no stop date)    Folate (previously received IV dose x1 early this AM, now changed to FT/enteral route today - 100 mg daily, no stop date)    ANTHROPOMETRICS  Height:  "155 cm (5' 1.024\")  Most Recent Weight: 39 kg (85 lb 15.7 oz) upon adm 11/21/17, noted 38.9 kg on 11/20/17 (preop/PTA)  IBW: 48 kg (@ 81%)  BMI: 16.2 kg/m2 =  Underweight BMI <18.5  Weight History: Per review of available wt records (below) compared to 11/20/17 wt, appears pt with loss of 2.5 kg from 8/21/17 to 11/20/17 = 6% loss x 3 months.    Wt Readings from Last 10 Encounters:   11/21/17 39 kg (85 lb 15.7 oz)   11/20/17 38.9 kg (85 lb 12.8 oz)   11/09/17 38.3 kg (84 lb 7 oz)   10/26/17 37.9 kg (83 lb 9.6 oz)   10/16/17 37.2 kg (82 lb)   08/28/17 41.5 kg (91 lb 7.9 oz)   08/28/17 42.5 kg (93 lb 9.6 oz)   08/21/17 41.4 kg (91 lb 3.2 oz)   08/03/17 40.8 kg (90 lb)   07/19/17 40.1 kg (88 lb 8 oz)      Dosing Weight: 39 kg (actual) = lowest/recent documented wt of 38.9 kg on 11/20/17    ASSESSED NUTRITION NEEDS  Estimated Energy Needs: 3534-3014+ kcals/day (30 - 35+ kcals/kg )  Justification: Post-op, Repletion and Underweight  Estimated Protein Needs: 59-78 grams protein/day (1.5 - 2 grams of pro/kg)  Justification: Post-op, Repletion and Underweight  Estimated Fluid Needs: (25 - 30+ mL/kg)   Justification: Maintenance    PHYSICAL FINDINGS  See malnutrition section below.  No abnormal nutrition-related physical findings observed - but not able to examine oral cavity with altered mentation.  Poor skin turgor     MALNUTRITION  % Intake: Unable to assess (suspect inadequate given EtOH and physical appearance with wt losses)  % Weight Loss: Up to 7.5% in 3 months (non-severe) but underweight status per BMI <18.5 kg/m2 suggests underweight/severe malnutrition  Subcutaneous Fat Loss: Facial region, Upper arm and Thoracic/intercostal: Severe  Muscle Loss: Temporal, Facial & jaw region, Thoracic region (clavicle, acromium bone, deltoid, trapezius, pectoral), Upper arm (bicep, tricep), Dorsal hand, Upper leg (quadricep, hamstring), Patellar region and Posterior calf:  Severe  Fluid Accumulation/Edema: None " noted  Malnutrition Diagnosis: Severe malnutrition in the context of acute on chronic illness (and possibly social/environmental circumstances with EtOH abuse)    NUTRITION DIAGNOSIS  Inadequate protein-energy intake r/t suspect SCC of base of tongue as EtOH abuse limiting quality and quality nutritional intakes AEB unknown diet intake hx PTA now with EtOH withdrawal warranting FT placement for administration of Rx/TF/EtOH in pt with significant sx of muscle/fat wasting and wt loss of 6% x 3 months PTA.    INTERVENTIONS  Implementation  Nutrition Education: Not appropriate at this time due to patient condition     Collaboration and Referral of Nutrition care - Discussed plan for FEN/GI on rounds with Providers who approved to begin TF/adjust micronutrients per above and will follow for labs for approp of replacement/begin and adv TFs.      Goals  1.  Tolerate adv of TF to goal infusion without sx of refeeding within the next 3 days.    2.  Total ave nutrition intakes (TF + diet if/when allowed) to provide minimum 30 kcal/kg/day and 1.5 g PRO/kg/day (per 39 kg).      Monitoring/Evaluation  Progress toward goals will be monitored and evaluated per protocol.     Layla Hudson ,RD, LD, CNSC (pgr [phone])

## 2017-11-22 NOTE — PHARMACY-CONSULT NOTE
Pharmacy Tube Feeding Consult    Medication reviewed for administration by feeding tube and for potential food/drug interactions.    Recommendation: No changes are needed at this time.     Pharmacy will continue to follow as new medications are ordered.    Manolo TylerD

## 2017-11-22 NOTE — PLAN OF CARE
Problem: Patient Care Overview  Goal: Plan of Care/Patient Progress Review  POD#1 Left Partial Glossectomy And Left Neck Dissection. Pt using writing board for communication at times, garbled speech. AxOx3-4, disor to date at times; wears sunglasses when lights are on.  VSS, currently on 3-4L NC sats ranging 92-93%; capnography in place. Pt has hx of heavy alcoholism and gets irritable easily. Dr. Mendes notified regarding pt agitation and inability to swallow w/out coughing/gurgling, and declining respiratory status. Pt was yelling at staff, thrashing in bed at times, difficult to calm. IV Benadryl given w/minimal relief; at this point in time requiring 6L NC to keeps sats in low 90 s. Pt had scheduled alcohol to drink and continued to attempt to drink the alcohol w/each swallow resulting in gurgling, coughing and oral suctioning. MSSA protocol in place, w/ pt s initial score of 18. Once O2 sats improved and re-discussed w/Dr. Mendes, 1mg IV Ativan given w/good result. Sitter at bedside for 2 hours and then removed as pt calmed down. IV Dilaudid given for severe pain at times. Incisions TEO, bacitracin applied. Tongue/mouth difficult to assess and draining small amts of blood at times. CAMACHO x2 from neck. MIVF @75/hr. Voiding via bedpan.  Continue to monitor and follow POC

## 2017-11-22 NOTE — PLAN OF CARE
Problem: Patient Care Overview  Goal: Plan of Care/Patient Progress Review  Outcome: Improving  D: Pt transferred from  shortly before noon today for decreased LOC and low sats secondary to benzos for behaviour changes. I: Initiated standard ICU monitoring. Pt stats 97% and better on 10 liters face mask. CO2 monitor IPI scores 8 and better. Pt arousable and nodding appropriately to questions. Requests bedpan appropriately. A: NG placement confirmed, 0800 meds given along with ETOH dose. Pt resting well. Occasional frustration with communication. Adequate pain control on 0.5 mg dilaudid. P: Anticipate start of tube feeds today. Continue plan of care.

## 2017-11-22 NOTE — PROVIDER NOTIFICATION
11/22/17 1100   Call Information   Date of Call 11/22/17   Time of Call 1006   Name of person requesting the team Lucia   Title of person requesting team RN   RRT Arrival time 1009   Time RRT ended 1153   Reason for call   Type of RRT Adult   Primary reason for call Staff concerned;Care plan cannot be sustained at current level of care   Was patient transferred from the ED, ICU, or PACU within last 24 hours prior to RRT call? Yes   SBAR   Situation Pt. s/p L partial glossectomy, L selectiive neck dissection in ETOH withdrawal, desaturating   Background Squamous cell CA left side of tongue   Notable History/Conditions Cardiac   Assessment coarse lung sounds, increased O2 demand, agitated   Interventions CXR;ECG;Meds;Suction   Patient Outcome   Patient Outcome Transferred to  (ICU 4A)   RRT Team   Attending/Primary/Covering Physician Dandre Jean Baptiste MD   Date Attending Physician notified 11/22/17   Time Attending Physician notified 1000   Physician(s) LIA Lee   Lead RN Marcial Anderson RN   RN Lucia Mckeon RN   RT Juve Bray RT

## 2017-11-23 NOTE — ADDENDUM NOTE
Addendum  created 11/23/17 1730 by Roger Toribio MD    Delete clinical note, Sign clinical note

## 2017-11-23 NOTE — PLAN OF CARE
Problem: Patient Care Overview  Goal: Plan of Care/Patient Progress Review  OT 4A: Evaluation orders received. Cancel today as pt currently in OR

## 2017-11-23 NOTE — PLAN OF CARE
Problem: Pain, Acute (Adult)  Goal: Identify Related Risk Factors and Signs and Symptoms  Related risk factors and signs and symptoms are identified upon initiation of Human Response Clinical Practice Guideline (CPG).   Outcome: Declining  D:  Pt had steadily increasing O2 needs this AM accompanied with increased tachycardia.  Pt SATS ~84 at 0700, Oxi plus mask O2 increased to 10LPM from 7LPM for durration of nursing report, Pt SATS stabilized ~91% during this time.  Writer entered room to assess pt around 0740 and noted that pt seemed more lethargic, confused and in distress than previous assessments and report.  Writer called SICU to come and assess pt, HFNC ordered.  RT came to place HFNC and assist in nasopharyngeal suctioning, RT needed to leave oxi plus mask on at 15 LPM to keep SATS >89%.  SATS not improving significantly enough on 100%HFNC and 15LPM Oxiplus mask, SICU called to reassess pt at bedside.  ENT paged to assess pt.  Increasing swelling to L neck noted from previous assessments and even more so than 0800 assessment.  Alternate IV's started per vascular access, VBG and CBC sent to lab.  L neck explored by ENT at bedside and pt started to bleed.  Anesthesia paged to assist as plan shifted to bring pt back to OR.  1L NS started via pressure bag for replacement as pressure began to drop and pt became more tachy than primary assessment, Precedex started to manage agitation/sedation levels.  Pt left for OR by 0920  I: SICU, ENT, Anesthesia, RT, Writer and Charge RN assisting pt and getting pt to OR.  A: To OR with ENT, Anesthesia, RT, and SICU  P:  Continue ICU level of assessments and cares Post op upon pt's return.

## 2017-11-23 NOTE — ANESTHESIA PREPROCEDURE EVALUATION
Anesthesia Evaluation    Physical Exam      Airway   Comment: Case emergent. Unable to assess    Dental   Comment: Unable to aseess    Cardiovascular   Rhythm and rate: regular and abnormal      Pulmonary (+) rhonchi, decreased breath sounds and wheezes             Anesthesia Plan      History & Physical Review      ASA Status:  4 emergent.    NPO Status:  Unknown and waived due to emergency    Plan for Awake Technique, Other and General (awake tracheostomy ) with Intravenous induction. Maintenance will be Inhalation.    PONV prophylaxis:  Other (See comment)       Postoperative Care  Postoperative pain management:  Multi-modal analgesia.      Consents  Anesthetic plan, risks, benefits and alternatives discussed with:  Patient..          Anesthesia Pre-op Note    Alexia Flor is a 62 year old female with past medical as described below is scheduled for Procedure(s):  left Neck Exploration, tracheostomy , placement of nasogastric feeding tube - Wound Class: II-Clean Contaminated   - Wound Class: II-Clean Contaminated    Past Medical History:   Diagnosis Date     Alcohol abuse      Alcoholic hepatitis      Alcoholic pancreatitis      Anxiety      Schafer's esophagus      Breast cancer (H)     rt, s/p radiation.     Chemical dependency (H)      Congestive heart failure, unspecified      COPD (chronic obstructive pulmonary disease) (H)      Depressive disorder      History of radiation therapy      Hoarseness      Hypokalemia      Macrocytic anemia      Non-adherence to medical treatment      Tobacco abuse      Tongue cancer (H)      Past Surgical History:   Procedure Laterality Date     DISSECTION RADICAL NECK Left 11/21/2017    Procedure: DISSECTION RADICAL NECK;;  Surgeon: Heriberto George MD;  Location: [facility] OR     ESOPHAGOSCOPY, GASTROSCOPY, DUODENOSCOPY (EGD), COMBINED N/A 11/9/2017    Procedure: COMBINED ENDOSCOPIC ULTRASOUND, ESOPHAGOSCOPY, GASTROSCOPY, DUODENOSCOPY (EGD), FINE NEEDLE ASPIRATE/BIOPSY;;   Surgeon: Yo Bhat MD;  Location: [facility] OR     GLOSSECTOMY PARTIAL N/A 11/21/2017    Procedure: GLOSSECTOMY PARTIAL;  Left Partial Glossectomy And Left Neck Dissection, ;  Surgeon: Heriberto George MD;  Location: [facility] OR     LARYNGOSCOPY WITH BIOPSY(IES) N/A 11/9/2017    Procedure: LARYNGOSCOPY WITH BIOPSY(IES);  Direct Laryngoscopy,  Upper Endoscopic Ultrasound and Fine Needle Aspiration;  Surgeon: Heriberto George MD;  Location: [facility] OR     lumpectomy Rt breast  2006       Family History   Problem Relation Age of Onset     Coronary Artery Disease Father      Emphysema Father      CANCER Mother      renal cancer     Breast Cancer Maternal Grandmother      CANCER Maternal Grandmother      Substance Abuse Maternal Grandfather      CANCER Maternal Grandfather      Substance Abuse Cousin      CANCER Cousin      Substance Abuse Brother      DIABETES Brother      Social History     Social History     Marital status: Single     Spouse name: N/A     Number of children: N/A     Years of education: N/A     Occupational History     Not on file.     Social History Main Topics     Smoking status: Heavy Tobacco Smoker     Packs/day: 0.50     Years: 40.00     Types: Cigarettes     Smokeless tobacco: Current User     Alcohol use 0.5 oz/week      Comment: 1/2 liter per day     Drug use: No     Sexual activity: Not Currently     Partners: Male     Other Topics Concern     Not on file     Social History Narrative    Lives in [place] in an apartment. Does not own a car.     Current Outpatient Prescriptions   Medication Sig Dispense Refill     oxyCODONE IR (ROXICODONE) 5 MG tablet Take 1-2 tablets (5-10 mg) by mouth every 4 hours as needed for moderate to severe pain 30 tablet 0     potassium chloride (MICRO-K) 10 MEQ CR capsule TAKE 2 CAPSULES BY MOUTH DAILY 30 capsule 0     Current Facility-Administered Medications   Medication     dexmedetomidine (PRECEDEX) 400 mcg in 0.9% sodium chloride 100 mL     fentaNYL (SUBLIMAZE)  infusion     [START ON 11/24/2017] pantoprazole (PROTONIX) 40 mg IV push injection     mineral oil-hydrophilic petrolatum (AQUAPHOR)     multivitamins with minerals (CERTAVITE/CEROVITE) liquid 15 mL     cyanocobalamin (vitamin  B-12) tablet 1,000 mcg     folic acid (FOLVITE) tablet 1 mg     [START ON 11/24/2017] heparin sodium PF injection 5,000 Units     diazepam (VALIUM) injection 5-20 mg     norepinephrine (LEVOPHED) 16 mg in D5W 250 mL infusion     vasopressin (PITRESSIN) 40 Units in D5W 40 mL infusion     lactated ringers injection     calcium gluconate 2 g in D5W 100 mL intermittent infusion     chlorhexidine (PERIDEX) 0.12 % solution 15 mL     potassium chloride SA (K-DUR/KLOR-CON M) CR tablet 20-40 mEq     potassium chloride (KLOR-CON) Packet 20-40 mEq     potassium chloride 10 mEq in 100 mL sterile water intermittent infusion (premix)     potassium chloride 10 mEq in 100 mL intermittent infusion with 10 mg lidocaine     magnesium sulfate 4 g in 100 mL sterile water (premade)     potassium phosphate 15 mmol in D5W 250 mL intermittent infusion     potassium phosphate 20 mmol in D5W 500 mL intermittent infusion     potassium phosphate 20 mmol in D5W 250 mL intermittent infusion     potassium phosphate 25 mmol in D5W 500 mL intermittent infusion     glucose 40 % gel 15-30 g    Or     dextrose 50 % injection 25-50 mL    Or     glucagon injection 1 mg     insulin aspart (NovoLOG) inj (RAPID ACTING)     dextrose 10 % 1,000 mL infusion     thiamine tablet 100 mg     lidocaine 1 % 1 mL     lidocaine (LMX4) kit     sodium chloride (PF) 0.9% PF flush 3 mL     sodium chloride (PF) 0.9% PF flush 3 mL     dextrose 5% and 0.45% NaCl + KCl 20 mEq/L infusion     ondansetron (ZOFRAN) injection 4 mg     prochlorperazine (COMPAZINE) injection 10 mg    Or     prochlorperazine (COMPAZINE) tablet 10 mg     naloxone (NARCAN) injection 0.1-0.4 mg     diphenhydrAMINE (BENADRYL) injection 25 mg        Allergies   Allergen Reactions  "    Codeine Anaphylaxis     Contrast Dye Itching and Swelling     7/10/2009 Patient reports history of contrast reaction when first diagnosed with breast cancer. \"I almost .\"   (Hives, swelling.) 2009 CT to be done without IV contrast per Dr. KAVIN Live     Moxifloxacin Anaphylaxis     Avelox     Nickel Itching and Rash     Codeine Sulfate Other (See Comments)     shaking     Gabapentin Other (See Comments)     Patient reports that she got very shaky and she \"felt like she was going to die\"         Lab:     CBC RESULTS:   Recent Labs   Lab Test  17   1346   WBC  3.4*   RBC  2.03*   HGB  7.4*   HCT  24.0*   MCV  118*   MCH  36.5*   MCHC  30.8*   RDW  18.5*   PLT  182     Recent Labs   Lab Test  17   1345  17   1130  17   0313   NA  142  141  140   POTASSIUM  2.9*  3.1*  4.2   CHLORIDE  111*   --   106   CO2  22   --   25   ANIONGAP  8   --   9   GLC  175*  171*  187*   BUN  12   --   13   CR  0.61   --   0.72   SARAH  6.6*   --   7.4*     The laboratory has evaluated your INR and PTT and the results are as listed below.    Lab Results   Component Value Date    INR 1.10 2017     No results found for: PTT                            Alexia Flor is a 62 year old female  admitted on 2017. She has a history of alcohol abuse, diastolic heart failure, COPD, breast cancer s/p radiation and chemotherapy and is admitted following a left partial glossectomy and left neck dissection for invasive SCC. She is s/p left Partial Glossectomy And Left Neck Dissection performed on 2017. Her postoperative course was complicated by agitation, delirium, and alcohol withdrawal. She tried taking alcohol but coughed up. She also had difficulty keeping oxygen saturation requiring 6L of nasal cannula oxygen. Due to the her coughing there was a concern of aspiration.     Per Medicine note on 17, She likely has aspiration pneumonia, atelectasis. Patient attempted to drink whiskey to help with " ETOH withdrawals, but RN reported that patient felt she could not swallow and there was definite concern for aspiration. She also is becoming more and more agitated with withdrawal and requiring increasing doses of benzodiazepines, which is likely suppressing her breathing. She also becomes agitated and starts screaming out, which is associated with acute drop in O2 sats to 60s and 70s. Patient was then transferred to ICU.     At ICU she developed bleeding from incision likely from repetitive coughing and agitation. At one point she was  placed on 100% HFNC at 40L together with 15L oxymask. Patient saturating 96%. She was bleeding from incision site. As her condition deteriorated, she came to OR STAT for awake tracheostomy.

## 2017-11-23 NOTE — PROGRESS NOTES
Otolaryngology Progress Note  November 23, 2017     S: Was called to patient's room due to increased swelling of left neck and acute agitation/desaturation     O: Temp: 97.6  F (36.4  C) Temp src: Axillary BP: 151/69   Heart Rate: 93 Resp: 14 SpO2: 96 % O2 Device: Oxi Plus                          General: fluctuating between agitated and sedated, O2 sats jumping between 75 and 99%.                          HEENT: EOMI. No facial droop. Oral tongue edematous. Floor of mouth greatly swollen and tense to palpation. Left lateral tongue incision c/d/i. Left neck incision c/d/i,neck tense and firm superior and inferior to incision. CAMACHO drains x 2 holding bulb suction with sanguinous drainage.                          Pulmonary: Breathing non-labored on 15L O2 via mask, no stridor, accessory muscle use is present.          Intake/Output Summary (Last 24 hours) at 11/23/17 0646  Last data filed at 11/23/17 0600   Gross per 24 hour   Intake             3410 ml   Output              361 ml   Net             3049 ml     CAMACHO drain output(s): (last 24 hours)/(last shift)  1 Left neck: 50,20,31 (101)  2 Left neck 50,12,24 (86)     A/P: Alexia Flor is a 62 year old female with a past medical history of alcohol abuse, diastolic heart failure, COPD, breast cancer s/p chemoradiation, and a left tongue SCC now POD#2 s/p left partial glossectomy with primary closure and left MRND complicated by alcohol withdrawal with acute change in neck indicative of active bleeding and Hematoma formation.     The neck was opened bedside for immediate decompression due to patient desaturation at which point bright red pulsatile bleeding was noted. Pressure was immediately applied and the patient was rushed to the OR for emergent awake trach, due to inability to intubate from above, and neck exploration.     -- Patient and above plan discussed with Dr. Doris Jean Baptiste, PGY1  Otolaryngology-Head & Neck Surgery  Please contact ENT with  questions by dialing [phone] and entering job code [phone] when prompted.

## 2017-11-23 NOTE — PROGRESS NOTES
SURGICAL ICU PROGRESS NOTE  November 23, 2017      CO-MORBIDITIES:   Acute post-operative pain  (primary encounter diagnosis)    ASSESSMENT: Alexia Flor is a 62-year-old female with PMH alcohol use disorder, MDD, Breast Cancer, tongue cancer, alcoholic fatty liver, COPD, and diastolic heart failure now POD #2 s/p left partial glossectomy with primary closure and left neck dissection for invasive SCC.  Admitted to SICU for assistance with EtOH withdrawal, possible need for ativan gtt, and respiratory monitoring.    This AM was noted to have worsening respiratory status and an enlarging neck hematoma. She was emergently taken to the OR for neck exploration and tracheostomy. During neck exploration, no active bleeding was seen. Was on norepinephrine and vasopressin during the case. Postoperatively has had low MAPs requiring NE and vasopressin     CHANGES FOR TODAY :  - Start precedex and fentanyl gtt  - Wean off levophed and vasopressin as able  - q6h Hb checks  - 1 U pRBCs     PLAN:  Neuro/ pain/ sedation:  - Monitor neurological status. Notify the MD for any acute changes in exam.  - Pain: Fentanyl gtt  - Sedation: Precedex gtt     Pulmonary care:   - Tracheostomy, MV  CMV: 20/400/18/100%  - ABGs q2h till stable then ABGs q4h    Cardiovascular:    - Monitor hemodynamic status.   - NE and vasopressin gtt  - Will transfuse 1U pRBCs    GI care:   - NPO except ice chips and medications.    Fluids/ Electrolytes/ Nutrition:   - D5 1/2 NS+ 20 K @75 for IV fluid hydration  - On replacement protocol   - No indication for parenteral nutrition.    Renal/ Fluid Balance:    - Urine output is adequate so far.  - Will continue to monitor intake and output.    Endocrine:    - Sliding scale for diabetes management.     ID/ Antibiotics:  - No indication for antibiotics.     Heme:     - Will transfuse 1U pRBCs with context of bleeding and Hb 7.4    Prophylaxis:    - Mechanical prophylaxis for DVT.   - Will plan for DVT ppx tomorrow      Lines/ tubes/ drains:  -  CVC  - A line  - PIV x 3  - Neck JPs x 2    Disposition:  - Surgical ICU.     Patient discussed with surgical ICU staff , Dr. Martin.        ====================================    TODAY'S PROGRESS:   SUBJECTIVE:     This AM was noted to have worsening respiratory status and an enlarging neck hematoma. She was emergently taken to the OR for neck exploration and tracheostomy. During neck exploration, no active bleeding was seen. Was on norepinephrine and vasopressin during the case. Postoperatively has had low MAPs requiring NE and vasopressin       OBJECTIVE:   1. VITAL SIGNS:   Temp:  [94.9  F (34.9  C)-98.6  F (37  C)] 94.9  F (34.9  C)  Heart Rate:  [] 135  Resp:  [11-42] 15  BP: (104-161)/(59-86) 104/75  FiO2 (%):  [100 %] 100 %  SpO2:  [78 %-100 %] 84 %  FiO2 (%): 100 % (35 bleed in)  Resp: 15    2. INTAKE/ OUTPUT:   I/O last 3 completed shifts:  In: 2371.25 [P.O.:45; I.V.:1661.25; NG/GT:465]  Out: 361 [Urine:250; Drains:111]    3. PHYSICAL EXAMINATION:   General: Intubated and sedated   Resp: B/L air entry , bilateral rales   HEENT: incisions C/D, no swelling or hematoma , CAMACHO drains with scant sanguineous output   CV: RRR  Abdomen: Soft, Non-distended, Non-tender  Extremities: warm and well perfused    4. INVESTIGATIONS:   Arterial Blood Gases     Recent Labs  Lab 11/23/17  1130 11/22/17  1141   PH 7.17* 7.39   PCO2 64* 45   PO2 47* 97   HCO3 23 27     Complete Blood Count     Recent Labs  Lab 11/23/17  1130 11/23/17  0852 11/23/17  0313 11/22/17  1157 11/21/17  2125 11/20/17  1421   WBC  --  16.3* 22.4* 20.0*  --  6.9   HGB 7.1* 7.7* 8.1* 8.0*  --  9.3*   PLT  --  222 213 200 237 248     Basic Metabolic Panel    Recent Labs  Lab 11/23/17  1130 11/23/17  0313 11/22/17  1157 11/20/17  1421    140 142 141   POTASSIUM 3.1* 4.2 3.8 3.2*   CHLORIDE  --  106 108 103   CO2  --  25 26 26   BUN  --  13 16 12   CR  --  0.72 0.75 0.78   * 187* 161* 83        =========================================    Discussed with staff, Dr. Veronica Blum MD  PGY-2 General Surgery Resident   [phone]

## 2017-11-23 NOTE — ANESTHESIA CARE TRANSFER NOTE
Patient: Alexia Hernandez [facility]    Procedure(s):  left Neck Exploration, tracheostomy , placement of nasogastric feeding tube - Wound Class: II-Clean Contaminated   - Wound Class: II-Clean Contaminated    Diagnosis: Neck Hematoma  Diagnosis Additional Information: No value filed.    Anesthesia Type:   No value filed.     Note:  Airway :Tracheostomy and Ventilator  Patient transferred to:ICU  Comments: Tx to 4A with full monitors, O2 per ambu, placed on ventilator, Vt 400, RR 15, FiO2 1.0, PEEP 8, norepinephrine and vasopressin infusions discontinued, dexmedetomidine @ 0.6 mcg/kg/hr, VSS, report to RN.ICU Handoff: Call for PAUSE to initiate/utilize ICU HANDOFF, Identified Patient, Identified Responsible Provider, Reviewed the Pertinent Medical History, Discussed Surgical Course, Reviewed Intra-OP Anesthesia Management and Issues during Anesthesia, Set Expectations for Post Procedure Period and Allowed Opportunity for Questions and Acknowledgement of Understanding      Vitals: (Last set prior to Anesthesia Care Transfer)    CRNA VITALS  11/23/2017 1209 - 11/23/2017 1309      11/23/2017             Resp Rate (observed): (!)  1                Electronically Signed By: ANA LAURA Caldwell CRNA  November 23, 2017  1:10 PM

## 2017-11-23 NOTE — ANESTHESIA POSTPROCEDURE EVALUATION
Patient: Alexia Hernandez [facility]    Procedure(s):  left Neck Exploration, tracheostomy , placement of nasogastric feeding tube - Wound Class: II-Clean Contaminated   - Wound Class: II-Clean Contaminated    Diagnosis:Neck Hematoma  Diagnosis Additional Information: No value filed.    Anesthesia Type:  Awake tracheostomy     Note:  Anesthesia Post Evaluation    Patient participation: Unable to participate in evaluation secondary to underlying medical condition  Pain management: unable to assess  PONV: unable to assess     Anesthetic complications: yes  Specific anesthetic complications: Perioperative Aspiration   Additional complication comments:Other QI - See Comment        Last vitals:  Vitals:    11/23/17 1630 11/23/17 1645 11/23/17 1700   BP: 92/48 91/52 90/50   Resp:      Temp:      SpO2: 93% 94% 96%         Electronically Signed By: Roger Toribio MD  November 23, 2017  5:21 PM

## 2017-11-23 NOTE — PROGRESS NOTES
Patient placed on 100% HFNC at 40L together with 15L oxymask. Patient saturating 96% when leaving room. Patient was NT sx with 10F catheter for moderate white bloody thin secretions. RT to follow.

## 2017-11-23 NOTE — PROGRESS NOTES
Otolaryngology Brief Progress Note    We were paged by patient's RN about sudden left sided swelling, bleeding from drain sites, and increase in oxygen requirements of Ms. Flro. When we arrived at bedside she was tachycardic in the 120s with good blood pressures and on 15 lpm 100% high flow nasal cannula, saturating ~90% but dropping ~80% whenever she was more agitated/held her breath. She was not following commands. Neck felt fluctuant and FOM appeared firm and full. No stridor. Drains had sanguinous output, ~ 15 cc in each one when they had just been drained at about 7am.  Two large bore needle IV access were ordered and four point restrains were placed. ENT staff on call arrived to the bedside within minutes and a decision was made to open the incision and drain the hematoma. Betadine prep was done and a few of the midline sutures were removed, a juana clamp was used to open the incision when we encountered pulsatile bleeding. We proceeded to pack the wound and apply pressure. She continued to saturate in the low 90% and we were able to bag valve mask ventilate her without resistance. We transferred to the OR for an emergent neck exploration and intraoperative intubation vs tracheostomy.     Lila Oconnor MD  PGY-2

## 2017-11-23 NOTE — BRIEF OP NOTE
November 23, 2017    Pre-op Diagnosis:  Left neck hematoma  Post-op Diagnosis:   same  Procedure:   Emergent Tracheostomy, left neck exploration, NG tube placement.    Surgeons:  Daisy Singh MD-primary, Sachin Jean Baptiste MD assisting  Anesthesia:  GETA  EBL:  25 cc  Drains:  x2 left neck 10 Chinese flat      Complications:  Patient aspiration on GI contents during tracheostomy  Specimens:   none    Findings:  Coagulated blood in the left neck, no signs of active bleeding    Indications:  Ms. Flor is a 62 year old female who is POD2 follow left partial glossectomy with primary closure and left level 1-4 neck dissection with a rapidly expanding hematoma that was opened bedside and found to have active bleeding and acute desaturation.

## 2017-11-23 NOTE — PLAN OF CARE
Problem: Patient Care Overview  Goal: Plan of Care/Patient Progress Review  PT-4A- Cancel, pt to OR.

## 2017-11-23 NOTE — ANESTHESIA PROCEDURE NOTES
Arterial Line Procedure Note  Staff:     Anesthesiologist:  ELVIS PARHAM    Resident/CRNA:  JOYCE RUTH    Arterial line performed by resident/CRNA in presence of a teaching physician    Location: In OR After Induction  Procedure Start/Stop Times:     patient identified, IV checked, site marked, risks and benefits discussed, informed consent, monitors and equipment checked, pre-op evaluation and at physician/surgeon's request      Correct Patient: Yes      Correct Position: Yes      Correct Site: Yes      Correct Procedure: Yes      Correct Laterality:  Yes    Site Marked:  Yes  Line Placement:     Procedure:  Arterial Line    Insertion Site:  Radial    Insertion laterality:  Left    Skin Prep: Chloraprep      Patient Prep: patient draped, mask, sterile gloves, sterile gown and hat      Local skin infiltration:  None    Ultrasound Guided?: Yes      Artery evaluated via ultrasound confirming patency.   Using realtime imaging, the artery was punctured and the needle was observed entering the artery.      A permanent image is entered into patient's chart.      Catheter size:  20 gauge, 12 cm    Cath secured with: suture      Dressing:  Tegaderm    Complications:  None obvious    Arterial waveform: Yes      IBP within 10% of NIBP: Yes

## 2017-11-23 NOTE — ANESTHESIA PROCEDURE NOTES
Central Line Procedure Note  Staff:     Anesthesiologist:  ELVIS PARHAM    Resident/CRNA:  JOYCE RUTH    Central line placed by Resident/CRNA in the presence of a teaching physician    Location: In OR after induction  Procedure Start/Stop Times:     patient identified, IV checked, site marked, risks and benefits discussed, informed consent, monitors and equipment checked, pre-op evaluation and at physician/surgeon's request      Correct Patient: Yes      Correct Position: Yes      Correct Site: Yes      Correct Procedure: Yes      Correct Laterality:  Yes    Site Marked:  Yes  Line Placement:     Procedure:  Central Line    Insertion laterality:  Right    Insertion site:  Femoral    Position:  Supine      Maximal Sterile Barriers: All elements of maximal sterile barrier technique followed      (Maximal sterile barriers include:   Sterile gown, Sterile Gloves, Mask, Cap, Whole body draped, hand hygiene and acceptable skin prep).       Injection Technique:  Ultrasound guided    Sterile Ultrasound Technique:  Sterile probe cover and Sterile gel    Vein evaluated via U/S for patency/adequacy of catheter insertion and is adequate.  Using realtime U/S imaging the vein was punctured, and needle was observed entering vein on U/S      Permanent Image entered into patient's record      Local skin infiltration:  None    Catheter size:  12 Fr, 3 lumen, 20 cm    Catheter length at skin (cm):  20    Cath secured with: suture      Dressing:  Tegaderm and Biopatch    Complications:  None obvious    Blood aspirated all lumens: Yes      All Lumens Flushed: Yes      Verification method:  Placement to be verified post-op

## 2017-11-23 NOTE — PLAN OF CARE
Problem: Pain, Acute (Adult)  Goal: Identify Related Risk Factors and Signs and Symptoms  Related risk factors and signs and symptoms are identified upon initiation of Human Response Clinical Practice Guideline (CPG).   Outcome: No Change  Shift durration: 9055-8293    LOC: confused, needing reorientation almost every time I enter room.  Pt unable to state/communicate date or answer orientation questions.  Neuro: grossly intact, pupils 3 and reactive each to light, orientation and communication are focal deficits.  Cards:MAP's 80's, NSR 80's with conductive PAC's occasionally.  Pulm:7LPM per oxi plus mask, LS course and pt has weak cough.  Pt encouraged every hour this shift to cough and deep breathe.    GI/: adequate UOP via bed pan.  Pt passing gas.  Skin: intact, fragile.  Incision to L tongue and L neck.  Mobility: A2 to reposition.  Vasc access: PIV x2  Drains/Devices: 2 CAMACHO's L neck.  Special/Event: no events this shift, continue to monitor on ICU.

## 2017-11-24 NOTE — PROCEDURES
[facility]  Anesthesia Procedure Note    Name of Procedure: Right-Sided Arterial Line Placement    Date of Procedure: November 23, 2017    Description of Procedure  The patient was placed in the supine position and her right arm was abducted and rotated.  The puncture site was then prepped and draped in the usual sterile fashion.  A 20-gauge needle was then advanced through the patient's skin and subcutaneous tissue and entered into the patient's brachial artery. Strong pulsatile blood flow was immediately observed coming from the needle. A guidewire was then advanced through the needle. The needle was subsequently removed over the guidewire, after which an arterial catheter was advanced over the guidewire. The guidewire was then removed and the catheter was attached to a transducer. It was subsequently sutured into place. After cleaning the site of entry, the patient's arm was relaxed and a sterile dressing was applied. There were not any immediate complications and the patient tolerated the procedure well.    Findings: A good waveform was observed on the monitor and the arterial line blood pressure readings matched those obtained via the blood pressure cuff.    Jean Marshall Jr., MD  Anesthesia Resident - [facility]  Pager: [phone]  11/23/2017  8:59 PM  11/23/2017  8:59 PM

## 2017-11-24 NOTE — PLAN OF CARE
Problem: Patient Care Overview  Goal: Plan of Care/Patient Progress Review  PT evaluation cx, patient not medically appropriate for evaluation today.

## 2017-11-24 NOTE — PHARMACY-VANCOMYCIN DOSING SERVICE
Pharmacy Vancomycin Initial Note  Date of Service 2017  Patient's  1955  62 year old, female    Indication: Sepsis    Current estimated CrCl = Estimated Creatinine Clearance: 43.9 mL/min (based on Cr of 0.81).    Creatinine for last 3 days  2017: 11:57 AM Creatinine 0.75 mg/dL  2017:  3:13 AM Creatinine 0.72 mg/dL;  1:45 PM Creatinine 0.61 mg/dL;  7:45 PM Creatinine 0.85 mg/dL  2017:  3:20 AM Creatinine 0.81 mg/dL    Recent Vancomycin Level(s) for last 3 days  No results found for requested labs within last 72 hours.      Vancomycin IV Administrations (past 72 hours)      No vancomycin orders with administrations in past 72 hours.                Nephrotoxins and other renal medications (Future)    Start     Dose/Rate Route Frequency Ordered Stop    17  vancomycin (VANCOCIN) 750 mg in NaCl 0.9 % 250 mL intermittent infusion      750 mg  over 90 Minutes Intravenous EVERY 12 HOURS 17 0747      17 1000  piperacillin-tazobactam (ZOSYN) 3.375 in 15 mL NS Premix Syringe      3.375 g  over 3 Minutes Intravenous EVERY 6 HOURS 17 0750      17 0800  vancomycin (VANCOCIN) 1000 mg in dextrose 5% 200 mL PREMIX      1,000 mg  200 mL/hr over 1 Hours Intravenous ONCE 17 0747      17 1500  vasopressin (PITRESSIN) 40 Units in D5W 40 mL infusion      0.5-4 Units/hr  0.5-4 mL/hr  Intravenous CONTINUOUS 17 1449      17 1430  norepinephrine (LEVOPHED) 16 mg in D5W 250 mL infusion      0.03-0.4 mcg/kg/min × 39.6 kg  1.1-14.9 mL/hr  Intravenous CONTINUOUS 17 1415            Contrast Orders - past 72 hours     None                Plan:  1.  Start vancomycin  1000 mg (25 mg/kg) x 1 then 750 mg (18.9 mg/kg) IV q12h.   2.  Goal Trough Level: 15-20 mg/L   3.  Pharmacy will check trough levels as appropriate in 1-2 days as patient's calculated creatinine clearance is 44 mL/min so q12h dosing may be more frequent that what will be need ongoing  once patient has been fully loaded on vancomycin.    4. Serum creatinine levels will be ordered daily for the first week of therapy and at least twice weekly for subsequent weeks.    5. [place] method utilized to dose vancomycin therapy: Method 2      Jackelin Jacobson, PharmD  pager [phone]

## 2017-11-24 NOTE — PROGRESS NOTES
LEFT PICC PLACED ON FIRST ATTEMPT WITH ULTRASOUND GUIDANCE VIA THE BRACHIAL VENOUS SYTEM. LINE THREADED WITH EASE AND POSITIVE DARK RED BLOOD RETURN OBSERVED X3 LUMENS. NAVIGATION SYSTEM INDICATED LINE TOWARDS SVC.  FLUSHED AND DRESSED CATHETER PER PROTOCOL. NO OBSERVABLE SIGNS OR SX OF COMPLICATIONS POST INSERTION.  SOULEYMANE Patel      PICC TIP LOCATION MID SVC (FORREST GUTIÉRREZ RN). PRIMARY RN ADRIAN NOTIFIED LINE OK FOR USE.  SOULEYMANE Patel

## 2017-11-24 NOTE — PROGRESS NOTES
Oxycodone IR 5mg tablet rx found in patient's chart from Jessica Hernández PA-C. This most likely was written with the plan for patient to discharge. Plan has changed and patient is critically ill in the ICU with no known discharge date. Due to concern for rx being misplaced or mishandled, Rx has been shredded with [place] RN witnessing.

## 2017-11-24 NOTE — OP NOTE
DATE OF OPERATION:  11/23/2017      PREOPERATIVE DIAGNOSES:   1.  Expanding left neck hematoma.   2.  Floor of mouth edema.   3.  Airway compromise.      POSTOPERATIVE DIAGNOSES:   1.  Expanding left neck hematoma.   2.  Floor of mouth edema.   3.  Airway compromise.      OPERATIVE PROCEDURE:   1.  Tracheostomy.   2.  Exploration of the left neck and control of bleeding.      SURGEON:  Daisy Singh MD      ASSISTANT:  Sachin Cobos, resident.      ANESTHESIA:  Monitored anesthesia care for the tracheostomy followed by general anesthesia.      ESTIMATED BLOOD LOSS:  25 mL.      COMPLICATIONS:  None.      SPECIMENS:  None.      INDICATIONS:  Ms. Alexia Flor is a 62-year-old female who is 2 days postoperative from a left partial glossectomy with primary closure and a left neck dissection with a rapidly expanding hematoma.  Part of the incision was opened at bedside and she was found to have active bleeding with respiratory compromise.  Anesthesia was not able to intubate and therefore an emergent tracheostomy was performed.      FINDINGS:  The patient had a normal trachea.  There was aspiration of tube feed contents during the tracheostomy portion.  There was clotted organized blood in the patient's left neck.  All the blood clot was removed.  No discrete large vessel bleeding was found.  Several suspicious-looking vessels were clipped or reclipped.      DESCRIPTION OF PROCEDURE:  The patient was brought into the operating room and transferred to the operating table in a supine position.  Anesthesia examined her airway with a CMAC in order to determine if they would be able to intubate her.  They determined that they would not be able to safely intubate her and therefore the decision was made to place an emergency tracheostomy to secure her airway.  Approximately 9 mL of 2% lidocaine with epinephrine was injected into the anterior neck.  Cricoid cartilage was identified.  A 3.5 cm incision was fashioned over the cricoid.   We encountered the anterior jugular vein and this was clipped.  Strap muscles were then divided.  We identified thyroid and bovied through the inferior portion of the thyroid in order to identify the trachea.  A #15 blade was then used to incise through the trachea between approximately the second and third tracheal rings.  A 6-0 wire reinforced endotracheal tube was then placed.  During our entry into the tracheostomy tube, the patient did aspirate a large portion of her tube feeds.  Anesthesia suctioned from above and we suctioned down through the tracheostomy.  Once this was cleared, we replaced the endotracheal tube and inflated the cuff.  We confirmed there was end tidal CO2.  We secured the endotracheal tube to the sternum using 3-0 Prolene.  We then broke down the field and resterilized and reprepped the entire neck.  We sterilely draped the patient.  The left neck sutures were removed.  The deep Vicryl sutures were also removed.  The patient had a large amount of organized blood clot in the neck.  We were able to remove this with a Ray-Robert and pickups.  We methodically and systematically checked the entire neck wound for any sources of bleeding.  We were not able to find any obvious source.  We did clip a few suspicious-looking vessels.  We then retracted the SCM and looked at the internal jugular and carotid sheath.  Once again we did not find any obvious bleeding.  We irrigated the neck.  The previously placed CAMACHO drains were clotted off and new ones were placed.  These were secured.  The neck was closed in layers using 3-0 Vicryl for the platysma layer and 3-0 Vicryl for the deep dermal layer.  I closed her skin with staples.  We then removed a 6-0 endotracheal tube and placed a 6-0 Shiley.  This was secured using 3-0 silk.  The patient was handed back over to Anesthesia and allowed to transfer to the PACU in stable condition.       I, Daisy Singh, was present during the key portions of the  procedure, and I was immediately available for the entire procedure between opening and closing.      ANETA CRANDALL MD             D: 2017 13:54   T: 2017 22:13   MT: BOB      Name:     PEYTON TORRES   MRN:      [id number]        Account:        [id number]   :      1955           Procedure Date: 2017      Document: [id number]

## 2017-11-24 NOTE — PROGRESS NOTES
Patient's emergency contact Phu called and he was updated. He stated the surgeons contacted him yesterday and is aware of the trach. Phu also requested that we add Alexia's mother's phone number to her chart to contact Matilde #[phone]. Chart was updated

## 2017-11-24 NOTE — PLAN OF CARE
Problem: Patient Care Overview  Goal: Plan of Care/Patient Progress Review    D: Pt POD3 L partial glossectomy and L radical neck dissection for invasive SCC. Emergently returned to OR yesterday 11/23 for respiratory distress, emergent trach and large hematoma in L neck dissection site. Alcohol withdrawal likely cause for acute decline.      I/A: Neuro: Sedated on precedex, 0.3-0.7mcg/kg/hr. MSSA protocol in place, scoring 8-17. 10mg IV valium given x 2, effective. Otherwise does not follow any commands and is very labile. Pt will be agitated and combative, then will appear heavily sedated. Can move all 4 extremities with +4 strength but not to command. PERRL 2mm sluggish. Scleral edema/reddness.   CV: Febrile. Tmax 102.4, sched IV tylenol started. Currently temp 100.2, bladder. HR 76-90, occ PVCs and PACs. MAP goal > 65. Requiring levo at 0.18mcg/kg/min and vaso 2.4U/hr to sustain MAPs. Earlier in night levo maxed out and epi started for short period, but pt has been improving since 2300. 1000cc 5% albumin given x 1. Weak pulses.   Resp: #6 Shiley, small amount thin yellow/tan secretions. Trach leaking SS drainage at site. Do not change trach ties until approved by ENT. Lungs coarse to clear. CMV 70%/400/15/18.   GI: Abd distended. Very hypoactive BS to no bowel sounds detected overnight, MD notified. Bowel regimen started. NPO. NG tube in place, feeds on hold d/t very high residuals. NG to LIS ~200cc out overnight, pink/tan/clear.   : Bell in place. Marginal UO overnight ~20-30cc/hr clear MD justina aware.  Skin: Neck ecchymotic/edematous. L neck stapes in place, incision ecchymotic minimal SS drainage. Tongue very ecchymotic, but swelling appears to have gone down overnight. Otherwise skin blotchy, but intact.   Drains: 2 neck JPs. CAMACHO 1 with 0cc output all night. CAMACHO 2 with ~10cc SS output q4h.   Lines: L radial A-line d/c'd d/t damped waveform. R brachial A line placed at 2100, waveform appropriate. L femoral  3L CVC. Bilat arm PIVs.     P: Continue current POC. Wean pressors as appropriate. Notify MD of any acute changes.

## 2017-11-24 NOTE — PLAN OF CARE
Problem: Pain, Acute (Adult)  Goal: Identify Related Risk Factors and Signs and Symptoms  Related risk factors and signs and symptoms are identified upon initiation of Human Response Clinical Practice Guideline (CPG).   Outcome: Declining  Shift durration: 5154-3098 (Back from OR at 1300)    LOC: sedated on precedex 0.5, 50 mcg/hr fentanyl for pain management and sedation needs.  Neuro: motor function grossly intact when pt does break through sedation, pupils sluggish at 2mm each. Pt cannot participate in neuro exam, please see flowsheet for specific details.  Cards:  Pt arrived from OR on 4 vaso and 0.4 levo but both were turned off d/t HTN ~190/100's per CRNA, BP's steadily decreased and levo then vaso were restarted and titrated to keep MAP's >65.  At shift change pt was on 0.4 LEVO and 2.5 VASO, SICU notified throughout titration of possible need for more hemodynamic support.  250mL 5% albumin, 500mL LR given as bolus after OR, Pt received 2200cc LR in case as well as 1L NS prior to case d/t declining pressures this AM.  K currently being replaced.  Pulm: LS course throughout, diminished in bases, Shiley #6 placed, sutures intact and trach ties intact and bloody - DO NOT CHANGE TRACH TIES PER ENT AND DO NOT CUT SUTURES.  Moderately thick tan secretions from tracheal suctioning, Pt can cough with stimulation.  Pt on CMV/AC setting with Volume 400, RR15 (pt over breathes ~21), Peep 18, FiO2 titrated down to 70%, Peak pressures high ~38-47 and this was communicated to Rt and SICU.  GI/: NG in L nostril to LIS,  NO MEDS VIA TUBE.  Urine was adequate via mckinney after case but after pressors restarted urine output declined, SICU aware.  Pt due for bowel movement, may need reg.  Skin: ecchymotic neck and mouth.  Scleral edema.  General pale skin. Coccyx intact.  Mobility: A2 turning side to side.  Vasc access:PIV and R groin site.  Drains/Devices:  2 CAMACHO to L neck, putting out small amounts of serosanguineous  fluid.  Special/Event:  Pt went back to OR today, CAMACHO & NG exchanged in OR as well as trach placement and ex lap of L neck area.  Pt received her alcohol scheduled this AM but now is discontinued, we are scoring on MSSA/ CIWA for agitation/withdrawl.  Continue with ICU level of care.

## 2017-11-24 NOTE — PROGRESS NOTES
"Otolaryngology Progress Note  November 24, 2017     S: Patient was taken to OR yesterday for awake trach and emergent neck exploration. Patient is sedated this morning and quite calm. She is intubated and ventilated.      O: Vital signs:  Temp: 99.9  F (37.7  C) Temp src: Bladder BP: 109/60   Heart Rate: 74 Resp: 12 SpO2: 99 % O2 Device: Mechanical Ventilator  Height: 155 cm (5' 1.02\") Weight: 39.6 kg (87 lb 4.8 oz)                         General: Sedated, laying comfortably in bed                         HEENT: Oral tongue edematous. Left neck incision c/d/i, neck soft and non-fluctuant. CAMACHO drains x 2 holding bulb suction with sanguinous drainage. 6-0 shiley cuffed sutured in place with trach ties around neck.                          Pulmonary: Ventilated no stridor, no accessory muscle use.          Intake/Output Summary (Last 24 hours) at 11/24/17 0801  Last data filed at 11/24/17 0700   Gross per 24 hour   Intake          5897.76 ml   Output             1382 ml   Net          4515.76 ml     CAMACHO drain output(s): (last 24 hours)/(last shift)  1 Left neck: 0,0,0,= 0 mL  2 Left neck 17,34,18 = 69 mL     Lab Results   Component Value Date    WBC 11.1 11/24/2017     Lab Results   Component Value Date    RBC 2.50 11/24/2017     Lab Results   Component Value Date    HGB 8.7 11/24/2017     Lab Results   Component Value Date    HCT 26.5 11/24/2017     No components found for: MCT  Lab Results   Component Value Date     11/24/2017     Lab Results   Component Value Date    MCH 34.8 11/24/2017     Lab Results   Component Value Date    MCHC 32.8 11/24/2017     Lab Results   Component Value Date    RDW 23.9 11/24/2017     Lab Results   Component Value Date     11/24/2017     A/P: Alexia Flor is a 62 year old female with a past medical history of alcohol abuse, diastolic heart failure, COPD, breast cancer s/p chemoradiation, and a left tongue SCC now POD#3 s/p left partial glossectomy with primary closure and " left MRND and POD#1 following awake tracheostomy and neck expoloration for hematoma formation.      Trach Care Instructions:   - Do NOT cut or change trach ties - they are placed tightly around the neck to avoid decannulation. ENT will change trach ties. RN not to change the trach ties                         - Please keep new trach ties at bedside.   - The first changing of trach tube, sponge, and/or ties will be performed by ENT at bedside on POD 3-6. Afterwards, other hospital staff can manage these items as needed  - Perform regular suctioning   - RN should change disposable inner canulas every shift and PRN   - Keep trach tube obturator taped to wall behind the head of the bed   - Keep extra unopened 6-0 cuffed Shiley and 4-0 cuffed Shiley at bedside at all times   - Minimize the amount of air in the cuff needed to adequately apply positive pressure ventilate. If positive pressure ventilation is not need then the cuff should be down.   - Contact ENT on-call with any questions or concerns     Neuro:  - Pain control: acetaminophen 500mg Q4H, fentanyl 50-100mcg/hr  - Alcoholism: high concern for EtOH withdrawal.                          -SICU and Medicine team managing no current regimen                         - Thiamine and folate, B12, multivitamin      HEENT:  - Incision cares: clean with 0.9% sodium chloride and apply bacitracin x 24h, then transition to Aquaphor Q8H   - Monitor and record CAMACHO drain output Qshift  - Peridex oral rinses 4 times daily   - Switch to small bulb suction.  - PTA Zyrtec     Respiratory:  - patient currently ventilated managed by SICU  - Hx COPD, no PTA inhalers/meds in [facility]. Verified aspiration during OR yesterday, SICU aware and treating.     CV/heme:  - Required levophed and vasopressin for BP management weaning to Precedex,  - Acute on chronic anemia: continue PTA Iron supplement  - Hx diastolic heart failure: Hold lasix due to low BP     FEN/GI:  - NPO  - Medications and feeds  through G-tube recommend consulting nutrition for recs.  - mIVF: D5 1/2NS +KCl 20 @ 75 mL/h   - pantoprazole   - Bowel regimen dulcolax daily PRN  - Hx hypokalemia: resume PTA potassium chloride tab 20 mEq daily   - Electrolyte replacement per protocol      :  - Urinary Catheter in place     Endo  - sliding scale for diabetic management     ID:  - Monitor for s/s of infection      PPX:  - SQ  Heparin 5,000 units Q8H  - SCDs  - IS         Sachin Jean Baptiste, PGY 1  Otolaryngology-Head & Neck Surgery  Please contact ENT with questions by dialing [phone] and entering job code [phone] when prompted.

## 2017-11-24 NOTE — PROGRESS NOTES
SURGICAL ICU PROGRESS NOTE  2017      CO-MORBIDITIES:   Acute post-operative pain  (primary encounter diagnosis)    ASSESSMENT: Alexia Flor is a 62-year-old female with PMH alcohol use disorder, MDD, Breast Cancer, tongue cancer, alcoholic fatty liver, COPD, and diastolic heart failure now POD #2 s/p left partial glossectomy with primary closure and left neck dissection for invasive SCC.  Admitted to SICU for assistance with EtOH withdrawal, possible need for ativan gtt, and respiratory monitoring.    Epinephrine gtt was added along with NE and vasopressin for hemodynamic support overnight. Now on NE and vasopressin. Has required high PEEPs for adequate oxygenation overnight. Febrile to 102.4 F overnight, thought to be secondary to transfusion.     CHANGES FOR TODAY :    - TTE today  - Sputum, urine and blood cultures sent. Hold off on antibiotics until cultures back. Low threshold to start Abx if febrile (>101.5 F)  - Start TFs through NG tube  - PICC RUE for access. Will dc Fem line once PICC in place  - RUQ US to rule out obstructive cause of direct hyperbilirubinemia  - F/u CXR today  - Dc Precedex, will use PRN versed for sedation   - FeNa     PLAN:  Neuro/ pain/ sedation:  - Monitor neurological status. Notify the MD for any acute changes in exam.  - Pain: Fentanyl gtt  - Sedation: Dc Precedex, will use PRN versed for sedation      Pulmonary care:   - Tracheostomy, MV  CMV: 20/400/18/100%  AB.27/38/81/17  Lactate 2.1 today ( 2.6 last check)  - ABGs q4hrs  - Lactate q8hrs     Cardiovascular:    - Monitor hemodynamic status.   - NE and vasopressin gtt. Added Epinephrine last night has been off this AM  - TTE today     GI care:   - NPO except ice chips and medications.  - TFs via NG and advance as tolerated     Fluids/ Electrolytes/ Nutrition:   - D5 1/2 NS+ 20 K @75 for IV fluid hydration  - On replacement protocol   - No indication for parenteral nutrition.    Renal/ Fluid Balance:    -  Urine output is adequate so far.  - Will continue to monitor intake and output.    Endocrine:    - Sliding scale for diabetes management.     ID/ Antibiotics:  - No indication for antibiotics.   - Sputum, urine and blood cultures sent. Hold off on antibiotics until cultures back. Low threshold to start Abx if febrile (>101.5 F)    Heme:     - Hb stable  - Platelets 165    Prophylaxis:    - Mechanical prophylaxis for DVT.   - Will plan for DVT ppx tomorrow     Lines/ tubes/ drains:  -  CVC Fem  - A line  - PIV x 3  - Neck JPs x 2  - Plan for PICC and subsequent removal of femoral line       Disposition:  - Surgical ICU.     Patient discussed with surgical ICU staff , Dr. Martin.    ====================================    TODAY'S PROGRESS:   SUBJECTIVE:     Epinephrine gtt was added along with NE and vasopressin for hemodynamic support overnight. Now on NE and vasopressin. Has required high PEEPs for adequate oxygenation overnight. Febrile to 102.4 F overnight, thought to be secondary to transfusion.         OBJECTIVE:   1. VITAL SIGNS:   Temp:  [94.9  F (34.9  C)-102.7  F (39.3  C)] 99.5  F (37.5  C)  Heart Rate:  [] 74  Resp:  [11-24] 13  BP: ()/(44-80) 109/60  MAP:  [38 mmHg-134 mmHg] 73 mmHg  Arterial Line BP: ()/() 117/52  FiO2 (%):  [60 %-100 %] 70 %  SpO2:  [76 %-100 %] 96 %  Ventilation Mode: CMV/AC  FiO2 (%): 70 %  Rate Set (breaths/minute): 18 breaths/min  Tidal Volume Set (mL): 300 mL  PEEP (cm H2O): 14 cmH2O  Oxygen Concentration (%): 70 %  Resp: 13    2. INTAKE/ OUTPUT:   I/O last 3 completed shifts:  In: 6061.76 [I.V.:4991.76; IV Piggyback:500]  Out: 1418 [Urine:1003; Emesis/NG output:310; Drains:105]    3. PHYSICAL EXAMINATION:   General: Intubated and sedated   Resp: B/L air entry , bilateral rales   HEENT: incisions C/D, no swelling or hematoma , CAMACHO drains with scant sanguineous output   CV: RRR  Abdomen: Soft, Non-distended, Non-tender  Extremities: warm and well  perfused    4. INVESTIGATIONS:   Arterial Blood Gases     Recent Labs  Lab 11/24/17  0827 11/24/17 0320 11/24/17  0011 11/23/17 1945   PH 7.26* 7.27* 7.27* 7.30*   PCO2 39 38 36 40   PO2 72* 81 84 114*   HCO3 18* 17* 17* 20*     Complete Blood Count     Recent Labs  Lab 11/24/17 0320 11/23/17 1945 11/23/17  1346 11/23/17  1130 11/23/17  0852   WBC 11.1* 4.5 3.4*  --  16.3*   HGB 8.7* 7.8* 7.4* 7.1* 7.7*   * 204 182  --  222     Basic Metabolic Panel    Recent Labs  Lab 11/24/17 0320 11/23/17 1945 11/23/17  1345 11/23/17  1130 11/23/17  0313    142 142 141 140   POTASSIUM 5.0 4.7 2.9* 3.1* 4.2   CHLORIDE 112* 112* 111*  --  106   CO2 19* 20 22  --  25   BUN 15 13 12  --  13   CR 0.81 0.85 0.61  --  0.72   * 150* 175* 171* 187*       =========================================    Discussed with staff, Dr. Veronica Blum MD  PGY-2 General Surgery Resident   [phone]

## 2017-11-24 NOTE — PROGRESS NOTES
MD paged - PICC RN at bedside after consent obtained from patient's mother via phone. Pt agitated, requesting additional fentanyl and versed for procedure. MD to order

## 2017-11-24 NOTE — PROGRESS NOTES
Nutrition Progress Note - Discussion of POC on SICU rounds; f/u for progress towards previous nutrition POC (see previous 11/22 reassessment for details)    -Diet & Resp: NPO - noted events yesterday and now Trached (placed emergently 11/23 with complication of aspiration during trach placement) and on vent support with FiO2 @ 70% and now concerned for ARDs.    -GI & EN/Enteral access: noted ENT removed previous NGT/Bridle and replaced new NGT (12 Fr, 109 cm Entriflex which is now sutured into septum) and was confirmed in stomach on AXR 11/23.  TF (Isosource 1.5) previous @ 20 ml/hr but off since yesterday ~08:00 for emergent trach surgery and not yet restarted.  Noted on AXR report, mention of gastric banding clips.  Upon further investigation of GI status, noted on 11/9/2017 pt had EUS with mention of narrowing of the esophageal inlet, chronic pancreatitis, gastric varices without esophageal varices.  No mention of pancreatic enzyme supplementation med list in H&P note.    -FEN: K+ 5 (today), 4.7 (yesterday), 2.9 (11/22); BUN 15, Cr 0.8, Phos 4 mg/dL (today) - (?) if true K+ trend upwards but would avoid renal TF if possible (note BUN/Cr remains stable so do not suspect CARLOS).    Interventions:  Collaboration and Referral of Nutrition care - Discussed plan for FEN/GI on rounds with Providers (both SICU and ENT) who, given aspiration event, initially requested this writer place a small bowel FT.  However, relayed AXR report findings of gastric banding clips and noted recent EUS (above) to Providers who agreed with RD rec to order Lipase/Amylase to evaluate for pancreatitis in setting of possible ARDs.  Noted Anya/Lipase results WNL today and decision made to proceed with feedings via current NGT (avoid placement of another FT given higher risk with gastric clips) with change to more elemental TF (peptide/MCT based) given hx of chronic pancreatitis with unclear pancreatic exocrine function and now with  complications/vented status post ENT surgery.  Ordered the following EN regimen/monitoring:     --Impact Peptide @ 15 ml/hr and re-advance slowly by 10 ml q 12 hrs to goal 35 ml/hr (840 ml/day) to provide 1260 kcals (32 kcal/kg/day), 79 g PRO (2 g PRO/kg/day), 647 ml free H2O, 54 g Fat (50% from MCTs), 118 g CHO and no Fiber daily.   --Continue with Certavite + Thiamine + Folate  --Order to obtain baseline acute phase PRO (CRP) and recheck every Monday to evaluate trend with duration/volumes of immune-modulating TF received and ongoing approp of specialized TF therapy vs. approp to change to maintenance-type formula.    Layla Hudson, RD, LD, CNSC (pgr [phone])

## 2017-11-24 NOTE — PLAN OF CARE
Problem: Patient Care Overview  Goal: Plan of Care/Patient Progress Review  OT4A: CX: Per RN, pt on multiple pressors, agitated and with groin line. Will try evaluation when pt more medically appropriate.

## 2017-11-25 NOTE — PROGRESS NOTES
Please contact the patient and notify her of the following:  The year and sample is essentially normal.  The chemistry panel shows a slightly decreased potassium of 3.2.  Kidney function is normal.  The blood cell count shows a slight improvement in the anemia.  The hemoglobin is now 9.3 up from the previous 8.9.  Thank you.  DO PATEL Farris

## 2017-11-25 NOTE — PROGRESS NOTES
SURGICAL ICU PROGRESS NOTE  November 25, 2017      CO-MORBIDITIES:   Acute post-operative pain  (primary encounter diagnosis)    ASSESSMENT: Alexia Flor is a 62-year-old female with PMH alcohol use disorder, MDD, Breast Cancer, tongue cancer, alcoholic fatty liver, COPD, and diastolic heart failure now POD #2 s/p left partial glossectomy with primary closure and left neck dissection for invasive SCC.  Admitted to SICU for assistance with EtOH withdrawal, possible need for ativan gtt, and respiratory monitoring.    Continues to require 2 pressor support, however levophed requirements or slowly titrating down.  Mechanical ventilation requirements are also trending down.  Patient had trach placed 11/23.    CHANGES FOR TODAY :    - Switching TF to trickle d/t ileus   - Suppository for no BM since 11/21  - D/C precedex, PRN versed  - Remove Bell today    PLAN:  Neuro/ pain/ sedation:  - Monitor neurological status. Notify the MD for any acute changes in exam.  - Pain: Fentanyl gtt  - Sedation: PRN versed     Pulmonary care:   - Titrate FiO2 to maintain SpO2 > 92%    Cardiovascular:    - Monitor hemodynamic status.   - NE and vasopressin gtt, wean as tolerated to maintain MAP > 60    GI care:   - NPO except ice chips and medications.  - TFs via NG and advance as tolerated     Fluids/ Electrolytes/ Nutrition:   - D5 1/2 NS+ 20 K @75 for IV fluid hydration  - On replacement protocol   - No indication for parenteral nutrition.    Renal/ Fluid Balance:    - Urine output is adequate so far.  - Will continue to monitor intake and output.    Endocrine:    - No intervention indicated at this time    ID/ Antibiotics:  - No indication for antibiotics.   - Sputum, urine and blood cultures sent. Hold off on antibiotics until cultures back. Low threshold to start Abx if febrile (>101.5 F)    Heme:     - Hgb down trending, will transfuse for hgb < 7.0    Prophylaxis:    - Mechanical prophylaxis for DVT.   - Will plan for DVT ppx  tomorrow   - Protonix 40 mg qD    Lines/ tubes/ drains:  - PICC  - A line  - PIV x 3  - Neck JPs x 2  - Remove Bell 11/25      Disposition:  - Surgical ICU.     Patient seen and discussed with surgical ICU staff , Dr. Martin.    Jean Marshall Jr., MD  Anesthesia Resident - CA2  Pager: [phone]  11/25/2017  6:28 AM      ====================================    TODAY'S PROGRESS:   SUBJECTIVE:     NAEO.  Patient is no longer tolerating TF.  Large output through NG tube.  Placed on trickle feeds.  Agitation controlled.  Pressor requirements waning.          OBJECTIVE:   1. VITAL SIGNS:   Temp:  [97.9  F (36.6  C)-99.9  F (37.7  C)] 99.3  F (37.4  C)  Heart Rate:  [] 80  Resp:  [11-29] 18  MAP:  [60 mmHg-237 mmHg] 73 mmHg  Arterial Line BP: ()/() 111/49  FiO2 (%):  [60 %-100 %] 60 %  SpO2:  [88 %-100 %] 98 %  Ventilation Mode: CMV/AC  FiO2 (%): 60 %  Rate Set (breaths/minute): 18 breaths/min  Tidal Volume Set (mL): 300 mL  PEEP (cm H2O): 12 cmH2O  Oxygen Concentration (%): 70 %  Resp: 18    2. INTAKE/ OUTPUT:   I/O last 3 completed shifts:  In: 3307.19 [I.V.:2282.19; NG/GT:135]  Out: 619 [Urine:459; Emesis/NG output:110; Drains:50]    3. PHYSICAL EXAMINATION:   General: Intubated and sedated   Resp: Equal and bilateral breath sounds.  HEENT: incisions C/D, no swelling or hematoma , CAMACHO drains with scant sanguineous output   CV: RRR  Abdomen: Soft, Non-distended, Non-tender  Extremities: warm and well perfused    4. INVESTIGATIONS:   Arterial Blood Gases     Recent Labs  Lab 11/25/17  0415 11/24/17  2349 11/24/17  1953/17  1617   PH 7.23* 7.24* 7.23* 7.24*   PCO2 44 36 43 42   PO2 81 104 76* 80   HCO3 19* 15* 18* 18*     Complete Blood Count     Recent Labs  Lab 11/24/17  1209 11/24/17  0320 11/23/17  1945 11/23/17  1346   WBC 18.3* 11.1* 4.5 3.4*   HGB 8.2* 8.7* 7.8* 7.4*    148* 204 182     Basic Metabolic Panel    Recent Labs  Lab 11/25/17  0415 11/24/17  1209 11/24/17  0751  11/24/17  0320 11/23/17  1945 11/23/17  1345   NA  --  136 140 140 142 142   POTASSIUM 4.0 4.6  --  5.0 4.7 2.9*   CHLORIDE  --  109  --  112* 112* 111*   CO2  --  18*  --  19* 20 22   BUN  --  19  --  15 13 12   CR  --  0.91 0.96 0.81 0.85 0.61   GLC  --  107*  --  132* 150* 175*       =========================================

## 2017-11-25 NOTE — PLAN OF CARE
Problem: Patient Care Overview  Goal: Plan of Care/Patient Progress Review  Outcome: No Change  Neuro: Labile mood. Pupils 3 mm equal/sluggish, does not follow commands. Moves all extremities. Mitts in place. Fentanyl @ 100 mcg/hr. Midazolam @ 1 mg/hr.   Cardiac: Afebrile. Goal MAP> 65, requiring pressors. Norepinephrine @ 0.15 mcg/kg/min, Vasopressin @ straight rate of 2.4 units/hr. Peripheral pulses weak.   Resp: CMV/18/50%/300/12. Sating low to high 90s. Wean FiO2 as pt tolerates. Trach #6 shiley, inner cannula cares completed, first exchange of trach ties to be changed by ENT. Lung sounds coarse/clear/diminished.   GI: Abdomen firm and distended, faint hypoactive bowel sounds. LBM 11/25/2017 x2, small loose stools. TF @ 25 ml/hr (goal 35 ml/hr), advance per RD orders. Continue with bowel regimen.   : Bell in place. Low UO (~ 20 ml/hr), team aware. Continue to monitor.  Skin: Turn q2 hrs to maintain skin integrity. Malnourished, many bony prominences, sacral mepilex placed prophylactically. Pillows between bony prominences. Heels floated.   Musc: Bedrest. AROM in all extremities.  LADs: Bell. L neck JPx2. NGT. R brachial art line. R PIV. R TL femoral line. L TL PICC. LR @ 75 ml/hr.   ID: Cultures pending. Per SICU note, threshold for starting antibiotics= 101.5 F.     Plan: Continue with sedation plan, monitor for signs of ETOH withdrawal. MAP >65. Wean FiO2.

## 2017-11-25 NOTE — PROGRESS NOTES
Received phone call from someone named Ole # [phone]. Explained to Ole that I can not be giving information to anyone who calls for privacy reasons. He was upset and said he is not getting information from anyone else and can't get ahold of them. I again explained that our protocol is next of kin (her mother) and Phu who the patient had said can get information prior to intubation. I encouraged him to continue to contact them.

## 2017-11-25 NOTE — PLAN OF CARE
Problem: Patient Care Overview  Goal: Plan of Care/Patient Progress Review  OT 4A: HOLD, Pt appropriate for PROM only at this time, PT following for PROM and OT will initiate when pt able to participate in two therapies.

## 2017-11-25 NOTE — PROGRESS NOTES
Otolaryngology Progress Note  November 25, 2017   A/P: Alexia Flor is a 62 year old female with a past medical history of alcohol abuse, diastolic heart failure, COPD, breast cancer s/p chemoradiation, and a left tongue SCC on 11/21/17 now POD#4 s/p left partial glossectomy with primary closure and left MRND and POD#2 following awake tracheostomy and neck expoloration for left neck hematoma evacuation. Patient had aspiration during the procedure.        Neuro:  - Pain control: fentanyl 50-100mcg/hr  - Sedation: versed 1-2mg/hr   - Weaned off precedex 11/24.  - Alcoholism: high concern for EtOH withdrawal.                          -SICU and Medicine team managing no current regimen                         - Thiamine and folate, B12, multivitamin       HEENT:  - Incision cares: clean with 0.9% sodium chloride and apply Aquaphor Q8H   - Monitor and record CAMACHO drain output Qshift  - Peridex oral rinses 4 times daily   - PTA Zyrtec  - Recommend bite block on the right side. Do not place on left side of mouth due to surgery. If bite block unable to be obtained may use a rolled gauze to keep mouth open.        Trach Care Instructions:   - Do NOT cut or change trach ties - they are placed tightly around the neck to avoid decannulation. ENT will change trach ties. RN not to change the trach ties                         - Please keep new trach ties at bedside.   - The first changing of trach tube, sponge, and/or ties will be performed by ENT at bedside on POD 3-6. Afterwards, other hospital staff can manage these items as needed  - Perform regular suctioning   - RN should change disposable inner canulas every shift and PRN   - Keep trach tube obturator taped to wall behind the head of the bed   - Keep extra unopened 6-0 cuffed Shiley and 4-0 cuffed Shiley at bedside at all times   - Minimize the amount of air in the cuff needed to adequately apply positive pressure ventilate. If positive pressure ventilation is not need then  the cuff should be down.   - Contact ENT on-call with any questions or concerns       Respiratory:  - patient currently ventilated managed by SICU. FiO2 60% (70%) and PEEP of 12 and PiPs in mid 20s. Adequate volumes.   - Hx COPD, no PTA inhalers/meds in EPIC. Verified aspiration during OR 11/23/17, SICU aware and watching for fever of 101.5 prior to treating.  - Patient acidotic at 7.23 down from 7.27 on 11/24. SICU team made aware and they will address at rounds today     CV/heme:  - Requires levophed .15mcg/kg/min and vasopressin 2.4 units/hour for BP  - Acute on chronic anemia: continue PTA Iron supplement  - Hx diastolic heart failure: Hold lasix due to low BP  - TTE 11/24 showed EF of 60-65%      FEN/GI:  - NPO  - Not tolerating trickle TF. Recommend holding Tube feeds as patient was actively regurgitating feeds while ENT in room  - Per SICU colleagues. mIVF: D5 1/2NS +KCl 20 @ 75 mL/h   - pantoprazole   - Bowel regimen  - Electrolyte replacement per protocol. Holding PTA potassium chloride for now.       :  - Urinary Catheter in place. Good UOP.       Endo  - sliding scale for diabetic management. BG wnl      ID:  - Monitor for s/s of infection   - Per SICU colleagues low threshold to start antibiotics for suspected aspiration pneumonia if Tmax of 101.5      PPX:  - OK to resume Heparin 5000 TID from ENT standpoint.  - SCDs  - IS      Sachin Jean Baptiste, PGY 1  Otolaryngology-Head & Neck Surgery  Please contact ENT with questions by dialing [phone] and entering job code [phone] when prompted.    S: Patient received 500cc of albumin with minimal improvement of BP, weaned off precedex and switched to versed. PICC line placed yesterday by SICU. Patient is sedated this morning and quite calm. She is intubated and ventilated. Per RN, she was biting down on tongue earlier today.      O: Vital signs:  Temp: 99.9  F (37.7  C) Temp src: Bladder BP: 109/60   Heart Rate: 74 Resp: 12 SpO2: 99 % O2 Device: Mechanical Ventilator   "Height: 155 cm (5' 1.02\") Weight: 39.6 kg (87 lb 4.8 oz)                         General: Sedated, laying comfortably in bed                         HEENT: Oral tongue less edematous overall. Patient was biting down per RN but isn't at the moment, she is partially edentulous..  Left neck incision c/d/i, neck soft and non-fluctuant, small area just inferior to the lobule supra-incisional more full than yesterday. CAMACHO drains x 2 holding bulb suction with sanguinous drainage. 6-0 shiley cuffed sutured in place with trach ties around neck, thick white/yellow secretions.                          Pulmonary: Ventilated no stridor, no accessory muscle use  Ventilation Mode: CMV/AC  FiO2 (%): 60 %  Rate Set (breaths/minute): 18 breaths/min  Tidal Volume Set (mL): 300 mL  PEEP (cm H2O): 12 cmH2O  Oxygen Concentration (%): 70 %  Resp: 18      Intake/Output Summary (Last 24 hours) at 11/25/17 0919  Last data filed at 11/25/17 0900   Gross per 24 hour   Intake          2905.76 ml   Output              637 ml   Net          2268.76 ml       CAMACHO drain output(s): (last 24 hours)/(last shift)  1 Left neck: 0,14,1= 15 mL  2 Left neck 18,0,19 = 37 mL      BMP  Recent Labs  Lab 11/25/17 0415 11/24/17  1209 11/24/17  0751 11/24/17 0320 11/23/17 1945    136 140 140 142   POTASSIUM 4.0  Canceled, Test credited 4.6  --  5.0 4.7   CHLORIDE 109 109  --  112* 112*   SARAH 7.8* 7.7*  --  6.8* 7.2*   CO2 19* 18*  --  19* 20   BUN 19 19  --  15 13   CR 0.94 0.91 0.96 0.81 0.85   * 107*  --  132* 150*     CBC  Recent Labs  Lab 11/25/17 0415 11/24/17  1209 11/24/17  0320 11/23/17 1945   WBC 20.6* 18.3* 11.1* 4.5   RBC 2.26* 2.44* 2.50* 2.17*   HGB 7.6* 8.2* 8.7* 7.8*   HCT 23.8* 25.4* 26.5* 24.7*   * 104* 106* 114*   MCH 33.6* 33.6* 34.8* 35.9*   MCHC 31.9 32.3 32.8 31.6   RDW 24.0* Dimorphic population - unable to calculate 23.9* 20.9*    165 148* 204     Electrolytes  Recent Labs  Lab 11/25/17  0415 11/24/17  1209 " 11/24/17  0320 11/23/17 1945 11/23/17 0313   MAG 2.7*  --  1.6 1.7  --  3.1*   PHOS 4.2  --  4.0 2.2*  --  2.8   SARAH 7.8* 7.7* 6.8* 7.2*  < > 7.4*   < > = values in this interval not displayed.      Recent Labs  Lab 11/25/17  0851 11/25/17  0415 11/24/17  2349 11/1953   PH 7.23* 7.23* 7.24* 7.23*   PCO2 44 44 36 43   PO2 89 81 104 76*   HCO3 19* 19* 15* 18*   O2PER 50.0 60.0 70.0 70

## 2017-11-25 NOTE — PLAN OF CARE
Problem: Patient Care Overview  Goal: Plan of Care/Patient Progress Review  Outcome: No Change  Patient critically ill.  Neuro: varies between lethargic and agitated - never follows commands, moves all extremities independently x 4  Resp: Vent settings per MD, requiring high PEEP and FiO2 still, trending ABGs for pH monitoring. Thick yellow secretions inline suction from #6 shiley trach.  Cardiac: Requiring pressors to maintain SBP MAP >65. BP very labile - pump did not alarm, but pt bp was noted to be 60s/30s quickly and iv tubing was found kinked before pump could alarm with occlusion, IV line unkinked and bp returned to normal with pressor support. Sinus rates 60-80s with PVCs and PACs. 500mL albumin given this shift with minimal improvement  GI: suppository without any results, TF started at 10mL/hr this shift.   : low urine output, MD aware, continue to follow    Plan: Continue with sedation plan, patient very ill.

## 2017-11-25 NOTE — PLAN OF CARE
Problem: Patient Care Overview  Goal: Plan of Care/Patient Progress Review  PT 4A: Cx, pt presents with groin line and high degree of extremity movement due to restlessness/agitation per RN. Pt without any contracture concerns, RN requests check back on Monday 11/27.

## 2017-11-26 NOTE — PLAN OF CARE
Problem: Patient Care Overview  Goal: Plan of Care/Patient Progress Review  Outcome: No Change  Neuro: Unable to assess orientation, does not follow commands. Pupils 3-4 mm equal/sluggish. Labile mood. Moves all extremities against gravity. Fentanyl @ 50 mcg/hr. Midazolam @ 1 mg/hr. Wean sedation as pt tolerates.   Cardiac: Afebrile. Titrating pressors to maintain MAP >60. Norepinephrine @ .07 mcg/kg/min, Vasopressin off since 0030. Peripheral pulses weak. +1 edema in both hands.  Resp: CMV/18/50%/300/10. Sating high 90s, wean FiO2 as pt tolerates. Lung sounds coarse/diminished. Trach #6 shiley, inner cannula cares completed. Drainage around trach site.   GI: Faint bowel sounds. Abdomen firm and distended. LBM 11/26/2017 x4. TF @ 10 ml/hr. At beginning of shift, there were concerns for poor absorption r/t high residuals. SICU notified, it was decided to continue with TF unless residual >500 ml. Continue with current bowel regimen.  : Urinary incontinence x4 overnight.  Skin: Turn q2 hours to maintain skin integrity. Prophylactic mepilex in place on sacrum and bilateral heels. Pillows between bony prominences. Heels floated.   Musc: Bedrest. AROM in all extremities.  LADs: L TL PICC. L neck JPx2. NGT. R brachial art line. R PIV. LR @ 75 ml/hr.     Plan: Wean sedation. Titrate FiO2. Titrate pressors as pt tolerates to maintain MAP >60.

## 2017-11-26 NOTE — PLAN OF CARE
Problem: Patient Care Overview  Goal: Plan of Care/Patient Progress Review  D: Pt admitted for left partial glossectomy and left neck dissection for invasive SCC of tongue.  Post-op went to the floor but came to the ICU for respiratory compromise and potential MSSA protocol.   NEURO: Pt remains on Versed 1 mg/hour, Fent 50 mcg/hour, intermittently agitated and requiring 1mg Versed bumps.  Pt very agitated with cares and turning/movement to chair. Not following commands but moving all 4 limbs spontaneously and opening eyes spontaneously. Risperidone BID started po.   CARDIAC: Vaso remains off, norepi intermittently off when agitated and returned to on at 0.05 when labile/resting.  MAP goal >60/ NST/Sinus tach with PACs occasionally.   RESP: remains vented on CMV 18/60%, 300, peep 10. LS coarse and thick, moderate amount white-yellow sputum oral and inline.  Yellow drainage around trach (potential for TF - MDs aware). Trach #6 Shiley with inner cannula cares and frequent external cares.   GI/: abd remains firm/slightly distended, faint BS, TF remain at trickle/10cc/hour. Incontinent of urine and stool - only smears this shift.   SKIN: Mepilex ppx remains on sacrum, pt kicked legs often so potential problematic for heel friction issues.  Pt will not keep boots on at all.  ENT removed CAMACHO #1; CAMACHO #2 remains in neck.    PLAN:  Monitor and reassure pt often.  Norepi for MAPs>60 if needed.  Versed boluses for agitation.  Fentanyl and Versed gtts can be increased if needed. Notify team of changes.

## 2017-11-26 NOTE — PROGRESS NOTES
Patient admitted to unit from 6B. Arrived VSS with House Float RN on monitors. Patient transferred to bed and connected to new monitors, oriented to unit and routines, notified MD of arrival, pt has already contacted son to notify him. MD at bedside to evaluate and discuss plan with patient. Plan for 2 units PRBC and CT Angio.

## 2017-11-26 NOTE — PROGRESS NOTES
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       Neuro:  - Pain: has required less fentanyl, 50 (100)  - Sedation: versed gtt 1 mg/hr and PRN (received x5)  - Weaned off precedex 11/24.  - Alcoholism: high concern for EtOH withdrawal.                          - SICU managing                         -Thiamine and folate, B12, multivitamin       HEENT:  - Incision cares: clean with 0.9% sodium chloride and apply Aquaphor Q8H   - Monitor and record CAMACHO drain output Qshift   - CAMACHO 1 removed 11/26.   - Peridex oral rinses 4 times daily   - PTA Zyrtec  - Patient no longer biting on tongue as it is substantially less swollen.       Trach Care Instructions:   - Do NOT cut or change trach ties - they are placed tightly around the neck to avoid decannulation. ENT will change trach ties. RN not to change the trach ties                         - Please keep new trach ties at bedside.   - The first changing of trach tube, sponge, and/or ties will be performed by ENT at bedside on POD 3-6. Afterwards, other hospital staff can manage these items as needed  - Perform regular suctioning   - RN should change disposable inner canulas every shift and PRN   - Keep trach tube obturator taped to wall behind the head of the bed   - Keep extra unopened 6-0 cuffed Shiley and 4-0 cuffed Shiley at bedside at all times   - Minimize the amount of air in the cuff needed to adequately apply positive pressure ventilate. If positive pressure ventilation is not need then the cuff should be down.   - Contact ENT on-call with any questions or concerns       Respiratory:  - Aspiration pneumonia vs  pneumonitis: Intraop aspiration event on 11/23,  tracheal sputum culture growing lactose fermenting gram negative rods, SICU colleagues started Zosyn today 11/26.  - patient currently ventilated managed by SICU. FiO2 60% (70%) and PEEP of 10 12) and PiPs in mid 20s. Adequate volumes.   - Hx COPD, no PTA inhalers/meds in EPIC.       CV/heme:  - Closely monitoring hemodynamic status, management per SICU colleagues  - Weaning down levophed 0.07 (0.15) mcg/kg/min and vasopressin weaned off at midnight 11/26.   - Acute on chronic anemia: continue PTA Iron supplement. Hgb 7.5 (7.6)  - Hx diastolic heart failure: Hold lasix due to low BP  - TTE 11/24 showed EF of 60-65%      FEN/GI:  - NPO  - Continuing trickle TF at 10cc/h. Monitoring residuals.   - BM x6 11/25  - Per SICU colleagues. mIVF: D5 1/2NS +KCl 20 @ 75 mL/h   - pantoprazole   - Bowel regimen, received dulcolax suppository 11/25.  - Electrolyte replacement per protocol. Holding PTA potassium chloride for now.       /Renal:  - Removed Bell  - Adequate UOP   - ABG showing acidemia in the setting of low bicarbonate suggestive of metabolic acidosis, trending towards normal.   - pH 7.3 (7.23), HCO3 19 (15), pCO2 38 (42)      Endo  - sliding scale for diabetic management. BG wnl      ID:  - Sputum culture growing lactose fermenting gram negative rods  - 11/26 Zosyn q6h.   - Afebrile,Tmax 98.3, HR 70s, WBC trending up 20.4 (19.3, 18.3, 4.5)       PPX:  - OK to resume Heparin 5000 TID from ENT standpoint.  - SCDs  - IS      Lila Oconnor MD  PGY-2 OtoHNS    S: No acute events overnight. Had BMx6 after suppository. Having high gastric residuals, TF down to trickle. Weaned off vasopressin overnight. Weaning down norepinephrine. Zosyn started this morning after sputum cx grew gram negative rods. Continues to be agitated at times with labile BPs, not following commands. Bell removed yesterday. Requiring midazolam drip with additional PRN doses x5.    O:  "Vital signs:  Temp: 99.9  F (37.7  C) Temp src: Bladder BP: 109/60   Heart Rate: 74 Resp: 12 SpO2: 99 % O2 Device: Mechanical Ventilator  Height: 155 cm (5' 1.02\") Weight: 39.6 kg (87 lb 4.8 oz)                         General: Sedated, easily agitated when spoken to or touched.                         HEENT: Oral tongue markedly less edematous fitting within oral cavity without being pressed by teeth. Patient is biting down and agitated. Left neck incision c/d/i, neck soft and non-fluctuant, diffuse edema but essentially flat, no fluctuance. Mild erythema over incision as expected postoperatively, no skin breakdown. CAMACHO drains x 2 holding bulb suction with serosanguinous drainage. 6-0 shiley cuffed sutured in place with trach ties around neck, thick white/yellow secretions, cuff appropriately insufflated.                          Pulmonary: on ventilator.   Ventilation Mode: CMV/AC  FiO2 (%): 60 %  Rate Set (breaths/minute): 18 breaths/min  Tidal Volume Set (mL): 300 mL  PEEP (cm H2O): 10 cmH2O  Oxygen Concentration (%): 50 %  Resp: 15    Intake/Output Summary (Last 24 hours) at 11/26/17 1045  Last data filed at 11/26/17 1000   Gross per 24 hour   Intake          2537.95 ml   Output              121 ml   Net          2416.95 ml       CAMACHO drain output(s): (last 24 hours)/(last shift)  1 Left neck: 0, 2, 9= 11 cc Remove today   2 Left neck 11/ 5/ 11= 27 cc      BMP    Recent Labs  Lab 11/26/17 0316 11/25/17  1623 11/25/17  0415 11/24/17  1209    139 139 136   POTASSIUM 3.5 3.6 4.0  Canceled, Test credited 4.6   CHLORIDE 112* 110* 109 109   SARAH 8.2* 8.1* 7.8* 7.7*   CO2 19* 18* 19* 18*   BUN 26 24 19 19   CR 1.00 1.00 0.94 0.91   GLC 88 106* 102* 107*     CBC    Recent Labs  Lab 11/26/17 0316 11/25/17  1623 11/25/17  0415 11/24/17  1209   WBC 20.4* 19.3* 20.6* 18.3*   RBC 2.20* 2.17* 2.26* 2.44*   HGB 7.5* 7.5* 7.6* 8.2*   HCT 22.8* 22.8* 23.8* 25.4*   * 105* 105* 104*   MCH 34.1* 34.6* 33.6* 33.6* "   MCHC 32.9 32.9 31.9 32.3   RDW 22.8* 23.4* 24.0* Dimorphic population - unable to calculate   * 156 170 165     Electrolytes    Recent Labs  Lab 11/26/17  0316 11/25/17  1623 11/25/17  0415 11/24/17  1209 11/24/17  0320 11/23/17  1945   MAG 2.3  --  2.7*  --  1.6 1.7   PHOS 3.7  --  4.2  --  4.0 2.2*   SARAH 8.2* 8.1* 7.8* 7.7* 6.8* 7.2*         Recent Labs  Lab 11/26/17  0702 11/25/17  0851 11/25/17  0415 11/24/17  2349   PH 7.30* 7.23* 7.23* 7.24*   PCO2 38 44 44 36   PO2 81 89 81 104   HCO3 19* 19* 19* 15*   O2PER 60 50.0 60.0 70.0

## 2017-11-26 NOTE — PLAN OF CARE
Problem: Patient Care Overview  Goal: Plan of Care/Patient Progress Review  Outcome: No Change  No major events this shift.  VS remain very labile with repositions and IV drip titration of levophed and versed.  No blood products given this shift. Continue to monitor hgb trends.  Patient sedation varies between obtunded/lethargic and agitated/restless. Generally lethargic, more agitated with cares and repositions.  Multiple phone calls from family and friends today requesting information, continued to tell them that due to privacy issues we can not give information.    Plan: Continue to monitor bp and sedation, trend labs.    Problem: Pain, Acute (Adult)  Goal: Identify Related Risk Factors and Signs and Symptoms  Related risk factors and signs and symptoms are identified upon initiation of Human Response Clinical Practice Guideline (CPG).   Outcome: No Change  Fentanyl decreased this shift due to RASS score

## 2017-11-26 NOTE — TRANSFUSION REACTION (BLOOD)
Laboratory Medicine and Pathology  Transfusion Medicine- Transfusion Reaction Investigation      I have reviewed this transfusion reaction work-up.  I agree with the original findings and have added the updated laboratory results below to finalize this case:    All cultures of the transfused unit showed no growth, ruling out a septic transfusion reaction. The original diagnosis stands.     Ayla Low MD, PhD  Transfusion Medicine Attending  Pager [phone]         Alexia Hernandez [facility] [id number]   1955   62 year old     Reaction Date:11/23/2017  Unit #:[id number]  Component: Red blood cell         History of Reaction:   The patient is a 62 year old female with left tongue squamous cell carcinoma S/P left partial glossectomy with primary closure and left neck dissection on 11/21/2017 and tracheostomy and exploration of left neck for hematoma on 11/23/2017.  On 11/23/2017 a red blood cell transfusion ([id number]) was given from 17:30 to 19:15.  The patient received approximately 3/4 of the unit.  The transfusion was stopped due to a rise in temperature.  No other symptoms such as chills/rigors, hives, angioedema, and hypoxia were reported.  The patient was hypotensive in this period, but was on pressors before, during and after the transfusion.  Tmax in the 24 hours prior to transfusion was 98.6 axillary    Date and Time Temp RR HR BP O2 sat (%)   11/23/2017 16:40 99  89 91/50    11/23/2017 16:45 98.8  89 91/52 94   11/23/2017 16:50 99  89 87/52    11/23/2017 16:55 99.3  89 92/52    11/23/2017 17:15 99.9  87 89/53 96   11/23/2017 17:20   87 95/51    11/23/2017 17:25   86 96/53    11/23/2017 17:30 start 100.2  82 92/53 97   11/23/2017 17:45 100.4  87 93/54 98   11/23/2017 17:50 100.8  84 92/56    11/23/2017 17:55 100.6  85 96/53    11/23/2017 18:00 100.9 21 84 96/54 98   11/23/2017 18:05 101.1  81 94/52    11/23/2017 18:10 101.3  84     11/23/2017 18:15 101.5  87 94/54 98   11/23/2017  18:20 101.7  79 108/61    11/23/2017 18:25 101.5       11/23/2017 18:30 101.5  87 86/52 100   11/23/2017 18:35 101.8       11/23/2017 18:40 101.8  88 86/45    11/23/2017 18:45 102  91 80/48 100   11/23/2017 18:47   83 80/48 100   11/23/2017 18:50 102.2  89 79/45    11/23/2017 18:55 102  89 88/58    11/23/2017 19:00 102 21 87 89/51    11/23/2017 19:05 102.2  89 90/53    11/23/2017 19:10 102.2  87 93/55    11/23/2017 19:15 stop 102.2  86 90/52 100   11/23/2017 19:30 102.4  92 103/61 100   11/23/2017 19:45 102.4 21 90 105/58    11/23/2017 20:00 102.4 bladder 22 93 97/58    11/23/2017 20:15 102.4 22 91 91/51 99   11/23/2017 20:30 Tmax 102.7 22 86 78/52 98   11/23/2017 20:45 102.4 21 89 101/63 96   11/23/2017 21:00 102.4 22 120 95/72 89   11/23/2017 21:15    90/58    11/23/2017 21:30 102 23 90 97/60 98         Blood Bank Investigation:   Patient blood type: A pos  Patient pre transfusion antibody screen: negative with no history of alloantibodies  Unit blood type: Apos  Post transfusion serum color: straw  Post transfusion direct antiglobulin test (ALINA): negative  Unit gram stain: no organisms seen  Unit culture: no growth to date  Patient post transfusion blood culture: no growth to date           Assessment:   1. The reaction meets the definition of a non-severe febrile non-hemolytic transfusion reaction.  The imputability is probable as a contribution from the underlying medical condition can not be excluded.  2. No evidence of bacterial contamination - cultures in progress  3. No evidence of a hemolytic transfusion reaction           Recommendation:   Transfuse as needed.      Elizabeth Carver M.D., Ph.D.  Attending Physician  Division of Transfusion Medicine  Department of Laboratory Medicine [place] [address]  Pager [phone]

## 2017-11-26 NOTE — PROGRESS NOTES
SURGICAL ICU PROGRESS NOTE  2017      CO-MORBIDITIES:   Acute post-operative pain  (primary encounter diagnosis)    ASSESSMENT: Alexia Flor is a 62-year-old female with PMH alcohol use disorder, MDD, Breast Cancer, tongue cancer, alcoholic fatty liver, COPD, and diastolic heart failure now POD #2 s/p left partial glossectomy with primary closure and left neck dissection for invasive SCC.  Admitted to SICU for assistance with EtOH withdrawal, possible need for ativan gtt, and respiratory monitoring.    Continues to require intermittent 1-2 pressor support, however levophed requirements or slowly titrating down.  Mechanical ventilation requirements are also trending down.  Patient had trach placed .    CHANGES FOR TODAY :  -Zosyn  -Wean pressors as able  -Wean vent as able  -Start risperidone  -Continue trickle TF  -Ammonia level today    PLAN:  Neuro/ pain/ sedation:  - Monitor neurological status. Notify the MD for any acute changes in exam.  - Pain: Fentanyl gtt  - Sedation: versed gtt  - EtOH withdrawal: on versed gtt, add risperidone today.  BP fluctuation may be due to w/d.     Pulmonary care:   - Titrate FiO2 to maintain SpO2 > 92%  - CMV/AC: 18/300/10/50 AB.30/38/81/19    Cardiovascular:    - Monitor hemodynamic status.   - NE and vasopressin gtt, wean as tolerated to maintain MAP > 60    GI care:   - NPO except ice chips and medications.  - TFs trickle via NG given Ileus.  - Ileus: 6 stools recorded yesterday, but just smears.  - Ammonia level today -- if elevated could consider lactulose    Fluids/ Electrolytes/ Nutrition:   - LR @75 for IV fluid hydration  - On replacement protocol   - No indication for parenteral nutrition.    Renal/ Fluid Balance:    - Urine output is adequate so far.  - Will continue to monitor intake and output.  - Bell discontinued , Cr 1.00, BUN 26 today    Endocrine:    - No intervention indicated at this time, BG stable    ID/ Antibiotics:  - GNR in  sputum from 11/24.  Start Zosyn today.  - Follow cultures to narrow abx as able    Heme:     - Hgb stable at 7.5, will transfuse for hgb < 7.0    Prophylaxis:    - Mechanical prophylaxis for DVT.   - Start subq heparin today for DVT ppx  - Protonix 40 mg qD for GI ppx    Lines/ tubes/ drains:  - PICC  - A line  - PIV x 3  - Neck JPs x 2    Disposition:  - Surgical ICU.     Patient seen and discussed with surgical ICU staff , Dr. Martin.    Hans Rizo MD  PGY-1  Orthopaedic Surgery    ====================================    TODAY'S PROGRESS:   SUBJECTIVE:     Continued on trickle TF overnight.  Had 600 out of NG yesterday so TF slowed down.  Patient intermittently requiring levophed and vasopressin.  Vaso off since this AM however.  Pressures fluctuating throughout morning.    OBJECTIVE:   1. VITAL SIGNS:   Temp:  [97.6  F (36.4  C)-99.9  F (37.7  C)] 98.3  F (36.8  C)  Heart Rate:  [] 71  Resp:  [10-22] 18  MAP:  [60 mmHg-126 mmHg] 68 mmHg  Arterial Line BP: ()/(37-80) 96/53  FiO2 (%):  [45 %-60 %] 60 %  SpO2:  [88 %-100 %] 100 %  Ventilation Mode: CMV/AC  FiO2 (%): 60 %  Rate Set (breaths/minute): 18 breaths/min  Tidal Volume Set (mL): 300 mL  PEEP (cm H2O): 10 cmH2O  Oxygen Concentration (%): 50 %  Resp: 18    2. INTAKE/ OUTPUT:   I/O last 3 completed shifts:  In: 2647.06 [I.V.:2117.06; NG/GT:320]  Out: 746 [Urine:108; Emesis/NG output:600; Drains:38]    3. PHYSICAL EXAMINATION:   General: Intubated and sedated   Resp: Equal and bilateral breath sounds.  HEENT: incisions C/D, no swelling or hematoma , CAMACHO drains with serosanguinous output   CV: RRR  Abdomen: Soft, Non-distended, Non-tender  Extremities: warm and well perfused    4. INVESTIGATIONS:   Arterial Blood Gases     Recent Labs  Lab 11/26/17  0702 11/25/17  0851 11/25/17  0415 11/24/17  2349   PH 7.30* 7.23* 7.23* 7.24*   PCO2 38 44 44 36   PO2 81 89 81 104   HCO3 19* 19* 19* 15*     Complete Blood Count     Recent Labs  Lab  11/26/17  0316 11/25/17  1623 11/25/17  0415 11/24/17  1209   WBC 20.4* 19.3* 20.6* 18.3*   HGB 7.5* 7.5* 7.6* 8.2*   * 156 170 165     Basic Metabolic Panel    Recent Labs  Lab 11/26/17 0316 11/25/17  1623 11/25/17  0415 11/24/17  1209    139 139 136   POTASSIUM 3.5 3.6 4.0  Canceled, Test credited 4.6   CHLORIDE 112* 110* 109 109   CO2 19* 18* 19* 18*   BUN 26 24 19 19   CR 1.00 1.00 0.94 0.91   GLC 88 106* 102* 107*       =========================================

## 2017-11-27 NOTE — PROGRESS NOTES
Nutrition Progress Note - Discussion of POC on SICU rounds; f/u for progress towards previous nutrition POC (see previous 11/22 assessment for details)    -Enteral access: NGT (12 Fr Entriflex)  -EN: Impact Peptide @ 10 ml/hr - held this AM due to concern for suctioning TF formula out trach  -FEN: K+ 2.9 --> 3.6, Mg++ 1.8, Phos 3.3 mg/dL (today) - no sx of refeeding but also without significant TF intakes.  -GI: x1 stool so far today, x2 yesterday, x6 on 11/25  -Resp: remains on vent support via trach, FiO2 @ 45%  -Weight: 50.2 kg (today, 11/27/2017), 49 kg (11/26), 39.6 kg (11/23/17), 39 kg (11/21/17) - unclear if adm wt (preop) accurate vs if reflective of dehydrated weight?    Interventions:  Collaboration and Referral of Nutrition care - Discussed plan for FEN/GI on rounds with Providers who rechecked XR today and confirmed FT in the stomach/requested RD order to restart/adv TFs (ordered to restart @ 15 ml/hr and adv by 10 ml q 12 hrs pending sx of refeeding).  Providers do not desire to pursue small bowel FT at this time given hx of gastric clips and continue to follow tolerance to gastric feeding trials.  Ordered metabolic cart study to be completed in the next 1-2 days (ideally once TF at goal) to help better evaluate REE (kcal) needs and most approp TF goal rate.    Layla Hudson ,RD, LD, CNSC (pgr [phone])

## 2017-11-27 NOTE — PROGRESS NOTES
Otolaryngology Progress Note  11/27/17    S: No acute events overnight. Remains on versed/fentanyl drips for sedation. Titrating down on norepinephrine drip with MAP goal of > 60.     O: Vital signs:  Temp: 97.9  F (36.6  C) Temp src: Axillary BP: 167/74   Heart Rate: 88 Resp: 28 SpO2: 94 % O2 Device: Mechanical Ventilator                           General: Sedated, easily agitated when spoken to or touched, not following commands                         HEENT: Oral tongue is less edematous. FOM bruised and purple, but soft to palpation. Patient is biting down, so it is difficult to assess the left lateral tongue incision at this time. Left neck incision c/d/i, neck soft and non-fluctuant, diffuse edema but essentially flat, no fluctuance. Mild erythema over incision as expected postoperatively, no skin breakdown. CAMACHO drain x 1 in the left neck holding bulb suction with serosanguinous drainage. 6-0 shiley cuffed sutured in place with trach ties around neck, thick white/yellow secretions, cuff appropriately insufflated.                          Pulmonary: on ventilator via trach, FiO2 45%, PEEP 10    Intake/Output Summary (Last 24 hours) at 11/27/17 0832  Last data filed at 11/27/17 0620   Gross per 24 hour   Intake          2119.77 ml   Output              262 ml   Net          1857.77 ml     CAMACHO drain output(s): (last 24 hours)/(last shift)  2 Left neck 2, 0, 50 mL = 52 ml      ROUTINE ICU LABS (Last four results)  CMP  Recent Labs  Lab 11/27/17  0323 11/26/17  1637 11/26/17  0316 11/25/17  1623 11/25/17  0415  11/24/17  0751 11/24/17  0320   * 144 144 139 139  < > 140 140   POTASSIUM 2.9* 3.3* 3.5 3.6 4.0  Canceled, Test credited  < >  --  5.0   CHLORIDE 114* 112* 112* 110* 109  < >  --  112*   CO2 20 20 19* 18* 19*  < >  --  19*   ANIONGAP 12 11 13 12 11  < >  --  9   GLC 91 103* 88 106* 102*  < >  --  132*   BUN 24 27 26 24 19  < >  --  15   CR 0.97 0.98 1.00 1.00 0.94  < > 0.96 0.81   GFRESTIMATED 58* 57*  56* 56* 60*  < > 58* 71   GFRESTBLACK 70 69 67 68 72  < > 71 86   SARAH 8.3* 8.6 8.2* 8.1* 7.8*  < >  --  6.8*   MAG 1.8  --  2.3  --  2.7*  --   --  1.6   PHOS 3.3  --  3.7  --  4.2  --   --  4.0   PROTTOTAL 5.2*  --  5.6*  --  5.8*  --  5.6*  --    ALBUMIN 2.1*  --  2.6*  --  3.0*  --  3.0*  --    BILITOTAL 4.6*  --  3.3*  --  3.1*  --  4.6*  --    ALKPHOS 156*  --  86  --  58  --  55  --    AST 52*  --  59*  --  58*  --  65*  --    ALT 28  --  32  --  26  --  23  --    < > = values in this interval not displayed.  CBC  Recent Labs  Lab 11/27/17  0323 11/26/17  1637 11/26/17  0316 11/25/17  1623   WBC 14.6* 18.1* 20.4* 19.3*   RBC 2.13* 2.32* 2.20* 2.17*   HGB 7.3* 8.0* 7.5* 7.5*   HCT 22.1* 24.2* 22.8* 22.8*   * 104* 104* 105*   MCH 34.3* 34.5* 34.1* 34.6*   MCHC 33.0 33.1 32.9 32.9   RDW 21.9* 22.5* 22.8* 23.4*   * 135* 144* 156     INRNo lab results found in last 7 days.  Arterial Blood Gas  Recent Labs  Lab 11/27/17  0804 11/26/17  0702 11/25/17  0851 11/25/17  0415   PH 7.33* 7.30* 7.23* 7.23*   PCO2 34* 38 44 44   PO2 85 81 89 81   HCO3 18* 19* 19* 19*   O2PER 45 60 50.0 60.0     CRP: 260 --> 210    Micro:  11/24:  Blood cx: NGTD  Urine cx: No growth  Sputum cx: e. 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       Neuro:  - Pain: Fentanyl gtt  - Sedation: versed gtt  - Alcoholism: high concern for EtOH withdrawal                         - SICU managing                         -Thiamine and folate, B12, multivitamin       HEENT:  - Incision cares: clean with 0.9% sodium chloride and apply Aquaphor Q8H   - Monitor and record CAMACHO drain output Qshift  - Peridex oral rinses 4  times daily   - Soft red rosales suction to oral cavity only - no olivier  - PTA Zyrtec      Trach Care Instructions:   - Do NOT cut or change trach ties - they are placed tightly around the neck to avoid decannulation. ENT will change trach ties. RN not to change the trach ties  - The first changing of trach tube, sponge, and/or ties will be performed by ENT at bedside on POD 3-6. Afterwards, other hospital staff can manage these items as needed  - Perform regular suctioning   - RN should change disposable inner canulas every shift and PRN   - Keep trach tube obturator taped to wall behind the head of the bed   - Keep extra unopened 6-0 cuffed Shiley and 4-0 cuffed Shiley at bedside at all times   - Minimize the amount of air in the cuff needed to adequately apply positive pressure ventilate. If positive pressure ventilation is not need then the cuff should be down.   - Contact ENT on-call with any questions or concerns       Respiratory:  - Aspiration pneumonia vs pneumonitis: Intraop aspiration event on 11/23,  tracheal sputum culture growing e. Coli (see ID section below)  - patient currently ventilated managed by SICU.  - Hx COPD, no PTA inhalers/meds in EPIC.       CV/heme:  - Closely monitoring hemodynamic status  - Hypotensive: Weaning down levophed as able while maintaining MAPs > 60  - Acute blood loss anemia on chronic anemia: continue PTA Iron supplement. Hgb 8.3, transfuse for < 7  - Hx diastolic heart failure: Hold lasix due to low BP  - TTE 11/24 showed EF of 60-65%      FEN/GI:  - NPO  - TFs via NG held due to reflux into oral cavity and around trach site.    - Concern for possible ileus: BM smear x6 11/25, x 3 yesterday   - mIVF: LR @ 75 mL/h   - pantoprazole   - Bowel regimen, received dulcolax suppository 11/25.  - Electrolyte replacement per protocol. Holding PTA potassium chloride for now.       /Renal:  - Adequate UOP, incontinent       Endo  - sliding scale for diabetic management. BG  wnl      ID:  - Aspiration pneumonia: Sputum culture growing e.coli resistant to Zosyn. Discontinue Zosyn, start ceftriaxone per SICU.         PPX:  - SQ Heparin 5000 TID  - SCDs  - IS    -- Patient and above plan to be discussed with Dr. George.     Jessica Hernández PA-C  Otolaryngology-Head & Neck Surgery  Please contact ENT by dialing [phone] and entering job code [phone].

## 2017-11-27 NOTE — PROGRESS NOTES
SURGICAL ICU PROGRESS NOTE  2017      CO-MORBIDITIES:   Acute post-operative pain  (primary encounter diagnosis)    ASSESSMENT: Alexia Flor is a 62-year-old female with PMH alcohol use disorder, MDD, Breast Cancer, tongue cancer, alcoholic fatty liver, COPD, and diastolic heart failure now s/p left partial glossectomy with primary closure and left neck dissection for invasive SCC .  Admitted to SICU for assistance with EtOH withdrawal, possible need for ativan gtt, and respiratory monitoring. Underwent tracheostomy and evacuation of L neck hematoma     Overnight there were concerns regarding TFs coming out of tracheostomy. TFs were held and her vent settings were unchanged. Has had decreasing pressor requirements over last 24 hours and is off pressors this AM.     CHANGES FOR TODAY :  - Dc Zosyn. Will start ceftriaxone x 7d For HCAP  -Wean vent as able  - Dc Versed gtt, continue PRN versed. Start gabapentin 100 TID today  - Dc Fentanyl gtt. Start enteral oxycodone PRN and Dilaudid PRN  - Advance TFs  - Possible bronchoscopy today   - Consult hepatology today  - CXR to check position of NG tube       PLAN:  Neuro/ pain/ sedation:  - Monitor neurological status. Notify the MD for any acute changes in exam.  - Dc Versed gtt, continue PRN versed. Start gabapentin 100 TID today  - Dc Fentanyl gtt. Start enteral oxycodone PRN and Dilaudid PRN  - Risperidone 1 mg BID    Pulmonary care:   - CMV/AC: 18/300/10/45 AB.33/34/85/18, LA WNL, AG WNL ( non anion gap metabolic acidosis with respiratory compensation)  - Bronch today  - Dc Zosyn. Will start ceftriaxone x 7d For HCAP on     Cardiovascular:    - Monitor hemodynamic status.   - Off NE this AM    GI care:   - NPO except ice chips and medications.  - TFs trickle via NG given Ileus.  - Ileus: multiple stools recorded yesterday, but just smears.  - Ammonia level WNL   - Persistent direct hyperbilirubinemia:  RUQ US showed non dilated  CBD and intrahepatic ducts, cholelithiasis  - Consult hepatology today    Fluids/ Electrolytes/ Nutrition:   - TKO IVF, PRN Iv boluses   - On replacement protocol   - No indication for parenteral nutrition.    Renal/ Fluid Balance:    - Urine output is adequate so far.  - Will continue to monitor intake and output.  - Bell discontinued 11/25, Cr 0.97    Endocrine:    - No intervention indicated at this time, BG stable    ID/ Antibiotics:  - E coli  sputum from 11/24. Resistant to Zosyn, Ampicillin and FQs.   - Dc Zosyn, start Ceftriaxone x 7d (started 11/27)  - Follow cultures to narrow abx as able    Heme:     - Hgb stable at 7.3 , will transfuse for hgb < 7.0    Prophylaxis:    - Mechanical prophylaxis for DVT.   - Sq heparin for DVT ppx  - Protonix 40 mg qD for GI ppx    Lines/ tubes/ drains:  - PICC  - A line  - PIV x 3  - Neck JPs x 2    Disposition:  - Surgical ICU.     Patient seen and discussed with surgical ICU staff , Dr. Lauri Blum MD  PGY-2 General Surgery Resident  [phone]    ====================================    TODAY'S PROGRESS:   SUBJECTIVE:     Overnight there were concerns regarding TFs coming out of tracheostomy. TFs were held and her vent settings were unchanged. Has had decreasing pressor requirements over last 24 hours and is off pressors this AM.     OBJECTIVE:   1. VITAL SIGNS:   Temp:  [97.9  F (36.6  C)-99.5  F (37.5  C)] 97.9  F (36.6  C)  Heart Rate:  [] 101  Resp:  [8-42] 28  BP: (167-190)/(67-74) 167/74  MAP:  [59 mmHg-140 mmHg] 107 mmHg  Arterial Line BP: ()/(37-90) 166/60  FiO2 (%):  [45 %-60 %] 45 %  SpO2:  [91 %-100 %] 95 %  Ventilation Mode: CMV/AC  FiO2 (%): 45 %  Rate Set (breaths/minute): 18 breaths/min  Tidal Volume Set (mL): 300 mL  PEEP (cm H2O): 10 cmH2O  Oxygen Concentration (%): 50 %  Resp: 28    2. INTAKE/ OUTPUT:   I/O last 3 completed shifts:  In: 2426.92 [I.V.:2066.92; NG/GT:240]  Out: 270 [Emesis/NG output:200; Drains:70]    3.  PHYSICAL EXAMINATION:   General: Intubated and sedated   Resp: Equal and bilateral breath sounds.  HEENT: incisions C/D, no swelling or hematoma , CAMACHO drains with serosanguinous output   CV: RRR  Abdomen: Soft, Non-distended, Non-tender  Extremities: warm and well perfused    4. INVESTIGATIONS:   Arterial Blood Gases     Recent Labs  Lab 11/27/17  0804 11/26/17  0702 11/25/17  0851 11/25/17  0415   PH 7.33* 7.30* 7.23* 7.23*   PCO2 34* 38 44 44   PO2 85 81 89 81   HCO3 18* 19* 19* 19*     Complete Blood Count     Recent Labs  Lab 11/27/17  0323 11/26/17  1637 11/26/17  0316 11/25/17  1623   WBC 14.6* 18.1* 20.4* 19.3*   HGB 7.3* 8.0* 7.5* 7.5*   * 135* 144* 156     Basic Metabolic Panel    Recent Labs  Lab 11/27/17  0323 11/26/17  1637 11/26/17  0316 11/25/17  1623   * 144 144 139   POTASSIUM 2.9* 3.3* 3.5 3.6   CHLORIDE 114* 112* 112* 110*   CO2 20 20 19* 18*   BUN 24 27 26 24   CR 0.97 0.98 1.00 1.00   GLC 91 103* 88 106*       =========================================    Discussed with Dr. Lauri Blum MD  PGY-2 General Surgery Resident  [phone]  Physician Attestation   I, Antoine Carreon, saw this patient with the resident and agree with the resident s findings and plan of care as documented in the resident s note.      I personally reviewed vital signs, medications, labs and imaging.    Key findings: 62-year-old female with alcohol use disorder, MDD, Breast Cancer, tongue cancer, alcoholic fatty liver, COPD, and diastolic heart failure now s/p left partial glossectomy with primary closure and left neck dissection for invasive SCC 11/21.  Hospital stay was complicated by ethanol withdrawal,and respiratory failure. Underwent tracheostomy and evacuation of Left neck hematoma 11/23. We have scaled down her antibiotics and is currently being treated for a health care associated pneumonia. On 11/24 RUQ US showed non dilated CBD and intrahepatic ducts, cholelithiasis. We have consulted  hepatology. In view of potential tube feeds aspiration, we may consider a bronchoscopy. She remains on the ventilator with difficulty in weaning.     Antoine Carreon  Date of Service (when I saw the patient): 11/27/2017  Time Spent on this Encounter   I spent 37 minutes managing the critical care of Alexia Flor in relation to the issues listed in this note.

## 2017-11-27 NOTE — LETTER
November 29, 2017      Alexia Anthony  [address]  [facility] [address]        Dear ,    We are writing to inform you of your test results.    The urine sample is essentially normal.  The chemistry panel shows a slightly decreased potassium of 3.2.  Kidney function is normal.  The blood cell count shows a slight improvement in the anemia.  The hemoglobin is now 9.3 up from the previous 8.9.    Resulted Orders   CBC with platelets   Result Value Ref Range    WBC 6.9 4.0 - 11.0 10e9/L    RBC Count 2.57 (L) 3.8 - 5.2 10e12/L    Hemoglobin 9.3 (L) 11.7 - 15.7 g/dL    Hematocrit 29.5 (L) 35.0 - 47.0 %     (H) 78 - 100 fl    MCH 36.2 (H) 26.5 - 33.0 pg    MCHC 31.5 31.5 - 36.5 g/dL    RDW 18.7 (H) 10.0 - 15.0 %    Platelet Count 248 150 - 450 10e9/L   Basic metabolic panel   Result Value Ref Range    Sodium 141 133 - 144 mmol/L    Potassium 3.2 (L) 3.4 - 5.3 mmol/L    Chloride 103 94 - 109 mmol/L    Carbon Dioxide 26 20 - 32 mmol/L    Anion Gap 12 3 - 14 mmol/L    Glucose 83 70 - 99 mg/dL    Urea Nitrogen 12 7 - 30 mg/dL    Creatinine 0.78 0.52 - 1.04 mg/dL    GFR Estimate 74 >60 mL/min/1.7m2      Comment:      Non  GFR Calc    GFR Estimate If Black 89 >60 mL/min/1.7m2      Comment:       GFR Calc    Calcium 7.8 (L) 8.5 - 10.1 mg/dL   *UA reflex to Microscopic and Culture ([place]; [facility]; [facility]; [facility]; [facility]; [facility]; [place]; Range)   Result Value Ref Range    Color Urine Yellow     Appearance Urine Clear     Glucose Urine Negative NEG^Negative mg/dL    Bilirubin Urine Negative NEG^Negative    Ketones Urine Negative NEG^Negative mg/dL    Specific Gravity Urine 1.010 1.003 - 1.035    Blood Urine Negative NEG^Negative    pH Urine 6.0 5.0 - 7.0 pH    Protein Albumin Urine Negative NEG^Negative mg/dL    Urobilinogen mg/dL 4.0 (H) 0.0 - 2.0 mg/dL    Nitrite Urine Negative NEG^Negative    Leukocyte Esterase Urine Trace (A)  NEG^Negative    Source Midstream Urine     RBC Urine <1 0 - 2 /HPF    WBC Urine 2 0 - 2 /HPF    Squamous Epithelial /HPF Urine 4 (H) 0 - 1 /HPF    Mucous Urine Present (A) NEG^Negative /LPF       If you have any questions or concerns, please call the clinic at the number listed above.       Sincerely,    Kyaw Shaikh, DO

## 2017-11-27 NOTE — CONSULTS
GASTROENTEROLOGY CONSULTATION      Date of Admission:  11/21/2017          ASSESSMENT AND RECOMMENDATIONS:   Assessment:  62 year old female with a history of  alcohol use disorder, MDD, Breast Cancer, tongue cancer, alcoholic fatty liver, COPD, and diastolic heart failure now POD#6 s/p left partial glossectomy and left neck dissection for invasive SCC.  Admitted to SICU for assistance with EtOH withdrawal, and respiratory monitoring from an aspiration event. Now with acute jump in Alk Phos and persistent direct hyperbilirubinemia (Total 4.6, direct 3.5), with no significant elevation in ALT/AST. Normal ammonia, recent RUQ US showed non-dilated CBD and intrahepatic ducts, cholelithiasis with out signs of cholecystitis, and minimal ascites. Differential includes Cholestasis of chronic illness/sepsis or worsening alcoholic liver disease, and less likely obstruction.    Recommendations:  - Agree with Abdominal US: pending results  - Diagnostic paracentesis, if sufficient ascites fluid to tap safely, and full ascites analysis including gram stain and culture  - Obtain Fractionate Alkaline phosphatase  - Continue to rule out infection, ongoing concerns for aspiration or other unknown source   - Consider formally working up possible pancreatic inefficacy if patient is having loss stools (72 hour fecal fat study)  - Consider a Neurology consult with the patient's ongoing AMS   - Gastroenterology will continue to follow    Thank you for involving us in this patient's care. Please do not hesitate to contact the GI service with any questions or concerns.     Pt care plan discussed with Dr. Saeed, GI staff physician.    Philip Flores MD, [facility]  Internal Medicine PGY-2  [facility]  Pager: [phone]    -------------------------------------------------------------------------------------------------------------------          Chief Complaint:   We were asked by Dr. Blum of Surgery to evaluate  this patient with Direst hyperbilirubinemia, history of alcohol abuse, cholelithiasis with normal CBD    History is obtained from the medical record.     Patient is how hospitalized for for treatment of left tongue SCC now POD#6 s/p left partial glossectomy with primary closure and left MRND and POD#4 following awake tracheostomy and neck expoloration for left neck hematoma evacuation. Hospital course complicated by aspiration pneumonia (intraop aspiration event on 11/23), Sputum culture growing e.coli resistant to Zosyn so patient was transferred to ceftriaxone.  Patient continued to be intermittently agitated, confused, and somnolent, with concerns for ongoing EtoH withdrawal, requiring soft restraints and sedation with precedex gtt.  Patient has also needed pressors, but has been able to wean off over the last 24 hours.     08/28 - 08/29/17: Patient was admitted from for left-sided abdominal pain nausea, vomiting.   showing a mild elevation or lipase at 699 and has a low potassium at 2.6. CT of the abdomen is showing bowel wall thickening with stranding involving a splenic flexure and gallstones without signs of cholecystitis. Thought to have pancreatitis or possible colitis.  Patient left AMA the following day due to active drinking in hospital room and requesting 1-2 mg of Dilaudid for abdominal pain.    08/29 - 09/16/17: Presented to [facility] in [place] with baseline confusion and abdominal pain. Colonoscopy showed diverticulosis but otherwise unremarkable and no evidance of colitis.  Upper endoscopy showed angiodysplastic lesions in the stomach.  Continued problems with waxing and waning mental status.           History of Present Illness:   Alexia Flor is a 62 year old female with a history of            Past Medical History:   Reviewed and edited as appropriate  Past Medical History:   Diagnosis Date     Alcohol abuse      Alcoholic hepatitis      Alcoholic pancreatitis      Anxiety       Schafer's esophagus      Breast cancer (H)     rt, s/p radiation.     Chemical dependency (H)      Congestive heart failure, unspecified      COPD (chronic obstructive pulmonary disease) (H)      Depressive disorder      History of radiation therapy      Hoarseness      Hypokalemia      Macrocytic anemia      Non-adherence to medical treatment      Tobacco abuse      Tongue cancer (H)             Past Surgical History:   Reviewed and edited as appropriate   Past Surgical History:   Procedure Laterality Date     DISSECTION RADICAL NECK Left 11/21/2017    Procedure: DISSECTION RADICAL NECK;;  Surgeon: Heriberto George MD;  Location: [facility] OR     ESOPHAGOSCOPY, GASTROSCOPY, DUODENOSCOPY (EGD), COMBINED N/A 11/9/2017    Procedure: COMBINED ENDOSCOPIC ULTRASOUND, ESOPHAGOSCOPY, GASTROSCOPY, DUODENOSCOPY (EGD), FINE NEEDLE ASPIRATE/BIOPSY;;  Surgeon: Yo Bhat MD;  Location: [facility] OR     EXPLORE NECK Left 11/23/2017    Procedure: EXPLORE NECK;  left Neck Exploration, tracheostomy , placement of nasogastric feeding tube;  Surgeon: Daisy Singh MD;  Location: [facility] OR     GLOSSECTOMY PARTIAL N/A 11/21/2017    Procedure: GLOSSECTOMY PARTIAL;  Left Partial Glossectomy And Left Neck Dissection, ;  Surgeon: Heriberto George MD;  Location: [facility] OR     LARYNGOSCOPY WITH BIOPSY(IES) N/A 11/9/2017    Procedure: LARYNGOSCOPY WITH BIOPSY(IES);  Direct Laryngoscopy,  Upper Endoscopic Ultrasound and Fine Needle Aspiration;  Surgeon: Heriberto George MD;  Location: [facility] OR     lumpectomy Rt breast  2006       TRACHEOSTOMY  11/23/2017    Procedure: TRACHEOSTOMY;;  Surgeon: Dasiy Singh MD;  Location: [facility] OR            Previous Endoscopy:   No results found for this or any previous visit.         Social History:   Reviewed and edited as appropriate  Social History     Social History     Marital status: Single     Spouse name: N/A     Number of children: N/A     Years of education: N/A     Occupational  "History     Not on file.     Social History Main Topics     Smoking status: Heavy Tobacco Smoker     Packs/day: 0.50     Years: 40.00     Types: Cigarettes     Smokeless tobacco: Current User     Alcohol use 0.5 oz/week      Comment: 1/2 liter per day     Drug use: No     Sexual activity: Not Currently     Partners: Male     Other Topics Concern     Not on file     Social History Narrative    Lives in [place] in an apartment. Does not own a car.            Family History:   Reviewed and edited as appropriate  Family History   Problem Relation Age of Onset     Coronary Artery Disease Father      Emphysema Father      CANCER Mother      renal cancer     Breast Cancer Maternal Grandmother      CANCER Maternal Grandmother      Substance Abuse Maternal Grandfather      CANCER Maternal Grandfather      Substance Abuse Cousin      CANCER Cousin      Substance Abuse Brother      DIABETES Brother           Allergies:   Reviewed and edited as appropriate     Allergies   Allergen Reactions     Codeine Anaphylaxis     Contrast Dye Itching and Swelling     7/10/2009 Patient reports history of contrast reaction when first diagnosed with breast cancer. \"I almost .\"   (Hives, swelling.) 2009 CT to be done without IV contrast per Dr. KAVIN Live     Moxifloxacin Anaphylaxis     Avelox     Nickel Itching and Rash     Codeine Sulfate Other (See Comments)     shaking     Gabapentin Other (See Comments)     Patient reports that she got very shaky and she \"felt like she was going to die\"            Medications:     Current Facility-Administered Medications   Medication     dexmedetomidine (PRECEDEX) 400 mcg in 0.9% sodium chloride 100 mL     potassium chloride SA (K-DUR/KLOR-CON M) CR tablet 20-40 mEq     potassium chloride (KLOR-CON) Packet 20-40 mEq     potassium chloride 10 mEq in 100 mL sterile water intermittent infusion (premix)     potassium chloride 10 mEq in 100 mL intermittent infusion with 10 mg lidocaine     magnesium " sulfate 2 g in NS intermittent infusion (PharMEDium or FV Cmpd)     magnesium sulfate 4 g in 100 mL sterile water (premade)     potassium phosphate 10 mmol in D5W 250 mL intermittent infusion     potassium phosphate 15 mmol in D5W 250 mL intermittent infusion     potassium phosphate 20 mmol in D5W 500 mL intermittent infusion     potassium phosphate 20 mmol in D5W 250 mL intermittent infusion     potassium phosphate 25 mmol in D5W 500 mL intermittent infusion     gabapentin (NEURONTIN) solution 100 mg     [START ON 11/28/2017] cefTRIAXone (ROCEPHIN) 1 g in 10 mL SWFI Premix Syringe     risperiDONE (risperDAL) solution 1 mg     heparin sodium PF injection 5,000 Units     midazolam (VERSED) injection 1-2 mg     Carboxymethylcellulose Sod PF (REFRESH PLUS) 0.5 % ophthalmic solution 1 drop     lidocaine (LMX4) kit     heparin lock flush 10 UNIT/ML injection 2-5 mL     acetaminophen (TYLENOL) tablet 650 mg    Or     acetaminophen (TYLENOL) Suppository 650 mg     mineral oil-hydrophilic petrolatum (AQUAPHOR)     fentaNYL (PF) (SUBLIMAZE) injection 50 mcg     pantoprazole (PROTONIX) suspension 40 mg     fentaNYL (SUBLIMAZE) infusion     mineral oil-hydrophilic petrolatum (AQUAPHOR)     multivitamins with minerals (CERTAVITE/CEROVITE) liquid 15 mL     cyanocobalamin (vitamin  B-12) tablet 1,000 mcg     folic acid (FOLVITE) tablet 1 mg     lactated ringers infusion     bisacodyl (DULCOLAX) Suppository 10 mg     polyethylene glycol (MIRALAX/GLYCOLAX) Packet 17 g     senna-docusate (SENOKOT-S;PERICOLACE) 8.6-50 MG per tablet 1 tablet    Or     senna-docusate (SENOKOT-S;PERICOLACE) 8.6-50 MG per tablet 2 tablet     chlorhexidine (PERIDEX) 0.12 % solution 15 mL     glucose 40 % gel 15-30 g    Or     dextrose 50 % injection 25-50 mL    Or     glucagon injection 1 mg     dextrose 10 % 1,000 mL infusion     thiamine tablet 100 mg     lidocaine 1 % 1 mL     lidocaine (LMX4) kit     sodium chloride (PF) 0.9% PF flush 3 mL     sodium  "chloride (PF) 0.9% PF flush 3 mL     ondansetron (ZOFRAN) injection 4 mg     prochlorperazine (COMPAZINE) injection 10 mg    Or     prochlorperazine (COMPAZINE) tablet 10 mg     naloxone (NARCAN) injection 0.1-0.4 mg     diphenhydrAMINE (BENADRYL) injection 25 mg           Review of Systems:   A complete review of systems was performed and is negative except as noted in the HPI           Physical Exam:   /74  Temp 97.9  F (36.6  C) (Axillary)  Resp 24  Ht 1.55 m (5' 1.02\")  Wt 50.2 kg (110 lb 10.7 oz)  SpO2 100%  Breastfeeding? No  BMI 20.89 kg/m2  Wt:   Wt Readings from Last 2 Encounters:   11/27/17 50.2 kg (110 lb 10.7 oz)   11/20/17 38.9 kg (85 lb 12.8 oz)      Constitutional: Intubated, sedated   Eyes: Slight scleral icterus, non-injected  Ears/nose/mouth/throat: Normal oropharynx without ulcers or exudate, mucus membranes moist, hearing intact  Neck: supple, thyroid normal size  CV: No edema  Respiratory: Unlabored breathing on venerator  Lymph: No axillary, submandibular, supraclavicular or inguinal lymphadenopathy  Abd: Distended, +bs, unable to assess masses  Skin: warm, perfused, no jaundice  Neuro: Patient is sedated and not responding to commands  Psych: Unable to assess  MSK: No gross deformities         Data:   Labs and imaging below were independently reviewed and interpreted    BMP  Recent Labs  Lab 11/27/17  1035 11/27/17  0323 11/26/17  1637 11/26/17  0316 11/25/17  1623   NA  --  145* 144 144 139   POTASSIUM 3.6 2.9* 3.3* 3.5 3.6   CHLORIDE  --  114* 112* 112* 110*   SARAH  --  8.3* 8.6 8.2* 8.1*   CO2  --  20 20 19* 18*   BUN  --  24 27 26 24   CR  --  0.97 0.98 1.00 1.00   GLC  --  91 103* 88 106*     CBC  Recent Labs  Lab 11/27/17  0323 11/26/17  1637 11/26/17  0316 11/25/17  1623   WBC 14.6* 18.1* 20.4* 19.3*   RBC 2.13* 2.32* 2.20* 2.17*   HGB 7.3* 8.0* 7.5* 7.5*   HCT 22.1* 24.2* 22.8* 22.8*   * 104* 104* 105*   MCH 34.3* 34.5* 34.1* 34.6*   MCHC 33.0 33.1 32.9 32.9   RDW " 21.9* 22.5* 22.8* 23.4*   * 135* 144* 156     INRNo lab results found in last 7 days.  LFTs  Recent Labs  Lab 11/27/17  0323 11/26/17  0316 11/25/17  0415 11/24/17  0751   ALKPHOS 156* 86 58 55   AST 52* 59* 58* 65*   ALT 28 32 26 23   BILITOTAL 4.6* 3.3* 3.1* 4.6*   PROTTOTAL 5.2* 5.6* 5.8* 5.6*   ALBUMIN 2.1* 2.6* 3.0* 3.0*      PANC  Recent Labs  Lab 11/24/17  1209   LIPASE 24*   AMYLASE 32     Imaging:    EXAMINATION: Limited Abdominal Ultrasound, 11/24/2017 1:58 PM     1.  Cholelithiasis without evidence of cholecystitis.  2.  Mild ascites in the upper abdomen partially visualized  right-sided pleural effusion.    Attestation:  This patient has been seen and evaluated by me, Miles Saeed.  Discussed with the house staff team or resident(s) and agree with the findings and plan in this note.
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
## 2017-11-27 NOTE — PLAN OF CARE
Problem: Patient Care Overview  Goal: Plan of Care/Patient Progress Review  PT 4AB: PT HOLDING- Per chart review, pt intubated/sedated. OT to initiate PROM for UE/LEs and progress mobility as appropriate. OT to notify PT if needs arise.

## 2017-11-27 NOTE — PLAN OF CARE
Problem: Patient Care Overview  Goal: Plan of Care/Patient Progress Review  Outcome: No Change  Pt remains trached and on versed/fentanyl for sedation. Does not follow any commands, but is strong and has full ROM to all extremities. Norepinephrine titrated to keep MAP>60. Continues to have moderate amount of yellow drainage from trach site, however secretions from inline suction are pale. Spoke with SICU resident regarding pt's trach cuff, as cuff was down at start of author's shift. Resident gave OK to leave cuff down. Will pass on to day RN to f/u with ENT team as well. Continuing to hold TF, as ordered. Incontinent of urine, but does appear to have adequate output with pad changes. Repositioned [id number]. Continuing to monitor.

## 2017-11-27 NOTE — TELEPHONE ENCOUNTER
----- Message from Kyaw Shaikh DO sent at 11/25/2017  9:05 AM CST -----    Please contact the patient and notify her of the following:  The year and sample is essentially normal.  The chemistry panel shows a slightly decreased potassium of 3.2.  Kidney function is normal.  The blood cell count shows a slight improvement in the anemia.  The hemoglobin is now 9.3 up from the previous 8.9.  Thank you.  Kyaw Shaikh DO FACOI

## 2017-11-28 NOTE — PROCEDURES
Bridle Placement:   Reason for bridle placement: Secure FT with EtOH withdrawal sx   Medicine delivered during procedure: lidocaine gel   Procedure: Successful   Location of top of clip on FT: @ 100 cm marker   Condition of nose/skin at time of bridle placement: Unremarkable   Face to Face time with patient: 5 minutes.    Layla Hudson ,RD, LD, CNSC (pgr [phone])

## 2017-11-28 NOTE — PLAN OF CARE
Problem: Patient Care Overview  Goal: Plan of Care/Patient Progress Review  Outcome: No Change  Ms. Flor was restless throughout majority of shift, constantly thrashing legs and moving arms non purposefully.  Patient is difficult to reposition, especially neck, and favors having her neck turned to left side.  Does not follow commands, does not open eyes or track, but moving all extremities.  Precedex increased to 0.6, Fentanyl given x 1 and Acetaminophen given x 2.  Versed given x 1 when patient attempting to flip both legs over railing.  Patient rested for approximately an hour after Versed administration but then returned to restless state.  SR on monitors with little to no ectopy noted.  Tachypnic except for after Fentanyl and Versed.  Neck swollen around trach site.  Trach oozing throughout shift and trach cares completed/inner cannula changed.  Difficult to keep neck dry as patient is constantly oozing purulent/yellow drainage and ulceration/blistering noted.  ENT aware and trach tie change/trach care completed at bedside this am.  Mepilex placed to protect skin.  CMV settings continue with FiO2 45%.  Spoke to RT about possible pressure support trial for this am.  CAMACHO drain output as charted.  Potassium and magnesium replaced, awaiting potassium phosphate replacement from pharmacy.  Tube feeding not advanced due to high residuals and electrolytes out of normal range.  Incontinent of urine multiple times; one green BM smear.  Abdominal distention noted.  No family calls or family at bedside.

## 2017-11-28 NOTE — PROGRESS NOTES
"CC: abnormal hepatic panel    S: Still altered, not following commands. Agitated. Stooling. US yesterday showed ascites.    Unable to obtain ROS due to altered mental status     Labs reviewed, essentially stable. Infectious w/u last done 11/24    O:  /78  Temp 96.9  F (36.1  C) (Axillary)  Resp 28  Ht 1.55 m (5' 1.02\")  Wt 47 kg (103 lb 11.2 oz)  SpO2 98%  Breastfeeding? No  BMI 19.58 kg/m2  GEN: unable to follow commands, in moderate distress  PULM: tachypneic, bibasilar crackles, R side BS decreased   CV: tachy, RR  ABD: distended, BS+  EXTREM: 1+ LE edema  NEURO: unable to assess due to AMS    A:  62 year old female with EtOH misuse, likely EtOH liver disease, CHF, COPD, recent partial glossectomy c/b neck hematoma s/p evacuation who developed abnormal hepatic panel post-procedurally. Her hepatic panel abnormalities are likely related to cholestasis of critical illness with possible underlying EtOH liver disease    Recs:  -US yesterday showed ascites-->would tap either ascites or consider tapping R pleural effusion if unable to tap ascites  -Repeat full infectious w/u  -If AMS persists/worsens consider neuro consult or central imaging.  -Unlikely US findings represent cholecystitis  -Hepatology is available were questions to arise.    Discussed with Dr. Christophe Adams  Hepatology Fellow  "

## 2017-11-28 NOTE — PLAN OF CARE
Problem: Patient Care Overview  Goal: Plan of Care/Patient Progress Review  Outcome: No Change  D: POD 6 left partial glossectomy now trached. Had been going through ETOH withdrawal and very restless over the weekend, though now is more somnolent. Sedation changed to precedex today/  I/A:     Neuro: sedation and pain medications stopped and precedex and gabapentin initiated today. Patient has been somnolent with no command following. Grimaces to pain and moves arms purposefully aiming at pain location, though is otherwise calm. Does not open eyes to pain or keep them open. No tracking noted. Team aware. GI team suggested head CT though SICU would like to hold off for now.   Cardiac: Sinus rhythm in 60-90s with PVCs improving now. Pressors off, BPS labile. Will be in 90s/50s when sleeping and up to 150s/70s when awake. No PRNS ordered or given.   Respiratory: VEnted. PEEP decreased to 8 today, tolerating well. FiO2 at 45%. Thick tan copious  secretions present from around trach, moderate more white down trach. Lungs coarse throughout.   GI: TF restarted after abdominal US today. To be advanced q 12 hr.   : Briefs in place. Incontinent of urine  ID: Afebrile      P: Continues to be somnolent. SICU team aware. Notify of any acute events

## 2017-11-28 NOTE — PROGRESS NOTES
SURGICAL ICU PROGRESS NOTE  2017      CO-MORBIDITIES:   Acute post-operative pain  (primary encounter diagnosis)    ASSESSMENT: Alexia Flor is a 62-year-old female with PMH alcohol use disorder, MDD, Breast Cancer, tongue cancer, alcoholic fatty liver, COPD, and diastolic heart failure now s/p left partial glossectomy with primary closure and left neck dissection for invasive SCC .  Admitted to SICU for assistance with EtOH withdrawal, possible need for ativan gtt, and respiratory monitoring. Underwent tracheostomy and evacuation of L neck hematoma     Recent concerns about TFs coming out of tracheostomy, but stable overnight.  No plans to perform bronchoscopy unless this occurs again.  ENT changed trach ties .  Hepatology following for worsening LFTs.     CHANGES FOR TODAY :  - Follow up with Hepatology  - Discontinue fentanyl, start PRN oxycodone  - FWF 60 mL q4h  - Decrease PEEP to 5  - NJ placement today w/ nutrition  - Add clonidine, wean decrease precedex as able  - Increase gabapentin to 300 mg q8h    PLAN:  Neuro/ pain/ sedation:  - Monitor neurological status. Notify the MD for any acute changes in exam.  - Precedex gtt wean as tolerated.  Start clonidine.  - Increase gabapentin to 300 mg q8h  - Enteral oxycodone PRN and Dilaudid PRN  - Risperidone 1 mg BID    Pulmonary care:   - CMV/AC: 18/300/8/45 AB.41/29/101/18 LA WNL, AG WNL ( non anion gap metabolic acidosis with respiratory compensation) -- decreasing PEEP to 5 today.  - No plans for Bronch currently.  - Ceftriaxone x 7d For HCAP on     Cardiovascular:    - Monitor hemodynamic status.   - Off pressors    GI care:   - NPO except ice chips and medications.  - NJ placement today w/ nutrition  - Ileus: multiple stools recorded yesterday  - Ammonia level WNL     #Persistent direct hyperbilirubinemia:   - Hepatology consult, appreciate recommendations  - fractionate alk phos -- ordered  - possible paracentesis --  will discuss with hepatology  - supportive cares  - abdominal US w/ dopplers -- cholelithiasis w/o evidence of cholecystitis. Mild ascites present.    Fluids/ Electrolytes/ Nutrition:   - TKO IVF, PRN IV boluses   - On replacement protocol   - No indication for parenteral nutrition.    Renal/ Fluid Balance:    - Urine output is adequate so far.  - Will continue to monitor intake and output.  - Bell discontinued 11/25, Cr 0.86, BUN 22.    Endocrine:    - No intervention indicated at this time, BG stable    ID/ Antibiotics:  - E coli  sputum from 11/24. Resistant to Zosyn, Ampicillin and FQs.   - Dc Zosyn, start Ceftriaxone x 7d (started 11/27)  - Follow cultures to narrow abx as able    Heme:     - Hgb stable at 7.5 , will transfuse for hgb < 7.0    Prophylaxis:    - Mechanical prophylaxis for DVT.   - Sq heparin for DVT ppx  - Protonix 40 mg qD for GI ppx    Lines/ tubes/ drains:  - PICC  - A line  - PIV x 3  - Neck JPs x 1    Disposition:  - Surgical ICU.     Patient seen and discussed with surgical ICU staff , Dr. Lauri Rizo MD  PGY-1  Orthopaedic Surgery    ====================================    TODAY'S PROGRESS:   SUBJECTIVE:     No acute events overnight.  ENT changed trach ties this morning.  Secretions slowing down from Trach, per nursing.    OBJECTIVE:   1. VITAL SIGNS:   Temp:  [96.9  F (36.1  C)-98.1  F (36.7  C)] 96.9  F (36.1  C)  Heart Rate:  [62-95] 62  Resp:  [17-33] 17  MAP:  [67 mmHg-109 mmHg] 83 mmHg  Arterial Line BP: ()/(32-74) 129/51  FiO2 (%):  [45 %] 45 %  SpO2:  [98 %-100 %] 100 %  Ventilation Mode: CMV/AC  FiO2 (%): 45 %  Rate Set (breaths/minute): 18 breaths/min  Tidal Volume Set (mL): 300 mL  PEEP (cm H2O): 5 cmH2O  Oxygen Concentration (%): 45 %  Resp: 17    2. INTAKE/ OUTPUT:   I/O last 3 completed shifts:  In: 2203.17 [I.V.:1508.17; NG/GT:470]  Out: 240 [Emesis/NG output:200; Drains:40]    3. PHYSICAL EXAMINATION:   General: Intubated and sedated   Resp: Equal and  bilateral breath sounds.  HEENT: no swelling or hematoma , CAMACHO drains with serosanguinous output   CV: RRR  Abdomen: Soft, mildly distended, Non-tender  Extremities: warm and well perfused    4. INVESTIGATIONS:   Arterial Blood Gases     Recent Labs  Lab 11/28/17  0430 11/27/17  0804 11/26/17  0702 11/25/17  0851   PH 7.41 7.33* 7.30* 7.23*   PCO2 29* 34* 38 44   PO2 101 85 81 89   HCO3 18* 18* 19* 19*     Complete Blood Count     Recent Labs  Lab 11/28/17  0430 11/27/17  2106 11/27/17  0323 11/26/17  1637   WBC 13.2* 14.2* 14.6* 18.1*   HGB 7.5* 7.6* 7.3* 8.0*   * 126* 111* 135*     Basic Metabolic Panel    Recent Labs  Lab 11/28/17  0430 11/27/17  2106 11/27/17  1035 11/27/17  0323 11/26/17  1637   * 147*  --  145* 144   POTASSIUM 3.8 3.1* 3.6 2.9* 3.3*   CHLORIDE 116* 115*  --  114* 112*   CO2 17* 16*  --  20 20   BUN 22 21  --  24 27   CR 0.86 0.87  --  0.97 0.98   * 102*  --  91 103*       =========================================  Physician Attestation   I, Antoine Carreon, saw this patient with the resident and agree with the resident s findings and plan of care as documented in the resident s note.      I personally reviewed vital signs, medications, labs and imaging.    Key findings: 62-year-old female with alcohol use disorder, MDD, Breast Cancer, tongue cancer, alcoholic fatty liver, COPD, and diastolic heart failure now s/p left partial glossectomy with primary closure and left neck dissection for invasive SCC 11/21.  Hospital stay was complicated by ethanol withdrawal,and respiratory failure. Underwent tracheostomy and evacuation of Left neck hematoma 11/23. We have scaled down her antibiotics and is currently being treated for a health care associated pneumonia. On 11/24 RUQ US showed non dilated CBD and intrahepatic ducts, cholelithiasis. We have consulted hepatology.She remains on the ventilator with difficulty in weaning. Adjusting sedation medications      Antoine Carreon  Date  of Service (when I saw the patient): 11/28/2017  Time Spent on this Encounter   I spent 38 minutes managing the critical care of Alexia Flor in relation to the issues listed in this note.

## 2017-11-28 NOTE — PLAN OF CARE
Problem: Patient Care Overview  Goal: Plan of Care/Patient Progress Review  Discharge Planner OT   Patient plan for discharge: unstated   Current status: pt dependent for all ADL's, no command following  Barriers to return to prior living situation: deconditioning and cognitive status.    Recommendations for discharge: TCU/LTACH pending medical status.   Rationale for recommendations: pt will likely incur decompensation leading to decreased ADL I requiring rehab to regain functional strength and I.        Entered by: Zak Sheldon 11/28/2017 1:57 PM

## 2017-11-28 NOTE — PROCEDURES
Small Bowel Feeding Tube Placement Assessment  Reason for Feeding Tube Placement: Intolerance to gastric feeding trials  Cortrak Start Time:12:00   Cortrak End Time: 12:24  Medicine Delivered During Procedure: Lidocaine to L nare (note previous Entriflex FT was in R nare)  Placement Successful:   Presume post-pyloric (pending AXR confirmation).     Procedure Complications: None.  Additional Comments: once Cortrak FT was able to be placed via L nare and advanced into stomach per tracing, RN cut sutures and removed previous 12 Fr Entriflex NGT out of R nare.  Final Placement Damon at exit of nare: @ 99 cm (note: used 109 cm long Cortrak FT)  Face to Face time with patient:  45 minutes    Layla Hudson ,PERRI, LD, [facility] (pgr [phone])

## 2017-11-28 NOTE — PROGRESS NOTES
Care Coordinator Progress Note     Admission Date/Time:  11/21/2017  Attending MD:  Heriberto George MD     Data  Chart reviewed, discussed with interdisciplinary team.   Patient was admitted for: Left partial glossectomy and left Neck dissection.    Assessment  Pt is POD # 7 s/p partial glossectomy and neck dissection and POD # 5 following awake tracheostomy and neck exploration for left neck hematoma evacuation.  Pt remain in ICU vented via trach.  Attempt to visit pt and family to introduce self and for support.  No family was in the room at time of my visit.  See SW note for full assessment.     Plan  Anticipated Discharge Date:   TBD.  Anticipated Discharge Plan:    TBD.  CC will cont to follow plan of care.      Jing Neely RN, PHN, BSN  4A and 4E/ ICU  Care Coordinator  Phone: [phone]  Pager: [phone]

## 2017-11-28 NOTE — PROGRESS NOTES
Otolaryngology Progress Note  11/28/17    S: No acute events overnight. Has not required any pressors since yesterday morning. Agitated overnight, on precedex drip for sedation and given fentanyl and versed bumps PRN.     O: Vital signs:  Temp: 96.9  F (36.1  C) Temp src: Axillary BP: 161/78   Heart Rate: 71 Resp: 28 SpO2: 98 % O2 Device: Mechanical Ventilator                           General: Sedated, easily agitated, not following commands                         HEENT: Oral tongue is less edematous. FOM bruised and purple, but soft to palpation. Left neck incision c/d/i, neck soft and non-fluctuant, diffuse edema but essentially flat, no fluctuance. Mild erythema over incision that is stable. CAMACHO drain x 1 in the left neck holding bulb suction with serosanguinous drainage. 6-0 shiley cuffed sutured in place with trach ties around neck, thick white/yellow secretions, cuff appropriately insufflated appropriately. Trach sutures removed. There is a superficial pressure ulceration with skin breakdown at the left inferior flange. Mepilex lite placed around all trach edges.                            Pulmonary: on ventilator via trach, FiO2 45%, PEEP 8    Intake/Output Summary (Last 24 hours) at 11/28/17 1124  Last data filed at 11/28/17 1100   Gross per 24 hour   Intake          1954.15 ml   Output              240 ml   Net          1714.15 ml       CAMACHO drain output(s): (last 24 hours)/(last shift)  2 Left neck NR, 30, 10 mL = 40 ml      ROUTINE ICU LABS (Last four results)  CMP  Recent Labs  Lab 11/28/17  0430 11/27/17  2106 11/27/17  1035 11/27/17  0323 11/26/17  1637 11/26/17  0316  11/25/17  0415   * 147*  --  145* 144 144  < > 139   POTASSIUM 3.8 3.1* 3.6 2.9* 3.3* 3.5  < > 4.0  Canceled, Test credited   CHLORIDE 116* 115*  --  114* 112* 112*  < > 109   CO2 17* 16*  --  20 20 19*  < > 19*   ANIONGAP 13 15*  --  12 11 13  < > 11   * 102*  --  91 103* 88  < > 102*   BUN 22 21  --  24 27 26  < > 19   CR  0.86 0.87  --  0.97 0.98 1.00  < > 0.94   GFRESTIMATED 66 65  --  58* 57* 56*  < > 60*   GFRESTBLACK 80 79  --  70 69 67  < > 72   SARAH 8.6 8.4*  --  8.3* 8.6 8.2*  < > 7.8*   MAG 1.9  --  1.8 1.8  --  2.3  --  2.7*   PHOS 2.6  --   --  3.3  --  3.7  --  4.2   PROTTOTAL 5.5*  --   --  5.2*  --  5.6*  --  5.8*   ALBUMIN 2.2*  --   --  2.1*  --  2.6*  --  3.0*   BILITOTAL 5.1*  --   --  4.6*  --  3.3*  --  3.1*   ALKPHOS 216*  --   --  156*  --  86  --  58   AST 51*  --   --  52*  --  59*  --  58*   ALT 27  --   --  28  --  32  --  26   < > = values in this interval not displayed.  CBC    Recent Labs  Lab 11/28/17  0430 11/27/17  2106 11/27/17  0323 11/26/17  1637   WBC 13.2* 14.2* 14.6* 18.1*   RBC 2.21* 2.18* 2.13* 2.32*   HGB 7.5* 7.6* 7.3* 8.0*   HCT 22.5* 22.5* 22.1* 24.2*   * 103* 104* 104*   MCH 33.9* 34.9* 34.3* 34.5*   MCHC 33.3 33.8 33.0 33.1   RDW 21.0* 21.4* 21.9* 22.5*   * 126* 111* 135*     INRNo lab results found in last 7 days.  Arterial Blood Gas    Recent Labs  Lab 11/28/17  0430 11/27/17  0804 11/26/17  0702 11/25/17  0851   PH 7.41 7.33* 7.30* 7.23*   PCO2 29* 34* 38 44   PO2 101 85 81 89   HCO3 18* 18* 19* 19*   O2PER 45 45 60 50.0     Phos 2.6  Mg 1.9    CRP: 260 --> 210  Lactate 0.8  ical 5.1    Micro:  11/24:  Blood cx: NGTD  Urine cx: No growth  Sputum cx: e. 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       Neuro:  - Pain: Gabapentin 300mg TID, Tylenol PRN, oxycodone PRN, IV Fentanyl PRN  - Sedation: precedex gtt  - Agitation: risperidone 1mg BID   - Alcoholism: high concern for EtOH withdrawal                         - SICU  managing                         -Thiamine and folate, B12, multivitamin       HEENT:  - Incision cares: clean with 0.9% sodium chloride and apply Aquaphor Q8H   - Monitor and record CAMACHO drain output Qshift  - Peridex oral rinses 4 times daily   - Soft red rosales suction to oral cavity only - no yankeur  - PTA Zyrtec      Trach Care Instructions:   - OK for RN to change trach ties, must have one person hold trach flange in place at all times while changing ties  - ENT to perform first trach change once patient is off mechanical ventilation  - Mepilex lite under trach flange at all times to prevent further pressure necrosis, RN to change PRN   - Perform regular suctioning   - RN should change disposable inner canulas every shift and PRN   - Keep trach tube obturator taped to wall behind the head of the bed   - Keep extra unopened 6-0 cuffed Shiley and 4-0 cuffed Shiley at bedside at all times   - Minimize the amount of air in the cuff needed to adequately apply positive pressure ventilate. If positive pressure ventilation is not need then the cuff should be down.   - Contact ENT on-call with any questions or concerns     Respiratory:  - Aspiration pneumonia: Intraop aspiration event on 11/23,  tracheal sputum culture growing e. Coli (see ID section below)  - patient currently ventilated managed by SICU  - Hx COPD, no PTA inhalers/meds in [facility]     CV/heme:  - Closely monitoring hemodynamic status  - Hypotension now improved and off pressors  - Acute blood loss anemia on chronic anemia: continue PTA Iron supplement. Hgb 7.5, transfuse for < 7  - Hx diastolic heart failure: holding lasix, will defer to SICU   - TTE 11/24 showed EF of 60-65%      FEN/GI:  - NPO  - TFs via NG, advancing to NJ today per nutrition.   - mIVF: LR @ 75 mL/h   - pantoprazole   - Bowel regimen PRN, multiple stools yesterday   - Electrolyte replacement per protocol. Holding PTA potassium chloride for now.   - Hepatology consulted due to elevated  liver enzymes and hyperbilirubinemia, appreciate recs.    - Abdomina US 11/27 with cholelithiasis w/o evidence of cholecystitis, mild ascites      /Renal:  - Adequate UOP, incontinent       Endo  - sliding scale for diabetic management. BG wnl      ID:  - Aspiration pneumonia: Sputum culture growing e.coli resistant to Zosyn. Ceftriaxone started 11/27.        PPX:  - SQ Heparin 5000 TID  - SCDs  - IS    -- Patient and above plan to be discussed with Dr. George.     Jessica Hernández PA-C  Otolaryngology-Head & Neck Surgery  Please contact ENT by dialing [phone] and entering job code [phone].

## 2017-11-28 NOTE — PLAN OF CARE
Problem: Pain, Acute (Adult)  Goal: Acceptable Pain Control/Comfort Level  Patient will demonstrate the desired outcomes by discharge/transition of care.   Outcome: Declining  Ms. Flor appears to be uncomfortable for majority of shift.  Have administered Tylenol and PRN Fentanyl for assumed pain present as well as repositioning.  Patient continues to be restless; unclear if it is pain related or possible related to ETOH withdrawals.

## 2017-11-28 NOTE — PROGRESS NOTES
"CLINICAL NUTRITION SERVICES - REASSESSMENT NOTE     Nutrition Prescription    RECOMMENDATIONS FOR MDs/PROVIDERS TO ORDER:  If desire to proceed with small bowel FT attempt, order \"Xray FT placement\" if desire Rads to place (given hx of gastric clippings for gastric varices) versus \"NUTR Post Pyloric Bedside Feeding Tube Placement ADULT\" if appropriate for RD to attempt.    Malnutrition Status:    Severe malnutrition in the context of acute on chronic illness.    Recommendations already ordered by Registered Dietitian (RD):  Ordered to restart TF @ 20 ml/hr and adv by 10 ml q 12 hrs to new goal of Impact Peptide @ goal 40 ml/hr (960 ml/day) to provide 1440 kcals (98% MREE or 37 kcal/kg/day), 90 g PRO (2.3 g/kg/day), 739 ml free H2O, 61 g Fat (50% from MCTs), 134 g CHO and no Fiber daily.     Future/Additional Recommendations:  If/when TF advances to goal and if pt remains on the vent, repeat metabolic cart studies weekly to better evaluate changes to REE (kcal) needs and approp of energy provisions.        EVALUATION OF THE PROGRESS TOWARD GOALS   -Diet: NPO (changed to today with EtOH withdrawal warranting transfer/admission to the ICU) - noted postop status (op note reviewed) and previous had Mechanical Soft diet ordered but only briefly (11/21-11/22/17)     -Enteral access: ENT just placed NGT (12 Fr, 109 cm long Entriflex) + Bridle (12 Fr)    -EN: currently receiving Impact Peptide @ 15 ml/hr (rec/ordered goal 35 ml/hr) but RN reporting \"high residuals\" (250-275 ml documented most recently and returned via small bore FT?)    -Intakes: Per I/O documentation, received  ml TF daily since 11/22 meeting << 50% of est needs/goals for intake now the duration of this LOS x7 days.     NEW FINDINGS   -Resp & Est Needs: Obtained metabolic cart study 11/27 @ ~15:00 with the following results: MREE = 1469 kcals/day (equiv to 38 kcal/kg/day) with RQ = 0.71.  Pt received minimal TF prior to study (did not adv >15 ml/hr).  " RQ is within a logical and physiologic range given provisions (minimal kcal/PRO) received prior to study.  Would aim energy intakes minimally at % of this MREE (equiv to 1166-2412 kcals/day = equiv to 34-38 kcal/kg/day) given sx of severe malnutrition (muscle mass/fat mass losses) and continue to minimally aim for 2 g PRO/kg/day as well.  Given  (11/27) and 260 (11/25/17, baseline) with postop/ARDs status would continue with same/current immune-modulating TF formula.    MALNUTRITION  % Intake: </= 50% for >/= 5 days (severe)  % Weight Loss: Up to 7.5% in 3 months (non-severe) PTA and underweight status per BMI <18.5 kg/m2; none noted so far this adm with wt 47 kg today (11/28/2017) remaining > adm (dosing) wt of 39 kg.  Subcutaneous Fat Loss: Facial region, Upper arm and Thoracic/intercostal: Severe  Muscle Loss: Temporal, Facial & jaw region, Thoracic region (clavicle, acromium bone, deltoid, trapezius, pectoral), Upper arm (bicep, tricep), Dorsal hand, Upper leg (quadricep, hamstring), Patellar region and Posterior calf:  Severe  Fluid Accumulation/Edema: None noted  Malnutrition Diagnosis: Severe malnutrition in the context of acute on chronic illness.    Previous Goals   1.  Tolerate adv of TF to goal infusion without sx of refeeding within the next 3 days.  Evaluation: Not met  2.  Total ave nutrition intakes (TF + diet if/when allowed) to provide minimum 30 kcal/kg/day and 1.5 g PRO/kg/day (per 39 kg).   Evaluation: Not met    Previous Nutrition Diagnosis  Inadequate protein-energy intake   Evaluation: Declining    CURRENT NUTRITION DIAGNOSIS  Inadequate protein-energy intake r/t EtOH abuse/intakes PTA likely inhibiting quality/quantity oral diet intakes and now resp status (trached/vented/ARDs) inhibiting ability to resume oral diet with intolerance to gastric TF trials so this adm AEB NPO with minimal TF intakes this duration of this LOS x7 days meeting <50% of est needs in pt with sx of severe  muscle/fat wasting.    INTERVENTIONS  Implementation  Collaboration and Referral of Nutrition care - Discussed plan for FEN/GI on rounds with Providers who consulted RD to attempt Cortrak small bowel FT (see procedure notes) and restart/adv to goal upon confirmation of small bowel access.      Goals  1.  Tolerate adv of TF to goal infusion without sx of refeeding within the next 1 days.  2.  Total ave nutrition intakes (EN) to provide minimum 90% of last known MREE (equiv to 34 kcal/kg/day) and 2 g PRO/kg/day (per 39 kg).     Monitoring/Evaluation  Progress toward goals will be monitored and evaluated per protocol.     Layla Hudson ,RD, LD, CNSC (pgr [phone])

## 2017-11-29 NOTE — PROVIDER NOTIFICATION
PRN Oxycodone administered after patient agitated and SBP in 140s.  Patient's systolic then dropped to SBP upper 70s-90s, sustaining primarily in the 80s with MAPs in the 50s.  Cuff pressure 110s-140s (taken in leg due to brachial art line and PICC line).  Patient's BP goes back up to 100s-110s when being suctioned or woken up.  SICU paged.  Awaiting response.

## 2017-11-29 NOTE — PLAN OF CARE
Problem: Pain, Acute (Adult)  Goal: Acceptable Pain Control/Comfort Level  Patient will demonstrate the desired outcomes by discharge/transition of care.   Outcome: No Change  Ms. Flor has been administered PRN Tylenol and Oxycodone for her pain control.  Patient's BP decreased significantly with administration of oxycodone but patient was able to rest quietly for a couple of hours after administration.  Also utilizing repositioning for comfort.

## 2017-11-29 NOTE — MR AVS SNAPSHOT
After Visit Summary   2017    Alexia Flor    MRN: [id number]           Patient Information     Date Of Birth          1955        Visit Information        Provider Department      2017 11:00 AM [facility] EEG TECH 2 [facility] EEG        Today's Diagnoses     Somnolence    -  1       Follow-ups after your visit        Your next 10 appointments already scheduled     Dec 04, 2017 12:00 PM CST   (Arrive by 11:45 AM)   RETURN TUMOR VISIT with Heriberto George MD   [facility] ([facility])    [address] [place] [id number] [phone]              Who to contact     Please call your clinic at [phone] to:    Ask questions about your health    Make or cancel appointments    Discuss your medicines    Learn about your test results    Speak to your doctor   If you have compliments or concerns about an experience at your clinic, or if you wish to file a complaint, please contact [facility] Patient Relations at [phone] or email us at [email or web address]         Additional Information About Your Visit        MyChart Information     Dayakt is an electronic gateway that provides easy, online access to your medical records. With Mobile Captain, you can request a clinic appointment, read your test results, renew a prescription or communicate with your care team.     To sign up for Dayakt visit the website at [email or web address]   You will be asked to enter the access code listed below, as well as some personal information. Please follow the directions to create your username and password.     Your access code is: [email or web address]  Expires: 2018  2:12 PM     Your access code will  in 90 days. If you need help or a new code, please contact your [facility] Clinic or call [phone] for assistance.        Care EveryWhere ID     This is your Care EveryWhere ID.  This could be used by other organizations to access your [facility] medical records  [id number]         Blood Pressure from Last 3 Encounters:   11/29/17 125/66   11/20/17 150/70   11/09/17 164/85    Weight from Last 3 Encounters:   11/29/17 48.7 kg (107 lb 5.8 oz)   11/20/17 38.9 kg (85 lb 12.8 oz)   11/09/17 38.3 kg (84 lb 7 oz)              Today, you had the following     No orders found for display         Today's Medication Changes      Notice     This visit is during an admission. Changes to the med list made in this visit will be reflected in the After Visit Summary of the admission.             Primary Care Provider Office Phone # Fax #    Karan Smith [phone]       [facility] [address] [place] [address]        Equal Access to Services     [facility]: Hadii dhruv sosa hadasho Soomaali, waaxda luqadaha, qaybta kaalmada adeegyada, waxay zeeshan haykaren orellana . So [facility] [phone].    ATENCIÓN: Si habla español, tiene a savage disposición servicios gratuitos de asistencia lingüística. Llame al [phone].    We comply with applicable federal civil rights laws and [place] laws. We do not discriminate on the basis of race, color, national origin, age, disability, sex, sexual orientation, or gender identity.            Thank you!     Thank you for choosing [facility]  for your care. Our goal is always to provide you with excellent care. Hearing back from our patients is one way we can continue to improve our services. Please take a few minutes to complete the written survey that you may receive in the mail after your visit with us. Thank you!             Your Updated Medication List - Protect others around you: Learn how to safely use, store and throw away your medicines at www.disposemymeds.org.      Notice     This visit is during an admission. Changes to the med list made in this visit will be reflected in the After Visit Summary of the  admission.

## 2017-11-29 NOTE — PLAN OF CARE
Problem: Patient Care Overview  Goal: Plan of Care/Patient Progress Review  Outcome: No Change  D/I: Pt remains afebrile, VSS. Moves all extremities, follows no commands. Sedated on precedex 0.6 at start of shift, weaned to off by 1000 this morning. PEEP reduced from 8 to 5 but unable to tolerate CPAP trials Gastric residual 525cc this morning, returned gastric content and stopped feeds. Administered PRN miralax for inadequate BM and high residuals. NG removed and NJ placed by Dietician. Changed trach dressing q 4 hr for secretions and skin breakdown. A: Large BM and large urinary incontinence today, Restarted feeds at 20cc/hr with 60cc flushes q 4 hr. Pt having pain this afternoon as evidenced by hypertension and facial grimacing. Oxycodone for pain x 1, brought SBP down from 174 to 109. P; Continue plan of care.

## 2017-11-29 NOTE — PROGRESS NOTES
Nutrition Progress Note - Discussion of POC on SICU rounds; f/u for progress towards previous nutrition POC (see previous 11/28 reassessment for details)    -Enteral access: AXR confirmed NJT placement yesterday  -EN: Impact Peptide restarted, advancing and currently @ 30 ml/hr (goal 40 ml/hr) + H2O 30 ml q 4 hrs  -FEN: K+ 3.3, Phos 2.2, Na+ 149 (today)    Interventions:  Collaboration and Referral of Nutrition care - Discussed plan for FEN/GI on rounds with Providers who ordered to increase H2O to 30 ml q 1 hrs with rising Na+; additionally recommended Providers order enteral Phos supplementation (NeutraPhos 1 pkt TID-QID for K+ and Phos replacement) in addition to IV replacement given possible sx of refeeding with adv of TFs now and not yet at goal infusion.    Layla Hudson ,RD, LD, CNSC (pgr [phone])

## 2017-11-29 NOTE — PLAN OF CARE
Problem: Patient Care Overview  Goal: Plan of Care/Patient Progress Review  Discharge Planner OT -4A  Patient plan for discharge: pt unable to state  Current status: pt is dependent for all ADLs, pt follows no commands. VSS while on VC-AC 45% fiO2, peep 5. Pt restless in bed.  Barriers to return to prior living situation: LOC, cognition, deconditioning, vent dependence  Recommendations for discharge: LTACH or TCU  Rationale for recommendations: to increase ADL I, and pending medical needs.        Entered by: Patrice Horne 11/29/2017 9:08 AM

## 2017-11-29 NOTE — PLAN OF CARE
Problem: Patient Care Overview  Goal: Plan of Care/Patient Progress Review  Outcome: No Change  Ms. Flor is obtunded, not following commands, is restless in bed throughout shift, moving all extremities.  Patient is constantly moving around in the bed, reaching arms up, and throwing legs over side of bed.  Unable to assess orientation.  Off of all sedation.  PRN Tylenol and Oxycodone administered for assumed pain as evidenced by agitation, increased BP, HR, and respiratory rate.  Upon administration of 5 mg oxycodone, patient's systolic BP dropped significantly.  BP sustained in 80s for approximately 2 hours but would increase if patient was woken up or suctioned.  SICU paged; no call returned.  Patient's BP eventually boom on it's own to systolic 140s-160s.  Clonidine continues to be administered as scheduled.  SR/ST on monitor with occasional PVCs/PACs.  CMV settings continue.  Trach site remains constantly soiled due to constant secretions leaking/oozing at trach site.  Frequent trach cares completed and Mepilex replaced around site to prevent further skin breakdown.  Trach suctioned and small amount of white, clear secretions noted.  Thick creamy white oral secretions suctioned.  TF continues @ 20; flushes as scheduled see orders.  Tube feeding not increased due to electrolytes out of balance.  No tube feeding residuals noted.  Patient had multiple green/brown liquid/soft stools; medium in size.  Incontinent of urine.  Magnesium, potassium, and phosphorous to be replaced.

## 2017-11-29 NOTE — PLAN OF CARE
Problem: Patient Care Overview  Goal: Plan of Care/Patient Progress Review  Outcome: No Change  Was restless for the first half of the day, now has been calm. Not following commands. Pupils equal and reactive. Spot EEG done to r/o seizure activity as cause of mental status change. Awaiting results. HR 90s-110s, sinus rhythm. BP elevated with -170s, higher with movement. ETT to CMV settings FiO2 40/rate 18/tidal volume 300/PEEP 5. Pressure supported for 2 hours. Trach #6 shiley, constant ooze around site, there is significant skin breakdown. Cares done frequently and WOC consult placed. Lung sounds have coarse crackles. NJ infusing tube feeding at 30mL/hr with flushes 30mL q1h. To be advanced to goal rate of 40mL/hr at 2200. Loose BM, rectal pouch in place. Incontinent of urine, female wicking system in place, collects most of the urine. Pt needs to be cleaned up q1-2h. L neck incision reddened and swollen. R groin site weepy. L CAMACHO in place, site is red and ulcerated. Ex- came to visit, otherwise no other visitors.   PICC, TKO.  R brachial art line, positional.

## 2017-11-29 NOTE — TELEPHONE ENCOUNTER
I have attempted to call the pt with the following results. I left message for pt to call back. I have mailed a letter to the pt with results. Kristin Estrada CMA ([facility])

## 2017-11-29 NOTE — PROGRESS NOTES
Otolaryngology Progress Note  11/29/17    S: No acute events overnight. Off all sedation and pressors. Failed pressure support trial this morning due to tachypnea. Continues to be obtunded, not following commands, agitated at times. BP dropped after receiving oxycodone overnight. Thick secretions around trach site. Had large BM yesterday, no longer having high residuals from TFs.     O: Vital signs:  Temp: 97.2  F (36.2  C) Temp src: Axillary BP: 125/66   Heart Rate: 95 Resp: (!) 33 SpO2: 97 % O2 Device: Mechanical Ventilator                           General: Obtunded, intermittently agitated, not following commands, moving all extremities                         HEENT: Oral tongue is less edematous. FOM bruised and purple, but soft to palpation. Left neck incision c/d/i, neck soft and non-fluctuant, diffuse edema but essentially flat, no fluctuance. Mild erythema over incision that is stable. CAMACHO drain x 1 in the left neck holding bulb suction with serosanguinous drainage. 6-0 shiley cuffed sutured in place with trach ties around neck, thick tan secretions, cuff appropriately insufflated. There is a superficial pressure ulceration with skin breakdown at the left inferior flange. Mepilex lite placed around all trach edges.                            Pulmonary: on ventilator via trach, FiO2 45%, PEEP 5      Intake/Output Summary (Last 24 hours) at 11/29/17 1015  Last data filed at 11/29/17 0800   Gross per 24 hour   Intake           1372.5 ml   Output               40 ml   Net           1332.5 ml       CAMACHO drain output(s): (last 24 hours)/(last shift)  2 Left neck 25, 10, 5 mL = 40 ml      ROUTINE ICU LABS (Last four results)  CMP  Recent Labs  Lab 11/29/17  0345 11/28/17  1607 11/28/17  0430 11/27/17  2106 11/27/17  1035 11/27/17  0323  11/26/17  0316   * 146* 146* 147*  --  145*  < > 144   POTASSIUM 3.3* 3.7 3.8 3.1* 3.6 2.9*  < > 3.5   CHLORIDE 119* 113* 116* 115*  --  114*  < > 112*   CO2 20 19* 17* 16*  --   20  < > 19*   ANIONGAP 11 14 13 15*  --  12  < > 13   * 146* 118* 102*  --  91  < > 88   BUN 19 21 22 21  --  24  < > 26   CR 0.67 0.72 0.86 0.87  --  0.97  < > 1.00   GFRESTIMATED 88 81 66 65  --  58*  < > 56*   GFRESTBLACK >90 >90 80 79  --  70  < > 67   SARAH 8.0* 8.9 8.6 8.4*  --  8.3*  < > 8.2*   MAG 1.7  --  1.9  --  1.8 1.8  --  2.3   PHOS 2.2*  --  2.6  --   --  3.3  --  3.7   PROTTOTAL 5.2*  --  5.5*  --   --  5.2*  --  5.6*   ALBUMIN 2.0*  --  2.2*  --   --  2.1*  --  2.6*   BILITOTAL 3.4*  --  5.1*  --   --  4.6*  --  3.3*   ALKPHOS 224*  --  216*  --   --  156*  --  86   AST 63*  --  51*  --   --  52*  --  59*   ALT 34  --  27  --   --  28  --  32   < > = values in this interval not displayed.  CBC    Recent Labs  Lab 11/29/17 0345 11/28/17  1607 11/28/17  0430 11/27/17  2106   WBC 15.8* 15.2* 13.2* 14.2*   RBC 2.28* 2.41* 2.21* 2.18*   HGB 7.7* 8.2* 7.5* 7.6*   HCT 22.9* 24.4* 22.5* 22.5*    101* 102* 103*   MCH 33.8* 34.0* 33.9* 34.9*   MCHC 33.6 33.6 33.3 33.8   RDW 20.9* 20.7* 21.0* 21.4*    160 139* 126*     INRNo lab results found in last 7 days.  Arterial Blood Gas    Recent Labs  Lab 11/29/17 0345 11/28/17  0430 11/27/17  0804 11/26/17  0702   PH 7.44 7.41 7.33* 7.30*   PCO2 33* 29* 34* 38   PO2 60* 101 85 81   HCO3 22 18* 18* 19*   O2PER 45 45 45 60     Phos 2.2  Mg 1.7    CRP: 260 --> 210 (11/27)  Lactate 1.0  ical 5.0    Micro:  11/24:  Blood cx: NGTD  Urine cx: No growth  Sputum cx: e. 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       Neuro:  - Pain: Gabapentin 300mg TID, Tylenol PRN, oxycodone PRN, IV Fentanyl PRN  - Sedation off  - Agitation:  discontinuing risperidone 1mg BID and starting seroquel today per SICU  - Alcoholism: high concern for EtOH withdrawal                         - SICU managing                         -Thiamine and folate, B12, multivitamin       HEENT:  - Incision cares: clean with 0.9% sodium chloride and apply Aquaphor Q8H   - Monitor and record CAMACHO drain output Qshift  - Peridex oral rinses 4 times daily   - Soft red rosales suction to oral cavity only - no yankeur  - PTA Zyrtec      Trach Care Instructions:   - OK for RN to change trach ties, must have one person hold trach flange in place at all times while changing ties  - ENT to perform first trach change once patient is off mechanical ventilation  - Mepilex lite under trach flange at all times to prevent further pressure necrosis, RN to change PRN   - Perform regular suctioning   - RN should change disposable inner canulas every shift and PRN   - Keep trach tube obturator taped to wall behind the head of the bed   - Keep extra unopened 6-0 cuffed Shiley and 4-0 cuffed Shiley at bedside at all times   - Minimize the amount of air in the cuff needed to adequately apply positive pressure ventilate. If positive pressure ventilation is not need then the cuff should be down.   - Contact ENT on-call with any questions or concerns     Respiratory:  - Aspiration pneumonia: Intraop aspiration event on 11/23,  tracheal sputum culture growing e. Coli (see ID section below)  - patient currently ventilated managed by SICU  - Daily pressure support trials per ICU   - Hx COPD, no PTA inhalers/meds in EPIC     CV/heme:  - Closely monitoring hemodynamic status  - Hypotension now improved and off pressors  - Acute blood loss anemia on chronic anemia: continue PTA Iron supplement. Hgb 7.7, transfuse for < 7  - Hx diastolic heart failure: holding lasix, will defer to SICU   - TTE 11/24 showed EF of 60-65%      FEN/GI:  - NPO  - TFs via NJ, advancing to continuous goal.   - mIVF: LR @ 75 mL/h   -  pantoprazole   - Bowel regimen PRN, multiple stools yesterday   - Electrolyte replacement per protocol. Holding PTA potassium chloride for now.   - GI/Hepatology consulted due to elevated liver enzymes and hyperbilirubinemia, appreciate recs.    - Abdomina US 11/27 with cholelithiasis w/o evidence of cholecystitis, mild ascites   - Considering diagnostic paracentesis, will defer to ICU       /Renal:  - Adequate UOP, incontinent       Endo  - sliding scale for diabetic management. BG wnl      ID:  - Aspiration pneumonia: Sputum culture growing e.coli resistant to Zosyn. Ceftriaxone started 11/27.        PPX:  - SQ Heparin 5000 TID  - SCDs  - IS    -- Patient and above plan to be discussed with Dr. George.     Jessica Hernández PA-C  Otolaryngology-Head & Neck Surgery  Please contact ENT by dialing [phone] and entering job code [phone].

## 2017-11-29 NOTE — PROGRESS NOTES
SURGICAL ICU PROGRESS NOTE  2017        ASSESSMENT: Alexia Flor is a 62-year-old female with PMH alcohol use disorder, MDD, Breast Cancer, tongue cancer, alcoholic fatty liver, COPD, and diastolic heart failure now s/p left partial glossectomy with primary closure and left neck dissection for invasive SCC .  Admitted to SICU for assistance with EtOH withdrawal, possible need for ativan gtt, and respiratory monitoring. Underwent tracheostomy and evacuation of L neck hematoma     Recent concerns about TFs coming out of tracheostomy, but stable overnight.  No plans to perform bronchoscopy unless this occurs again.  ENT changed trach ties .     No acute events overnight. Somnolent and not appropriately answering questions this AM. Off pressors and on stable vent settings overnight     CHANGES FOR TODAY :  - FWF to 30 cc q1h  - Spot EEG to rule out seizures as cause of AMS  - Start seroquel BID and dc risperidone  - CXR this AM  - PS trials   - Lasix 10 mg x 2 today    PLAN:  Neuro/ pain/ sedation:  - Monitor neurological status. Notify the MD for any acute changes in exam.  - Dc'ed Precedex gtt.  On clonidine.  - Continue gabapentin 300 mg q8h  - Enteral oxycodone PRN and Dilaudid PRN  - Risperidone 1 mg BID dc'ed today. Will start Seroquel 25 mg BID  - Spot EEG to rule out seizures as cause of AMS    Pulmonary care:   - CMV/AC: 18/300/8/45 AB.44/33/60/22 LA WNL, AG WNL ( non anion gap metabolic acidosis with respiratory compensation)   - No plans for Bronch currently.  - Ceftriaxone x 7d For HCAP (start date )  - Failed PS trial this AM. Continue PS trials   - CXR this AM    Cardiovascular:    - Monitor hemodynamic status.   - Off pressors  - PRN anti-hypertensives    GI care:   - NPO except ice chips and medications.  - NJ placed. Advance TFs as tolerated   - Ileus: multiple stools recorded yesterday  - Ammonia level WNL ()    #Persistent direct hyperbilirubinemia:   -  Hepatology consult, appreciate recommendations  - Given minimal ascites seen on US, will hold off on paracentesis  - abdominal US w/ dopplers -- cholelithiasis w/o evidence of cholecystitis. Mild ascites present.    Fluids/ Electrolytes/ Nutrition:   - TKO IVF, PRN IV boluses   - Hypernatremia: Na 149 today, FWF to 30 cc q1h. Will recheck BMP later today  - On replacement protocol   - No indication for parenteral nutrition.    Renal/ Fluid Balance:    - Urine output is adequate so far.  - Will continue to monitor intake and output.  - Bell discontinued 11/25, Cr 0.67, BUN 19    Endocrine:    - No intervention indicated at this time, BG stable    ID/ Antibiotics:  - E coli  sputum from 11/24. Resistant to Zosyn, Ampicillin and FQs.   - Dc Zosyn, start Ceftriaxone x 7d (started 11/27)    Heme:     - Hgb stable at 7.7 , will transfuse for hgb < 7.0    Prophylaxis:    - Mechanical prophylaxis for DVT.   - Sq heparin for DVT ppx  - Protonix 40 mg qD for GI ppx (PTA)    Lines/ tubes/ drains:  - PICC  - A line  - PIV x 3  - Neck JPs x 1  - NJ    Disposition:  - Surgical ICU.     Patient seen and discussed with surgical ICU staff , Dr. Lauri Blum MD  PGY-2 General Surgery Resident  [phone]      ====================================    TODAY'S PROGRESS:   SUBJECTIVE:     No acute events overnight. Somnolent and not appropriately answering questions this AM. Off pressors and on stable vent settings overnight     OBJECTIVE:   1. VITAL SIGNS:   Temp:  [97.2  F (36.2  C)-99.1  F (37.3  C)] 97.2  F (36.2  C)  Heart Rate:  [] 95  Resp:  [14-40] 33  BP: (113-161)/(53-78) 125/66  MAP:  [56 mmHg-121 mmHg] 93 mmHg  Arterial Line BP: ()/(41-83) 146/54  FiO2 (%):  [45 %] 45 %  SpO2:  [94 %-100 %] 97 %  Ventilation Mode: CMV/AC  FiO2 (%): 45 %  Rate Set (breaths/minute): 18 breaths/min  Tidal Volume Set (mL): 300 mL  PEEP (cm H2O): 5 cmH2O  Pressure Support (cm H2O): 7 cmH2O  Oxygen Concentration (%): 45  %  Resp: 33    2. INTAKE/ OUTPUT:   I/O last 3 completed shifts:  In: 1582.03 [I.V.:742.03; NG/GT:585]  Out: 35 [Drains:35]    3. PHYSICAL EXAMINATION:   General: Intubated and sedated   Resp: Equal and bilateral breath sounds.  HEENT: no swelling or hematoma , CAMACHO drains with serosanguinous output   CV: RRR  Abdomen: Soft, mildly distended, Non-tender  Extremities: warm and well perfused    4. INVESTIGATIONS:   Arterial Blood Gases     Recent Labs  Lab 11/29/17  0345 11/28/17  0430 11/27/17  0804 11/26/17  0702   PH 7.44 7.41 7.33* 7.30*   PCO2 33* 29* 34* 38   PO2 60* 101 85 81   HCO3 22 18* 18* 19*     Complete Blood Count     Recent Labs  Lab 11/29/17  0345 11/28/17  1607 11/28/17  0430 11/27/17  2106   WBC 15.8* 15.2* 13.2* 14.2*   HGB 7.7* 8.2* 7.5* 7.6*    160 139* 126*     Basic Metabolic Panel    Recent Labs  Lab 11/29/17  0345 11/28/17  1607 11/28/17  0430 11/27/17  2106   * 146* 146* 147*   POTASSIUM 3.3* 3.7 3.8 3.1*   CHLORIDE 119* 113* 116* 115*   CO2 20 19* 17* 16*   BUN 19 21 22 21   CR 0.67 0.72 0.86 0.87   * 146* 118* 102*       =========================================    Discussed with Dr. Lauri Blum MD  PGY-2 General Surgery Resident  [phone]  Physician Attestation   I, Antoine Carreon, saw this patient with the resident and agree with the resident s findings and plan of care as documented in the resident s note.      I personally reviewed vital signs, medications, labs and imaging.    Key findings: 62-year-old female with alcohol use disorder, MDD, Breast Cancer, tongue cancer, alcoholic fatty liver, COPD, and diastolic heart failure now s/p left partial glossectomy with primary closure and left neck dissection for invasive SCC 11/21.  Hospital stay was complicated by ethanol withdrawal,and respiratory failure. Underwent tracheostomy and evacuation of Left neck hematoma 11/23. We have scaled down her antibiotics and is currently being treated for a health  care associated pneumonia. On 11/24 RUQ US showed non dilated CBD and intrahepatic ducts, cholelithiasis. We have consulted hepatology.She remains on the ventilator with difficulty in weaning, but is on pressure support trials. Adjusting sedation medications      Antoine Carreon  Date of Service (when I saw the patient): 11/29/2017  Time Spent on this Encounter   I spent 35 minutes managing the critical care of Alexia Flor in relation to the issues listed in this note.

## 2017-11-30 NOTE — PLAN OF CARE
Problem: Patient Care Overview  Goal: Plan of Care/Patient Progress Review  PT 4A: Holding - Following discussion with OT and RN, pt continues with no command following and restless. Pt remains appropriate for only 1 discipline at this time. Will continue to monitor status and initiate as appropriate.

## 2017-11-30 NOTE — PROGRESS NOTES
SURGICAL ICU PROGRESS NOTE  2017        ASSESSMENT: Alexia Flor is a 62-year-old female with PMH alcohol use disorder, MDD, Breast Cancer, tongue cancer, alcoholic fatty liver, COPD, and diastolic heart failure now s/p left partial glossectomy with primary closure and left neck dissection for invasive SCC .  Admitted to SICU for assistance with EtOH withdrawal, possible need for ativan gtt, and respiratory monitoring. Underwent tracheostomy and evacuation of L neck hematoma     Recent concerns about TFs coming out of tracheostomy, but stable overnight.  No plans to perform bronchoscopy unless this occurs again.  ENT changed trach ties .     No acute events overnight. Somnolent and not appropriately answering questions this AM. Off pressors and on stable vent settings overnight     CHANGES FOR TODAY :    - Switch to Seroquel HS and will order PRN seroquel dose for AM  - Decrease gabapentin to 100 mg TID. If no change in mental status will decrease it to 100 mg BID   - Decrease clonidine to 0.05 mg q8h. Will wean off within 2 days   - PS trials   - IV Acetazolamide 250 mg x 1 for mixed alkalosis   - Will discuss LTACH placement with CC (Melchor [phone] pager)    PLAN:  Neuro/ pain/ sedation:  - Monitor neurological status. Notify the MD for any acute changes in exam.  - Switch to Seroquel HS and will order PRN seroquel dose for AM  - Decrease gabapentin to 100 mg TID. If no change in mental status will decrease it to 100 mg BID   - Decrease clonidine to 0.05 mg q8h. Will wean off within 2 days   - Enteral oxycodone PRN and Dilaudid PRN  - Spot EEG to rule out seizures as cause of AMS demonstrated no epileptiform findings, non specific encephalopathy seen   - QTc (): 450 msec    Pulmonary care:   - CMV/AC: 18/300/8/45 AB.51/36/61/29 (mixed metabolic alkalosis    - Ceftriaxone x 7d For HCAP (start date )  - Failed PS trial this AM. Continue PS trials   - CXR this  AM    Cardiovascular:    - Monitor hemodynamic status.   - Off pressors  - PRN anti-hypertensives    GI care:   - NPO except ice chips and medications.  - NJ placed. Advance TFs as tolerated   - Ileus: multiple stools recorded yesterday    #Persistent direct hyperbilirubinemia:   - Bilirubin decreasing  - Hepatology consult, appreciate recommendations  - Given minimal ascites seen on US, will hold off on paracentesis  - abdominal US w/ dopplers -- cholelithiasis w/o evidence of cholecystitis. Mild ascites present.    Fluids/ Electrolytes/ Nutrition:   - TKO IVF, PRN IV boluses   - Improving hypernatremia: Na 146 today, FWF 30 cc q1h.   - On replacement protocol   - No indication for parenteral nutrition.    Renal/ Fluid Balance:    - Urine output is adequate so far.  - Will continue to monitor intake and output.  - Bell discontinued 11/25, Cr 0.76, BUN 24    Endocrine:    - No intervention indicated at this time, BG stable    ID/ Antibiotics:  - E coli  sputum from 11/24. Resistant to Zosyn, Ampicillin and FQs.   - Dc Zosyn, start Ceftriaxone x 7d (started 11/27)    Heme:     - Hgb stable at 7.7 , will transfuse for hgb < 7.0    Prophylaxis:    - Mechanical prophylaxis for DVT.   - Sq heparin for DVT ppx  - Protonix 40 mg qD for GI ppx (PTA)    Lines/ tubes/ drains:  - PICC  - A line  - PIV x 3  - Neck JPs x 1  - NJ    Disposition:  - Surgical ICU.     Patient seen and discussed with surgical ICU staff , Dr. Lauri Blum MD  PGY-2 General Surgery Resident  [phone]      ====================================    TODAY'S PROGRESS:   SUBJECTIVE:     Somnolent and not appropriately answering questions this AM. On stable vent settings overnight     OBJECTIVE:   1. VITAL SIGNS:   Temp:  [97.6  F (36.4  C)-99.4  F (37.4  C)] 98.8  F (37.1  C)  Heart Rate:  [] 98  Resp:  [14-34] 31  BP: (148)/(58) 148/58  MAP:  [52 mmHg-118 mmHg] 84 mmHg  Arterial Line BP: ()/(33-97) 121/59  FiO2 (%):  [40 %-45  %] 40 %  SpO2:  [91 %-100 %] 95 %  Ventilation Mode: CMV/AC (already on CMV previous RT placed on full vent settings)  FiO2 (%): 40 %  Rate Set (breaths/minute): 18 breaths/min  Tidal Volume Set (mL): 300 mL  PEEP (cm H2O): 5 cmH2O  Pressure Support (cm H2O): 10 cmH2O  Oxygen Concentration (%): 40 %  Resp: 31    2. INTAKE/ OUTPUT:   I/O last 3 completed shifts:  In: 2206.5 [I.V.:376.5; NG/GT:1110]  Out: 1340 [Urine:1300; Drains:40]    3. PHYSICAL EXAMINATION:   General: Intubated and sedated   Resp: Equal and bilateral breath sounds.  HEENT: no swelling or hematoma , CAMACHO drains with serosanguinous output   CV: RRR  Abdomen: Soft, mildly distended, Non-tender  Extremities: warm and well perfused    4. INVESTIGATIONS:   Arterial Blood Gases     Recent Labs  Lab 11/30/17  0401 11/29/17  0345 11/28/17  0430 11/27/17  0804   PH 7.51* 7.44 7.41 7.33*   PCO2 36 33* 29* 34*   PO2 61* 60* 101 85   HCO3 29* 22 18* 18*     Complete Blood Count     Recent Labs  Lab 11/30/17  0401 11/29/17  0345 11/28/17  1607 11/28/17  0430   WBC 18.6* 15.8* 15.2* 13.2*   HGB 7.7* 7.7* 8.2* 7.5*    186 160 139*     Basic Metabolic Panel    Recent Labs  Lab 11/30/17  0401 11/29/17  1712 11/29/17  0345 11/28/17  1607   * 145* 149* 146*   POTASSIUM 3.3* 4.1 3.3* 3.7   CHLORIDE 112* 114* 119* 113*   CO2 28 24 20 19*   BUN 24 20 19 21   CR 0.76 0.66 0.67 0.72   * 120* 138* 146*       =========================================    Discussed with Dr. Lauri Blum MD  PGY-2 General Surgery Resident  [phone]  Physician Attestation   I, Antoine Carreon, saw this patient with the resident and agree with the resident s findings and plan of care as documented in the resident s note.      I personally reviewed vital signs, medications, labs and imaging.    Key findings: 62-year-old female with alcohol use disorder, MDD, Breast Cancer, tongue cancer, alcoholic fatty liver, COPD, and diastolic heart failure now s/p left  partial glossectomy with primary closure and left neck dissection for invasive SCC 11/21.  Hospital stay was complicated by ethanol withdrawal,and respiratory failure. Underwent tracheostomy and evacuation of Left neck hematoma 11/23. We have scaled down her antibiotics and is currently being treated for a health care associated pneumonia. On 11/24 RUQ US showed non dilated CBD and intrahepatic ducts, cholelithiasis. We have consulted hepatology.She remains on the ventilator with difficulty in weaning, but is on pressure support trials. Adjusting sedation medications. Probably needs LTAC placement    Antoine Carreon  Date of Service (when I saw the patient): 11/30/2017  Time Spent on this Encounter   I spent 32 minutes managing the critical care of Alexia Flor in relation to the issues listed in this note.

## 2017-11-30 NOTE — PLAN OF CARE
Problem: Pain, Acute (Adult)  Goal: Acceptable Pain Control/Comfort Level  Patient will demonstrate the desired outcomes by discharge/transition of care.   Outcome: Improving  Alexia appears to be more comfortable with Seroquel on board; less agitated and restless this shift.  PRN Tylenol administered x 1.

## 2017-11-30 NOTE — PLAN OF CARE
Problem: Patient Care Overview  Goal: Plan of Care/Patient Progress Review  Outcome: No Change  Alexia continues to be obtunded, not following commands, opening eyes only occasionally during trach cares or suctioning, and is not interactive.  She moves all her extremities spontaneously.SR/ST on monitor with occasional PVCs.  BPs ranging from systolic 70s-160s.  Patient appears to be less agitated than previous shifts after initiation of Seroquel.  When patient is asleep/relaxed, BPs tend to run hypotensive.  When patient is stimulated she can become hypertensive.  SICU notified.  CMV settings continue, pressure supporting for approximately 45 minutes this am but became tachypneic with respiratory rate in the 40s and increased HR/agitation.  Trach cares completed every 2 hours and dressing changed every 2 hours.  Patient continues with constant purulent drainage.  Tube feedings continue through NJ and rate increased to goal @ 40.  30 cc water flushes every hour.  Patient having frequent liquid stools.  Incontinent of urine and voiding frequently.  Skin to coccyx area reddened but blanchable.  Aquaphor applied to left neck site.  Attempting to keep patient's head from being turned to left but patient is extremely stiff and forces her neck back to the left.  Frequent oral care completed.  Rectal tube placed at change of shift.

## 2017-11-30 NOTE — TELEPHONE ENCOUNTER
Head & Neck Tumor Conference Note     Status: Return  Staff: Dr. doty    Tumor Site: Left tongue  Tumor Stage: [id number]    Brief History: 62 y.o woman with history of alcohol dependence and tobacco use has biopsy proven cancer of the tongue.   On 11/21/17, patient underwent a partial glossectomy and left modified radical neck dissection.    Reason for Review: Review Path and POC    Imaging:   No new imaging    Pathology:   Received: 11/21/2017   Reported: 11/28/2017 17:32   Ordering Phy(s): JOSEPH DOTY     For improved result formatting, select 'View Enhanced Report Format'   under Linked Documents section.     ORIGINAL REPORT:     SPECIMEN(S):   A: Left partial glossectomy   B: Anterior lateral margin   C: Posterior lateral margin   D: Anterior margin   E: Anterior medial margin   F: Posterior medial margin   G: Posterior margin   H: Deep margin   I: Left neck dissection level 2A   J: Left neck dissection level 2B   K: Left neck dissection level 3   L: Left neck dissection level 4   M: Left neck dissection level 1B     FINAL DIAGNOSIS:   A. TONGUE, LEFT PARTIAL GLOSSECTOMY:   - Invasive squamous cell carcinoma, conventional, moderately   differentiated   - Tumor site: Left lateral tongue   - Tumor size: 2.3 cm   - The depth of invasion: 0.4 cm   - Margins negative for carcinoma   - Lymphovascular invasion present, focal   - Perineural invasion present, focal   - Squamous cell carcinoma in situ   - See tumor staging summary for details     B. TONGUE, ANTERIOR LATERAL MARGIN, BIOPSY:   - Squamous mucosa, negative for dysplasia or malignancy     C. TONGUE, POSTERIOR LATERAL MARGIN, BIOPSY:   - Squamous mucosa, negative for dysplasia or malignancy     D. TONGUE, ANTERIOR MARGIN, BIOPSY:   - Squamous mucosa, negative for dysplasia or malignancy     E. TONGUE, ANTERIOR MEDIAL MARGIN, BIOPSY:   - Squamous mucosa with Actinomyces colonization, negative for dysplasia   or malignancy     F. TONGUE , POSTERIOR  MEDIAL MARGIN, BIOPSY:   - Squamous mucosa, negative for dysplasia or malignancy     G. TONGUE , POSTERIOR MARGIN, BIOPSY:   - Squamous mucosa, negative for dysplasia or malignancy     H. TONGUE , DEEP MARGIN, BIOPSY:   - Skeletal muscle tissue, negative for malignancy     I. LYMPH NODES, LEFT NECK DISSECTION LEVEL 2A, EXCISION:   - Fourteen reactive lymph nodes, negative for malignancy (0/14)     J. LYMPH NODES , LEFT NECK DISSECTION LEVEL 2B, EXCISION:   - Nine reactive lymph nodes, negative for malignancy (0/9)     K. LYMPH NODES , LEFT NECK DISSECTION LEVEL 3, EXCISION:   - Four reactive lymph nodes, negative for malignancy (0/4)     L. LYMPH NODES, LEFT NECK DISSECTION LEVEL 4, EXCISION:   - Three reactive lymph nodes, negative for malignancy (0/3)     M. LYMPH NODES , LEFT NECK DISSECTION LEVEL 1B, EXCISION:   - Two reactive lymph nodes, negative for malignancy (0/2)   - Salivary gland tissue, negative for malignancy     Report Name: Lip and Oral Cavity        Status: Submitted Checklist Inst: 1      Last Updated By: Trinity Mendes M.D., PhD, [facility], 11/28/2017 17:29:29   Part(s) Involved:   A: Left partial glossectomy     Synoptic Report:     CLINICAL     Neoadjuvant Therapy:         - No     SPECIMEN     Specimen:         - Anterior two-thirds of tongue, NOS     Received:         - In formalin     Procedure:         - Resection         - Neck dissection - Left levels 2A, 2B, 3, 4, 1B       Extent of Resection:           - Glossectomy - Left partial     Specimen Size: 3.2 x 2.6 x 2.0 cm     Specimen Laterality:         - Left     Primary Tumor Site:         - Not specified - Left lateral     Additional Sites Involved By Tumor:         - None identified     Tumor Focality:         - Single focus     TUMOR     Histologic Type:         - Squamous cell carcinoma, conventional     Histologic Grade:         - G2: Moderately differentiated     EXTENT     Tumor Size: 2.3 cm     Tumor Thickness: 4 mm     MARGINS      Margins - Invasive Status:         - Margins uninvolved by invasive carcinoma       Distance From Closest Margin: 6 mm       Location of Closest Margin, Per Orientation:           lateral     Margins - In Situ Status:         - Margins uninvolved by carcinoma in situ (includes moderate and   severe         dysplasia)       Distance From Closest Margin: 2 mm       Location of Closest Margin, Per Orientation:           lateral     ACCESSORY FINDINGS     Lymph-Vascular Invasion:         - Present     Perineural Invasion:         - Present     LYMPH NODES     Number of Lymph Nodes Examined: 32     Number of Lymph Nodes Involved: 0     Lymph Nodes, Extranodal Extension:         - Not identified     Extracapsular Extension:         - Not identified     STAGE (PTNM)     Pathologic Staging:         - For All Carcinomas Excluding Mucosal Melanoma     Pathologic Staging       Primary Tumor (pT):           - pT2: Tumor more than 2 cm but not more than 4 cm in greatest   dimension       Regional Lymph Nodes (pN):           - pN0: No regional lymph node metastasis       Distant Metastasis (pM):           - Not applicable          Tumor Board Recommendation:   Discussion: Invasive portion is <2cm.    Plan: Would consider radiation but psychosocial circumstances will likely prohibit this     Dr. George will review pathology results and plan of care with Alexia when she is awake and alert in the hospital           Mavis Loredo MD PGY-3  Otolaryngology- Head and Neck Surgery

## 2017-11-30 NOTE — PROCEDURES
[facility] EEG #:        DATE OF RECORDIN2017.      DURATION OF RECORDIN minutes.      CLINICAL SUMMARY:  This inpatient routine EEG recording was performed in evaluation of seizures in Peyton Flor, using 23 scalp electrodes placed in the 10-20 system.  She was reported to have received gabapentin, risperidone, quetiapine, clonidine and oxycodone on the day of this recording.      EEG ACTIVITIES DURING STUPOR:  During ongoing stupor there was a continuous pattern of low-moderate amplitude irregular 1-4 Hz delta slowing, with superimposed low amplitude 4-8 Hz theta activities symmetrically.  There was no electrocerebral reactivity to cutaneous and auditory stimulation.  No interictal epileptiform abnormalities and no electrographic seizures were recorded.      IMPRESSION:  This was an abnormal EEG recording during stupor due to continuous generalized delta slowing.  No interictal epileptiform abnormalities and no electrographic seizures were recorded.  These findings indicate moderate-severe electrographic encephalopathy, which is etiologically nonspecific.  Clinical correlation is recommended.   Lauri Reddy M.D., Professor of Neurology       D: 2017 18:15   T: 2017 18:36   MT: angela      Name:     PEYTON FLOR   MRN:      [id number]        Account:        [id number]   :      1955           Procedure Date: 2017      Document: [id number]

## 2017-11-30 NOTE — PROGRESS NOTES
"Otolaryngology Progress Note  11/30/17     S: No acute events overnight. Off all sedation and pressors. Continues to be obtunded, not following commands, agitated at times. Thick secretions around trach site. Had multiple small BM yesterday, no longer having high residuals from TFs since progressing to [place].     O: Vital signs:  Temp: 98.8  F (37.1  C) Temp src: Axillary BP: 148/58   Heart Rate: 100 Resp: (!) 32 SpO2: 95 % O2 Device: Mechanical Ventilator Oxygen Delivery: (S) 15 LPM (SICU notified and asked to come and assess pt at bedside) Height: 155 cm (5' 1.02\") Weight: 45.8 kg (101 lb)                         General: Obtunded, intermittently agitated, not following commands, moving all extremities                         HEENT: Oral tongue is less edematous. FOM bruised and purple, but soft to palpation. Left neck incision c/d/i, neck soft and non-fluctuant, diffuse edema but essentially flat, no fluctuance. Mild erythema over incision that is stable. CAMACHO drain x 1 in the left neck holding bulb suction with serosanguinous drainage. 6-0 shiley cuffed sutured in place with trach ties around neck, thick tan secretions, cuff appropriately insufflated. There is a superficial pressure ulceration with skin breakdown at the left inferior flange. Mepilex lite placed around all trach edges.                            Pulmonary: on ventilator via trach, FiO2 40%, PEEP 5          Intake/Output Summary (Last 24 hours) at 11/30/17 0907  Last data filed at 11/30/17 0800   Gross per 24 hour   Intake             2041 ml   Output             1337 ml   Net              704 ml     CAMACHO drain output(s): (last 24 hours)/(last shift)  2 Left neck 10,15,15 mL = 40 ml      CBC RESULTS:   Recent Labs   Lab Test  11/30/17   0401   WBC  18.6*   RBC  2.29*   HGB  7.7*   HCT  23.1*   MCV  101*   MCH  33.6*   MCHC  33.3   RDW  20.5*   PLT  201        Micro:  11/24:  Blood cx: NGTD  Urine cx: No growth  Sputum cx: e. Coli, candida      A/P: Alexia " Mary Flor is a 62 year old female with a past medical history of alcohol abuse, diastolic heart failure, COPD, breast cancer s/p chemoradiation, and a left tongue SCC on 11/21/17 now POD#9 s/p left partial glossectomy with primary closure and left MRND and POD#7 following awake tracheostomy and neck expoloration for left neck hematoma evacuation.      Neuro:  - Pain: Gabapentin 300mg TID, Tylenol PRN, oxycodone PRN, IV Fentanyl PRN  - Sedation off  - Agitation: discontinued risperidone 1mg BID and started seroquel  - Alcoholism: high concern for EtOH withdrawal                         - SICU managing                         -Thiamine and folate, B12, multivitamin       HEENT:  - Incision cares: clean with 0.9% sodium chloride and apply Aquaphor Q8H   - Monitor and record CAMACHO drain output Qshift  - Peridex oral rinses 4 times daily   - Soft red rosales suction to oral cavity only - no yankeur  - PTA Zyrtec    - Wound cares per WOC once patient is seen today. We recommend keeping patients head turned to the right with mepilex dressing covering the ulcer until seen.     Trach Care Instructions:   - OK for RN to change trach ties, must have one person hold trach flange in place at all times while changing ties  - ENT to perform first trach change once patient is off mechanical ventilation  - Mepilex lite under trach flange at all times to prevent further pressure necrosis, RN to change PRN   - Perform regular suctioning   - RN should change disposable inner canulas every shift and PRN   - Keep trach tube obturator taped to wall behind the head of the bed   - Keep extra unopened 6-0 cuffed Shiley and 4-0 cuffed Shiley at bedside at all times   - Minimize the amount of air in the cuff needed to adequately apply positive pressure ventilate. If positive pressure ventilation is not need then the cuff should be down.   - Contact ENT on-call with any questions or concerns      Respiratory:  - Aspiration pneumonia: Intraop  aspiration event on 11/23,  tracheal sputum culture growing e. Coli (see ID section below)  - patient currently ventilated managed by SICU  - Daily pressure support trials per ICU   - Hx COPD, no PTA inhalers/meds in EPIC      CV/heme:  - Closely monitoring hemodynamic status  - Hypotension now improved and off pressors  - Acute blood loss anemia on chronic anemia: continue PTA Iron supplement. Hgb 7.7, transfuse for < 7  - Hx diastolic heart failure: holding lasix, will defer to SICU   - TTE 11/24 showed EF of 60-65%      FEN/GI:  - NPO  - TFs via [place], advancing to continuous goal.    - pantoprazole   - Bowel regimen PRN, multiple stools yesterday   - Electrolyte replacement per protocol. Holding PTA potassium chloride for now.   - GI/Hepatology consulted due to elevated liver enzymes and hyperbilirubinemia, appreciate recs.                          - Abdominal US 11/27 with cholelithiasis w/o evidence of cholecystitis, mild ascites                         - Considering diagnostic paracentesis, will defer to ICU       /Renal:  - Adequate UOP, incontinent       Endo  - sliding scale for diabetic management. BG wnl      ID:  - Aspiration pneumonia: Sputum culture growing e.coli resistant to Zosyn. Ceftriaxone started 11/27.       PPX:  - SQ Heparin 5000 TID  - SCDs  - IS     -- Patient and above plan to be discussed with Dr. George.      Sachin Jean Baptiste MD  Otolaryngology-Head & Neck Surgery  Please contact ENT by dialing [phone] and entering job code [phone].

## 2017-11-30 NOTE — PROGRESS NOTES
[facility]  WO Nurse Inpatient Adult Pressure INJURY (PI) Wound Assessment     Initial assessment of PU(s) on pt's:   Left Trach Site    Data:   Patient History:      per MD note(s):  62 year old female with a past medical history of alcohol abuse, diastolic heart failure, COPD, breast cancer s/p chemoradiation, and a left tongue SCC on 17 now POD#9 s/p left partial glossectomy with primary closure and left MRND and POD#7 following awake tracheostomy and neck expoloration for left neck hematoma evacuation.   WOC consult to assess pressure injury at left trach collar. Trach placed 17. Patient hold head ridge toward left and trach collar digging into skin      Cy Assessment and sub scores:   Cy Score  Av  Min: 10  Max: 23    Positioning: Foam dressing Mepilex lite, Pillows and Z-Flow    Mattress:  Standard , isolibrium    Moisture Management:  Foam dressing        Current Diet / Nutrition:           Active Diet Order      NPO for Medical/Clinical Reasons Except for: Meds, Ice Chips    Tube Feeding:     Labs:   Recent Labs   Lab Test  17   0401  17   0345   17   0313   17   1323   ALBUMIN  2.0*  2.0*   < >   --    --    --    HGB  7.7*  7.7*   < >  8.1*   < >  8.9*   INR   --    --    --    --    --   1.10   WBC  18.6*  15.8*   < >  22.4*   < >   --    A1C   --    --    --   Canceled, Test credited   --    --    CRP   --   140.0*   < >   --    --    --     < > = values in this interval not displayed.                                                                                                                        Pressure Injury Assessment  (location #1):   Left Trach Site under Collar   Wound History:   Tracheostomy 17, patient hold head to left, rigid.            Wound Base: yellow tan slough ,  Full thickness, moist slough and dry scab    Specific Dimensions (length x width x depth, in cm) :   2 x 1 x 0.4 cm with a 1.5 x 1 x 0 cm scab  lateral    Tunneling:  N/A    Undermining: N/A    Palpation of the wound bed:  normal    Slough appearance:  adherent, moist, shiney, tan and yellow    Eschar appearance:  none    Periwound Skin: edema, erythema, maceration and irritant dermatitis,      Color: red and weepy    Temperature  normal     Drainage:  Heavy secretions from trach site and wound drainage         Odor: none    Pain:  Moderate, patient pulling away when wound cleansed         Intervention:     Patient's chart evaluated.      Cy Interventions:  Current Cy Interventions and Care Plan reviewed and updated, appropriate at this time.    Wound was assessed.    Wound Care: was done: Removal of existing dressing    Visual inspection    Cleansing with NS solution    Application of clean dressing,    Orders  Written    Supplies  Ordered: Fenestrated Optifoam #[id number]    Discussed plan of care with Nurse and ENT Physicians Assistant    Assisted RN to reposition head and attach trach tube morrison to relieve pressure to skin           Assessment:     Pressure Injury (PI) located on Left Trach Site: Unstageable    Status: wound  initial assessment, Symptomatic    Co-assessed and Stage with JARVIS Gillette RN, CWOCN         Plan:     Nursing to notify the Provider(s) and re-consult the WOC Nurse if wound(s) deteriorate(s).    Plan of care for wound located on Trach Site: Change dressing 4 times daily, PRN if dressing is saturated. Clean skin with NS, Place Fenestrated Optifoam #[id number] dressing. Use Vent Tube Stabilizer to keep pressure off skin, use Z flow to prevent head from turn to left.    WOC Nurse will return: 30 minute  Face to face time: 30 minutes

## 2017-12-01 NOTE — PROGRESS NOTES
SURGICAL ICU PROGRESS NOTE  2017    ASSESSMENT: Alexia Flor is a 62-year-old female with PMH alcohol use disorder, MDD, Breast Cancer, tongue cancer, alcoholic fatty liver, COPD, and diastolic heart failure now s/p left partial glossectomy with primary closure and left neck dissection for invasive SCC .  Admitted to SICU for assistance with EtOH withdrawal, possible need for ativan gtt, and respiratory monitoring. Underwent tracheostomy and evacuation of L neck hematoma      Recent concerns about TFs coming out of tracheostomy, but stable overnight.  No plans to perform bronchoscopy unless this occurs again.        No acute events overnight. Somnolent and not appropriately answering questions this AM. Off pressors and on stable vent settings overnight. Plan to stop all antipsychotics and pain meds to assess mental status. PS trial successful this AM with minimal agitation and RSBIs in 40s. Plan on trach dome this AM. Had multiple loose stools overnigth     CHANGES FOR TODAY :   - DC versed, hydromorphone, oxycodone, clonidine, seroquel  - 1 unit PRBC given  - Trach dome  - C Diff PCR for multiple loose stools       PLAN:  Neuro/ pain/ sedation:  - Monitor neurological status. Notify the MD for any acute changes in exam.   - DC versed, hydromorphone, oxycodone, clonidine, seroquel  - Spot EEG to rule out seizures as cause of AMS demonstrated no epileptiform findings, non specific encephalopathy seen   - QTc (): 450 msec       Pulmonary care:   - CMV/AC: 18/300/8/45, tolerated PS trial. Post PS trial AB.42/42/61/27  - Ceftriaxone x 7d For HCAP (start date )     Cardiovascular:    - Monitor hemodynamic status.   - Off pressors  - PRN anti-hypertensives     GI care:   - NPO except ice chips and medications.  - NJ placed. Advance TFs as tolerated   - Ileus: multiple stools recorded yesterday     #Persistent direct hyperbilirubinemia:   - Bilirubin decreasing (2.2 today)  -  Hepatology consult, appreciate recommendations  - Given minimal ascites seen on US, will hold off on paracentesis  - abdominal US w/ dopplers -- cholelithiasis w/o evidence of cholecystitis. Mild ascites present.     Fluids/ Electrolytes/ Nutrition:   - TKO IVF, PRN IV boluses   - Improving hypernatremia: Na 145 today, Continue FWF 30 cc q1h.   - On replacement protocol   - No indication for parenteral nutrition.    Renal/ Fluid Balance:    - Urine output is adequate so far, no mckinney in place.  - Will continue to monitor intake and output.  - Mckinney discontinued 11/25, Cr 0.76, BUN 24  - Hold off on diuresis     Endocrine:    - No intervention indicated at this time, BG stable     ID/ Antibiotics:  - E coli  sputum from 11/24. Resistant to Zosyn, Ampicillin and FQs.   - Dc Zosyn, start Ceftriaxone x 7d (started 11/27)  - Increasing leukocytosis. WBC 21 today . Will send C Diff PCR given multiple loose BMs  - Will reassess if needs to be pan-cultured or not      Heme:     - Hgb stable at 6.9, will transfuse for hgb < 7.0     Prophylaxis:    - Mechanical prophylaxis for DVT.   - Sq heparin for DVT ppx  - Protonix 40 mg qD for GI ppx (PTA)     Lines/ tubes/ drains:  - PICC  - A line  - PIV x 3  - Neck JPs x 1  - NJ     Disposition:  - Surgical ICU.      Patient seen and discussed with surgical ICU staff , Dr. Lauri Blum MD  PGY-2 General Surgery Resident  [phone]     ====================================    SUBJECTIVE:   No acute events overnight. Somnolent and not appropriately answering questions this AM. Off pressors and on stable vent settings overnight. Plan to stop all antipsychotics and pain meds to assess mental status. PS trial successful this AM with minimal agitation and RSBIs in 40s. Plan on trach dome this AM. Had multiple loose stools overnigth    OBJECTIVE:   1. VITAL SIGNS:   Temp:  [98.6  F (37  C)-99.8  F (37.7  C)] 99.8  F (37.7  C)  Heart Rate:  [] 95  Resp:  [17-40] 24  BP:  ()/(46-67) 105/53  MAP:  [49 mmHg-95 mmHg] 54 mmHg  Arterial Line BP: ()/(34-61) 91/36  FiO2 (%):  [40 %] 40 %  SpO2:  [91 %-100 %] 92 %  Ventilation Mode: CPAP/PS  FiO2 (%): 40 %  Rate Set (breaths/minute): 18 breaths/min  Tidal Volume Set (mL): 300 mL  PEEP (cm H2O): 5 cmH2O  Pressure Support (cm H2O): 10 cmH2O  Oxygen Concentration (%): 0.4 %  Resp: 24    2. INTAKE/ OUTPUT:   I/O last 3 completed shifts:  In: 2272 [I.V.:382; NG/GT:930]  Out: 727 [Drains:27; Stool:700]    3. PHYSICAL EXAMINATION:     GEN: intubated and somnolent.  EYES: PERRL, Anicteric sclera.   HEENT:  Normocephalic, atraumatic  CV: RRR, no gallops, rubs, or murmurs  PULM/CHEST: Clear breath sounds bilaterally without rhonchi, crackles or wheeze, symmetric chest rise  GI: soft, non-tender, no rebound tenderness or guarding, no masses      4. INVESTIGATIONS:   Arterial Blood Gases     Recent Labs  Lab 12/01/17  0825 12/01/17  0336 11/30/17  1811 11/30/17  0401   PH 7.42 7.38 7.41 7.51*   PCO2 42 49* 43 36   PO2 61* 62* 75* 61*   HCO3 27 29* 27 29*     Complete Blood Count     Recent Labs  Lab 12/01/17  0336 11/30/17  0401 11/29/17  0345 11/28/17  1607   WBC 21.0* 18.6* 15.8* 15.2*   HGB 6.9* 7.7* 7.7* 8.2*    201 186 160     Basic Metabolic Panel    Recent Labs  Lab 12/01/17  0336 11/30/17  1256 11/30/17  0401 11/29/17  1712 11/29/17  0345   *  --  146* 145* 149*   POTASSIUM 4.0 4.2 3.3* 4.1 3.3*   CHLORIDE 110*  --  112* 114* 119*   CO2 30  --  28 24 20   BUN 32*  --  24 20 19   CR 0.85  --  0.76 0.66 0.67   *  --  137* 120* 138*     Liver Function Tests    Recent Labs  Lab 12/01/17  0336 11/30/17  0401 11/29/17  0345 11/28/17  0430   * 110* 63* 51*   ALT 53* 46 34 27   ALKPHOS 286* 246* 224* 216*   BILITOTAL 2.2* 3.1* 3.4* 5.1*   ALBUMIN 2.0* 2.0* 2.0* 2.2*     Pancreatic Enzymes    Recent Labs  Lab 11/24/17  1209   LIPASE 24*   AMYLASE 32     Coagulation Profile  No lab results found in last 7  days.      5. RADIOLOGY:   Recent Results (from the past 24 hour(s))   XR Chest Port 1 View    Narrative    XR CHEST PORT 1 VW  12/1/2017 4:55 AM    History: Leukocytosis    Comparison: Prior day    Findings:   Single AP view the chest is obtained. Stable position of the  tracheostomy tube, visualized portion of the enteric tube, and the  left upper extremity PICC.    Cardiac mediastinal silhouette is stable. No significant change in  diffuse interstitial opacities. Likely stable volume of the small  right pleural effusion, which is now positioned dependently in the  right lower thorax. No left pleural effusion. Left retrocardiac and  right basilar opacities are stable.      Impression    IMPRESSION:  1.  Stable diffuse interstitial opacities, most likely representing  interstitial pulmonary edema.  2.  Stable small right pleural effusion.  3.  Left retrocardiac and right basilar opacities could represent  atelectasis or consolidation.    I have personally reviewed the examination and initial interpretation  and I agree with the findings.    SHAWNA OTT MD       =========================================  Discussed with Dr. Lauri Blum MD  PGY-2 General Surgery Resident  [phone]  Physician Attestation   I, Antoine Carreon, saw this patient with the resident and agree with the resident s findings and plan of care as documented in the resident s note.      I personally reviewed vital signs, medications, labs and imaging.    Key findings: 62-year-old female with alcohol use disorder, MDD, Breast Cancer, tongue cancer, alcoholic fatty liver, COPD, and diastolic heart failure now s/p left partial glossectomy with primary closure and left neck dissection for invasive SCC 11/21.  Hospital stay was complicated by ethanol withdrawal,and respiratory failure. Underwent tracheostomy and evacuation of Left neck hematoma 11/23. We have scaled down her antibiotics and is currently being treated for a health  care associated pneumonia. On 11/24 RUQ US showed non dilated CBD and intrahepatic ducts, cholelithiasis. We have consulted hepatology.She remains on the ventilator with difficulty in weaning, but is on pressure support trials. Stopped all sedation medications. Probably needs LTAC placement    Antoine Carreon  Date of Service (when I saw the patient): 12/1/2017  Time Spent on this Encounter   I spent 30 minutes managing the critical care of Alexia Flor in relation to the issues listed in this note.

## 2017-12-01 NOTE — PROGRESS NOTES
Neuro: Opens eyes to repeated stimulation, does not follow any commands. Restless and grimacing. Moves all extremities spontaneously. Pupils slightly reactive, 2mm, sluggish and round.   CV: SR - ST . Occassional PVCs. BP sensitive to analgesic. SBP < 180 without intervention. + 2 pulses. TMAX 99.6  Resp: #6 shiley CMV 18/300/40%/5. Overbreathing the vent 20-30 RR. LS coarse with occassional exp. Wheezes, diminished in bases. Coughing frequently, small amount of tan/white secretions. Moderate amount of tan, jim oral secretions.   : Incontinent of urine.   GI: NJ with TF @ 40 (goal) with 30 Q1 flushes. BS hyperactive. Rectal tube in place for watery stools.   Activity: Mits on for patient safety due to altered neuro status at this time.   Skin: Pressure ulcer under L side of trach WOC following, Labial edema +2/3, Coccyx red blanchable, R groin blisters/infected/oozing/bleeding from moisture of incontinence.  Skin is slightly yellow. Scleral edema/yellow.   Pain: PRN 5mg oxy given   Drains: CAMACHO L side of neck serous output.   Access: L PICC TKO, R brachial A-line.   Labs: MD aware of 6.9 Hgb and rising WBC. Continue to monitor.     Continue to monitor and notify MD of changes.     Phu (friend) called to say that he plans to visit [facility] Saturday around 8am, not sure he understands current state of health maybe have social work present for assistance.

## 2017-12-01 NOTE — PROGRESS NOTES
Otolaryngology Progress Note  12/1/17      S: Overnight labs showed Hgb of 6.9 and WBC of 21. A stat chest x-ray was ordered. Discussed with SICU team they do not feel pancultures are needed at this time and will continue to monitor the PO2 arterial that is currently 61. Continues to be obtunded, not following commands, agitated at times. Thick secretions around trach site. Had multiple small BM yesterday, no longer having high residuals from TFs since progressing to NJ. Pressure supporting this AM.      O: Temp: 99.8  F (37.7  C) Temp src: Axillary BP: 105/53   Heart Rate: 95 Resp: 24 SpO2: 94 % O2 Device: Trach dome Oxygen Delivery: Other (Comments) (40 LPM via drager O2 therapy)                         General: Obtunded, intermittently agitated, not following commands, moving all extremities                         HEENT: Oral tongue is less edematous. Left neck incision c/d/i, neck soft and non-fluctuant, diffuse edema but essentially flat, no fluctuance. Mild erythema over incision that is stable. CAMACHO drain x 1 in the left neck holding bulb suction with serosanguinous drainage. 6-0 shiley cuffed sutured in place with trach ties around neck, thick tan secretions, cuff appropriately insufflated. There is a full thickness pressure ulceration with skin breakdown at the left inferior flange. This is stable to prior. [facility] nurse following and providing cares.                         Pulmonary: on ventilator via trach, FiO2 40%, PEEP 5.               Intake/Output Summary (Last 24 hours) at 12/01/17 1038  Last data filed at 12/01/17 0900   Gross per 24 hour   Intake             2049 ml   Output              675 ml   Net             1374 ml     CAMACHO drain output(s): (last 24 hours)/(last shift)  2 Left neck 10,2,15 mL = 27 ml      CBC RESULTS:   Recent Labs   Lab Test  12/01/17   0336   WBC  21.0*   RBC  2.12*   HGB  6.9*   HCT  21.7*   MCV  102*   MCH  32.5   MCHC  31.8   RDW  19.8*   PLT  206         Micro:  11/24:  Blood  cx: NGTD  Urine cx: No growth  Sputum cx: e. Coli, candida       A/P: Alexia Flor is a 62 year old female with a past medical history of alcohol abuse, diastolic heart failure, COPD, breast cancer s/p chemoradiation, and a left tongue SCC on 11/21/17 now POD#10 s/p left partial glossectomy with primary closure and left MRND and POD#8 following awake tracheostomy and neck expoloration for left neck hematoma evacuation.      Neuro:  - Pain: Tylenol PRN, oxycodone PRN, IV Fentanyl PRN  - Sedation off  - Agitation: discontinued risperidone 1mg BID and started seroquel  - Alcoholism: high concern for EtOH withdrawal                         - SICU managing                         -Thiamine and folate, B12, multivitamin       HEENT:  - Incision cares: clean with 0.9% sodium chloride and apply Aquaphor Q8H   - Monitor and record CAMACHO drain output Qshift  - Peridex oral rinses 4 times daily   - Soft red rosales suction to oral cavity only - no yankeur  - PTA Zyrtec    - Wound cares per WOC      Trach Care Instructions:   - OK for RN to change trach ties, must have one person hold trach flange in place at all times while changing ties  - ENT to perform first trach change once patient is off mechanical ventilation  - Perform regular suctioning   - RN should change disposable inner canulas every shift and PRN   - Keep trach tube obturator taped to wall behind the head of the bed   - Keep extra unopened 6-0 cuffed Shiley and 4-0 cuffed Shiley at bedside at all times   - Minimize the amount of air in the cuff needed to adequately apply positive pressure ventilate. If positive pressure ventilation is not need then the cuff should be down.   - Contact ENT on-call with any questions or concerns       Respiratory:  - Aspiration pneumonia: Intraop aspiration event on 11/23,  tracheal sputum culture growing e. Coli (see ID section below)  - patient currently ventilated managed by SICU  - Daily pressure support trials per ICU, will  trial kimber plummer today  - Hx COPD, no PTA inhalers/meds in EPIC      CV/heme:  - Closely monitoring hemodynamic status  - Hypotension now improved and off pressors  - Acute blood loss anemia on chronic anemia: continue PTA Iron supplement. Hgb 6.9, SICU to manage for transfusion  - Hx diastolic heart failure: holding lasix, will defer to SICU   - TTE 11/24 showed EF of 60-65%      FEN/GI:  - NPO  - TFs via NJ, advancing to continuous goal.    - pantoprazole   - Bowel regimen PRN, multiple stools yesterday   - Electrolyte replacement per protocol. Holding PTA potassium chloride for now.   - GI/Hepatology consulted due to elevated liver enzymes and hyperbilirubinemia, appreciate recs.                          - Abdominal US 11/27 with cholelithiasis w/o evidence of cholecystitis, mild ascites                         - Considering diagnostic paracentesis, will defer to ICU       /Renal:  - Adequate UOP, incontinent       Endo  - sliding scale for diabetic management. BG wnl      ID:  - Aspiration pneumonia: Sputum culture growing e.coli resistant to Zosyn. Ceftriaxone started 11/27.       PPX:  - SQ Heparin 5000 TID  - SCDs  - IS      -- Patient and above plan to be discussed with Dr. George.       Sachin Jean Baptiste MD  Otolaryngology-Head & Neck Surgery  Please contact ENT by dialing [phone] and entering job code [phone].

## 2017-12-01 NOTE — PROGRESS NOTES
Completed PROM for BUE and BLE x 10 reps including, ankle inversion/eversion, ankle dorsiflexion/plantarflexion, forefoot mobility, heel slides, hip abduction/adduction, finger flexion/extension, thumb opposition, wrist flexion/extension with ulnar/radial deviations, elbow flexion/extension, shoulder flexion/extension, shoulder abduction/adduction, and horizontal shoulder abduction/adduction. Pt unable to follow simple verbal commands and appeared restless and agitated throughout PROM.

## 2017-12-01 NOTE — PROGRESS NOTES
Nutrition Progress Note - Discussion of POC on SICU rounds; f/u for progress towards previous nutrition POC (see previous 11/28 reassessment for details)    -EN: Impact Peptide @ goal 40 ml/hr (fiber-free TF)  -FEN: K+ 4 and no Phos today but noted Phos 2.4 mg/dL yesterday.  Remains on NeutraPhos 1 pkt QID  -GI: noted increased loose/liquid stools outputs now requiring rectal tube containment.  Noted WBC trends.      Interventions:  Collaboration and Referral of Nutrition care - Discussed plan for FEN/GI on rounds with Providers and bedside RN.  Ordered add on Phos to evaluate if need to repeat IV replacement per protocols and/or approp to continue NeutraPhos.  If Phos in ~middle range of nrml, consider discontinue NeutraPhos.   Providers to r/o C.diff before would consider soluble fiber supplement (but if r/o C.diff, rec consider Nutrisource Fiber 1 pkt q 4 hrs for 18 g soluble fiber).    Layla Hudson ,RD, LD, CNSC (pgr [phone])

## 2017-12-01 NOTE — PLAN OF CARE
Problem: Patient Care Overview  Goal: Plan of Care/Patient Progress Review  N: Patient arouses to voice and is constantly restless. Not able to follow commands, but moves all extremities. Eyes jaundice and swollen, MD notified.  CV: BP stable. Sinus rhythm with frequent PVC's. Afebrile.  R: CMV settings, 40% Fi02, peep 5. Pressure support x 2 today - did not tolerate well, RR increased to high 30's.  GI/: TF at goal, 40/hr with 30ml flush q 1 hr. Rectal tube placed - watery output. Incontinent of urine.   Skin: Aquafor applied to L neck incision. CAMACHO with serous output. Using red rosales for oral cares. Pressure ulcer noticed on L side of trach site - WOC RN aware.    Plan: wean from vent, continue with POC - update team with change in patient condition.

## 2017-12-01 NOTE — PLAN OF CARE
Problem: Patient Care Overview  Goal: Plan of Care/Patient Progress Review  Discharge Planner OT   Patient plan for discharge: unknown  Current status: Pt engaged in PROM task to increase ROM and decreased contractures. Pt agitated and restless throughout task. Pt unable to follow simple commands. Max Ax2 for bed mobility and dependent for toileting task.   Barriers to return to prior living situation: Deconditioned, vent dependence, cognition, medical status  Recommendations for discharge: TCU vs LTACH  Rationale for recommendations: To continue to address ind with ADLS and functional tasks        Entered by: Yary Patrick 12/01/2017 2:57 PM

## 2017-12-01 NOTE — PROGRESS NOTES
Brief GI Note  12/1/2017     LFTs continue to fluctuate, likely still representing critical illness and underlying liver disease. There is no further workup recommended at this time. Please page hepatology fellow at time of discharge to help organize liver clinic followup.     .GI will sign off; please call with questions or concerns.    Cory Valenzuela MD  GI Fellow  [phone]

## 2017-12-01 NOTE — PROGRESS NOTES
Social Work Services Progress Note    Hospital Day: 11  Date of Initial Social Work Evaluation:  11/27/17  Collaborated with:  Chart review, attended rounds, pt's brother-Earnest Pearce     Data:  Received voicemail from pt's brother, Earnest, requesting call back. He plans to come to the hospital with pt's mother either today or Sunday. Discussed [facility] policy and inquired if he had any information on contact information for pt's son. He reported that he hasn't seen or talked to pt's son since he was little. He had medical questions about pt's current status. Encouraged him to speak with the medical team when he is at hospital. He thanked  for the call back and denied further questions or needs.     Intervention:  Phone call     Assessment:  Earnest was pleasant and receptive to SW.     Plan:    Anticipated Disposition:  TBD     Barriers to d/c plan:  Medical stability     Follow Up:  SW will continue to monitor, support and assist with ongoing social service needs.     ANNA Johnson, [facility]  ICU Float  -  Coverage for SW Mary Ren  Pager: [phone]  [email or web address]     NO LETTER

## 2017-12-01 NOTE — PROVIDER NOTIFICATION
Notified MD of critical Hgb of 6.9, increasing WBC of 21.0 and ABG results.     Plan to have STAT chest Xray and MD assess.

## 2017-12-02 NOTE — PROGRESS NOTES
SURGICAL ICU PROGRESS NOTE  2017    ASSESSMENT: Alexia Flor is a 62-year-old female with PMH alcohol use disorder, MDD, Breast Cancer, tongue cancer, alcoholic fatty liver, COPD, and diastolic heart failure now s/p left partial glossectomy with primary closure and left neck dissection for invasive SCC .  Admitted to SICU for assistance with EtOH withdrawal, possible need for ativan gtt, and respiratory monitoring. Underwent tracheostomy and evacuation of L neck hematoma      Recent concerns about TFs coming out of tracheostomy, but stable overnight.  No plans to perform bronchoscopy unless this occurs again.        No acute events overnight. All sedating meds were discontinued yesterday with improvement in mental status. Received Haldol for agitation overnight.  Awake but no following commands this AM. Off pressors and on stable vent settings overnight. Plan on PS trial and  trach dome this AM. C Diff negative. Leukocytosis worsening     CHANGES FOR TODAY :  - PS trials ,Trach dome  - Continue PRN haldol   - Pro-calcitonin and CBC with Diff sent   - Pan culture if Diff or pro calcitonin suggestive : sputum, blood (PICC line and BCx) and urine  - Lasix 20 mg x 2     PLAN:  Neuro/ pain/ sedation:  - Monitor neurological status. Notify the MD for any acute changes in exam.   - DC versed, hydromorphone, oxycodone, clonidine, seroquel  - Spot EEG to rule out seizures as cause of AMS demonstrated no epileptiform findings, non specific encephalopathy seen   - QTc (): 459 msec       Pulmonary care:   - CMV/AC: 18/300/8/40  - AB.41/41/81/26  - Ceftriaxone x 7d For HCAP (start date )  - CXR shows stable bilateral diffuse opacities, unchanged R small effusion     Cardiovascular:    - Monitor hemodynamic status.   - Off pressors  - PRN anti-hypertensives     GI care:   - NPO except ice chips and medications.  - NJ placed. Advance TFs as tolerated   - Ileus resolved: multiple stools  recorded yesterday     #Persistent direct hyperbilirubinemia:   - Bilirubin 2.6 today (2.2 yesterday)  - Hepatology consult, appreciate recommendations  - Given minimal ascites seen on US, will hold off on paracentesis  - abdominal US w/ dopplers -- cholelithiasis w/o evidence of cholecystitis. Mild ascites present.     Fluids/ Electrolytes/ Nutrition:   - TKO IVF, PRN IV boluses   - Improving hypernatremia: Na 144 today, Continue FWF 30 cc q1h.   - On replacement protocol   - No indication for parenteral nutrition.    Renal/ Fluid Balance:    - Urine output is adequate so far, no mckinney in place.  - Will continue to monitor intake and output.  - Mckinney discontinued 11/25, Cr 0.79, BUN 35  - Lasix BID x 2. Will repeat as needed      Endocrine:    - No intervention indicated at this time, BG stable     ID/ Antibiotics:  - E coli  sputum from 11/24. Resistant to Zosyn, Ampicillin and FQs.   - Dc Zosyn, start Ceftriaxone x 7d (started 11/27)  - Increasing leukocytosis. WBC 25 today .   - C Diff PCR neg 12/1  - Pan culture: sputum, blood (PICC line and BCx) and urine     Heme:     - Hgb 8.3, received 1U pRBCs yesterday  - Plts 223     Prophylaxis:    - Mechanical prophylaxis for DVT.   - Sq heparin for DVT ppx  - Protonix 40 mg qD for GI ppx (PTA)     Lines/ tubes/ drains:  - PICC  - A line  - PIV x 3  - Neck JPs x 1  - NJ     Disposition:  - Surgical ICU.      Patient seen and discussed with surgical ICU staff , Dr. Lauri Blum MD  PGY-2 General Surgery Resident  [phone]     ====================================    SUBJECTIVE:   No acute events overnight. Received Haldol for agitation Somnolent and not appropriately answering questions this AM. Off pressors and on stable vent settings overnight. Plan on PS trial and  trach dome this AM. C Diff negative. Leukocytosis worsening    OBJECTIVE:   1. VITAL SIGNS:   Temp:  [96.5  F (35.8  C)-99.7  F (37.6  C)] 97.9  F (36.6  C)  Heart Rate:  [71-95] 87  Resp:   [13-41] 17  BP: (105-195)/(51-91) 195/91  MAP:  [54 mmHg-155 mmHg] 155 mmHg  Arterial Line BP: ()/() 208/108  FiO2 (%):  [40 %] 40 %  SpO2:  [92 %-100 %] 96 %  Ventilation Mode: CMV/AC  FiO2 (%): 40 %  Rate Set (breaths/minute): 18 breaths/min  Tidal Volume Set (mL): 300 mL  PEEP (cm H2O): 5 cmH2O  Pressure Support (cm H2O): 10 cmH2O  Oxygen Concentration (%): 40 %  Resp: 17    2. INTAKE/ OUTPUT:   I/O last 3 completed shifts:  In: 2271 [I.V.:36; NG/GT:975]  Out: 855 [Drains:55; Stool:800]    3. PHYSICAL EXAMINATION:     GEN: Awake but not following commands   CV: RRR, no gallops, rubs, or murmurs  PULM/CHEST: Clear breath sounds bilaterally without rhonchi, crackles or wheeze, symmetric chest rise  GI: soft, non-tender, no rebound tenderness or guarding, no masses      4. INVESTIGATIONS:   Arterial Blood Gases     Recent Labs  Lab 12/02/17  0332 12/01/17  0825 12/01/17  0336 11/30/17  1811   PH 7.41 7.42 7.38 7.41   PCO2 41 42 49* 43   PO2 81 61* 62* 75*   HCO3 26 27 29* 27     Complete Blood Count     Recent Labs  Lab 12/02/17  0332 12/01/17  0336 11/30/17  0401 11/29/17  0345   WBC 25.7* 21.0* 18.6* 15.8*   HGB 8.3* 6.9* 7.7* 7.7*    206 201 186     Basic Metabolic Panel    Recent Labs  Lab 12/02/17  0332 12/01/17  0336 11/30/17  1256 11/30/17  0401 11/29/17  1712    145*  --  146* 145*   POTASSIUM 3.5 4.0 4.2 3.3* 4.1   CHLORIDE 110* 110*  --  112* 114*   CO2 28 30  --  28 24   BUN 35* 32*  --  24 20   CR 0.79 0.85  --  0.76 0.66   * 122*  --  137* 120*     Liver Function Tests    Recent Labs  Lab 12/02/17  0332 12/01/17  0336 11/30/17  0401 11/29/17  0345   * 108* 110* 63*   ALT 63* 53* 46 34   ALKPHOS 443* 286* 246* 224*   BILITOTAL 2.6* 2.2* 3.1* 3.4*   ALBUMIN 2.2* 2.0* 2.0* 2.0*     Pancreatic Enzymes  No lab results found in last 7 days.  Coagulation Profile  No lab results found in last 7 days.      5. RADIOLOGY:   No results found for this or any previous visit  (from the past 24 hour(s)).    =========================================  Discussed with Dr. Lauri Blum MD  PGY-2 General Surgery Resident  [phone]  Physician Attestation   I, Antoine Carreon, saw this patient with the resident and agree with the resident s findings and plan of care as documented in the resident s note.      I personally reviewed vital signs, medications, labs and imaging.    Key findings: 62-year-old female with alcohol use disorder, MDD, Breast Cancer, tongue cancer, alcoholic fatty liver, COPD, and diastolic heart failure now s/p left partial glossectomy with primary closure and left neck dissection for invasive SCC 11/21.  Hospital stay was complicated by ethanol withdrawal,and respiratory failure. Underwent tracheostomy and evacuation of Left neck hematoma 11/23. We have scaled down her antibiotics and is currently being treated for a health care associated pneumonia. On 11/24 RUQ US showed non dilated CBD and intrahepatic ducts, cholelithiasis. We have consulted hepatology.She remains on the ventilator with difficulty in weaning, but is on pressure support trials. Stopped all sedation medications. Probably needs LTAC placement. Continue trach dome.      Antoine Carreon  Date of Service (when I saw the patient): 12/2/2017  Time Spent on this Encounter   I spent 32 minutes managing the critical care of Alexia Flor in relation to the issues listed in this note.

## 2017-12-02 NOTE — PLAN OF CARE
Problem: Patient Care Overview  Goal: Plan of Care/Patient Progress Review  Outcome: Improving  Pt s/p left partial glossectomy with primary closure and left neck dissection for invasive SCC 11/21. Alert but very restless. Haldol given x1 with moderate effect. PERRL. Moves all extremities spontaneously. Follows commands intermittently. Afebrile. BP stable. NSR with occasional PACs. Vented on 4O% FiO2 via trach. Sating upper 90's. LS coarse with expiratory wheezing throughout. Tolerating TF well @ 40 cc/hr. Rectal tube with no stool output overnight. Incontinent of urine. CAMACHO with moderate output. Electrolytes replaced. Plan: see flow sheet for detailed assessments and intervention, continue to support POC.

## 2017-12-02 NOTE — PROGRESS NOTES
Pt restless/agitated throughout shift, tries to resist cares. PRN haldol given. Not following any commands, pupils brisk 3 mm. Prn Labetalol, and hydralazine given to keep SBP less than 160. Tele NSR in the 70's.  Lungs sound very coarse throughout with mild amount of cream colored secretions.  Attempted to pressure support, and failed d/t high respirations and agitation. On CMV , RR 18, PEEP 5  fiO2 40%.  Having watery stools through rectal tube. TF at goal rate of 40 ml/hr with free water flush 30 ml q 4hr.  Incontinent of urine purewik placed, working at times. Given lasix twice with good results. Afebrile/  Ulcerations around trach site care for with foam dressing under trach plate. Blanchable reddness/excoriation around coccyx, barrier cream applied.

## 2017-12-02 NOTE — PROGRESS NOTES
Otolaryngology Progress Note  12/2/17      S: Overnight labs showed Hgb of 8.3 following her transfusion and WBC of 25.7. Continues to be obtunded, not following commands, agitated at times. Thick secretions around trach site. Had multiple small BM yesterday C-diff negative, no longer having high residuals from TFs since progressing to NJ currently at goal for TF. Sicu attempted to wean off the vent to trach dome yesterday but this was unsuccessful.      O: Temp: 97.1  F (36.2  C) Temp src: Axillary BP: 165/71   Heart Rate: 85 Resp: 21 SpO2: 96 % O2 Device: Mechanical Ventilator Oxygen Delivery: Other (Comments) (40 LPM via drager O2 therapy)                         General: Obtunded, intermittently agitated, not following commands, moving all extremities                         HEENT: Oral tongue is slightly more edematous. Small area on anterior tongue where patient may have bitten tongue stable from yesterday. Left neck incision c/d/i, neck more firm than yesterday morning, non-fluctuant, stable swelling from yesterday AM but stable from last night. Mild erythema over incision that is stable. CAMACHO drain x 1 in the left neck holding bulb suction with serosanguinous drainage. 6-0 shiley cuffed sutured in place with trach ties around neck, thick tan secretions, cuff appropriately insufflated. There is a full thickness pressure ulceration with skin breakdown at the left inferior flange. Neck superior to trach site is erythematous. This is increased from prior. WOC nurse following and providing cares.                         Pulmonary: on ventilator via trach, FiO2 40%, PEEP 5.    Intake/Output Summary (Last 24 hours) at 12/02/17 0716  Last data filed at 12/02/17 0700   Gross per 24 hour   Intake             2268 ml   Output              855 ml   Net             1413 ml     CAMACHO drain output(s): (last 24 hours)/(last shift)  2 Left neck NR, 40, 15 = 55 ml      CBC RESULTS:   Recent Labs   Lab Test  12/02/17   0332   WBC   25.7*   RBC  2.49*   HGB  8.3*   HCT  24.9*   MCV  100   MCH  33.3*   MCHC  33.3   RDW  19.3*   PLT  223       Micro:  11/24:  Blood cx: NGTD  Urine cx: No growth  Sputum cx: e. Coli, candida       A/P: Alexia Flor is a 62 year old female with a past medical history of alcohol abuse, diastolic heart failure, COPD, breast cancer s/p chemoradiation, and a left tongue SCC on 11/21/17 now POD#11 s/p left partial glossectomy with primary closure and left MRND and POD#9 following awake tracheostomy and neck expoloration for left neck hematoma evacuation.      Neuro:  - Pain: Tylenol PRN  - Sedation off  - Agitation  - Alcoholism: high concern for EtOH withdrawal                         - SICU managing                         -Thiamine and folate, B12, multivitamin       HEENT:  - Incision cares: clean with 0.9% sodium chloride and apply Aquaphor Q8H   - Monitor and record CAMACHO drain output Qshift  - Peridex oral rinses 4 times daily   - Soft red rosales suction to oral cavity only - no yankeur  - PTA Zyrtec    - Wound cares per WOC  - Will order ultrasound neck to assess incisional swelling.  Trach Care Instructions:   - OK for RN to change trach ties, must have one person hold trach flange in place at all times while changing ties  - ENT to perform first trach change once patient is off mechanical ventilation  - Perform regular suctioning   - RN should change disposable inner canulas every shift and PRN   - Keep trach tube obturator taped to wall behind the head of the bed   - Keep extra unopened 6-0 cuffed Shiley and 4-0 cuffed Shiley at bedside at all times   - Minimize the amount of air in the cuff needed to adequately apply positive pressure ventilate. If positive pressure ventilation is not need then the cuff should be down.   - Contact ENT on-call with any questions or concerns       Respiratory:  - Aspiration pneumonia: Intraop aspiration event on 11/23,  tracheal sputum culture growing e. Coli (see ID section  below)  - patient currently ventilated managed by SICU  - Daily pressure support trials per ICU  - Hx COPD, no PTA inhalers/meds in EPIC      CV/heme:  - Closely monitoring hemodynamic status  - hypertensive on labetalol, prn hydralazine  - Acute blood loss anemia on chronic anemia: continue PTA Iron supplement. Hgb 8.3 following transfusion  - Hx diastolic heart failure: holding lasix, will defer to SICU   - TTE 11/24 showed EF of 60-65%      FEN/GI:  - NPO  - TFs via NJ, at goal.    - pantoprazole   - Bowel regimen PRN, multiple stools yesterday   - Electrolyte replacement per protocol. Holding PTA potassium chloride for now.   - GI/Hepatology consulted due to elevated liver enzymes and hyperbilirubinemia, appreciate recs.                          - Abdominal US 11/27 with cholelithiasis w/o evidence of cholecystitis, mild ascites                         - Considering diagnostic paracentesis, will defer to ICU       /Renal:  - Adequate UOP, incontinent       Endo  - sliding scale for diabetic management. BG wnl      ID:  - Aspiration pneumonia: Sputum culture growing e.coli resistant to Zosyn. Ceftriaxone started 11/27.       PPX:  - SQ Heparin 5000 TID  - SCDs  - IS      -- Patient and above plan to be discussed with Dr. George.       Sachin Jean Baptiste MD  Otolaryngology-Head & Neck Surgery  Please contact ENT by dialing * [phone] and entering job code [phone].

## 2017-12-02 NOTE — PROGRESS NOTES
Pt lethargic/somulent in the AM, restless/agitated in the afternoon. Not following any commands, pupils sluggish 3 mm.  PRN haldol given for agitation.  Hypertensive for most of shift SBP around 170, prn Labetalol, and hydralazine given.  Tele NSR in the 70's.  Lungs sound very coarse throughout with mild amount of thick yellow/tan secretions.  Attempted to trach dome, and failed within minutes d/t RR in the 50's, otherwise pressure supported throughout the day on 10/5.  40% fiO2.  Having watery stools through rectal tube, c-diff sent results came back negative. TF at goal rate of 40 ml/hr with free water flush 30ml/hr.  Incontinent of urine.  T-max 100.5.  Ulcerations around trach site care for with aquaphor, and foam dressing under trach plate. Blanchable reddness/excoriation around coccyx, barrier cream applied.

## 2017-12-03 NOTE — PLAN OF CARE
Problem: Patient Care Overview  Goal: Plan of Care/Patient Progress Review  Outcome: Improving  Pt s/p left partial glossectomy with primary closure and left neck dissection for invasive SCC 11/21. Alert but very restless. PERRL. Moves all extremities spontaneously. Not following commands. Afebrile. BP stable. NSR with occasional PVCs. Vented on 4O% FiO2 via trach. Sating upper 90's. LS coarse with expiratory wheezing throughout. Tolerating TF well @ 40 cc/hr. Rectal tube with medium stool output. Incontinent of urine. CAMACHO with small output. 40 meq of K+ given, needs additional 20 meq. Plan: see flow sheet for detailed assessments and intervention, continue to support POC.

## 2017-12-03 NOTE — PROGRESS NOTES
"Otolaryngology Progress Note  12/3/17      Interval events:  - Obtained neck ultrasound on 12/2 for neck swelling- revealed  \"3.9 x 4.3 x 2.2 cm  heterogeneous lesion, which would be consistent with a hematoma\". Discussed with Dr. George- no intervention for now  - WBC was rechecked yesterday and was 29.9 then decreased to 23.8 this morning. She remains afebrile  - Procalcitonin was elevated- 2.43. SICU began infectious workup  - Remains on the vent- P5, FiO2- 40    S: unable to obtain subjective data from patient.       O: Temp: 98.3  F (36.8  C) Temp src: Axillary BP: 152/70   Heart Rate: 96 Resp: 29 SpO2: 97 % O2 Device: Mechanical Ventilator    General: Awake, nods to some questions. Intermittently agitated and attempts to hits while being examined, not following commands  HEENT: Oral tongue non-etedematous but unable to visualize surgical site. FOM is soft. Small area on anterior tongue where patient may have bitten tongue stable from yesterday. Left neck incision with staples is intact- medial portion of the incision has a small dark area- stable from previous exams. Left  Lateral neck with stable swelling not-fluctuant, no overlying erythema or warmth. CAMACHO drain x 1 in the left neck holding bulb suction with serosanguinous drainage. 6-0 shiley cuffed sutured in place with trach ties around neck, thick tan secretions, cuff appropriately insufflated. There is a full thickness pressure ulceration with skin breakdown at the left inferior flange- improving. Optiform Dressing in place. Patient has stable skin irritation to left neck from the trach ties- dressing placed under the times.   Pulmonary: on ventilator via trach, FiO2 40%, PEEP 5.      Intake/Output Summary (Last 24 hours) at 12/03/17 1516  Last data filed at 12/03/17 1400   Gross per 24 hour   Intake             1350 ml   Output             1515 ml   Net             -165 ml         CAMACHO drain output(s): (last 24 hours)/(last shift)   Left neck 15/5/0 " (20)//15      CBC RESULTS:   Recent Labs   Lab Test  12/02/17   0332   WBC  25.7*   RBC  2.49*   HGB  8.3*   HCT  24.9*   MCV  100   MCH  33.3*   MCHC  33.3   RDW  19.3*   PLT  223       Last Basic Metabolic Panel:  Lab Results   Component Value Date     12/03/2017      Lab Results   Component Value Date    POTASSIUM 3.9 12/03/2017     Lab Results   Component Value Date    CHLORIDE 108 12/03/2017     Lab Results   Component Value Date    SARAH 8.4 12/03/2017     Lab Results   Component Value Date    CO2 30 12/03/2017     Lab Results   Component Value Date    BUN 41 12/03/2017     Lab Results   Component Value Date    CR 0.80 12/03/2017     Lab Results   Component Value Date     12/03/2017         Micro:  11/24:  Blood cx: NGTD  Urine cx: No growth  Sputum cx: e. Coli, candida       A/P: Alexia Flor is a 62 year old female with a past medical history of alcohol abuse, diastolic heart failure, COPD, breast cancer s/p chemoradiation, and a left tongue SCC on 11/21/17 now POD#12 s/p left partial glossectomy with primary closure and left MRND and POD#10 following awake tracheostomy and neck expoloration for left neck hematoma evacuation.      Neuro:  - Pain: Tylenol PRN  - Sedation off  - Agitation  - Hx of significant Alcoholism:   - SICU managing  -Thiamine and folate, B12, multivitamin       HEENT:  - Incision cares: clean with 0.9% sodium chloride and apply Aquaphor Q8H   - Monitor and record CAMACHO drain output Qshift  - Peridex oral rinses 4 times daily   - Soft red rosales suction to oral cavity only - no yankeur  - PTA Zyrtec    - Wound cares per WOC  -Recurrent left neck hematoma- does not appear infected at this time but difficult to be certain. Continue to monitor closely  - Continue haresh-tracheostomy and haresh-incisional wounds cares as recommended by WOCN. Please prop patient's head to the right to avoid constant pressure to the left neck  Trach Care Instructions:   - OK for RN to change trach ties,  must have one person hold trach flange in place at all times while changing ties  - ENT to perform first trach change once patient is off mechanical ventilation  - Perform regular suctioning   - RN should change disposable inner canulas every shift and PRN   - Keep trach tube obturator taped to wall behind the head of the bed   - Keep extra unopened 6-0 cuffed Shiley and 4-0 cuffed Shiley at bedside at all times   - Minimize the amount of air in the cuff needed to adequately apply positive pressure ventilate. If positive pressure ventilation is not need then the cuff should be down.   - Contact ENT on-call with any questions or concerns       Respiratory:  - Aspiration pneumonia: Intraop aspiration event on 11/23,  tracheal sputum culture growing e. Coli (see ID section below)  - patient currently ventilated managed by SICU  - Daily pressure support trials per ICU  - Hx COPD, no PTA inhalers/meds in EPIC      CV/heme:  - Closely monitoring hemodynamic status  - hypertensive on labetalol, prn hydralazine  - Acute blood loss anemia on chronic anemia: continue PTA Iron supplement. Hgb 8.3 following transfusion  - Hx diastolic heart failure: Lasix dosing per ICU team  - TTE 11/24 showed EF of 60-65%      FEN/GI:  - NPO  - TFs via NJ, at goal.    - pantoprazole   -No bowel meds at this time due to diarrhea. Rectal tube in place. C-diff was negative  - Electrolyte replacement per protocol. Holding PTA potassium chloride for now.   - GI/Hepatology consulted due to elevated liver enzymes and hyperbilirubinemia, appreciate recs.  Liver enzymes uptrending                         - Abdominal US 11/27 with cholelithiasis w/o evidence of cholecystitis, mild ascites                         - Considering diagnostic paracentesis, will defer to ICU       /Renal:  - Adequate UOP, incontinent   - Management of metabolic acidosis per SICU team      Endo  - sliding scale for diabetic management. BG wnl      ID: Leukocytosis of unclear  etiology (wbc 25-->29.9-->23.8), procalcitonin 2.43  - Aspiration pneumonia: Sputum culture growing e.coli resistant to Zosyn. Ceftriaxone started 11/27.   - Panculture to identify source per SICU.       PPX:  - SQ Heparin 5000 TID  - SCDs        --  Dr. George was updated    Mavis Loredo MD PGY-3  Otolaryngology- Head and Neck Surgery  Pager: [phone]  Please contact ENT with questions by dialing [phone] and entering job code [phone] when prompted.

## 2017-12-03 NOTE — PROGRESS NOTES
SURGICAL ICU PROGRESS NOTE  December 3, 2017    ASSESSMENT: Alexia Flor is a 62-year-old female with PMH alcohol use disorder, MDD, Breast Cancer, tongue cancer, alcoholic fatty liver, COPD, and diastolic heart failure now s/p left partial glossectomy with primary closure and left neck dissection for invasive SCC .  Admitted to SICU for assistance with EtOH withdrawal, possible need for ativan gtt, and respiratory monitoring. Underwent tracheostomy and evacuation of L neck hematoma      Recent concerns about TFs coming out of tracheostomy, but stable overnight.  No plans to perform bronchoscopy unless this occurs again.        No acute events overnight. All sedating meds were discontinued  with improvement in mental status.  Awake and ollowing commands this AM. Off pressors and on stable vent settings overnight. Plan on PS trial and  trach dome today. C Diff negative. Leukocytosis worsening, pro calcitonin 2.43, left shift on CBC with diff. Plan on pan-cx today     CHANGES FOR TODAY :  - PS trials ,Trach dome  - Continue PRN haldol   - Pan culture today  - Lasix 20 mg x 2  - Acetazolamide 250 mg x 1 for metabolic alkalosis   - PRN and joão nebs for pulm toilet   - Start Metoprolol 25 BID      PLAN:  Neuro/ pain/ sedation:  - Monitor neurological status. Notify the MD for any acute changes in exam.   - DC'ed versed, hydromorphone, oxycodone, clonidine, seroquel. Much improved mental status, is following commands this AM  - Continue PRN haldol   - Spot EEG to rule out seizures as cause of AMS demonstrated no epileptiform findings, non specific encephalopathy seen   - QTc (): 459 msec       Pulmonary care:   - CMV/AC: 18/300/8/40  - Continue PS trials and trach dome is successful   - AB.49, 40, 101, 31. Metabolic alkalosis probably secondary to diuretics. Will plan on continued diuresis but will add Acetazolamide x 1   - Ceftriaxone x 7d For HCAP (start date )  - CXR shows stable bilateral  diffuse opacities, slightly decreased R small effusion     Cardiovascular:    - Monitor hemodynamic status.   - Off pressors  - PRN anti-hypertensives     GI care:   - NPO except ice chips and medications.  - NJ placed. TF@ goal   - Ileus resolved: multiple stools recorded yesterday     # Hyperbilirubinemia:   - Bilirubin 1.8 today (2.6 yesterday)  - Hepatology consult, appreciate recommendations  - Given minimal ascites seen on US, will hold off on paracentesis  - abdominal US w/ dopplers -- cholelithiasis w/o evidence of cholecystitis. Mild ascites present.     Fluids/ Electrolytes/ Nutrition:   - TKO IVF, PRN IV boluses   - Improving hypernatremia: Na 144 today, Continue FWF 30 cc q1h.   - On replacement protocol   - No indication for parenteral nutrition.    Renal/ Fluid Balance:    - Urine output is adequate so far, no mckinney in place.  - Will continue to monitor intake and output.  - Mckinney discontinued 11/25, Cr 0.8 BUN 41  - Lasix BID x 2. today      Endocrine:    - No intervention indicated at this time, BG stable     ID/ Antibiotics:  - E coli  sputum from 11/24. Resistant to Zosyn, Ampicillin and FQs.   - Dc Zosyn, start Ceftriaxone x 7d (started 11/27)  - Increasing leukocytosis. WBC 25 today .   - C Diff PCR neg 12/1  - pro calcitonin 2.43, left shift on CBC with diff. On 12/2  - Pan culture: sputum, blood (PICC line and BCx) and urine     Heme:     - Hgb 8.0 stable  - Plts 288     Prophylaxis:    - Mechanical prophylaxis for DVT.   - Sq heparin for DVT ppx  - Protonix 40 mg qD for GI ppx (PTA)     Lines/ tubes/ drains:  - PICC  - A line  - PIV x 3  - Neck JPs x 1  - NJ     Disposition:  - Surgical ICU.      Patient seen and discussed with surgical ICU staff , Dr. Lauri Blum MD  PGY-2 General Surgery Resident  [phone]     ====================================    SUBJECTIVE:   All sedating meds were discontinued 12/1 with improvement in mental status.  Awake and ollowing commands this  AM. Off pressors and on stable vent settings overnight. Plan on PS trial and  trach dome today. C Diff negative. Leukocytosis worsening, pro calcitonin 2.43, left shift on CBC with diff. Plan on pan-cx today      OBJECTIVE:   1. VITAL SIGNS:   Temp:  [97.7  F (36.5  C)-98.5  F (36.9  C)] 98.3  F (36.8  C)  Heart Rate:  [] 94  Resp:  [9-35] 27  BP: (125-170)/(60-98) 125/63  MAP:  [72 mmHg-130 mmHg] 91 mmHg  Arterial Line BP: (120-183)/(41-89) 129/67  FiO2 (%):  [40 %] 40 %  SpO2:  [95 %-100 %] 98 %  Ventilation Mode: CMV/AC  FiO2 (%): 40 %  Rate Set (breaths/minute): 18 breaths/min  Tidal Volume Set (mL): 300 mL  PEEP (cm H2O): 5 cmH2O  Pressure Support (cm H2O): 10 cmH2O  Oxygen Concentration (%): 40 %  Resp: 27    2. INTAKE/ OUTPUT:   I/O last 3 completed shifts:  In: 1620 [I.V.:110; NG/GT:550]  Out: 1870 [Urine:1650; Drains:20; Stool:200]    3. PHYSICAL EXAMINATION:     GEN: Awake and following commands   CV: RRR, no gallops, rubs, or murmurs  PULM/CHEST: Clear breath sounds bilaterally without rhonchi, crackles or wheeze, symmetric chest rise  GI: soft, non-tender, no rebound tenderness or guarding, no masses      4. INVESTIGATIONS:   Arterial Blood Gases     Recent Labs  Lab 12/03/17  0425 12/02/17  0332 12/01/17  0825 12/01/17  0336   PH 7.49* 7.41 7.42 7.38   PCO2 40 41 42 49*   PO2 101 81 61* 62*   HCO3 31* 26 27 29*     Complete Blood Count     Recent Labs  Lab 12/03/17  0425 12/02/17  1424 12/02/17  0332 12/01/17  0336   WBC 23.8* 29.9* 25.7* 21.0*   HGB 8.0* 8.2* 8.3* 6.9*    233 223 206     Basic Metabolic Panel    Recent Labs  Lab 12/03/17  0425 12/02/17  0332 12/01/17  0336 11/30/17  1256 11/30/17  0401    144 145*  --  146*   POTASSIUM 3.2* 3.5 4.0 4.2 3.3*   CHLORIDE 108 110* 110*  --  112*   CO2 30 28 30  --  28   BUN 41* 35* 32*  --  24   CR 0.80 0.79 0.85  --  0.76   * 136* 122*  --  137*     Liver Function Tests    Recent Labs  Lab 12/03/17  0425 12/02/17  0332  12/01/17  0336 11/30/17  0401   * 124* 108* 110*   ALT 75* 63* 53* 46   ALKPHOS 502* 443* 286* 246*   BILITOTAL 1.8* 2.6* 2.2* 3.1*   ALBUMIN 2.1* 2.2* 2.0* 2.0*     Pancreatic Enzymes  No lab results found in last 7 days.  Coagulation Profile  No lab results found in last 7 days.      5. RADIOLOGY:   Recent Results (from the past 24 hour(s))   US Head Neck Soft Tissue    Narrative    EXAM: US HEAD NECK SOFT TISSUE  12/2/2017 1:39 PM      HISTORY: previous neck dissection and hematoma formation of the left  neck previously evacuated. Please assess for incisional swelling to  the left neck;     COMPARISON: None    FINDINGS: Targeted ultrasound of the neck to look for hematoma.    Below and to the left of tracheostomy, to the left of the neck drain  3.9 x 4.3 x 2.2 cm  heterogeneous lesion, which would be consistent  with a hematoma in view of recent surgery. No internal blood flow.      Impression    IMPRESSION: 4.3 cm heterogeneous collection to the left of the neck  drain, consistent with a hematoma in view of recent surgery.    I have personally reviewed the examination and initial interpretation  and I agree with the findings.    JUAN COKER   XR Chest Port 1 View    Narrative    XR CHEST PORT 1 VW 12/3/2017 9:56 AM    History: Interval imaging;     Comparison: 12/1/2017    Findings: Tracheostomy in the high intrathoracic trachea. Feeding tube  coursing over the pylorus. Cardiac silhouette is within normal limits.  No pneumothorax. Decreased small right pleural effusion. Diffuse  stable interstitial opacities.      Impression    Impression:   1. Decreased small right pleural effusion.  2. Diffuse interstitial opacities have not significantly changed.    I have personally reviewed the examination and initial interpretation  and I agree with the findings.    JUAN COKER       =========================================  Discussed with Dr. Lauri Blum MD  PGY-2 General Surgery  Resident  [phone]      Physician Attestation   I, Antoine Carreon, saw this patient with the resident and agree with the resident s findings and plan of care as documented in the resident s note.      I personally reviewed vital signs, medications, labs and imaging.    Key findings: 62-year-old female with chronic alcoholism, MDD, Breast Cancer, tongue cancer, alcoholic fatty liver, COPD, and diastolic heart failure now s/p left partial glossectomy with primary closure and left neck dissection for invasive SCC 11/21.  Patient was agitated possibly secondary to alcohol withdrawal, now s/p tracheostomy and evacuation of L neck hematoma 11/23. Difficult wean from ventilator. leukocytosis concerning, will pan culture. However, patient followed commands today for the first time (after discontinuing all sedation medications). Elevated pro calcitonin, no fevers, left shift in wbc differential. Was treated for Ecoli in sputum sensitive to ceftriaxone (will complete 7 days). Has contraction alkalosis but probably still needs some diuresis, based on lung status. Continue judicious diuresis. LTAC referral done.       Antoine Carreon  Date of Service (when I saw the patient): 12/03/17    Time Spent on this Encounter   I spent 34 minutes managing the critical care of Alexia Flor in relation to the issues listed in this note.

## 2017-12-03 NOTE — PLAN OF CARE
Problem: Patient Care Overview  Goal: Plan of Care/Patient Progress Review  D: Pt with PMH of ETOH abuse, MDD, breast Ca, tongue CA, ETOH fatty liver dsz, COPD, diastolic CHF who unerwent (11/21/17) left partial glossectomy with primary closure and left neck dissection for invasive SCC.  11/23/17 went to OR emergently for left neck hematoma and trach placement.  Pt remains vented via trach, tachypnic.  I/A:  NEURO: restless, following commands intermittently, noding head yes/no appropriately intermittently, resistive with cares.    CARDS: afebrile, HR 80-90s, tachycardic with agitation, -150s/60-70s mostly (HTN with repositions/cares).  SBP goal <160 (PRN hydralazine and labteolol, none given this shift).  Scheduled po Metoprolol started. Lasix given (for fluid status).   RESP:  Lasix 20mg x 1 given, LS remain coarse with thick sputum.  [place] cxs sent. Shiley #6 inner cannula changed, trach cares with replacement of Interdry performed. Vented, ,18,5,40% - tachypnic into 30s throughout most of the day.  Pt OOB to chair x 1.   GI/: incontinent of urine, changed frequently (especially with Lasix), RT remains in place with balloon near anal opening, cares with leaking around anus.  Diarrhea, liquid remains (600cc out this shift).    SKIN: CAMACHO in left neck without much output, coccyx blanchable erythema, pressure ulcer beneath trach/trach ties (Interdry for wicking), aquaphor to left neck incision with staples, known hematoma near incision site. Pt likes to have her head always to the left - goal to keep head neutral or to the right to relieve pressure.   Labs: K repalced prior to this shift with remaining given at start of this shift, rechecked and replaced again.  WBC 23.8, trending down.  Hgb 8.0, trending down. LFTs worsening.    PLAN:  Pt slowly clearing cognitively.  Monitor pan cx results. Continue aggressive pulm toileting. Notify team of changes.

## 2017-12-04 NOTE — PROGRESS NOTES
SURGICAL ICU PROGRESS NOTE  December 4, 2017      ASSESSMENT: Alexia Flor is a 62-year-old female with PMH alcohol use disorder, MDD, Breast Cancer, tongue cancer, alcoholic fatty liver, COPD, and diastolic heart failure now s/p left partial glossectomy with primary closure and left neck dissection for invasive SCC 11/21.  Admitted to SICU for assistance with EtOH withdrawal, possible need for ativan gtt, and respiratory monitoring. Underwent tracheostomy and evacuation of L neck hematoma 11/23      Recent concerns about TFs coming out of tracheostomy, but stable overnight.  No plans to perform bronchoscopy unless this occurs again.         No acute events overnight. All sedating meds were discontinued 12/1 with improvement in mental status.  Awake and ollowing commands this AM. Off pressors and on stable vent settings overnight. Plan on PS trial and  trach dome today.       CHANGES FOR TODAY :  - Stop daily bili  - Free water to 60ml every 4 hours.   - Trach dome  - Remove arterial line  - Discuss drain removal with ENT     PLAN:  Neuro/ pain/ sedation:   #Acute postoperative pain   - Monitor neurological status. Notify the MD for any acute changes in exam.   -Off all sedating medications.   - Continue PRN haldol    - Spot EEG to rule out seizures as cause of AMS demonstrated no epileptiform findings, non specific encephalopathy seen       Pulmonary care:   #Acute respiratory failure  - CMV/AC: 18/300/5/40  - Continue PS trials and trach dome is successful    - Ceftriaxone x 7d For HCAP (start date 11/27)  - Daily PST. Transition to trach dome trial.       Cardiovascular:    - Monitor hemodynamic status.   - PRN anti-hypertensives      GI care:   # Hyperbilirubinemia:   #Transaminitis  #Alcoholic fatty liver disease  - Bilirubin 1.6 today (1.8 yesterday). Stop daily checks  - AST/ALT elevated, likely representing critical illness and underlying alcoholic fatty liver disease  - Hepatology consult, appreciate  recommendations. Patient will need follow up with hepatology upon discharge.   - abdominal US w/ dopplers -- cholelithiasis w/o evidence of cholecystitis. Mild ascites present.      Fluids/ Electrolytes/ Nutrition:   #Severe Protein Calorie Malnutrition  #Hypernatremia  - NJ placed. TF@ goal   - TKO IVF, PRN IV boluses   - Improving hypernatremia: increase FWF 60 cc q4h.   - On replacement protocol     Renal/ Fluid Balance:    - Urine output is adequate so far, no mckinney in place.  - Will continue to monitor intake and output.  - Mckinney discontinued 11/25      Endocrine:    - No intervention indicated at this time, BG stable      ID/ Antibiotics:  #VAP  - E coli  sputum from 11/24. Resistant to Zosyn, Ampicillin and FQs.   - Dc Zosyn, start Ceftriaxone x 7d (ended 12/3)  - C Diff PCR neg 12/1  - follow up cultures.       Heme:     #Anemia of critical illness  #Acute blood loss anemia  - no need for transfusion. Hemoglobin 8.0-->7.2      Prophylaxis:    - Mechanical prophylaxis for DVT.   - Sq heparin for DVT ppx  - Protonix 40 mg qD for GI ppx (PTA)      Lines/ tubes/ drains:  - PICC  - A line- dc today  - Neck JPs x 1  - NJ  - Trach      Disposition:  - Surgical ICU.   - talk with social work about LTACH      Patient seen and discussed with surgical ICU staff , Dr. Aly Colon   CC time 35 minutes    ====================================    TODAY'S PROGRESS:   SUBJECTIVE:   - Improving overnight, no acute issues.  Awake, denies dyspnea, chest pain, abdominal pain.       OBJECTIVE:   1. VITAL SIGNS:   Temp:  [97.6  F (36.4  C)-99.5  F (37.5  C)] 99.5  F (37.5  C)  Heart Rate:  [] 81  Resp:  [18-37] 27  BP: (121-168)/(50-98) 122/50  MAP:  [75 mmHg-118 mmHg] 75 mmHg  Arterial Line BP: (118-174)/(48-89) 118/48  FiO2 (%):  [4 %-40 %] 40 %  SpO2:  [95 %-100 %] 99 %  Ventilation Mode: CMV/AC  FiO2 (%): 40 %  Rate Set (breaths/minute): 18 breaths/min  Tidal Volume Set (mL): 300 mL  PEEP (cm H2O): 5  cmH2O  Pressure Support (cm H2O): 10 cmH2O  Oxygen Concentration (%): 40 %  Resp: 27    2. INTAKE/ OUTPUT:   I/O last 3 completed shifts:  In: 1512 [I.V.:252; NG/GT:300]  Out: 915 [Urine:300; Drains:15; Stool:600]    3. PHYSICAL EXAMINATION:   General: NAD  Neuro: NAD, alert and responsive. Following commands with all extremities. Globally weak. No focal deficits.   Resp: Breathing non-labored, clear lung sounds. Trach sutured in place. CAMACHO drain in place with minimal serosanguinous drainage.   CV: RRR, S1 S2  Abdomen: Soft, Non-distended, Non-tender  Incisions: c/d/i  Extremities: warm and well perfused  : no mckinney in place.     4. INVESTIGATIONS:   Arterial Blood Gases     Recent Labs  Lab 12/04/17 0322 12/03/17  0425 12/02/17  0332 12/01/17  0825   PH 7.46* 7.49* 7.41 7.42   PCO2 41 40 41 42   PO2 109* 101 81 61*   HCO3 29* 31* 26 27     Complete Blood Count     Recent Labs  Lab 12/04/17 0322 12/03/17  0425 12/02/17  1424 12/02/17  0332   WBC 22.0* 23.8* 29.9* 25.7*   HGB 7.2* 8.0* 8.2* 8.3*    288 233 223     Basic Metabolic Panel    Recent Labs  Lab 12/04/17  0322 12/03/17  1232 12/03/17  0425 12/02/17  0332 12/01/17  0336   *  --  144 144 145*   POTASSIUM 3.4 3.9 3.2* 3.5 4.0   CHLORIDE 112*  --  108 110* 110*   CO2 30  --  30 28 30   BUN 43*  --  41* 35* 32*   CR 0.77  --  0.80 0.79 0.85   *  --  133* 136* 122*     Liver Function Tests    Recent Labs  Lab 12/04/17  0322 12/03/17  0425 12/02/17  0332 12/01/17  0336   * 136* 124* 108*   ALT 75* 75* 63* 53*   ALKPHOS 480* 502* 443* 286*   BILITOTAL 1.6* 1.8* 2.6* 2.2*   ALBUMIN 2.0* 2.1* 2.2* 2.0*     Pancreatic Enzymes  No lab results found in last 7 days.  Coagulation Profile  No lab results found in last 7 days.  Lactate  Invalid input(s): LACTATE    5. RADIOLOGY:   Recent Results (from the past 24 hour(s))   XR Chest Port 1 View    Narrative    XR CHEST PORT 1 VW 12/3/2017 9:56 AM    History: Interval imaging;     Comparison:  12/1/2017    Findings: Tracheostomy in the high intrathoracic trachea. Feeding tube  coursing over the pylorus. Cardiac silhouette is within normal limits.  No pneumothorax. Decreased small right pleural effusion. Diffuse  stable interstitial opacities.      Impression    Impression:   1. Decreased small right pleural effusion.  2. Diffuse interstitial opacities have not significantly changed.    I have personally reviewed the examination and initial interpretation  and I agree with the findings.    JUAN COKER   XR Chest Port 1 View    Narrative    XR CHEST PORT 1 VW, 12/4/2017 1:36 AM.    Comparison: 12/3/2017.    History: Interval imaging; .    Findings:   Left PICC tip mid SVC. Tracheostomy in the high intrathoracic trachea.  Feeding tube coursing toward stomach tip not visualized. Cardiac  silhouette is within normal limits. No pneumothorax. Unchanged small  bilateral pleural effusion. Diffuse unchanged interstitial opacities.  Surgical clips over the right chest. Surgical clips over the neck with  stable lines.      Impression    Impression:   1. Unchanged small bilateral pleural effusion.  2. Diffuse interstitial opacities may represent pulmonary edema or  atypical infection.    I have personally reviewed the examination and initial interpretation  and I agree with the findings.    SHAWNA OTT MD       =========================================

## 2017-12-04 NOTE — PROGRESS NOTES
CLINICAL NUTRITION SERVICES - REASSESSMENT NOTE     Nutrition Prescription    RECOMMENDATIONS FOR MDs/PROVIDERS TO ORDER:  None at this time.     Malnutrition Status:     Severe malnutrition in the context of acute on chronic illness (and suspected social/environmental circumstances with EtOH hx)     Recommendations already ordered by Registered Dietitian (RD):  1.  Change TF to the following peptide/MCT based formula to help optimize absorption/digestion and avoid the need to consider pancreatic enzyme replacement therapy (PERT) with TFs:   --Peptamen 1.5 @ goal 45 ml/hr (1080 ml/day) to provide 1620 kcals (39182 kcal/kg/day), 73 g PRO (1.9 g/kg/day), 832 ml free H2O, 60 g Fat (70% from MCTs), 203 g CHO and no Fiber daily.     2.  Ordered Nutrisource Fiber 1 pkt q 4 hrs (6 pkts/day = 18 g soluble fiber)    3.  Ordered the following micronutrient supplementation (in addition to ongoing multivitamin/mineral) with physical sx of deficiency noted below: Vitamin C 500 mg tablet daily x 10 days (CRUSH WELL with 10 Fr FT).    Future/Additional Recommendations:  If/when receiving new goal TF (energy provisions) and poor vent wean progress, repeat metabolic cart studies weekly to better evaluate changes to REE (kcal) needs and approp of energy provisions.        EVALUATION OF THE PROGRESS TOWARD GOALS   -Diet: NPO (since 11/22)     -Enteral access: initially had 12 Fr NGT placed by ENT but pt did not tolerate gastric TF trial and alternatively placed NJT (10 Fr Cortrak) + Bridle on 11/28    -EN: currently receiving Impact Peptide @ goal 40 ml/hr (960 ml/day) to provide 1440 kcals (37 kcal/kg/day or 99% of last known MREE of 1469 kcals/day), 90 g PRO (2.3 g/kg/day), 739 ml free H2O, 61 g Fat (50% from MCTs), 134 g CHO and no Fiber daily.     -Intakes: Per I/O documentation, received ~100% of goal TF daily the last 4 days and with ave of 884 ml TF daily the last 5 days providing ave of 1326 kcals (90% of last known MREE or 34  kcal/kg/day) and 83 g PRO (2.1 g/kg/day)      NEW FINDINGS   -Resp: noted tolerating PS trials today and plan to transition to trach dome trials now.    -Physical assessment: pt working with PT today with exposed legs and noted pt with corkscrew hairs on legs (sx c/w scurvy/Vitamin C deficiency).    -GI & Labs: observed stool in rectal bag today and no evidence/sx of steatorrhea (dark brown/liquid and loose stool).  Given pt with no sx of steatorrhea on current peptide/MCT based formula (no Creon per pt's home regimen with chronic pancreatitis) but now with improving CRPs (68 today down from 140 on previous check) and overall course, would change pt to maintenance peptide/MCT formula (no enzymes) + add soluble fiber to fiber-free formula regimens.    MALNUTRITION  % Intake: No decreased intake noted  % Weight Loss: None noted so far this adm (today's wt of 47.1 kg remains > adm/dosing wt of 38.9 kg on 11/20/17); Up to 7.5% in 3 months (non-severe) PTA and underweight status per BMI <18.5 kg/m2 (severe)  Subcutaneous Fat Loss: Facial region, Upper arm and Thoracic/intercostal: Severe  Muscle Loss: Temporal, Facial & jaw region, Thoracic region (clavicle, acromium bone, deltoid, trapezius, pectoral), Upper arm (bicep, tricep), Dorsal hand, Upper leg (quadricep, hamstring), Patellar region and Posterior calf:  Severe  Fluid Accumulation/Edema: None noted  Malnutrition Diagnosis: Severe malnutrition in the context of acute on chronic illness (and suspected social/environmental circumstances with EtOH hx)     Previous Goals   1.  Tolerate adv of TF to goal infusion without sx of refeeding within the next 1 days.  Evaluation: Met  2.  Total ave nutrition intakes (EN) to provide minimum 90% of last known MREE (equiv to 34 kcal/kg/day) and 2 g PRO/kg/day (per 39 kg).   Evaluation: Met    Previous Nutrition Diagnosis  Inadequate protein-energy intake   Evaluation: Resolved    CURRENT NUTRITION DIAGNOSIS  Predicted suboptimal  nutrient intake (protein-energy) r/t remains NPO (do not anticipate oral diet trials anytime in near future) and currently receiving goals for TF intakes but ongoing risk for interruptions with continued adm and plan to change TF regimen.    INTERVENTIONS  Implementation  Collaboration and Referral of Nutrition care - Discussed plan for FEN/GI on rounds with Providers who approved RD to order changes EN per above recs and requested RN crush Vitamin C tablet very well to avoid clogging of 10 Fr FT.    Goals  Total ave nutrition intakes (EN) to provide minimum 100% of last known MREE (equiv to 38 kcal/kg/day) and 2 g PRO/kg/day (per 39 kg).     Monitoring/Evaluation  Progress toward goals will be monitored and evaluated per protocol.     Layla Hudson ,RD, LD, CNSC (pgr [phone])

## 2017-12-04 NOTE — PROGRESS NOTES
"Otolaryngology Progress Note  12/4/17      Interval events:  - Obtained neck ultrasound on 12/2 for neck swelling- revealed  \"3.9 x 4.3 x 2.2 cm  heterogeneous lesion, which would be consistent with a hematoma\". Discussed with Dr. George- no intervention for now  - WBC was rechecked yesterday and was 23.8 and decreased to 22 this morning. She remains afebrile  - Procalcitonin was elevated- 2.43. SICU began infectious workup with only rare yeast found in sputum so far.  - Remains on the vent- P5, FiO2- 40     S: Patient is much more alert this morning she waves hello as we enter the room. She appropriately nods to questions. Patient remains intubated and ventilated..       O: Temp: 99.3  F (37.4  C) Temp src: Axillary BP: 126/61   Heart Rate: 100 Resp: 22 SpO2: 100 % O2 Device: Mechanical Ventilator    General: Awake, nods to some questions. Intermittently agitated and attempts to hits while being examined, not following commands  HEENT: Oral tongue non-etedematous.Left neck incision with staples is intact- medial portion of the incision has a small dark area- stable from previous exams. Left  Lateral neck with stable swelling not-fluctuant, no overlying erythema or warmth. CAMACHO drain x 1 in the left neck holding bulb suction with serosanguinous drainage. 6-0 shiley cuffed sutured in place with trach ties around neck, thick tan secretions, cuff appropriately insufflated. There is a full thickness pressure ulceration with skin breakdown at the left inferior flange- improving slightly. Optiform Dressing in place. Patient has stable skin irritation to left neck from the trach ties.   Pulmonary: on ventilator via trach, FiO2 40%, PEEP 5.     Intake/Output Summary (Last 24 hours) at 12/04/17 0902  Last data filed at 12/04/17 0700   Gross per 24 hour   Intake             1342 ml   Output              615 ml   Net              727 ml        CAMACHO drain output(s): (last 24 hours)/(last shift)   Left neck 0/10/5 (15)      CBC " RESULTS:   Recent Labs   Lab Test  12/04/17   0322   WBC  22.0*   RBC  2.09*   HGB  7.2*   HCT  21.5*   MCV  103*   MCH  34.4*   MCHC  33.5   RDW  19.6*   PLT  282     Last Basic Metabolic Panel:  Lab Results   Component Value Date     12/04/2017     Lab Results   Component Value Date    POTASSIUM 3.4 12/04/2017     Lab Results   Component Value Date    CHLORIDE 112 12/04/2017     Lab Results   Component Value Date    SARAH 8.3 12/04/2017     Lab Results   Component Value Date    CO2 30 12/04/2017     Lab Results   Component Value Date    BUN 43 12/04/2017     Lab Results   Component Value Date    CR 0.77 12/04/2017     Lab Results   Component Value Date     12/04/2017        Micro:  11/24:  Blood cx: NGTD  Urine cx: No growth  Sputum cx: e. 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      Neuro:  - Pain: Tylenol PRN  - Sedation off  - Agitation- Haldol 5mg PRN  - Hx of significant Alcoholism:   - SICU managing  -Thiamine and folate, B12, multivitamin       HEENT:  - Incision cares: clean with 0.9% sodium chloride and apply Aquaphor Q8H   - Monitor and record CAMACHO drain output Qshift, Will remove drain later today.  - Peridex oral rinses 4 times daily   - Soft red rosales suction to oral cavity only - no yankeur  - Wound cares per WOC  - Recurrent left neck hematoma- does not appear infected at this time but difficult to be certain. Continue to monitor closely  - Continue haresh-tracheostomy and haresh-incisional wounds cares as recommended by WOCN. Please prop patient's head to the right to avoid constant pressure to the left neck  Trach Care Instructions:   - OK for RN to change  trach ties, must have one person hold trach flange in place at all times while changing ties  - ENT to perform first trach change once patient is off mechanical ventilation  - Perform regular suctioning   - RN should change disposable inner canulas every shift and PRN   - Keep trach tube obturator taped to wall behind the head of the bed   - Keep extra unopened 6-0 cuffed Shiley and 4-0 cuffed Shiley at bedside at all times   - Minimize the amount of air in the cuff needed to adequately apply positive pressure ventilate. If positive pressure ventilation is not need then the cuff should be down.   - Contact ENT on-call with any questions or concerns       Respiratory:  - Aspiration pneumonia: Intraop aspiration event on 11/23,  tracheal sputum culture growing e. Coli (see ID section below)  - patient currently ventilated managed by SICU  - Daily pressure support trials per ICU  - Hx COPD, no PTA inhalers/meds in EPIC      CV/heme:  - Closely monitoring hemodynamic status  - hypertensive on labetalol, prn hydralazine  - Acute blood loss anemia on chronic anemia: continue PTA Iron supplement. Hgb 7.2 today. Monitory closely transfuse per SICU  - Hx diastolic heart failure: Lasix dosing per ICU team  - TTE 11/24 showed EF of 60-65%      FEN/GI:  - NPO  - TFs via NJ, at goal.    - pantoprazole   -No bowel meds at this time due to diarrhea. Rectal tube in place. C-diff was negative  - Electrolyte replacement per protocol.   - GI/Hepatology consulted due to elevated liver enzymes and hyperbilirubinemia, appreciate recs.  Liver enzymes elevated.                         - Abdominal US 11/27 with cholelithiasis w/o evidence of cholecystitis, mild ascites                         - Considering diagnostic paracentesis, will defer to ICU       /Renal:  - Adequate UOP per nursing, incontinent   - Management of metabolic alkalosis per SICU team      Endo  - sliding scale for diabetic management. BG wnl      ID: Leukocytosis of  unclear etiology (wbc 22 (23.8)), procalcitonin 2.43  - Aspiration pneumonia: Sputum culture growing e.coli resistant to Zosyn. Ceftriaxone started 11/27.   - Panculture to identify source per SICU no source as of yet other than yeast in sputum.        PPX:  - SQ Heparin 5000 TID  - SCDs     --  Dr. George was updated     Sachin Jean Baptiste MD PGY 1  Otolaryngology- Head and Neck Surgery  Please contact ENT with questions by dialing [phone] and entering job code [phone] when prompted.

## 2017-12-04 NOTE — PROGRESS NOTES
Care Coordinator Progress Note     Admission Date/Time:  11/21/2017  Attending MD:  Heriberto George MD     Data  Chart reviewed, discussed with interdisciplinary team.   Patient was admitted for: Left partial glossectomy and left neck dissection.      Assessment  Pt is POD # 13 S/P partial glossectomy and neck dissection.  POD #11 following awake tracheostomy and neck exploration for left neck hematoma evacuation.    Pt remain in ICU vented via trach.  Pt will need LTAC placement once medically stable.  Per chart review and discussion with the team, pt is more alert today and nodes to questions.  The team have called pt dtr to update her and discuss about LTAC need.  CC will cont to f/u and will discuss about LTAC with pt dtr once informed by the team.      Plan  Anticipated Discharge Date:  TBD.  Anticipated Discharge Plan:   LTAC.  CC will cont to follow plan of care.      Jing Neely RN, PHN, BSN  4A and 4E/ ICU  Care Coordinator  Phone: [phone]  Pager: [phone]

## 2017-12-04 NOTE — PROGRESS NOTES
12/04/17 0948   Quick Adds   Type of Visit Initial PT Evaluation   Living Environment   Lives With alone   Self-Care   Activity/Exercise/Self-Care Comment Pt intubated via trach, confused and unable to state PLOF or living environment. No family present unable to obain PLOF. Information obtained from chart review.   Functional Level Prior   Ambulation 0-->independent   Transferring 0-->independent   Toileting 0-->independent   Bathing 0-->independent   Dressing 0-->independent   Eating 0-->independent   Communication 0-->understands/communicates without difficulty   Swallowing 0-->swallows foods/liquids without difficulty   Cognition 0 - no cognition issues reported   Fall history within last six months no   Which of the above functional risks had a recent onset or change? ambulation;transferring;toileting;bathing;dressing;eating;swallowing;cognition;communication/speech   General Information   Onset of Illness/Injury or Date of Surgery - Date 12/21/17   Referring Physician Hans Rizo MD   Pertinent History of Current Problem (include personal factors and/or comorbidities that impact the POC) Pt is a 62-year-old female with PMH alcohol use disorder, MDD, Breast Cancer, tongue cancer, alcoholic fatty liver, COPD, and diastolic heart failure now s/p left partial glossectomy with primary closure and left neck dissection for invasive SCC 11/21.  Admitted to SICU for assistance with ETOH withdrawal, possible need for ativan gtt, and respiratory monitoring. Underwent tracheostomy and evacuation of L neck hematoma 11/23.   Precautions/Limitations fall precautions;swallowing precautions   General Observations Pt intubated via trach, 40% FiO2, CPAP.    General Info Comments Absent dentition noted   Cognitive Status Examination   Orientation person   Level of Consciousness agitated;combative;confused;intubated   Follows Commands and Answers Questions able to follow single-step instructions;unable to answer questions  "  Personal Safety and Judgment impaired;other (Must comment)  (hand mitts donned)   Integumentary/Edema   Integumentary/Edema other (describe)   Integumentary/Edema Comments Pt with left lateral neck incision, intubated via trach   Posture    Posture Kyphosis   Range of Motion (ROM)   ROM Quick Adds No deficits were identified   Strength   Strength Comments BLE 2/5 throughout   Bed Mobility   Bed Mobility Comments Pt rolls with Mod A.    Transfer Skills   Transfer Comments Pt tx bed->chair with Dep A.   General Therapy Interventions   Planned Therapy Interventions bed mobility training;gait training;neuromuscular re-education;strengthening;transfer training;progressive activity/exercise   Clinical Impression   Criteria for Skilled Therapeutic Intervention yes, treatment indicated   PT Diagnosis Impaired Functional Mobility   Influenced by the following impairments decreased strength, activity intolerance, impaired cognition   Functional limitations due to impairments bed mob, transfers, gait   Clinical Presentation Evolving/Changing   Clinical Presentation Rationale clinical judgement   Clinical Decision Making (Complexity) Moderate complexity   Therapy Frequency` other (see comments)  (4x/week)   Predicted Duration of Therapy Intervention (days/wks) 12/15/17   Anticipated Discharge Disposition Other (see comments)  (LTACH)   Risk & Benefits of therapy have been explained Yes   Patient, Family & other staff in agreement with plan of care Yes   [facility] [employer] TM \"6 Clicks\"   2016, Trustees of [facility], under license to [employer].  All rights reserved.   6 Clicks Short Forms Basic Mobility Inpatient Short Form   [facility] AM-PAC  \"6 Clicks\" V.2 Basic Mobility Inpatient Short Form   1. Turning from your back to your side while in a flat bed without using bedrails? 2 - A Lot   2. Moving from lying on your back to sitting on the side of a flat bed without using bedrails? 2 - A Lot   3. " Moving to and from a bed to a chair (including a wheelchair)? 1 - Total   4. Standing up from a chair using your arms (e.g., wheelchair, or bedside chair)? 1 - Total   5. To walk in hospital room? 1 - Total   6. Climbing 3-5 steps with a railing? 1 - Total   Basic Mobility Raw Score (Score out of 24.Lower scores equate to lower levels of function) 8   Total Evaluation Time   Total Evaluation Time (Minutes) 10

## 2017-12-04 NOTE — PLAN OF CARE
Problem: Patient Care Overview  Goal: Plan of Care/Patient Progress Review  Outcome: Improving  Pt s/p left partial glossectomy with primary closure and left neck dissection for invasive SCC 11/21. Alert but very restless. PERRL. Moves all extremities spontaneously. Not following commands. Afebrile. BP stable. NSR with occasional PVCs. Vented on 4O% FiO2 via trach. Tachypneic (RR 20's-30's). Sating upper 90's. LS coarse throughout. Pressure supporting  10/5 since 0600, tolerating well. TF @ 40 cc/hr. Rectal tube with no stool output overnight. Incontinent of urine. CAMACHO with small output. K+ and magnesium replaced. Plan: see flow sheet for detailed assessments and intervention, continue to support POC.

## 2017-12-04 NOTE — PROGRESS NOTES
[facility]  WO Nurse Inpatient Adult Pressure INJURY (PI) Wound Assessment     Followup assessment of PU(s) on pt's:   Left Trach Site    Data:   Patient History:      per MD note(s):  62 year old female with a past medical history of alcohol abuse, diastolic heart failure, COPD, breast cancer s/p chemoradiation, and a left tongue SCC on 17 now POD#9 s/p left partial glossectomy with primary closure and left MRND and POD#7 following awake tracheostomy and neck expoloration for left neck hematoma evacuation.   WOC consult to assess pressure injury at left trach collar. Trach placed 17. Patient tends to hold head rigid toward left. Today, head is positioned so there is no pressure being applied to skin, parastomal skin superior and inferior with irritant moist dermatitis is slightly improved    Cy Assessment and sub scores:  Cy Score  Av.7  Min: 10  Max: 23         Positioning: Foam dressing Mepilex lite, Pillows and Z-Flow    Mattress:  Standard , isolibrium    Moisture Management:  Foam dressing        Current Diet / Nutrition:       Active Diet Order      NPO for Medical/Clinical Reasons Except for: Meds, Ice Chips    Tube Feeding:     Recent Labs   Lab Test  17   0322   17   0313   17   1323   ALBUMIN  2.0*   < >   --    --    --    HGB  7.2*   < >  8.1*   < >  8.9*   INR   --    --    --    --   1.10   WBC  22.0*   < >  22.4*   < >   --    A1C   --    --   Canceled, Test credited   --    --    CRP  68.0*   < >   --    --    --     < > = values in this interval not displayed.                                                                                                         Pressure Injury Assessment  (location #1):   Left Trach Site under Collar   Wound History:   Tracheostomy 17, patient holds head to left, rigid.    17 photo        Wound Base: 100% yellow tan slough ,  Full thickness, moist slough and dry scab laterally    Specific  Dimensions (length x width x depth, in cm) :   2 x 1 x 0.4 cm with a 1.5 x 1 x 0 cm scab lateral (no change in measurements today    Palpation of the wound bed:  normal    Slough appearance:  adherent, moist, shiney, tan and yellow    Eschar appearance:  none    Periwound Skin: edema, erythema, maceration and irritant dermatitis,      Color: red and weepy improving    Temperature  normal     Drainage:  moderate secretions from trach site and wound drainage         Odor: none    Pain:  Minimal with trach cares         Intervention:     Patient's chart evaluated.      Cy Interventions:  Current Cy Interventions and Care Plan reviewed and updated, appropriate at this time.    Wound was assessed.    Wound Care: was done: Removal of existing dressing    Visual inspection    Cleansing with NS solution    Application of clean dressing,    Orders updated    Supplies in room    Discussed plan of care with Nurse     Assisted RN to reposition head and attach trach tube morrison to relieve pressure to skin           Assessment:     Pressure Injury (PI) located on Left Trach Site: Unstageable    Status: wound  Follow up assessment, Symptomatic    No significant change with pressure injury, parastomal skin beginning to improve         Plan:     Nursing to notify the Provider(s) and re-consult the WOC Nurse if wound(s) deteriorate(s).  Plan of care for wound located on Trach Site: Change Faom dressing 4 times daily, Change Xerofoam dressing 1 x day. PRN if dressing is saturated. Clean skin with NS, Place Xerofoam dressing directly to skin, then Fenestrated Optifoam #[id number] dressing. Use Vent Tube Stabilizer to keep pressure off skin, use Z flow to prevent head from turn to left.    WOC Nurse will return: Thursday  Face to face time: 15 minutes

## 2017-12-05 NOTE — PROGRESS NOTES
SURGICAL ICU PROGRESS NOTE  December 5, 2017      CO-MORBIDITIES:   Acute post-operative pain  (primary encounter diagnosis)    ASSESSMENT: Alexia Flor is a 62-year-old female with PMH alcohol use disorder, MDD, Breast Cancer, tongue cancer, alcoholic fatty liver, COPD, and diastolic heart failure now s/p left partial glossectomy with primary closure and left neck dissection for invasive SCC 11/21.  Admitted to SICU for assistance with EtOH withdrawal, possible need for ativan gtt, and respiratory monitoring. Underwent tracheostomy and evacuation of L neck hematoma 11/23. Acutely bleeding with indurated left neck incision this morning - going emergently to the O.R. I&D with exploration.         CHANGES FOR TODAY :  - Return to OR for neck reexploration  - Increase free water  - 1 unit of PRBC. Follow up hemoglobin  - Repeat blood cultures x 2  - Start medium SSI      PLAN:  Neuro/ pain/ sedation:   #Acute postoperative pain   - Monitor neurological status. Notify the MD for any acute changes in exam.  - will reevaluate post procedure. Will be cautious with sedation      Pulmonary care:   #Acute respiratory failure  - CMV/AC: 18/300/5/40  - Continue PS trials. Able to trach dome for several hours 12/4. Will currently hold those efforts pending OR.   - Ceftriaxone x 7d For HCAP (start date 11/27)    Cardiovascular:    #Hypertension  - Monitor hemodynamic status. Hypertensive overnight.   - PRN anti-hypertensives  - Will reevaluate post op      GI care:   # Hyperbilirubinemia:   #Transaminitis  #Alcoholic fatty liver disease  - Bilirubin 1.8   - AST/ALT elevated, likely representing critical illness and underlying alcoholic fatty liver disease. Trending down  - Hepatology consult, appreciate recommendations. Patient will need follow up with hepatology upon discharge.   - abdominal US w/ dopplers -- cholelithiasis w/o evidence of cholecystitis. Mild ascites present.      Fluids/ Electrolytes/ Nutrition:   #Severe  Protein Calorie Malnutrition  #Hypernatremia  - NJ placed. TF@ goal   - TKO IVF, PRN IV boluses   - Improving hypernatremia: increase FWF to 50 ml/hr  - On electrolyte replacement protocol     Renal/ Fluid Balance:    - Urine output is adequate so far, no mckinney in place. Replaced this am given trip to OR.   - Will continue to monitor intake and output.      Endocrine:    - Glucose up to 165  - Start medium SSI      ID/ Antibiotics:  #VAP  - E coli  sputum from 11/24. Resistant to Zosyn, Ampicillin and FQs.   - Dc Zosyn, start Ceftriaxone x 7d (ended 12/3)  - C Diff PCR neg 12/1  - WBC 22-->29.3. Likely related to surgical site process.   - 12/3 BC x 1 (from PICC) with gram positive cocci in clusters. Positive Verigine. Likely contaminant. Will repeat BC x 2 today.       Heme:     #Anemia of critical illness  #Acute blood loss anemia  - no need for transfusion. Hemoglobin down to 6.3. Given 1 unit of PRBC. Bleeding at surgical site present this am.       Prophylaxis:    - Mechanical prophylaxis for DVT.   - Sq heparin for DVT ppx- on hold given OR.   - Protonix 40 mg qD for GI ppx (PTA)      Lines/ tubes/ drains:  - PICC  - NJ  - Trach      Disposition:  - Surgical ICU.       Patient seen and discussed with surgical ICU staff , Dr. Aly Colon   CC time 35 minutes  Disposition: SICU  Primary Team: ENT  Consultants: ENT        ====================================    TODAY'S PROGRESS:   SUBJECTIVE:   Nursing reports agitation overnight. Incontinent of urine. Bleeding around trach noted and induration of left neck. ENT called.Patient returned to OR. Patient unable to give ROS.       OBJECTIVE:   1. VITAL SIGNS:   Temp:  [98.6  F (37  C)-100  F (37.8  C)] 98.8  F (37.1  C)  Pulse:  [96] 96  Heart Rate:  [] 95  Resp:  [13-39] 34  BP: (120-168)/(50-97) 159/87  MAP:  [75 mmHg-121 mmHg] 108 mmHg  Arterial Line BP: (118-168)/(48-83) 152/73  FiO2 (%):  [40 %] 40 %  SpO2:  [97 %-100 %] 100  %  Ventilation Mode: CPAP/PS  FiO2 (%): 40 %  Rate Set (breaths/minute): 18 breaths/min  Tidal Volume Set (mL): 300 mL  PEEP (cm H2O): 5 cmH2O  Pressure Support (cm H2O): 7 cmH2O  Oxygen Concentration (%): 40 %  Resp: 34    2. INTAKE/ OUTPUT:   I/O last 3 completed shifts:  In: 1745 [I.V.:20; NG/GT:730]  Out: 306 [Drains:6; Stool:300]    3. PHYSICAL EXAMINATION:   General: trached, moderate distress  Neuro: Awake, tracking, moving all extremities, agitated  Resp: Breathing non-labored, coarse lung sounds throughout. Trach secured, sanguinous output around trach. Also tracheal blood tinge secretions suctioned via closed system.   CV: RRR,   Abdomen: Soft, Non-distended, Non-tender.   Incisions: Left neck indurated, red extending to the mandibular ridge. Sanguinous output around trach.   Extremities: warm and well perfused. Pulses palpable.     4. INVESTIGATIONS:   Arterial Blood Gases     Recent Labs  Lab 12/04/17 0322 12/03/17 0425 12/02/17 0332 12/01/17  0825   PH 7.46* 7.49* 7.41 7.42   PCO2 41 40 41 42   PO2 109* 101 81 61*   HCO3 29* 31* 26 27     Complete Blood Count     Recent Labs  Lab 12/05/17 0337 12/04/17 0322 12/03/17  0425 12/02/17  1424   WBC 29.3* 22.0* 23.8* 29.9*   HGB 6.3* 7.2* 8.0* 8.2*    282 288 233     Basic Metabolic Panel    Recent Labs  Lab 12/05/17  0337 12/04/17 0322 12/03/17  1232 12/03/17  0425 12/02/17  0332   * 148*  --  144 144   POTASSIUM 3.6 3.4 3.9 3.2* 3.5   CHLORIDE 114* 112*  --  108 110*   CO2 26 30  --  30 28   BUN 37* 43*  --  41* 35*   CR 0.78 0.77  --  0.80 0.79   * 106*  --  133* 136*     Liver Function Tests    Recent Labs  Lab 12/04/17 0322 12/03/17  0425 12/02/17  0332 12/01/17  0336   * 136* 124* 108*   ALT 75* 75* 63* 53*   ALKPHOS 480* 502* 443* 286*   BILITOTAL 1.6* 1.8* 2.6* 2.2*   ALBUMIN 2.0* 2.1* 2.2* 2.0*     Pancreatic Enzymes  No lab results found in last 7 days.  Coagulation Profile  No lab results found in last 7  days.  Lactate  Invalid input(s): LACTATE    5. RADIOLOGY:   Recent Results (from the past 24 hour(s))   XR Chest Port 1 View    Narrative    XR CHEST PORT 1 VW, 12/5/2017 2:25 AM.    Comparison: 12/4/2017.    History: Interval imaging; .    Findings:   Left PICC tip mid SVC. Tracheostomy in the high intrathoracic trachea.  Feeding tube coursing toward stomach tip not visualized. Cardiac  silhouette is within normal limits. No pneumothorax. Unchanged small  bilateral pleural effusion. Diffuse unchanged interstitial opacities.  Surgical clips over the right chest. Surgical clips and staples over  the neck.      Impression    Impression:   1. Unchanged small bilateral pleural effusions.  2. Unchanged diffuse interstitial opacities may represent pulmonary  edema or atypical infection.  3. Stable support devices.    I have personally reviewed the examination and initial interpretation  and I agree with the findings.    SHAWNA OTT MD       =========================================

## 2017-12-05 NOTE — ANESTHESIA PREPROCEDURE EVALUATION
"                      Anesthesia Plan      History & Physical Review  History and physical reviewed and following examination; no interval change.    ASA Status:  3 emergent.    NPO Status:  Waived due to emergency    Plan for General and Trach with Intravenous induction. Maintenance will be Balanced.           Postoperative Care      Consents           Allergies   Allergen Reactions     Codeine Anaphylaxis     Contrast Dye Itching and Swelling     7/10/2009 Patient reports history of contrast reaction when first diagnosed with breast cancer. \"I almost .\"   (Hives, swelling.) 2009 CT to be done without IV contrast per Dr. KAVIN Live     Moxifloxacin Anaphylaxis     Avelox     Nickel Itching and Rash     Codeine Sulfate Other (See Comments)     shaking     Gabapentin Other (See Comments)     Patient reports that she got very shaky and she \"felt like she was going to die\"     Prescription Medications as of 2017             potassium chloride (MICRO-K) 10 MEQ CR capsule TAKE 2 CAPSULES BY MOUTH DAILY    oxyCODONE IR (ROXICODONE) 5 MG tablet Take 1-2 tablets (5-10 mg) by mouth every 4 hours as needed for moderate to severe pain    IBUPROFEN PO Take 1 tablet by mouth At Bedtime    acetaminophen (TYLENOL) 325 MG tablet Take 2 tablets (650 mg) by mouth every 4 hours as needed for other (mild pain)    B-12 TR 1000 MCG TBCR TAKE 1 TABLET BY MOUTH DAILY    chlordiazePOXIDE (LIBRIUM) 25 MG capsule Take 50mg twice daily for 2 days, then take 25mg bid for days 3-5    VITAMIN B-1 100 MG tablet TAKE 1 TABLET(100 MG) BY MOUTH DAILY    MAGIC MOUTHWASH, ENTER INGREDIENTS IN COMMENTS, Take 10 mLs by mouth every 4 hours as needed    LORazepam (ATIVAN) 0.5 MG tablet Take 1 tablet (0.5 mg) by mouth every 8 hours as needed (for withdrawl)    pantoprazole (PROTONIX) 40 MG EC tablet TAKE 1 TABLET(40 MG) BY MOUTH EVERY 12 HOURS    cetirizine (ZYRTEC) 10 MG tablet TAKE 1 TABLET BY MOUTH EVERY EVENING    furosemide (LASIX) 20 MG " tablet TAKE 1 TABLET(20 MG) BY MOUTH EVERY MORNING    ferrous gluconate (FERGON) 324 (38 FE) MG tablet TAKE 1 TABLET(324 MG) BY MOUTH DAILY WITH BREAKFAST    ferrous sulfate (IRON) 325 (65 FE) MG tablet Take 1 tablet (325 mg) by mouth 2 times daily    magnesium oxide (MAG-OX) 400 MG tablet Take 1 tablet (400 mg) by mouth 3 times daily    folic acid (FOLVITE) 400 MCG tablet Take 2.5 tablets (1 mg) by mouth daily    omeprazole (PRILOSEC) 40 MG capsule TAKE 1 CAPSULE(40 MG) BY MOUTH DAILY      Facility Administered Medications as of 12/5/2017             bacitracin 50,000 Units in 1L NS IRRIGATION Irrigate with as directed once    ceFAZolin (ANCEF) 1 g vial to attach to  ml bag for ADULT or 50 ml bag for PEDS as needed    rocuronium (ZEMURON) injection as needed    fentaNYL (PF) (SUBLIMAZE) injection as needed for moderate to severe pain    0.9% sodium chloride infusion continuous prn    albumin human 5 % injection continuous prn for other    lactated ringers infusion continuous prn    phenylephrine (VANE-SYNEPHRINE) injection 1 mg 1 mg continuous prn    ePHEDrine injection as needed    sugammadex (BRIDION) injection as needed    lidocaine 2%-EPINEPHrine 1:100,000 injection as needed    fiber modular (NUTRISOURCE FIBER) packet 1 packet (Auto Hold) ((Auto Hold) since 12/5/2017  8:08 AM) 1 packet by Per Feeding Tube route every 4 hours    ascorbic acid (VITAMIN C) tablet 500 mg (Auto Hold) ((Auto Hold) since 12/5/2017  8:08 AM) 1 tablet (500 mg) by Oral or Feeding Tube route daily    albuterol (PROAIR HFA/PROVENTIL HFA/VENTOLIN HFA) Inhaler 2 puff (Auto Hold) ((Auto Hold) since 12/5/2017  8:08 AM) Inhale 2 puffs into the lungs 4 times daily    albuterol (PROAIR HFA/PROVENTIL HFA/VENTOLIN HFA) Inhaler 2 puff (Auto Hold) ((Auto Hold) since 12/5/2017  8:08 AM) Inhale 2 puffs into the lungs 4 times daily as needed for other (dyspnea)    metoprolol (LOPRESSOR) suspension 25 mg (Auto Hold) ((Auto Hold) since 12/5/2017   8:08 AM) Take 2.5 mLs (25 mg) by mouth 2 times daily    labetalol (NORMODYNE/TRANDATE) injection 20 mg (Auto Hold) ((Auto Hold) since 12/5/2017  8:08 AM) Inject 4 mLs (20 mg) into the vein every 4 hours as needed for high blood pressure (SBP > 160)    hydrALAZINE (APRESOLINE) injection 10 mg (Auto Hold) ((Auto Hold) since 12/5/2017  8:08 AM) Inject 0.5 mLs (10 mg) into the vein every 4 hours as needed for high blood pressure (SBP>160)    haloperidol lactate (HALDOL) injection 5 mg (Auto Hold) ((Auto Hold) since 12/5/2017  8:08 AM) Inject 1 mL (5 mg) into the vein every 6 hours as needed for agitation    polyethylene glycol 0.4%- propylene glycol 0.3% (SYSTANE ULTRA) ophthalmic solution 2 drop (Auto Hold) ((Auto Hold) since 12/5/2017  8:08 AM) Place 2 drops into both eyes every hour as needed for dry eyes    potassium chloride SA (K-DUR/KLOR-CON M) CR tablet 20-40 mEq (Auto Hold) ((Auto Hold) since 12/5/2017  8:08 AM) Take 2-4 tablets (20-40 mEq) by mouth every 2 hours as needed for potassium supplementation    potassium chloride (KLOR-CON) Packet 20-40 mEq (Auto Hold) ((Auto Hold) since 12/5/2017  8:08 AM) 20-40 mEq by Oral or Feeding Tube route every 2 hours as needed for potassium supplementation    potassium chloride 10 mEq in 100 mL sterile water intermittent infusion (premix) (Auto Hold) ((Auto Hold) since 12/5/2017  8:08 AM) Inject 100 mLs (10 mEq) into the vein every hour as needed for potassium supplementation    potassium chloride 10 mEq in 100 mL intermittent infusion with 10 mg lidocaine (Auto Hold) ((Auto Hold) since 12/5/2017  8:08 AM) Inject 100 mLs (10 mEq) into the vein every hour as needed for potassium supplementation    magnesium sulfate 2 g in NS intermittent infusion (PharMEDium or FV Cmpd) (Auto Hold) ((Auto Hold) since 12/5/2017  8:08 AM) Inject 50 mLs (2 g) into the vein daily as needed for magnesium supplementation    magnesium sulfate 4 g in 100 mL sterile water (premade) (Auto Hold) ((Auto  "Hold) since 12/5/2017  8:08 AM) Inject 100 mLs (4 g) into the vein every 4 hours as needed for magnesium supplementation    potassium phosphate 10 mmol in D5W 250 mL intermittent infusion (Auto Hold) ((Auto Hold) since 12/5/2017  8:08 AM) Inject 10 mmol into the vein daily as needed for phosphorous supplementation    potassium phosphate 15 mmol in D5W 250 mL intermittent infusion (Auto Hold) ((Auto Hold) since 12/5/2017  8:08 AM) Inject 15 mmol into the vein daily as needed for phosphorous supplementation    potassium phosphate 20 mmol in D5W 500 mL intermittent infusion (Auto Hold) ((Auto Hold) since 12/5/2017  8:08 AM) Inject 20 mmol into the vein every 6 hours as needed for phosphorous supplementation    potassium phosphate 20 mmol in D5W 250 mL intermittent infusion (Auto Hold) ((Auto Hold) since 12/5/2017  8:08 AM) Inject 20 mmol into the vein every 6 hours as needed for phosphorous supplementation    potassium phosphate 25 mmol in D5W 500 mL intermittent infusion (Auto Hold) ((Auto Hold) since 12/5/2017  8:08 AM) Inject 25 mmol into the vein every 8 hours as needed for phosphorous supplementation    heparin sodium PF injection 5,000 Units (Discontinued) Inject 0.5 mLs (5,000 Units) Subcutaneous every 8 hours    lidocaine (LMX4) kit (Auto Hold) ((Auto Hold) since 12/5/2017  8:08 AM) Apply topically once as needed for moderate pain (for local anesthetic during PICC insertion)    heparin lock flush 10 UNIT/ML injection 2-5 mL (Auto Hold) ((Auto Hold) since 12/5/2017  8:08 AM) 2-5 mLs by Intracatheter route once as needed for line flush (for locking each dormant lumen with line placement)    acetaminophen (TYLENOL) tablet 650 mg (Auto Hold) ((Auto Hold) since 12/5/2017  8:08 AM) 2 tablets (650 mg) by Oral or Feeding Tube route every 8 hours as needed for mild pain    Linked Group 1:  \"Or\" Linked Group Details     acetaminophen (TYLENOL) Suppository 650 mg (Auto Hold) ((Auto Hold) since 12/5/2017  8:08 AM) Place 1 " "suppository (650 mg) rectally every 8 hours as needed for mild pain    Linked Group 1:  \"Or\" Linked Group Details     pantoprazole (PROTONIX) suspension 40 mg (Auto Hold) ((Auto Hold) since 12/5/2017  8:08 AM) 20 mLs (40 mg) by Oral or Feeding Tube route 2 times daily (before meals)    mineral oil-hydrophilic petrolatum (AQUAPHOR) (Auto Hold) ((Auto Hold) since 12/5/2017  8:08 AM) Apply topically every 8 hours    multivitamins with minerals (CERTAVITE/CEROVITE) liquid 15 mL (Auto Hold) ((Auto Hold) since 12/5/2017  8:08 AM) 15 mLs by Per Feeding Tube route daily    cyanocobalamin (vitamin  B-12) tablet 1,000 mcg (Auto Hold) ((Auto Hold) since 12/5/2017  8:08 AM) 1 tablet (1,000 mcg) by Oral or NG Tube route daily    folic acid (FOLVITE) tablet 1 mg (Auto Hold) ((Auto Hold) since 12/5/2017  8:08 AM) 1 tablet (1 mg) by Oral or Feeding Tube route daily    bisacodyl (DULCOLAX) Suppository 10 mg (Auto Hold) ((Auto Hold) since 12/5/2017  8:08 AM) Place 1 suppository (10 mg) rectally daily as needed for constipation    polyethylene glycol (MIRALAX/GLYCOLAX) Packet 17 g (Auto Hold) ((Auto Hold) since 12/5/2017  8:08 AM) Take 17 g by mouth daily as needed for constipation    senna-docusate (SENOKOT-S;PERICOLACE) 8.6-50 MG per tablet 1 tablet (Auto Hold) ((Auto Hold) since 12/5/2017  8:08 AM) Take 1 tablet by mouth 2 times daily as needed for constipation    Linked Group 2:  \"Or\" Linked Group Details     senna-docusate (SENOKOT-S;PERICOLACE) 8.6-50 MG per tablet 2 tablet (Auto Hold) ((Auto Hold) since 12/5/2017  8:08 AM) Take 2 tablets by mouth 2 times daily as needed for constipation    Linked Group 2:  \"Or\" Linked Group Details     chlorhexidine (PERIDEX) 0.12 % solution 15 mL (Auto Hold) ((Auto Hold) since 12/5/2017  8:08 AM) Swish and spit 15 mLs in mouth 4 times daily    glucose 40 % gel 15-30 g (Auto Hold) ((Auto Hold) since 12/5/2017  8:08 AM) Take 15-30 g by mouth every 15 minutes as needed for low blood sugar    " "Linked Group 3:  \"Or\" Linked Group Details     dextrose 50 % injection 25-50 mL (Auto Hold) ((Auto Hold) since 12/5/2017  8:08 AM) Inject 25-50 mLs into the vein every 15 minutes as needed for low blood sugar    Linked Group 3:  \"Or\" Linked Group Details     glucagon injection 1 mg (Auto Hold) ((Auto Hold) since 12/5/2017  8:08 AM) Inject 1 mg Subcutaneous every 15 minutes as needed for low blood sugar (May repeat x 1 only)    Linked Group 3:  \"Or\" Linked Group Details     dextrose 10 % 1,000 mL infusion Inject into the vein continuous prn (Hypoglycemia prevention)    thiamine tablet 100 mg (Auto Hold) ((Auto Hold) since 12/5/2017  8:08 AM) 1 tablet (100 mg) by Oral or Feeding Tube route daily    lidocaine 1 % 1 mL (Auto Hold) ((Auto Hold) since 12/5/2017  8:08 AM) 1 mL by Other route every hour as needed (mild pain with VAD insertion or accessing implanted port)    lidocaine (LMX4) kit (Auto Hold) ((Auto Hold) since 12/5/2017  8:08 AM) Apply topically every hour as needed for pain (with VAD insertion or accessing implanted port.)    sodium chloride (PF) 0.9% PF flush 3 mL (Auto Hold) ((Auto Hold) since 12/5/2017  8:08 AM) 3 mLs by Intracatheter route every hour as needed for line flush (for peripheral IV flush post IV meds)    sodium chloride (PF) 0.9% PF flush 3 mL (Auto Hold) ((Auto Hold) since 12/5/2017  8:08 AM) 3 mLs by Intracatheter route every 8 hours    ondansetron (ZOFRAN) injection 4 mg (Auto Hold) ((Auto Hold) since 12/5/2017  8:08 AM) Inject 2 mLs (4 mg) into the vein every 6 hours as needed for nausea or vomiting    Linked Group 4:  \"Or\" Linked Group Details     naloxone (NARCAN) injection 0.1-0.4 mg (Auto Hold) ((Auto Hold) since 12/5/2017  8:08 AM) Inject 0.25-1 mLs (0.1-0.4 mg) into the vein every 2 minutes as needed for opioid reversal      No results found for this or any previous visit (from the past 4320 hour(s)).  PAST MEDICAL HISTORY:   Past Medical History:   Diagnosis Date     Alcohol abuse  "     Alcoholic hepatitis      Alcoholic pancreatitis      Anxiety      Schafer's esophagus      Breast cancer (H)     rt, s/p radiation.     Chemical dependency (H)      Congestive heart failure, unspecified      COPD (chronic obstructive pulmonary disease) (H)      Depressive disorder      History of radiation therapy      Hoarseness      Hypokalemia      Macrocytic anemia      Non-adherence to medical treatment      Tobacco abuse      Tongue cancer (H)        PAST SURGICAL HISTORY:   Past Surgical History:   Procedure Laterality Date     DISSECTION RADICAL NECK Left 11/21/2017    Procedure: DISSECTION RADICAL NECK;;  Surgeon: Heriberto George MD;  Location: [facility] OR     ESOPHAGOSCOPY, GASTROSCOPY, DUODENOSCOPY (EGD), COMBINED N/A 11/9/2017    Procedure: COMBINED ENDOSCOPIC ULTRASOUND, ESOPHAGOSCOPY, GASTROSCOPY, DUODENOSCOPY (EGD), FINE NEEDLE ASPIRATE/BIOPSY;;  Surgeon: oY Bhat MD;  Location: [facility] OR     EXPLORE NECK Left 11/23/2017    Procedure: EXPLORE NECK;  left Neck Exploration, tracheostomy , placement of nasogastric feeding tube;  Surgeon: Daisy Singh MD;  Location: [facility] OR     GLOSSECTOMY PARTIAL N/A 11/21/2017    Procedure: GLOSSECTOMY PARTIAL;  Left Partial Glossectomy And Left Neck Dissection, ;  Surgeon: Heriberto George MD;  Location: [facility] OR     LARYNGOSCOPY WITH BIOPSY(IES) N/A 11/9/2017    Procedure: LARYNGOSCOPY WITH BIOPSY(IES);  Direct Laryngoscopy,  Upper Endoscopic Ultrasound and Fine Needle Aspiration;  Surgeon: Heriberto George MD;  Location: [facility] OR     lumpectomy Rt breast  2006       TRACHEOSTOMY  11/23/2017    Procedure: TRACHEOSTOMY;;  Surgeon: Daisy Singh MD;  Location: [facility] OR       FAMILY HISTORY:   Family History   Problem Relation Age of Onset     Coronary Artery Disease Father      Emphysema Father      CANCER Mother      renal cancer     Breast Cancer Maternal Grandmother      CANCER Maternal Grandmother      Substance Abuse Maternal  Grandfather      CANCER Maternal Grandfather      Substance Abuse Cousin      CANCER Cousin      Substance Abuse Brother      DIABETES Brother        SOCIAL HISTORY:   Social History   Substance Use Topics     Smoking status: Heavy Tobacco Smoker     Packs/day: 0.50     Years: 40.00     Types: Cigarettes     Smokeless tobacco: Current User     Alcohol use 0.5 oz/week      Comment: 1/2 liter per day     Lab Results   Component Value Date    WBC 26.3 (H) 12/05/2017    HGB 7.1 (L) 12/05/2017    HCT 21.9 (L) 12/05/2017     12/05/2017    CHOL 269 (A) 04/30/2015    TRIG 235 04/30/2015    HDL 78 04/30/2015    ALT 54 (H) 12/05/2017    AST 51 (H) 12/05/2017     (H) 12/05/2017    BUN 40 (H) 12/05/2017    CO2 25 12/05/2017    TSH 3.87 07/19/2017    INR 1.08 12/05/2017     Prescriptions Prior to Admission   Medication Sig Dispense Refill Last Dose     IBUPROFEN PO Take 1 tablet by mouth At Bedtime   11/20/2017 at 2000     B-12 TR 1000 MCG TBCR TAKE 1 TABLET BY MOUTH DAILY 30 tablet 0 11/20/2017 at 0800     VITAMIN B-1 100 MG tablet TAKE 1 TABLET(100 MG) BY MOUTH DAILY 30 tablet 0 11/20/2017 at 0800     cetirizine (ZYRTEC) 10 MG tablet TAKE 1 TABLET BY MOUTH EVERY EVENING 14 tablet 5 11/20/2017 at 2000     furosemide (LASIX) 20 MG tablet TAKE 1 TABLET(20 MG) BY MOUTH EVERY MORNING 30 tablet 10 Past Week at Unknown time     ferrous gluconate (FERGON) 324 (38 FE) MG tablet TAKE 1 TABLET(324 MG) BY MOUTH DAILY WITH BREAKFAST 100 tablet 0 Past Week at Unknown time     ferrous sulfate (IRON) 325 (65 FE) MG tablet Take 1 tablet (325 mg) by mouth 2 times daily 1 tablet 0 Past Week at Unknown time     magnesium oxide (MAG-OX) 400 MG tablet Take 1 tablet (400 mg) by mouth 3 times daily 90 tablet 3 11/20/2017 at 0800     folic acid (FOLVITE) 400 MCG tablet Take 2.5 tablets (1 mg) by mouth daily 75 tablet 3 11/20/2017 at 2000     omeprazole (PRILOSEC) 40 MG capsule TAKE 1 CAPSULE(40 MG) BY MOUTH DAILY 30 capsule 11 11/20/2017  at 0800     acetaminophen (TYLENOL) 325 MG tablet Take 2 tablets (650 mg) by mouth every 4 hours as needed for other (mild pain) 25 tablet 0 More than a month at Unknown time     chlordiazePOXIDE (LIBRIUM) 25 MG capsule Take 50mg twice daily for 2 days, then take 25mg bid for days 3-5 25 capsule 0 Unknown at Unknown time     MAGIC MOUTHWASH, ENTER INGREDIENTS IN COMMENTS, Take 10 mLs by mouth every 4 hours as needed 480 mL 1 More than a month at Unknown time     LORazepam (ATIVAN) 0.5 MG tablet Take 1 tablet (0.5 mg) by mouth every 8 hours as needed (for withdrawl) 20 tablet 0 More than a month at Unknown time     pantoprazole (PROTONIX) 40 MG EC tablet TAKE 1 TABLET(40 MG) BY MOUTH EVERY 12 HOURS 60 tablet 0 More than a month at Unknown time                     .

## 2017-12-05 NOTE — PROGRESS NOTES
ENT brief progress note  12/5/17    S: patient was noted to have swelling of the neck first on Friday afternoon. This has been stable over the course of the past 4 days. ENT was paged ASAP to come to patients room this morning for increased neck swelling and bleeding from the neck incision site.     O: Temp: 98.8  F (37.1  C) Temp src: Oral BP: 159/87 Pulse: 96 Heart Rate: 95 Resp: (!) 34 SpO2: 100 % O2 Device: Mechanical Ventilator Oxygen Delivery: Other (Comments) (40LPM)  Gen: Patient intubated  HEENT: Trach tube in place with slightly improved ulcer over the left neck near the trach site. Neck swelling acutely increasing with bright red bleeding.    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    -Plan for left neck exploration and hematoma evacuation.

## 2017-12-05 NOTE — PLAN OF CARE
Problem: Patient Care Overview  Goal: Plan of Care/Patient Progress Review  OT4A: CX: Pt in OR this AM, will check back tomorrow.

## 2017-12-05 NOTE — PROVIDER NOTIFICATION
D: Ms. Flor had a hard hematoma at 0730 in left incisional neck that started spontaneously bleeding. Tongue was swollen and deviated to the right. Vital signs stable  I: Pressure held, ENT and SICU notified. Patient urgently brought to OR with anesthesia  A/ P: To OR for neck exploration

## 2017-12-05 NOTE — PLAN OF CARE
Problem: Patient Care Overview  Goal: Plan of Care/Patient Progress Review  Discharge Planner PT 4A  Patient plan for discharge: Unable to State  Current status: Pt performed rolling in bed with Mod A x 2. Pt tx bed->chair with Dep A.   Barriers to return to prior living situation: Pt is well below baseline level of function to live alone.   Recommendations for discharge: LTACH  Rationale for recommendations: Pt is well below baseline level of function, demonstrates skilled need for PT, OT and SLP services.       Entered by: Layla Todd 12/04/2017 6:02 PM

## 2017-12-05 NOTE — ANESTHESIA CARE TRANSFER NOTE
Patient: Alexia Hernandez [facility]    Procedure(s):  Washout Left Neck Hematoma , excision of skin    - Wound Class: II-Clean Contaminated    Diagnosis: Left Neck Hematoma   Diagnosis Additional Information: No value filed.    Anesthesia Type:   No value filed.     Note:  Airway :Ventilator and Tracheostomy  Patient transferred to:ICU  Comments: Placed on ventilator upon arrival. Tolerated procedure and anesthesia well.ICU Handoff: Call for PAUSE to initiate/utilize ICU HANDOFF, Identified Patient, Identified Responsible Provider, Reviewed the Pertinent Medical History, Discussed Surgical Course, Reviewed Intra-OP Anesthesia Management and Issues during Anesthesia, Set Expectations for Post Procedure Period and Allowed Opportunity for Questions and Acknowledgement of Understanding      Vitals: (Last set prior to Anesthesia Care Transfer)    CRNA VITALS  12/5/2017 1033 - 12/5/2017 1116      12/5/2017             EKG: Sinus rhythm                Electronically Signed By: ANA LAURA Sherman CRNA  December 5, 2017  11:16 AM

## 2017-12-05 NOTE — PLAN OF CARE
Problem: Patient Care Overview  Goal: Plan of Care/Patient Progress Review  Outcome: No Change  Events: no acute changes on day shift. Art line removed. Trach domed from 8222-2254 then flipped back to CMV d/t tachypnea/wheezing.     Assessment: neuro- combative, but responds to yes/no questions appropriately. PERRLA. Follows commands. Haldol once for agitation. CV- HTN, labetalol. SR to ST with PVC's. Tmax 100- MD aware. +2 pulses. Resp- coarse/wheezy/dim in bases. PS from 9184-9754. Tolerating well. IC changed at 1200. Tracheal ulcer unchanged. GI/- TF's at goal of 40/hr with flushed increased to 60 Q4H. Rectal tube in place. auop- incontinent of urine x5. CAMACHO removed per ENT.   Activity: in chair this am for 2 hours, refused to get back up. Turns Q2H.     Plan: watch neck ulcers, follow cultures, watch for temps, continue per plan of care.

## 2017-12-05 NOTE — PLAN OF CARE
Problem: Patient Care Overview  Goal: Plan of Care/Patient Progress Review  Outcome: No Change  D/I:?Patient on unit 4A Surgical/Neuro ICU for partial glossectomy and evac of hematoma  Neuro- Confused, PERRL, moves all extremities  CV- NSR with HR in 90s  Pulm- Trach , pressure support since 1345, FIO2 40%, 300, RR 18, PEEP 5, O2 high 90s, blood suctioned out of ET tube, diffuse oozing around incisional site  GI- Rectal tube with low output, protruding and gaping rectum, lacks rectal tone, tube feeds at 45/ hour with flush of 60 ml free water/ hour  - Bell placed prior to surgery, producing 150 mL/ hour  Gtts- None  Skin- Various bruises  IV's/Drains- PICC in left arm, new CAMACHO placed in OR, PIV in right hand  Incision- Stable left neck incisions after hematoma evac at 0900  Pain- CPOT- no pain  See flow sheets for further interventions and assessments.   A: Stable   P:?Continue to monitor pt closely. Notify MD of significant changes.

## 2017-12-05 NOTE — PLAN OF CARE
Problem: Patient Care Overview  Goal: Plan of Care/Patient Progress Review  PT-4A- Cancel, pt to emergent OR for exploration and possible hematoma evacuation.

## 2017-12-05 NOTE — PLAN OF CARE
"Problem: Patient Care Overview  Goal: Plan of Care/Patient Progress Review  Outcome: No Change              Progress Note      D/A:Alexia Flor is a 62-year-old female with PMH alcohol use disorder, MDD, Breast Cancer, tongue cancer, alcoholic fatty liver, COPD, and diastolic heart failure now s/p left partial glossectomy with primary closure and left neck dissection for invasive SCC 11/21.  Admitted to SICU for assistance with EtOH withdrawal, possible need for ativan gtt, and respiratory monitoring. Underwent tracheostomy and evacuation of L neck hematoma 11/23    Blood pressure 139/69, pulse 96, temperature 98.8  F (37.1  C), temperature source Oral, resp. rate (!) 32, height 1.55 m (5' 1.02\"), weight 41.6 kg (91 lb 12.8 oz), SpO2 100 %, not currently breastfeeding.    Neuro: When pt wants to follow commands she will do so, otherwise pt is trying to hit staff while trying to do cares for the pt, she is alert but dose seem confused in that she is able to use the bedpan but chooses not to  CV: NSR to ST with PVCs noted, medications given to keep SBP <160  Pulm: LS are course/diminished with creamy/thin inline trach secretions, trach cares done at 2000/0000, Shiley #6 in place on CMV settings currently 40%/18/300/5   GI: TF increased to 45 with 60cc q4hr, rectal tube in place with liquid/brown stool, abd rounded/distened  : pt incontinent of large amounts of urine clear/pale/yellow/odorious with labia edema +2  Skin: dressing changed in neck at 0000  Lines: Left PICC, NG, trach, rectal tube  Other: Pt was given 1 unit PRBC with AM lab hgb 6.3, electrolytes replaced as needed, pt positive for staph in PICC line (MD aware), pt tongue at 0000 noted to be drooping out the Right side (MD assessed and aware)      R: Pt did not sleep at all during the night, dose not complain of pain  P: Continue with POC and alert SICU/ENT if any acute changes arise                  "

## 2017-12-05 NOTE — PROGRESS NOTES
Brief WOC Note 12/5:  ASSESSMENT:  Saw patient briefly after returning from the OR.  Incisions on neck clean and dry.  Previous pressure injury within one surgical injury, necrotic tissue cleaned off during surgery and wound is clean and granular, approximately 1cm in diameter.    INTERVENTIONS:  Discontinued xerofoam and just use Optifoam dressing under trach faceplate, changed daily and PRN.    PLAN:  WOC revisit later this week for thorough assessment.  Veronica Nance MS, APRN, CNS, CWOCN, CFCN

## 2017-12-05 NOTE — OP NOTE
DATE OF SURGERY:  12/05/2017      SURGEON:  Stella Hernadez MD      RESIDENT:  Thien Wong MD      PREOPERATIVE DIAGNOSIS:    1. Left neck expanding hematoma.  2. 5 x 2 cm full thickness skin wound.      POSTOPERATIVE DIAGNOSIS:   1. Left neck expanding hematoma.  2. 5 x 2 cm full thickness skin wound.      PROCEDURES:   1.  Drainage of left neck hematoma.   2.  Control of bleeding causing expanding hematoma.   3.  Excision of 5 x 2 cm full thickness skin breakdown wound and closure of defect.      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  Her postoperative course has complicated by alcohol withdrawal, aspiration pneumonia, as well as previous left neck hematoma.  This hematoma was evacuated on 11/23/2017 and patient has had no further bleeding episodes since then.  This morning ENT was urgently paged to the bedside as patient was noted to have a sudden increase in swelling in her left neck.  On examination, the patient was noted to have an expanding hematoma.  Given these findings, we decided to take Ms. Flor to the OR emergently for control of bleeding.       ANESTHESIA:  General by existing tracheostomy.      FINDINGS:  The patient was noted to have a large hematoma that was under pressure and when we opened the neck the hematoma released and appeared to be about 100 mL of fresh blood.  The patient was noted to have bleeding from a branch that was coming off the internal jugular vein and this was clipped.  In addition to this, the patient was noted to have bleeding in the left neck level 2 region and this was carefully examined and what appeared to be occipital artery was clipped and bipolar was used for hemostasis.  In addition to this, the patient was noted to have a organized seroma in the wound bed and  this was suctioned out.  The patient also had diffuse oozing in her wound bed and this was controlled with the use of bipolar cautery.      ESTIMATED BLOOD LOSS:  150 mL      SPECIMENS:  None.      COMPLICATIONS:  None.      CONDITION AT THE END OF SURGERY:  Stable.      DESCRIPTION OF PROCEDURE:    The patient was brought to the operating room and laid supine on the operating table.  General anesthesia was induced by the anesthesia team via the patient's existing tracheostomy tube and then bed was turned 180 degrees and a shoulder roll was placed and the Shiley trach tube was taken out and a reinforced 6-0 cuffed endotracheal tube was placed and sutured in place.  A timeout was conducted, identifying patient and procedure and all were in agreement.  No consent had been obtained due to the emergent nature of the procedure. The existing staples in the left neck were taken out and the deeper tissues were opened up with a combination of finger dissection and scissors.  The hematoma was immediately encountered and suctioned out.  We then noted brisk bleeding from a branch of the internal jugular vein and this was controlled with the use of surgical clips. The wound was thoroughly irrigated with saline. We continued to thoroughly investigate the wound and we noted diffuse bleeding throughout the wound and this was controlled with the use of bipolar cautery. In addition to this, in level 2 we noted that she continued to have bleeding.  Hence, this area was further evaluated. We identified cranial nerve XI and protected this to ensure that we did not cause any harm to it.  We continued to dissect in level II region to identify a source of bleeding.  The occipital artery was encountered and this was dissected out.  Surgical clips were placed and it was divided.  Further bleeding was encountered and controlled with the use of the bipolar cautery.  The patient also had an old seroma in her neck and this was evacuated and  diffuse bleeding under the seroma was controlled with the use of bipolar cautery.  The wound was thoroughly irrigated out with both sterile saline and bacitracin impregnated saline.  The patient had an anterior neck wound that was full thickness and measured 5x2 cm below her incision line.  An elliptical incision was made around this full thickness wound and this was excised.  The wound was closed in a multilayered fashion. Deep tissue was closed with interrupted 3-0 Vicryl and then skin was closed with 4-0 nylon.  Once we had closed this wound, we turned our attention back to the neck.  Further minimal bleeding was noted and this was controlled with the use of bipolar cautery.  A CAMACHO drain was then placed in the wound and it was placed in the SCM gutter and then brought out superficially and medially. The medial aspect of the wound was closed deeply with 3-0 vicryl to eliminate the communication with the tracheostomy site. The neck wound was then closed in a multilayered fashion using 3-0 Vicryl in the deeper tissues and 4-0 nylon on skin.  The endotracheal tube was then switched out back to a 6-0 Shiley.  End tidal CO2 was confirmed.  The patient was handed back over to Anesthesia and she remained ventilated and taken to the ICU in stable condition. The patient tolerated the procedure with no immediate complications.     Dr. Hernadez was present for all critical portions of this procedure.        As dictated by NEVILLE RAMESH MD           Teaching statement:  I was present for and participated in all critical portions of the procedure, and immediately available for skin closure.    Stella Hernadez MD    Department of Otolaryngology

## 2017-12-05 NOTE — BRIEF OP NOTE
[facility], [place]    Brief Operative Note    Pre-operative diagnosis: Left Neck Hematoma   Post-operative diagnosis Same  Procedure: Procedure(s):  [place] Left Neck Hematoma , excision of skin    - Wound Class: II-Clean Contaminated  Surgeon: Surgeon(s) and Role:     * Stella Hernadez MD - Primary     * Thien Ewing MD - Resident - Assisting  Anesthesia: General   Estimated blood loss: 150 ml  Drains:  CAMACHO drain  Specimens: * No specimens in log *  Findings:   Bleeding from branch of IJ and other diffuse bleeding  Complications: None.  Implants: None.

## 2017-12-06 NOTE — PROGRESS NOTES
Otolaryngology Progress Note  12/6/17     S: No acute events overnight. Patient alert, but remains confused and intermittently follows commands.       O: Temp: 98.9  F (37.2  C) Temp src: Oral BP: 123/60 Pulse: 100 Heart Rate: 93 Resp: (!) 32 SpO2: 99 % O2 Device: (S) Trach dome Oxygen Delivery: Other (Comments) (40L)  General: Awake, nods to some questions. Intermittently agitated and bats hands while being examined, not following commands  HEENT: Oral tongue mildly edematous, but soft, FOM soft. Left neck full and swollen, no fluctuance. Erythematous around incisions. Small incision from the pressure ulcer excision is c/d/i. Skin between the neck incision and ulcer excision site is thinned and violaceck holding bulb suction with sanguinous drainage. 6-0 shiley cuffed sutured in place with trach ties around neck, thick tan secretions, eous. CAMACHO drain x 1 in the left neck holding bulb suction with sanguinous drainage. 6-0 shiley cuffed sutured in place with trach ties around neck, cuff appropriately insufflated. Optiform Dressing in place.  Pulmonary: Breathing non-labored on HTD       Intake/Output Summary (Last 24 hours) at 12/06/17 1147  Last data filed at 12/06/17 1100   Gross per 24 hour   Intake             2315 ml   Output             1818 ml   Net              497 ml       CAMACHO drain output(s): (last 24 hours)/(last shift)   Left neck: 86 / 42 = 128 mL      ROUTINE IP LABS (Last four results)  BMP  Recent Labs  Lab 12/06/17  0335 12/05/17  1334 12/05/17  0849 12/05/17  0845 12/05/17  0337   * 150* 150* 148* 149*   POTASSIUM 3.8 4.1 3.9 3.9 3.6   CHLORIDE 117* 118*  --  114* 114*   SARAH 8.3* 7.9*  --  8.5 8.4*   CO2 26 25  --  25 26   BUN 38* 38*  --  40* 37*   CR 0.74 0.78  --  0.70 0.78   * 139* 151* 145* 165*     CBC  Recent Labs  Lab 12/06/17  0335 12/05/17  1334 12/05/17  0849 12/05/17  0845 12/05/17  0337 12/04/17  0322   WBC 28.7*  --   --  26.3* 29.3* 22.0*   RBC 2.84*  --   --  2.17* 1.90*  2.09*   HGB 9.1* 9.6* 7.1* 7.1* 6.3* 7.2*   HCT 27.5*  --   --  21.9* 19.6* 21.5*   MCV 97  --   --  101* 103* 103*   MCH 32.0  --   --  32.7 33.2* 34.4*   MCHC 33.1  --   --  32.4 32.1 33.5   RDW 18.4*  --   --  19.4* 20.4* 19.6*     --   --  277 304 282     INR  Recent Labs  Lab 12/05/17  0845   INR 1.08       Micro:  12/3/17:  Blood cx (PICC site): staph hominis  Urine cx: No growth  Sputum cx: e. Coli, candida, klebsiella oxytoca       A/P: Alexia Flor is a 62 year old female with a past medical history of alcohol abuse, diastolic heart failure, COPD, breast cancer s/p chemoradiation, and a left tongue SCC on 11/21/17 now POD#15 s/p left partial glossectomy with primary closure and left MRND and POD#13 following awake tracheostomy and neck expoloration for left neck hematoma evacuation, POD#1 s/p drainage of left neck hematoma, control of bleeding causing hematoma, excision of 5 x 2 cm full thickness skin breakdown wound and closure of defect.       Neuro:  - Pain control per SICU: Tylenol PRN, consider adding additional pain medication given return to OR and post-op pain   - Sedation off  - Agitation- Seroquel 12.5mg QHS added today, Haldol 5mg PRN  - Hx of significant Alcoholism   -Thiamine and folate, B12, multivitamin       HEENT:  - Incision cares: clean with 0.9% sodium chloride and apply Aquaphor Q8H   - Monitor and record CAMACHO drain output Q8H  - Peridex oral rinses 4 times daily   - Soft red rosales suction to oral cavity only - no yankeur    Trach Care Instructions:   - OK for RN to change trach ties and dressing under trach  - Mepilex or sponge under trach and left neck trach ties to off load pressure and prevent further skin irritation/breakdown   - Trach changed 12/5, uncomplicated trach change with stable tract  - Perform regular suctioning   - RN should change disposable inner canulas every shift and PRN   - Keep trach tube obturator taped to wall behind the head of the bed   - Keep extra  unopened 6-0 cuffed Shiley and 4-0 cuffed Shiley at bedside at all times   - Minimize the amount of air in the cuff needed to adequately apply positive pressure ventilate. If positive pressure ventilation is not need then the cuff should be down.   - Contact ENT on-call with any questions or concerns   - SLP consult for PMSV trial    Respiratory:  - Aspiration pneumonia: Intraop aspiration event on 11/23 s/p 1 week of Rocephin, now with new growth of e.coli and klebsiella on sputum culture 12/4, see ID section below.   - HTD at all times, wean FiO2 as able  - Hx COPD, no PTA inhalers/meds in EPIC       CV/heme:   - Closely monitoring hemodynamic status  - hypertensive: metoprolol 25mg BID, hydralazine PRN, labetalol PRN   - Acute blood loss anemia on chronic anemia: continue PTA Iron supplement. Hgb 9.1 today, received 3 units PRBCs yesterday due to neck bleeding/hematoma. Monitor closely and transfuse for hgb < 7   - Given recurrent episodes of bleeding/hematoma recommend further coagulopathy workup, platelet function test pending per SICU   - Hx diastolic heart failure: holding home lasix, defer to SICU for management  - TTE 11/24 showed EF of 60-65%        FEN/GI:  - NPO  - TFs via NJ, at goal.    - Certavite  - pantoprazole   - No bowel meds at this time due to diarrhea, rectal tube in place. C-diff was negative  - Electrolyte replacement per protocol.   - GI/Hepatology consulted due to elevated liver enzymes and hyperbilirubinemia, appreciate recs.                         - Abdominal US 11/27 with cholelithiasis w/o evidence of cholecystitis, mild ascites                         - No acute intervention       /Renal:  - Adequate UOP per nursing, incontinent   - Management of metabolic alkalosis per SICU team      Endo  - sliding scale for diabetic management. BG wnl      ID: Leukocytosis 28.3  - Aspiration pneumonia: 11/24 Sputum culture growing e.coli resistant to Zosyn. Completed 7 day course of Ceftriaxone  on 12/3  - Sputum culture 12/4 with e.coli, klebsiella, and candida, started cefepime today for 7 day course      PPX:  - SQ Heparin 5000 TID  - SCDs     --  Patient and above plan discussed with Dr. Doris Hernández PALewisC  Otolaryngology-Head & Neck Surgery  Please contact ENT by dialing [phone] and entering job code [phone].

## 2017-12-06 NOTE — PROGRESS NOTES
[facility]  WO Nurse Inpatient Adult Pressure INJURY (PI) Wound Assessment     Followup assessment of PU(s) on pt's:   Left Trach Site    Data:   Patient History:      per MD note(s):  62 year old female with a past medical history of alcohol abuse, diastolic heart failure, COPD, breast cancer s/p chemoradiation, and a left tongue SCC on 17 now POD#9 s/p left partial glossectomy with primary closure and left MRND and POD#7 following awake tracheostomy and neck expoloration for left neck hematoma evacuation.   WO consult to assess pressure injury at left trach collar. Trach placed 17. Patient tends to hold head rigid toward left. Today, head is positioned so there is no pressure being applied to skin, parastomal skin superior and inferior with irritant moist dermatitis is slightly improved. POD 1 (17) PROCEDURES:   1.  Drainage of left neck hematoma.   2.  Control of bleeding causing expanding hematoma.   3.  Excision of 5 x 2 cm full thickness skin breakdown wound and closure of defect.     Cy Score  Av.7  Min: 10  Max: 23       Positioning: Foam dressing Mepilex lite, Pillows and Z-Flow    Mattress:  Standard , isolibrium    Moisture Management:  Foam dressing        Current Diet / Nutrition:       Active Diet Order      NPO for Medical/Clinical Reasons Except for: Meds, Ice Chips    Tube Feeding:   Recent Labs   Lab Test  17   0335   17   0845   17   0322   17   0313   ALBUMIN   --    --   2.1*   --   2.0*   < >   --    HGB  9.1*   < >  7.1*   < >  7.2*   < >  8.1*   INR   --    --   1.08   --    --    --    --    WBC  28.7*   --   26.3*   < >  22.0*   < >  22.4*   A1C   --    --    --    --    --    --   Canceled, Test credited   CRP   --    --    --    --   68.0*   < >   --     < > = values in this interval not displayed.                                                                                                      Pressure Injury Assessment   (location #1):   Left Trach Site under Collar   Wound History:   Tracheostomy 11/23/17, patient holds head to left, rigid.          11/30/17 photo                                                     12/6/17 photo  Suspect scabbed area in 11/30 photo may have been removed during 12/6 surgical debridement. Suspect former moist yellow slough has dried to red brown as seen in 12/6 photo        Wound Base: area yellow tan slough is now dry ,  Full thickness,  Dry brown slough    Specific Dimensions (length x width x depth, in cm) :   2 x 1 x 0 cm     Palpation of the wound bed:  Normal, edema    Slough appearance:  adherent, dry and brown    Eschar appearance:  none    Periwound Skin: edema, erythema, intact incision    Color: red    Temperature  normal     Drainage:  moderate secretions from trach site         Odor: none    Pain:  Minimal with trach cares         Intervention:     Patient's chart evaluated.      Cy Interventions:  Current Cy Interventions and Care Plan reviewed and updated, appropriate at this time.    Wound was assessed.    Wound Care: was done: Removal of existing dressing    Visual inspection    Cleansing with NS solution    Application of clean dressing, added additional foam padding under trach strap    Orders updated    Supplies in room    Discussed plan of care with Nurse     Assisted RN to reposition head and attach trach tube morrison to relieve pressure to skin           Assessment:     Pressure Injury (PI) located on Left Trach Site: Unstageable    Status: wound  Follow up assessment, Symptomatic    Wound altered by surgery yesterday         Plan:     Nursing to notify the Provider(s) and re-consult the [facility] Nurse if wound(s) deteriorate(s).  Plan of care for wound located on Trach Site: Change Faom dressing 2-4 times daily. PRN if dressing is saturated. Clean skin with NS, Fenestrated Optifoam #[id number] dressing at trach, foam pad under left trach tie. Use Vent Tube Stabilizer to keep  pressure off skin, use Z flow to prevent head from turn to left.    WOC Nurse will return: Tuesday  Face to face time: 15 minutes

## 2017-12-06 NOTE — PROGRESS NOTES
SURGICAL ICU PROGRESS NOTE  December 6, 2017      ASSESSMENT: Alexia Flor is a 62-year-old female with PMH alcohol use disorder, MDD, Breast Cancer, tongue cancer, alcoholic fatty liver, COPD, and diastolic heart failure now s/p left partial glossectomy with primary closure and left neck dissection for invasive SCC 11/21.  Admitted to SICU for assistance with EtOH withdrawal, possible need for ativan gtt, and respiratory monitoring. Underwent tracheostomy and evacuation of L neck hematoma 11/23. Acutely bleeding with indurated left neck incision this morning - going emergently to the O.R. I&D with exploration.          CHANGES FOR TODAY :  - increase FWF to 100ml cc every hours   - seroquel 12.5mg at bedtime  - Ecoli on sputum culture -start cefepime 2g daily for 7 days  - follow up with family regarding long-term rehab placement. -    PLAN:  Neuro/ pain/ sedation:   #Acute postoperative pain   # Delirium   # hx of depression   # hx of alcohol abuse s/p alcohol withdrawal   - Monitor neurological status. Notify the MD for any acute changes in exam.  - seroquel 12.5 at beditme, prn haldol,  PRN tylenol, scheduled tylenol on hold given elevated AST/ALT.  - start low dose oxycodone for pain   - continue with thiamine/folic acid and MVI.     Pulmonary care:   #Acute respiratory failure s/p tracheostomy   # E. Coli pneumonia   # COPD, no home o2 or inhalers per notes   - CMV/AC: 18/300/5/40  - Continue PS trials. Wean to trach dome as tolerated  - Ceftriaxone x 7d For HCAP (start date 11/27 - 12/4)  - Start Cefepime 12/4 for E coli in sputum. Follow culture results   - continue with albuterol inhaler.     Cardiovascular:    # Hypertension  # hx of CHF   - Monitor hemodynamic status. Continue with metoprolol 25 mg BID and  PRN anti-hypertensive  - holding PTa lasix for now given high Na, monitor for now.     GI care:   # Hyperbilirubinemia:   #Transaminitis  #Alcoholic fatty liver disease  - Bilirubin 1.8   - AST/ALT  elevated, likely representing critical illness and underlying alcoholic fatty liver disease. Trending down--hold scheduled acetaminophen   - Hepatology consult, appreciate recommendations. Patient will need follow up with hepatology upon discharge.   - 11/27: abdominal US w/ dopplers -- cholelithiasis w/o evidence of cholecystitis. Mild ascites present.      Fluids/ Electrolytes/ Nutrition:   #Severe Protein Calorie Malnutrition  # Hypernatremia  - NJ placed. TF@ goal.  - Na remains elevated at 150 increase FWF to 100 ml/hr  - On electrolyte replacement protocol     Renal/ Fluid Balance:    - Urine output is adequate so far, no mckinney in place. Replaced yesterday due to OR   - Will continue to monitor intake and output.      Endocrine:    # stress hyperglycemia   - Glucose up to 165. Start medium SSI today       ID/ Antibiotics:  #VAP  # leukocytosis  - E coli  sputum from 11/24. Resistant to Zosyn, Ampicillin and FQs s/p 7 days course of Rocephin   -12/3 sputum with E. Coli and another unidentified LFGNR--start Cefepime today.   - C Diff PCR neg 12/1  - 12/3 BC x 1 (from PICC) with gram positive cocci in clusters--found to be staph hominins, repeat cultures sent 11/5/17--NGTD thus far.       Heme:     #Anemia of critical illness  #Acute blood loss anemia  - no need for transfusion today. hgb stable.    - s/p 2 units PRBC 12/5/17  - platelet function assay pending.       MSK   # weakness and deconditioning of critical illness   - PT/OT consults     Prophylaxis:    - Mechanical prophylaxis for DVT and SC heparin for DVT ppx- on hold given OR.   - Protonix 40 mg qD for GI ppx (PTA)      Lines/ tubes/ drains:  - PICC  - NJ  - Tracheostomy   - CAMACHO exiting left neck       Disposition:  - Surgical ICU.       Patient seen and discussed with surgical ICU staff,  Dr. Lu      Time spent on this Encounter   Billing:  I spent 45 minutes bedside and on the inpatient unit today managing the critical care of Alexia Flor in  relation to the issues listed in this note.    Madi Bradford PA-C      ====================================  SUBJECTIVE:  Course reviewed. No reported acute events overnight. Pt reported to have not slept overnight per nursing. Pt awake but not following commands. Insist on pulling mitts off and intermittently swatting at providers.     Does not answer ROS questions when asked.     OBJECTIVE:   1. VITAL SIGNS:   Temp:  [98.2  F (36.8  C)-99.9  F (37.7  C)] 98.9  F (37.2  C)  Pulse:  [100] 100  Heart Rate:  [] 109  Resp:  [9-38] 28  BP: (131-167)/() 142/78  FiO2 (%):  [40 %] 40 %  SpO2:  [94 %-100 %] 97 %  Ventilation Mode: CPAP/PS  FiO2 (%): 40 %  Rate Set (breaths/minute): 18 breaths/min  Tidal Volume Set (mL): 300 mL  PEEP (cm H2O): 5 cmH2O  Pressure Support (cm H2O): 7 cmH2O  Oxygen Concentration (%): 40 %  Resp: 28    2. INTAKE/ OUTPUT:   I/O last 3 completed shifts:  In: 3655 [I.V.:910; NG/GT:1040]  Out: 1928 [Urine:1350; Drains:128; Stool:300; Blood:150]    3. PHYSICAL EXAMINATION:   General: sitting up in recliner chair, attempting to remove mitts, intermittently agitated throughout examination   Neck: Serosangious drainage in CAMACHO bulb and  tubing. Neck incision reddened but without drainage, mild tenderness around site  Neuro: Awake, delirious, does not follow commands   Resp: Breathing non-labored, coarse breath sounds bilaterally, on PS mode. RR upper 20's-30's,  range  CV: RRR, S1, S2, no murmurs   Abdomen: Soft, Non-distended, non-tender, soft and compressible.   Incisions: neck incision intact.   Extremities: LANG. warm and well perfused, calves soft and non-tender. PP2+.     4. INVESTIGATIONS:   Arterial Blood Gases     Recent Labs  Lab 12/04/17  0322 12/03/17  0425 12/02/17  0332 12/01/17  0825   PH 7.46* 7.49* 7.41 7.42   PCO2 41 40 41 42   PO2 109* 101 81 61*   HCO3 29* 31* 26 27     Complete Blood Count     Recent Labs  Lab 12/06/17  0335 12/05/17  1334 12/05/17  0849 12/05/17  0845  12/05/17  0337 12/04/17  0322   WBC 28.7*  --   --  26.3* 29.3* 22.0*   HGB 9.1* 9.6* 7.1* 7.1* 6.3* 7.2*     --   --  277 304 282     Basic Metabolic Panel    Recent Labs  Lab 12/06/17  0335 12/05/17  1334 12/05/17  0849 12/05/17  0845 12/05/17  0337   * 150* 150* 148* 149*   POTASSIUM 3.8 4.1 3.9 3.9 3.6   CHLORIDE 117* 118*  --  114* 114*   CO2 26 25  --  25 26   BUN 38* 38*  --  40* 37*   CR 0.74 0.78  --  0.70 0.78   * 139* 151* 145* 165*     Liver Function Tests    Recent Labs  Lab 12/05/17  0845 12/04/17  0322 12/03/17  0425 12/02/17  0332   AST 51* 123* 136* 124*   ALT 54* 75* 75* 63*   ALKPHOS 358* 480* 502* 443*   BILITOTAL 1.8* 1.6* 1.8* 2.6*   ALBUMIN 2.1* 2.0* 2.1* 2.2*   INR 1.08  --   --   --      Pancreatic Enzymes  No lab results found in last 7 days.  Coagulation Profile    Recent Labs  Lab 12/05/17  0845   INR 1.08   PTT 30     Lactate  Invalid input(s): LACTATE    5. RADIOLOGY:   Recent Results (from the past 24 hour(s))   XR Chest Port 1 View    Narrative    XR CHEST PORT 1 VW, 12/6/2017 2:02 AM.    Comparison: 12/5/2017.    History: respiratory failure; .    Findings:   Left PICC tip mid SVC. Tracheostomy in the high intrathoracic trachea.  Feeding tube coursing toward stomach tip not visualized. Cardiac  silhouette is within normal limits. No pneumothorax. Unchanged small  bilateral pleural effusion. Diffuse unchanged interstitial opacities.  Surgical clips over the right chest. Surgical clips and staples over  the neck.      Impression:   1. Unchanged small bilateral pleural effusions.  2. Unchanged bilateral diffuse interstitial opacities which may represent pulmonary edema and/or atypical infection.  3. Stable support devices.    I have personally reviewed the examination and initial interpretation  and I agree with the findings.    SHAWNA OTT MD       =========================================

## 2017-12-06 NOTE — PLAN OF CARE
"Problem: Patient Care Overview  Goal: Plan of Care/Patient Progress Review  Outcome: Improving                                                                                                                                                                                                                                                                Progress Note      D/A:Alexia Flor is a 62-year-old female with PMH alcohol use disorder, MDD, Breast Cancer, tongue cancer, alcoholic fatty liver, COPD, and diastolic heart failure now s/p left partial glossectomy with primary closure and left neck dissection for invasive SCC 11/21.  Admitted to SICU for assistance with EtOH withdrawal, possible need for ativan gtt, and respiratory monitoring. Underwent tracheostomy and evacuation of L neck hematoma 11/23    Blood pressure 157/80, pulse 100, temperature 99.9  F (37.7  C), temperature source Oral, resp. rate 22, height 1.55 m (5' 1.02\"), weight 41.4 kg (91 lb 3.2 oz), SpO2 99 %, not currently breastfeeding.    Neuro: When pt wants to follow commands and seemed much more calm tonight  CV: NSR to ST with PVCs noted, medications given to keep SBP <160  Pulm: LS are course/diminished with creamy/thin inline trach secretions, trach cares done at 2000/0000, Shiley #6 in place on CMV settings currently 40%/18/300/5   GI: TF at goal with 50cc q4hr, rectal tube in place with liquid/brown stool, abd rounded/distened  : pt with mckinney in place with good urinary output, labia edema +2  Skin: dressing changed in neck at 0000, incision care done at 2000 and 0400  Lines: Left PICC, NG, trach, rectal tube, mckinney, CAMACHO  Other: electrolytes replaced as needed    R: Pt did not sleep at all during the night, dose not complain of pain  P: Continue with POC and alert SICU/ENT if any acute changes arise      "

## 2017-12-06 NOTE — PLAN OF CARE
Problem: Patient Care Overview  Goal: Plan of Care/Patient Progress Review  Discharge Planner OT   Patient plan for discharge: unable to state   Current status: Pt dependently lifted up to EOB for core strengthening - only able to tolerate ~5 min due to c/o pain in buttocks. Pt dependently lifted over to chair; SOB with activity and O2 sats down to 87% on 40% FiO2 trach dome - recovered within 60s rest with cues for breathing/anxiety. Initiated handwriting with pt in attempts to increase pt's ability to make needs known/minimize frustration; pt able to write some words legibly with cuing however unable to communicate complete thoughts - will continue to practice in future sessions.  Barriers to return to prior living situation: O2 needs, weakness, impaired cognition  Recommendations for discharge: TCU vs LTACH (pending medical needs)  Rationale for recommendations: to increase pt's (I) with functional transfers and ADLs       Entered by: Phoebe Bran 12/06/2017 4:30 PM

## 2017-12-06 NOTE — PROGRESS NOTES
Care Coordinator- Discharge Planning     Admission Date/Time:  11/21/2017  Attending MD:  Heriberto George MD     Data  Chart reviewed, discussed with interdisciplinary team.   Patient was admitted for:   1. Acute post-operative pain    Left partial glossectomy and left neck dissection.        Assessment  Full assessment completed in previous note    Pt was taken to OR yesterday, 12/5 for left Neck hematoma washout.  Attempt to visit family for support.  No family was in the room at time of my visit.  RNCC called pt dtr, Tatiana # [phone] and LVM with my call back number.    Plan  Anticipated Discharge Date:  TBD.  Anticipated Discharge Plan:   LTAC.  Awaiting a call back from pt dtr to discuss about LTAC.  CC will cont to follow plan of care.      Jing Neely RN, PHN, BSN  4A and 4E/ ICU  Care Coordinator  Phone: [phone]  Pager: [phone]

## 2017-12-07 NOTE — PROGRESS NOTES
SURGICAL ICU PROGRESS NOTE  December 7, 2017      ASSESSMENT: Alexia Flor is a 62-year-old female with PMH alcohol use disorder, MDD, Breast Cancer, tongue cancer, alcoholic fatty liver, COPD, and diastolic heart failure now s/p left partial glossectomy with primary closure and left neck dissection for invasive SCC 11/21.  Admitted to SICU for assistance with EtOH withdrawal, possible need for ativan gtt, and respiratory monitoring. Underwent tracheostomy and evacuation of L neck hematoma 11/23. Acutely bleeding with indurated left neck incision on 12/5 which required emergent return to the operating room. Stable since and no acute events since that time.      CHANGES FOR TODAY :  - trach dome today  - follow up with family regarding long-term rehab placement.  - pull mckinney  - nicotine patch  - metabolic cart if able.   - d/c Cefepime and change ceftriaxone 1g Q24h x 7 days. END 12/14/17  - melatonin for sleep.   - restart dvt chemoprophylaxis     PLAN:  Neuro/ pain/ sedation:   # Acute postoperative pain   # Delirium   # Hx of depression   # Hx of alcohol abuse s/p alcohol withdrawal   - Monitor neurological status, continue efforts to support circadian rhythm.   - Delirium preventions and precautions.   - seroquel 12.5 at beditme, prn haldol,  PRN tylenol, scheduled tylenol on hold given elevated AST/ALT.  - Add melatonin at night to aid in sleep.   - dose oxycodone for pain   - continue with thiamine/folic acid and MVI.   - add nicotine patch today.      Pulmonary care:   # Acute respiratory failure s/p tracheostomy   # E. Coli/Klebisella pneumonia   # COPD, no home o2 or inhalers per notes   - CMV/AC: 18/300/5/40--BAck up. Weaned to trach dome. Plan to keep on trach dome as much as possible.   - s/p Ceftriaxone x 7d For HCAP (start date 11/27 - 12/4). Most recent sputum with E. Coli/Kleb--12/3/17-- change cefepime to rocephin today. Will continue for now. WBC  Improving and pt afebrile  - continue with  albuterol inhaler.      Cardiovascular:    # Hypertension  # Hx of CHF   - Monitor hemodynamic status. Continue with metoprolol 25 mg BID and  PRN anti-hypertensive  - holding PTa lasix for now given high Na, monitor for now.      GI care:   # Hyperbilirubinemia:   # Transaminitis  # Alcoholic fatty liver disease  - AST/ALT elevated, likely representing critical illness and underlying alcoholic fatty liver disease. Trending down--holding scheduled acetaminophen   - Hepatology consult, appreciate recommendations. Patient will need follow up with hepatology upon discharge.   - 11/27: abdominal US w/ dopplers -- cholelithiasis w/o evidence of cholecystitis. Mild ascites present.      Fluids/ Electrolytes/ Nutrition:   # Severe Protein Calorie Malnutrition  # Hypernatremia  - NJ placed. TF@ goal. RD following. MVI. Micronutrients ordered.   - Na improving-keep FWF 50cc every hour. Na still elevated 148.   - On electrolyte replacement protocol. Replenish lytes as needed     Renal/ Fluid Balance:    - Will continue to monitor intake and output.  - remove mckinney today.  - daily weights       Endocrine:    # stress hyperglycemia   - appears resolved, with minimal usage will d/c insulin but keep checks in place  - hypoglycemia protocol     ID/ Antibiotics:  # polymicrobial pneumonia   # leukocytosis  - wbc improving  - E coli  sputum from 11/24. Resistant to Zosyn, Ampicillin and FQs s/p 7 days course of Rocephin  -12/3 sputum with E. Coli and kelbsiella--cefepime appears to be effective. Go back to ceftriaxone since both sensitive 1g Q24h.   - C Diff PCR neg 12/1  - 12/3 BC x 1 (from PICC) with gram positive cocci in clusters--found to be staph hominins, repeat cultures sent 11/5/17--NGTD thus far. contaminant will not treat at this time.       Heme:     # Anemia of critical illness  # Acute blood loss anemia  - no need for transfusion today. Continue to monitor and trend. Transfuse if hgb 7.0 or signs/symptoms of  "hypoperfusion.   - no outward signs of bleeding or blood loss.       MSK   # weakness and deconditioning of critical illness   - PT/OT consults      General cares and Prophylaxis:    - DVT: Lovenox 30mg SCI   - Protonix 40 mg qD for GI ppx (PTA)      Lines/ tubes/ drains:  - PICC  - NJ  - Tracheostomy   - CAMACHO exiting left neck       Disposition:  - Surgical ICU.       Patient seen and discussed with surgical ICU staff,  Dr. Lu      Time spent on this Encounter   Billing:  I spent 40 minutes bedside and on the inpatient unit today managing the critical care of Alexia Flor in relation to the issues listed in this note.     Madi Bradford PABRYANT   ====================================    SUBJECTIVE:  Course reviewed. No major events overnight. Pt reported to have slept several hours overnight. Intermittently restless per nursing and attempting to bat with mitts. This am, pt appears more awake, follows commands intermittently. Asking to get up, wants to go home. Mouths \" there nothing wrong with me.\"  Reports discomfort from tubes.  Wants mitts and tubes removed.       OBJECTIVE:   1. VITAL SIGNS:   Temp:  [98.4  F (36.9  C)-99.4  F (37.4  C)] 98.8  F (37.1  C)  Heart Rate:  [] 95  Resp:  [11-32] 19  BP: (115-154)/() 144/68  FiO2 (%):  [40 %] 40 %  SpO2:  [97 %-100 %] 100 %  Ventilation Mode: Trach collar  FiO2 (%): 40 %  Rate Set (breaths/minute): 18 breaths/min  Tidal Volume Set (mL): 300 mL  PEEP (cm H2O): 5 cmH2O  Pressure Support (cm H2O): 7 cmH2O  Oxygen Concentration (%): 40 %  Resp: 19    2. INTAKE/ OUTPUT:   I/O last 3 completed shifts:  In: 2375 [I.V.:150; NG/GT:1145]  Out: 1366 [Urine:893; Drains:73; Stool:400]    3. PHYSICAL EXAMINATION:   General: sitting up in bed, appears awake, calm during interview.   Neuro: awake, alert, follows commands.  Intermittently restless   Neck: trached and on trach dome. Trach present and secured. No redness or drainage.   Resp:Lungs clear but coarse, decreased " at bases.   CV: RRR, S1, S2, no murmurs, rubs or gallops.   Abdomen: Soft, Non-distended, non-tender, no guarding or rigidity  Incisions: neck incision without redness, drainage. CAMACHO present and secured, serous fluid in tubing    Extremities: warm and well perfused, calves soft and non-tender. Cap refill brisk.  PP2+.     4. INVESTIGATIONS:   Arterial Blood Gases     Recent Labs  Lab 12/04/17  0322 12/03/17  0425 12/02/17  0332 12/01/17  0825   PH 7.46* 7.49* 7.41 7.42   PCO2 41 40 41 42   PO2 109* 101 81 61*   HCO3 29* 31* 26 27     Complete Blood Count     Recent Labs  Lab 12/07/17  0333 12/06/17  0335 12/05/17  1334 12/05/17  0849 12/05/17  0845 12/05/17  0337   WBC 21.0* 28.7*  --   --  26.3* 29.3*   HGB 7.9* 9.1* 9.6* 7.1* 7.1* 6.3*    247  --   --  277 304     Basic Metabolic Panel    Recent Labs  Lab 12/07/17  0333 12/06/17  0335 12/05/17  1334 12/05/17  0849 12/05/17  0845   * 150* 150* 150* 148*   POTASSIUM 3.8 3.8 4.1 3.9 3.9   CHLORIDE 116* 117* 118*  --  114*   CO2 25 26 25  --  25   BUN 27 38* 38*  --  40*   CR 0.70 0.74 0.78  --  0.70   GLC 89 104* 139* 151* 145*     Liver Function Tests    Recent Labs  Lab 12/05/17  0845 12/04/17  0322 12/03/17  0425 12/02/17  0332   AST 51* 123* 136* 124*   ALT 54* 75* 75* 63*   ALKPHOS 358* 480* 502* 443*   BILITOTAL 1.8* 1.6* 1.8* 2.6*   ALBUMIN 2.1* 2.0* 2.1* 2.2*   INR 1.08  --   --   --      Pancreatic Enzymes  No lab results found in last 7 days.     Coagulation Profile    Recent Labs  Lab 12/05/17  0845   INR 1.08   PTT 30     Lactate  Invalid input(s): LACTATE    5. RADIOLOGY:   Recent Results (from the past 24 hour(s))   XR Chest Port 1 View    Narrative    XR CHEST PORT 1 VW, 12/7/2017 1:43 AM.    Comparison: 12/6/2017.    History: Interval imaging; .    Findings:   Left PICC tip mid SVC. Tracheostomy in the high intrathoracic trachea.  Feeding tube coursing toward stomach tip not visualized. Cardiac  silhouette is within normal limits. No  pneumothorax. Unchanged small  bilateral pleural effusion. Diffuse interstitial opacities. Patchy  right side opacities. Left basilar atelectasis. Surgical clips over  the right chest. Surgical clips and staples over the neck.      Impression:   1. Unchanged small bilateral pleural effusions.  2. Unchanged bilateral diffuse interstitial opacities which may  represent pulmonary edema and/or atypical infection. There has been  mild increase in superimposed patchy right lower lung opacities, representing atelectasis or infection.  3. Stable support devices.    I have personally reviewed the examination and initial interpretation  and I agree with the findings.    SHAWNA OTT MD       =========================================

## 2017-12-07 NOTE — PLAN OF CARE
Care Coordinator- Discharge Planning     Admission Date/Time:  11/21/2017  Attending MD:  Heriberto George MD     Data  Chart reviewed, discussed with interdisciplinary team.   Patient was admitted for:   1. Acute post-operative pain    Left partial glossectomy and left neck dissection.     Assessment  Pt remain in ICU vented via trach and doing PS/trach dome.  Pt will need LTAC placement for vent weaning.     No call back from pt dtr., Tatiana.  RNCC and the team attempted to get hold of pt dtr several times for the last few day to discuss about LTAC but unable to get hold of her.  Pt next of kin is pt mom, next of pt dtr (See SW note on 11/27).  RNCC called pt momMatilde # [phone] and discussed about LTAC.  Matilde stated she was hoping pt will be able to go to [facility] rehab.  Matilde stated she lives in [facility] and she doesn't drive to the [facility].  She only came 2X to see her with family members.   RNCC explained to Mimi the difference between TCU and LTAC.  Matilde agreed to have referral send to both facilities.  Matilde stated she doesn't know anything about the facilities and request that I call Diego and give him info about LTAC.  Matilde stated Diego lives locally and he might know the places.  RNCC informed Matilde that if we can't hold of pt dtr, Tatiana she will be next of kin and she need to let us know about her preference about LTAC facility if both accept her.  Matilde agreed and stated she will talk to Diego after I talked to him about the facilities.    CC called Diego # [phone] and informed him about the above discussion.  Diego sated Tatiana is in [place] for work and some times she doesn't have phone reception and he will try to text her.  RNCC shared LTAC info.  Diego stated he doesn't want to make any decision but for location [facility] will be closer to pt mom.  RNCC informed Diego that pt momMatilde will be making decision but she would like to get his input.  Diego  agreed to call Matilde and discuss about LTAC with her.  RNCC informed Diego that I will follow up with Matilde.    Coordination of Care and Referrals: Provided patient/family with options for LTACH.      Plan  Anticipated Discharge Date:   TBD.  Anticipated Discharge Plan:   LTAC.  [facility] are following for LTAC placement.  RNCC will contact pt mom to check her preference once she talk to Diego.      Jing Neely RN, PHN, BSN  4A and 4E/ ICU  Care Coordinator  Phone: [phone]  Pager: [phone]

## 2017-12-07 NOTE — PLAN OF CARE
Problem: Patient Care Overview  Goal: Plan of Care/Patient Progress Review  Neuro: Restless most of the shift, kailey orientation status, seems as if pt is hallucinating (pointing to things she thought were people)  Resp: Trach dome fi02 40% since 1100, shiley #6, coarse lungs, small amount of secretions  Cardio: Sinus rhythm, SB<160 (goal)  GI: Rectal tube, TF goal, FW changed to 25 cc q 1 hour  : Bell, 30 cc/hr  Access: PICC, saline locked  Skin: Optifoam around trach site and on incision site on L under trach ties, redness around rectal tube  Pt's ex- at bedside briefly this afternoon, no phone calls received from family.  P: Continue current cares.

## 2017-12-07 NOTE — PLAN OF CARE
Problem: Patient Care Overview  Goal: Plan of Care/Patient Progress Review  Discharge Planner OT   Patient plan for discharge: pt did not state  Current status: pt required Mod-Max A for ADLs, Min A for short mobility. VSS while on 40% fiO2. Pt followed few commands.   Barriers to return to prior living situation: cognition, medical status, post surgical precautions  Recommendations for discharge: TCU  Rationale for recommendations: to increase ADL I and tolerance       Entered by: Patrice Horne 12/07/2017 2:35 PM

## 2017-12-07 NOTE — PLAN OF CARE
Problem: Patient Care Overview  Goal: Plan of Care/Patient Progress Review  Outcome: No Change  D: 62-year-old female s/p left partial glossectomy with primary closure and left neck dissection for invasive SCC 11/21. I/A: PERRL, confused, follows commands with equal strength bilaterally. Emotionally labile, smiling and requesting hugs, frowning, swinging fists. Dilaudid 2.5 mg tube feed for pain with good effect. Shakes head yes and no to questions, tries to write to make needs known, but handwriting largly illegible, at one point pt seemed to express that she saw three other people in the room when we were the only two present. Slept 2-4 hrs overnight. NSR, SBP < 160 without intervention, afebrile. CMV at night, PS this morning, pt often disconnects vent tubing. Small, thick, in-line green mucous secretions, coughs independently, some relief with suction and inner cannula change. TF at goal of 45 with flush of 25 mL free water every hour. Neck incisions and pressure injury reddened, site care done. Bell with adequate output, rectal tube small output, TF at goal. CAMACHO 23 mL this shift, serosanguinous. Please see flowsheets for for vitals and assessments.  P: Continue to monitor and treat as ordered. Offer support to patient and family as able.

## 2017-12-07 NOTE — PLAN OF CARE
Problem: Patient Care Overview  Goal: Plan of Care/Patient Progress Review  PT 4A: Discharge Planner PT   Patient plan for discharge: not stated  Current status: pt needing lift for bed to chair transfer, trach dome at 40% FiO2. Pt sits EOB for about 6 minutes with help of sling, pt resisting upright posture, needs mod-max assist for balance. Pt agitated, swinging at therapist.   Barriers to return to prior living situation: assist for mobility, cognition, O2 needs  Recommendations for discharge: TCU  Rationale for recommendations: pt below baseline and currently resistive to therapy       Entered by: Sallie Nava 12/07/2017 5:14 PM

## 2017-12-07 NOTE — PROGRESS NOTES
"Otolaryngology Progress Note  12/7/17      S: No acute events overnight. Patient alert, but remains confused and intermittently follows commands. Nursing reports that patient was seeing people in room that were not present.      O: Vital signs:  Temp: 98.8  F (37.1  C) Temp src: Axillary BP: 149/72   Heart Rate: 81 Resp: 18 SpO2: 100 % O2 Device: Trach dome Oxygen Delivery: Other (Comments) (40L) Height: 155 cm (5' 1.02\") Weight: 40.3 kg (88 lb 12.8 oz)  General: Awake, nods to some questions. Intermittently agitated and bats hands while being examined, following occasional commands  HEENT: Oral tongue mildly edematous, but soft, FOM soft. Left neck full and swollen, no fluctuance. Erythematous around incisions. Small incision from the pressure ulcer excision is c/d/i. Skin between the neck incision and ulcer excision site is thinned and violaceck holding bulb suction with sanguinous drainage. 6-0 shiley cuffed sutured in place with trach ties around neck, thick tan secretions, eous. CAMACHO drain x 1 in the left neck holding bulb suction with sanguinous drainage. Optiform Dressing in place.  Pulmonary: Breathing non-labored on HTD      Intake/Output Summary (Last 24 hours) at 12/07/17 1107  Last data filed at 12/07/17 1000   Gross per 24 hour   Intake             2305 ml   Output             1351 ml   Net              954 ml        CAMACHO drain output(s): (last 24 hours)/(last shift)   Left neck: 42, 35, 24 (101)      CBC RESULTS:   Recent Labs   Lab Test  12/07/17   0333   WBC  21.0*   RBC  2.45*   HGB  7.9*   HCT  24.4*   MCV  100   MCH  32.2   MCHC  32.4   RDW  18.3*   PLT  224     Micro:  12/3/17:  Blood cx (PICC site): staph hominis  Urine cx: No growth  Sputum cx: e. 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       Neuro:  - Pain control per SICU: Tylenol PRN, oxycodone 2.5mg   - Sedation off  - Agitation- Seroquel 12.5mg QHS added today, Haldol 5mg PRN  - Hx of significant Alcoholism                          -Thiamine and folate, B12, multivitamin       HEENT:  - Incision cares: clean with 0.9% sodium chloride and apply Aquaphor Q8H   - Monitor and record CAMACHO drain output Q8H  - Peridex oral rinses 4 times daily   - Soft red rosales suction to oral cavity only - no yankeur     Trach Care Instructions:   - OK for RN to change trach ties and dressing under trach  - Mepilex or sponge under trach and left neck trach ties to off load pressure and prevent further skin irritation/breakdown   - Trach changed 12/5, uncomplicated trach change with stable tract  - Perform regular suctioning   - RN should change disposable inner canulas every shift and PRN   - Keep trach tube obturator taped to wall behind the head of the bed   - Keep extra unopened 6-0 cuffed Shiley and 4-0 cuffed Shiley at bedside at all times   - Minimize the amount of air in the cuff needed to adequately apply positive pressure ventilate. If positive pressure ventilation is not need then the cuff should be down.   - Contact ENT on-call with any questions or concerns   - SLP consult for PMSV trial     Respiratory:  - Aspiration pneumonia: Intraop aspiration event on 11/23 s/p 1 week of Rocephin, now with new growth of e.coli and klebsiella on sputum culture 12/4, see ID section below.   - HTD at all times, wean FiO2 as able  - Hx COPD, no PTA inhalers/meds in EPIC      CV/heme:   - Closely monitoring hemodynamic status  - hypertensive: metoprolol 25mg BID,  hydralazine PRN, labetalol PRN   - Acute blood loss anemia on chronic anemia: continue PTA Iron supplement. Hgb 7.9 (9.1) 12/5, received 3 units PRBCs yesterday due to neck bleeding/hematoma. Monitor closely and transfuse for hgb < 7                          - Given recurrent episodes of bleeding/hematoma recommend further coagulopathy workup, platelet function test pending per SICU   - Hx diastolic heart failure: holding home lasix, defer to SICU for management  - TTE 11/24 showed EF of 60-65%     FEN/GI:  - NPO  - TFs via [place], at goal.    - Certavite  - pantoprazole   - No bowel meds at this time due to diarrhea, rectal tube in place. C-diff was negative  - Electrolyte replacement per protocol.   - GI/Hepatology consulted due to elevated liver enzymes and hyperbilirubinemia, appreciate recs.                         - Abdominal US 11/27 with cholelithiasis w/o evidence of cholecystitis, mild ascites                         - No acute intervention       /Renal:  - Adequate UOP per nursing, incontinent   - Management of metabolic alkalosis per SICU team      Endo  - sliding scale for diabetic management. BG wnl      ID: Leukocytosis 21 (28.3)  - Aspiration pneumonia: 11/24 Sputum culture growing e.coli resistant to Zosyn. Completed 7 day course of Ceftriaxone on 12/3  - Sputum culture 12/4 with e.coli, klebsiella, and candida, started cefepime yesterday for 7 day course      PPX:  - SQ Heparin 5000 TID  - SCDs      --  Patient and above plan discussed with Dr. Doris Jean Baptiste MD ([id number])  Otolaryngology-Head & Neck Surgery  Please contact ENT by dialing * [phone] and entering job code [phone].

## 2017-12-08 NOTE — PROGRESS NOTES
Social Work Services Progress Note    Hospital Day: 18  Date of Initial Social Work Evaluation:  11/27/17  Collaborated with:  Chart review, attended rounds, RN CC     Data:  Per discussion with care team and RN CC, multiple attempts have been made to reach pt's daughter, Tatiana, who would be pt's next of kin per [facility] policy, but she has not returned calls. No information is available on pt's son, so he has not been reached either. Per [place] policy, pt's mother, Matilde, would be alternate decision-maker since staff have not been able to reach either of her children.     Received call from pt's sister-in-law, Cindy Pearce ( to pt's brother, Abdoulaye). She stated that the family does not want pt's mother, Matilde, to be the one to have to make decisions. She explained that Matilde is 91 years old and isn't able to keep all the details straight. Explained [facility] Next of Kin policy, noting order of alternate decision makers. Cindy stated that she would call pt's daughter (Tatiana) and see if she wants to do it, but was certain that she wouldn't want to and then stated that pt's mother is unable to do it. Explained that if there is concern about a family member that is technically next of kin for decision-making, the best course of action would be to work together as a family to help that family member make decisions, but that another family member can't make the decision that someone can't be a decision maker. Encouraged her to get in touch with pt's daughter if she is able and have her call the hospital.     RN CC currently working with family on LTAC placement.     Intervention:  NA     Assessment:  NA    Plan:    Anticipated Disposition:  LTAC    Barriers to d/c plan:  Medical stability    Follow Up:  SW will continue to follow, support and assist with ongoing social service and discharge planning needs, as needed.     ANNA Johnson, [facility]  ICU Float   Pager:  [phone]  [email or web address]     NO LETTER

## 2017-12-08 NOTE — PLAN OF CARE
Problem: Restraint for Non-Violent/Non-Self-Destructive Behavior  Goal: Prevent/Manage Potential Problems  Maintain safety of patient and others during period of restraint.  Promote psychological and physical wellbeing.  Prevent injury to skin and involved body parts.  Promote nutrition, hydration, and elimination.   Pt pulling at lines and tubes and taking mitts off.  Soft wrist restraints applied per pt safety.

## 2017-12-08 NOTE — PROGRESS NOTES
Dalia[place] by Zak Sheldon, OTR/L 11/28 11/28/17 1300   Quick Adds   Type of Visit Initial Occupational Therapy Evaluation   Living Environment   Living Environment Comment unable to gather any informaiton, per chart pt lives alone   Self-Care   Current Activity Tolerance poor   Functional Level Prior   Prior Functional Level Comment unable to gather any PLOF informaiton, per chart review pt was I PLOF for all ADL's.    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 )   Cognitive Comment further testing required as indicated.    Visual Perception   Visual Perception Comments unable to assess   Sensory Examination   Sensory Quick Adds Other (describe)   Sensory Comments no withdraw to pain in B hands.    Range of Motion (ROM)   ROM Quick Adds Other (describe)   ROM Comment unable to assess AROM, PROM WFL in all B UE/LE's.    Strength   Strength Comments flacid   Hand Strength   Hand Strength Comments flacid   Coordination   Coordination Comments unable to assess   Lower Body Dressing   Level of [place]: Dress Lower Body dependent (less than 25% patients effort)   Grooming   Level of [place]: Grooming dependent (less than 25% patients effort)   Instrumental Activities of Daily Living (IADL)   IADL Comments unable to gather  "little.    Activities of Daily Living Analysis   Impairments Contributing to Impaired Activities of Daily Living cognition impaired;strength decreased   General Therapy Interventions   Planned Therapy Interventions ADL retraining;IADL retraining;bed mobility training;cognition;fine motor coordination training;motor coordination training;ROM;strengthening;transfer training;home program guidelines;progressive activity/exercise;risk factor education   Clinical Impression   Criteria for Skilled Therapeutic Interventions Met yes, treatment indicated   OT Diagnosis decreased ADL I.    Assessment of Occupational Performance 5 or more Performance Deficits   Identified Performance Deficits dressing, bathing, G/H, toileting, cooking, leisure.    Clinical Decision Making (Complexity) Low complexity   Therapy Frequency 3 times/wk   Predicted Duration of Therapy Intervention (days/wks) 3 weeks   Anticipated Discharge Disposition Transitional Care Facility   Risks and Benefits of Treatment have been explained. Yes   Patient, Family & other staff in agreement with plan of care Other (comments)  (pt unable to state)   Clinical Impression Comments Pt presents to OT unresponsive however sedation removed this AM. pt at great risk of further decompensation 2/2 prolonged bedrest and will benefit from skilled OT intervention. See daily note for treatment provided today.    [facility] AM-PAC TM \"6 Clicks\"   2016, Trustees of [facility], under license to [employer].  All rights reserved.   6 Clicks Short Forms Daily Activity Inpatient Short Form   [facility] AM-PAC  \"6 Clicks\" Daily Activity Inpatient Short Form   1. Putting on and taking off regular lower body clothing? 1 - Total   2. Bathing (including washing, rinsing, drying)? 1 - Total   3. Toileting, which includes using toilet, bedpan or urinal? 1 - Total   4. Putting on and taking off regular upper body clothing? 1 - Total   5. Taking care of personal " grooming such as brushing teeth? 1 - Total   6. Eating meals? 1 - Total   Daily Activity Raw Score (Score out of 24.Lower scores equate to lower levels of function) 6   Total Evaluation Time   Total Evaluation Time (Minutes) 3

## 2017-12-08 NOTE — PROGRESS NOTES
"Otolaryngology Progress Note  12/8/17      S: On trach dome, sat up in chair this am.      O: Vital signs:  Vital signs:  Temp: 97  F (36.1  C) Temp src: Axillary BP: 138/79   Heart Rate: 79 Resp: 20 SpO2: 95 % O2 Device: Trach dome Oxygen Delivery:  (40 LPM) Height: 155 cm (5' 1.02\") Weight: 40.3 kg (88 lb 13.5 oz)  General: Awake, nods to some questions. Intermittently agitated and bats hands while being examined, following occasional commands  HEENT: Oral tongue mildly edematous, but soft. Left neck full and swollen, no fluctuance. Erythematous around incisions. Small incision from the pressure ulcer excision is c/d/i. Skin between the neck incision and ulcer excision site is thinned and violaceck holding bulb suction with sanguinous drainage. 6-0 shiley cuffed sutured in place with trach ties around neck. CAMACHO drain x 1 in the left neck holding bulb suction with sanguinous drainage. Optiform Dressing in place.  Pulmonary: Breathing non-labored on HTD      Intake/Output Summary (Last 24 hours) at 12/08/17 1548  Last data filed at 12/08/17 1400   Gross per 24 hour   Intake             2765 ml   Output               44 ml   Net             2721 ml     CAMACHO drain (last 24 hours/last shift) 46/12     Micro:  12/3/17:  Blood cx (PICC site): staph hominis  Urine cx: No growth  Sputum cx: e. 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       Neuro:  - Pain control per SICU: Tylenol PRN, oxycodone 2.5mg   - Sedation " off  - Agitation- Seroquel 25mg QHS added today, Haldol 5mg PRN  - Hx of significant Alcoholism                          -Thiamine and folate, B12, multivitamin       HEENT:  - Incision cares: clean with 0.9% sodium chloride and apply Aquaphor Q8H   - Monitor and record CAMACHO drain output Q8H  - Peridex oral rinses 4 times daily   - Soft red rosales suction to oral cavity only - no yankeur      Trach Care Instructions:   - OK for RN to change trach ties and dressing under trach  - Mepilex or sponge under trach and left neck trach ties to off load pressure and prevent further skin irritation/breakdown   - Trach changed 12/5, uncomplicated trach change with stable tract  - Perform regular suctioning   - RN should change disposable inner canulas every shift and PRN   - Keep trach tube obturator taped to wall behind the head of the bed   - Keep extra unopened 6-0 cuffed Shiley and 4-0 cuffed Shiley at bedside at all times   - Minimize the amount of air in the cuff needed to adequately apply positive pressure ventilate. If positive pressure ventilation is not need then the cuff should be down.   - Contact ENT on-call with any questions or concerns   - SLP consult for PMSV trial      Respiratory:  - Aspiration pneumonia: Intraop aspiration event on 11/23 s/p 1 week of Rocephin, now with new growth of e.coli and klebsiella on sputum culture 12/4, see ID section below.   - HTD at all times, wean FiO2 as able  - Hx COPD, no PTA inhalers/meds in EPIC      CV/heme:   - Closely monitoring hemodynamic status  - Hypertensive: metoprolol 25mg BID, hydralazine PRN, labetalol PRN   - Acute blood loss anemia on chronic anemia: continue PTA Iron supplement. Hgb 8.1 (7.9) 12/5, received 3 units PRBCs 12/6 due to neck bleeding/hematoma. Monitor closely and transfuse for hgb < 7                          - Given recurrent episodes of bleeding/hematoma recommend further coagulopathy workup, platelet function test pending per SICU   - Hx  diastolic heart failure: back on PTA lasix 20 mg daily, defer to SICU for management  - TTE 11/24 showed EF of 60-65%      FEN/GI:  - NPO  - TFs via [place], at goal.    - Certavite  - Pantoprazole   - No bowel meds at this time due to diarrhea, rectal tube removed. C-diff was negative  - Electrolyte replacement per protocol.   - GI/Hepatology consulted due to elevated liver enzymes and hyperbilirubinemia, appreciate recs.                         - Abdominal US 11/27 with cholelithiasis w/o evidence of cholecystitis, mild ascites                         - No acute intervention       /Renal:  - Adequate UOP per nursing, incontinent   - Management of metabolic alkalosis per SICU team      Endo  - Sliding scale for diabetic management.       ID: Leukocytosis 19.1 (21)  - Aspiration pneumonia: 11/24 Sputum culture growing e.coli resistant to Zosyn. Completed 7 day course of Ceftriaxone on 12/3  - Sputum culture 12/4 with e.coli, klebsiella, and candida, started ceftriazone 12/7/17 yesterday for 7 day course      PPX:  - SQ Heparin per sicu residents  - SCDs      --  Patient and above plan discussed with Dr. Doris Wong MD ([id number])  Otolaryngology-Head & Neck Surgery  Please contact ENT by dialing * [phone] and entering job code [phone].

## 2017-12-08 NOTE — PLAN OF CARE
Problem: Patient Care Overview  Goal: Plan of Care/Patient Progress Review  OT 4A  Discharge Planner OT   Patient plan for discharge: LTACH  Current status: Practiced functional transfers to commode and chair, pt min A x 1 with A x 1 for lines when fully participating, required up to mod A for transfer to chair.   Barriers to return to prior living situation: deconditioning, weakness, medical appropriateness  Recommendations for discharge: LTACH  Rationale for recommendations: To facilitate ADL I       Entered by: Sera Borjas 12/08/2017 4:08 PM

## 2017-12-08 NOTE — PLAN OF CARE
Problem: Patient Care Overview  Goal: Plan of Care/Patient Progress Review  Neuro: Disoriented and some visual hallucinations, afebrile, following commands  Resp: Trach dome fio2 30%, shiley #6  Cardio: Sinus rhythm with rare PVCs, SB<160 (goal)  GI: Rectal tube came out, loose stools, TF goal, FW changed to 50 q 1  : Bell removed, incontinent  Access: PICC, saline locked  Pain: prn tylenol and oxycodone  Patient restless throughout the whole shift, prn haldol given x2 (seemed to work this am put not this evening).  Patient pivoted from chair to bed.  No family at bedside or family calls.  P: Continue current cars.  Trach dome throughout the night.

## 2017-12-08 NOTE — PROGRESS NOTES
SURGICAL ICU PROGRESS NOTE  December 7, 2017      ASSESSMENT: Alexia Flor is a 62-year-old female with PMH alcohol use disorder, MDD, Breast Cancer, tongue cancer, alcoholic fatty liver, COPD, and diastolic heart failure now s/p left partial glossectomy with primary closure and left neck dissection for invasive SCC 11/21.  Admitted to SICU for assistance with EtOH withdrawal, possible need for ativan gtt, and respiratory monitoring. Underwent tracheostomy and evacuation of L neck hematoma 11/23. Acutely bleeding with indurated left neck incision on 12/5 which required emergent return to the operating room. Stable since and no acute events since that time.      CHANGES FOR TODAY :  - lasix PO 20mg today  - increased seroquel to 25mg HS  - free water flushes 60cc q 4 hours      PLAN:  Neuro/ pain/ sedation:   # Acute postoperative pain   # Delirium   # Hx of depression   # Hx of alcohol abuse s/p alcohol withdrawal   - Monitor neurological status, continue efforts to support circadian rhythm.   - Delirium preventions and precautions. Pt noted to be rather anxious per nursing when left alone.   - seroquel increased to 25 at beditme, prn haldol, PRN tylenol, scheduled tylenol on hold given elevated AST/ALT.  - melatonin 5 mg at night to aid in sleep.   - dose oxycodone for pain   - continue with thiamine/folic acid and MVI.     # nicotine disorder  - nicotine patch ordered     Pulmonary care:   # Acute respiratory failure s/p tracheostomy   # E. Coli/Klebisella pneumonia   # COPD, no home o2 or inhalers per notes   - Has been on trach dome >24 hours. FiO2 40%   - Most recent sputum with E. Coli/Kleb--12/3/17-- Rocephin today. Will continue for now. WBC improving and pt afebrile  - continue with albuterol inhaler.      Cardiovascular:    # Hypertension  # Hx of CHF   - Monitor hemodynamic status.   -Continue with metoprolol 25 mg BID and  PRN anti-hypertensive as needed  - Resume home lasix 20 mg daily, weight down  to 40kg (admit 39)      GI care:   # Hyperbilirubinemia:   # Transaminitis  # Alcoholic fatty liver disease  - AST/ALT elevated, likely representing critical illness and underlying alcoholic fatty liver disease.  - Hepatology consult, appreciate recommendations. Patient will need follow up with hepatology upon discharge.   - 11/27: abdominal US w/ dopplers -- cholelithiasis w/o evidence of cholecystitis. Mild ascites present.      Fluids/ Electrolytes/ Nutrition:   # Severe Protein Calorie Malnutrition  # Hypernatremia, now resolved.   - NJ placed. TF@ goal. RD following. MVI. Micronutrients ordered.   - Sodium improved. Na 141--normal keep FWF 60cc every 4 hours.  On electrolyte replacement protocol. Replenish lytes as needed     Renal/ Fluid Balance:    - Will continue to monitor intake and output.  - remove mckinney today.  - daily weights       Endocrine:    # no issues   - hypoglycemia protocol--monitor for signs of symptoms of hypo/her glycemia and treat as indicated     ID/ Antibiotics:  # polymicrobial pneumonia--Klebisella/E. COli   # leukocytosis  - wbc improving  - E coli  sputum from 11/24. Resistant to Zosyn, Ampicillin and FQs s/p 7 days course of Rocephin  -12/3 sputum with E. Coli and kelbsiella-- On ceftriaxone since both sensitive 1g Q24h, plan for 7 day course  - C Diff PCR neg 12/1  - 12/3 BC x 1 (from PICC) with gram positive cocci in clusters--found to be staph hominins, repeat cultures sent 11/5/17--NGTD thus far. contaminant will not treat at this time.       Heme:     # Anemia of critical illness  # Acute blood loss anemia  - no need for transfusion today.  hgb 9.1.  no outward signs of bleeding or blood loss.   - Continue to monitor and trend.     MSK   # weakness and deconditioning of critical illness   - PT/OT consults      General cares and Prophylaxis:    - DVT: Lovenox 30mg SCI   - Protonix 40 mg qD for GI ppx (PTA)      Lines/ tubes/ drains:  - PICC  - NJ  - Tracheostomy   - CAMACHO exiting  left neck       Disposition:  - Surgical ICU.       Patient seen and discussed with surgical ICU staff,  Dr. Lu      Time spent on this Encounter   Billing:  I spent 40 minutes bedside and on the inpatient unit today managing the critical care of Alexia Flor in relation to the issues listed in this note.     Madi Bradford PA-C   ====================================    SUBJECTIVE:  Course reviewed. No major events overnight. Intermittently restless and appears anxious when left alone per nursing. Pt awake, follows commands. Does not answer ROS questions.     OBJECTIVE:   1. VITAL SIGNS:   Temp:  [97.1  F (36.2  C)-98.7  F (37.1  C)] 97.7  F (36.5  C)  Heart Rate:  [69-99] 76  Resp:  [11-38] 16  BP: ()/() 140/70  FiO2 (%):  [30 %-60 %] 40 %  SpO2:  [88 %-100 %] 91 %  Ventilation Mode: Trach collar  FiO2 (%): 40 %  Rate Set (breaths/minute): 18 breaths/min  Tidal Volume Set (mL): 300 mL  PEEP (cm H2O): 5 cmH2O  Pressure Support (cm H2O): 7 cmH2O  Oxygen Concentration (%): 40 %  Resp: 16    2. INTAKE/ OUTPUT:   I/O last 3 completed shifts:  In: 2860 [NG/GT:1780]  Out: 479 [Urine:135; Drains:44; Stool:300]    3. PHYSICAL EXAMINATION:    General: sitting up in bed, appears awake.   Neuro: awake, alert, follows commands during interview    Neck: trached and on trach dome. Trach present and secured. No redness or drainage.    Resp:Lungs clear but coarse, decreased at bases.   CV: RRR, S1, S2, no murmurs, rubs or gallops.   Abdomen: Soft, Non-distended, non-tender, no guarding or rigidity  Incisions: neck incision without redness, drainage. CAMACHO present and secured, pink serous fluid in tubing    Extremities: warm and well perfused, calves soft and non-tender. Cap refill brisk.  PP2+.     4. INVESTIGATIONS:   Arterial Blood Gases     Recent Labs  Lab 12/04/17  0322 12/03/17  0425 12/02/17  0332   PH 7.46* 7.49* 7.41   PCO2 41 40 41   PO2 109* 101 81   HCO3 29* 31* 26     Complete Blood Count     Recent  Labs  Lab 12/08/17  0350 12/07/17  0333 12/06/17  0335 12/05/17  1334  12/05/17  0845   WBC 19.1* 21.0* 28.7*  --   --  26.3*   HGB 8.1* 7.9* 9.1* 9.6*  < > 7.1*    224 247  --   --  277   < > = values in this interval not displayed.  Basic Metabolic Panel    Recent Labs  Lab 12/08/17  0350 12/07/17  0333 12/06/17  0335 12/05/17  1334    148* 150* 150*   POTASSIUM 4.0 3.8 3.8 4.1   CHLORIDE 110* 116* 117* 118*   CO2 26 25 26 25   BUN 20 27 38* 38*   CR 0.58 0.70 0.74 0.78   * 89 104* 139*     Liver Function Tests    Recent Labs  Lab 12/05/17  0845 12/04/17  0322 12/03/17  0425 12/02/17  0332   AST 51* 123* 136* 124*   ALT 54* 75* 75* 63*   ALKPHOS 358* 480* 502* 443*   BILITOTAL 1.8* 1.6* 1.8* 2.6*   ALBUMIN 2.1* 2.0* 2.1* 2.2*   INR 1.08  --   --   --      Pancreatic Enzymes  No lab results found in last 7 days.     Coagulation Profile    Recent Labs  Lab 12/05/17  0845   INR 1.08   PTT 30     Lactate  Invalid input(s): LACTATE    5. RADIOLOGY:   Recent Results (from the past 24 hour(s))   XR Chest Port 1 View    Narrative    XR CHEST PORT 1 VW, 12/7/2017 1:43 AM.    Comparison: 12/6/2017.    History: Interval imaging; .    Findings:   Left PICC tip mid SVC. Tracheostomy in the high intrathoracic trachea.  Feeding tube coursing toward stomach tip not visualized. Cardiac  silhouette is within normal limits. No pneumothorax. Unchanged small  bilateral pleural effusion. Diffuse interstitial opacities. Patchy  right side opacities. Left basilar atelectasis. Surgical clips over  the right chest. Surgical clips and staples over the neck.      Impression:   1. Unchanged small bilateral pleural effusions.  2. Unchanged bilateral diffuse interstitial opacities which may  represent pulmonary edema and/or atypical infection. There has been  mild increase in superimposed patchy right lower lung opacities, representing atelectasis or infection.  3. Stable support devices.    I have personally reviewed the  examination and initial interpretation  and I agree with the findings.    SHAWNA OTT MD       =========================================

## 2017-12-08 NOTE — PROGRESS NOTES
Care Coordinator- Discharge Planning     Admission Date/Time:  11/21/2017  Attending MD:  Heriberto George MD     Data  Date of initial CC assessment:  11/28/17  Chart reviewed, discussed with interdisciplinary team.   Patient was admitted for:   1. Acute post-operative pain         Assessment  Full assessment completed in previous note.  Pt is in ICU with post-op invasive SCC 11/21.  Admitted to SICU for assistance with EtOH withdrawal, possible need for ativan gtt, and respiratory monitoring. Underwent tracheostomy and evacuation of L neck hematoma 11/23.  Previous RNCC has made LTACH referral to both [facility] and [place].  ENT service has been primary with SICU consulting.      Coordination of Care and Referrals: Provided patient/family with options for LTACH. Spoke with pt's mother Matilde Pearce ([phone]) regarding decision about LTACH.  Matilde stated that she would choose [facility] based on location to her home in [place].  Matilde asked that I call pt's ex- Diego to discuss.  I explained that I would talk with Diego but ultimately, she is the legal NOK since we were unable to reach pt's daughter Tatiana Clayton would be the decision maker.  After discussions with both Diego and Matilde, both have agreed that [facility] is their choice for LTACH based on location. I have contacted both ENT and SICU services and they have said that pt is medically stable for transfer today. I have updated [facility] liaison that family has chosen their facility for transfer after she is stable for transfer.  Colleen updated met that there are no beds today but she will authorize insurance and put pt on their waiting list for transfer when bed is available. RNCC to follow for discharge planning.         Plan  Anticipated Discharge Date:  12/09, 12/10 or 12/11 when bed available at [facility]    Anticipated Discharge Plan:  Transfer to [facility]      Ricardo Linder RN, BSN  ICU Care Coordinator  Pager:  [phone]  Phone:  [phone]

## 2017-12-08 NOTE — PLAN OF CARE
Problem: Patient Care Overview  Goal: Plan of Care/Patient Progress Review  Outcome: Improving  D: 62-year-old female s/p left partial glossectomy with primary closure and left neck dissection for invasive SCC 11/21.   I/A: PERRL, equal strength bilaterally, writing questions about the next steps in her care plan and expressing her desire to go home and live independently. Pt insisted on getting up to the chair at 0500, and she was up to the commode x 3 with standby assist. She also began using her call light, communication much improved. Pt impulsive, now able to get out of bed without assist, bed alarm on. At times she had panic attacks about her breathing, O2 sats dropped, needed encouragement for deep breathing. NSR, SBP < 160 with labetalol x 1, afebrile. Trach dome overnight with FiO2 40-50%. Small, thick, in-line green mucous secretions. TF at goal of 45 with flush of 50 mL free water every hour. Neck incisions and pressure injury reddened, serosanguinous drainage and large clot at CAMACHO site, SICU MD came to bedside, site care done. Please see flowsheets for for vitals and assessments.  P: Transfer to floor when appropriate. Continue to monitor and treat as ordered. Offer support to patient and family as able.

## 2017-12-08 NOTE — PROGRESS NOTES
"Otolaryngology Progress Note  12/8/17      S: Patient began pulling at lines overnight requiring soft mitts to be applied. Patient alert, but remains confused and intermittently follows commands.      O: Vital signs:  Vital signs:  Temp: 97  F (36.1  C) Temp src: Axillary BP: 138/79   Heart Rate: 79 Resp: 20 SpO2: 95 % O2 Device: Trach dome Oxygen Delivery:  (40 LPM) Height: 155 cm (5' 1.02\") Weight: 40.3 kg (88 lb 13.5 oz)  General: Awake, nods to some questions. Intermittently agitated and bats hands while being examined, following occasional commands  HEENT: Oral tongue mildly edematous, but soft. Left neck full and swollen, no fluctuance. Erythematous around incisions. Small incision from the pressure ulcer excision is c/d/i. Skin between the neck incision and ulcer excision site is thinned and violaceck holding bulb suction with sanguinous drainage. 6-0 shiley cuffed sutured in place with trach ties around neck, thick tan secretions, eous. CAMACHO drain x 1 in the left neck holding bulb suction with sanguinous drainage. Optiform Dressing in place.  Pulmonary: Breathing non-labored on HTD        Intake/Output Summary (Last 24 hours) at 12/08/17 1442  Last data filed at 12/08/17 1400   Gross per 24 hour   Intake             2860 ml   Output               44 ml   Net             2816 ml         CAMACHO drain output(s): (last 24 hours)/(last shift)   Left neck: 14, NR, 32 (46)      CBC RESULTS:   Recent Labs   Lab Test  12/08/17   0350   WBC  19.1*   RBC  2.50*   HGB  8.1*   HCT  25.7*   MCV  103*   MCH  32.4   MCHC  31.5   RDW  18.6*   PLT  262      Micro:  12/3/17:  Blood cx (PICC site): staph hominis  Urine cx: No growth  Sputum cx: e. Coli, candida, klebsiella oxytoca       A/P: Alexia Flor is a 62 year old female with a past medical history of alcohol abuse, diastolic heart failure, COPD, breast cancer s/p chemoradiation, and a left tongue SCC on 11/21/17 now POD#17 s/p left partial glossectomy with primary closure " and left MRND and POD#15 following awake tracheostomy and neck expoloration for left neck hematoma evacuation, POD#3 s/p drainage of left neck hematoma, control of bleeding causing hematoma, excision of 5 x 2 cm full thickness skin breakdown wound and closure of defect.       Neuro:  - Pain control per SICU: Tylenol PRN, oxycodone 2.5mg   - Sedation off  - Agitation- Seroquel 12.5mg QHS added today, Haldol 5mg PRN  - Hx of significant Alcoholism                          -Thiamine and folate, B12, multivitamin       HEENT:  - Incision cares: clean with 0.9% sodium chloride and apply Aquaphor Q8H   - Monitor and record CAMACHO drain output Q8H  - Peridex oral rinses 4 times daily   - Soft red rosales suction to oral cavity only - no yankeur      Trach Care Instructions:   - OK for RN to change trach ties and dressing under trach  - Mepilex or sponge under trach and left neck trach ties to off load pressure and prevent further skin irritation/breakdown   - Trach changed 12/5, uncomplicated trach change with stable tract  - Perform regular suctioning   - RN should change disposable inner canulas every shift and PRN   - Keep trach tube obturator taped to wall behind the head of the bed   - Keep extra unopened 6-0 cuffed Shiley and 4-0 cuffed Shiley at bedside at all times   - Minimize the amount of air in the cuff needed to adequately apply positive pressure ventilate. If positive pressure ventilation is not need then the cuff should be down.   - Contact ENT on-call with any questions or concerns   - SLP consult for PMSV trial      Respiratory:  - Aspiration pneumonia: Intraop aspiration event on 11/23 s/p 1 week of Rocephin, now with new growth of e.coli and klebsiella on sputum culture 12/4, see ID section below.   - HTD at all times, wean FiO2 as able  - Hx COPD, no PTA inhalers/meds in EPIC      CV/heme:   - Closely monitoring hemodynamic status  - hypertensive: metoprolol 25mg BID, hydralazine PRN, labetalol PRN   -  Acute blood loss anemia on chronic anemia: continue PTA Iron supplement. Hgb 8.1 (7.9) 12/5, received 3 units PRBCs 12/6 due to neck bleeding/hematoma. Monitor closely and transfuse for hgb < 7                          - Given recurrent episodes of bleeding/hematoma recommend further coagulopathy workup, platelet function test pending per SICU   - Hx diastolic heart failure: holding home lasix, defer to SICU for management  - TTE 11/24 showed EF of 60-65%      FEN/GI:  - NPO  - TFs via NJ, at goal.    - Certavite  - pantoprazole   - No bowel meds at this time due to diarrhea, rectal tube removed yesterday. C-diff was negative  - Electrolyte replacement per protocol.   - GI/Hepatology consulted due to elevated liver enzymes and hyperbilirubinemia, appreciate recs.                         - Abdominal US 11/27 with cholelithiasis w/o evidence of cholecystitis, mild ascites                         - No acute intervention       /Renal:  - Adequate UOP per nursing, incontinent   - Management of metabolic alkalosis per SICU team      Endo  - sliding scale for diabetic management. BG wnl      ID: Leukocytosis 19.1 (21)  - Aspiration pneumonia: 11/24 Sputum culture growing e.coli resistant to Zosyn. Completed 7 day course of Ceftriaxone on 12/3  - Sputum culture 12/4 with e.coli, klebsiella, and candida, started ceftriazone 12/7/17 yesterday for 7 day course      PPX:  - SQ Heparin per sicu residents  - SCDs      --  Patient and above plan discussed with Dr. Doris Jean Baptiste MD ([id number])  Otolaryngology-Head & Neck Surgery  Please contact ENT by dialing [phone] and entering job code [phone].

## 2017-12-09 NOTE — PROGRESS NOTES
SURGICAL ICU PROGRESS NOTE  December 9, 2017      CO-MORBIDITIES:   Acute post-operative pain  (primary encounter diagnosis)    ASSESSMENT: Alexia Flor is a 62-year-old female with PMH alcohol use disorder, MDD, Breast Cancer, tongue cancer, alcoholic fatty liver, COPD, and diastolic heart failure now s/p left partial glossectomy with primary closure and left neck dissection for invasive SCC 11/21.  Admitted to SICU for assistance with EtOH withdrawal, possible need for ativan gtt, and respiratory monitoring. Underwent tracheostomy and evacuation of L neck hematoma 11/23. Acutely bleeding with indurated left neck incision on 12/5 which required emergent return to the operating room. Stable since and no acute events since that time.      CHANGES FOR TODAY :  - will discuss with primary service regarding long term rehab and transfer of care plans.       PLAN:  Neuro/ pain/ sedation:   # Acute postoperative pain   # Delirium   # Hx of depression   # Hx of alcohol abuse s/p alcohol withdrawal   - Monitor neurological status, continue efforts to support circadian rhythm.   - Delirium preventions and precautions.   - seroquel  25 at beditme, prn haldol, PRN tylenol, scheduled tylenol on hold given elevated AST/ALT.  - melatonin 5 mg at night to aid in sleep.   - prn oxycodone for pain   - continue with thiamine/folic acid and MVI.      # nicotine disorder  - nicotine patch ordered      Pulmonary care:   # Acute respiratory failure s/p tracheostomy   # E. Coli/Klebisella pneumonia   # COPD, no home o2 or inhalers per notes   - Has been on trach dome >24 hours. FiO2 40%, intermittent CPAP overnight.   - Most recent sputum with E. Coli/Kleb--12/3/17-- Rocephin 12/7-12/13.  WBC normal and pt afebrile.  - continue with albuterol inhaler.      Cardiovascular:    # Hypertension  # Hx of CHF   - Monitor hemodynamic status.   -Continue with metoprolol 25 mg BID and  PRN anti-hypertensive as needed  - Resume home lasix 20 mg  daily, weight down to 40kg (admit 39)       GI care:   # Hyperbilirubinemia:   # Transaminitis  # Alcoholic fatty liver disease  - AST/ALT elevated, likely representing critical illness and underlying alcoholic fatty liver disease.  - Hepatology consult, appreciate recommendations. Patient will need follow up with hepatology upon discharge.   - 11/27: abdominal US w/ dopplers -- cholelithiasis w/o evidence of cholecystitis. Mild ascites present.      Fluids/ Electrolytes/ Nutrition:   # Severe Protein Calorie Malnutrition  # Hypernatremia, now resolved.   - NJ placed. TF@ goal. RD following. MVI. Micronutrients ordered.   - Sodium improved 142-  keep FWF 60cc every 4 hours.  On electrolyte replacement protocol. Replenish electrolytes as needed     Renal/ Fluid Balance:    - Will continue to monitor intake and output..  - daily weights       Endocrine:    # no issues   - hypoglycemia protocol--monitor for signs of symptoms of hypo/her glycemia and treat as indicated      ID/ Antibiotics:  # polymicrobial pneumonia--Klebisella/E. COli   # leukocytosis  - wbc improving  - E coli  sputum from 11/24. Resistant to Zosyn, Ampicillin and FQs s/p 7 days course of Rocephin 12/6-13  -12/3 sputum with E. Coli and kelbsiella-- on appropriate abx  - C Diff PCR neg 12/1  - 12/3 BC x 1 (from PICC) with gram positive cocci in clusters--found to be staph hominins, repeat cultures sent 11/5/17--NGTD thus far. contaminant will not treat at this time.       Heme:     # Anemia of critical illness  # Acute blood loss anemia  - no need for transfusion today.   - Continue to monitor and trend.      MSK   # weakness and deconditioning of critical illness   - PT/OT consults       General cares and Prophylaxis:    - DVT: Lovenox 30mg SCI   - Protonix 40 mg  (home med)      Lines/ tubes/ drains:  - PICC  - NJ  - Tracheostomy   - CAMACHO exiting left neck       Disposition:  - Surgical ICU.       Patient seen and discussed with surgical ICU staff,   Dr. Kailyn Gomez MD  [facility]  [id number]  [id number]  ====================================    TODAY'S PROGRESS:   SUBJECTIVE:   - No acute events overnight, intermittently on CPAP for some desaturations while she was sleeping. Back on trach dome this morning with FiO2 50. Alert and responsive.        OBJECTIVE:   1. VITAL SIGNS:   Temp:  [97  F (36.1  C)-98.5  F (36.9  C)] 98.5  F (36.9  C)  Heart Rate:  [66-83] 75  Resp:  [12-28] 18  BP: ()/(46-79) 124/55  FiO2 (%):  [40 %-60 %] 50 %  SpO2:  [92 %-100 %] 100 %  Ventilation Mode: CPAP/PS  FiO2 (%): 50 %  PEEP (cm H2O): 5 cmH2O  Pressure Support (cm H2O): 7 cmH2O  Oxygen Concentration (%): 50 %  Resp: 18    2. INTAKE/ OUTPUT:   I/O last 3 completed shifts:  In: 2290 [I.V.:50; NG/GT:1160]  Out: 35 [Drains:35]    3. PHYSICAL EXAMINATION:   General:   Neuro: Alert and following commands. Non verbal.   Resp: Breathing non-labored on trach dome.   CV: RRR  Abdomen: Soft, Non-distended, Non-tender  Incisions: c/d/i on right neck.   Extremities: warm and well perfused    4. INVESTIGATIONS:   Arterial Blood Gases     Recent Labs  Lab 12/04/17  0322 12/03/17  0425   PH 7.46* 7.49*   PCO2 41 40   PO2 109* 101   HCO3 29* 31*     Complete Blood Count     Recent Labs  Lab 12/09/17  0411 12/08/17  0350 12/07/17  0333 12/06/17  0335   WBC 15.8* 19.1* 21.0* 28.7*   HGB 7.7* 8.1* 7.9* 9.1*    262 224 247     Basic Metabolic Panel    Recent Labs  Lab 12/09/17  0411 12/08/17  0350 12/07/17  0333 12/06/17  0335    141 148* 150*   POTASSIUM 4.3 4.0 3.8 3.8   CHLORIDE 110* 110* 116* 117*   CO2 26 26 25 26   BUN 21 20 27 38*   CR 0.60 0.58 0.70 0.74   * 132* 89 104*     Liver Function Tests    Recent Labs  Lab 12/05/17  0845 12/04/17  0322 12/03/17  0425   AST 51* 123* 136*   ALT 54* 75* 75*   ALKPHOS 358* 480* 502*   BILITOTAL 1.8* 1.6* 1.8*   ALBUMIN 2.1* 2.0* 2.1*   INR 1.08  --   --      Pancreatic Enzymes  No lab results found in last 7 days.  Coagulation  Profile    Recent Labs  Lab 12/05/17  0845   INR 1.08   PTT 30     Lactate  Invalid input(s): LACTATE    5. RADIOLOGY:   Recent Results (from the past 24 hour(s))   XR Chest Port 1 View    Narrative    XR CHEST PORT 1 VW, 12/9/2017 3:43 AM.    Comparison: 12/8/2017.    History: Interval imaging; .    Findings:   Left PICC tip mid SVC. Tracheostomy in the high/mid intrathoracic  trachea. Feeding tube coursing toward stomach tip not visualized.  Cardiac silhouette is within normal limits. No pneumothorax. Unchanged  small bilateral pleural effusion. Diffuse interstitial opacities.  Bibasilar opacities. Surgical clips over the right chest. Surgical  clips and staples over the neck.      Impression    Impression:   1. Unchanged small bilateral pleural effusions.  2. Slightly increased bilateral diffuse interstitial opacities which  may represent pulmonary edema and/or atypical infection.   3. Stable support devices.    I have personally reviewed the examination and initial interpretation  and I agree with the findings.    MARY VERDUZCO MD       =========================================

## 2017-12-09 NOTE — PLAN OF CARE
Problem: Patient Care Overview  Goal: Plan of Care/Patient Progress Review  Outcome: No Change  Neuro: Afebrile, kailey orientation  Cardio: Sinus rhythm with rare PVCs, mg and ph replaced per protocol, SB<160 (goal)  Resp: Trach dome 40-60%, coarse with exp. wheezes, shallow and tachypnea, switched to PS at 1800 7/5 settings, shiley #6  GI: Loose/watery stools, tried 3 rectal pouches (didn't stay sealed on) and 1 rectal tube (pt bearing down and pushing balloon out of rectum), incontinent, TF goal, FW changed to 60 q 4 hours  : PO lasix, incontinent  Access: PICC, caps changed, saline locked  Pain: PRN oxy and tylenol  Patient restless throughout most of shift, prn haldol given this morning/seroquel dose increased for bedtime, patient pulling mitts off and restraints had to be placed.  Attention seeking behavior displayed.  Pt will be restless when RN out of room, but will pretend to sleep when RN in the room.  P: Continue current cares.

## 2017-12-09 NOTE — PROGRESS NOTES
"Otolaryngology Progress Note  12/9/17      Overnight events: Patient trach domed for about 24 hours but was put back on pressure support yesterday from tachypnea, increased WOB and desaturation. Continues to have episodes of disorientation and confusion    S: Patient is trach doming this morning and indicates some difficulty breathing         O: Vital signs:  Vital signs:  Temp: 98.9  F (37.2  C) Temp src: Oral BP: 107/52   Heart Rate: 73 Resp: 14 SpO2: 100 % O2 Device: Mechanical Ventilator Oxygen Delivery:  (40 LPM) Height: 155 cm (5' 1.02\") Weight: 41.5 kg (91 lb 7.9 oz)  General: Awake, alert and pleasant. Responds to questions. No agitated. Follows most commands. Appears to be working a little harder to breathe  HEENT: Oral tongue mildly edematous, but soft. Left neck full and swollen, no fluctuance. Erythematous around incisions. Small incision from the pressure ulcer excision is c/d/i. Skin between the neck incision and ulcer excision site is thinned and violaceck holding bulb suction with sanguinous drainage. 6-0 shiley cuffed sutured in place with trach ties around neck, thick tan secretions, eous. CAMACHO drain x 1 in the left neck holding bulb suction with sanguinous drainage. Optiform Dressing in place.  Pulmonary: tachypneic to 22 with mildly increased work of breathing. Patient indicated that she is having some difficulty with breathing so was placed back on pressure support.        Intake/Output Summary (Last 24 hours) at 12/09/17 1251  Last data filed at 12/09/17 1100   Gross per 24 hour   Intake             1900 ml   Output               35 ml   Net             1865 ml             CAMACHO drain output(s): (last 24 hours)/(last shift)   Left neck: 12/NR/27 (39)/ 8      CBC RESULTS:   Recent Labs   Lab Test  12/09/17   0411   WBC  15.8*   RBC  2.36*   HGB  7.7*   HCT  24.7*   MCV  105*   MCH  32.6   MCHC  31.2*   RDW  18.2*   PLT  257       Micro:  12/3/17:  Blood cx (PICC site): staph hominis  Urine cx: No " growth  Sputum cx: e. 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       Neuro:  - Pain control per SICU: Tylenol PRN, oxycodone 2.5mg   - Sedation off  - Agitation- Seroquel 12.5mg QHS , Haldol 5mg PRN- managed by SICU  - Hx of significant Alcoholism                          -Thiamine and folate, B12, multivitamin       HEENT:  - Incision cares: clean with 0.9% sodium chloride and apply Aquaphor Q8H   - Monitor and record CAMACHO drain output Q8H  - Peridex oral rinses 4 times daily   - Soft red rosales suction to oral cavity only - no yankeur      Trach Care Instructions:   - OK for RN to change trach ties and dressing under trach  - Mepilex or sponge under trach and left neck trach ties to off load pressure and prevent further skin irritation/breakdown   - Trach changed 12/5, uncomplicated trach change with stable tract  - Perform regular suctioning   - RN should change disposable inner canulas every shift and PRN   - Keep trach tube obturator taped to wall behind the head of the bed   - Keep extra unopened 6-0 cuffed Shiley and 4-0 cuffed Shiley at bedside at all times   - Minimize the amount of air in the cuff needed to adequately apply positive pressure ventilate. If positive pressure ventilation is not need then the cuff should be down.   - Contact ENT on-call with any questions or concerns   - SLP consult for PMSV trial      Respiratory:  - Aspiration pneumonia: Intraop aspiration event on 11/23 s/p 1 week  of Rocephin, now with new growth of e.coli and klebsiella on sputum culture 12/4, see ID section below.   - HTD at all times, wean FiO2 as able. Patient still requires intermittent pressure support for respiratory fatigue  - Hx COPD, no PTA inhalers/meds in EPIC      CV/heme:   - Closely monitoring hemodynamic status  - hypertensive: metoprolol 25mg BID, hydralazine PRN, labetalol PRN   - Acute blood loss anemia on chronic anemia:  Received 3 units PRBCs 12/6 due to neck bleeding/hematoma. Continue PTA Iron supplement. Hgb 7.7. Monitor closely and transfuse for hgb < 7                          - Given recurrent episodes of bleeding/hematoma recommend further coagulopathy workup, platelet function test pending per SICU   - Hx diastolic heart failure: holding home lasix, defer to SICU for management  - TTE 11/24 showed EF of 60-65%      FEN/GI:  - NPO  - TFs via NJ, at goal (45ml/h).    - Certavite  - pantoprazole   - No bowel meds at this time due to diarrhea. Previously obtained C-diff was negative  - Electrolyte replacement per protocol.   - GI/Hepatology consulted due to elevated liver enzymes and hyperbilirubinemia, appreciate recs.                         - Abdominal US 11/27 with cholelithiasis w/o evidence of cholecystitis, mild ascites                         - No acute intervention               - SICU monitory      /Renal:  - Adequate UOP per nursing, incontinent   - Management of metabolic alkalosis per SICU team      Endo  - sliding scale for diabetic management. BG wnl      ID: Leukocytosis improving 15.8 <--19.1 <--21)  - Aspiration pneumonia: 11/24 Sputum culture growing e.coli resistant to Zosyn. Completed 7 day course of Ceftriaxone on 12/3  - Sputum culture 12/4 with e.coli, klebsiella, and candida, started ceftriazone 12/7/17 for 7 day course      PPX:  - SQ Heparin per sicu residents  - SCDs    Dispo: Patient is still requiring intermittent pressure support due to respiratory fatigue. Once  patient is ready to transfer to floor, she will be on the medicine service (this was discussed with the medicine team and they agreed to graciously assist with managing her medical problems once out of the ICU)      --  Patient and above plan discussed with Dr. Doris Loredo MD PGY-3  Otolaryngology- Head and Neck Surgery  Pager: [phone]  Please contact ENT with questions by dialing [phone] and entering job code [phone] when prompted.

## 2017-12-09 NOTE — PROGRESS NOTES
63yo F p/w 11/21 L partial glossectomy and L neck dissection r/t tongue CA. Hospitalization c/b hematoma evacuations 11/23 and 12/5. Pt trached 11/23    Neuro- AO to place, orientation difficult to assess r/t trach and poor writing at this time, follows commands, mitts on bilat hands, restless/agitated at times, Seroquel @ bedtime (additional 1x dose of 25mg added this shift), Haldol PRN, Oxycodone PRN Q4  CV- afebrile, NSR, 60-70's, BP goal >160, BPs WNL this shift, Labetalol PRN available if needed, pulses 2/2  Resp- coarse to dim lung sounds, trached w/ shiley #6, small to moderate creamy/tan secretions, PS @ 50% 7/5, Pt trach dome for over 24hrs 12/7 through 12/8, and rested on PS this shift. Plan to attempt trach dome again 0800/0900 today  GI/UG- Loose stools (rectal pouch placed, rectal tube previously failed for pt), incont of urine. x3 incont episodes urine this shift, TF @ goal of 45ml/hr w/ Q4 60ml free water flushes  Skin- Neck incision sutured (serosang) is reddened/edemetous, pressure injury next to trach w/ optifoam dressing, aquaphor applied-scheduled in e-MAR, L CAMACHO in neck w/ minimal output serosang    Plan: Trach dome today. Anna tx Monday. VS WNL at this time WCTM.

## 2017-12-09 NOTE — PLAN OF CARE
Problem: Restraint for Non-Violent/Non-Self-Destructive Behavior  Goal: Prevent/Manage Potential Problems  Maintain safety of patient and others during period of restraint.  Promote psychological and physical wellbeing.  Prevent injury to skin and involved body parts.  Promote nutrition, hydration, and elimination.   Outcome: No Change  Patient pulling mitts off to scratch herself.  Will try restraints off with mitts on bc patient isn't pulling at tubes.

## 2017-12-09 NOTE — PLAN OF CARE
Problem: Patient Care Overview  Goal: Plan of Care/Patient Progress Review  Outcome: Improving  Date of Admission: 21 November 2017    Interval Events:    Respiratory Distress  -Respiratory distress with sudden drop in O2 Sats (78-80's); required <5 min bagging with ventilator support  -Placed on CMV after the second episode  -STAT CXR 2/2 hypoxia  -10 mg IVP Lasix  -0.2 mg Dilaudid for anxiety associated with increased WOB    PICC Line  -Pt accidentally removed PICC line while scratching  -PIV Placed  -Reassess necessity of PICC Line on rounds tomorrow  -Place another PIV tomorrow if PICC Line is not necessary    D/I:  Neuro: Alert/oriented; appropriate; restlessness and anxiety, especially after scheduled nebs; pt has been reporting intermittent pain that has been treated with PRN Oxy and tylenol; she has used her call light appropriately and has alerted staff that when she has to urinate or needs her depends changed  CV:SR; normotensive; no PRNs needed to keep SBP<160  Pulm:coarse LS with expiratory wheezes in all lung fields; thick/copious secretions; scheduled and PRN nebs; #6 Shiley; currently on vent  GI: TF @ goal; loose stools; 2 fecal pouches today, all unsuccessful  : Incontinent of urine; large volumes; has attempted to alert staff when she needs to go, but have usually been too late  Endo:WDL  Heme:Hgb goal is >7; no active s/s of bleeding  ID:afebrile; continues with rocephin  Lines:PIV; #6 Shiley; CAMACHO to left neck  Gtts: SL  Skin: Neck and left incision are edematous; erythema and tender to touch; per ENT, they are slowly improving  Social: No family or friends around today    A: Stable   P: Wean vent as able overnight; manage pain; reassess line needs tomorrow    Please see flow sheets for further information. Thank you.

## 2017-12-09 NOTE — PLAN OF CARE
Problem: Patient Care Overview  Goal: Plan of Care/Patient Progress Review  PT 4A: Cancel, pt with recent respiratory changes and now placed back on vent, not currently appropriate for therapy, will reschedule.

## 2017-12-09 NOTE — PROVIDER NOTIFICATION
PROVIDER NOTIFICATION    Date/Time 09 December 2017 @ 1414   Provider Name/Title Madi Bradford PA-C/SICU   Method of Notification Verbal: Telephone   Notification Reason Sudden onset hypoxemia  1315: Pt placed back on trach dome  1337: Pt O2 Sats 78-80%, cyanotic (dark blue) and gasping for air   Intervention Pt bagged for approximately 5 minutes until recovery obtained  Placed back on CPAP/PS  Suctioned for minimal secretions     Plan of Action Reassess in a couple hours; keep on CPAP/PS for now

## 2017-12-10 NOTE — PLAN OF CARE
Problem: Patient Care Overview  Goal: Plan of Care/Patient Progress Review  Outcome: No Change  N: Alert and follows commands. Very anxious and restless. Gave 25 mg seroquel at bedtime and an additional 25 mg for restlessness. For neck pain, PRN tylenol, dilaudid and oxycodone. Moves independently in bed.   CV: SR 70-90s, BP WNL, afebrile.   PULM: Trach with CMV settings throughout night, PS this AM. LS coarse, occasional wheezes.  GI/: NJ with TF at goal, FW 60 Q4H. Incontinent of stool and urine multiple times.   IV: 1 PIV SL.   SKIN: sores on left side of neck with CAMACHO drain, 10 cc overnight. Reddened bottom.    Plan: Monday acute care center.

## 2017-12-10 NOTE — PROGRESS NOTES
SURGICAL ICU PROGRESS NOTE  December 10, 2017      CO-MORBIDITIES:   Acute post-operative pain  (primary encounter diagnosis)    ASSESSMENT: Alexia Flor is a 62-year-old female with PMH alcohol use disorder, MDD, Breast Cancer, tongue cancer, alcoholic fatty liver, COPD, and diastolic heart failure now s/p left partial glossectomy with primary closure and left neck dissection for invasive SCC 11/21.  Admitted to SICU for assistance with EtOH withdrawal, possible need for ativan gtt, and respiratory monitoring. Underwent tracheostomy and evacuation of L neck hematoma 11/23. Acutely bleeding with indurated left neck incision on 12/5 which required emergent return to the operating room. Stable since and no acute events since that time.      CHANGES FOR TODAY :  - Metoprolol increased today to 37.5 BID  - Seroquel increased to 50mg HS  - ECHO today  - Drain plan per ENT. Will d/w their staff  - Will plan to work on setting up transfer to long term care facility early this week      PLAN:  Neuro/ pain/ sedation:   # Acute postoperative pain   # Delirium   # Hx of depression   # Hx of alcohol abuse s/p alcohol withdrawal   - Monitor neurological status, continue efforts to support circadian rhythm.   - Delirium preventions and precautions.   - seroquel  50 at beditme, prn haldol, PRN tylenol, scheduled tylenol on hold given elevated AST/ALT.  - melatonin 5 mg at night to aid in sleep.   - prn oxycodone for pain   - continue with thiamine/folic acid and MVI.      # nicotine disorder  - nicotine patch ordered      Pulmonary care:   # Acute respiratory failure s/p tracheostomy   # E. Coli/Klebisella pneumonia   # COPD, no home o2 or inhalers per notes   - Nighttime ventilation with trach dome during the day.   - Most recent sputum with E. Coli/Kleb--12/3/17-- Rocephin 12/7-12/13.  WBC normal and pt afebrile.  - continue with albuterol inhaler.      Cardiovascular:    # Hypertension  # Hx of CHF   - Monitor hemodynamic  status.   - ECHO ordered 12/10  -Continue with metoprolol 37.5 mg BID and  PRN anti-hypertensive as needed  - Resume home lasix 20 mg daily, weight down to 40kg (admit 39)       GI care:   # Hyperbilirubinemia:   # Transaminitis  # Alcoholic fatty liver disease  - AST/ALT elevated, likely representing critical illness and underlying alcoholic fatty liver disease.  - Hepatology consult, appreciate recommendations. Patient will need follow up with hepatology upon discharge.   - 11/27: abdominal US w/ dopplers -- cholelithiasis w/o evidence of cholecystitis. Mild ascites present.      Fluids/ Electrolytes/ Nutrition:   # Severe Protein Calorie Malnutrition  # Hypernatremia, now resolved.   - NJ placed. TF@ goal. RD following. MVI. Micronutrients ordered.   - Sodium improved 143-  keep FWF 60cc every 4 hours.  On electrolyte replacement protocol. Replenish electrolytes as needed     Renal/ Fluid Balance:    - Will continue to monitor intake and output..  - daily weights       Endocrine:    # no issues   - hypoglycemia protocol--monitor for signs of symptoms of hypo/her glycemia and treat as indicated      ID/ Antibiotics:  # polymicrobial pneumonia--Klebisella/E. COli   # leukocytosis  - wbc improving  - E coli  sputum from 11/24. Resistant to Zosyn, Ampicillin and FQs s/p 7 days course of Rocephin 12/6-13  -12/3 sputum with E. Coli and kelbsiella-- on appropriate abx  - C Diff PCR neg 12/1  - 12/3 BC x 1 (from PICC) with gram positive cocci in clusters--found to be staph hominins, repeat cultures sent 11/5/17--NGTD thus far. contaminant will not treat at this time.       Heme:     # Anemia of critical illness  # Acute blood loss anemia  - no need for transfusion today.   - Continue to monitor and trend.      MSK   # weakness and deconditioning of critical illness   - PT/OT consults       General cares and Prophylaxis:    - DVT: Lovenox 30mg SCI   - Protonix 40 mg  (home med)      Lines/ tubes/ drains:  - PICC  - NJ  -  Tracheostomy   - CAMACHO exiting left neck       Disposition:  - Surgical ICU.       Patient seen and discussed with surgical ICU staff,  Dr. Kailyn Garza MD   Gen Surg PGY-2  P: [phone]    ====================================    TODAY'S PROGRESS:   SUBJECTIVE:   - No acute events overnight, intermittently on CPAP for some desaturations while she was sleeping. Back on trach dome this morning with FiO2 50. Alert and responsive.        OBJECTIVE:   1. VITAL SIGNS:   Temp:  [97.5  F (36.4  C)-99  F (37.2  C)] 99  F (37.2  C)  Pulse:  [78] 78  Heart Rate:  [73-97] 82  Resp:  [10-21] 11  BP: (125-165)/(61-87) 165/81  FiO2 (%):  [40 %] 40 %  SpO2:  [96 %-100 %] 100 %  Ventilation Mode: CPAP/PS  FiO2 (%): 40 %  Rate Set (breaths/minute): 14 breaths/min  Tidal Volume Set (mL): 300 mL  PEEP (cm H2O): 5 cmH2O  Pressure Support (cm H2O): 7 cmH2O  Oxygen Concentration (%): 40 %  Resp: 11    2. INTAKE/ OUTPUT:   I/O last 3 completed shifts:  In: 2069 [I.V.:119; NG/GT:870]  Out: 15 [Drains:15]    3. PHYSICAL EXAMINATION:   General:   Neuro: Alert and following commands. Non verbal.   Resp: Breathing non-labored on trach dome.   CV: RRR  Abdomen: Soft, Non-distended, Non-tender  Incisions: c/d/i on right neck.   Extremities: warm and well perfused    4. INVESTIGATIONS:   Arterial Blood Gases     Recent Labs  Lab 12/04/17  0322   PH 7.46*   PCO2 41   PO2 109*   HCO3 29*     Complete Blood Count     Recent Labs  Lab 12/10/17  0434 12/09/17  0411 12/08/17  0350 12/07/17  0333   WBC 12.7* 15.8* 19.1* 21.0*   HGB 7.9* 7.7* 8.1* 7.9*    257 262 224     Basic Metabolic Panel    Recent Labs  Lab 12/10/17  0434 12/09/17  0411 12/08/17  0350 12/07/17  0333    142 141 148*   POTASSIUM 4.3 4.3 4.0 3.8   CHLORIDE 109 110* 110* 116*   CO2 27 26 26 25   BUN 20 21 20 27   CR 0.60 0.60 0.58 0.70   * 120* 132* 89     Liver Function Tests    Recent Labs  Lab 12/05/17  0845 12/04/17  0322   AST 51* 123*   ALT 54* 75*    ALKPHOS 358* 480*   BILITOTAL 1.8* 1.6*   ALBUMIN 2.1* 2.0*   INR 1.08  --      Pancreatic Enzymes  No lab results found in last 7 days.  Coagulation Profile    Recent Labs  Lab 12/05/17  0845   INR 1.08   PTT 30     Lactate  Invalid input(s): LACTATE    5. RADIOLOGY:   Recent Results (from the past 24 hour(s))   XR Chest Port 1 View    Narrative    XR CHEST PORT 1 VW, 12/9/2017 3:43 AM.    Comparison: 12/8/2017.    History: Interval imaging; .    Findings:   Left PICC tip mid SVC. Tracheostomy in the high/mid intrathoracic  trachea. Feeding tube coursing toward stomach tip not visualized.  Cardiac silhouette is within normal limits. No pneumothorax. Unchanged  small bilateral pleural effusion. Diffuse interstitial opacities.  Bibasilar opacities. Surgical clips over the right chest. Surgical  clips and staples over the neck.      Impression    Impression:   1. Unchanged small bilateral pleural effusions.  2. Slightly increased bilateral diffuse interstitial opacities which  may represent pulmonary edema and/or atypical infection.   3. Stable support devices.    I have personally reviewed the examination and initial interpretation  and I agree with the findings.    MARY VERDUZCO MD       =========================================

## 2017-12-10 NOTE — PROVIDER NOTIFICATION
MD Blum notified of Pt pulling airway out and grabbing at lines. Airway replaced by respiratory therapy. Restraints placed due to Pt's noncompliance w/ education and re-direction.

## 2017-12-10 NOTE — PROGRESS NOTES
"Otolaryngology Progress Note  12/10/17      Overnight events: Patient had episodes of respiratory distress with drop in her oxygen saturation and required bagging before she was placed back on the vent. She was placed on CMV/AC and has since been weaned back to pressure support (FiO2 40, Peep 5). Chest xray revealed stable lung opacities    S: Sleeping and not interacting this morning        O: Vital signs:  Vital signs:  Temp: 98.4  F (36.9  C) Temp src: Axillary BP: 137/82 Pulse: 78 Heart Rate: 79 Resp: 10 SpO2: 100 % O2 Device: Mechanical Ventilator Oxygen Delivery:  (40 LPM) Height: 155 cm (5' 1.02\") Weight: 41.5 kg (91 lb 7.9 oz)  General: sleeping and not interacting during this encounter  HEENT: Oral tongue with trace edema, but soft. Left neck extending to left face with pitting edema and no fluctuance. Erythematous around incisions with superficial ulcerations depite optifoam. Ulcers have fibrinous base.Skin between the neck incision and ulcer excision site is thinned and violaceous holding bulb suction with sanguinous drainage. 6-0 shiley cuffed sutured in place with trach ties around neck. CAMACHO drain x 1 in the left neck holding bulb suction with sanguinous drainage.   Pulmonary: On pressure support          Intake/Output Summary (Last 24 hours) at 12/10/17 1119  Last data filed at 12/10/17 1000   Gross per 24 hour   Intake             1931 ml   Output               15 ml   Net             1916 ml               CAMACHO drain output(s)ml: (last 24 hours)/(last shift)   Left neck: 8/5/0 (13)/10      CBC RESULTS:   Recent Labs   Lab Test  12/09/17   0411   WBC  15.8*   RBC  2.36*   HGB  7.7*   HCT  24.7*   MCV  105*   MCH  32.6   MCHC  31.2*   RDW  18.2*   PLT  257       Micro:  12/3/17:  Blood cx (PICC site): staph hominis  Urine cx: No growth  Sputum cx: e. 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       Neuro:  - Pain control per SICU: Tylenol PRN, oxycodone 2.5mg   - Sedation off  - Agitation- Seroquel 12.5mg QHS , Haldol 5mg PRN- managed by SICU  - Hx of significant Alcoholism                          -Thiamine and folate, B12, multivitamin       HEENT:  - Incision cares: clean with 0.9% sodium chloride and apply Aquaphor Q8H   - Monitor and record CAMACHO drain output Q8H.   - Peridex oral rinses 4 times daily   - Soft red rosales suction to oral cavity only - no yankeur      Trach Care Instructions:   - OK for RN to change trach ties and dressing under trach  - Mepilex or sponge under trach and left neck trach ties to off load pressure and prevent further skin irritation/breakdown   - Trach changed 12/5, uncomplicated trach change with stable tract  - Perform regular suctioning   - RN should change disposable inner canulas every shift and PRN   - Keep trach tube obturator taped to wall behind the head of the bed   - Keep extra unopened 6-0 cuffed Shiley and 4-0 cuffed Shiley at bedside at all times   - Minimize the amount of air in the cuff needed to adequately apply positive pressure ventilate. If positive pressure ventilation is not need then the cuff should be down.   - Contact ENT on-call with any questions or concerns   - SLP consult for PMSV trial      Respiratory:  - Aspiration pneumonia: Intraop aspiration event on 11/23 s/p 1 week of Rocephin, now with new growth of e.coli and klebsiella on sputum culture 12/4, see ID section below.   - HTD at all times, wean FiO2 as able.   - Hx COPD, no PTA  inhalers/meds in EPIC      CV/heme:   - Closely monitoring hemodynamic status  - hypertensive: metoprolol 25mg BID, hydralazine PRN, labetalol PRN   - Acute blood loss anemia on chronic anemia:  Received 3 units PRBCs 12/6 due to neck bleeding/hematoma. Continue PTA Iron supplement. Hgb 7.9. Monitor closely and transfuse for hgb < 7  - Given recurrent episodes of bleeding/hematoma recommend further coagulopathy workup.  Platelet function test not resulted- discussed with SICU and resident will check with the rest of the team to see if this was actually sent  - Hx diastolic heart failure: holding home lasix, defer to SICU for management  - TTE 11/24 showed EF of 60-65%      FEN/GI:  - NPO  - TFs via NJ, at goal (45ml/h).    - Certavite  - pantoprazole   - No bowel meds at this time due to diarrhea. Previously obtained C-diff was negative  - Electrolyte replacement per protocol.   - GI/Hepatology consulted due to elevated liver enzymes and hyperbilirubinemia, appreciate recs.                         - Abdominal US 11/27 with cholelithiasis w/o evidence of cholecystitis, mild ascites                         - No acute intervention               - SICU monitory      /Renal:  - Adequate UOP per nursing, incontinent   - Management of metabolic alkalosis per SICU team      Endo  - sliding scale for diabetic management. BG wnl      ID: Leukocytosis improving 12.7<--15.8 <--19.1 <--21)  - Aspiration pneumonia: 11/24 Sputum culture growing e.coli resistant to Zosyn. Completed 7 day course of Ceftriaxone on 12/3  - Sputum culture 12/4 with e.coli, klebsiella, and candida, started ceftriazone 12/7/17 for 7 day course      PPX:  - SQ Heparin per sicu residents  - SCDs    Dispo: Patient is still requiringventilatory support due to respiratory fatigue and hypoxia. Once patient is ready to transfer to floor, she will be on the medicine service (this was discussed with the medicine team and they agreed to graciously assist with  managing her medical problems once out of the ICU)      --  Dr. George was updated      Mavis Loredo MD PGY-3  Otolaryngology- Head and Neck Surgery  Pager: [phone]  Please contact ENT with questions by dialing [phone] and entering job code [phone] when prompted.

## 2017-12-11 NOTE — PROGRESS NOTES
"Otolaryngology Progress Note  12/11/17      S: Patient self decannulated x 2 overnight. Trach was easily replaced by RT and soft wrist restraints applied.  Has been tolerating pressure support overnight. TFs at goal and tolerating well. Confused this morning and pushing providers away during exam.         O: Vital signs:  Vital signs:  Temp: 98.6  F (37  C) Temp src: Oral BP: 149/79 Pulse: 78 Heart Rate: 87 Resp: 17 SpO2: 98 % O2 Device: Mechanical Ventilator Oxygen Delivery:  (40 LPM) Height: 155 cm (5' 1.02\") Weight: 41.5 kg (91 lb 7.9 oz)  General: Alert, confused and anxious at times, moving all extremities  HEENT: Oral tongue with trace edema, but soft. Left neck extending to left face with pitting edema and no fluctuance. Erythematous around incisions with superficial ulcerations under trach ties depite optifoam. Ulcers have fibrinous base.Skin between the neck incision and ulcer excision site is thinned and violaceous. 6-0 shiley cuffed sutured in place with trach ties around neck.   Pulmonary: On pressure support via trach        Intake/Output Summary (Last 24 hours) at 12/11/17 0853  Last data filed at 12/11/17 0700   Gross per 24 hour   Intake           1654.5 ml   Output               10 ml   Net           1644.5 ml         ROUTINE IP LABS (Last four results)  BMP  Recent Labs  Lab 12/11/17  0350 12/10/17  0434 12/09/17  0411 12/08/17  0350    143 142 141   POTASSIUM 4.6 4.3 4.3 4.0   CHLORIDE 106 109 110* 110*   SARAH 8.4* 8.3* 8.2* 8.5   CO2 31 27 26 26   BUN 17 20 21 20   CR 0.54 0.60 0.60 0.58   * 106* 120* 132*     CBC  Recent Labs  Lab 12/11/17  0350 12/10/17  0434 12/09/17  0411 12/08/17  0350   WBC 12.0* 12.7* 15.8* 19.1*   RBC 2.50* 2.44* 2.36* 2.50*   HGB 8.0* 7.9* 7.7* 8.1*   HCT 26.2* 25.5* 24.7* 25.7*   * 105* 105* 103*   MCH 32.0 32.4 32.6 32.4   MCHC 30.5* 31.0* 31.2* 31.5   RDW 17.6* 17.8* 18.2* 18.6*    246 257 262     INR  Recent Labs  Lab 12/05/17  0845   INR " 1.08       Micro:  12/3/17:  Blood cx (PICC site): staph hominis  Urine cx: No growth  Sputum cx: e. 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       Neuro:  - Pain control per SICU: Tylenol PRN, oxycodone 2.5mg   - Sedation off  - Agitation- Seroquel 12.5mg QHS , Haldol 5mg PRN- managed by SICU  - Hx of significant Alcoholism             -Thiamine and folate, B12, multivitamin       HEENT:  - Incision cares: clean with 0.9% sodium chloride and apply Aquaphor Q8H   - Monitor and record CAMACHO drain output Q8H.   - Peridex oral rinses 4 times daily   - Soft red rosales suction to oral cavity only - no yankeur      Trach Care Instructions:   - OK for RN to change trach ties and dressing under trach  - Mepilex or optifoam under trach flange and left neck trach ties to off load pressure and prevent further skin irritation/breakdown   - Trach changed 12/5, uncomplicated trach change with stable tract  - Perform regular suctioning   - RN should change disposable inner canulas every shift and PRN   - Keep trach tube obturator taped to wall behind the head of the bed   - Keep extra unopened 6-0 cuffed Shiley and 4-0 cuffed Shiley at bedside at all times   - Minimize the amount of air in the cuff needed to adequately apply positive pressure ventilate. If positive pressure ventilation is not need then the cuff should be down.   - Contact ENT on-call with any questions or concerns   - SLP consult for PMSV trial once back  on trach dome      Respiratory:  - Aspiration pneumonia: Intraop aspiration event on 11/23 s/p 1 week of Rocephin, now with new growth of e.coli and klebsiella on sputum culture 12/4, see ID section below.   - Pressure support via trach, wean to trach dome as tolerated  - Hx COPD, no PTA inhalers/meds in EPIC      CV/heme:   - Closely monitoring hemodynamic status  - HTN: metoprolol 25mg BID, hydralazine PRN, labetalol PRN   - Acute blood loss anemia on chronic anemia:  Received 3 units PRBCs 12/6 due to neck bleeding/hematoma. Continue PTA Iron supplement. Hgb 8.0. Monitor closely and transfuse for hgb < 7  - Given recurrent episodes of bleeding/hematoma recommend further coagulopathy workup.  Platelet function test not resulted- discussed with SICU and resident will check with the rest of the team to see if this was actually sent and whether to resend  - Hx diastolic heart failure: holding home lasix, defer to SICU for management  - TTE 11/24 showed EF of 60-65%      FEN/GI:  - NPO  - TFs via NJ, at goal (45ml/h).    - Certavite  - pantoprazole   - No bowel meds at this time due to diarrhea. Previously obtained C-diff was negative  - Electrolyte replacement per protocol.   - GI/Hepatology consulted due to elevated liver enzymes and hyperbilirubinemia, appreciate recs.                         - Abdominal US 11/27 with cholelithiasis w/o evidence of cholecystitis, mild ascites                         - No acute intervention               - SICU monitoring      /Renal:  - Adequate UOP per nursing, incontinent   - Management of metabolic alkalosis per SICU team      Endo  - sliding scale for diabetic management. BG wnl      ID: Leukocytosis improving 12.0<--12.7<--15.8 <--19.1 <--21  - Aspiration pneumonia: 11/24 Sputum culture growing e.coli resistant to Zosyn. Completed 7 day course of Ceftriaxone on 12/3  - Sputum culture 12/4 with e.coli, klebsiella, and candida, started ceftriazone 12/7/17 for 7 day  course      PPX:  - SQ Heparin per sicu residents  - SCDs    Dispo: Patient is still requiring ventilatory support due to respiratory fatigue and hypoxia. Once patient is ready to transfer to floor, she will be on the medicine service (this was discussed with the medicine team and they agreed to graciously assist with managing her medical problems once out of the ICU). Discharge plan for LTACH once medically stable.       --  Dr. George was updated      Jessica Hernández PA-C  Otolaryngology-Head & Neck Surgery  Please contact ENT by dialing * [phone] and entering job code [phone].

## 2017-12-11 NOTE — PLAN OF CARE
Problem: Patient Care Overview  Goal: Plan of Care/Patient Progress Review  Discharge Planner OT   Patient plan for discharge:   Current status: pt. Juani with sup.<>sit EOB. Pt. Became agitated, declined attempt to transf. to chair.  Pt. Keeping eyes closed for majority of session.  VSS throughout on FIo2 40%, Peep 5.  Barriers to return to prior living situation: medical status, requiring A with BADLs, mobility  Recommendations for discharge: LTACH  Rationale for recommendations: to increase activity tolerance, max. safety/indep. with ADLs and functional mobility.       Entered by: Clara Tripathi 12/11/2017 9:53 AM

## 2017-12-11 NOTE — PROGRESS NOTES
12/11/17 1220   General Information   Patient Profile Review See Profile for full history and prior level of function   Onset of Illness/Injury, or Date of Surgery - Date 11/21/17   Start of Care Date 12/11/17   Date of Tracheostomy Placement 11/23/17   Referring Physician Yary Gandara PA-C   Patient/Family Goals Statement Pt is unable to state, but appears to want to communicate   Pertinent History of Current Problem Pt with PMH alcohol use disorder, MDD, Breast Cancer, tongue cancer, alcoholic fatty liver, COPD, and diastolic heart failure now s/p left partial glossectomy with primary closure and left neck dissection for invasive SCC 11/21.  Admitted to SICU for assistance with EtOH withdrawal, possible need for ativan gtt, and respiratory monitoring. Underwent tracheostomy and evacuation of L neck hematoma 11/23. Acutely bleeding with indurated left neck incision on 12/5 which required emergent return to the operating room. Stable since and no acute events since that time.   Treatment Diagnosis aphonia due to trach   Precautions/Limitations Tracheostomy  (bilateral restraints due to self decannulation x2 overnight)   Handedness Right   Observations Alert  (follows some commands)   Comments Pt is agitated and confused; suspicious of staff and unable to process current situation   Evaluation   Type of Trach Shiley   Size of Trach 6 mm   Oxygen Supply Trach Dome;Humidity   Cuff Inflated at Onset of Evaluation Yes   Orders received to deflate cuff for PMSV trial Yes   Suctioning Required Before Cuff Deflation No   Oxygen saturation before cuff deflation 99 %   Respiratory rate before cuff deflation 14 Per Minute   Suctioning required after cuff deflation No   Oxygen saturation after cuff deflation 99 %   Respiratory rate after cuff deflation 12 Per Minute   Air movement around trach found to be Adequate upon finger occlusion   Voicing noted after PMSV placement: Yes   Oxygen saturation with PMSV placement  99 %   Respiratory Rate with PMSV placement 12 Per Minute   Total amount of time with PMSV placement: 20 minutes   Cuff re-inflated after PMSV trials: No   Prognosis / Impression   Criteria for Skilled Therapeutic Interventions Met Yes   SLP Diagnosis aphonia due to trach   Rehab Potential Good, to achieve stated therapy goals   Rehab potential affected by Pt with poor awareness of current situation   Therapy Frequency Daily   Predicted Duration of Therapy Intervention (days/wks) 3 weeks   Anticipated Discharge Disposition inpatient rehabilitation facility;[facility] (long-term [facility])   Risks and Benefits of Treatment have been explained. yes   Patient, Family & other staff in agreement with plan of care yes   Clinical Impression Comments Communication/trach evaluation completed per MD order. Pt transitioned to trach dome prior to session. Trach cuff deflated without incident. Pt demonstated good air flow around trach with finger occlusion. Some air is leaking around trach at stoma site. PMSV placed x20 minutes without desat. Pt talking almost constantly with good volume, but very difficulty to understand. Pt becomes agitated and suspicious of attempts to elicit repetitions or clarifications. Pt did report some discomfort as session progressed and was noted to have small amount of back pressure with PMSV removal. Recommend continue trach dome with trach cuff deflated. Ok for PMSV use with 1:1 RN supervision for 15-20 minute increments as tolerated. Remove if Pt demonstrating fatigue or shortness of breath. ST to follow closely for PMSV tolerance   Total Evaluation Time   Total Evaluation Time (Minutes) 13

## 2017-12-11 NOTE — PROGRESS NOTES
Care Coordinator- Discharge Planning     Admission Date/Time:  11/21/2017  Attending MD:  Heriberto George MD     Data    Chart reviewed, discussed with interdisciplinary team.   Patient was admitted for:   1. Acute post-operative pain    2. Status post neck dissection    3. Acute and chronic respiratory failure with hypoxia (H)    4. Diarrhea, unspecified type    5. Anxiety    6. Essential hypertension    7. Diastolic dysfunction         Assessment  Full assessment completed in previous note   Pt remain in ICU vented via trach and doing PS/trach dome.  Pt will need LTAC placement for vent weaning.      CC have tried to call pt dtrTatiana several times last week to discuss about the d/c plan.  See previous CC notes on 12/7 and 12/8.    Received a message from pt dtrTatiana today stating that she has remover her self from her mother longtime ago and doesn't want to be involved in her mother care in any way and doesn't want to be contacted ever again.  CC removed Tatiana's contact info from the contact list.  CC have been communicating with pt mom, who is pt NOK next of pt dtr.    CC have made referral for both [facility].  Pt family preference is [facility] based on location.    The team reported pt is medically ready to be transfer to [facility] today.  Col[facility] liaison reported they do have bed today and can accept pt this afternoon.  CC called [facility] transport to arrange transportation # [phone].  Stretcher transport with vent have been arranged for today 5:00 pm  time.   time have been shared with SICU team, bedside RN and charge nurse.  The team agreed to call pt mom and provide update about the d/c plan.  PCS form completed.  Accepting provider is Dr. Stroud # [phone] and the number have been shared with SICU team.  Pt will be going to room # 234, RN # [phone].    CC talked to pt ex- Diego, who has been helping pt mom with choosing the facility.  CC informed  Diego about  time and that pt dtr, Tatiana has called and LVM stating that she doesn't want to be involved in anyway with her mom care and that request not to be contacted any more.  Diego acknowledged that the family need to discuss about pt care giver, and who to have as main contact.  Diego stated pt mom is elderly and she might not be able to be main contact and he is contacting pt siblings ( 4 brothers) to let them know that they need to have discussion with their mom.    Coordination of Care and Referrals: Provided patient/family with options for LTACH.      Plan  Anticipated Discharge Date:  Today, 12/11/17 at 5:00 pm.  Anticipated Discharge Plan:  LTAC, [facility].  Health system transport will provide stretcher transport with vent.       Jing Neely RN, PHN, BSN  4A and 4E/ ICU  Care Coordinator  Phone: [phone]  Pager: [phone]

## 2017-12-11 NOTE — PROCEDURES
Bridle Placement:   Reason for bridle placement: Repositioned as on physical exam today too loose (FT exiting nare @ 98 cm and Bridle clip @ 100 cm marker)  Medicine delivered during procedure: None.  Procedure: Successful reposition.  Location of top of clip on FT: @ 100 cm marker and FT now exiting nare @ 99 cm marker.  Condition of nose/skin at time of bridle placement: Unremarkable   Face to Face time with patient: 10 minutes.    Layla Hudson, RD, LD, [facility] (pgr [phone])

## 2017-12-11 NOTE — PROGRESS NOTES
Transfer   D/I/A: Per primary team patient adequate for transfer to [facility] for ongoing cares. Patient A&Ox4, confused at times. Moving all extremities, following commands. Medicated for pain x2, adequately controlled per patient report. HR NSR. BP elevated, labetalol and hydralazine administered PRN for SBP goal <160. Lungs coarse at times, clear w/ cough and suctioning; #6 shiley in place, inner cannula changed this AM. Trach doming since 9AM. NJ w/ feeds at goal. Frequent stool and urine incontinence, fecal pouch placed. Stool remains loose, cdiff (-). Neck incision denuded, red and edematous - MD following closely - no change to amount of swelling this shift. Perineum reddened. Mother contacted and updated re: status and plan for transfer to LTAC. Attempted to provided RN to RN report to [facility], 3 attempts made, messages left with Staffing RN ([phone]); after no call back was received from staffing RN 2 additional attempts were made to contact any RN at [facility] (charge RN and RN supervisor - message was left for supervisor). Patient was loaded [facility] to [facility] and [facility], report was provided to [facility] paramedic.  P: Transfer care to [facility].

## 2017-12-11 NOTE — PLAN OF CARE
Problem: Patient Care Overview  Goal: Plan of Care/Patient Progress Review  Outcome: No Change  Pt cooperative most of evening, c/o pain, PRN oxycodone, tylenol effective, PRN haldol for anxiety effective. Around 0400 partially removed trach requiring replacement. Hypertensive, SBP up tp 160s. PS all night, occasionally c/o difficulty breathing, calming measures and suction effective. TF at 45cc.hr, goal. Incontinent of urine and stool, requiring brief change about q2h. Left neck appears to be slightly more swollen & painful, ENT notified.

## 2017-12-11 NOTE — DISCHARGE SUMMARY
Discharge Summary  Alexia Hernandez [facility]  [id number]  1955    Date of Admission: 11/21/2017  Date of Discharge: 12/11/2017    Admission Diagnosis: Tongue Cancer   Status post neck dissection  Neck Hematoma  Left Neck Hematoma   Discharge Diagnosis: Same    Procedures:  Date: 11/21/2017  Procedure(s):  1.  Left partial glossectomy.   2.  Left selective neck dissection of levels 1B, 2A, 2B, 3 and 4.     Date: 11/23/2017  Procedures:   1.  Tracheostomy.   2.  Exploration of the left neck and control of bleeding.     Date: 12/05/2017  Procedures:   1.  Drainage of left neck hematoma.   2.  Control of bleeding causing expanding hematoma.   3.  Excision of 5 x 2 cm full thickness skin breakdown wound and closure of defect.     Pathology:  Surgical pathology 11/21/2017:  SPECIMEN(S):   A: Left partial glossectomy   B: Anterior lateral margin   C: Posterior lateral margin   D: Anterior margin   E: Anterior medial margin   F: Posterior medial margin   G: Posterior margin   H: Deep margin   I: Left neck dissection level 2A   J: Left neck dissection level 2B   K: Left neck dissection level 3   L: Left neck dissection level 4   M: Left neck dissection level 1B     FINAL DIAGNOSIS:   A. TONGUE, LEFT PARTIAL GLOSSECTOMY:   - Invasive squamous cell carcinoma, conventional, moderately   differentiated   - Tumor site: Left lateral tongue   - Tumor size: 2.3 cm   - The depth of invasion: 0.4 cm   - Margins negative for carcinoma   - Lymphovascular invasion present, focal   - Perineural invasion present, focal   - Squamous cell carcinoma in situ   - See tumor staging summary for details     B. TONGUE, ANTERIOR LATERAL MARGIN, BIOPSY:   - Squamous mucosa, negative for dysplasia or malignancy     C. TONGUE, POSTERIOR LATERAL MARGIN, BIOPSY:   - Squamous mucosa, negative for dysplasia or malignancy     D. TONGUE, ANTERIOR MARGIN, BIOPSY:   - Squamous mucosa, negative for dysplasia or malignancy     E. TONGUE, ANTERIOR MEDIAL MARGIN, BIOPSY:    - Squamous mucosa with Actinomyces colonization, negative for dysplasia   or malignancy     F. TONGUE , POSTERIOR MEDIAL MARGIN, BIOPSY:   - Squamous mucosa, negative for dysplasia or malignancy     G. TONGUE , POSTERIOR MARGIN, BIOPSY:   - Squamous mucosa, negative for dysplasia or malignancy     H. TONGUE , DEEP MARGIN, BIOPSY:   - Skeletal muscle tissue, negative for malignancy     I. LYMPH NODES, LEFT NECK DISSECTION LEVEL 2A, EXCISION:   - Fourteen reactive lymph nodes, negative for malignancy (0/14)     J. LYMPH NODES , LEFT NECK DISSECTION LEVEL 2B, EXCISION:   - Nine reactive lymph nodes, negative for malignancy (0/9)     K. LYMPH NODES , LEFT NECK DISSECTION LEVEL 3, EXCISION:   - Four reactive lymph nodes, negative for malignancy (0/4)     L. LYMPH NODES, LEFT NECK DISSECTION LEVEL 4, EXCISION:   - Three reactive lymph nodes, negative for malignancy (0/3)     M. LYMPH NODES , LEFT NECK DISSECTION LEVEL 1B, EXCISION:   - Two reactive lymph nodes, negative for malignancy (0/2)   - Salivary gland tissue, negative for malignancy     HPI: Alexia Flor is a 62 year old female with history of EtOH abuse and dependence, alcohol induced pancreatitis, alcoholic fatty liver, cholelithiasis, diastolic heart failure, COPD, breast cancer s/p radiation therapy, Schafer's esophagus, anxiety, MDD, tobacco use, and recently diagnosed squamous cell carcinoma of the left tongue. It was recommended that she undergo operative intervention and the patient consented to the above procedure after detailed explanation of the risks and benefits of said procedure.    Hospital Course: The patient was admitted to the hospital and underwent the above mentioned procedure. She tolerated the procedure without any intra- or haresh-operative complications. Please see the operative report for full details of the procedure. The patient was admitted for post-operative monitoring. Her postoperative course was complicated by alcohol withdrawal on  POD1 causing confusion and subsequent aspiration event leading to increased supplemental oxygen needs and requiring transfer to ICU for closer monitoring. She developed an enlarging hematoma at the surgical site in the left neck on POD1 requiring emergent return to OR for evacuation of hematoma and tracheostomy. Intraoperatively she had a large emesis and aspiration of tube feeding which caused aspiration pneumonia and prolonged mechanical ventilation requirements. Her mental status waxed and waned throughout the remainder of her admission with intermittent agitation requiring Seroquel and Haldol PRN. Her aspiration pneumonia was treated with a course of IV ceftriaxone for 7 days. Unfortunately she continued to have leukocytosis and ventilator requirements and sputum cultures continued to grow E.coli and klebsiella despite her 7 day treatment with ceftriaxone (cultures sensitive to ceftriaxone). Antibiotics were restarted for another 7 day course and her leukocytosis improved. She was able to be weaned off of mechanical support to trach dome and intermittent pressure support via tracheostomy. Throughout the course of her hospital stay she developed a full thickness pressure ulcer under the left inferior trach flange due to persistent neck flexion to that side despite attempts to turn and off-set pressure. She developed another left neck hematoma on 12/5/2017 and required emergent return to OR for control of bleeding, evacuation of hematoma. The left neck pressure ulcer was excised and closed primarily with sutures at the time of return to OR. Please see operative reports for full details of the above mentioned procedures.  At discharge to [facility], the patient's pain was well controlled, the patient was voiding on her own, and tolerating a continuous tube feeding diet via NJ tube.     Discharge Exam:  Vitals:    12/11/17 0600 12/11/17 0800 12/11/17 0900 12/11/17 1000   BP: 149/79 171/89 131/60 115/58   BP Location:   Right arm     Pulse:       Resp: 17 11 15 13   Temp:  98.4  F (36.9  C)     TempSrc:  Axillary     SpO2: 98% 98% 99% 99%   Weight:       Height:         General: Alert, confused and anxious at times, moving all extremities  HEENT: Oral tongue with trace edema, but soft. Left neck extending to left face with pitting edema and no fluctuance. Erythematous around incisions with superficial ulcerations under trach ties depite optifoam. Ulcers have fibrinous base.Skin between the neck incision and ulcer excision site is thinned and violaceous. 6-0 shiley cuffed sutured in place with trach ties around neck.   Pulmonary: On pressure support via trach         Alexia Flor   Home Medication Instructions BART:[id number]    Printed on:12/11/17 1323   Medication Information                      acetaminophen (TYLENOL) 325 MG tablet  2 tablets (650 mg) by Oral or Feeding Tube route every 8 hours as needed for mild pain             albuterol (2.5 MG/3ML) 0.083% neb solution  Take 1 vial (2.5 mg) by nebulization every 4 hours as needed for wheezing             albuterol (PROAIR HFA/PROVENTIL HFA/VENTOLIN HFA) 108 (90 BASE) MCG/ACT Inhaler  Inhale 2 puffs into the lungs 4 times daily as needed for other (dyspnea)             ascorbic acid 500 MG TABS  1 tablet (500 mg) by Oral or Feeding Tube route daily             cefTRIAXone (ROCEPHIN) 1 GM vial  Inject 1 g (1,000 mg) into the vein daily for 2 days             cyanocobalamin 1000 MCG TABS  1,000 mcg by Oral or NG Tube route daily             enoxaparin (LOVENOX) 30 MG/0.3ML injection  Inject 0.3 mLs (30 mg) Subcutaneous every 24 hours             fiber modular (NUTRISOURCE FIBER) packet  3 packets by Per Feeding Tube route 3 times daily             folic acid (FOLVITE) 1 MG tablet  1 tablet (1 mg) by Oral or Feeding Tube route daily             furosemide (LASIX) 20 MG tablet  1 tablet (20 mg) by Oral or Feeding Tube route daily             haloperidol lactate (HALDOL) 5 MG/ML  injection  Inject 1 mL (5 mg) into the vein every 6 hours as needed for agitation             HYDROmorphone (DILAUDID) 0.2 MG/0.2 ML SOLN injection  Inject 0.2 mLs (0.2 mg) into the vein every 2 hours as needed for moderate to severe pain             ipratropium - albuterol 0.5 mg/2.5 mg/3 mL (DUONEB) 0.5-2.5 (3) MG/3ML neb solution  Take 1 vial (3 mLs) by nebulization 4 times daily             labetalol (NORMODYNE/TRANDATE) 5 MG/ML injection  Inject 4 mLs (20 mg) into the vein every 4 hours as needed for high blood pressure (SBP > 160)             loperamide (IMODIUM) 1 MG/5ML liquid  Take 10 mLs (2 mg) by mouth 3 times daily             melatonin 5 MG tablet  1 tablet (5 mg) by Oral or Feeding Tube route every evening             metoprolol (LOPRESSOR) 10 mg/mL SUSP  3.75 mLs (37.5 mg) by Oral or Feeding Tube route 2 times daily             multivitamins with minerals (CERTAVITE/CEROVITE) LIQD liquid  15 mLs by Per Feeding Tube route daily             nicotine (NICODERM CQ) 14 MG/24HR 24 hr patch  Place 1 patch onto the skin daily             oxyCODONE (ROXICODONE) 5 MG/5ML solution  Take 2.5 mLs (2.5 mg) by mouth every 4 hours as needed for moderate to severe pain             oxyCODONE IR (ROXICODONE) 5 MG tablet  Take 1-2 tablets (5-10 mg) by mouth every 4 hours as needed for moderate to severe pain             pantoprazole (PROTONIX) SUSP suspension  20 mLs (40 mg) by Oral or Feeding Tube route 2 times daily (before meals)             QUEtiapine (SEROQUEL) 25 MG tablet  0.5 tablets (12.5 mg) by Oral or Feeding Tube route daily             QUEtiapine (SEROQUEL) 50 MG tablet  1 tablet (50 mg) by Oral or Feeding Tube route every evening             thiamine (VITAMIN B-1) 100 MG tablet  1 tablet (100 mg) by Oral or Feeding Tube route daily                     Discharge Procedure Orders  General info for SNF   Order Comments: Length of Stay Estimate: Short Term Care: Estimated # of Days <30  Condition at Discharge:  Improving  Level of care:skilled   Rehabilitation Potential: Good  Admission H&P remains valid and up-to-date: Yes  Recent Chemotherapy: N/A  Use LTACH Standing Orders: Yes     Reason for your hospital stay   Order Comments: Post-operative care following partial glossectomy and neck dissection complicated by alcohol withdrawal, multiple hematomas at the surgical site, and aspiration pneumonia.     Intake and output   Order Comments: Every shift     Daily weights     Wound care (specify)   Order Comments: Site:   Trach  Instructions:  Place Optifoam dressing under tracheostomy tube flange and change daily and as needed if soiled by secretions. Place another piece of Optifoam or mepilex under the trach ties over the left neck to prevent further skin breakdown.    Site: Neck  Instructions: Apply Aquaphor ointment to neck incision three times daily.     Follow Up and recommended labs and tests   Order Comments: Follow up in ENT clinic with Dr. George once discharged from [facility]. Please call the clinic with to schedule this appointment or with questions/concerns: [phone].    Otolaryngology/ENT Clinic:  [facility]  [address] [place] [address]     Activity - Up with nursing assistance   Order Specific Question Answer Comments   Is discharge order? Yes      Tracheostomy care by nursing   Order Comments: Standard Cares. Suction trach every 8 hours and as needed for secretion management. Change inner cannula at least once every 8 hours and as needed for secretion build up.     Full Code     Physical Therapy Adult Consult   Order Comments: Evaluate and treat as clinically indicated.    Reason:  Physical deconditioning     Occupational Therapy Adult Consult   Order Comments: Evaluate and treat as clinically indicated.    Reason:  Physical deconditioning     Speech Language Path Adult Consult   Order Comments: Evaluate and treat as clinically  indicated.    Reason:  Passy shanel speaking valve as tolerated. Once medically appropriate perform bedside swallow evaluation prior to initiating any oral intake.     Nutrition Services Adult IP Consult   Order Comments: Reason:  Continuous tube feeding via nasojejunostomy tube, Formula: Peptamen 1.5, rate of 45 mL/h. Nothing to eat or drink by mouth until cleared by speech language pathology.     Oxygen - Trach dome   Order Comments: With humidity to keep O2 sats % >  90%. Must wear humidified trach dome at all times, if patient does not require supplement oxygen then should have humidified room air to keep trach secretions thin.     Ventilator   Order Comments: Ventilator support as needed: Pressure support via tracheostomy tube. PEEP 5. Minimum pressure support 5, maximum pressure support 15. Wean FiO2 as able to keep spO2 saturations > 92%         Dispo: To LTACH in stable condition.     Jessica Hernández PA-C  Otolaryngology-Head & Neck Surgery  Please contact ENT by dialing [phone] and entering job code [phone].

## 2017-12-11 NOTE — PLAN OF CARE
Problem: Patient Care Overview  Goal: Plan of Care/Patient Progress Review  Physical Therapy Discharge Summary    Reason for therapy discharge:    Discharged to transitional care facility.    Progress towards therapy goal(s). See goals on Care Plan in [facility] record for goal details.  Goals partially met.  Barriers to achieving goals:   discharge from facility.    Therapy recommendation(s):    Continued therapy is recommended.  Rationale/Recommendations:  [facility], [facility] off [facility] and improve her mobility.

## 2017-12-11 NOTE — PROGRESS NOTES
CLINICAL NUTRITION SERVICES - REASSESSMENT NOTE     Nutrition Prescription    RECOMMENDATIONS FOR MDs/PROVIDERS TO ORDER:  Consider order pancreatic elastase test to evaluate pt for pancreatic exocrine deficiency and need to begin PERT per recs below.    Malnutrition Status:    Severe malnutrition in the context of acute on chronic illness (and suspected social/environmental circumstances with EtOH hx)     Recommendations already ordered by Registered Dietitian (RD):  Increase Nutrisource Fiber to 3 pkts TID (9 pkts/day = 27 g fiber daily)    Future/Additional Recommendations:  1.  If admission to continue and poor vent wean progress, repeat metabolic cart studies weekly to better evaluate changes to REE (kcal) needs and approp of energy provisions.     2.  If/when able to observe stool quality and suspect pt with component of steatorrhea (despite more elemental TF) or confirm pt with pancreatic exocrine deficiency, consider trial of pancreatic enzyme replacement therapy (PERT) as med bolus initially with the following:  --Viokace 10,440 - 2 tablets q 4 hrs as med flush via NJT.   Administration Instructions: Crush 1 tablet, mix into 15 ml of warm water to dissolve well and then administer via FT as med bolus.  Flush FT well after   --If TF rate is running @ 10-20 ml/hr, administer 1 tablet q 4 hrs;   --If TF rate is running @ 30-45 ml/hr, increase to 2 tablets q 4 hrs.    *Note: this enzyme regimen with TF @ goal infusion will provide approx 2088 units of lipase/gram of total Fat daily and approp dosing initially for pancreatic insufficiency with more elemental TF formula.        EVALUATION OF THE PROGRESS TOWARD GOALS   -Diet: NPO (since 11/22)     -Enteral access: initially had 12 Fr NGT placed by ENT but pt did not tolerate gastric TF trial and alternatively placed NJT (10 Fr Cortrak) + Bridle on 11/28    -EN: changed to maintenance/peptide/MCT based formula (given chronic pancreatis with unknown exocrine  function) on 12/4; currently receiving Peptamen 1.5 @ goal 45 ml/hr (1080 ml/day) to provide 1620 kcals (42 kcal/kg/day or 110% of last known MREE of 1469 kcals/day from 11/27 study), 73 g PRO (1.9 g/kg/day), 832 ml free H2O, 60 g Fat (70% from MCTs), 203 g CHO and no Fiber daily.  Additionally, receiving Nutrisource Fiber 1 pkt q 4 hrs (6 pkts = 18 g soluble fiber) since 12/4.    -Intakes: Per I/O documentation, received ave of 1053 ml TF daily the last 5 days providing ave of 1580 kcals (41 kcal/kg/day or 108% last known MREE) and 72 g PRO (1.8 g/kg/day) meeting new  goals for intakes (see below).     NEW FINDINGS   -Resp & Dispo: previously tolerating trach dome trials inhibiting repeat metabolic cart study but noted now back on vent after events of pt dislodging her trach but plans to wean back to trach dome.  Awaiting possible transfer to LTAC for continued care/vent wean.    -GI: note able to remove rectal but on 12/7 but pt with ongoing frequent BMs (x5-11 yesterday?, x2-6 on 12/9, x1-10 on 12/8 - difficult to evaluate actual output frequency based on I/O documentation).  C.diff negative on 12/11.  Imodium started today (12/11).  Noted pt has a diaper collecting loose stool and able to observe today and loose/yellow outputs (no overt foamy/frothy/oily appearance but difficult to evaluate in a diaper).  May need to trial further increase to fiber (soluble) and/or consider trial of PERT to see if helps change stool quality/quantity?    MALNUTRITION  % Intake: No decreased intake noted  % Weight Loss: None noted so far this adm (last recheck of weight on 12/9/17 noted 41.5 kg and remains > adm/dosing wt of 38.9 kg on 11/20/17); Up to 7.5% in 3 months (non-severe) PTA and underweight status per BMI <18.5 kg/m2 (severe)  Subcutaneous Fat Loss: (ongoing per 12/11 eval) Facial region, Upper arm and Thoracic/intercostal: Severe  Muscle Loss:(ongoing per 12/11 eval) Temporal, Facial & jaw region, Thoracic region  (clavicle, acromium bone, deltoid, trapezius, pectoral), Upper arm (bicep, tricep), Dorsal hand, Upper leg (quadricep, hamstring), Patellar region and Posterior calf:  Severe  Fluid Accumulation/Edema: None noted  Malnutrition Diagnosis: Severe malnutrition in the context of acute on chronic illness (and suspected social/environmental circumstances with EtOH hx)     Previous Goals   Total ave nutrition intakes (EN) to provide minimum 100% of last known MREE (equiv to 38 kcal/kg/day) and 2 g PRO/kg/day (per 39 kg).   Evaluation: Met for energy, just < previous PRO intake goals.    Previous Nutrition Diagnosis  Predicted suboptimal nutrient intake (protein-energy)  Evaluation: ongoing.    CURRENT NUTRITION DIAGNOSIS  Predicted suboptimal nutrient intake (protein-energy) r/t remains NPO (do not anticipate oral diet trials anytime in near future given postop status, altered mental status persists and pending vent wean progress), currently receiving goals for TF intakes but ongoing risk for interruptions with continued adm and unclear adequacy of digestion/absorption of peptide/MCT (70%)/LCT (30%) based TF formula with hx of chronic pancreatitis but unclear pancreatic exocrine function/need to consider pancreatic enzyme replacement therapy (PERT).    INTERVENTIONS  Implementation  Collaboration and Referral of Nutrition care - Discussed plan for FEN/GI on rounds with Providers who agree to continue with same/current EN regimen and hold on repeating metabolic cart as pt was tolerating vent wean trials previously with new provisions (suspect change in resp status more r/t trach events).  Providers ordered Pancreatic elastase test per above and will follow for results to help evaluate need to begin/order PERT per above.    Goals  Total ave nutrition intakes (EN) to provide minimum 100% of last known MREE (equiv to 38 kcal/kg/day) and 1.5 g PRO/kg/day (per 39 kg).     Monitoring/Evaluation  Progress toward goals will be  monitored and evaluated per protocol.     Layla Hudson ,RD, LD, CNSC (pgr [phone])

## 2017-12-11 NOTE — PROGRESS NOTES
SURGICAL ICU PROGRESS NOTE  December 11, 2017      CO-MORBIDITIES:   Acute post-operative pain  (primary encounter diagnosis)    ASSESSMENT: Alexia Flor is a 62-year-old female with PMHx alcohol use disorder, MDD, Breast Cancer, tongue cancer, alcoholic fatty liver, COPD, and diastolic heart failure now s/p left partial glossectomy with primary closure and left neck dissection for invasive SCC 11/21. Admitted to SICU on 11/22 for assistance with EtOH withdrawal and respiratory monitoring. Underwent tracheostomy and evacuation of L neck hematoma 11/23. Underwent evacuation of left neck hematoma and excision of full thickness wound breakdown and closure defect on 12/5. Stable with no acute events since that time.       CHANGES FOR TODAY:  - SLP evaluation for passy shanel valve   - Will plan to work on setting up transfer to long term care facility early this week   - Follow up Echo results       PLAN:  Neuro/ pain/ sedation:   # Acute postoperative pain   # Delirium   # Hx of depression   # Hx of alcohol abuse s/p alcohol withdrawl   - Monitor neurological status, continue efforts to support circadian rhythm.   - Delirium preventions and precautions.   - seroquel  50 at bedtime and seroquel 12.5 QAM , prn haldol, PRN tylenol  - melatonin 5 mg at night to aid in sleep.   - prn oxycodone for pain   - continue with thiamine/folic acid and MVI.      # nicotine disorder  - nicotine patch ordered      Pulmonary care:   # Acute respiratory failure s/p tracheostomy   # E. Coli/Klebisella pneumonia   # COPD, no home o2 or inhalers per notes   - Placed back on PS this weekend due to SOB  - will wean PS from 12/5-->7/5 and then attempt trach dome again  - Most recent sputum with E. Coli/Kleb--12/3/17-- Rocephin 12/7-12/13.  WBC normal and pt afebrile.  - continue with duoneb inhaler.      Cardiovascular:    # Hypertension  # Hx of diastolic heart failure  - Monitor hemodynamic status.   - ECHO ordered 12/10- no acute changes  from prior ( EF 55-60%, diastolic dysfunction is indeterminate, prior echo reading grade II diastolic dysfunction)  -Continue with metoprolol 37.5 mg BID and  PRN anti-hypertensive as needed  - Resume home lasix 20 mg daily, weight down to 40kg (admit 39)       GI care:   # Hyperbilirubinemia  # Transaminitis, resolving  # Alcoholic fatty liver disease  - AST/ALT elevated this admission, likely representing critical illness and underlying alcoholic fatty liver disease. Now nearly normal   - Hepatology consulted. Patient will need follow up with hepatology upon discharge. (Will need to page hepatology fellow at time of discharge to help organize liver clinic follow-up)  - 11/27: abdominal US w/ dopplers -- cholelithiasis w/o evidence of cholecystitis. Mild ascites present.       Fluids/ Electrolytes/ Nutrition:   # Severe Protein Calorie Malnutrition  # Hypernatremia, now resolved.   - NJ placed. TF@ goal. RD following. MVI. Micronutrients ordered.   - Sodium improved-  keep FWF 60cc every 4 hours.    - On electrolyte replacement protocol. Replenish electrolytes as needed     Renal/ Fluid Balance:    - Will continue to monitor intake and output..  - daily weights       Endocrine:    # no issues   - hypoglycemia protocol--monitor for signs of symptoms of hypo/her glycemia and treat as indicated      ID/ Antibiotics:  # polymicrobial pneumonia--Klebisella/E. Coli   # leukocytosis  - wbc improving  - E coli  sputum from 11/24. Resistant to Zosyn, Ampicillin and FQs s/p 7 days course of Rocephin 12/6-13  -12/3 sputum with E. Coli and kelbsiella-- on appropriate abx (ceftriaxone for 7 day course, end date 12/13)  - C Diff PCR neg 12/1  - 12/3 BC x 1 (from PICC) with gram positive cocci in clusters--found to be staph hominins, repeat cultures sent 11/5/17--NGTD thus far. contaminant will not treat at this time.       Heme:     # Anemia of critical illness  # Acute blood loss anemia  - no need for transfusion today.   -  Continue to monitor and trend.      MSK   # weakness and deconditioning of critical illness   - PT/OT consults       General cares and Prophylaxis:    - DVT: Lovenox 30mg SCI   - Protonix 40 mg  (home med)      Lines/ tubes/ drains:  - PICC  - NJ  - Tracheostomy       Disposition:  - Surgical ICU.       Patient seen and discussed with surgical ICU staff.    Critical care time: 35 minutes     Yary Gandara PA-C  Surgical ICU  ====================================    TODAY'S PROGRESS:   SUBJECTIVE:   - No acute events overnight. Has been on PS for 24 hours. She denies abdominal pain this AM.     OBJECTIVE:   1. VITAL SIGNS:   Temp:  [97.9  F (36.6  C)-99  F (37.2  C)] 98.6  F (37  C)  Pulse:  [78] 78  Heart Rate:  [] 87  Resp:  [10-28] 17  BP: (133-171)/(64-87) 149/79  FiO2 (%):  [40 %] 40 %  SpO2:  [92 %-100 %] 98 %  Ventilation Mode: CPAP/PS  FiO2 (%): 40 %  Rate Set (breaths/minute): 14 breaths/min  Tidal Volume Set (mL): 300 mL  PEEP (cm H2O): 5 cmH2O  Pressure Support (cm H2O): 12 cmH2O  Oxygen Concentration (%): 40 %  Resp: 17    2. INTAKE/ OUTPUT:   I/O last 3 completed shifts:  In: 1819.5 [I.V.:42; NG/GT:787.5]  Out: 10 [Drains:10]    3. PHYSICAL EXAMINATION:   General: Resting in bed, mildly agitated this AM  Neuro: Alert and intermittently following commands. Non verbal.   Resp: Breathing non-labored on PS  CV: RRR, no murmur   Abdomen: Soft, Non-distended, Non-tender  Incisions: c/d/i on right neck.   Extremities: warm and well perfused    4. INVESTIGATIONS:   Arterial Blood Gases   No lab results found in last 7 days.  Complete Blood Count     Recent Labs  Lab 12/11/17  0350 12/10/17  0434 12/09/17  0411 12/08/17  0350   WBC 12.0* 12.7* 15.8* 19.1*   HGB 8.0* 7.9* 7.7* 8.1*    246 257 262     Basic Metabolic Panel    Recent Labs  Lab 12/11/17  0350 12/10/17  0434 12/09/17  0411 12/08/17  0350    143 142 141   POTASSIUM 4.6 4.3 4.3 4.0   CHLORIDE 106 109 110* 110*   CO2 31 27 26 26   BUN 17  20 21 20   CR 0.54 0.60 0.60 0.58   * 106* 120* 132*     Liver Function Tests    Recent Labs  Lab 12/11/17  0350 12/05/17  0845   AST 34 51*   ALT 23 54*   ALKPHOS 285* 358*   BILITOTAL 1.2 1.8*   ALBUMIN 1.7* 2.1*   INR  --  1.08     Pancreatic Enzymes  No lab results found in last 7 days.  Coagulation Profile    Recent Labs  Lab 12/05/17  0845   INR 1.08   PTT 30     Lactate  Invalid input(s): LACTATE    5. RADIOLOGY:   Recent Results (from the past 24 hour(s))   XR Chest Port 1 View    Narrative    XR CHEST PORT 1 VW, 12/9/2017 3:43 AM.    Comparison: 12/8/2017.    History: Interval imaging; .    Findings:   Left PICC tip mid SVC. Tracheostomy in the high/mid intrathoracic  trachea. Feeding tube coursing toward stomach tip not visualized.  Cardiac silhouette is within normal limits. No pneumothorax. Unchanged  small bilateral pleural effusion. Diffuse interstitial opacities.  Bibasilar opacities. Surgical clips over the right chest. Surgical  clips and staples over the neck.      Impression    Impression:   1. Unchanged small bilateral pleural effusions.  2. Slightly increased bilateral diffuse interstitial opacities which  may represent pulmonary edema and/or atypical infection.   3. Stable support devices.    I have personally reviewed the examination and initial interpretation  and I agree with the findings.    MARY VERDUZCO MD       =========================================

## 2017-12-11 NOTE — PLAN OF CARE
Problem: Patient Care Overview  Goal: Plan of Care/Patient Progress Review  PT 4A-   Discharge Planner PT   Patient plan for discharge: rehab  Current status: pt performed LE exercises in supine position. Pt rolled to her side and pushed up into a seated position at EOB. Pt performed sit to stand transfers x 2, taking small steps to her L. Pt transferred sit to stand with SBA x 2 and stood for up to one minute with UE support and was able to take small steps to her L. Pt became winded with standing and dropped her O2 sat down to 85% on FIO2 of 50.   Barriers to return to prior living situation: pt is weak and is on vent.  Recommendations for discharge: LTACH  Rationale for recommendations:  Pt will get weaned from vent and continue with PT and OT at   [facility] so she can get stronger and go home.       Entered by: Katrin Fernandez 12/11/2017 3:25 PM

## 2017-12-11 NOTE — PLAN OF CARE
Problem: Patient Care Overview  Goal: Plan of Care/Patient Progress Review  Outcome: Improving  Took over care of Pt at 1500  Neuro: Alert, Pupils equal reactive. Moves all extremities w/ moderate strength. Mood labile, alternates between calm/sleeping and restless/anxious.   Cardiac: Normal sinus rhythm, afebrile.   Resp:  Shiley 6 trach. On pressure support 12/5. Lung sounds coarse w/ inspiratory wheezes in bases. Small yellow-white sputum w/ suctioning. Pt removed trach at 1700, replaced immediately by RT.   GI: Frequent loose diarrhea, stool sample due to be collected for C-diff testing. NJ infusing w/ TFs at goal of 45cc/hr w/ q4h 60cc water flushes.  : Incontinent of urine.  Skin: Neck incision intact, skin under trach and around incision reddened, swollen. Dressing around trach changed x2.   Muscle/Mobility: Pt independently turning in bed.  Pain: Oxycodone and tyelonol.  Lines/Drains: R PIV saline locked.    PLAN: Possible DC tomorrow, continue w/ POC.

## 2017-12-11 NOTE — PLAN OF CARE
Problem: Patient Care Overview  Goal: Plan of Care/Patient Progress Review  Discharge Planner SLP   Patient plan for discharge: unknown  Current status: Communication/trach evaluation completed per MD order. Pt transitioned to trach dome prior to session. Trach cuff deflated without incident. Pt demonstated good air flow around trach with finger occlusion. Some air is leaking around trach at stoma site. PMSV placed x20 minutes without desat. Pt talking almost constantly with good volume, but very difficulty to understand. Pt becomes agitated and suspicious of attempts to elicit repetitions or clarifications. Pt did report some discomfort as session progressed and was noted to have small amount of back pressure with PMSV removal. Recommend continue trach dome with trach cuff deflated. Ok for PMSV use with 1:1 RN supervision for 15-20 minute increments as tolerated. Remove if Pt demonstrating fatigue or shortness of breath. ST to follow closely for PMSV tolerance  Barriers to return to prior living situation: respiratory compromise, trach, safety  Recommendations for discharge: inpatient rehab  Rationale for recommendations: ongoing medical/rehab needs       Entered by: Kylah Garcia 12/11/2017 12:29 PM

## 2017-12-11 NOTE — PLAN OF CARE
Problem: Restraint for Non-Violent/Non-Self-Destructive Behavior  Goal: Prevent/Manage Potential Problems  Maintain safety of patient and others during period of restraint.  Promote psychological and physical wellbeing.  Prevent injury to skin and involved body parts.  Promote nutrition, hydration, and elimination.   Outcome: No Change  Soft wrist restraints discontinued in evening as pt was cooperative with cares, demonstrated appropriate behavior not pulling at trach. However, around 0400 this AM pt  Dislodged trach >50% out, not cooperative, not compliant with instructions to not pull at trach. Soft wrist restraints applied at 0500, MD order obtained. Will continue to monitor and reassess need for restraints.

## 2017-12-12 NOTE — BRIEF OP NOTE
[facility], [place]    Brief Operative Note    Pre-operative diagnosis: Bleeding Neck   Post-operative diagnosis Same  Procedure: Procedure(s):  Direct Laryngoscopy, oral cavity exporation cauterization, flexible bronschoscopy, trach exploration   - Wound Class: I-Clean  Surgeon: Surgeon(s) and Role:     * Heriberto George MD - Primary  Anesthesia: General   Estimated blood loss: 5 ml  Drains: None  Specimens: * No specimens in log *  Findings:   No active bleeding, large clot in oropharynx, normal bronch.  Complications: None.  Implants: None.

## 2017-12-12 NOTE — ANESTHESIA PREPROCEDURE EVALUATION
Anesthesia Evaluation     . Pt has had prior anesthetic. Type: General           ROS/MED HX    ENT/Pulmonary:     (+)vocal cord abnormalities- tobacco use, Past use moderate COPD, O2 dependent, , . .    Neurologic:     (+)dementia, other neuro alcohol dependence    Cardiovascular:     (+) ----. : . CHF . . :. .       METS/Exercise Tolerance:     Hematologic:     (+) Anemia, History of Transfusion no previous transfusion reaction -      Musculoskeletal:  - neg musculoskeletal ROS       GI/Hepatic:     (+) hepatitis type Alcoholic, liver disease,       Renal/Genitourinary:  - ROS Renal section negative       Endo:  - neg endo ROS       Psychiatric:     (+) psychiatric history other (comment)      Infectious Disease:  - neg infectious disease ROS       Malignancy:         Other:                     Physical Exam      Airway     Dental     Cardiovascular   Rhythm and rate: regular and normal      Pulmonary (+) rhonchi       Other findings: Emergently brought to OR from ED with tracheostomy bleeding.  No ability to interview patient                Anesthesia Plan      History & Physical Review  History and physical reviewed and following examination; no interval change.    ASA Status:  4 emergent.    NPO Status:  Waived due to emergency    Plan for General and Trach with Inhalation induction. Maintenance will be TIVA.      Additional equipment: Arterial Line and Central Line Severe bleeding from tracheostomy at OSH.  Transferred to ED then directly to OR for neck exploration.  Unable to conduct interview.  Pt critically ill.  IMplied consent.      Postoperative Care  Postoperative pain management:  IV analgesics.      Consents  Anesthetic plan, risks, benefits and alternatives discussed with: .  Use of blood products discussed: No .   .                         .

## 2017-12-12 NOTE — PROGRESS NOTES
Ms. Flor was recently discharged to acute care facility but we received a call this morning that she was experiencing bleeding from the oral cavity and the trach site.She was evaluated immediately upon arrival in the emergency department.There, she was found to have no obvious bleeding from the neck or oral  Cavity. There was clot in the pharynx but the patient was stable. We brought her to the operating room immediately where lines were placed and we performed an examination under anesthesia. We found a large clot in the oropharynx.This made us suspect that the bleeding was from the prior oral cavity resection site in the left mobile tongue. We examined this area carefully and did not find any obvious source of bleeding but did cauterize the old resection bed thoroughly. Leading from the site would be highly in usual given that the surgery was presently three weeks ago. However, given her likely bleeding diathesis, this is the most likely source. We also examined the trach site and tract in addition to performing tracheostomy, no other sources identified. The neck itself looked well so we did not reopen this incision.    I spoke to the patient's mother and discussed these findings and my suspicion that this bleeding is likely related to an intrinsic bleeding problem likely related to chronic liver disease. I also communicated to her that we would be speaking with the team from intervention radiology to consider embolizing the external carotid artery on the left side to reduce the chance of further bleeding episodes. She expressed gratitude for our work and communication. Given her advanced age, it is difficult for her to get to the hospital to actually visit Ms. Flor.      We will await further plan from IR as the patient is weaned from the ventilator.      Heriberto George M.D.

## 2017-12-12 NOTE — PHARMACY-CONSULT NOTE
Pharmacy Tube Feeding Consult    Medication reviewed for administration by feeding tube and for potential food/drug interactions.    Recommendation: No changes are needed at this time.     Pharmacy will continue to follow as new medications are ordered.    Arlette Fairbanks, PharmD  Pager: [phone]

## 2017-12-12 NOTE — ANESTHESIA POSTPROCEDURE EVALUATION
Patient: Alexia Flor    Procedure(s):  Direct Laryngoscopy, oral cavity exporation cauterization, flexible bronschoscopy, trach exploration   - Wound Class: I-Clean    Diagnosis:Bleeding Neck   Diagnosis Additional Information: No value filed.    Anesthesia Type:  No value filed.    Note:  Anesthesia Post Evaluation    Patient location during evaluation: PACU  Patient participation: Unable to evaluate secondary to administered sedation  Level of consciousness: obtunded/minimal responses  Pain management: unable to assess  Airway patency: patent  Cardiovascular status: blood pressure returned to baseline and hemodynamically stable  Respiratory status: ETT  Hydration status: euvolemic  PONV: unable to assess     Anesthetic complications: None          Last vitals:  Vitals:    12/12/17 1030 12/12/17 1045 12/12/17 1100   BP:      Pulse:      Resp: 16 16 16   Temp: 36.3  C (97.3  F) 36.4  C (97.5  F) 36.5  C (97.7  F)   SpO2: 100% 99% 99%         Electronically Signed By: Madi Velazquez MD  December 12, 2017  11:12 AM

## 2017-12-12 NOTE — H&P
SURGICAL ICU ADMISSION NOTE  12/12/2017    PMH:   COPD  Diastolic HF  EtOH use disorder w/ dependence  EtOH induced pancreatitis  EtOH fatty liver  Cholelithiasis  Breast cancer s/p radiation therapy  Schafer's esophagus  Anxiety and MDD  Tobacco use    ASSESSMENT: Ms. Alexia Flor is a 62-year-old female with PMH significant for COPD, diastolic HF, EtOH use disorder with subsequent pancreatitis and fatty liver, cholelithiasis, breast cancer s/p radiation therapy, Schafer's esophagus, anxiety and MDD, and tobacco use who was recently admitted to [facility] from 11/21/2017-12/11/2017 after a left partial glossectomy and left selective neck dissection (11/21/2017) complicated by EtOH withdrawal, aspiration, SICU monitoring, postoperative hematoma of left neck requiring emergent OR and tracheostomy (11/23/2017).  Subsequently she developed a second hematoma 12/5/2017 and required return to OR for evacuation.  Also, she developed left neck pressure ulcer, which was excised 12/5/2017.  She was discharged to [facility] on 12/11/2017 after weaning off mechanical support to kimber plummer.  She returned acutely to [facility] 12/12/2017 for operative intervention with ENT after she was found to have bleeding from her oropharynx and 200 cc of carlitos red blood was suctioned from her tracheostomy.  Hgb in the ED prior to OR was 5.6.  ENT performed direct laryngoscopy oral cavity exlploation and cauterization, flexible bronchoscopy and trach exploration on 12/12/2017.  She received 3 units pRBC, 1 unit FFP, and 700 cc crystalloid in the OR.  She was transferred to the SICU for ventilator wean and monitoring postoperatively.    PLAN:   Neuro/ pain/ sedation:  #Acute postoperative pain  #Hx delirium  #Hx depression  #Hx etoh use disorder w/ etoh withdrawal  -Monitor neurological status. Notify the MD for any acute changes in exam.  -Delirium preventions and precautions  -Seroquel 50 mg qHs and Seroquel 12.5 qAM. PRN Haldol.  -PRN tylenol, PRN  oxycodone and PRN dilaudid for postoperative pain.  -Melatonin 5 mg qHS for sleep  -Thiamine, folic acid and MVI for EtOH use disorder hx  -Turn off sedation on arrival to floor to assess neuro status    #Nicotine use disorder  -Nicotine patch ordered     Pulmonary care:   #Acute respiratory failure s/p tracheostomy  #E. Coli and Klebsiella oxytoca pneumonia  #COPD w/o hx of home O2 or inhalers  -Attempt trach dome when able  -Pressure support as needed  -Continue Rocephin course (12/7-12/13) for pneumonia  -Dunoeb inhaler    #Oropharynx bleeding  -Neurointerventional Radiology consult for embolization consideration -- has IR carotid cerebral angiogram scheduled 12/12  -Plan for angio embolization at 1130 12/13/2017.  3 u pRBC on hold for procedure--ordered by ENT.  -Monitor for signs of bleeding  -Suction from tracheostomy and oropharynx as needed  -TEG with heparinase -- ordered     Cardiovascular:  #Hypertension  #Hx of diastolic HF  -Monitor hemodynamic status.  Stable on transfer to SICU.   -Echo 12/10 (EF 55-60%, diastolic dysfunction indeterminate)  -Metoprolol 37.5 mg BID and PRN antihypertensives as needed  -Home lasix 20 mg daily      GI care:  #Hyperbilirubinemia  #Elevated Transaminases  #EtOH fatty liver disease  -NPO  -AST/ALT elevations during past admission likely 2/2 critical illness and underlying EtOH fatty liver disease  -Hepatology follow up as outpatient  -11/27  -- cholelithiasis w/o cholecystitis, mild ascittes     Renal/ Fluids  -Monitor I/Os  -Daily weights     Electrolytes/Nutrition:     #Severe protein calorie malnutrition  -Continue NJ w/ TF at goal  -FWS 60 cc q4h -- monitor for hypernatremia   -Electrolyte replacement protocol     Endocrine:    -CARLITA  -Treate hypo or hyperglycemia as indicated     ID/ Antibiotics:  -Continue ceftriaxone for pneumonia (Klebsiella oxytoca and E. coli) 12/7-12/13  -Monitor for other signs/symptoms of infection     Heme:     -Hemoglobin stable prior to  discharge 12/11. With recheck in ED 5.6 here 12/12.  -Received 3 units pRBC in OR w/ 1 unit of plasma with PACU check 11.2.  -Recheck on admission to SICU given bleeding and transfusion requirements and trend     Prophylaxis:    -Mechanical prophylaxis for DVT.  -No chemical DVT prophylaxis due to high risk of bleeding.     MSK:    -PT/OT ordered     Lines/ tubes/ drains:  -Right femoral CVC  -Right radial arterial line  -PIVs x2  -Bell     Disposition:  -Surgical ICU.    Patient seen, findings and plan discussed with surgical ICU staff, Dr. Carreon.    Hans Rizo MD  PGY-1  Orthopaedic Surgery  - - - - - - - - - - - - - - - - - - - - - - - - - - - - - - - - - - - - - - - - - - - - - - - - - - - - - - - - - - - - - - - - - - - - - -     PRIMARY TEAM: Otolaryngology  PRIMARY PHYSICIAN: Dr. George    REASON FOR CRITICAL CARE ADMISSION: Ventilator requirements on trach and close hemodynamic monitoring  ADMITTING PHYSICIAN: Dr. Carreon    HISTORY PRESENTING ILLNESS: Ms. Alexia Flor is a 62-year-old female with PMH significant for COPD, diastolic HF, EtOH use disorder with subsequent pancreatitis and fatty liver, cholelithiasis, breast cancer s/p radiation therapy, Schafer's esophagus, anxiety and MDD, and tobacco use who was recently admitted to [facility] from 11/21/2017-12/11/2017 after a left partial glossectomy and left selective neck dissection (11/21/2017) complicated by EtOH withdrawal, aspiration, SICU monitoring, postoperative hematoma of left neck requiring emergent OR and tracheostomy (11/23/2017).  Subsequently she developed a second hematoma 12/5/2017 and required return to OR for evacuation.  Also, she developed left neck pressure ulcer, which was excised 12/5/2017.  She was discharged to [facility] on 12/11/2017 after weaning off mechanical support to [facility] dome.  She returned acutely to [facility] 12/12/2017 for operative intervention with ENT after she was found to have bleeding from her oropharynx and  200 cc of carlitos red blood was suctioned from her tracheostomy.  ENT performed direct laryngoscopy oral cavity exlploation and cauterization, flexible bronchoscopy and trach exploration on 12/12/2017.  She was transferred to the SICU for close monitoring postoperatively.    The patient's course during last hospitalization was initially uncomplicated, but a RRT was called on POD1 due to altered mental status and concern for aspiration.  The patient was expected to discharge from the hospital POD1 or POD2 and was receiving EtOH as a bridge to her EtOH use preop and postop.  She was unable to tolerate PO and was subsequently transferred to the SICU.    REVIEW OF SYSTEMS: Unable to obtain from patient on admission to SICU because of sedation.    PAST MEDICAL HISTORY:   Past Medical History:   Diagnosis Date     Alcohol abuse      Alcoholic hepatitis      Alcoholic pancreatitis      Anxiety      Schafer's esophagus      Breast cancer (H)     rt, s/p radiation.     Chemical dependency (H)      Congestive heart failure, unspecified      COPD (chronic obstructive pulmonary disease) (H)      Depressive disorder      History of radiation therapy      Hoarseness      Hypokalemia      Macrocytic anemia      Non-adherence to medical treatment      Tobacco abuse      Tongue cancer (H)        SURGICAL HISTORY:   Past Surgical History:   Procedure Laterality Date     DISSECTION RADICAL NECK Left 11/21/2017    Procedure: DISSECTION RADICAL NECK;;  Surgeon: Heriberto George MD;  Location: [facility] OR     ESOPHAGOSCOPY, GASTROSCOPY, DUODENOSCOPY (EGD), COMBINED N/A 11/9/2017    Procedure: COMBINED ENDOSCOPIC ULTRASOUND, ESOPHAGOSCOPY, GASTROSCOPY, DUODENOSCOPY (EGD), FINE NEEDLE ASPIRATE/BIOPSY;;  Surgeon: Yo Bhat MD;  Location: [facility] OR     EXPLORE NECK Left 11/23/2017    Procedure: EXPLORE NECK;  left Neck Exploration, tracheostomy , placement of nasogastric feeding tube;  Surgeon: Daisy Singh MD;  [facility]OR     EXPLORE NECK Left 2017    Procedure: EXPLORE NECK;  [place] Left Neck Hematoma , excision of skin   ;  Surgeon: Stella Hernadez MD;  Location: [facility] OR     GLOSSECTOMY PARTIAL N/A 2017    Procedure: GLOSSECTOMY PARTIAL;  Left Partial Glossectomy And Left Neck Dissection, ;  Surgeon: Heriberto George MD;  Location: [facility] OR     LARYNGOSCOPY WITH BIOPSY(IES) N/A 2017    Procedure: LARYNGOSCOPY WITH BIOPSY(IES);  Direct Laryngoscopy,  Upper Endoscopic Ultrasound and Fine Needle Aspiration;  Surgeon: Heriberto George MD;  Location: [facility] OR     lumpectomy Rt breast  2006       TRACHEOSTOMY  2017    Procedure: TRACHEOSTOMY;;  Surgeon: Daisy Singh MD;  Location: [facility] OR       SOCIAL HISTORY: Tobacco - . Etoh - yes hx of etoh use disorder.    FAMILY HISTORY: Mom w/ renal cancer. Dad w/ CAD and emphysema.    ALLERGIES:      Allergies   Allergen Reactions     Codeine Anaphylaxis     Contrast Dye Itching and Swelling     7/10/2009 Patient reports history of contrast reaction when first diagnosed with breast cancer. \"I almost .\"   (Hives, swelling.) 2009 CT to be done without IV contrast per Dr. KAVIN Live     Moxifloxacin Anaphylaxis     Avelox     Nickel Itching and Rash     Codeine Sulfate Other (See Comments)     shaking     Gabapentin Other (See Comments)     Patient reports that she got very shaky and she \"felt like she was going to die\"       MEDICATIONS:    Current Facility-Administered Medications on File Prior to Encounter:  [DISCONTINUED] No abx ordered pre-op   [DISCONTINUED] fentaNYL (PF) (SUBLIMAZE) injection   [DISCONTINUED] midazolam (VERSED) injection   [DISCONTINUED] dexamethasone (DECADRON) injection   [DISCONTINUED] ondansetron (ZOFRAN) injection   [DISCONTINUED] phenylephrine (VANE-SYNEPHRINE) injection 1 mg   [DISCONTINUED] rocuronium (ZEMURON) injection   [DISCONTINUED] ketamine (KETALAR) injection   [DISCONTINUED] 0.9% sodium chloride infusion "   [DISCONTINUED] propofol (DIPRIVAN) infusion   [DISCONTINUED] calcium chloride injection   [DISCONTINUED] HYDROmorphone (DILAUDID) injection   [DISCONTINUED] loperamide (IMODIUM) liquid 2 mg   [DISCONTINUED] fiber modular (NUTRISOURCE FIBER) packet 3 packet   [DISCONTINUED] QUEtiapine (SEROquel) half-tab 12.5 mg   [DISCONTINUED] QUEtiapine (SEROquel) tablet 50 mg   [DISCONTINUED] metoprolol (LOPRESSOR) suspension 37.5 mg   [DISCONTINUED] ipratropium - albuterol 0.5 mg/2.5 mg/3 mL (DUONEB) neb solution 3 mL   [DISCONTINUED] HYDROmorphone (DILAUDID) injection 0.2 mg   [DISCONTINUED] furosemide (LASIX) tablet 20 mg   [DISCONTINUED] albuterol neb solution 2.5 mg   [DISCONTINUED] melatonin tablet 5 mg   [DISCONTINUED] nicotine Patch in Place   [DISCONTINUED] nicotine patch REMOVAL   [DISCONTINUED] nicotine (NICODERM CQ) 14 MG/24HR 24 hr patch 1 patch   [DISCONTINUED] cefTRIAXone (ROCEPHIN) 1 g in 10 mL SWFI Premix Syringe   [DISCONTINUED] enoxaparin (LOVENOX) injection 30 mg   [DISCONTINUED] oxyCODONE (ROXICODONE) solution 2.5 mg   [DISCONTINUED] fiber modular (NUTRISOURCE FIBER) packet 1 packet   [DISCONTINUED] ascorbic acid (VITAMIN C) tablet 500 mg   [DISCONTINUED] albuterol (PROAIR HFA/PROVENTIL HFA/VENTOLIN HFA) Inhaler 2 puff   [DISCONTINUED] labetalol (NORMODYNE/TRANDATE) injection 20 mg   [DISCONTINUED] hydrALAZINE (APRESOLINE) injection 10 mg   [DISCONTINUED] haloperidol lactate (HALDOL) injection 5 mg   [DISCONTINUED] polyethylene glycol 0.4%- propylene glycol 0.3% (SYSTANE ULTRA) ophthalmic solution 2 drop   [DISCONTINUED] potassium chloride SA (K-DUR/KLOR-CON M) CR tablet 20-40 mEq   [DISCONTINUED] potassium chloride (KLOR-CON) Packet 20-40 mEq   [DISCONTINUED] potassium chloride 10 mEq in 100 mL sterile water intermittent infusion (premix)   [DISCONTINUED] potassium chloride 10 mEq in 100 mL intermittent infusion with 10 mg lidocaine   [DISCONTINUED] magnesium sulfate 2 g in NS intermittent infusion  (PharMEDium or FV Cmpd)   [DISCONTINUED] magnesium sulfate 4 g in 100 mL sterile water (premade)   [DISCONTINUED] potassium phosphate 10 mmol in D5W 250 mL intermittent infusion   [DISCONTINUED] potassium phosphate 15 mmol in D5W 250 mL intermittent infusion   [DISCONTINUED] potassium phosphate 20 mmol in D5W 500 mL intermittent infusion   [DISCONTINUED] potassium phosphate 20 mmol in D5W 250 mL intermittent infusion   [DISCONTINUED] potassium phosphate 25 mmol in D5W 500 mL intermittent infusion   [DISCONTINUED] lidocaine (LMX4) kit   [DISCONTINUED] heparin lock flush 10 UNIT/ML injection 2-5 mL   [DISCONTINUED] acetaminophen (TYLENOL) tablet 650 mg   [DISCONTINUED] acetaminophen (TYLENOL) Suppository 650 mg   [DISCONTINUED] pantoprazole (PROTONIX) suspension 40 mg   [DISCONTINUED] mineral oil-hydrophilic petrolatum (AQUAPHOR)   [DISCONTINUED] multivitamins with minerals (CERTAVITE/CEROVITE) liquid 15 mL   [DISCONTINUED] cyanocobalamin (vitamin  B-12) tablet 1,000 mcg   [DISCONTINUED] folic acid (FOLVITE) tablet 1 mg   [DISCONTINUED] bisacodyl (DULCOLAX) Suppository 10 mg   [DISCONTINUED] polyethylene glycol (MIRALAX/GLYCOLAX) Packet 17 g   [DISCONTINUED] senna-docusate (SENOKOT-S;PERICOLACE) 8.6-50 MG per tablet 1 tablet   [DISCONTINUED] senna-docusate (SENOKOT-S;PERICOLACE) 8.6-50 MG per tablet 2 tablet   [DISCONTINUED] chlorhexidine (PERIDEX) 0.12 % solution 15 mL   [DISCONTINUED] glucose 40 % gel 15-30 g   [DISCONTINUED] dextrose 50 % injection 25-50 mL   [DISCONTINUED] glucagon injection 1 mg   [DISCONTINUED] dextrose 10 % 1,000 mL infusion   [DISCONTINUED] thiamine tablet 100 mg   [DISCONTINUED] lidocaine 1 % 1 mL   [DISCONTINUED] lidocaine (LMX4) kit   [DISCONTINUED] sodium chloride (PF) 0.9% PF flush 3 mL   [DISCONTINUED] sodium chloride (PF) 0.9% PF flush 3 mL   [DISCONTINUED] ondansetron (ZOFRAN) injection 4 mg   [DISCONTINUED] naloxone (NARCAN) injection 0.1-0.4 mg     Current Outpatient Prescriptions on  File Prior to Encounter:  melatonin 5 MG tablet 1 tablet (5 mg) by Oral or Feeding Tube route every evening   HYDROmorphone (DILAUDID) 0.2 MG/0.2 ML SOLN injection Inject 0.2 mLs (0.2 mg) into the vein every 2 hours as needed for moderate to severe pain   oxyCODONE (ROXICODONE) 5 MG/5ML solution Take 2.5 mLs (2.5 mg) by mouth every 4 hours as needed for moderate to severe pain   acetaminophen (TYLENOL) 325 MG tablet 2 tablets (650 mg) by Oral or Feeding Tube route every 8 hours as needed for mild pain   albuterol (PROAIR HFA/PROVENTIL HFA/VENTOLIN HFA) 108 (90 BASE) MCG/ACT Inhaler Inhale 2 puffs into the lungs 4 times daily as needed for other (dyspnea)   albuterol (2.5 MG/3ML) 0.083% neb solution Take 1 vial (2.5 mg) by nebulization every 4 hours as needed for wheezing   ipratropium - albuterol 0.5 mg/2.5 mg/3 mL (DUONEB) 0.5-2.5 (3) MG/3ML neb solution Take 1 vial (3 mLs) by nebulization 4 times daily   enoxaparin (LOVENOX) 30 MG/0.3ML injection Inject 0.3 mLs (30 mg) Subcutaneous every 24 hours   loperamide (IMODIUM) 1 MG/5ML liquid Take 10 mLs (2 mg) by mouth 3 times daily   haloperidol lactate (HALDOL) 5 MG/ML injection Inject 1 mL (5 mg) into the vein every 6 hours as needed for agitation   QUEtiapine (SEROQUEL) 25 MG tablet 0.5 tablets (12.5 mg) by Oral or Feeding Tube route daily   QUEtiapine (SEROQUEL) 50 MG tablet 1 tablet (50 mg) by Oral or Feeding Tube route every evening   labetalol (NORMODYNE/TRANDATE) 5 MG/ML injection Inject 4 mLs (20 mg) into the vein every 4 hours as needed for high blood pressure (SBP > 160)   metoprolol (LOPRESSOR) 10 mg/mL SUSP 3.75 mLs (37.5 mg) by Oral or Feeding Tube route 2 times daily   furosemide (LASIX) 20 MG tablet 1 tablet (20 mg) by Oral or Feeding Tube route daily   cyanocobalamin 1000 MCG TABS 1,000 mcg by Oral or NG Tube route daily   folic acid (FOLVITE) 1 MG tablet 1 tablet (1 mg) by Oral or Feeding Tube route daily   nicotine (NICODERM CQ) 14 MG/24HR 24 hr patch  Place 1 patch onto the skin daily   multivitamins with minerals (CERTAVITE/CEROVITE) LIQD liquid 15 mLs by Per Feeding Tube route daily   pantoprazole (PROTONIX) SUSP suspension 20 mLs (40 mg) by Oral or Feeding Tube route 2 times daily (before meals)   ascorbic acid 500 MG TABS 1 tablet (500 mg) by Oral or Feeding Tube route daily   thiamine (VITAMIN B-1) 100 MG tablet 1 tablet (100 mg) by Oral or Feeding Tube route daily   fiber modular (NUTRISOURCE FIBER) packet 3 packets by Per Feeding Tube route 3 times daily   cefTRIAXone (ROCEPHIN) 1 GM vial Inject 1 g (1,000 mg) into the vein daily for 2 days   oxyCODONE IR (ROXICODONE) 5 MG tablet Take 1-2 tablets (5-10 mg) by mouth every 4 hours as needed for moderate to severe pain       PHYSICAL EXAMINATION:  Temp:  [97  F (36.1  C)-98.6  F (37  C)] 97  F (36.1  C)  Pulse:  [84] 84  Heart Rate:  [65-90] 65  Resp:  [12-36] 16  BP: (138-179)/(67-99) 179/85  MAP:  [108 mmHg-143 mmHg] 114 mmHg  Arterial Line BP: (165-193)/() 175/72  FiO2 (%):  [50 %] 50 %  SpO2:  [96 %-100 %] 100 %  General: Sedated on propofol on arrival to floor. Lying in hospital bed. No acute distress. On ventilator through tracheostomy.  HEENT: NJ in nares.  Clear/sanguinous secretions in oropharynx.  No active bleeding identified.  Neck: Tracheostomy in place.  Drain removed. Surgical incision on left side of neck with some erythema surrounding.  Respiratory: Non-labored breathing. Intermittently coarse breath sounds bilaterally.  Cardiovascular: S1, S2. Regular rate and rhythm.  Gastrointestinal: Abdomen moderately distended with some ecchymosis in inferior fields.  Genitourinary: Bell catheter in place with yellow urinary output.  Extremities: Extremities warm and soft, palpable radial and PT pulses bilaterally.  Right radial arterial line in place.  Right femoral CVC in place.  Skin: As noted above. No rashes appreciated.    LABS: Reviewed.   Arterial Blood Gases     Recent Labs  Lab  12/12/17  0842 12/12/17  0749   PH 7.42 7.39   PCO2 45 52*   PO2 190* 369*   HCO3 29* 31*     Complete Blood Count     Recent Labs  Lab 12/12/17  0842 12/12/17  0749 12/11/17 0350 12/10/17  0434 12/09/17  0411 12/08/17  0350   WBC  --   --  12.0* 12.7* 15.8* 19.1*   HGB 11.2* 5.6* 8.0* 7.9* 7.7* 8.1*   PLT  --  246 284 246 257 262     Basic Metabolic Panel    Recent Labs  Lab 12/12/17  0842 12/12/17  0749 12/12/17  0712 12/11/17  0350 12/10/17  0434 12/09/17  0411    143 140 142 143 142   POTASSIUM 4.5 3.7 4.3 4.6 4.3 4.3   CHLORIDE  --   --  103 106 109 110*   CO2  --   --  29 31 27 26   BUN  --   --  21 17 20 21   CR  --   --  0.48* 0.54 0.60 0.60   * 143* 150* 100* 106* 120*     Liver Function Tests    Recent Labs  Lab 12/12/17  0749 12/12/17 0712 12/11/17 0350   AST  --  28 34   ALT  --  22 23   ALKPHOS  --  296* 285*   BILITOTAL  --  1.0 1.2   ALBUMIN  --  2.0* 1.7*   INR 1.19* 1.12  --      Pancreatic Enzymes  No lab results found in last 7 days.  Coagulation Profile    Recent Labs  Lab 12/12/17  0749 12/12/17 0712   INR 1.19* 1.12   PTT 31  --      Lactate  Invalid input(s): LACTATE    IMAGING:  Results for orders placed or performed during the hospital encounter of 11/21/17   XR Chest Port 1 View    Narrative    Exam: XR CHEST PORT 1 VW, 11/22/2017 1:38 AM    Indication: respiratory distress;     Comparison: 12/20/2016    Findings:   Bibasilar streaky opacity. Small right pleural effusion which appears  new from prior. Central airways midline. Cardiac mediastinal  silhouette is within normal limits. Scarring over the right pleural  apex. Emphysematous changes of the lungs. Subtle nodules over the  lower lobes bilaterally. Surgical clips over the right hemithorax and  left upper quadrant.      Impression    Impression:   1. Bibasilar streaky opacities, favoring atelectasis or consolidation.  2. New small right pleural effusion.  3. Partially visualized bilateral lower lung pulmonary nodules  as seen  on PET/CT.  4. Lung emphysema.    I have personally reviewed the examination and initial interpretation  and I agree with the findings.    SHAWNA OTT MD   XR Chest Port 1 View    Narrative    Exam Exam: XR CHEST PORT 1 VW, 11/22/2017 11:03 AM    Indication: Desaturation, SOB;  62-year-old female with history of  tongue cancer, post left partial glossectomy and left modified radical  neck dissection.    Comparison: X-ray dated 11/22/2017    Findings:   Cardiac silhouette is unchanged central airway is midline. Scarring  over the right pleural apex. Surgical clips seen in the right  hemithorax and left upper abdomen. No pneumothorax. Bibasilar  opacities with interval increase in right-sided basilar opacities.  Unchanged small right pleural effusion. Subtle nodules over the lower  lobes.      Impression    Impression:     1. Bibasilar opacities with interval increase in right-sided basilar  and perihilar opacity, which may represent atelectasis versus  infection versus aspiration.  2. Small right pleural effusion.    I have personally reviewed the examination and initial interpretation  and I agree with the findings.    BENITO GOVEA MD   XR Abdomen Port 1 View    Narrative    XR ABDOMEN PORT F1   11/22/2017 12:20 PM      HISTORY: verify NG tube placement;     COMPARISON: CT 8/28/2017    FINDINGS: Supine AP view of the abdomen. Enteric tube tip and side  port projecting over the stomach. Cholelithiasis. Nonobstructive  visualized bowel gas pattern. Bibasilar opacities better characterized  on concurrent chest x-ray.      Impression    IMPRESSION: Enteric tube tip and side port projecting over the  stomach.    I have personally reviewed the examination and initial interpretation  and I agree with the findings.    ESTEFANIA SMITH MD   XR Abdomen Port 1 View    Narrative    Chest one view portable spine    HISTORY: NG tube placement.    COMPARISON STUDY: 11/22/2017    FINDINGS: Feeding tube tip in the  stomach. Right femoral catheter in  place. Calcifications projecting over the right kidney. Gastric  banding clips are noted.      Impression    IMPRESSION: Feeding tube tip in the stomach.    MYRIAM YANG MD   XR Chest Port 1 View    Narrative    Chest one view portable 30 degree upright    HISTORY: Postop chest x-ray    COMPARISON STUDY: 11/22/2017    FINDINGS: Tracheostomy tube in place. There are asymmetric perihilar  interstitial and airspace opacities right greater than left. Feeding  tube collimated off film abdomen. Cardiac silhouette is nonenlarged.  Right axillary and breast clips. Banding clips are noted in the left  upper quadrant.      Impression    IMPRESSION: New tracheostomy tube and perihilar pulmonary edema.    MYRIAM YANG MD   XR Chest Port 1 View    Narrative    Exam: XR CHEST PORT 1 VW, 11/24/2017 8:07 AM    Indication: New fever, increasing pressor requirements; 62-year-old  female with history of tongue cancer, postoperative from left partial  glossectomy with rapidly expanding hematoma.    Comparison: Chest x-ray dated 11/23/2017    Findings:   Frontal chest x-ray. Tracheostomy tube with tip projecting over the  upper thoracic trachea. Enteric tube is seen coursing below the  diaphragm with tip collimated off the field of view. Cardiac  silhouette is unchanged. Right apical pleural cap. Right axillary and  breast clips. Perihilar interstitial opacities, right more than left,  slightly decreased compared to previous chest x-ray dated 11/23/2017.  No significant pleural effusion.       Impression    Impression:   1. Slight interval increase in perihilar interstitial opacities  compared to previous chest x-ray dated 11/23/2017.  2. Unchanged cardiac silhouette.  3. Unchanged tracheostomy tube position.    I have personally reviewed the examination and initial interpretation  and I agree with the findings.    MYRIAM YANG MD   US Abdomen Limited Portable    Narrative    EXAMINATION:  Limited Abdominal Ultrasound, 11/24/2017 1:58 PM     COMPARISON: 5/28/2013    HISTORY: Elevated bilirubin    FINDINGS:   Fluid: Mild ascites in the upper abdomen and partially visualized  right-sided pleural effusion.    Liver: The liver demonstrates normal echotexture, measuring 16.0 cm in  craniocaudal dimension. There is no focal mass.     Gallbladder: Cholelithiasis without wall thickening or positive  sonographic Robles's.    Bile Ducts: Both the intra- and extrahepatic biliary system are of  normal caliber.  The common bile duct measures 3.8 mm in diameter.    Pancreas: Visualized portions of the head and body of the pancreas are  unremarkable.     Kidney: The right kidney measures 8.5 cm long. There is no  hydronephrosis or hydroureter, no shadowing renal calculi, cystic  lesion or mass.       Impression    IMPRESSION:   1.  Cholelithiasis without evidence of cholecystitis.  2.   Mild ascites in the upper abdomen partially visualized  right-sided pleural effusion.    I have personally reviewed the examination and initial interpretation  and I agree with the findings.    MARY VERDUZCO MD   XR Chest Port 1 View    Narrative    Exam: XR CHEST PORT 1 VW, 11/24/2017 5:28 PM    Indication: RN placed PICC - verify tip placement;     Comparison: Same-day radiograph.    Findings:   Single portable AP view of the chest.  Sided PICC with tip at the mid SVC. Tracheostomy tube tip in the high  trachea, enteric tube with tip outside the field-of-view, right  midline catheter with tip projecting over the axilla. Cardiac  silhouette and lung volumes are stable. Slight increase patchy  perihilar predominant opacities. Right apical pleural cap.  Costophrenic angles are clear. No pneumothorax. Surgical clips over  the right axilla and right chest wall.      Impression    Impression:   1. Left-sided PICC with tip at the mid SVC. Additional support devices  are stable.  2. Slight increase perihilar predominant interstitial  opacities,  presumably worsening pulmonary edema, infection or atelectasis.    I have personally reviewed the examination and initial interpretation  and I agree with the findings.    MYRIAM YANG MD   XR Chest Port 1 View    Narrative    Chest one view portable upright    HISTORY: Increase in tracheal aspirate    COMPARISON STUDY: 11/24/2017    FINDINGS: Tracheostomy tube and feeding tube in place. Left PICC with  tip at the confluence of innominate veins. Increased interstitial and  airspace opacities bilaterally. Cardiac silhouette is nonenlarged.  Right axillary PICC. Surgical clips right breast and left neck.      Impression    IMPRESSION: Slight increase in interstitial and airspace opacities  bilaterally representing edema, infection or ARDS    MYRIAM YANG MD   XR Chest Port 1 View    Narrative    Exam:  Chest X-ray 11/27/2017 10:09 AM    History: Interval CXr, assess NG tube position as well;     Comparison: Chest x-ray dated 11/26/2017.    Findings: AP portable chest x-ray at 40 degrees. Tracheostomy tube in  place with the tip approximately 6.5 cm from the jacob. Left PICC  with the tip at the mid SVC. Partially visualized feeding tube.  Surgical staples are present over the neck with a drain projecting  over the neck. The cardiomediastinal silhouette is within normal  limits. Redemonstration of diffuse interstitial and airspace  opacities. No acute bony abnormality.      Impression    Impression:  1. Slightly improved interstitial and airspace opacities in comparison  to prior exam.  2. The feeding tube is only partially visualized on this exam, please  consider abdominal x-ray for evaluation of placement.    I have personally reviewed the examination and initial interpretation  and I agree with the findings.    MYRIAM YANG MD   US Abdomen Limited w Abd/Pelv Duplex Complete Port    Narrative    EXAMINATION: US ABDOMEN COMPLETE WITH DOPPLER, 11/27/2017 3:58 PM     COMPARISON: Abdomen Limited  ultrasound, 11/24/2017    HISTORY: Worsening LFTs    TECHNIQUE: The abdomen was scanned in standard fashion with  specialized ultrasound transducer(s) using both gray-scale, color  Doppler, and spectral flow techniques.    Findings:    Liver: The liver demonstrates normal homogeneous echotexture. No  evidence of a focal hepatic mass.     Extrahepatic portal vein flow is antegrade, measuring 27 cm/sec.  Right portal vein flow is antegrade, measuring 16 cm/sec.  Left portal vein flow is antegrade, measuring 19 cm/sec.      Flow in the hepatic artery is towards the liver and:  68 cm/sec peak systolic  0.73 resistive index.     The splenic vein is patent and flow is towards the liver.  The left,  middle, and right hepatic veins are patent with flow towards the IVC.  The IVC is patent with flow towards the heart.   The visualized aorta  is not dilated.    Gallbladder: The gallbladder is mildly thickened to 3 mm. There is  evidence of stones and sludge.    Bile Ducts: Both the intra- and extrahepatic biliary system are of  normal caliber.  The common bile duct measures 3.8 mm in diameter.    Pancreas: The pancreas is not well-visualized.    Kidneys: The right kidneys are of normal echotexture, without mass or  hydronephrosis.   The craniocaudal dimensions of the right is 8.2 cm.    Fluid: There is mild ascites and evidence of right pleural effusion.        Impression    Impression:   1.  Cholelithiasis without evidence of cholecystitis. There is mild  thickening of the gallbladder wall to 3 mm which is increased from  11/24/2017. This could be related to patient ascites. If cholecystitis  is a concern a nuclear medicine study could be performed to evaluate  patency of the cystic duct.   2.  Mild ascites in the upper abdomen with partially visualized  right-sided pleural effusion.    I have personally reviewed the examination and initial interpretation  and I agree with the findings.    BENITO GOVEA MD   XR Abdomen Port  1 View    Narrative    XR ABDOMEN PORT F1 VW 11/28/2017 3:11 PM    History: Verify small bowel feeding tube bedside placement;     Comparison: None.    Findings: Feeding tube with tip in the proximal portion of the  jejunum. No pneumatosis or portal venous gas. Air-filled large bowel,  nondistended.      Impression    Impression: Feeding tube with tip in the proximal portion of the  jejunum.    I have personally reviewed the examination and initial interpretation  and I agree with the findings.    MYRIAM YANG MD   XR Chest Port 1 View    Narrative    Chest one view portable semiupright    HISTORY: HCAP    COMPARISON STUDY: 11/27/2017    FINDINGS: Tracheostomy tube, feeding tube and left PICC in place. Left  neck surgical clips. Right axillary PICC. Cardiac silhouette is  nonenlarged. Right breast clips. Diffuse interstitial opacities  throughout the lungs. Right pleural effusion tracking the apex.      Impression    IMPRESSION: Diffuse interstitial opacities compatible with infection,  pulmonary edema and/or ARDS.    MYRIAM YANG MD   XR Chest Port 1 View    Narrative    Exam:  Chest X-ray 11/30/2017 8:56 AM    History: Interval CXR;     Comparison: Chest x-ray dated 11/29/2017    Findings: AP portable chest x-ray at 30 degrees. Left PICC with the  tip in the mid SVC. Right PICC tip is present within the right axilla.  Right breast clips. Partially visualized feeding tube. Tracheostomy  tube with the tip approximately 5.2 cm from the jacob. Stable cardiac  silhouette. Diffuse interstitial opacities, slightly increased in  comparison to prior exam. Right pleural effusion that tracks to the  apex. No pneumothorax.      Impression    Impression:  1. Mildly increased diffuse interstitial opacities concerning for  infection versus pulmonary edema versus ARDS.  2. Stable right pleural effusion that tracks to the right apex.    I have personally reviewed the examination and initial interpretation  and I agree with  the findings.    MYRIAM YANG MD   XR Chest Port 1 View    Narrative    XR CHEST PORT 1 VW  12/1/2017 4:55 AM    History: Leukocytosis    Comparison: Prior day    Findings:   Single AP view the chest is obtained. Stable position of the  tracheostomy tube, visualized portion of the enteric tube, and the  left upper extremity PICC.    Cardiac mediastinal silhouette is stable. No significant change in  diffuse interstitial opacities. Likely stable volume of the small  right pleural effusion, which is now positioned dependently in the  right lower thorax. No left pleural effusion. Left retrocardiac and  right basilar opacities are stable.      Impression    IMPRESSION:  1.  Stable diffuse interstitial opacities, most likely representing  interstitial pulmonary edema.  2.  Stable small right pleural effusion.  3.  Left retrocardiac and right basilar opacities could represent  atelectasis or consolidation.    I have personally reviewed the examination and initial interpretation  and I agree with the findings.    SHAWNA OTT MD   US Head Neck Soft Tissue    Narrative    EXAM: US HEAD NECK SOFT TISSUE  12/2/2017 1:39 PM      HISTORY: previous neck dissection and hematoma formation of the left  neck previously evacuated. Please assess for incisional swelling to  the left neck;     COMPARISON: None    FINDINGS: Targeted ultrasound of the neck to look for hematoma.    Below and to the left of tracheostomy, to the left of the neck drain  3.9 x 4.3 x 2.2 cm  heterogeneous lesion, which would be consistent  with a hematoma in view of recent surgery. No internal blood flow.      Impression    IMPRESSION: 4.3 cm heterogeneous collection to the left of the neck  drain, consistent with a hematoma in view of recent surgery.    I have personally reviewed the examination and initial interpretation  and I agree with the findings.    JUAN COKER   XR Chest Port 1 View    Narrative    XR CHEST PORT 1 VW 12/3/2017 9:56  AM    History: Interval imaging;     Comparison: 12/1/2017    Findings: Tracheostomy in the high intrathoracic trachea. Feeding tube  coursing over the pylorus. Cardiac silhouette is within normal limits.  No pneumothorax. Decreased small right pleural effusion. Diffuse  stable interstitial opacities.      Impression    Impression:   1. Decreased small right pleural effusion.  2. Diffuse interstitial opacities have not significantly changed.    I have personally reviewed the examination and initial interpretation  and I agree with the findings.    JUAN COKER   XR Chest Port 1 View    Narrative    XR CHEST PORT 1 VW, 12/4/2017 1:36 AM.    Comparison: 12/3/2017.    History: Interval imaging; .    Findings:   Left PICC tip mid SVC. Tracheostomy in the high intrathoracic trachea.  Feeding tube coursing toward stomach tip not visualized. Cardiac  silhouette is within normal limits. No pneumothorax. Unchanged small  bilateral pleural effusion. Diffuse unchanged interstitial opacities.  Surgical clips over the right chest. Surgical clips over the neck with  stable lines.      Impression    Impression:   1. Unchanged small bilateral pleural effusion.  2. Diffuse interstitial opacities may represent pulmonary edema or  atypical infection.    I have personally reviewed the examination and initial interpretation  and I agree with the findings.    SHAWNA OTT MD   XR Chest Port 1 View    Narrative    XR CHEST PORT 1 VW, 12/5/2017 2:25 AM.    Comparison: 12/4/2017.    History: Interval imaging; .    Findings:   Left PICC tip mid SVC. Tracheostomy in the high intrathoracic trachea.  Feeding tube coursing toward stomach tip not visualized. Cardiac  silhouette is within normal limits. No pneumothorax. Unchanged small  bilateral pleural effusion. Diffuse unchanged interstitial opacities.  Surgical clips over the right chest. Surgical clips and staples over  the neck.      Impression    Impression:   1. Unchanged small  bilateral pleural effusions.  2. Unchanged diffuse interstitial opacities may represent pulmonary  edema or atypical infection.  3. Stable support devices.    I have personally reviewed the examination and initial interpretation  and I agree with the findings.    SHAWNA OTT MD   XR Chest Port 1 View    Narrative    XR CHEST PORT 1 VW, 12/6/2017 2:02 AM.    Comparison: 12/5/2017.    History: respiratory failure; .    Findings:   Left PICC tip mid SVC. Tracheostomy in the high intrathoracic trachea.  Feeding tube coursing toward stomach tip not visualized. Cardiac  silhouette is within normal limits. No pneumothorax. Unchanged small  bilateral pleural effusion. Diffuse unchanged interstitial opacities.  Surgical clips over the right chest. Surgical clips and staples over  the neck.      Impression    Impression:   1. Unchanged small bilateral pleural effusions.  2. Unchanged bilateral diffuse interstitial opacities which may  represent pulmonary edema and/or atypical infection.  3. Stable support devices.    I have personally reviewed the examination and initial interpretation  and I agree with the findings.    SHAWNA OTT MD   XR Chest Port 1 View    Narrative    XR CHEST PORT 1 VW, 12/7/2017 1:43 AM.    Comparison: 12/6/2017.    History: Interval imaging; .    Findings:   Left PICC tip mid SVC. Tracheostomy in the high intrathoracic trachea.  Feeding tube coursing toward stomach tip not visualized. Cardiac  silhouette is within normal limits. No pneumothorax. Unchanged small  bilateral pleural effusion. Diffuse interstitial opacities. Patchy  right side opacities. Left basilar atelectasis. Surgical clips over  the right chest. Surgical clips and staples over the neck.      Impression    Impression:   1. Unchanged small bilateral pleural effusions.  2. Unchanged bilateral diffuse interstitial opacities which may  represent pulmonary edema and/or atypical infection. There has been  mild increase in  superimposed patchy right lower lung opacities,  representing atelectasis or infection.  3. Stable support devices.    I have personally reviewed the examination and initial interpretation  and I agree with the findings.    SHAWNA OTT MD   XR Chest Port 1 View    Narrative    XR CHEST PORT 1 VW, 12/8/2017 12:37 AM.    Comparison: 12/7/2017.    History: Interval imaging; .    Findings:   Left PICC tip mid SVC. Tracheostomy in the high/mid intrathoracic  trachea. Feeding tube coursing toward stomach tip not visualized.  Cardiac silhouette is within normal limits. No pneumothorax. Unchanged  small bilateral pleural effusion. Diffuse interstitial opacities.  Patchy right side opacities. Left basilar atelectasis. Surgical clips  over the right chest. Surgical clips and staples over the neck.      Impression    Impression:   1. Unchanged small bilateral pleural effusions.  2. Mildly increased bilateral diffuse interstitial opacities which may  represent pulmonary edema and/or atypical infection.   3. Stable support devices.    I have personally reviewed the examination and initial interpretation  and I agree with the findings.    SHAWNA OTT MD   XR Chest Port 1 View    Narrative    XR CHEST PORT 1 , 12/9/2017 3:43 AM.    Comparison: 12/8/2017.    History: Interval imaging; .    Findings:   Left PICC tip mid SVC. Tracheostomy in the high/mid intrathoracic  trachea. Feeding tube coursing toward stomach tip not visualized.  Cardiac silhouette is within normal limits. No pneumothorax. Unchanged  small bilateral pleural effusion. Diffuse interstitial opacities.  Bibasilar opacities. Surgical clips over the right chest. Surgical  clips and staples over the neck.      Impression    Impression:   1. Unchanged small bilateral pleural effusions.  2. Slightly increased bilateral diffuse interstitial opacities which  may represent pulmonary edema and/or atypical infection.   3. Stable support devices.    I have personally  reviewed the examination and initial interpretation  and I agree with the findings.    MARY VERDUZCO MD   XR Chest Port 1 View    Narrative    EXAM: XR CHEST PORT 1 VW  12/9/2017 3:20 PM     HISTORY:  sob;        COMPARISON: Chest radiograph 12/9/2017    FINDINGS: Single AP view of chest. Stable thoracostomy tube. Interval  removal of left-sided PICC line. Gastric tube is coursing throughout  the esophagus with tip projects below diaphragm and out of the  field-of-view. Multiple surgical clips over the left upper quadrant.  Cardiac silhouette is unchanged. Right greater than left small pleural  effusions, unchanged since prior. No pneumothorax. Indistinct  pulmonary vasculature. Lingular opacity.. Predominantly right lung  interstitial opacities. Surgical clips over the right mid thorax and  lower neck.      Impression    IMPRESSION:   1. Interval removal of the left-sided PICC line.  2. Stable predominantly right lung interstitial opacities. Lingular  opacity persists.  3. Right greater than left small pleural effusions.    I have personally reviewed the examination and initial interpretation  and I agree with the findings.    JUAN GUPTA MD   XR Chest Port 1 View    Narrative    EXAM: XR CHEST PORT 1 VW  12/11/2017 3:20 AM     HISTORY:  Eval position of Trach;        COMPARISON: Chest radiograph 12/9/2017    FINDINGS: Single AP view of chest. Tracheostomy the mid intrathoracic  trachea. Gastric tube is coursing throughout the esophagus with tip  projects below diaphragm and out of the field-of-view. Cardiac  silhouette is unchanged. Decrease in small right pleural effusion. No  pneumothorax. Increase in right greater than left diffuse hazy  airspace opacities. Surgical clips over the right mid thorax and lower  neck.      Impression    IMPRESSION:   1. Tracheostomy in the mid intrathoracic trachea.  2. Accounting for technical differences, minimal increase in the right  greater than left hazy airspace  opacities; infection, pulmonary edema,  and atelectasis.  3. Decreasing small right pleural effusion.    I have personally reviewed the examination and initial interpretation  and I agree with the findings.    SHAWNA OTT MD     .Physician Attestation   I, Antoine Carreon, saw this patient with the resident and agree with the resident s findings and plan of care as documented in the resident s note.      I personally reviewed vital signs, medications, labs and imaging.    Key findings: 62-year-old female with multiple comorbidities: COPD, diastolic HF, EtOH use disorder with subsequent pancreatitis and fatty liver, cholelithiasis, breast cancer s/p radiation therapy, Schafer's esophagus, anxiety and MDD, and tobacco use who was recently admitted to [facility] from 11/21/2017-12/11/2017 s/p left partial glossectomy and left selective neck dissection (11/21/2017) complicated by EtOH withdrawal, aspiration. Hospital stay complicated by postoperative hematoma of left neck requiring emergent OR and tracheostomy (11/23/2017), followed by a second hematoma 12/5/2017 and required return to OR for evacuation.  Also, she developed left neck pressure ulcer, which was excised 12/5/2017.  She was discharged to [facility] on 12/11/2017 after weaning off mechanical support to [facility].  She returned acutely to [facility] 12/12/2017 for operative intervention with ENT after she was found to have bleeding from her oropharynx and 200 cc of carlitos red blood was suctioned from her tracheostomy.  ENT performed direct laryngoscopy oral cavity exlploration and cauterization, flexible bronchoscopy and trach exploration on 12/12/2017.  She received 3 units pRBC, 1 unit FFP, and 700 cc crystalloid in the OR.  She was transferred to the SICU for ventilator wean and monitoring postoperatively. Awaiting further management of bleed probably from tongue?  -Plan for angioembolization at 1130 12/13/2017.  3 u pRBC on hold for procedure--ordered by  [facility].      Antoine Carreon  Date of Service (when I saw the patient): 12/12/17    Time Spent on this Encounter   I spent 40 minutes managing the critical care of Alexia Flor in relation to the issues listed in this note.

## 2017-12-12 NOTE — ANESTHESIA PREPROCEDURE EVALUATION
Anesthesia Evaluation     .             ROS/MED HX    ENT/Pulmonary:     (+)COPD, , . .    Neurologic:     (+)delerium     Cardiovascular:     (+) ----. : . CHF . . :. .       METS/Exercise Tolerance:     Hematologic:         Musculoskeletal:         GI/Hepatic:     (+) hepatitis type Alcoholic, liver disease,       Renal/Genitourinary:         Endo:         Psychiatric:         Infectious Disease:         Malignancy:         Other:                                    Anesthesia Plan      History & Physical Review  History and physical reviewed and following examination; no interval change.    ASA Status:  3 .    NPO Status:  > 8 hours    Plan for General and Trach with Intravenous induction. Maintenance will be Balanced.    PONV prophylaxis:  Ondansetron (or other 5HT-3)  Additional equipment: 2nd IV      Postoperative Care  Postoperative pain management:  IV analgesics.      Consents            Anesthesiology Attending Note    HPI: Alexia Flor is a 62 year old female who  has a past medical history of Alcohol abuse; Alcoholic hepatitis; Alcoholic pancreatitis; Anxiety; Schafer's esophagus; Breast cancer (H); Chemical dependency (H); Congestive heart failure, unspecified; COPD (chronic obstructive pulmonary disease) (H); Depressive disorder; History of radiation therapy; Hoarseness; Hypokalemia; Macrocytic anemia; Non-adherence to medical treatment; Tobacco abuse; and Tongue cancer (H).  has a past surgical history that includes lumpectomy Rt breast  2006; Laryngoscopy with biopsy(ies) (N/A, 11/9/2017); Esophagoscopy, gastroscopy, duodenoscopy (EGD), combined (N/A, 11/9/2017); Glossectomy partial (N/A, 11/21/2017); Dissection radical neck (Left, 11/21/2017); Explore neck (Left, 11/23/2017); Tracheostomy (11/23/2017); and Explore neck (Left, 12/5/2017). with a Status post neck dissection;Bleeding  scheduled for Procedure(s):  Anesthesia Coverage Embolization @1130 - Wound Class: .      PMHx/PSHx/ROS:  Past  Medical History:   Diagnosis Date     Alcohol abuse      Alcoholic hepatitis      Alcoholic pancreatitis      Anxiety      Schafer's esophagus      Breast cancer (H)     rt, s/p radiation.     Chemical dependency (H)      Congestive heart failure, unspecified      COPD (chronic obstructive pulmonary disease) (H)      Depressive disorder      History of radiation therapy      Hoarseness      Hypokalemia      Macrocytic anemia      Non-adherence to medical treatment      Tobacco abuse      Tongue cancer (H)        Past Surgical History:   Procedure Laterality Date     DISSECTION RADICAL NECK Left 11/21/2017    Procedure: DISSECTION RADICAL NECK;;  Surgeon: Heriberto George MD;  Location: [facility] OR     ESOPHAGOSCOPY, GASTROSCOPY, DUODENOSCOPY (EGD), COMBINED N/A 11/9/2017    Procedure: COMBINED ENDOSCOPIC ULTRASOUND, ESOPHAGOSCOPY, GASTROSCOPY, DUODENOSCOPY (EGD), FINE NEEDLE ASPIRATE/BIOPSY;;  Surgeon: Yo Bhat MD;  Location: [facility] OR     EXPLORE NECK Left 11/23/2017    Procedure: EXPLORE NECK;  left Neck Exploration, tracheostomy , placement of nasogastric feeding tube;  Surgeon: Daisy Singh MD;  Location: [facility] OR     EXPLORE NECK Left 12/5/2017    Procedure: EXPLORE NECK;  [place] Left Neck Hematoma , excision of skin   ;  Surgeon: Stella Hernadez MD;  Location: [facility] OR     GLOSSECTOMY PARTIAL N/A 11/21/2017    Procedure: GLOSSECTOMY PARTIAL;  Left Partial Glossectomy And Left Neck Dissection, ;  Surgeon: Heriberto George MD;  Location: [facility] OR     LARYNGOSCOPY WITH BIOPSY(IES) N/A 11/9/2017    Procedure: LARYNGOSCOPY WITH BIOPSY(IES);  Direct Laryngoscopy,  Upper Endoscopic Ultrasound and Fine Needle Aspiration;  Surgeon: Heriberto George MD;  Location: [facility] OR     lumpectomy Rt breast  2006       TRACHEOSTOMY  11/23/2017    Procedure: TRACHEOSTOMY;;  Surgeon: Daisy Singh MD;  Location: [facility] OR       Soc Hx:   Social History   Substance Use Topics     Smoking status: Heavy  "Tobacco Smoker     Packs/day: 0.50     Years: 40.00     Types: Cigarettes     Smokeless tobacco: Current User     Alcohol use 0.5 oz/week      Comment: 1/2 liter per day         Allergies:   Allergies   Allergen Reactions     Codeine Anaphylaxis     Contrast Dye Itching and Swelling     7/10/2009 Patient reports history of contrast reaction when first diagnosed with breast cancer. \"I almost .\"   (Hives, swelling.) 2009 CT to be done without IV contrast per Dr. KAVIN Live     Moxifloxacin Anaphylaxis     Avelox     Nickel Itching and Rash     Codeine Sulfate Other (See Comments)     shaking     Gabapentin Other (See Comments)     Patient reports that she got very shaky and she \"felt like she was going to die\"       Meds:   Prescriptions Prior to Admission   Medication Sig Dispense Refill Last Dose     melatonin 5 MG tablet 1 tablet (5 mg) by Oral or Feeding Tube route every evening        HYDROmorphone (DILAUDID) 0.2 MG/0.2 ML SOLN injection Inject 0.2 mLs (0.2 mg) into the vein every 2 hours as needed for moderate to severe pain  0      oxyCODONE (ROXICODONE) 5 MG/5ML solution Take 2.5 mLs (2.5 mg) by mouth every 4 hours as needed for moderate to severe pain 90 mL 0      acetaminophen (TYLENOL) 325 MG tablet 2 tablets (650 mg) by Oral or Feeding Tube route every 8 hours as needed for mild pain 100 tablet       albuterol (PROAIR HFA/PROVENTIL HFA/VENTOLIN HFA) 108 (90 BASE) MCG/ACT Inhaler Inhale 2 puffs into the lungs 4 times daily as needed for other (dyspnea)        albuterol (2.5 MG/3ML) 0.083% neb solution Take 1 vial (2.5 mg) by nebulization every 4 hours as needed for wheezing 360 mL       ipratropium - albuterol 0.5 mg/2.5 mg/3 mL (DUONEB) 0.5-2.5 (3) MG/3ML neb solution Take 1 vial (3 mLs) by nebulization 4 times daily 360 mL       enoxaparin (LOVENOX) 30 MG/0.3ML injection Inject 0.3 mLs (30 mg) Subcutaneous every 24 hours        loperamide (IMODIUM) 1 MG/5ML liquid Take 10 mLs (2 mg) by mouth 3 " times daily 200 mL       haloperidol lactate (HALDOL) 5 MG/ML injection Inject 1 mL (5 mg) into the vein every 6 hours as needed for agitation 90 mL       QUEtiapine (SEROQUEL) 25 MG tablet 0.5 tablets (12.5 mg) by Oral or Feeding Tube route daily 60 tablet       QUEtiapine (SEROQUEL) 50 MG tablet 1 tablet (50 mg) by Oral or Feeding Tube route every evening 120 tablet       labetalol (NORMODYNE/TRANDATE) 5 MG/ML injection Inject 4 mLs (20 mg) into the vein every 4 hours as needed for high blood pressure (SBP > 160) 4 mL       metoprolol (LOPRESSOR) 10 mg/mL SUSP 3.75 mLs (37.5 mg) by Oral or Feeding Tube route 2 times daily        furosemide (LASIX) 20 MG tablet 1 tablet (20 mg) by Oral or Feeding Tube route daily 30 tablet       cyanocobalamin 1000 MCG TABS 1,000 mcg by Oral or NG Tube route daily 30 tablet       folic acid (FOLVITE) 1 MG tablet 1 tablet (1 mg) by Oral or Feeding Tube route daily 30 tablet 0      nicotine (NICODERM CQ) 14 MG/24HR 24 hr patch Place 1 patch onto the skin daily 30 patch       multivitamins with minerals (CERTAVITE/CEROVITE) LIQD liquid 15 mLs by Per Feeding Tube route daily        pantoprazole (PROTONIX) SUSP suspension 20 mLs (40 mg) by Oral or Feeding Tube route 2 times daily (before meals)        ascorbic acid 500 MG TABS 1 tablet (500 mg) by Oral or Feeding Tube route daily 30 tablet       thiamine (VITAMIN B-1) 100 MG tablet 1 tablet (100 mg) by Oral or Feeding Tube route daily        fiber modular (NUTRISOURCE FIBER) packet 3 packets by Per Feeding Tube route 3 times daily        cefTRIAXone (ROCEPHIN) 1 GM vial Inject 1 g (1,000 mg) into the vein daily for 2 days 10 mL 0      oxyCODONE IR (ROXICODONE) 5 MG tablet Take 1-2 tablets (5-10 mg) by mouth every 4 hours as needed for moderate to severe pain 30 tablet 0        No current outpatient prescriptions on file.         Labs:  BMP:  Recent Labs   Lab Test  12/12/17   0842   12/12/17   0712   NA  140   < >  140   POTASSIUM  4.5    < >  4.3   CHLORIDE   --    --   103   CO2   --    --   29   BUN   --    --   21   CR   --    --   0.48*   GLC  130*   < >  150*   SARAH   --    --   8.5    < > = values in this interval not displayed.     CBC:   Recent Labs   Lab Test  12/12/17   0842  12/12/17   0749  12/11/17   0350   WBC   --    --   12.0*   RBC   --    --   2.50*   HGB  11.2*  5.6*  8.0*   HCT   --    --   26.2*   MCV   --    --   105*   MCH   --    --   32.0   MCHC   --    --   30.5*   RDW   --    --   17.6*   PLT   --   246  284     Coags:  Recent Labs   Lab Test  12/12/17   0749   INR  1.19*   PTT  31   FIBR  347         ABG 12/12/2017  7.42       pCO2 Arterial 35 - 45 mm Hg 45     pO2 Arterial 80 - 105 mm Hg 190 (H)     Bicarbonate Arterial 21 - 28 mmol/L 29 (H)     Base Excess Art mmol/L 3.7     Comments: Abnormal Result, Ref range: -9.0 to 1.8     FIO2  70.0     Sodium 133 - 144 mmol/L 140     Potassium 3.4 - 5.3 mmol/L 4.5     Hemoglobin 11.7 - 15.7 g/dL 11.2 (L)     Glucose 70 - 99 mg/dL 130 (H)     Calcium Ionized Whole Blood 4.4 - 5.2 mg/dL 5.0        ECHO 11/2017  Left ventricular function, chamber size, wall motion, and wall thickness are normal.The EF is 55-60%.  Left ventricular diastolic function is indeterminate.  Right ventricular function, chamber size, wall motion, and thickness are normal.  Pulmonary artery systolic pressure cannot be assessed.  The inferior vena cava was normal in size with preserved respiratory variability.  No pericardial effusion is present.  No change from prior.      Vital Signs:  T 97.7  P 84  /85  R 16  SpO2 99%    Anesthetic Assessment and Plan:    62 year old female who  has a past medical history of Alcohol abuse; Alcoholic hepatitis; Alcoholic pancreatitis; Anxiety; Schafer's esophagus; Breast cancer (H); Chemical dependency (H); Congestive heart failure, unspecified; COPD (chronic obstructive pulmonary disease) (H); Depressive disorder; History of radiation therapy; Hoarseness; Hypokalemia;  Macrocytic anemia; Non-adherence to medical treatment; Tobacco abuse; and Tongue cancer (H). to OR for Procedure(s):  Anesthesia Coverage Embolization @1130 - Wound Class:     Pt is POD #! s/p emergent exploration of trachestomy site due to bleeding, now scheduled for IR embolization.  Prior to procedure patient seen, chart reviewed, consent in chart.    Milena Garcia MD               .

## 2017-12-12 NOTE — ED NOTES
Bed: ED02  Expected date: 12/12/17  Expected time: 7:00 AM  Means of arrival:   Comments:  Alexia Anthony MRN: [id number]  Transfer from [facility] pt, hx tongue cancer, bleeding from mouth and nose. VSS.  [facility] resident with patient ETA *[phone]

## 2017-12-12 NOTE — OR NURSING
Resident Dr. CLIFF Steele with anesthesia viewed STAT CXR and verified placement of central line. Line ok to use.

## 2017-12-12 NOTE — PLAN OF CARE
Problem: Patient Care Overview  Goal: Plan of Care/Patient Progress Review  Speech Language Therapy Discharge Summary    Reason for therapy discharge:    Discharged to [facility]     Progress towards therapy goal(s). See goals on Care Plan in [facility] electronic health record for goal details.  Goals not met.  Barriers to achieving goals:   discharge from facility.    Therapy recommendation(s):    Continued therapy is recommended.  Rationale/Recommendations:  Pt would benefit from continued ST in order to address communication and swallow deficits.  .

## 2017-12-12 NOTE — PLAN OF CARE
Problem: Patient Care Overview  Goal: Plan of Care/Patient Progress Review  Patient arrived to 4A from PACU. Sedation stopped, patient able to follow commands and answer simple yes/no questions. Sinus rhythm with occasional PVC's. Hypertensive - MD notified, BP goal is systolic < 180 per MD. Tolerating pressure support well. TF started at goal of 45ml/hr with 30ml water flush q 4 hours. Bell patient. No BM. Labs sent. MD notified of mag of 1.4. MIVF continued at 75ml/hr. MD notified of dark, tar-like stools.

## 2017-12-12 NOTE — IP AVS SNAPSHOT
MRN:[id number]                      After Visit Summary   12/12/2017    Alexia Flor    MRN: [id number]           Thank you!     Thank you for choosing [facility] for your care. Our goal is always to provide you with excellent care. Hearing back from our patients is one way we can continue to improve our services. Please take a few minutes to complete the written survey that you may receive in the mail after you visit with us. Thank you!        Patient Information     Date Of Birth          1955        About your hospital stay     You were admitted on:  December 12, 2017 You last received care in the:  [address] [facility]    You were discharged on:  December 14, 2017        Reason for your hospital stay       Oropharyngeal bleeding                  Who to Call     For medical emergencies, please call 911.  For non-urgent questions about your medical care, please call your primary care provider or clinic, [phone]  For questions related to your surgery, please call your surgery clinic        Attending Provider     Provider Specialty    Anya Gomez MD Emergency Medicine    [facility]       Primary Care Provider Office Phone # Fax #    Karan Smith [phone]      After Care Instructions     Activity - Up with nursing assistance           Advance Diet as Tolerated       Follow this diet upon discharge: Orders Placed This Encounter      Adult Formula Drip Feeding: Continuous Peptamen 1.5; Nasoduodenal tube; Goal Rate: 45; mL/hr; Medication - Tube Feeding Flush Frequency: At least 15-30 mL water before and after medication administration and with tube clogging; Amout to Send (Nutr...      NPO per Anesthesia Guidelines for Procedure/Surgery Except for: Meds            Bladder scan       X 2 for post void residual            Daily weights       Call Provider for weight gain of more than 2 pounds per day or 5 pounds per week.             General info for SNF       Length of Stay Estimate: Short Term Care: Estimated # of Days <30  Condition at Discharge: Improving  Level of care:skilled   Rehabilitation Potential: Good  Admission H&P remains valid and up-to-date: Yes  Recent Chemotherapy: N/A  Use LTACH Standing Orders: Yes            Glucose monitor nursing POCT       Before meals and at bedtime            Intake and output       Every shift            Oxygen - Trach dome       With humidity to keep O2 sats % >  92%. If supplemental oxygen is not needed patient should have humidified air via trach dome for moisture            Wound care (specify)       Site:   Left neck  Instructions:  Keep incisions clean and dry. Apply Aquaphor ointment to incisions three times daily to keep moist. You may shower, do not soak, scrub, or submerge incisions under water. Keep Optifoam or meplix dressing under tracheostomy flange and under left neck trach ties to prevent worsening pressure wounds to underlying skin.            Ventilator       Pressure support via trach:  PEEP: 5  Minimum pressure support: 5  Maximum pressure support 20  FiO2: as needed to keep O2 saturations > 92%                  Follow-up Appointments     Follow Up and recommended labs and tests       Follow up in ENT clinic with Dr. George as soon as possible after discharge from [facility] facility. Please call the clinic with questions/concerns: [phone].    Otolaryngology/ENT Clinic:  [facility]  [address] [place] [address]                  Additional Services     Nutrition Services Adult IP Consult       Reason:  Nothing to eat or drink by mouth until cleared by speech language pathology. Continuous tube feeding via nasogastric feeding tube. Formula: Peptamen 1.5, Goal rate of 45 mL/h continuous.            Occupational Therapy Adult Consult       Evaluate and treat as clinically indicated.    Reason:  Physical  "deconditioning            Physical Therapy Adult Consult       Evaluate and treat as clinically indicated.    Reason:  Physical deconditioning            Speech Language Path Adult Consult       Evaluate and treat as clinically indicated.    Reason: Swallow evaluation, passy shanel valve as tolerated for speaking                  Future tests that were ordered for you     Pneumatic Compression Device        Bilateral calf. Remove 30 mins BID.                  Additional Information     If you use hormonal birth control (such as the pill, patch, ring or implants): You'll need a second form of birth control for 7 days (condoms, a diaphragm or contraceptive foam). While in the hospital, you received a medicine called Bridion. Your normal birth control will not work as well for a week after taking this medicine.          Pending Results     Date and Time Order Name Status Description    12/12/2017 1235 IR Carotid Cerebral Angiogram Bilateral In process     12/12/2017 0714 Platelets prepare order unit In process     12/12/2017 0710 Plasma prepare order unit In process             Statement of Approval     Ordered          12/14/17 5302  I have reviewed and agree with all the recommendations and orders detailed in this document.  EFFECTIVE NOW     Approved and electronically signed by:  Jessica Hernández PA-C             Admission Information     Date & Time Provider Department Dept. Phone    12/12/2017 Heriberto George MD [address] [facility] [place] [phone]      Your Vitals Were     Blood Pressure Pulse Temperature Respirations Pulse Oximetry       106/65 84 97.5  F (36.4  C) 16 99%       Face[employer] Information     [employer] lets you send messages to your doctor, view your test results, renew your prescriptions, schedule appointments and more. To sign up, go to [email or web address] . Click on \"Log in\" on the left side of the screen, which will take you to the Welcome page. Then click on \"Sign up Now\" on the right side " of the page.     You will be asked to enter the access code listed below, as well as some personal information. Please follow the directions to create your username and password.     Your access code is: [email or web address]  Expires: 2018  2:12 PM     Your access code will  in 90 days. If you need help or a new code, please call your [facility] clinic or [phone].        Care EveryWhere ID     This is your Care EveryWhere ID. This could be used by other organizations to access your [facility] medical records  [id number]        Equal Access to Services     [facility]: Hadii dhruv sosa hadasho Sorosalbaali, waaxda luqadaha, qaybta kaalmaken rodriguez, michelle orellana . So [facility] [phone].    ATENCIÓN: Si habla español, tiene a savage disposición servicios gratuitos de asistencia lingüística. Llame al [phone].    We comply with applicable federal civil rights laws and [place] laws. We do not discriminate on the basis of race, color, national origin, age, disability, sex, sexual orientation, or gender identity.               Review of your medicines      START taking        Dose / Directions    amylase-lipase-protease 78801 UNITS per tablet   Commonly known as:  VIOKACE   Used for:  Nutritional deficiency        Dose:  1-2 tablet   1-2 tablets by Per Feeding Tube route every 4 hours Crush 1 tab WELL, mix into 30 ml warm water, DISSOLVE well and administer via FT as med bolus. Flush FT after administration. If TF rate is @10-20 ml/h, administer 1 tablet q4hrs; if @30-45 ml/h, increase to 2 tabs q4hrs.This regimen with TF @ goal will provide approx 2088 units of lipase/g of total Fat daily and approp dosing initially for pancreatic insuff. with more elemental TF formula.   Refills:  0       mineral oil-hydrophilic petrolatum   Used for:  Status post neck dissection        Apply topically 3 times daily   Quantity:  50 g   Refills:  0         CONTINUE these medicines which may have CHANGED, or  have new prescriptions. If we are uncertain of the size of tablets/capsules you have at home, strength may be listed as something that might have changed.        Dose / Directions    furosemide 20 MG tablet   Commonly known as:  LASIX   This may have changed:  how to take this   Used for:  Benign essential hypertension        Dose:  20 mg   Start taking on:  12/15/2017   Take 1 tablet (20 mg) by mouth daily   Quantity:  30 tablet   Refills:  0       labetalol 5 MG/ML injection   Commonly known as:  NORMODYNE/TRANDATE   This may have changed:  reasons to take this   Used for:  Benign essential hypertension        Dose:  20 mg   Inject 4 mLs (20 mg) into the vein every 4 hours as needed for high blood pressure (SBP > 160, DBP >110.  Hold for HR < 65.)   Quantity:  4 mL   Refills:  0       metoprolol 10 mg/mL Susp   Commonly known as:  LOPRESSOR   This may have changed:  how much to take   Used for:  Benign essential hypertension        Dose:  50 mg   5 mLs (50 mg) by Oral or Feeding Tube route 2 times daily   Refills:  0       oxyCODONE 5 MG/5ML solution   Commonly known as:  ROXICODONE   This may have changed:  Another medication with the same name was removed. Continue taking this medication, and follow the directions you see here.   Used for:  Acute post-operative pain        Dose:  2.5 mg   Take 2.5 mLs (2.5 mg) by mouth every 4 hours as needed for moderate to severe pain   Quantity:  90 mL   Refills:  0         CONTINUE these medicines which have NOT CHANGED        Dose / Directions    acetaminophen 325 MG tablet   Commonly known as:  TYLENOL   Used for:  Acute post-operative pain        Dose:  650 mg   2 tablets (650 mg) by Oral or Feeding Tube route every 8 hours as needed for mild pain   Quantity:  100 tablet   Refills:  0       * albuterol (2.5 MG/3ML) 0.083% neb solution   Used for:  Wheezing        Dose:  2.5 mg   Take 1 vial (2.5 mg) by nebulization every 4 hours as needed for wheezing   Quantity:  360 mL    Refills:  0       * albuterol 108 (90 BASE) MCG/ACT Inhaler   Commonly known as:  PROAIR HFA/PROVENTIL HFA/VENTOLIN HFA   Used for:  Wheezing        Dose:  2 puff   Inhale 2 puffs into the lungs 4 times daily as needed for other (dyspnea)   Refills:  0       ascorbic acid 500 MG Tabs   Used for:  Nutritional deficiency        Dose:  500 mg   Start taking on:  12/15/2017   1 tablet (500 mg) by Oral or Feeding Tube route daily   Quantity:  30 tablet   Refills:  0       cyanocobalamin 1000 MCG Tabs   Used for:  Macrocytic anemia        Dose:  1000 mcg   Start taking on:  12/15/2017   1,000 mcg by Oral or NG Tube route daily   Quantity:  30 tablet   Refills:  0       enoxaparin 30 MG/0.3ML injection   Commonly known as:  LOVENOX   Used for:  Status post neck dissection        Dose:  30 mg   Inject 0.3 mLs (30 mg) Subcutaneous every 24 hours   Refills:  0       fiber modular packet   Used for:  Nutritional deficiency        Dose:  3 packet   3 packets by Per Feeding Tube route 3 times daily   Refills:  0       folic acid 1 MG tablet   Commonly known as:  FOLVITE   Used for:  Macrocytic anemia        Dose:  1 mg   Start taking on:  12/15/2017   1 tablet (1 mg) by Oral or Feeding Tube route daily   Quantity:  30 tablet   Refills:  0       haloperidol lactate 5 MG/ML injection   Commonly known as:  HALDOL   Used for:  Anxiety        Dose:  5 mg   Inject 1 mL (5 mg) into the vein every 6 hours as needed for agitation   Quantity:  90 mL   Refills:  0       HYDROmorphone 0.2 MG/0.2 ML Soln injection   Commonly known as:  DILAUDID   Used for:  Acute post-operative pain        Dose:  0.2 mg   Inject 0.2 mLs (0.2 mg) into the vein every 2 hours as needed for moderate to severe pain   Refills:  0       ipratropium - albuterol 0.5 mg/2.5 mg/3 mL 0.5-2.5 (3) MG/3ML neb solution   Commonly known as:  DUONEB   Used for:  Wheezing        Dose:  3 mL   Take 1 vial (3 mLs) by nebulization 4 times daily   Quantity:  360 mL   Refills:  0        loperamide 1 MG/5ML liquid   Commonly known as:  IMODIUM   Used for:  Diarrhea, unspecified type        Dose:  2 mg   Take 10 mLs (2 mg) by mouth 3 times daily   Quantity:  200 mL   Refills:  0       melatonin 5 MG tablet   Used for:  Insomnia, unspecified type        Dose:  5 mg   1 tablet (5 mg) by Oral or Feeding Tube route every evening   Refills:  0       multivitamins with minerals Liqd liquid   Used for:  Nutritional deficiency        Dose:  15 mL   Start taking on:  12/15/2017   15 mLs by Per Feeding Tube route daily   Refills:  0       nicotine 14 MG/24HR 24 hr patch   Commonly known as:  NICODERM CQ   Used for:  Current smoker        Dose:  1 patch   Start taking on:  12/15/2017   Place 1 patch onto the skin daily   Quantity:  30 patch   Refills:  0       pantoprazole Susp suspension   Commonly known as:  PROTONIX   Used for:  Gastroesophageal reflux disease without esophagitis        Dose:  40 mg   20 mLs (40 mg) by Oral or Feeding Tube route 2 times daily (before meals)   Refills:  0       * QUEtiapine 50 MG tablet   Commonly known as:  SEROquel   Used for:  Anxiety        Dose:  50 mg   1 tablet (50 mg) by Oral or Feeding Tube route every evening   Quantity:  120 tablet   Refills:  0       * QUEtiapine 25 MG tablet   Commonly known as:  SEROquel   Used for:  Anxiety        Dose:  12.5 mg   Start taking on:  12/15/2017   0.5 tablets (12.5 mg) by Oral or Feeding Tube route daily   Quantity:  60 tablet   Refills:  0       thiamine 100 MG tablet   Used for:  Nutritional deficiency        Dose:  100 mg   Start taking on:  12/15/2017   1 tablet (100 mg) by Oral or Feeding Tube route daily   Refills:  0       * Notice:  This list has 4 medication(s) that are the same as other medications prescribed for you. Read the directions carefully, and ask your doctor or other care provider to review them with you.      STOP taking     cefTRIAXone 1 GM vial   Commonly known as:  ROCEPHIN                Where to get  your medicines      Some of these will need a paper prescription and others can be bought over the counter. Ask your nurse if you have questions.     You don't need a prescription for these medications     acetaminophen 325 MG tablet    albuterol (2.5 MG/3ML) 0.083% neb solution    albuterol 108 (90 BASE) MCG/ACT Inhaler    amylase-lipase-protease 76885 UNITS per tablet    ascorbic acid 500 MG Tabs    cyanocobalamin 1000 MCG Tabs    enoxaparin 30 MG/0.3ML injection    fiber modular packet    folic acid 1 MG tablet    furosemide 20 MG tablet    haloperidol lactate 5 MG/ML injection    ipratropium - albuterol 0.5 mg/2.5 mg/3 mL 0.5-2.5 (3) MG/3ML neb solution    labetalol 5 MG/ML injection    loperamide 1 MG/5ML liquid    melatonin 5 MG tablet    metoprolol 10 mg/mL Susp    mineral oil-hydrophilic petrolatum    multivitamins with minerals Liqd liquid    nicotine 14 MG/24HR 24 hr patch    pantoprazole Susp suspension    QUEtiapine 25 MG tablet    QUEtiapine 50 MG tablet    thiamine 100 MG tablet         Information about where to get these medications is not yet available     ! Ask your nurse or doctor about these medications     HYDROmorphone 0.2 MG/0.2 ML Soln injection    oxyCODONE 5 MG/5ML solution                Protect others around you: Learn how to safely use, store and throw away your medicines at www.disposemymeds.org.             Medication List: This is a list of all your medications and when to take them. Check marks below indicate your daily home schedule. Keep this list as a reference.      Medications           Morning Afternoon Evening Bedtime As Needed    acetaminophen 325 MG tablet   Commonly known as:  TYLENOL   2 tablets (650 mg) by Oral or Feeding Tube route every 8 hours as needed for mild pain   Last time this was given:  650 mg on 12/14/2017  7:46 AM                                * albuterol (2.5 MG/3ML) 0.083% neb solution   Take 1 vial (2.5 mg) by nebulization every 4 hours as needed for  wheezing   Last time this was given:  2.5 mg on 12/14/2017  6:06 AM                                * albuterol 108 (90 BASE) MCG/ACT Inhaler   Commonly known as:  PROAIR HFA/PROVENTIL HFA/VENTOLIN HFA   Inhale 2 puffs into the lungs 4 times daily as needed for other (dyspnea)                                amylase-lipase-protease 47731 UNITS per tablet   Commonly known as:  VIOKACE   1-2 tablets by Per Feeding Tube route every 4 hours Crush 1 tab WELL, mix into 30 ml warm water, DISSOLVE well and administer via FT as med bolus. Flush FT after administration. If TF rate is @10-20 ml/h, administer 1 tablet q4hrs; if @30-45 ml/h, increase to 2 tabs q4hrs.This regimen with TF @ goal will provide approx 2088 units of lipase/g of total Fat daily and approp dosing initially for pancreatic insuff. with more elemental TF formula.   Last time this was given:  2 tablets on 12/14/2017 11:56 AM                                ascorbic acid 500 MG Tabs   1 tablet (500 mg) by Oral or Feeding Tube route daily   Start taking on:  12/15/2017   Last time this was given:  500 mg on 12/14/2017  7:46 AM                                cyanocobalamin 1000 MCG Tabs   1,000 mcg by Oral or NG Tube route daily   Start taking on:  12/15/2017   Last time this was given:  1,000 mcg on 12/14/2017  7:46 AM                                enoxaparin 30 MG/0.3ML injection   Commonly known as:  LOVENOX   Inject 0.3 mLs (30 mg) Subcutaneous every 24 hours                                fiber modular packet   3 packets by Per Feeding Tube route 3 times daily   Last time this was given:  3 packets on 12/13/2017  7:59 AM                                folic acid 1 MG tablet   Commonly known as:  FOLVITE   1 tablet (1 mg) by Oral or Feeding Tube route daily   Start taking on:  12/15/2017   Last time this was given:  1 mg on 12/14/2017  7:47 AM                                furosemide 20 MG tablet   Commonly known as:  LASIX   Take 1 tablet (20 mg) by mouth  daily   Start taking on:  12/15/2017   Last time this was given:  20 mg on 12/14/2017  7:47 AM                                haloperidol lactate 5 MG/ML injection   Commonly known as:  HALDOL   Inject 1 mL (5 mg) into the vein every 6 hours as needed for agitation   Last time this was given:  5 mg on 12/14/2017  7:40 AM                                HYDROmorphone 0.2 MG/0.2 ML Soln injection   Commonly known as:  DILAUDID   Inject 0.2 mLs (0.2 mg) into the vein every 2 hours as needed for moderate to severe pain   Last time this was given:  0.2 mg on 12/14/2017 10:55 AM                                ipratropium - albuterol 0.5 mg/2.5 mg/3 mL 0.5-2.5 (3) MG/3ML neb solution   Commonly known as:  DUONEB   Take 1 vial (3 mLs) by nebulization 4 times daily   Last time this was given:  3 mLs on 12/14/2017 12:36 PM                                labetalol 5 MG/ML injection   Commonly known as:  NORMODYNE/TRANDATE   Inject 4 mLs (20 mg) into the vein every 4 hours as needed for high blood pressure (SBP > 160, DBP >110.  Hold for HR < 65.)   Last time this was given:  20 mg on 12/14/2017  3:12 AM                                loperamide 1 MG/5ML liquid   Commonly known as:  IMODIUM   Take 10 mLs (2 mg) by mouth 3 times daily                                melatonin 5 MG tablet   1 tablet (5 mg) by Oral or Feeding Tube route every evening   Last time this was given:  5 mg on 12/13/2017  8:11 PM                                metoprolol 10 mg/mL Susp   Commonly known as:  LOPRESSOR   5 mLs (50 mg) by Oral or Feeding Tube route 2 times daily   Last time this was given:  50 mg on 12/14/2017  7:46 AM                                mineral oil-hydrophilic petrolatum   Apply topically 3 times daily   Last time this was given:  12/14/2017  8:02 AM                                multivitamins with minerals Liqd liquid   15 mLs by Per Feeding Tube route daily   Start taking on:  12/15/2017   Last time this was given:  15 mLs on  12/14/2017  7:47 AM                                nicotine 14 MG/24HR 24 hr patch   Commonly known as:  NICODERM CQ   Place 1 patch onto the skin daily   Start taking on:  12/15/2017   Last time this was given:  1 patch on 12/14/2017  7:47 AM                                oxyCODONE 5 MG/5ML solution   Commonly known as:  ROXICODONE   Take 2.5 mLs (2.5 mg) by mouth every 4 hours as needed for moderate to severe pain   Last time this was given:  2.5 mg on 12/14/2017 12:07 PM                                pantoprazole Susp suspension   Commonly known as:  PROTONIX   20 mLs (40 mg) by Oral or Feeding Tube route 2 times daily (before meals)   Last time this was given:  40 mg on 12/14/2017  7:47 AM                                * QUEtiapine 50 MG tablet   Commonly known as:  SEROquel   1 tablet (50 mg) by Oral or Feeding Tube route every evening   Last time this was given:  12.5 mg on 12/14/2017  7:47 AM                                * QUEtiapine 25 MG tablet   Commonly known as:  SEROquel   0.5 tablets (12.5 mg) by Oral or Feeding Tube route daily   Start taking on:  12/15/2017   Last time this was given:  12.5 mg on 12/14/2017  7:47 AM                                thiamine 100 MG tablet   1 tablet (100 mg) by Oral or Feeding Tube route daily   Start taking on:  12/15/2017   Last time this was given:  100 mg on 12/14/2017  7:47 AM                                * Notice:  This list has 4 medication(s) that are the same as other medications prescribed for you. Read the directions carefully, and ask your doctor or other care provider to review them with you.

## 2017-12-12 NOTE — ED NOTES
Triage Assessment & Note:    /71  Pulse 84  Resp 12    Patient presents with: bleeding trach.     Home Treatments/Remedies:    Febrile / Afebrile?    Duration of C/o:      Richard Armando  December 12, 2017

## 2017-12-12 NOTE — IP AVS SNAPSHOT
[address] [address]    Phone:  [phone]                                       After Visit Summary   12/12/2017    Alexia Flor    MRN: [id number]           After Visit Summary Signature Page     I have received my discharge instructions, and my questions have been answered. I have discussed any challenges I see with this plan with the nurse or doctor.    ..........................................................................................................................................  Patient/Patient Representative Signature      ..........................................................................................................................................  Patient Representative Print Name and Relationship to Patient    ..................................................               ................................................  Date                                            Time    ..........................................................................................................................................  Reviewed by Signature/Title    ...................................................              ..............................................  Date                                                            Time

## 2017-12-12 NOTE — LETTER
Transition Communication Hand-off for Care Transitions to Next Level of Care Provider    Name: Alexia Flor  MRN #: [id number]  Primary Care Provider: Karan Smith     Primary Clinic: [address] [place] [address]     Reason for Hospitalization:  Status post neck dissection  Bleeding   Admit Date/Time: 12/12/2017  7:02 AM  Discharge Date: 12/14/17           Reason for Communication Hand-off Referral:  continuous of care    Discharge Plan: Anna REILLY RN, PHN, BSN  4A and 4E/ ICU  Care Coordinator  Phone: [phone]  Pager: [phone]        AVS/Discharge Summary is the source of truth; this is a helpful guide for improved communication of patient story

## 2017-12-12 NOTE — ANESTHESIA PROCEDURE NOTES
Arterial Line Procedure Note  Staff:     Anesthesiologist:  SARAVANAN CAPPS  Location: In OR After Induction  Procedure Start/Stop Times:     patient identified, IV checked, site marked, risks and benefits discussed, informed consent, monitors and equipment checked, pre-op evaluation and at physician/surgeon's request      Correct Patient: Yes      Correct Position: Yes      Correct Site: Yes      Correct Procedure: Yes      Correct Laterality:  Yes    Site Marked:  Yes  Line Placement:     Procedure:  Arterial Line    Insertion Site:  Radial    Insertion laterality:  Right    Skin Prep: Chloraprep      Patient Prep: patient draped, mask, sterile gloves, hat and hand hygiene      Local skin infiltration:  None    Ultrasound Guided?: No      Catheter size:  20 gauge, Quick cath    Cath secured with: anchor securement device      Dressing:  Tegaderm    Complications:  None obvious    Arterial waveform: Yes      IBP within 10% of NIBP: Yes

## 2017-12-12 NOTE — ANESTHESIA CARE TRANSFER NOTE
Patient: Alexia Flor    Procedure(s):  Direct Laryngoscopy, oral cavity exporation cauterization, flexible bronschoscopy, trach exploration   - Wound Class: I-Clean    Diagnosis: Bleeding Neck   Diagnosis Additional Information: No value filed.    Anesthesia Type:   No value filed.     Note:  Airway :ETT  Patient transferred to:PACU  Comments: To PACU, VSS, airway patent, RN at bedside. Patient remains intubated and sedated.Handoff Report: Identifed the Patient, Identified the Reponsible Provider, Reviewed the pertinent medical history, Discussed the surgical course, Reviewed Intra-OP anesthesia mangement and issues during anesthesia, Set expectations for post-procedure period and Allowed opportunity for questions and acknowledgement of understanding      Vitals: (Last set prior to Anesthesia Care Transfer)    CRNA VITALS  12/12/2017 0903 - 12/12/2017 0933      12/12/2017             Pulse: 81    ART BP: 177/67                Electronically Signed By: ANA LAURA Henry CRNA  December 12, 2017  9:33 AM

## 2017-12-12 NOTE — OR NURSING
Attending Dr. George with ENT text paged the following message:  Please update the family for Alexia Flor.  Call her mother Matilde at [phone].  thank you.  Ruchi MAURO [phone]

## 2017-12-12 NOTE — PROGRESS NOTES
CLINICAL NUTRITION SERVICES - ASSESSMENT NOTE     Nutrition Prescription    RECOMMENDATIONS FOR MDs/PROVIDERS TO ORDER:  -Consider order pancreatic elastase test to evaluate pt for pancreatic exocrine deficiency and need to begin PERT per recs below.  -Free water flush adjustment per MD discretion pending sodium and fluid status    Malnutrition Status:    Severe malnutrition in the context of acute on chronic illness (and suspected social/environmental circumstances with EtOH hx)     Recommendations already ordered by Registered Dietitian (RD):  Order the following enteral nutrition regimen:  -Peptamen 1.5 @ goal 45 ml/hr (1080 ml/day) to provide 1620 kcals (42 kcal/kg/day or 110% of last known MREE of 1469 kcals/day from 11/27 study), 73 g PRO (1.9 g/kg/day), 832 ml free H2O, 60 g Fat (70% from MCTs), 203 g CHO and no Fiber daily.  -Nutrisource Fiber 3 pkt TID (9 pkts = 27 g soluble fiber)    -Continue currently ordered Certavite, Vitamin C, Vitamin B12, Folic acid, Thiamin  -30 mL water flush q4h for tube patency    Future/Additional Recommendations:  1.  If admission to continue and poor vent wean progress, repeat metabolic cart studies weekly to better evaluate changes to REE (kcal) needs and approp of energy provisions.      2.  If/when able to observe stool quality and suspect pt with component of steatorrhea (despite more elemental TF) or confirm pt with pancreatic exocrine deficiency, consider trial of pancreatic enzyme replacement therapy (PERT) as med bolus initially with the following:  --Viokace 10,440 - 2 tablets q 4 hrs as med flush via NJT.   Administration Instructions: Crush 1 tablet, mix into 15 ml of warm water to dissolve well and then administer via FT as med bolus.  Flush FT well after   --If TF rate is running @ 10-20 ml/hr, administer 1 tablet q 4 hrs;   --If TF rate is running @ 30-45 ml/hr, increase to 2 tablets q 4 hrs.    *Note: this enzyme regimen with TF @ goal infusion will provide  "approx 2088 units of lipase/gram of total Fat daily and approp dosing initially for pancreatic insufficiency with more elemental TF formula.      REASON FOR ASSESSMENT  Alexia lFor is a/an 62 year old female assessed by the dietitian for Provider Order - Registered Dietitian to Assess and Order TF per Medical Nutrition Therapy Protocol    NUTRITION HISTORY  Unable to obtain from pt r/t trached (on vent) and sedated during visit.    Pt was just seen by [facility] RD on 12/11    -Enteral access: initially had 12 Fr NGT placed by ENT but pt did not tolerate gastric TF trial and alternatively placed NJT (10 Fr Cortrak) + Bridle on 11/28    -As of yesterday, pt was receiving Peptamen 1.5 @ goal 45 ml/hr (1080 ml/day) to provide 1620 kcals (42 kcal/kg/day or 110% of last known MREE of 1469 kcals/day from 11/27 study), 73 g PRO (1.9 g/kg/day), 832 ml free H2O, 60 g Fat (70% from MCTs), 203 g CHO and no Fiber daily.  Additionally, receiving Nutrisource Fiber 3 pkt TID (9 pkts = 27 g soluble fiber)     CURRENT NUTRITION ORDERS  Diet: NPO  Intake/Tolerance: See above    LABS  Labs reviewed    MEDICATIONS  Vitamin C  Vitamin B12  Folic acid  Certavite  Thiamin    ANTHROPOMETRICS  Height: 5'1\"  Most Recent Weight: 38.9 kg  IBW: 47.7 kg  BMI: 17; Underweight BMI <18.5  Weight History:   Wt Readings from Last 10 Encounters:   12/09/17 41.5 kg (91 lb 7.9 oz)   11/20/17 38.9 kg (85 lb 12.8 oz)   11/09/17 38.3 kg (84 lb 7 oz)   10/26/17 37.9 kg (83 lb 9.6 oz)   10/16/17 37.2 kg (82 lb)   08/28/17 41.5 kg (91 lb 7.9 oz)   08/28/17 42.5 kg (93 lb 9.6 oz)   08/21/17 41.4 kg (91 lb 3.2 oz)   08/03/17 40.8 kg (90 lb)   07/19/17 40.1 kg (88 lb 8 oz)     Dosing Weight: 39 kg (actual based on lowest admit wt of 38.9 kg, IBW = 47.7 kg)    ASSESSED NUTRITION NEEDS  Estimated Energy Needs: 3970-3805 kcals/day (100 - 120% of last known MREE on 11/27 equiv to 38-46 kcals/kg)  Justification: Post-op, Repletion and Underweight  Estimated Protein " Needs: 78-98 grams protein/day (2 - 2.5 grams of pro/kg)  Justification: Increased needs, Post-op and Repletion  Estimated Fluid Needs: (1 mL/kcal)   Justification: Per provider pending fluid status    PHYSICAL FINDINGS  See malnutrition section below.  Poor skin turgor     MALNUTRITION  % Intake: No decreased intake noted  % Weight Loss: Up to 7.5% in 3 months (non-severe)  Subcutaneous Fat Loss: Facial region, Upper arm, Lower arm and Thoracic/intercostal:  Severe  Muscle Loss: Temporal, Facial & jaw region, Thoracic region (clavicle, acromium bone, deltoid, trapezius, pectoral), Upper arm (bicep, tricep), Lower arm  (forearm), Dorsal hand, Upper leg (quadricep, hamstring), Patellar region and Posterior calf:  Severe  Fluid Accumulation/Edema: Mild  Malnutrition Diagnosis: Severe malnutrition in the context of chronic illness    NUTRITION DIAGNOSIS  Predicted suboptimal nutrient intake (protein-energy) r/t remains NPO (do not anticipate oral diet trials anytime in near future given postop status, altered mental status persists and pending vent wean progress), currently receiving goals for TF intakes but ongoing risk for interruptions with continued adm and unclear adequacy of digestion/absorption of peptide/MCT (70%)/LCT (30%) based TF formula with hx of chronic pancreatitis but unclear pancreatic exocrine function/need to consider pancreatic enzyme replacement therapy (PERT).    INTERVENTIONS  Implementation  Nutrition Education: Unable to complete due to pt intubated/sedated   Collaboration with other providers  Enteral Nutrition - Initiate  Feeding tube flush     Goals  Total ave nutrition intakes (EN) to provide minimum 100% of last known MREE (equiv to 38 kcal/kg/day) and 1.5 g PRO/kg/day (per 39 kg).     Monitoring/Evaluation  Progress toward goals will be monitored and evaluated per protocol.    Lyla Camp RDN, JUAN CARLOS  Pgr: [phone]

## 2017-12-12 NOTE — OP NOTE
DATE OF SURGERY:  12/12/2017      STAFF SURGEON:  Heriberto George MD      RESIDENT SURGEON:  Thien Wong MD      PREOPERATIVE DIAGNOSIS:  Bleeding from the oral cavity/oropharynx and from tracheostomy site.      POSTOPERATIVE DIAGNOSIS:  Bleeding from the oral cavity/oropharynx and from tracheostomy site.      PROCEDURES:   1.  Direct laryngoscopy.   2.  Oral cavity and oropharyngeal exploration with cauterization of ulcer on left lateral tongue.   3.  Exploration of tracheostomy site.   4.  Flexible bronchoscopy.      INDICATIONS FOR PROCEDURE:  Ms. 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  Her postoperative course has been complicated by alcohol withdrawal, aspiration pneumonia as well as 2 separate incidences of left neck hematoma.  Left neck hematoma evacuations and control of bleeding has been performed on 11/23/2017 and on 12/05/2017.  The patient has been stable since then and hence discharged to [facility] on 12/11/2017.  This morning we were paged by [facility] telling us that patient has had bleeding from her oral cavity, oropharynx and her tracheostomy site and that patient has been sent to the [facility] Emergency Department by ambulance.  Upon arrival to the ED, the patient was noted to have bleeding from her tracheostomy site as well as a large clot in the oropharynx.  Given this finding, the patient was taken to the operating room emergently.      ANESTHESIA:  General via existing tracheostomy.      FINDINGS:  The patient was noted to have a large clot in her oral cavity and oropharynx that was suctioned out.  She had an ulcer in her left posterior lateral tongue but no active bleeding was noted; however, this area was cauterized.  No bleeding or concern for neck hematoma on exam.  We thoroughly investigated her tracheostomy  site by taking out the Shiley and no bleeding was noted.  We also performed a flexible bronchoscopy and no bleeding was noted throughout the airway.      ESTIMATED BLOOD LOSS:  5 mL.      SPECIMENS:  None.      COMPLICATIONS:  None.      CONDITION AT THE END OF SURGERY:  Stable.      DESCRIPTION OF PROCEDURE:  The patient was brought to the operating room and laid supine on the operating table.  General anesthesia was induced by the anesthesia team via the patient's existing tracheostomy tube.  A timeout was conducted, identifying patient and procedure and all were in agreement.  No consent had been obtained due to the emergent nature of the procedure.  We started the procedure by performing a direct laryngoscopy.  Multiple clots in the oral cavity and oropharynx were suctioned out.  Oropharynx, supraglottis and glottis were all examined and no bleeding was noted.  There was an ulcer in the left posterolateral tongue from previous site of excision that was noted; however, there was no bleeding from this site, but there was clot that was adherent to the base of the ulcer.  Once we completed a direct laryngoscopy, the patient's face and neck were then prepped and patient was draped in the usual sterile fashion.  We then turned our attention back to the oral cavity and irrigated and suctioned it out and then cauterized the base of the ulcer on her tongue.  We then examined the neck and there were no concerns for neck hematoma or active bleeding from the neck.  Then we thoroughly examined the trach site.  The Shiley was pulled out and an endotracheal tube that was reinforced was placed.  No bleeding from the stoma site was noted.  The endotracheal tube was taken out and the stoma was further investigated and no bleeding was noted.  Hence, the Shiley was replaced in the stoma.  A flexible bronchoscope was then passed through the Shiley in the trachea all the way down to the jacob and then the left and right main stem  were carefully examined and no bleeding was noted.  This completed the procedure.  The patient was handed back over to Anesthesia and taken to the PACU in stable condition.  She remained on the ventilator when she was transported to the PACU.  Of note, she did receive blood products in the operating room as we had activated the massive protocol transfusion.  Dr. Gray was present for all critical portions of this procedure.         JOSEPH GRAY MD       As dictated by NEVILLE RAMESH MD            D: 2017 12:03   T: 2017 12:48   MT:       Name:     PEYTON TORRES   MRN:      [id number]        Account:        [id number]   :      1955           Procedure Date: 2017      Document: [id number]

## 2017-12-12 NOTE — ED PROVIDER NOTES
History     Chief Complaint   Patient presents with     Airway Obstruction     HPI  Alexia Flor is a 62 year old female who 62-year-old female with a history of squamous cell carcinoma of the tongue status-post resection, complicated by neck hematoma, status-post neck dissection and tracheostomy placement as well as a history of alcohol abuse and cirrhosis who presents from her care facility due to bleeding from the oropharynx and tracheostomy.  The care facility reports she began bleeding overnight from her nose, mouth and trach site.  They suctioned approximately 200 cc of carlitos blood from her trach.  In addition, upon EMS arrival, they continue to suction blood from her nares, mouth and trach.  She was placed on vent settings pressure support 12/5.  She was noted to be mildly hypoxic at 30% FiO2 with an SpO2 of 91%, and was increased to 100% with sats improving to 98%.  She was noted to be somewhat agitated prior to arrival and was given 5 mg Haldol.  She did not sustain any known trauma.  She is not currently anticoagulated.  She is drowsy at this point after having just received Haldol; however, does not seem to be in any respiratory distress.      This part of the medical record was transcribed by Ashley Bearden Medical Scribe, from a dictation done by Anya Gomez MD.      Past Medical History:   Diagnosis Date     Alcohol abuse      Alcoholic hepatitis      Alcoholic pancreatitis      Anxiety      Schafer's esophagus      Breast cancer (H)     rt, s/p radiation.     Chemical dependency (H)      Congestive heart failure, unspecified      COPD (chronic obstructive pulmonary disease) (H)      Depressive disorder      History of radiation therapy      Hoarseness      Hypokalemia      Macrocytic anemia      Non-adherence to medical treatment      Tobacco abuse      Tongue cancer (H)        Past Surgical History:   Procedure Laterality Date     DISSECTION RADICAL NECK Left 11/21/2017    Procedure: DISSECTION  RADICAL NECK;;  Surgeon: Heriberto George MD;  Location: [facility] OR     ESOPHAGOSCOPY, GASTROSCOPY, DUODENOSCOPY (EGD), COMBINED N/A 11/9/2017    Procedure: COMBINED ENDOSCOPIC ULTRASOUND, ESOPHAGOSCOPY, GASTROSCOPY, DUODENOSCOPY (EGD), FINE NEEDLE ASPIRATE/BIOPSY;;  Surgeon: Yo Bhat MD;  Location: [facility] OR     EXPLORE NECK Left 11/23/2017    Procedure: EXPLORE NECK;  left Neck Exploration, tracheostomy , placement of nasogastric feeding tube;  Surgeon: Daisy Singh MD;  Location: [facility] OR     EXPLORE NECK Left 12/5/2017    Procedure: EXPLORE NECK;  [place] Left Neck Hematoma , excision of skin   ;  Surgeon: Stella Hernadez MD;  Location: [facility] OR     GLOSSECTOMY PARTIAL N/A 11/21/2017    Procedure: GLOSSECTOMY PARTIAL;  Left Partial Glossectomy And Left Neck Dissection, ;  Surgeon: Heriberto George MD;  Location: [facility] OR     LARYNGOSCOPY WITH BIOPSY(IES) N/A 11/9/2017    Procedure: LARYNGOSCOPY WITH BIOPSY(IES);  Direct Laryngoscopy,  Upper Endoscopic Ultrasound and Fine Needle Aspiration;  Surgeon: Heriberto George MD;  Location: [facility] OR     lumpectomy Rt breast  2006       TRACHEOSTOMY  11/23/2017    Procedure: TRACHEOSTOMY;;  Surgeon: Daisy Singh MD;  Location: [facility] OR       Family History   Problem Relation Age of Onset     Coronary Artery Disease Father      Emphysema Father      CANCER Mother      renal cancer     Breast Cancer Maternal Grandmother      CANCER Maternal Grandmother      Substance Abuse Maternal Grandfather      CANCER Maternal Grandfather      Substance Abuse Cousin      CANCER Cousin      Substance Abuse Brother      DIABETES Brother        Social History   Substance Use Topics     Smoking status: Heavy Tobacco Smoker     Packs/day: 0.50     Years: 40.00     Types: Cigarettes     Smokeless tobacco: Current User     Alcohol use 0.5 oz/week      Comment: 1/2 liter per day     Current Facility-Administered Medications   Medication     bacitracin 50,000 Units,  "gentamicin (GARAMYCIN) 120 mg, polymyxin B 500,000 Units in sodium chloride 0.9% (bottle) 1,000 mL bottle for irrigation     heparin (porcine) 4,000 Units in sodium chloride 0.9% (bottle) 1,000 mL (Pharmacy Prepared for OR)     Facility-Administered Medications Ordered in Other Encounters   Medication     No abx ordered pre-op        Allergies   Allergen Reactions     Codeine Anaphylaxis     Contrast Dye Itching and Swelling     7/10/2009 Patient reports history of contrast reaction when first diagnosed with breast cancer. \"I almost .\"   (Hives, swelling.) 2009 CT to be done without IV contrast per Dr. KAVIN Live     Moxifloxacin Anaphylaxis     Avelox     Nickel Itching and Rash     Codeine Sulfate Other (See Comments)     shaking     Gabapentin Other (See Comments)     Patient reports that she got very shaky and she \"felt like she was going to die\"      I have reviewed the Medications, Allergies, Past Medical and Surgical History, and Social History in the Epic system.    Review of Systems   Constitutional: Negative for fever.   Respiratory: Positive for shortness of breath.    Cardiovascular: Negative for chest pain.   Skin: Positive for wound.   All other systems reviewed and are negative.      Physical Exam   BP: 146/71  Pulse: 84  Resp: 12      Physical Exam  General: patient is alert, anxious, in no respiratory distress  Head: atraumatic and normocephalic   EENT: moist mucus membranes without clot noted in the left posterior oropharynx, tracheostomy in place with trace bleeding around site, no neck swelling or induration, dissection site is soft and intact with trace yellow discharge, pupils round and reactive   Neck: supple   Cardiovascular: regular rate and rhythm, extremities warm and well perfused, no lower extremity edema  Pulmonary: coarse breath sounds bilaterally, symmetric, no wheezing  Abdomen: soft, non-tender   Musculoskeletal: normal range of motion   Neurological: alert, moving all " extremities symmetrically  Skin: warm, dry    ED Course     ED Course     Procedures             Critical Care time:  was 30 minutes for this patient excluding procedures.             Labs Ordered and Resulted from Time of ED Arrival Up to the Time of Departure from the ED   ISTAT  GASES LACTATE EUNICE POCT - Abnormal; Notable for the following:        Result Value    Ph Venous 7.58 (*)     PCO2 Venous 33 (*)     Bicarbonate Venous 31 (*)     All other components within normal limits   CARDIAC CONTINUOUS MONITORING   ISTAT CG4 GASES LACTATE EUNICE NURSING POCT   ISTAT CG8 GAS ELEC ICA GLUC EUNICE NURSE POCT   BLOOD COMPONENT   BLOOD COMPONENT   PLASMA PREPARE ORDER UNIT   BLOOD COMPONENT   BLOOD COMPONENT   BLOOD COMPONENT   BLOOD COMPONENT   PLATELETS PREPARE ORDER UNIT   BLOOD COMPONENT   BLOOD COMPONENT   BLOOD COMPONENT   BLOOD COMPONENT   BLOOD COMPONENT   BLOOD COMPONENT   BLOOD COMPONENT   BLOOD COMPONENT   BLOOD COMPONENT   BLOOD COMPONENT   BLOOD COMPONENT   BLOOD COMPONENT   BLOOD COMPONENT   BLOOD COMPONENT   BLOOD COMPONENT            Assessments & Plan (with Medical Decision Making)   Ms. Flor is a 62-year-old female with a history of squamous cell carcinoma of the tongue, status-post left partial glossectomy complicated by neck hematoma, status-post neck resection and tracheostomy placement, as well as a history of alcoholism and cirrhosis. She presents with bleeding from her oropharynx and trach site.  She does have a large clot within the posterior oropharynx, but is continuing to have bleeding from both around her trach site and from suctioning of the trach.  There is concern that she may have developed a tracheoinnominate fistula, however, slightly less likely given the clot noted in her oropharynx.  ENT was present on arrival of the patient to the emergency department.  She will be taken to the OR for further surgical exploration.  She arrived with one 20-gauge IV in place, and 2 additional peripheral  lines were placed.  She was started on the massive transfusion protocol with both FFP and PRBCs hanging on the way to the OR.  Labs including CBC, CMP, INR, and type and cross were drawn and are pending.  Her initial VBG shows a pH of 7.58, pCO2 of 33, bicarb of 31 and lactate of 0.9.  Patient was taken to the OR with anesthesia and ENT for further evaluation.      This part of the medical record was transcribed by Ashley Bearden Medical Scribe, from a dictation done by Anya Gomez MD.      I have reviewed the nursing notes.    I have reviewed the findings, diagnosis, plan and need for follow up with the patient.    Current Discharge Medication List          Final diagnoses:   Oral hemorrhage       12/12/2017   [facility] OR     Anya Gomez MD  12/12/17 0886

## 2017-12-13 NOTE — PROGRESS NOTES
[facility], [place]     Endovascular Surgical Neuroradiology Pre-Procedure Note      HPI:  Alexia Flor is a 62 year old woman with PMHx significant for COPD, diastolic HF, EtOH use disorder with subsequent pancreatitis and fatty liver, cholelithiasis, breast cancer s/p radiation therapy, Schafer's esophagus, anxiety and MDD, and tobacco use who was recently admitted to [facility] from 11/21/2017-12/11/2017 after a left partial glossectomy and left selective neck dissection (11/21/2017) complicated by EtOH withdrawal, aspiration, SICU monitoring, postoperative hematoma of left neck requiring emergent OR and tracheostomy (11/23/2017).  Subsequently she developed a second hematoma 12/5/2017 and required return to OR for evacuation.  Both times exploration revealed a branch coming off of IJV to be the source of bleeding. During this time she needed Trach placement and was transferred to LTAC. On 12/12/2017 she presented back with bleeding from her mouth and trach site. Her hemoglobin was as low as 5.6. She received 3U PRBC transfusion and responded appropriately. She was taken to OR emergently by ENT who removed a large clot from her oral cavity and oropharynx. She was also found to have an ulcer in left posterior lateral tongue however, no active source of bleeding was identified. A thorough cauterization of resection bed was performed. ENT has consulted our team to perform angiogram to identify and treat source of bleeding with embolization. Patient has been off of sedation and follows simple commands. The treatment plan was discussed with both patient and her mother.    Medical History:  Past Medical History:   Diagnosis Date     Alcohol abuse      Alcoholic hepatitis      Alcoholic pancreatitis      Anxiety      Schafer's esophagus      Breast cancer (H)     rt, s/p radiation.     Chemical dependency (H)      Congestive heart failure, unspecified      COPD (chronic obstructive pulmonary  disease) (H)      Depressive disorder      History of radiation therapy      Hoarseness      Hypokalemia      Macrocytic anemia      Non-adherence to medical treatment      Tobacco abuse      Tongue cancer (H)        Surgical History:  Past Surgical History:   Procedure Laterality Date     DISSECTION RADICAL NECK Left 11/21/2017    Procedure: DISSECTION RADICAL NECK;;  Surgeon: Heriberto George MD;  Location: [facility] OR     ESOPHAGOSCOPY, GASTROSCOPY, DUODENOSCOPY (EGD), COMBINED N/A 11/9/2017    Procedure: COMBINED ENDOSCOPIC ULTRASOUND, ESOPHAGOSCOPY, GASTROSCOPY, DUODENOSCOPY (EGD), FINE NEEDLE ASPIRATE/BIOPSY;;  Surgeon: Yo Bhat MD;  Location: [facility] OR     EXPLORE NECK Left 11/23/2017    Procedure: EXPLORE NECK;  left Neck Exploration, tracheostomy , placement of nasogastric feeding tube;  Surgeon: Daisy Singh MD;  Location: [facility] OR     EXPLORE NECK Left 12/5/2017    Procedure: EXPLORE NECK;  Washout Left Neck Hematoma , excision of skin   ;  Surgeon: Stella Hernadez MD;  Location: [facility] OR     GLOSSECTOMY PARTIAL N/A 11/21/2017    Procedure: GLOSSECTOMY PARTIAL;  Left Partial Glossectomy And Left Neck Dissection, ;  Surgeon: Heriberto George MD;  Location: [facility] OR     LARYNGOSCOPY N/A 12/12/2017    Procedure: LARYNGOSCOPY;  Direct Laryngoscopy, oral cavity exporation cauterization, flexible bronschoscopy, trach exploration  ;  Surgeon: Heriberto George MD;  Location: [facility] OR     LARYNGOSCOPY WITH BIOPSY(IES) N/A 11/9/2017    Procedure: LARYNGOSCOPY WITH BIOPSY(IES);  Direct Laryngoscopy,  Upper Endoscopic Ultrasound and Fine Needle Aspiration;  Surgeon: Heriberto George MD;  Location: [facility] OR     lumpectomy Rt breast  2006       TRACHEOSTOMY  11/23/2017    Procedure: TRACHEOSTOMY;;  Surgeon: Daisy Singh MD;  Location: [facility] OR       Family History:  Family History   Problem Relation Age of Onset     Coronary Artery Disease Father      Emphysema Father      CANCER Mother       "renal cancer     Breast Cancer Maternal Grandmother      CANCER Maternal Grandmother      Substance Abuse Maternal Grandfather      CANCER Maternal Grandfather      Substance Abuse Cousin      CANCER Cousin      Substance Abuse Brother      DIABETES Brother        Social History:  Social History     Social History     Marital status: Single     Spouse name: N/A     Number of children: N/A     Years of education: N/A     Occupational History     Not on file.     Social History Main Topics     Smoking status: Heavy Tobacco Smoker     Packs/day: 0.50     Years: 40.00     Types: Cigarettes     Smokeless tobacco: Current User     Alcohol use 0.5 oz/week      Comment: 1/2 liter per day     Drug use: No     Sexual activity: Not Currently     Partners: Male     Other Topics Concern     Not on file     Social History Narrative    Lives in [place] in an apartment. Does not own a car.       Allergies:  Allergies   Allergen Reactions     Codeine Anaphylaxis     Contrast Dye Itching and Swelling     7/10/2009 Patient reports history of contrast reaction when first diagnosed with breast cancer. \"I almost .\"   (Hives, swelling.) 2009 CT to be done without IV contrast per Dr. KAVIN Live     Moxifloxacin Anaphylaxis     Avelox     Nickel Itching and Rash     Codeine Sulfate Other (See Comments)     shaking     Gabapentin Other (See Comments)     Patient reports that she got very shaky and she \"felt like she was going to die\"       Is there a contrast allergy?  Yes.  PLAN:  IV Hydrocortisone    Medications:  Current Facility-Administered Medications   Medication     potassium chloride SA (K-DUR/KLOR-CON M) CR tablet 20-40 mEq     potassium chloride (KLOR-CON) Packet 20-40 mEq     potassium chloride 10 mEq in 100 mL sterile water intermittent infusion (premix)     potassium chloride 10 mEq in 100 mL intermittent infusion with 10 mg lidocaine     potassium chloride 20 mEq in 50 mL intermittent infusion     magnesium sulfate 2 " g in NS intermittent infusion (PharMEDium or FV Cmpd)     magnesium sulfate 4 g in 100 mL sterile water (premade)     potassium phosphate 10 mmol in D5W 250 mL intermittent infusion     potassium phosphate 15 mmol in D5W 250 mL intermittent infusion     potassium phosphate 20 mmol in D5W 500 mL intermittent infusion     potassium phosphate 20 mmol in D5W 250 mL intermittent infusion     potassium phosphate 25 mmol in D5W 500 mL intermittent infusion     metoprolol (LOPRESSOR) suspension 50 mg     labetalol (NORMODYNE/TRANDATE) injection 20 mg     lidocaine (LMX4) kit     sodium chloride (PF) 0.9% PF flush 3 mL     sodium chloride (PF) 0.9% PF flush 3 mL     acetaminophen (TYLENOL) tablet 650 mg     albuterol neb solution 2.5 mg     albuterol (PROAIR HFA/PROVENTIL HFA/VENTOLIN HFA) Inhaler 2 puff     ascorbic acid (VITAMIN C) tablet 500 mg     cyanocobalamin (vitamin  B-12) tablet 1,000 mcg     folic acid (FOLVITE) tablet 1 mg     haloperidol lactate (HALDOL) injection 5 mg     HYDROmorphone (DILAUDID) injection 0.2 mg     ipratropium - albuterol 0.5 mg/2.5 mg/3 mL (DUONEB) neb solution 3 mL     melatonin tablet 5 mg     multivitamins with minerals (CERTAVITE/CEROVITE) liquid 15 mL     nicotine (NICODERM CQ) 14 MG/24HR 24 hr patch 1 patch     oxyCODONE (ROXICODONE) solution 2.5 mg     pantoprazole (PROTONIX) suspension 40 mg     QUEtiapine (SEROquel) half-tab 12.5 mg     QUEtiapine (SEROquel) tablet 50 mg     thiamine tablet 100 mg     lidocaine 1 % 1 mL     sodium chloride (PF) 0.9% PF flush 3 mL     sodium chloride (PF) 0.9% PF flush 3 mL     mineral oil-hydrophilic petrolatum (AQUAPHOR)     naloxone (NARCAN) injection 0.1-0.4 mg     furosemide (LASIX) tablet 20 mg     nicotine patch REMOVAL     nicotine Patch in Place     0.9% sodium chloride infusion     dextrose 10 % 1,000 mL infusion     fiber modular (NUTRISOURCE FIBER) packet 3 packet     diphenhydrAMINE (BENADRYL) injection 25 mg     hydrocortisone sodium  succinate PF (solu-CORTEF) injection 100 mg   .    ROS:  Review of systems not obtained due to patient factors - mental status    PHYSICAL EXAMINATION  Vital Signs:  B/P: 136/84,  T: 97.7,  P: 84,  R: 13    Cardio:  RRR  Pulmonary:  [facility]'ed  Abdomen:  soft    Neurologic  Awake, alert, non-verbal  Follows simple commands consistently  Motor 4/5 throughout  Sensations intact b/L to light touch  Coordination deferred    LABS  (most recent Cr, BUN, GFR, PLT, INR, PTT within the past 7 days):    Recent Labs  Lab 12/13/17  0344  12/12/17  0749   CR 0.64  < >  --    BUN 27  < >  --    GFRESTIMATED >90  < >  --    GFRESTBLACK >90  < >  --      < > 246   INR  --   --  1.19*   PTT  --   --  31   < > = values in this interval not displayed.     Platelet Function P2Y12 (PRU):  Not applicable    ASSESSMENT:  Tongue ca s/p partial glossectomy later complicated by neck hematoma x2 and now new oral bleeding    PLAN:  Diagnostic angiogram with facial embolization and femoral arteriotomy closure device placement     PRE-PROCEDURE SEDATION ASSESSMENT     Pre-Procedure Sedation Assessment done at 0900.    Expected Level:  Deep Sedation    Indication:  Sedation is required to allow for neurointerventional procedure.    Consent obtained from patient's mother over phone after discussing the risks, benefits and alternatives.     PO Intake:  Appropriately NPO for procedure    ASA Class:  Class 5 - SEVERE SYSTEMIC DISEASE WITH IMMINENT RISK OF DEATH    Mallampati: [facility]'ed    History and physical reviewed and no updates needed. I have reviewed the lab findings, diagnostic data, medications, and the plan for sedation. I have determined this patient to be an appropriate candidate for the planned sedation and procedure and have reassessed the patient IMMEDIATELY PRIOR to sedation and procedure.    Patient was discussed with the Attending, Dr. De Santiago, who agrees with the plan.    Terence S. Male   Pager: [phone]      I have reviewed  the history.I have seen and examined the patient myself and agree with the assessment and plan above.  LINDA De Santiago MD

## 2017-12-13 NOTE — PROCEDURES
[facility], [place]     Endovascular Surgical Neuroradiology Post-Procedure Note    Pre-Procedure Diagnosis:  Oral bleeding  Post-Procedure Diagnosis:  Oral bleeding    Procedure(s):   Endovascular treatment of epistaxis or facial lacerations with hemorrhage    Findings: No obvious source of bleeding. Status post glue embolization of superior branch of left lingual artery.    Primary Surgeon:  Dr. Lupe De Santiago  Secondary Surgeon:  Not applicable  Secondary Surgeon Review:  None  Fellow:  Kavon Ramirez  Additional Assistants:  None    Prior to the start of the procedure and with procedural staff participation, I verbally confirmed: the patient s identity using two indicators, relevant allergies, that the procedure was appropriate and matched the consent or emergent situation, and that the correct equipment/implants were available. Immediately prior to starting the procedure I conducted the Time Out with the procedural staff and re-confirmed the patient s name, procedure, and site/side. (The Joint Commission universal protocol was followed.)  Yes    PRU value: Not applicable    Anesthesia:  Performed by Anesthesia  Medications:  Heparin 2000U  Puncture site:  Left Femoral Artery    Fluoroscopy time (minutes): 53.3  Radiation dose (mGy):  451    Contrast amount (mL):  60     Estimated blood loss (mL):  10    Closure:  Device - Angioseal    Disposition:  Will be followed in hospital by the ENT team.        Sedation Post-Procedure Summary    Sedatives: Per anesthesia    Vital signs and pulse oximetry were monitored and remained stable throughout the procedure, and sedation was maintained until the procedure was complete.  The patient was monitored by staff until sedation discharge criteria were met.    Patient tolerance:  Patient tolerated the procedure well with no immediate complications.    Time of sedation in minutes: Per anesthesia from beginning to end of physician one to one  monitoring.    Terence MCKINNON Male  Pager:  [phone]

## 2017-12-13 NOTE — ANESTHESIA POSTPROCEDURE EVALUATION
Patient: Alexia Hernandez [facility]    Procedure(s):  Washout Left Neck Hematoma , excision of skin    - Wound Class: II-Clean Contaminated    Diagnosis:Left Neck Hematoma   Diagnosis Additional Information: No value filed.    Anesthesia Type:  No value filed.    Note:  Anesthesia Post Evaluation    Patient location during evaluation: ICU  Patient participation: Unable to participate in evaluation secondary to underlying medical condition  Level of consciousness: obtunded/minimal responses  Pain management: unable to assess  Airway patency: patent  Cardiovascular status: acceptable  Respiratory status: acceptable and ETT  Hydration status: acceptable  PONV: unable to assess     Anesthetic complications: None          Last vitals:  Vitals:    12/11/17 1500 12/11/17 1550 12/11/17 1600   BP: (!) 153/97  151/72   Pulse:      Resp: 20  (!) 36   Temp:   36.9  C (98.4  F)   SpO2: 98% 98% 96%         Electronically Signed By: Philip Wynn MD  December 13, 2017  9:56 AM

## 2017-12-13 NOTE — PROGRESS NOTES
Nutrition Progress Note - Discussion of POC on SICU rounds; f/u for progress towards previous nutrition POC (see previous 12/12 assessment for details)    *Pt well known to this RD with recent adm and noted now re-admitted and postop status.    -EN: Peptamen 1.5 @ goal 45 ml/hr but now off for IR procedure  -GI: noted on last adm ordered Pancreatic elastase test on 12/11 and now showing result of 66 ug/g which is indicative of severe pancreatic exocrine insufficiency.  Would continue with more elemental (peptide/MCT) based TF formula but now add in pancreatic enzyme replacement therapy (PERT) as per this writer's recs in 12/11 note.    Interventions:  Collaboration and Referral of Nutrition care - Discussed plan for FEN/GI on rounds with Providers who approved RD to order PERT per previous admission recommendations and below (given above lab result):   Viokace 10,440 - 1 to 2 tablets q 4 hrs as med flush via NJT.   Administration Instructions: Crush 1 tablet WELL, mix into 15-30 ml of warm water to dissolve well and then administer via FT as med bolus.  Flush FT well after each administration.  --If TF rate is running @ 10-20 ml/hr, administer 1 tablet q 4 hrs;   --If TF rate is running @ 30-45 ml/hr, increase to 2 tablets q 4 hrs.    *Note: this enzyme regimen with TF @ goal infusion will provide approx 2088 units of lipase/gram of total Fat daily and approp dosing initially for pancreatic insufficiency with more elemental TF formula.      Layla Hudson ,RD, LD, CNSC (pgr [phone])

## 2017-12-13 NOTE — PROGRESS NOTES
Otolaryngology Progress Note  12/13/17      S: No acute events overnight. No further bleeding since OR. Patient awake and alert this morning, states she has been awake all night and asks us to come back later. Communicating via writing. Received haldol x 1 overnight for agitation. Mechanically ventilated overnight, switched to pressure support this morning at 0600. TFs running at goal via NJ tube. Dark stools x 3 overnight.          O: Temp: 97.7  F (36.5  C) Temp src: Axillary BP: 136/84   Heart Rate: 68 Resp: 14 SpO2: 98 % O2 Device: Mechanical Ventilator    General: Alert, writing for communication, NAD  HEENT: Oral tongue with moderate edema, but soft. No blood in the oral cavity. Left neck extending to left face with pitting edema and no fluctuance. Erythematous around neck incisions with skin breakdown ulcerations under trach ties depite optifoam. Ulcers have fibrinous base.Skin between the neck incision and ulcer excision site inferiorly is thinned and violaceous. 6-0 cuffed shiley in place with trach ties around neck.   Pulmonary: On pressure support via trach        Intake/Output Summary (Last 24 hours) at 12/13/17 0848  Last data filed at 12/13/17 0800   Gross per 24 hour   Intake             2540 ml   Output             1265 ml   Net             1275 ml         ROUTINE IP LABS (Last four results)  BMP  Recent Labs  Lab 12/13/17  0344 12/12/17  1438 12/12/17  0842 12/12/17  0749 12/12/17  0712 12/11/17  0350    141 140 143 140 142   POTASSIUM 3.8 4.7 4.5 3.7 4.3 4.6   CHLORIDE 108 106  --   --  103 106   SARAH 8.1* 8.8  --   --  8.5 8.4*   CO2 26 30  --   --  29 31   BUN 27 24  --   --  21 17   CR 0.64 0.55  --   --  0.48* 0.54   * 131* 130* 143* 150* 100*     CBC  Recent Labs  Lab 12/13/17  0344 12/12/17  1438 12/12/17  0842 12/12/17  0749 12/11/17  0350 12/10/17  0434   WBC 15.3* 17.6*  --   --  12.0* 12.7*   RBC 3.85 4.40  --   --  2.50* 2.44*   HGB 11.7 13.6 11.2* 5.6* 8.0* 7.9*   HCT 35.9  40.4  --   --  26.2* 25.5*   MCV 93 92  --   --  105* 105*   MCH 30.4 30.9  --   --  32.0 32.4   MCHC 32.6 33.7  --   --  30.5* 31.0*   RDW 19.7* 19.4*  --   --  17.6* 17.8*    288  --  246 284 246     INR  Recent Labs  Lab 12/12/17  0749 12/12/17  0712   INR 1.19* 1.12         Lactic acid 0.8  Mg 1.5  Phos 4.6     A/P: Alexia Flor is a 62 year old female with a past medical history of alcohol abuse, diastolic heart failure, COPD, breast cancer s/p chemoradiation, and 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 Her post-operative course was complicated by EtOH withdrawal,  left neck hematoma x 2 requiring return to OR for exploration and control of bleeding with tracheostomy on 11/23 and 12/5, aspiration pneumonia, and delirium. She was discharged to LTACH 12/11, however, returned to ED 12/12 with bleeding from the oropharynx and trach site. She is POD#1 s/p DL, oral cavity and oropharyngeal EUA with cauterization of ulcer on left latera tongue, exploration of tracheostomy site and flexible bronchoscopy with no identifiable source of bleeding.       Neuro:  - Pain control per SICU: Tylenol PRN (recommend judicious use in setting of alcohol related liver disease), oxycodone 2.5mg PRN, IV dilaudid PRN  - Agitation- Seroquel 12.5mg daily and 50mg QHS , Haldol 5mg PRN- managed by SICU  - Hx of significant Alcoholism             -Thiamine and folate, B12, multivitamin   - Nicotine patch   - melatonin for sleep      HEENT:  - Incision cares: clean with 0.9% sodium chloride and apply Aquaphor Q8H   - Soft red rosales suction to oral cavity only - no yankeur  Trach Care Instructions:   - OK for RN to change trach ties and dressing under trach  - Mepilex or optifoam under trach flange and left neck trach ties to off load pressure and prevent further skin irritation/breakdown   - Trach changed 12/5, uncomplicated trach change with stable tract  - Perform regular  suctioning   - RN should change disposable inner canulas every shift and PRN   - Keep trach tube obturator taped to wall behind the head of the bed   - Keep extra unopened 6-0 cuffed Shiley and 4-0 cuffed Shiley at bedside at all times   - Minimize the amount of air in the cuff needed to adequately apply positive pressure ventilate. If positive pressure ventilation is not need then the cuff should be down.   - Contact ENT on-call with any questions or concerns   - SLP consult for PMSV trial once back on trach dome      Respiratory:  - Aspiration pneumonia: Intraop aspiration event on 11/23 s/p 1 week of Rocephin, now with new growth of e.coli and klebsiella on sputum culture 12/4, see ID section below.   - Pressure support via trach, wean to trach dome as tolerated  - Hx COPD, no PTA inhalers/meds in EPIC      CV/heme:   - Closely monitoring hemodynamic status  - HTN: metoprolol 37.5 mg BID, hydralazine PRN, labetalol PRN   - Acute blood loss anemia on chronic anemia:  Received 3 units PRBCs 12/6 due to neck bleeding/hematoma. Continue PTA Iron supplement. Hgb 8.0. Monitor closely and transfuse for hgb < 7  - Given recurrent episodes of bleeding/hematoma recommend further coagulopathy workup. Teg study returned normal  - Hx diastolic heart failure: resume PTA lasix 20mg daily, defer to SICU for fluid status management  - TTE 11/24 showed EF of 60-65%      FEN/GI:  - NPO  - mIVF NS @ 75 mL/h   - TFs via NJ, at goal (45ml/h).    - Ceratvite, Nutrisource fiber packets TID   - pantoprazole   - No bowel meds at this time due to frequent stools. Anticipate dark bloody stools due to aspirated blood yesterday  - Electrolyte replacement per protocol.       /Renal:  - Adequate UOP per nursing, incontinent   - Management of metabolic alkalosis per SICU team      Endo  - sliding scale for diabetic management. BG wnl  - Hydrocortisone prior to IR procedure today       ID: Leukocytosis 15.3 (17.6)  - Aspiration pneumonia: 11/24  Sputum culture growing e.coli resistant to Zosyn. Completed 7 day course of Ceftriaxone on 12/3  - Sputum culture 12/4 with e.coli, klebsiella, and candida, restarted ceftriaxone 12/7/17 for 7 day course      PPX:  - Holding chemical DVT prophylaxis given IR procedure today and recent bleeding   - SCDs     Dispo: SICU. Anticipate discharge back to LTACH once stable      --  Patient and above plan discussed with Dr. Doris Hernández PA-C  Otolaryngology-Head & Neck Surgery  Please contact ENT by dialing [phone] and entering job code [phone].

## 2017-12-13 NOTE — PLAN OF CARE
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 Now presents with bleeding from the oropharynx and tracheostomy site from LTACH. A+O to self only. Restless. PERRL. Moving all extremities spontaneously.  Haldol given x1 for agitation with minimal effect. Tylenol and dilaudid given for neck pain with optimal relief. Afebrile. SBP <180. NSR. CMV vent settings overnight on 40% FiO2 via trach. Pressure supporting 7/5 since 0600, tolerating well. LS coarse throughout. TF @ 45 cc/hr. Had 3 black, loose stools overnight. Bell output 20-25 cc/hr. MD aware, no intervention. Left neck incision is WDL. Mag and K+ replaced. Plan: see flow sheet for detailed assessments and interventions, to IR today for embolization of left external carotid artery, continue to support POC.

## 2017-12-13 NOTE — PROGRESS NOTES
SURGICAL ICU PROGRESS NOTE  December 13, 2017      CO-MORBIDITIES:   Oral hemorrhage    ASSESSMENT: Ms. Alexia Flor is a 62-year-old female with PMH significant for COPD, diastolic HF, EtOH use disorder with subsequent pancreatitis and fatty liver, cholelithiasis, breast cancer s/p radiation therapy, Schafer's esophagus, anxiety and MDD, and tobacco use who was recently admitted to [facility] from 11/21/2017-12/11/2017 after a left partial glossectomy and left selective neck dissection (11/21/2017) complicated by EtOH withdrawal, aspiration, SICU monitoring, postoperative hematoma of left neck requiring emergent OR and tracheostomy (11/23/2017).  Subsequently she developed a second hematoma 12/5/2017 and required return to OR for evacuation.  Also, she developed left neck pressure ulcer, which was excised 12/5/2017.  She was discharged to [facility] on 12/11/2017 after weaning off mechanical support to [facility].  She returned acutely to [facility] 12/12/2017 for operative intervention with ENT after she was found to have bleeding from her oropharynx and 200 cc of carlitos red blood was suctioned from her tracheostomy.  Hgb in the ED prior to OR was 5.6.  ENT performed direct laryngoscopy oral cavity exploration and cauterization, flexible bronchoscopy and trach exploration on 12/12/2017.  She received 3 units pRBC, 1 unit FFP, and 700 cc crystalloid in the OR.  She was transferred to the SICU for ventilator wean and monitoring postoperatively.    CHANGES FOR TODAY:  - Neuro IR angio embolization today  - [facility] and [facility] as able  - Discontinue arterial line  - Discontinue CVC and mckinney after procedure   - Increase metoprolol to 50 mg BID     PLAN:   Neuro/ pain/ sedation:  #Acute postoperative pain  #Hx delirium  #Hx depression  #Hx EtOH use disorder w/ EtOH withdrawal  -Monitor neurological status. Notify the MD for any acute changes in exam.  -Delirium preventions and precautions  -Seroquel 50 mg qhs and Seroquel 12.5  qAM. PRN Haldol.  -PRN tylenol, PRN oxycodone and PRN dilaudid for postoperative pain.  -Melatonin 5 mg qHS for sleep  -Thiamine, folic acid and MVI for EtOH use disorder hx  -Turn off sedation on arrival to floor to assess neuro status     #Nicotine use disorder  -Nicotine patch ordered     Pulmonary care:   #Acute respiratory failure s/p tracheostomy  #E. Coli and Klebsiella oxytoca pneumonia  #COPD w/o hx of home O2 or inhalers  -Attempt trach dome when able  -Pressure support as needed  -Continue Rocephin course (12/7-12/13) for pneumonia  -Dunoeb inhaler     #Oropharynx bleeding  -Neuro interventional Radiology consult for embolization consideration  -Plan for angio embolization at 1130 12/13/2017.  3 u pRBC on hold for procedure--ordered by ENT.  -Monitor for signs of bleeding -- stable  -Suction from tracheostomy and oropharynx as needed  -TEG with heparinase -- no abnormalities.      Cardiovascular:  #Hypertension  #Hx of diastolic HF  -Monitor hemodynamic status.  Stable on transfer to SICU.   -Echo 12/10 (EF 55-60%, diastolic dysfunction indeterminate)  -Increase Metoprolol to 50 mg BID and PRN antihypertensives as needed  -Home lasix 20 mg daily      GI care:  #Hyperbilirubinemia  #Elevated Transaminases  #EtOH fatty liver disease  -NPO  -AST/ALT elevations during past admission likely 2/2 critical illness and underlying EtOH fatty liver disease  -Hepatology follow up as outpatient  -11/27  -- cholelithiasis w/o cholecystitis, mild ascites      Renal/ Fluids  -Monitor I/Os  -Daily weights      Electrolytes/Nutrition:     #Severe protein calorie malnutrition  -Continue NJ w/ TF at goal  -FWS 30 cc q4h -- monitor for hypernatremia   -Electrolyte replacement protocol      Endocrine:    -CARLITA  -Treate hypo or hyperglycemia as indicated      ID/ Antibiotics:  -Continue ceftriaxone for pneumonia (Klebsiella oxytoca and E. coli) 12/7-12/13--last dose today  -Monitor for other signs/symptoms of infection       Heme:     -Hemoglobin 11.7 this AM. Stable postoperatively after receiving 3 u pRBC in OR and 1 unit FFP.  -TEG heparinase without evidence of bleeding/clotting disorder.      Prophylaxis:    -Mechanical prophylaxis for DVT.  -No chemical DVT prophylaxis due to high risk of bleeding.      MSK:    -PT/OT ordered      Lines/ tubes/ drains:  -Right femoral CVC -- plan to discontinue after procedure today  -Right radial arterial line -- discontinuing today  -PIVs x2  -Bell -- plan to discontinue after procedure today      Disposition:  -Surgical ICU     Patient seen, findings and plan discussed with surgical ICU staff, Dr. Carreon.     Hans Rizo MD  PGY-1  Orthopaedic Surgery  ====================================    TODAY'S PROGRESS:   SUBJECTIVE:   No acute events. Patient mouthing words and swinging arms during examination this morning.    OBJECTIVE:   1. VITAL SIGNS:   Temp:  [97  F (36.1  C)-98.6  F (37  C)] 97.2  F (36.2  C)  Heart Rate:  [63-96] 78  Resp:  [12-22] 20  BP: (136-179)/(76-85) 136/84  MAP:  [70 mmHg-143 mmHg] 107 mmHg  Arterial Line BP: (118-201)/() 133/82  FiO2 (%):  [40 %-50 %] 40 %  SpO2:  [97 %-100 %] 98 %  Ventilation Mode: CPAP/PS  FiO2 (%): 40 %  Rate Set (breaths/minute): 14 breaths/min  Tidal Volume Set (mL): 300 mL  PEEP (cm H2O): 5 cmH2O  Pressure Support (cm H2O): 7 cmH2O  Oxygen Concentration (%): 40 %  Resp: 20    2. INTAKE/ OUTPUT:   I/O last 3 completed shifts:  In: 3874 [I.V.:1870; NG/GT:270]  Out: 1190 [Urine:1190]    3. PHYSICAL EXAMINATION:   General: Asleep, but wakes to voice  HEENT: patient refusing to open mouth for exam.  No gross drainage or blood coming for oropharynx. Neck incision c/d.  Neuro: Alert, interactive, but agitated.  Resp: Breathing non-labored on PS trial with trach.  CV: RRR  Abdomen: Soft, mildly distended, non tender.  Extremities: warm and well perfused    4. INVESTIGATIONS:   Arterial Blood Gases     Recent Labs  Lab 12/12/17  0842  12/12/17  0749   PH 7.42 7.39   PCO2 45 52*   PO2 190* 369*   HCO3 29* 31*     Complete Blood Count     Recent Labs  Lab 12/13/17  0344 12/12/17  1438 12/12/17  0842 12/12/17  0749 12/11/17  0350 12/10/17  0434   WBC 15.3* 17.6*  --   --  12.0* 12.7*   HGB 11.7 13.6 11.2* 5.6* 8.0* 7.9*    288  --  246 284 246     Basic Metabolic Panel    Recent Labs  Lab 12/13/17  0344 12/12/17  1438 12/12/17  0842 12/12/17  0749 12/12/17  0712 12/11/17  0350    141 140 143 140 142   POTASSIUM 3.8 4.7 4.5 3.7 4.3 4.6   CHLORIDE 108 106  --   --  103 106   CO2 26 30  --   --  29 31   BUN 27 24  --   --  21 17   CR 0.64 0.55  --   --  0.48* 0.54   * 131* 130* 143* 150* 100*     Liver Function Tests    Recent Labs  Lab 12/12/17  0749 12/12/17  0712 12/11/17  0350   AST  --  28 34   ALT  --  22 23   ALKPHOS  --  296* 285*   BILITOTAL  --  1.0 1.2   ALBUMIN  --  2.0* 1.7*   INR 1.19* 1.12  --      Pancreatic Enzymes  No lab results found in last 7 days.  Coagulation Profile    Recent Labs  Lab 12/12/17  0749 12/12/17  0712   INR 1.19* 1.12   PTT 31  --          5. RADIOLOGY:   Recent Results (from the past 24 hour(s))   XR Chest Port 1 View    Narrative    EXAM: XR CHEST PORT 1 VW  12/12/2017 10:19 AM      HISTORY: Position of central catheter    COMPARISON: Radiograph 12/11/2017    FINDINGS: AP radiograph of the chest. Lower approach central venous  catheter projects over the IVC. Tracheostomy tube terminates in the  midthoracic trachea. Feeding tube passes below the diaphragm and is  collimated off the field-of-view. Left upper quadrant surgical clips.  Right lower lobe surgical clips. Cardiac silhouette is unchanged. Mild  perihilar opacities and right greater than left diffuse hazy airspace  opacities. Small right pleural effusion. No pneumothorax.      Impression    IMPRESSION:   1. Lower approach central venous catheter tip projects over the IVC.  It is poorly visualized and an abdominal film could be helpful    2. Stable right greater than left diffuse airspace processes.    I have personally reviewed the examination and initial interpretation  and I agree with the findings.    MYRIAM YANG MD       =========================================  Physician Attestation   I, Antoine Carreon, saw this patient with the resident and agree with the resident s findings and plan of care as documented in the resident s note.      I personally reviewed vital signs, medications, labs and imaging.    Key findings: 62-year-old female with PMH significant for COPD, diastolic HF, EtOH use disorder with subsequent pancreatitis and fatty liver, cholelithiasis, breast cancer s/p radiation therapy, Schafer's esophagus, anxiety and MDD, and tobacco use who was recently admitted to [facility] from 11/21/2017-12/11/2017 after a left partial glossectomy and left selective neck dissection (11/21/2017) complicated by EtOH withdrawal, aspiration, SICU monitoring, postoperative hematoma of left neck requiring emergent OR and tracheostomy (11/23/2017).  Subsequently she developed a second hematoma 12/5/2017 and required return to OR for evacuation.  Also, she developed left neck pressure ulcer, which was excised 12/5/2017.  She was discharged to [facility] on 12/11/2017 after weaning off mechanical support to [facility].  She returned acutely to [facility] 12/12/2017 for operative intervention with ENT after she was found to have bleeding from her oropharynx. ENT performed direct laryngoscopy oral cavity exploration and cauterization, flexible bronchoscopy and trach exploration on 12/12/2017.  She received 3 units pRBC, 1 unit FFP, and 700 cc crystalloid in the OR.  She was transferred to the SICU for ventilator wean and monitoring postoperatively. She is likely to go for embolization.       Antoine Carreon  Date of Service (when I saw the patient): 12/13/2017    Time Spent on this Encounter   I spent 37 minutes managing the critical care of Alexia Flor in  relation to the issues listed in this note.

## 2017-12-13 NOTE — ANESTHESIA CARE TRANSFER NOTE
Patient: Alexia Hernandez [facility]    Procedure(s):  Anesthesia Coverage Embolization @1130    Diagnosis: Bleeding   Diagnosis Additional Information: No value filed.    Anesthesia Type:   General     Note:  Airway :Tracheostomy and Ventilator  Patient transferred to:ICU  Comments: Pt transported to ICU on monitors with ventilation via ambu.  Transport uneventful.  Report to RN. ICU Handoff: Call for PAUSE to initiate/utilize ICU HANDOFF, Identified Patient, Identified Responsible Provider, Reviewed the Pertinent Medical History, Discussed Surgical Course, Reviewed Intra-OP Anesthesia Management and Issues during Anesthesia, Set Expectations for Post Procedure Period and Allowed Opportunity for Questions and Acknowledgement of Understanding      Vitals: (Last set prior to Anesthesia Care Transfer)    CRNA VITALS  12/13/2017 1416 - 12/13/2017 1454      12/13/2017             NIBP: 115/74    Pulse: 72                Electronically Signed By: ANA LAURA Gandara CRNA  December 13, 2017  2:54 PM

## 2017-12-13 NOTE — PROGRESS NOTES
Patient Name: Alexia Hernandez [facility]  Medical Record Number: [id number]  Today's Date: 12/13/2017    Procedure:   Proceduralist: Tanja Zepeda    Sedation: Managed by anesthesia    Procedure start time: 1259  Puncture time: 1302  Procedure end time: 1433    D: Pt arrived at 1130, accompanied by anesthesia. Pedal pulses are palpable and present right foot pulses are weak. 6Fr. Sheath placed in left groin.  I:Monitored by anesthesia.  A: Pt groin is clean, flat, and dry. Angio-Seal used for closure. REF: [id number] LOT: [id number]. Pedal pulses are palpable and present R<L  P: Pt to continue to be managed by anesthesia until she transferred back to the ICU.

## 2017-12-13 NOTE — ANESTHESIA POSTPROCEDURE EVALUATION
Patient: Alexia Flor    Procedure(s):  Anesthesia Coverage Embolization @1130    Diagnosis:Bleeding   Diagnosis Additional Information: No value filed.    Anesthesia Type:  General    Note:  Anesthesia Post Evaluation    Patient location during evaluation: ICU  Post op Mental Status: limited participation   Level of consciousness: responsive to painful stimuli, responsive to physical stimuli, responsive to verbal stimuli, confused and combative  Pain management: unable to assess  Airway patency: patent (trach on pressure support trial)  Cardiovascular status: acceptable  Respiratory status: acceptable (trach on pressure support trial)  Hydration status: acceptable  PONV: unable to assess     Anesthetic complications: None          Last vitals:  Vitals:    12/13/17 0900 12/13/17 1000 12/13/17 1100   BP:  (!) 162/93 158/82   Pulse:      Resp: 13 13 13   Temp:  36.7  C (98.1  F) 36.7  C (98.1  F)   SpO2:  99% 97%         Electronically Signed By: Lai Sahu MD  December 13, 2017  3:32 PM

## 2017-12-14 NOTE — PLAN OF CARE
Problem: Restraint for Non-Violent/Non-Self-Destructive Behavior  Goal: Prevent/Manage Potential Problems  Maintain safety of patient and others during period of restraint.  Promote psychological and physical wellbeing.  Prevent injury to skin and involved body parts.  Promote nutrition, hydration, and elimination.   Outcome: Completed Date Met: 12/14/17  Pt no longer requiring bilateral soft wrist restraints as of 0600.  Following commands appropriately, redirectable.  Able to nod understanding and agreement.

## 2017-12-14 NOTE — PROGRESS NOTES
SURGICAL ICU PROGRESS NOTE  December 14, 2017      CO-MORBIDITIES:   Oral hemorrhage    ASSESSMENT: Ms. Alexia Flor is a 62-year-old female with PMH significant for COPD, diastolic HF, EtOH use disorder with subsequent pancreatitis and fatty liver, cholelithiasis, breast cancer s/p radiation therapy, Schafer's esophagus, anxiety and MDD, and tobacco use who was recently admitted to [facility] from 11/21/2017-12/11/2017 after a left partial glossectomy and left selective neck dissection (11/21/2017) complicated by EtOH withdrawal, aspiration, SICU monitoring, postoperative hematoma of left neck requiring emergent OR and tracheostomy (11/23/2017).  Subsequently she developed a second hematoma 12/5/2017 and required return to OR for evacuation.  Also, she developed left neck pressure ulcer, which was excised 12/5/2017.  She was discharged to [facility] on 12/11/2017 after weaning off mechanical support to [facility].  She returned acutely to [facility] 12/12/2017 for operative intervention with ENT after she was found to have bleeding from her oropharynx and 200 cc of carlitos red blood was suctioned from her tracheostomy.  Hgb in the ED prior to OR was 5.6.  ENT performed direct laryngoscopy oral cavity exlploation and cauterization, flexible bronchoscopy and trach exploration on 12/12/2017.  She received 3 units pRBC, 1 unit FFP, and 700 cc crystalloid in the OR.  She was transferred to the SICU for ventilator wean and monitoring postoperatively.     CHANGES FOR TODAY:  - PST and [facility] as able  - D/C femoral line, D/C mckinney  - add scheduled potassium  - consider transfer back to [facility]      PLAN:   Neuro/ pain/ sedation:  #Acute postoperative pain  #Hx delirium  #Hx depression  #Hx etoh use disorder w/ etoh withdrawal  -Monitor neurological status. Notify the MD for any acute changes in exam.  -Delirium preventions and precautions  -Seroquel 50 mg qHs and Seroquel 12.5 qAM. PRN Haldol.  -PRN tylenol, PRN oxycodone and  PRN dilaudid for postoperative pain.  -Melatonin 5 mg qHS for sleep  -Thiamine, folic acid and MVI for EtOH use disorder hx      #Nicotine use disorder  -Nicotine patch ordered     Pulmonary care:   #Acute respiratory failure s/p tracheostomy  #E. Coli and Klebsiella oxytoca pneumonia, treatment completed   #COPD w/o hx of home O2 or inhalers  -Attempt trach dome when able  -Pressure support as needed  -Dunoeb inhaler      #Oropharynx bleeding  -Neurointerventional Radiology performed an embolization of the superior branch of the lingual artery 12/13  -Monitor for signs of bleeding -- stable  -Suction from tracheostomy and oropharynx as needed  -TEG with heparinase -- no abnormalities.       Cardiovascular:  #Hypertension  #Hx of diastolic HF  - Monitor hemodynamic status.  - Echo 12/10 (EF 55-60%, diastolic dysfunction indeterminate)  - Metoprolol to 50 mg BID and PRN antihypertensives as needed  - Home lasix 20 mg daily      GI care:  #Hyperbilirubinemia  #Elevated Transaminases  #EtOH fatty liver disease  -NPO  -AST/ALT elevations during past admission likely 2/2 critical illness and underlying EtOH fatty liver disease  -Hepatology follow up as outpatient  -11/27  -- cholelithiasis w/o cholecystitis, mild ascites       Renal/ Fluids  -Monitor I/Os  -Daily weights       Electrolytes/Nutrition:     #Severe protein calorie malnutrition  -Continue NJ w/ TF at goal  -FWS 30 cc q4h -- monitor for hypernatremia   -Electrolyte replacement protocol  - add scheduled potassium        Endocrine:    -CARLITA  -Treate hypo or hyperglycemia as indicated       ID/ Antibiotics:  #Pneumonia- Klebsiella and E.coli, treatment completed   - ceftriaxone course completed for pneumonia (Klebsiella oxytoca and E. coli) 12/7-12/13  -Monitor for other signs/symptoms of infection       Heme:     -Hemoglobin 10.9 this AM. Stable postoperatively after receiving 3u pRBC in OR and 1 unit FFP on 12/12.  -TEG heparinase without evidence of  bleeding/clotting disorder.       Prophylaxis:    -Mechanical prophylaxis for DVT.  -No chemical DVT prophylaxis due to high risk of bleeding.       MSK:    -PT/OT ordered       Lines/ tubes/ drains:  -PIVs x2       Disposition:  - Likely back to LTACH today       Patient seen, findings and plan discussed with surgical ICU staff, Dr. Carreon.    Critical care time: 45 mins    Yary Gandara PA-C  Surgical ICU      ====================================    SUBJECTIVE:   Course reviewed. No acute events overnight. Patient tolerated procedure yesterday. She failed trach dome this AM due to tachypnea.     OBJECTIVE:   1. VITAL SIGNS:   Temp:  [97.2  F (36.2  C)-98.1  F (36.7  C)] 98.1  F (36.7  C)  Heart Rate:  [69-87] 74  Resp:  [13-20] 20  BP: (111-182)/(66-97) 175/86  FiO2 (%):  [40 %] 40 %  SpO2:  [97 %-100 %] 100 %  Ventilation Mode: CPAP/PS  FiO2 (%): 40 %  Rate Set (breaths/minute): 14 breaths/min  Tidal Volume Set (mL): 300 mL  PEEP (cm H2O): 5 cmH2O  Pressure Support (cm H2O): 7 cmH2O  Oxygen Concentration (%): 40 %  Resp: 20    2. INTAKE/ OUTPUT:   I/O last 3 completed shifts:  In: 3150 [I.V.:1725; NG/GT:570]  Out: 1250 [Urine:1240; Blood:10]    3. PHYSICAL EXAMINATION:   General: Resting in bed, in no acute distress   Neuro: Alert, does not respond to orientation questions, moves all 4 extremities purposefully   Resp: trach in place, diminished breath sounds in the bases, no wheezes   CV: RRR, no murmur   Abdomen: Soft, Non-distended, Non-tender  Incisions: neck incisions appear well approximated  Extremities: warm and well perfused    4. INVESTIGATIONS:   Arterial Blood Gases     Recent Labs  Lab 12/12/17  0842 12/12/17  0749   PH 7.42 7.39   PCO2 45 52*   PO2 190* 369*   HCO3 29* 31*     Complete Blood Count     Recent Labs  Lab 12/14/17  0502 12/13/17  0344 12/12/17  1438 12/12/17  0842 12/12/17  0749 12/11/17  0350   WBC 12.4* 15.3* 17.6*  --   --  12.0*   HGB 10.9* 11.7 13.6 11.2* 5.6* 8.0*    245 288   --  246 284     Basic Metabolic Panel    Recent Labs  Lab 12/14/17  0502 12/13/17  0344 12/12/17  1438 12/12/17  0842  12/12/17  0712    143 141 140  < > 140   POTASSIUM 3.3* 3.8 4.7 4.5  < > 4.3   CHLORIDE 108 108 106  --   --  103   CO2 26 26 30  --   --  29   BUN 28 27 24  --   --  21   CR 0.64 0.64 0.55  --   --  0.48*   * 177* 131* 130*  < > 150*   < > = values in this interval not displayed.  Liver Function Tests    Recent Labs  Lab 12/12/17  0749 12/12/17  0712 12/11/17  0350   AST  --  28 34   ALT  --  22 23   ALKPHOS  --  296* 285*   BILITOTAL  --  1.0 1.2   ALBUMIN  --  2.0* 1.7*   INR 1.19* 1.12  --      Pancreatic Enzymes  No lab results found in last 7 days.  Coagulation Profile    Recent Labs  Lab 12/12/17  0749 12/12/17  0712   INR 1.19* 1.12   PTT 31  --      Lactate  Invalid input(s): LACTATE    5. RADIOLOGY:   No results found for this or any previous visit (from the past 24 hour(s)).    =========================================      Physician Attestation   I, Antoine Carreon, have reviewed and discussed with the advanced practice provider their history, physical and plan for Alexia Flor. I did not participate in a shared visit by interviewing or examining the patient and this should be billed as an advanced practice provider only visit.    Antoine Carreon  Date of Service (when I saw the patient): 12/14/2017

## 2017-12-14 NOTE — DISCHARGE SUMMARY
Discharge Summary  Alexia Flor  [id number]  1955    Date of Admission: 12/12/2017  Date of Discharge: 12/14/2017    Admission Diagnosis: Status post neck dissection  Bleeding   Discharge Diagnosis: Same    Procedures:  Date: 12/12/2017  Procedure(s):  1.  Direct laryngoscopy.   2.  Oral cavity and oropharyngeal exploration with cauterization of ulcer on left lateral tongue.   3.  Exploration of tracheostomy site.   4.  Flexible bronchoscopy    Date: 12/13/2017  Procedures: Diagnostic angiogram, Glue embolization of superior branch of left lingual artery.     Pathology: none    HPI: Alexia Flor is a 62 year old female with history of of alcohol abuse, diastolic heart failure, COPD, breast cancer s/p chemoradiation, and a left tongue squamous cell carcinoma s/p left partial glossectomy with primary closure and left modified radical neck dissection on 11/21/17. Her post-operative course was complicated by EtOH withdrawal,  left neck hematoma x 2 requiring return to OR for exploration and control of bleeding with tracheostomy on 11/23 and 12/5, aspiration pneumonia, and delirium. She was discharged to LTACH 12/11, however, returned to ED 12/12 with bleeding from the oropharynx and trach site. She was taken to the OR emergently for exam under anesthesia and control of bleeding.     Hospital Course: The patient was admitted to the hospital and underwent the above mentioned procedure. She tolerated the procedure without any intra- or haresh-operative complications. Please see the operative report for full details of the procedure. No active bleeding was identified in the OR, however, there were large clots removed from the oropharynx. She required multiple transfusions of blood products. The patient was admitted for post-operative monitoring. Her postoperative course was uneventful. She had no further episodes of bleeding. Due to her recurrent episodes of bleeding, a TEG study was sent that returned normal.  Interventional neuroradiology performed a diagnostic angiogram on 12/13 to assess for vascular anomaly or other cause of bleeding. No sources identified, the superior branch of the left lingual artery was embolized prophylactically. At discharge, the patient's pain was well controlled, the patient was voiding on her own, was tolerating continuous tube feeding, and on pressures support via tracheostomy on minimal settings.     Discharge Exam:  Vitals:    12/14/17 1000 12/14/17 1030 12/14/17 1100 12/14/17 1130   BP:  124/61 125/64 106/65   BP Location:       Cuff Size:       Pulse:       Resp: 16 16 14   Temp: 98.1  F (36.7  C) 98.1  F (36.7  C) 97.5  F (36.4  C) 97.5  F (36.4  C)   TempSrc:       SpO2: 100% 99% 99% 98%       General: Alert, easily agitated, no acute distress, moving all extremities  HEENT: EOMI. No facial droop. 6-0 cuffed shiley tracheostomy tube in place and secured with velcro ties. Left neck and face edematous and pitting, slightly more full than previous, no fluctuance on palpation and no overlying erythema. Left neck incision is clean, dry, and intact; no bleeding no drainage. Left neck under trach ties is violaceous, there is an ulcerative lesions along the incision line with a fibrinous base. Optifoam dressing under trach flange and left neck trach ties.   Respiratory: Breathing non-labored on room air, no stridor, no accessory muscle use.     Discharge Medications:   Alexia Flor   Home Medication Instructions BART:[id number]    Printed on:12/14/17 1158   Medication Information                      acetaminophen (TYLENOL) 325 MG tablet  2 tablets (650 mg) by Oral or Feeding Tube route every 8 hours as needed for mild pain             albuterol (2.5 MG/3ML) 0.083% neb solution  Take 1 vial (2.5 mg) by nebulization every 4 hours as needed for wheezing             albuterol (PROAIR HFA/PROVENTIL HFA/VENTOLIN HFA) 108 (90 BASE) MCG/ACT Inhaler  Inhale 2 puffs into the lungs 4 times daily as  needed for other (dyspnea)             amylase-lipase-protease (VIOKACE) 10723 UNITS per tablet  1-2 tablets by Per Feeding Tube route every 4 hours Crush 1 tab WELL, mix into 30 ml warm water, DISSOLVE well and administer via FT as med bolus. Flush FT after administration. If TF rate is @10-20 ml/h, administer 1 tablet q4hrs; if @30-45 ml/h, increase to 2 tabs q4hrs.This regimen with TF @ goal will provide approx 2088 units of lipase/g of total Fat daily and approp dosing initially for pancreatic insuff. with more elemental TF formula.             ascorbic acid 500 MG TABS  1 tablet (500 mg) by Oral or Feeding Tube route daily             cyanocobalamin 1000 MCG TABS  1,000 mcg by Oral or NG Tube route daily             enoxaparin (LOVENOX) 30 MG/0.3ML injection  Inject 0.3 mLs (30 mg) Subcutaneous every 24 hours             fiber modular (NUTRISOURCE FIBER) packet  3 packets by Per Feeding Tube route 3 times daily             folic acid (FOLVITE) 1 MG tablet  1 tablet (1 mg) by Oral or Feeding Tube route daily             furosemide (LASIX) 20 MG tablet  Take 1 tablet (20 mg) by mouth daily             haloperidol lactate (HALDOL) 5 MG/ML injection  Inject 1 mL (5 mg) into the vein every 6 hours as needed for agitation             HYDROmorphone (DILAUDID) 0.2 MG/0.2 ML SOLN injection  Inject 0.2 mLs (0.2 mg) into the vein every 2 hours as needed for moderate to severe pain             ipratropium - albuterol 0.5 mg/2.5 mg/3 mL (DUONEB) 0.5-2.5 (3) MG/3ML neb solution  Take 1 vial (3 mLs) by nebulization 4 times daily             labetalol (NORMODYNE/TRANDATE) 5 MG/ML injection  Inject 4 mLs (20 mg) into the vein every 4 hours as needed for high blood pressure (SBP > 160, DBP >110.  Hold for HR < 65.)             loperamide (IMODIUM) 1 MG/5ML liquid  Take 10 mLs (2 mg) by mouth 3 times daily             melatonin 5 MG tablet  1 tablet (5 mg) by Oral or Feeding Tube route every evening             metoprolol  (LOPRESSOR) 10 mg/mL SUSP  5 mLs (50 mg) by Oral or Feeding Tube route 2 times daily             mineral oil-hydrophilic petrolatum (AQUAPHOR)  Apply topically 3 times daily             multivitamins with minerals (CERTAVITE/CEROVITE) LIQD liquid  15 mLs by Per Feeding Tube route daily             nicotine (NICODERM CQ) 14 MG/24HR 24 hr patch  Place 1 patch onto the skin daily             oxyCODONE (ROXICODONE) 5 MG/5ML solution  Take 2.5 mLs (2.5 mg) by mouth every 4 hours as needed for moderate to severe pain             pantoprazole (PROTONIX) SUSP suspension  20 mLs (40 mg) by Oral or Feeding Tube route 2 times daily (before meals)             QUEtiapine (SEROQUEL) 25 MG tablet  0.5 tablets (12.5 mg) by Oral or Feeding Tube route daily             QUEtiapine (SEROQUEL) 50 MG tablet  1 tablet (50 mg) by Oral or Feeding Tube route every evening             thiamine (VITAMIN B-1) 100 MG tablet  1 tablet (100 mg) by Oral or Feeding Tube route daily                   Discharge Procedure Orders  General info for SNF   Order Comments: Length of Stay Estimate: Short Term Care: Estimated # of Days <30  Condition at Discharge: Improving  Level of care:skilled   Rehabilitation Potential: Good  Admission H&P remains valid and up-to-date: Yes  Recent Chemotherapy: N/A  Use LTACH Standing Orders: Yes     Reason for your hospital stay   Order Comments: Oropharyngeal bleeding     Intake and output   Order Comments: Every shift     Daily weights   Order Comments: Call Provider for weight gain of more than 2 pounds per day or 5 pounds per week.     Glucose monitor nursing POCT   Order Comments: Before meals and at bedtime     Bladder scan   Order Comments: X 2 for post void residual     Wound care (specify)   Order Comments: Site:   Left neck  Instructions:  Keep incisions clean and dry. Apply Aquaphor ointment to incisions three times daily to keep moist. You may shower, do not soak, scrub, or submerge incisions under water.  Keep Optifoam or meplix dressing under tracheostomy flange and under left neck trach ties to prevent worsening pressure wounds to underlying skin.     Follow Up and recommended labs and tests   Order Comments: Follow up in ENT clinic with Dr. George as soon as possible after discharge from [facility] facility. Please call the clinic with questions/concerns: [phone].    Otolaryngology/ENT Clinic:  [facility]  [address] [place] [address]     Activity - Up with nursing assistance   Order Specific Question Answer Comments   Is discharge order? Yes      Full Code     Physical Therapy Adult Consult   Order Comments: Evaluate and treat as clinically indicated.    Reason:  Physical deconditioning     Occupational Therapy Adult Consult   Order Comments: Evaluate and treat as clinically indicated.    Reason:  Physical deconditioning     Speech Language Path Adult Consult   Order Comments: Evaluate and treat as clinically indicated.    Reason: Swallow evaluation, passy shanel valve as tolerated for speaking     Nutrition Services Adult IP Consult   Order Comments: Reason:  Nothing to eat or drink by mouth until cleared by speech language pathology. Continuous tube feeding via nasogastric feeding tube. Formula: Peptamen 1.5, Goal rate of 45 mL/h continuous.     Oxygen - Trach dome   Order Comments: With humidity to keep O2 sats % >  92%. If supplemental oxygen is not needed patient should have humidified air via trach dome for moisture     Ventilator   Standing Status: Standing Number of Occurrences: 100 Standing Exp. Date: 12/14/18   Order Comments: Pressure support via trach:  PEEP: 5  Minimum pressure support: 5  Maximum pressure support 20  FiO2: as needed to keep O2 saturations > 92%     Pneumatic Compression Device    Order Comments: Bilateral calf. Remove 30 mins BID.         Dispo: To [facility] in stable condition. All of the patient's  questions/concerns have been addressed at this time.     Jessica Hernández PA-C  Otolaryngology-Head & Neck Surgery  Please contact ENT by dialing [phone] and entering job code [phone].

## 2017-12-14 NOTE — PROGRESS NOTES
Care Coordinator- Discharge Planning     Admission Date/Time:  12/12/2017  Attending MD:  Heriberto George MD     Data  Date of initial CC assessment:  12/13/17  Chart reviewed, discussed with interdisciplinary team.   Patient was admitted for:   1. Insomnia, unspecified type    2. Oral hemorrhage    3. Acute post-operative pain    4. Wheezing    5. Status post neck dissection    6. Diarrhea, unspecified type    7. Anxiety    8. Benign essential hypertension    9. Macrocytic anemia    10. Nutritional deficiency    11. Current smoker    12. Gastroesophageal reflux disease without esophagitis         Assessment  Full assessment completed in previous note     The team reported pt is medically ready to be transfer back to [facility].  [facility] liaison reported they do have bed and can accept pt today.  RNCC contacted [facility] transport and arranged stretcher transport with dariela for 3:00 pm  time.  RNCC called pt mom 3X to provide update but no answer # [phone].  RNCC called pt ex-, Diego # [phone], who has been helping pt mom on updating her and visiting pt and informed him that I was unable to get hold of pt mom to update her.  Diego agreed to inform pt mom about the transfer.  Accepting provider at [facility] is Dr. Velásquez # [phone].  Pt will going to room 233 and RN contact number for report # [phone].   time and contact info have been shared with SICU team, bedside RN and charge nurse.  Ambulance PCS form completed.    Coordination of Care and Referrals: Provided patient/family with options for LTACH.      Plan  Anticipated Discharge Date:  Today, 12/14/17 at 3:00.  Anticipated Discharge Plan:   Transfer back to [facility].  [facility] will provide stretcher transport with vent.      Jing Neely, RN, PHN, BSN  4A and 4E/ ICU  Care Coordinator  Phone: [phone]  Pager: [phone]

## 2017-12-14 NOTE — PLAN OF CARE
Problem: Patient Care Overview  Goal: Plan of Care/Patient Progress Review  Outcome: Improving  D/I/A: Pt on 4A Neuro/Surgical ICU readmit from LTACH with bleeding from oropharynx and trach site.  To OR with ENT, no identifiable source with ex trach site and flexible bronchoscopy.  IR yesterday - cauterization of L lingual artery with L groin approach.     Neuro: Follows commands, now appropriate without mitts and restraints.  Anxious, will address with team.  Pt states that she was receiving valium.    CV: SBP goal < 160, difficult to obtain at times d/t pain and anxiety.  PRN labetalol given, along with PRN hydralazine now available.    Resp: #6 Shiley, P supp throughout NOC.  Attempt to trach dome this AM, limited by anxiety and pain.  FiO2 40%.    GI: Tf at goal of 40/hr, FWF 30 q4h.  x1 stool overnight.    : Bell in place with small UOP, appropriate to pt size.    Skin: Trach site covered with optifoam.  Redness to coccyx, covered with foam dressing.    Gtts: NS at 75/hr.    Lytes: Given 40 meq of K+, will need an additional 20 meq.    Plan: Continue to monitor and notify MD with changes.

## 2017-12-14 NOTE — PROGRESS NOTES
Care Coordinator Progress Note     Admission Date/Time:  12/12/2017  Attending MD:  Heriberto George MD     Data  Chart reviewed, discussed with interdisciplinary team.   Patient was admitted for: Oral hemorrhage.    Concerns with insurance coverage for discharge needs: None.  Support System: Uninvolved- pt dtr doesn't want to be contacted, pt mom is the main contact, who lives in [facility].  Services Involved: pt transferred back form [facility].  Transportation: Ambulance- [facility] stretcher transport.  Barriers to Discharge:  None    Coordination of Care and Referrals: Provided patient/family with options for LTACH.        Assessment  Pt was d/c'd to [facility] on 12/11.  Pt got readmitted the next day, 12/12 due to bleeding from oral cavity and trach site.       Plan  Anticipated Discharge Date:  1-2 days  Anticipated Discharge Plan:  Return back to [facility].  RNCC will cont to follow plan of care.      Jing Neely RN, PHN, BSN  4A and 4E/ ICU  Care Coordinator  Phone: [phone]  Pager: [phone]

## 2017-12-14 NOTE — PROGRESS NOTES
Social Work Services Progress Note    Hospital Day: 3  Date of Initial Social Work Evaluation:  See assessment note from MIKE Hernandez on 11/27/17  Collaborated with:  Chart review, attended rounds, RN CC     Data:  Pt was hospitalized from 11/21/17-12/11/17, discharged to [facility] and was re-admitted less than 12 hours later on 12/12/17. RN CC following for discharge back to [facility]. During last hospitalization, pt's daughter (Tatiana Escobar), who would technically be NOK for decision-making purposes, called to say that she does NOT want any role in decision-making for pt. Her information has been removed from pt's emergency contacts and pt's mother remains NOK for decision-making. See RN CC Jing Neely's note on 12/11/17 for details (Epic Chart Review >Notes/Trans).     Intervention:  NA     Assessment:  NA    Plan:    Anticipated Disposition:  Facility:  [facility]    Barriers to d/c plan:  Medical stability     Follow Up:  SW will continue to follow, support and assist with ongoing social service and discharge planning needs.     Suzi Peace, ANNA, [facility]  ICU Float   Pager: [phone]  [email or web address]     NO LETTER

## 2017-12-14 NOTE — PLAN OF CARE
Problem: Surgery Nonspecified (Adult)  Goal: Signs and Symptoms of Listed Potential Problems Will be Absent, Minimized or Managed (Surgery Nonspecified)  Signs and symptoms of listed potential problems will be absent, minimized or managed by discharge/transition of care (reference Surgery Nonspecified (Adult) CPG).   Outcome: No Change  D/I: Pt down to IR for embolization. Left groin site C/D/I, good distal pulses. Heart rate and vital signs stable (see VS flow sheets). Pt on pressure support all day. Restless and pulling at lines.   A: Pt stable post IR procedure.   P: Continue to monitor and update MD with concerns.

## 2018-01-01 ENCOUNTER — HOSPITAL ENCOUNTER (OUTPATIENT)
Dept: ULTRASOUND IMAGING | Facility: CLINIC | Age: 63
End: 2018-03-12
Attending: FAMILY MEDICINE
Payer: COMMERCIAL

## 2018-01-01 ENCOUNTER — PATIENT OUTREACH (OUTPATIENT)
Dept: CARE COORDINATION | Facility: CLINIC | Age: 63
End: 2018-01-01

## 2018-01-01 ENCOUNTER — TRANSFERRED RECORDS (OUTPATIENT)
Dept: HEALTH INFORMATION MANAGEMENT | Facility: CLINIC | Age: 63
End: 2018-01-01

## 2018-01-01 ENCOUNTER — CARE COORDINATION (OUTPATIENT)
Dept: OTOLARYNGOLOGY | Facility: CLINIC | Age: 63
End: 2018-01-01

## 2018-01-01 ENCOUNTER — OFFICE VISIT (OUTPATIENT)
Dept: FAMILY MEDICINE | Facility: CLINIC | Age: 63
End: 2018-01-01
Payer: COMMERCIAL

## 2018-01-01 ENCOUNTER — TELEPHONE (OUTPATIENT)
Dept: FAMILY MEDICINE | Facility: CLINIC | Age: 63
End: 2018-01-01

## 2018-01-01 ENCOUNTER — HOSPITAL ENCOUNTER (OUTPATIENT)
Dept: GENERAL RADIOLOGY | Facility: CLINIC | Age: 63
End: 2018-03-12
Attending: FAMILY MEDICINE
Payer: COMMERCIAL

## 2018-01-01 ENCOUNTER — CARE COORDINATION (OUTPATIENT)
Dept: CARE COORDINATION | Facility: CLINIC | Age: 63
End: 2018-01-01

## 2018-01-01 ENCOUNTER — HOSPITAL ENCOUNTER (OUTPATIENT)
Dept: GENERAL RADIOLOGY | Facility: CLINIC | Age: 63
Discharge: HOME | End: 2018-03-12
Attending: FAMILY MEDICINE | Admitting: FAMILY MEDICINE
Payer: COMMERCIAL

## 2018-01-01 ENCOUNTER — OFFICE VISIT (OUTPATIENT)
Dept: SURGERY | Facility: CLINIC | Age: 63
End: 2018-01-01
Payer: COMMERCIAL

## 2018-01-01 VITALS
TEMPERATURE: 98 F | DIASTOLIC BLOOD PRESSURE: 58 MMHG | OXYGEN SATURATION: 97 % | SYSTOLIC BLOOD PRESSURE: 104 MMHG | HEART RATE: 104 BPM | HEIGHT: 61 IN | BODY MASS INDEX: 16.41 KG/M2 | WEIGHT: 86.9 LBS

## 2018-01-01 VITALS — TEMPERATURE: 97 F | WEIGHT: 75 LBS | BODY MASS INDEX: 14.16 KG/M2 | OXYGEN SATURATION: 100 %

## 2018-01-01 VITALS
SYSTOLIC BLOOD PRESSURE: 94 MMHG | WEIGHT: 75.3 LBS | HEIGHT: 61 IN | DIASTOLIC BLOOD PRESSURE: 58 MMHG | HEART RATE: 82 BPM | BODY MASS INDEX: 14.22 KG/M2 | TEMPERATURE: 97 F | OXYGEN SATURATION: 100 %

## 2018-01-01 DIAGNOSIS — I10 BENIGN HYPERTENSION: Primary | ICD-10-CM

## 2018-01-01 DIAGNOSIS — K21.9 GASTROESOPHAGEAL REFLUX DISEASE WITHOUT ESOPHAGITIS: ICD-10-CM

## 2018-01-01 DIAGNOSIS — Z98.890 STATUS POST NECK DISSECTION: Primary | ICD-10-CM

## 2018-01-01 DIAGNOSIS — E87.6 HYPOKALEMIA: ICD-10-CM

## 2018-01-01 DIAGNOSIS — F33.9 EPISODE OF RECURRENT MAJOR DEPRESSIVE DISORDER, UNSPECIFIED DEPRESSION EPISODE SEVERITY (H): ICD-10-CM

## 2018-01-01 DIAGNOSIS — Z53.9 DIAGNOSIS NOT YET DEFINED: Primary | ICD-10-CM

## 2018-01-01 DIAGNOSIS — M25.552 HIP PAIN, LEFT: ICD-10-CM

## 2018-01-01 DIAGNOSIS — C01 MALIGNANT NEOPLASM OF BASE OF TONGUE (H): Primary | ICD-10-CM

## 2018-01-01 DIAGNOSIS — I70.90 ARTERIOSCLEROTIC VASCULAR DISEASE: ICD-10-CM

## 2018-01-01 DIAGNOSIS — E63.9 NUTRITIONAL DEFICIENCY: ICD-10-CM

## 2018-01-01 DIAGNOSIS — E87.8 ELECTROLYTE IMBALANCE: ICD-10-CM

## 2018-01-01 DIAGNOSIS — M79.662 PAIN OF LEFT LOWER LEG: ICD-10-CM

## 2018-01-01 DIAGNOSIS — K70.0 ALCOHOLIC FATTY LIVER: ICD-10-CM

## 2018-01-01 DIAGNOSIS — K85.20 ALCOHOL-INDUCED ACUTE PANCREATITIS, UNSPECIFIED COMPLICATION STATUS: ICD-10-CM

## 2018-01-01 DIAGNOSIS — F41.9 ANXIETY: ICD-10-CM

## 2018-01-01 DIAGNOSIS — Z46.59 ENCOUNTER FOR CARE RELATED TO FEEDING TUBE: Primary | ICD-10-CM

## 2018-01-01 DIAGNOSIS — Z76.89 HEALTH CARE HOME: ICD-10-CM

## 2018-01-01 DIAGNOSIS — C01 MALIGNANT NEOPLASM OF BASE OF TONGUE (H): ICD-10-CM

## 2018-01-01 LAB
ANION GAP SERPL CALCULATED.3IONS-SCNC: 9 MMOL/L (ref 3–14)
BASOPHILS # BLD AUTO: 0.1 10E9/L (ref 0–0.2)
BASOPHILS NFR BLD AUTO: 0.5 %
BUN SERPL-MCNC: 21 MG/DL (ref 7–30)
CALCIUM SERPL-MCNC: 8.6 MG/DL (ref 8.5–10.1)
CHLORIDE SERPL-SCNC: 111 MMOL/L (ref 94–109)
CO2 SERPL-SCNC: 24 MMOL/L (ref 20–32)
CREAT SERPL-MCNC: 0.75 MG/DL (ref 10–20)
CREAT SERPL-MCNC: 0.93 MG/DL (ref 0.52–1.04)
DIFFERENTIAL METHOD BLD: ABNORMAL
EOSINOPHIL # BLD AUTO: 0.1 10E9/L (ref 0–0.7)
EOSINOPHIL NFR BLD AUTO: 0.7 %
ERYTHROCYTE [DISTWIDTH] IN BLOOD BY AUTOMATED COUNT: 15.8 % (ref 10–15)
GFR SERPL CREATININE-BSD FRML MDRD: 61 ML/MIN/1.7M2
GFR SERPL CREATININE-BSD FRML MDRD: >60 ML/MIN/1.73M2
GLUCOSE SERPL-MCNC: 104 MG/DL (ref 70–100)
GLUCOSE SERPL-MCNC: 90 MG/DL (ref 70–99)
HCT VFR BLD AUTO: 30.6 % (ref 35–47)
HGB BLD-MCNC: 9.9 G/DL (ref 11.7–15.7)
IMM GRANULOCYTES # BLD: 0.2 10E9/L (ref 0–0.4)
IMM GRANULOCYTES NFR BLD: 2.2 %
LYMPHOCYTES # BLD AUTO: 1.3 10E9/L (ref 0.8–5.3)
LYMPHOCYTES NFR BLD AUTO: 12.9 %
MCH RBC QN AUTO: 37.4 PG (ref 26.5–33)
MCHC RBC AUTO-ENTMCNC: 32.4 G/DL (ref 31.5–36.5)
MCV RBC AUTO: 116 FL (ref 78–100)
MONOCYTES # BLD AUTO: 0.9 10E9/L (ref 0–1.3)
MONOCYTES NFR BLD AUTO: 9.2 %
NEUTROPHILS # BLD AUTO: 7.6 10E9/L (ref 1.6–8.3)
NEUTROPHILS NFR BLD AUTO: 74.5 %
PLATELET # BLD AUTO: 460 10E9/L (ref 150–450)
POTASSIUM SERPL-SCNC: 2.9 MMOL/L (ref 3.5–5.1)
POTASSIUM SERPL-SCNC: 4.5 MMOL/L (ref 3.4–5.3)
RBC # BLD AUTO: 2.65 10E12/L (ref 3.8–5.2)
SODIUM SERPL-SCNC: 144 MMOL/L (ref 133–144)
TRANSF REACT PLASRBC-IMP: NORMAL
WBC # BLD AUTO: 10.1 10E9/L (ref 4–11)

## 2018-01-01 PROCEDURE — 80048 BASIC METABOLIC PNL TOTAL CA: CPT | Performed by: FAMILY MEDICINE

## 2018-01-01 PROCEDURE — 93971 EXTREMITY STUDY: CPT | Mod: LT

## 2018-01-01 PROCEDURE — G0180 MD CERTIFICATION HHA PATIENT: HCPCS | Performed by: FAMILY MEDICINE

## 2018-01-01 PROCEDURE — 73552 X-RAY EXAM OF FEMUR 2/>: CPT | Mod: TC,LT

## 2018-01-01 PROCEDURE — 36415 COLL VENOUS BLD VENIPUNCTURE: CPT | Performed by: FAMILY MEDICINE

## 2018-01-01 PROCEDURE — 99215 OFFICE O/P EST HI 40 MIN: CPT | Performed by: FAMILY MEDICINE

## 2018-01-01 PROCEDURE — 85025 COMPLETE CBC W/AUTO DIFF WBC: CPT | Performed by: FAMILY MEDICINE

## 2018-01-01 PROCEDURE — 72170 X-RAY EXAM OF PELVIS: CPT | Mod: TC

## 2018-01-01 PROCEDURE — 99214 OFFICE O/P EST MOD 30 MIN: CPT | Performed by: SPECIALIST

## 2018-01-01 PROCEDURE — 99214 OFFICE O/P EST MOD 30 MIN: CPT | Performed by: FAMILY MEDICINE

## 2018-01-01 RX ORDER — METOPROLOL TARTRATE 50 MG
50 TABLET ORAL 2 TIMES DAILY
COMMUNITY
End: 2018-01-01 | Stop reason: DRUGHIGH

## 2018-01-01 RX ORDER — HYDROXYZINE PAMOATE 25 MG/1
25 CAPSULE ORAL 3 TIMES DAILY PRN
Qty: 90 CAPSULE | Refills: 1 | Status: SHIPPED | OUTPATIENT
Start: 2018-01-01

## 2018-01-01 RX ORDER — DULOXETIN HYDROCHLORIDE 30 MG/1
30 CAPSULE, DELAYED RELEASE ORAL DAILY
COMMUNITY

## 2018-01-01 RX ORDER — METOPROLOL TARTRATE 50 MG
50 TABLET ORAL DAILY
Qty: 90 TABLET | Refills: 3 | COMMUNITY
Start: 2018-01-01

## 2018-01-01 RX ORDER — OMEPRAZOLE 40 MG/1
CAPSULE, DELAYED RELEASE ORAL
Qty: 90 CAPSULE | Refills: 3 | Status: SHIPPED | OUTPATIENT
Start: 2018-01-01

## 2018-01-01 RX ORDER — FAMOTIDINE 20 MG/1
20 TABLET, FILM COATED ORAL 2 TIMES DAILY
COMMUNITY
End: 2018-01-01

## 2018-01-01 RX ORDER — HALOPERIDOL 2 MG/1
2 TABLET ORAL 4 TIMES DAILY
COMMUNITY
End: 2018-01-01

## 2018-01-01 RX ORDER — FUROSEMIDE 20 MG
TABLET ORAL
Refills: 10 | COMMUNITY
Start: 2017-01-01 | End: 2018-01-01

## 2018-01-01 RX ORDER — POTASSIUM CHLORIDE 750 MG/1
CAPSULE, EXTENDED RELEASE ORAL
Qty: 30 CAPSULE | Refills: 0 | Status: SHIPPED | OUTPATIENT
Start: 2018-01-01 | End: 2018-01-01

## 2018-01-01 ASSESSMENT — ACTIVITIES OF DAILY LIVING (ADL)
DEPENDENT_IADLS:: CLEANING;COOKING;LAUNDRY;SHOPPING;MEAL PREPARATION;MEDICATION MANAGEMENT;TRANSPORTATION

## 2018-01-18 NOTE — PROGRESS NOTES
I have called Alexia's cell a few times but no answer   Today I called Phu for an update-- She is still at [facility].  She is taking a small amt of food by mouth  She had a gtube placed  She finally got her trach removed.  He is hoping she will be discharged in the next few weeks   They have my # if they have concerns   I will schedule Alexia a follow up appt with Doris when she is discharged

## 2018-03-12 NOTE — LETTER
3/12/2018         RE: Alexia Flor  [address]   [place] [address]        Dear Colleague,    Thank you for referring your patient, Alexia Flor, to the [facility]. Please see a copy of my visit note below.    Consult requested by Dr. Smith    Reason for consultation - Feeding tube removal    HPI:  Patient is a 62-year-old white female who had a partial glossectomy for base of tongue cancer in November 2017. Her postoperative course was complicated with postoperative bleed and respiratory failure. She eventually had a feeding tube placed on January 9 of this year. It was used up until about 2-3 weeks ago when she was still in rehabilitation. Currently she is here with her caregiver and she is taking everything by mouth and is reportedly steadily gaining weight. She is planning on any Lynn's on the way home. She would like to now have her feeding tube removed at his neck as it is no longer being used.    Past Medical History:   Diagnosis Date     Alcohol abuse      Alcoholic hepatitis      Alcoholic pancreatitis      Anxiety      Schafer's esophagus      Breast cancer (H)     rt, s/p radiation.     Chemical dependency (H)      Congestive heart failure, unspecified      COPD (chronic obstructive pulmonary disease) (H)      Depressive disorder      History of radiation therapy      Hoarseness      Hypokalemia      Macrocytic anemia      Non-adherence to medical treatment      Tobacco abuse      Tongue cancer (H)      Past Surgical History:   Procedure Laterality Date     DISSECTION RADICAL NECK Left 11/21/2017    Procedure: DISSECTION RADICAL NECK;;  Surgeon: Heriberto George MD;  Location: [facility] OR     ESOPHAGOSCOPY, GASTROSCOPY, DUODENOSCOPY (EGD), COMBINED N/A 11/9/2017    Procedure: COMBINED ENDOSCOPIC ULTRASOUND, ESOPHAGOSCOPY, GASTROSCOPY, DUODENOSCOPY (EGD), FINE NEEDLE ASPIRATE/BIOPSY;;  Surgeon: Yo Bhat MD;  Location: [facility] OR     EXPLORE NECK Left  11/23/2017    Procedure: EXPLORE NECK;  left Neck Exploration, tracheostomy , placement of nasogastric feeding tube;  Surgeon: Daisy Singh MD;  Location: [facility] OR     EXPLORE NECK Left 12/5/2017    Procedure: EXPLORE NECK;  [place] Left Neck Hematoma , excision of skin   ;  Surgeon: Stella Hernadez MD;  Location: [facility] OR     GLOSSECTOMY PARTIAL N/A 11/21/2017    Procedure: GLOSSECTOMY PARTIAL;  Left Partial Glossectomy And Left Neck Dissection, ;  Surgeon: Heriberto George MD;  Location: [facility] OR     LARYNGOSCOPY N/A 12/12/2017    Procedure: LARYNGOSCOPY;  Direct Laryngoscopy, oral cavity exporation cauterization, flexible bronschoscopy, trach exploration  ;  Surgeon: Heriberto George MD;  Location: [facility] OR     LARYNGOSCOPY WITH BIOPSY(IES) N/A 11/9/2017    Procedure: LARYNGOSCOPY WITH BIOPSY(IES);  Direct Laryngoscopy,  Upper Endoscopic Ultrasound and Fine Needle Aspiration;  Surgeon: Heribetro George MD;  Location: [facility] OR     lumpectomy Rt breast  2006       TRACHEOSTOMY  11/23/2017    Procedure: TRACHEOSTOMY;;  Surgeon: Daisy Singh MD;  Location: [facility] OR     Current Outpatient Prescriptions   Medication     haloperidol (HALDOL) 2 MG tablet     DULoxetine (CYMBALTA) 30 MG EC capsule     metoprolol tartrate (LOPRESSOR) 50 MG tablet     famotidine (PEPCID) 20 MG tablet     melatonin 5 MG tablet     HYDROmorphone (DILAUDID) 0.2 MG/0.2 ML SOLN injection     oxyCODONE (ROXICODONE) 5 MG/5ML solution     acetaminophen (TYLENOL) 325 MG tablet     albuterol (2.5 MG/3ML) 0.083% neb solution     albuterol (PROAIR HFA/PROVENTIL HFA/VENTOLIN HFA) 108 (90 BASE) MCG/ACT Inhaler     ipratropium - albuterol 0.5 mg/2.5 mg/3 mL (DUONEB) 0.5-2.5 (3) MG/3ML neb solution     enoxaparin (LOVENOX) 30 MG/0.3ML injection     loperamide (IMODIUM) 1 MG/5ML liquid     haloperidol lactate (HALDOL) 5 MG/ML injection     QUEtiapine (SEROQUEL) 25 MG tablet     QUEtiapine (SEROQUEL) 50 MG tablet     labetalol  "(NORMODYNE/TRANDATE) 5 MG/ML injection     metoprolol (LOPRESSOR) 10 mg/mL SUSP     mineral oil-hydrophilic petrolatum (AQUAPHOR)     furosemide (LASIX) 20 MG tablet     cyanocobalamin 1000 MCG TABS     folic acid (FOLVITE) 1 MG tablet     fiber modular (NUTRISOURCE FIBER) packet     nicotine (NICODERM CQ) 14 MG/24HR 24 hr patch     multivitamins with minerals (CERTAVITE/CEROVITE) LIQD liquid     pantoprazole (PROTONIX) SUSP suspension     ascorbic acid 500 MG TABS     thiamine (VITAMIN B-1) 100 MG tablet     amylase-lipase-protease (VIOKACE) 50270 UNITS per tablet     No current facility-administered medications for this visit.         Allergies   Allergen Reactions     Codeine Anaphylaxis     Contrast Dye Itching and Swelling     7/10/2009 Patient reports history of contrast reaction when first diagnosed with breast cancer. \"I almost .\"   (Hives, swelling.) 2009 CT to be done without IV contrast per Dr. KAVIN Live     Moxifloxacin Anaphylaxis     Avelox     Nickel Itching and Rash     Codeine Sulfate Other (See Comments)     shaking     Gabapentin Other (See Comments)     Patient reports that she got very shaky and she \"felt like she was going to die\"     Social History   Substance Use Topics     Smoking status: Heavy Tobacco Smoker     Packs/day: 0.50     Years: 40.00     Types: Cigarettes     Smokeless tobacco: Current User     Alcohol use 0.5 oz/week      Comment: 1/2 liter per day     Family History   Problem Relation Age of Onset     Coronary Artery Disease Father      Emphysema Father      CANCER Mother      renal cancer     Breast Cancer Maternal Grandmother      CANCER Maternal Grandmother      Substance Abuse Maternal Grandfather      CANCER Maternal Grandfather      Substance Abuse Cousin      CANCER Cousin      Substance Abuse Brother      DIABETES Brother       ROS: 10 point ROS neg other than the symptoms noted above in the HPI.    PE:  B/P: Data Unavailable, T: 97, P: Data Unavailable, R: Data " Unavailable  General: well developed, thin WF who appears her stated age  HEENT: NC/AT, EOMI, (-)icterus, (-)injection  Neck: Supple, No JVD  Chest: CTA  Heart: S1, S2, (-)m/r/g  Abd: Soft, non tender, non distended, GT tube in place.  Balloon deflated and tube removed without difficulty  Ext; Warm, no edema  Psych: AAOx3  Neuro: No focal deficits      Impression/plan:  This is 62-year-old lady status post partial glossectomy with a complicayted postoperative course who had a  feeding tube placed January 9. I discussed the pros and cons of keeping versus leaving the feeding tube in place at this time. The patient is adamant about having it removed as she feels she is taking adequate oral intake and the tube has not been used for a few weeks.  The balloon was deflated on the feeding tube and it was removed without difficulty today in the clinic. I told her the stoma should seal the next 24-48 hours. If does not seal for the next week she knows to follow-up with me otherwise she will follow up as necessary.      Fawad Knight MD, FACS    Again, thank you for allowing me to participate in the care of your patient.        Sincerely,        Fawad Knight MD

## 2018-03-12 NOTE — NURSING NOTE
"Chief Complaint   Patient presents with     Consult     feeding tube removal        Initial BP 94/58 (BP Location: Left arm, Patient Position: Chair, Cuff Size: Child)  Pulse 82  Temp 97  F (36.1  C) (Temporal)  Ht 5' 1.02\" (1.55 m)  Wt 75 lb 4.8 oz (34.2 kg)  SpO2 100%  BMI 14.22 kg/m2 Estimated body mass index is 14.22 kg/(m^2) as calculated from the following:    Height as of this encounter: 5' 1.02\" (1.55 m).    Weight as of this encounter: 75 lb 4.8 oz (34.2 kg).  Medication Reconciliation: complete     "

## 2018-03-12 NOTE — PROGRESS NOTES
Consult requested by Dr. Smith    Reason for consultation - Feeding tube removal    HPI:  Patient is a 62-year-old white female who had a partial glossectomy for base of tongue cancer in November 2017. Her postoperative course was complicated with postoperative bleed and respiratory failure. She eventually had a feeding tube placed on January 9 of this year. It was used up until about 2-3 weeks ago when she was still in rehabilitation. Currently she is here with her caregiver and she is taking everything by mouth and is reportedly steadily gaining weight. She is planning on any [place] on the way home. She would like to now have her feeding tube removed at his neck as it is no longer being used.    Past Medical History:   Diagnosis Date     Alcohol abuse      Alcoholic hepatitis      Alcoholic pancreatitis      Anxiety      Schafer's esophagus      Breast cancer (H)     rt, s/p radiation.     Chemical dependency (H)      Congestive heart failure, unspecified      COPD (chronic obstructive pulmonary disease) (H)      Depressive disorder      History of radiation therapy      Hoarseness      Hypokalemia      Macrocytic anemia      Non-adherence to medical treatment      Tobacco abuse      Tongue cancer (H)      Past Surgical History:   Procedure Laterality Date     DISSECTION RADICAL NECK Left 11/21/2017    Procedure: DISSECTION RADICAL NECK;;  Surgeon: Heriberto George MD;  Location: [facility]     ESOPHAGOSCOPY, GASTROSCOPY, DUODENOSCOPY (EGD), COMBINED N/A 11/9/2017    Procedure: COMBINED ENDOSCOPIC ULTRASOUND, ESOPHAGOSCOPY, GASTROSCOPY, DUODENOSCOPY (EGD), FINE NEEDLE ASPIRATE/BIOPSY;;  Surgeon: Yo Bhat MD;  Location: [facility]     EXPLORE NECK Left 11/23/2017    Procedure: EXPLORE NECK;  left Neck Exploration, tracheostomy , placement of nasogastric feeding tube;  Surgeon: Daisy Singh MD;  Location: [facility]     EXPLORE NECK Left 12/5/2017    Procedure: EXPLORE NECK;  [place] Left Neck  Hematoma , excision of skin   ;  Surgeon: Stella Hernadez MD;  Location: [facility]     GLOSSECTOMY PARTIAL N/A 11/21/2017    Procedure: GLOSSECTOMY PARTIAL;  Left Partial Glossectomy And Left Neck Dissection, ;  Surgeon: Heriberto George MD;  Location: [facility]     LARYNGOSCOPY N/A 12/12/2017    Procedure: LARYNGOSCOPY;  Direct Laryngoscopy, oral cavity exporation cauterization, flexible bronschoscopy, trach exploration  ;  Surgeon: Heriberto George MD;  Location: [facility]     LARYNGOSCOPY WITH BIOPSY(IES) N/A 11/9/2017    Procedure: LARYNGOSCOPY WITH BIOPSY(IES);  Direct Laryngoscopy,  Upper Endoscopic Ultrasound and Fine Needle Aspiration;  Surgeon: Heriberto Goerge MD;  Location: [facility]     lumpectomy Rt breast  2006       TRACHEOSTOMY  11/23/2017    Procedure: TRACHEOSTOMY;;  Surgeon: Daisy Singh MD;  Location: [facility]     Current Outpatient Prescriptions   Medication     haloperidol (HALDOL) 2 MG tablet     DULoxetine (CYMBALTA) 30 MG EC capsule     metoprolol tartrate (LOPRESSOR) 50 MG tablet     famotidine (PEPCID) 20 MG tablet     melatonin 5 MG tablet     HYDROmorphone (DILAUDID) 0.2 MG/0.2 ML SOLN injection     oxyCODONE (ROXICODONE) 5 MG/5ML solution     acetaminophen (TYLENOL) 325 MG tablet     albuterol (2.5 MG/3ML) 0.083% neb solution     albuterol (PROAIR HFA/PROVENTIL HFA/VENTOLIN HFA) 108 (90 BASE) MCG/ACT Inhaler     ipratropium - albuterol 0.5 mg/2.5 mg/3 mL (DUONEB) 0.5-2.5 (3) MG/3ML neb solution     enoxaparin (LOVENOX) 30 MG/0.3ML injection     loperamide (IMODIUM) 1 MG/5ML liquid     haloperidol lactate (HALDOL) 5 MG/ML injection     QUEtiapine (SEROQUEL) 25 MG tablet     QUEtiapine (SEROQUEL) 50 MG tablet     labetalol (NORMODYNE/TRANDATE) 5 MG/ML injection     metoprolol (LOPRESSOR) 10 mg/mL SUSP     mineral oil-hydrophilic petrolatum (AQUAPHOR)     furosemide (LASIX) 20 MG tablet     cyanocobalamin 1000 MCG TABS     folic acid (FOLVITE) 1 MG tablet     fiber modular  "(NUTRISOURCE FIBER) packet     nicotine (NICODERM CQ) 14 MG/24HR 24 hr patch     multivitamins with minerals (CERTAVITE/CEROVITE) LIQD liquid     pantoprazole (PROTONIX) SUSP suspension     ascorbic acid 500 MG TABS     thiamine (VITAMIN B-1) 100 MG tablet     amylase-lipase-protease (VIOKACE) 94947 UNITS per tablet     No current facility-administered medications for this visit.         Allergies   Allergen Reactions     Codeine Anaphylaxis     Contrast Dye Itching and Swelling     7/10/2009 Patient reports history of contrast reaction when first diagnosed with breast cancer. \"I almost .\"   (Hives, swelling.) 2009 CT to be done without IV contrast per Dr. KAVIN Live     Moxifloxacin Anaphylaxis     Avelox     Nickel Itching and Rash     Codeine Sulfate Other (See Comments)     shaking     Gabapentin Other (See Comments)     Patient reports that she got very shaky and she \"felt like she was going to die\"     Social History   Substance Use Topics     Smoking status: Heavy Tobacco Smoker     Packs/day: 0.50     Years: 40.00     Types: Cigarettes     Smokeless tobacco: Current User     Alcohol use 0.5 oz/week      Comment: 1/2 liter per day     Family History   Problem Relation Age of Onset     Coronary Artery Disease Father      Emphysema Father      CANCER Mother      renal cancer     Breast Cancer Maternal Grandmother      CANCER Maternal Grandmother      Substance Abuse Maternal Grandfather      CANCER Maternal Grandfather      Substance Abuse Cousin      CANCER Cousin      Substance Abuse Brother      DIABETES Brother       ROS: 10 point ROS neg other than the symptoms noted above in the HPI.    PE:  B/P: Data Unavailable, T: 97, P: Data Unavailable, R: Data Unavailable  General: well developed, thin WF who appears her stated age  HEENT: NC/AT, EOMI, (-)icterus, (-)injection  Neck: Supple, No JVD  Chest: CTA  Heart: S1, S2, (-)m/r/g  Abd: Soft, non tender, non distended, GT tube in place.  Balloon deflated " and tube removed without difficulty  Ext; Warm, no edema  Psych: AAOx3  Neuro: No focal deficits      Impression/plan:  This is 62-year-old lady status post partial glossectomy with a complicayted postoperative course who had a  feeding tube placed January 9. I discussed the pros and cons of keeping versus leaving the feeding tube in place at this time. The patient is adamant about having it removed as she feels she is taking adequate oral intake and the tube has not been used for a few weeks.  The balloon was deflated on the feeding tube and it was removed without difficulty today in the clinic. I told her the stoma should seal the next 24-48 hours. If does not seal for the next week she knows to follow-up with me otherwise she will follow up as necessary.      Fawad Knight MD, FACS

## 2018-03-12 NOTE — MR AVS SNAPSHOT
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  Normal or non-critical lab and imaging results will be communicated to you by Fipeohart, letter or phone within 4 business days after the clinic has received the results. If you do not hear from us within 7 days, please contact the clinic through Fipeohart or phone. If you have a critical or abnormal lab result, we will notify you by phone as soon as possible.  Submit refill requests through [employer] or call your pharmacy and they will forward the refill request to us. Please allow 3 business days for your refill to be completed.          Additional Information About Your Visit        MyChart Information     [employer] lets you send messages to your doctor, view your test results, renew your prescriptions, schedule appointments and more. To sign up, go to [email or web address] . Click on \"Log in\" on the left side of the screen, which will take you to the Welcome page. Then click on \"Sign up Now\" on the right side of the page.     You will be asked to enter the access code listed below, as well as some personal information. Please follow the directions to create your username and password.     Your access code is: [email or web address]  Expires: 6/3/2018 10:26 PM     Your access code will  in 90 days. If you need help or a new code, please call your [facility] or [phone].   " "     Care EveryWhere ID     This is your Care EveryWhere ID. This could be used by other organizations to access your [facility] medical records  [id number]        Your Vitals Were     Pulse Temperature Height Pulse Oximetry BMI (Body Mass Index)       82 97  F (36.1  C) (Temporal) 5' 1.02\" (1.55 m) 100% 14.22 kg/m2        Blood Pressure from Last 3 Encounters:   03/12/18 94/58   12/14/17 151/72   12/11/17 151/72    Weight from Last 3 Encounters:   03/12/18 75 lb (34 kg)   03/12/18 75 lb 4.8 oz (34.2 kg)   12/09/17 91 lb 7.9 oz (41.5 kg)               Primary Care Provider Office Phone # Fax #    Karan Smith [phone]       [facility] [address] [place] [address]        Equal Access to Services     BA GRAY : Hadii dhruv ku hadasho Soomaali, waaxda luqadaha, qaybta kaalmada adeegyada, waxay zeeshan haykaren orellana . So [facility] [phone].    ATENCIÓN: Si habla español, tiene a savage disposición servicios gratuitos de asistencia lingüística. Llame al [phone].    We comply with applicable federal civil rights laws and [place] laws. We do not discriminate on the basis of race, color, national origin, age, disability, sex, sexual orientation, or gender identity.            Thank you!     Thank you for choosing [facility]  for your care. Our goal is always to provide you with excellent care. Hearing back from our patients is one way we can continue to improve our services. Please take a few minutes to complete the written survey that you may receive in the mail after your visit with us. Thank you!             Your Updated Medication List - Protect others around you: Learn how to safely use, store and throw away your medicines at www.disposemymeds.org.          This list is accurate as of 3/12/18 11:59 PM.  Always use your most recent med list.                   Brand Name Dispense Instructions for use Diagnosis    acetaminophen 325 MG " tablet    TYLENOL    100 tablet    2 tablets (650 mg) by Oral or Feeding Tube route every 8 hours as needed for mild pain    Acute post-operative pain       * albuterol (2.5 MG/3ML) 0.083% neb solution     360 mL    Take 1 vial (2.5 mg) by nebulization every 4 hours as needed for wheezing    Wheezing       * albuterol 108 (90 BASE) MCG/ACT Inhaler    PROAIR HFA/PROVENTIL HFA/VENTOLIN HFA     Inhale 2 puffs into the lungs 4 times daily as needed for other (dyspnea)    Wheezing       amylase-lipase-protease 35692 UNITS per tablet    VIOKACE     1-2 tablets by Per Feeding Tube route every 4 hours Crush 1 tab WELL, mix into 30 ml warm water, DISSOLVE well and administer via FT as med bolus. Flush FT after administration. If TF rate is @10-20 ml/h, administer 1 tablet q4hrs; if @30-45 ml/h, increase to 2 tabs q4hrs.This regimen with TF @ goal will provide approx 2088 units of lipase/g of total Fat daily and approp dosing initially for pancreatic insuff. with more elemental TF formula.    Nutritional deficiency       ascorbic acid 500 MG Tabs     30 tablet    1 tablet (500 mg) by Oral or Feeding Tube route daily    Nutritional deficiency       cyanocobalamin 1000 MCG Tabs     30 tablet    1,000 mcg by Oral or NG Tube route daily    Macrocytic anemia       DULoxetine 30 MG EC capsule    CYMBALTA     Take 30 mg by mouth daily        enoxaparin 30 MG/0.3ML injection    LOVENOX     Inject 0.3 mLs (30 mg) Subcutaneous every 24 hours    Status post neck dissection       famotidine 20 MG tablet    PEPCID     Take 20 mg by mouth 2 times daily        fiber modular packet      3 packets by Per Feeding Tube route 3 times daily    Nutritional deficiency       folic acid 1 MG tablet    FOLVITE    30 tablet    1 tablet (1 mg) by Oral or Feeding Tube route daily    Macrocytic anemia       furosemide 20 MG tablet    LASIX    30 tablet    Take 1 tablet (20 mg) by mouth daily    Benign essential hypertension       haloperidol 2 MG tablet     HALDOL     Take 2 mg by mouth 4 times daily        haloperidol lactate 5 MG/ML injection    HALDOL    90 mL    Inject 1 mL (5 mg) into the vein every 6 hours as needed for agitation    Anxiety       HYDROmorphone 0.2 MG/0.2 ML Soln injection    DILAUDID     Inject 0.2 mLs (0.2 mg) into the vein every 2 hours as needed for moderate to severe pain    Acute post-operative pain       ipratropium - albuterol 0.5 mg/2.5 mg/3 mL 0.5-2.5 (3) MG/3ML neb solution    DUONEB    360 mL    Take 1 vial (3 mLs) by nebulization 4 times daily    Wheezing       labetalol 5 MG/ML injection    NORMODYNE/TRANDATE    4 mL    Inject 4 mLs (20 mg) into the vein every 4 hours as needed for high blood pressure (SBP > 160, DBP >110.  Hold for HR < 65.)    Benign essential hypertension       loperamide 1 MG/5ML liquid    IMODIUM    200 mL    Take 10 mLs (2 mg) by mouth 3 times daily    Diarrhea, unspecified type       melatonin 5 MG tablet      1 tablet (5 mg) by Oral or Feeding Tube route every evening    Insomnia, unspecified type       * metoprolol tartrate 50 MG tablet    LOPRESSOR     Take 50 mg by mouth 2 times daily        * metoprolol 10 mg/mL Susp    LOPRESSOR     5 mLs (50 mg) by Oral or Feeding Tube route 2 times daily    Benign essential hypertension       mineral oil-hydrophilic petrolatum     50 g    Apply topically 3 times daily    Status post neck dissection       multivitamins with minerals Liqd liquid      15 mLs by Per Feeding Tube route daily    Nutritional deficiency       nicotine 14 MG/24HR 24 hr patch    NICODERM CQ    30 patch    Place 1 patch onto the skin daily    Current smoker       oxyCODONE 5 MG/5ML solution    ROXICODONE    90 mL    Take 2.5 mLs (2.5 mg) by mouth every 4 hours as needed for moderate to severe pain    Acute post-operative pain       pantoprazole Susp suspension    PROTONIX     20 mLs (40 mg) by Oral or Feeding Tube route 2 times daily (before meals)    Gastroesophageal reflux disease without  esophagitis       * QUEtiapine 50 MG tablet    SEROquel    120 tablet    1 tablet (50 mg) by Oral or Feeding Tube route every evening    Anxiety       * QUEtiapine 25 MG tablet    SEROquel    60 tablet    0.5 tablets (12.5 mg) by Oral or Feeding Tube route daily    Anxiety       thiamine 100 MG tablet      1 tablet (100 mg) by Oral or Feeding Tube route daily    Nutritional deficiency       * Notice:  This list has 6 medication(s) that are the same as other medications prescribed for you. Read the directions carefully, and ask your doctor or other care provider to review them with you.

## 2018-03-12 NOTE — MR AVS SNAPSHOT
"              After Visit Summary   3/12/2018    Alexia Anthony    MRN: [id number]           Patient Information     Date Of Birth          1955        Visit Information        Provider Department      3/12/2018 3:15 PM Fawad Knight MD [facility]        Today's Diagnoses     Encounter for care related to feeding tube    -  1       Follow-ups after your visit        Future tests that were ordered for you today     Open Future Orders        Priority Expected Expires Ordered    US Lower Extremity Venous Duplex Left STAT  3/12/2019 3/12/2018    LT X-RAY FEMUR [LYC267] Routine 3/12/2018 3/12/2019 3/12/2018    XR Pelvis 1/2 Views Routine 3/12/2018 3/12/2019 3/12/2018            Who to contact     If you have questions or need follow up information about today's clinic visit or your schedule please contact [facility] directly at [phone].  Normal or non-critical lab and imaging results will be communicated to you by MyChart, letter or phone within 4 business days after the clinic has received the results. If you do not hear from us within 7 days, please contact the clinic through MyChart or phone. If you have a critical or abnormal lab result, we will notify you by phone as soon as possible.  Submit refill requests through [employer] or call your pharmacy and they will forward the refill request to us. Please allow 3 business days for your refill to be completed.          Additional Information About Your Visit        MyChart Information     [employer] lets you send messages to your doctor, view your test results, renew your prescriptions, schedule appointments and more. To sign up, go to [email or web address] . Click on \"Log in\" on the left side of the screen, which will take you to the Welcome page. Then click on \"Sign up Now\" on the right side of the page.     You will be asked to enter the access code listed below, as well as some personal information. Please follow the " directions to create your username and password.     Your access code is: [email or web address]  Expires: 6/3/2018 10:26 PM     Your access code will  in 90 days. If you need help or a new code, please call your [facility] or [phone].        Care EveryWhere ID     This is your Care EveryWhere ID. This could be used by other organizations to access your [facility] medical records  [id number]        Your Vitals Were     Temperature Pulse Oximetry BMI (Body Mass Index)             97  F (36.1  C) (Temporal) 100% 14.16 kg/m2          Blood Pressure from Last 3 Encounters:   18 94/58   17 151/72   17 151/72    Weight from Last 3 Encounters:   18 34 kg (75 lb)   18 34.2 kg (75 lb 4.8 oz)   17 41.5 kg (91 lb 7.9 oz)              Today, you had the following     No orders found for display       Primary Care Provider Office Phone # Fax #    Karan Smith [phone]       [facility] [address] [place] [address]        Equal Access to Services     BA GRAY : Hadii aad ku hadasho Soomaali, waaxda luqadaha, qaybta kaalmada adeegyada, michelle acein haycoletten meli harris. So [facility] [phone].    ATENCIÓN: Si habla español, tiene a savage disposición servicios gratuitos de asistencia lingüística. Llame al [phone].    We comply with applicable federal civil rights laws and [place] laws. We do not discriminate on the basis of race, color, national origin, age, disability, sex, sexual orientation, or gender identity.            Thank you!     Thank you for choosing [facility]  for your care. Our goal is always to provide you with excellent care. Hearing back from our patients is one way we can continue to improve our services. Please take a few minutes to complete the written survey that you may receive in the mail after your visit with us. Thank you!             Your Updated Medication List - Protect others  around you: Learn how to safely use, store and throw away your medicines at www.disposemymeds.org.          This list is accurate as of 3/12/18  4:19 PM.  Always use your most recent med list.                   Brand Name Dispense Instructions for use Diagnosis    acetaminophen 325 MG tablet    TYLENOL    100 tablet    2 tablets (650 mg) by Oral or Feeding Tube route every 8 hours as needed for mild pain    Acute post-operative pain       * albuterol (2.5 MG/3ML) 0.083% neb solution     360 mL    Take 1 vial (2.5 mg) by nebulization every 4 hours as needed for wheezing    Wheezing       * albuterol 108 (90 BASE) MCG/ACT Inhaler    PROAIR HFA/PROVENTIL HFA/VENTOLIN HFA     Inhale 2 puffs into the lungs 4 times daily as needed for other (dyspnea)    Wheezing       amylase-lipase-protease 43536 UNITS per tablet    VIOKACE     1-2 tablets by Per Feeding Tube route every 4 hours Crush 1 tab WELL, mix into 30 ml warm water, DISSOLVE well and administer via FT as med bolus. Flush FT after administration. If TF rate is @10-20 ml/h, administer 1 tablet q4hrs; if @30-45 ml/h, increase to 2 tabs q4hrs.This regimen with TF @ goal will provide approx 2088 units of lipase/g of total Fat daily and approp dosing initially for pancreatic insuff. with more elemental TF formula.    Nutritional deficiency       ascorbic acid 500 MG Tabs     30 tablet    1 tablet (500 mg) by Oral or Feeding Tube route daily    Nutritional deficiency       cyanocobalamin 1000 MCG Tabs     30 tablet    1,000 mcg by Oral or NG Tube route daily    Macrocytic anemia       DULoxetine 30 MG EC capsule    CYMBALTA     Take 30 mg by mouth daily        enoxaparin 30 MG/0.3ML injection    LOVENOX     Inject 0.3 mLs (30 mg) Subcutaneous every 24 hours    Status post neck dissection       famotidine 20 MG tablet    PEPCID     Take 20 mg by mouth 2 times daily        fiber modular packet      3 packets by Per Feeding Tube route 3 times daily    Nutritional deficiency        folic acid 1 MG tablet    FOLVITE    30 tablet    1 tablet (1 mg) by Oral or Feeding Tube route daily    Macrocytic anemia       furosemide 20 MG tablet    LASIX    30 tablet    Take 1 tablet (20 mg) by mouth daily    Benign essential hypertension       haloperidol 2 MG tablet    HALDOL     Take 2 mg by mouth 4 times daily        haloperidol lactate 5 MG/ML injection    HALDOL    90 mL    Inject 1 mL (5 mg) into the vein every 6 hours as needed for agitation    Anxiety       HYDROmorphone 0.2 MG/0.2 ML Soln injection    DILAUDID     Inject 0.2 mLs (0.2 mg) into the vein every 2 hours as needed for moderate to severe pain    Acute post-operative pain       ipratropium - albuterol 0.5 mg/2.5 mg/3 mL 0.5-2.5 (3) MG/3ML neb solution    DUONEB    360 mL    Take 1 vial (3 mLs) by nebulization 4 times daily    Wheezing       labetalol 5 MG/ML injection    NORMODYNE/TRANDATE    4 mL    Inject 4 mLs (20 mg) into the vein every 4 hours as needed for high blood pressure (SBP > 160, DBP >110.  Hold for HR < 65.)    Benign essential hypertension       loperamide 1 MG/5ML liquid    IMODIUM    200 mL    Take 10 mLs (2 mg) by mouth 3 times daily    Diarrhea, unspecified type       melatonin 5 MG tablet      1 tablet (5 mg) by Oral or Feeding Tube route every evening    Insomnia, unspecified type       * metoprolol tartrate 50 MG tablet    LOPRESSOR     Take 50 mg by mouth 2 times daily        * metoprolol 10 mg/mL Susp    LOPRESSOR     5 mLs (50 mg) by Oral or Feeding Tube route 2 times daily    Benign essential hypertension       mineral oil-hydrophilic petrolatum     50 g    Apply topically 3 times daily    Status post neck dissection       multivitamins with minerals Liqd liquid      15 mLs by Per Feeding Tube route daily    Nutritional deficiency       nicotine 14 MG/24HR 24 hr patch    NICODERM CQ    30 patch    Place 1 patch onto the skin daily    Current smoker       oxyCODONE 5 MG/5ML solution    ROXICODONE    90 mL     Take 2.5 mLs (2.5 mg) by mouth every 4 hours as needed for moderate to severe pain    Acute post-operative pain       pantoprazole Susp suspension    PROTONIX     20 mLs (40 mg) by Oral or Feeding Tube route 2 times daily (before meals)    Gastroesophageal reflux disease without esophagitis       * QUEtiapine 50 MG tablet    SEROquel    120 tablet    1 tablet (50 mg) by Oral or Feeding Tube route every evening    Anxiety       * QUEtiapine 25 MG tablet    SEROquel    60 tablet    0.5 tablets (12.5 mg) by Oral or Feeding Tube route daily    Anxiety       thiamine 100 MG tablet      1 tablet (100 mg) by Oral or Feeding Tube route daily    Nutritional deficiency       * Notice:  This list has 6 medication(s) that are the same as other medications prescribed for you. Read the directions carefully, and ask your doctor or other care provider to review them with you.

## 2018-03-12 NOTE — PROGRESS NOTES
SUBJECTIVE:   Alexia Flor is a 62 year old female who presents to clinic today for the following health issues:      Patient is here today to go through her medications. Her care taker Phu states that she was sent home from [facility] with a sack full of medications. He has been trying to help her take them but states that there were a few injectables and he just isn't qualified to be giving them to her. She has medications that were prescribed and she hasn't taken any of them.  At this time she is only taking Haloperidol 2 mg, famotidine 20 mg, folic acid 1 mg, duloxetine 30 mg, furosemide 20mg and metoprolol 50mg. The injectable medication is enoxaparin 30mg/0.3ml that she has not been taking.     Patient also has a complaint of her left leg hurting very badly. Patient doesn't know if she fell or hurt herself. The doesn't know where the pain is coming from. States that it is located in her upper thigh. She can hardly walk because of it.     Hospital Follow-up Visit:    Hospital/Nursing Home/ Rehab Facility: [facility]   Date of Admission:12/2017   Date of Discharge: 03/11/2018  Reason(s) for Admission: surgical complications. Emergency trach,             Problems taking medications regularly:  With the help of Phu       Medication changes since discharge: Many        Problems adhering to non-medication therapy:  YES. She needs help taking her medications.    Summary of hospitalization:  [facility] discharge summary reviewed  See outside records, reviewed and scanned  Diagnostic Tests/Treatments reviewed.  Follow up needed: Occupational therapy  and ENT  Other Healthcare Providers Involved in Patient s Care:         Multiple disciplines see above  Update since discharge: stable.     Post Discharge Medication Reconciliation: discharge medications reconciled, continue medications without change.  Plan of care communicated with patient and caregiver      Coding guidelines for this visit:  Type of Medical   Decision Making Face-to-Face Visit       within 7 Days of discharge Face-to-Face Visit        within 14 days of discharge   Moderate Complexity [id number]   High Complexity 60155 [id number]                  Problem list and histories reviewed & adjusted, as indicated.  Additional history: as documented        Reviewed and updated as needed this visit by clinical staff       Reviewed and updated as needed this visit by Provider        SUBJECTIVE:  Alexia  is a 62 year old female who presents for: Follow-up of her prolonged hospitalization and aftercare following surgery on her tongue for cancer.  She had multiple complications.  She had alcohol withdrawal.  She was recently released to home.  She lives alone but has a friend that is providing care for her.  But they have not established anything with  yet.  She is complaining of left leg pain now.  She was on Lovenox for DVT prophylaxis.  This has been discontinued.  She is taking food orally.  Still has a feeding tube not used it for a while and wants it taken out.  She admits to having some alcohol again.  Very poor historian.  Seems confused on her medications.    Past Medical History:   Diagnosis Date     Alcohol abuse      Alcoholic hepatitis      Alcoholic pancreatitis      Anxiety      Schafer's esophagus      Breast cancer (H)     rt, s/p radiation.     Chemical dependency (H)      Congestive heart failure, unspecified      COPD (chronic obstructive pulmonary disease) (H)      Depressive disorder      History of radiation therapy      Hoarseness      Hypokalemia      Macrocytic anemia      Non-adherence to medical treatment      Tobacco abuse      Tongue cancer (H)      Past Surgical History:   Procedure Laterality Date     DISSECTION RADICAL NECK Left 11/21/2017    Procedure: DISSECTION RADICAL NECK;;  Surgeon: Heriberto George MD;  Location: [facility] OR     ESOPHAGOSCOPY, GASTROSCOPY, DUODENOSCOPY  (EGD), COMBINED N/A 11/9/2017    Procedure: COMBINED ENDOSCOPIC ULTRASOUND, ESOPHAGOSCOPY, GASTROSCOPY, DUODENOSCOPY (EGD), FINE NEEDLE ASPIRATE/BIOPSY;;  Surgeon: Yo Bhat MD;  Location: [facility] OR     EXPLORE NECK Left 11/23/2017    Procedure: EXPLORE NECK;  left Neck Exploration, tracheostomy , placement of nasogastric feeding tube;  Surgeon: Daisy Singh MD;  Location: [facility] OR     EXPLORE NECK Left 12/5/2017    Procedure: EXPLORE NECK;  [place] Left Neck Hematoma , excision of skin   ;  Surgeon: Stella Hernadez MD;  Location: [facility] OR     GLOSSECTOMY PARTIAL N/A 11/21/2017    Procedure: GLOSSECTOMY PARTIAL;  Left Partial Glossectomy And Left Neck Dissection, ;  Surgeon: Heriberto George MD;  Location: [facility] OR     LARYNGOSCOPY N/A 12/12/2017    Procedure: LARYNGOSCOPY;  Direct Laryngoscopy, oral cavity exporation cauterization, flexible bronschoscopy, trach exploration  ;  Surgeon: Heriberto George MD;  Location: [facility] OR     LARYNGOSCOPY WITH BIOPSY(IES) N/A 11/9/2017    Procedure: LARYNGOSCOPY WITH BIOPSY(IES);  Direct Laryngoscopy,  Upper Endoscopic Ultrasound and Fine Needle Aspiration;  Surgeon: Heriberto George MD;  Location: [facility] OR     lumpectomy Rt breast  2006       TRACHEOSTOMY  11/23/2017    Procedure: TRACHEOSTOMY;;  Surgeon: Daisy Singh MD;  Location: [facility]     Social History   Substance Use Topics     Smoking status: Heavy Tobacco Smoker     Packs/day: 0.50     Years: 40.00     Types: Cigarettes     Smokeless tobacco: Current User     Alcohol use 0.5 oz/week      Comment: 1/2 liter per day     Current Outpatient Prescriptions   Medication Sig Dispense Refill     haloperidol (HALDOL) 2 MG tablet Take 2 mg by mouth 4 times daily       DULoxetine (CYMBALTA) 30 MG EC capsule Take 30 mg by mouth daily       metoprolol tartrate (LOPRESSOR) 50 MG tablet Take 50 mg by mouth 2 times daily       famotidine (PEPCID) 20 MG tablet Take 20 mg by mouth 2 times daily        furosemide (LASIX) 20 MG tablet Take 1 tablet (20 mg) by mouth daily 30 tablet      folic acid (FOLVITE) 1 MG tablet 1 tablet (1 mg) by Oral or Feeding Tube route daily 30 tablet      melatonin 5 MG tablet 1 tablet (5 mg) by Oral or Feeding Tube route every evening (Patient not taking: Reported on 3/12/2018)       HYDROmorphone (DILAUDID) 0.2 MG/0.2 ML SOLN injection Inject 0.2 mLs (0.2 mg) into the vein every 2 hours as needed for moderate to severe pain (Patient not taking: Reported on 3/12/2018)  0     oxyCODONE (ROXICODONE) 5 MG/5ML solution Take 2.5 mLs (2.5 mg) by mouth every 4 hours as needed for moderate to severe pain (Patient not taking: Reported on 3/12/2018) 90 mL 0     acetaminophen (TYLENOL) 325 MG tablet 2 tablets (650 mg) by Oral or Feeding Tube route every 8 hours as needed for mild pain (Patient not taking: Reported on 3/12/2018) 100 tablet      albuterol (2.5 MG/3ML) 0.083% neb solution Take 1 vial (2.5 mg) by nebulization every 4 hours as needed for wheezing (Patient not taking: Reported on 3/12/2018) 360 mL      albuterol (PROAIR HFA/PROVENTIL HFA/VENTOLIN HFA) 108 (90 BASE) MCG/ACT Inhaler Inhale 2 puffs into the lungs 4 times daily as needed for other (dyspnea) (Patient not taking: Reported on 3/12/2018)       ipratropium - albuterol 0.5 mg/2.5 mg/3 mL (DUONEB) 0.5-2.5 (3) MG/3ML neb solution Take 1 vial (3 mLs) by nebulization 4 times daily (Patient not taking: Reported on 3/12/2018) 360 mL      enoxaparin (LOVENOX) 30 MG/0.3ML injection Inject 0.3 mLs (30 mg) Subcutaneous every 24 hours (Patient not taking: Reported on 3/12/2018)       loperamide (IMODIUM) 1 MG/5ML liquid Take 10 mLs (2 mg) by mouth 3 times daily (Patient not taking: Reported on 3/12/2018) 200 mL      haloperidol lactate (HALDOL) 5 MG/ML injection Inject 1 mL (5 mg) into the vein every 6 hours as needed for agitation (Patient not taking: Reported on 3/12/2018) 90 mL      QUEtiapine (SEROQUEL) 25 MG tablet 0.5 tablets (12.5  mg) by Oral or Feeding Tube route daily (Patient not taking: Reported on 3/12/2018) 60 tablet      QUEtiapine (SEROQUEL) 50 MG tablet 1 tablet (50 mg) by Oral or Feeding Tube route every evening (Patient not taking: Reported on 3/12/2018) 120 tablet      labetalol (NORMODYNE/TRANDATE) 5 MG/ML injection Inject 4 mLs (20 mg) into the vein every 4 hours as needed for high blood pressure (SBP > 160, DBP >110.  Hold for HR < 65.) (Patient not taking: Reported on 3/12/2018) 4 mL      metoprolol (LOPRESSOR) 10 mg/mL SUSP 5 mLs (50 mg) by Oral or Feeding Tube route 2 times daily (Patient not taking: Reported on 3/12/2018)       mineral oil-hydrophilic petrolatum (AQUAPHOR) Apply topically 3 times daily (Patient not taking: Reported on 3/12/2018) 50 g      cyanocobalamin 1000 MCG TABS 1,000 mcg by Oral or NG Tube route daily (Patient not taking: Reported on 3/12/2018) 30 tablet      fiber modular (NUTRISOURCE FIBER) packet 3 packets by Per Feeding Tube route 3 times daily (Patient not taking: Reported on 3/12/2018)       nicotine (NICODERM CQ) 14 MG/24HR 24 hr patch Place 1 patch onto the skin daily (Patient not taking: Reported on 3/12/2018) 30 patch      multivitamins with minerals (CERTAVITE/CEROVITE) LIQD liquid 15 mLs by Per Feeding Tube route daily (Patient not taking: Reported on 3/12/2018)       pantoprazole (PROTONIX) SUSP suspension 20 mLs (40 mg) by Oral or Feeding Tube route 2 times daily (before meals) (Patient not taking: Reported on 3/12/2018)       ascorbic acid 500 MG TABS 1 tablet (500 mg) by Oral or Feeding Tube route daily (Patient not taking: Reported on 3/12/2018) 30 tablet      thiamine (VITAMIN B-1) 100 MG tablet 1 tablet (100 mg) by Oral or Feeding Tube route daily (Patient not taking: Reported on 3/12/2018)       amylase-lipase-protease (VIOKACE) 42222 UNITS per tablet 1-2 tablets by Per Feeding Tube route every 4 hours Crush 1 tab WELL, mix into 30 ml warm water, DISSOLVE well and administer via FT  "as med bolus. Flush FT after administration. If TF rate is @10-20 ml/h, administer 1 tablet q4hrs; if @30-45 ml/h, increase to 2 tabs q4hrs.This regimen with TF @ goal will provide approx 2088 units of lipase/g of total Fat daily and approp dosing initially for pancreatic insuff. with more elemental TF formula. (Patient not taking: Reported on 3/12/2018)         REVIEW OF SYSTEMS:   5 point ROS negative except as noted above in HPI, including Gen., Resp, CV, GI &  system review.     OBJECTIVE:  Vitals: BP 94/58 (BP Location: Left arm, Patient Position: Chair, Cuff Size: Child)  Pulse 82  Temp 97  F (36.1  C) (Temporal)  Ht 5' 1.02\" (1.55 m)  Wt 75 lb 4.8 oz (34.2 kg)  SpO2 100%  BMI 14.22 kg/m2  BMI= Body mass index is 14.22 kg/(m^2).  She is alert but seems a bit confused.  Very thin and frail.  Mucous membranes moist.  Lungs clear.  Heart regular rhythm.  Her left leg very thin.  There is no deformity.  No ecchymosis.  No swelling.  Good range of motion.  X-rays of the femur were negative.  We did an ultrasound and it was negative for DVT.    ASSESSMENT:  #1 tongue cancer #2 alcoholism 3 left upper leg pain    PLAN:  Reviewed her x-rays and these are fine.  She went to see surgery to have the feeding tube gastrostomy taken out.  We need to get her set up with  for follow-up outpatient.  She needs some therapy.  She needs to follow-up with her ENT people.  She is very vulnerable.  Over 45 minutes was spent on this encounter over half of which was reviewing records arranging for consults and reviewing x-rays and scan.  Face-to-face with her today and she would definitely benefit from home health care services to help with medications ADLs and needs rehab for profound weakness.      Karan Smith MD  [facility]              "

## 2018-03-15 NOTE — TELEPHONE ENCOUNTER
Received a call from Alexia, not quite sure what it was about but it was to give verbal consent to speak with Phu her friend.    Thank you,  Sandy MARTINEZ

## 2018-03-15 NOTE — TELEPHONE ENCOUNTER
Per Dr. Smith: We are going to send a referral for home care through [place]. Phu shouldn't have to be taking on all of this responsibility. Hopefully we can get something accomplished faster through .     Referral for home care nursing.

## 2018-03-15 NOTE — TELEPHONE ENCOUNTER
Reason for Call: Request for an order or referral:    Order or referral being requested: Home Health Nurse - Phu states he was with Alexia at her last appt with Dr Smith and this was discussed a little bit at that time.  Phu states he has been doing all of her cares and whatever else he can for her but he has a fulltime job also and he is very overwhelmed.    Date needed: as soon as possible    Has the patient been seen by the PCP for this problem?  Yes    Additional comments: Phu calls to state that Alexia is in serious need of a home health nurse.  She is to weak to do anything for herself, sometimes she cant even make it to the bathroom on time.  Phu also states that she doesn't answer her phone due to being so weak and slow and that is why he is calling for her.    Phone number Patient can be reached at:  [phone]    Best Time:  any    Can we leave a detailed message on this number?  YES    Call taken on 3/15/2018 at 1:34 PM by Alessia Bullard

## 2018-03-15 NOTE — TELEPHONE ENCOUNTER
[facility] is in charge of setting this up. Their  was arranging this. I returned Phu's call but he seemed confused as to what is going on. He is very anxious and worried about Alexia.     [facility] . I was directed to a . Left message for return call on the status of this Home Care referral.

## 2018-03-16 NOTE — TELEPHONE ENCOUNTER
Home care nurse is calling stating the orders need to be sent to another agency because [facility] only goes to [facility] for hospice. If you have any questions you can call  [phone] [place].  Thank you,  Erma Johnson   for [facility]

## 2018-03-20 NOTE — TELEPHONE ENCOUNTER
Phu is calling (Alexia gave verbal consent to speak with Phu) because she was just discharged from the hospital.  He feels she needs some sort of help or strengthening, he will be leaving soon for [place] and he has been coming and helping her with everything.  Wondering if physical therapy, inpatient rehab.or if home health care would be eligible for her.  She is so weak, pee's her pants she is so weak cannot even get to the phone. She just needs physical therapy to build strenghth in legs and arms, too weak.     Please call to speak with Phu.  Dr Smith is out of clinic, please have another provider review this in his absence due to Phu leaving this Saturday and not returning until next week.  Thank you,  Sandy MARTINEZ

## 2018-03-20 NOTE — PROGRESS NOTES
Clinic Care Coordination Contact  OUTREACH    Referral Information:  Referral Source: Care Team  Reason for Contact: Pt's friend/caregiver Phu (pt gave verbal permission to speak with Phu) contacted Care Team regarding his concerns about pt at home as he is going to [place] on Saturday. Phu is wondering about possible home care or nursing home/rehab stay. Pt is weak according to Phu and could benefit from further rehab. Pt had just gotten home from a rehab facility in [place] about 2 weeks ago according to Phu. [facility] noted after conversation that after PCP appointment last week PCP had given orders for home care and that is in process. Due to location [place] is not able to do homecare for pt, so nursing staff is working with Priti at [facility] In finding alternate home care agency.        Universal Utilization:   ED Visits in last year: 4  Hospital visits in last year: 4  Last PCP appointment: 03/12/18     Concerns: pt is weak needs therapy- rehab or respite placement while friend is out of town. Phu assists pt and checks in a couple times/day- assists her with dressing, bathing, medications, laundry, and meals. Phu is concerned about her being alone while he is out of town starting Saturday 3/24/18-Wednesday 3/28/18.         Clinical Concerns:  Current Medical Concerns:   Patient Active Problem List   Diagnosis     Liver function abnormality     CARDIOVASCULAR SCREENING; LDL GOAL LESS THAN 130     Cachexia (HCC)     Anxiety     Alcoholic fatty liver     Alcoholism (H)     Arteriosclerotic vascular disease     Cholelithiasis     Diastolic dysfunction     Electrolyte imbalance     Macrocytic anemia     Major depressive disorder, recurrent episode (H)     Tubular adenoma     Cobalamin deficiency     Iron deficiency anemia due to chronic blood loss     Personal history of malignant neoplasm of breast     Health Care Home     Hypomagnesemia     Alcohol-induced acute pancreatitis,  unspecified complication status     Pancreatitis     Colitis     Hypokalemia     Malignant neoplasm of base of tongue (H) squamous cell per biopsy     Current smoker     Advanced directives, counseling/discussion     Status post neck dissection         Current Behavioral Concerns: Per Phu pt does smoke but not as much as she used to. Phu states pt has gotten her drinking under control as well. Pt allegedly was drinking a bottle of vodka every 1-2 days and now has maybe 1 or 2 shots a day    Education Provided to patient: N/A      Clinical Pathway: None    Medication Management:  See medication list     Functional Status:  Mobility Status: Independent (weak), is able to ambulate on her own shorter distances     Transportation: not discussed           Psychosocial:  Current living arrangement:: I live alone (pt lives in [facility]), friend Phu lives 2 miles away and helps pt and checks in on her frequently, at least 2 times/day he said.  Financial/Insurance: [place]- will be 63 in May and look at getting Medicare       Resources and Interventions:  Current Resources:  Home Care- nursing staff working on orders for home care with [facility]- office in [place]- uncertain if they do physical therapy. Phu plans to call them as they likely have been trying to connect with pt. Phu wonders if she may need rehab placement in a nursing home short-term to get stronger and have more supervision while he is out of town. [facility] and they may have an opening. Will see when home care is able to assess to determine if need possible nursing home rehab/respite placement.           Referrals Placed: TCU (referral sent to [facility] for possible rehab stay)     Goals: Will get home care services in place and/or possible short-term rehab placement secured.     Barriers: short time frame to try to get services lined up- Phu leaves on Saturday  Strengths: pt has  strong support and involvement from friend Phu  Patient/Caregiver understanding: Caregiver expresses understanding and appreciative of the assistance.        Plan: Phu will contact home care agency; [facility] will work with [facility] for possible short-term rehab placement. [facility] will continue to do outreach and assist in the upcoming days prior to Phu's vacation.     [facility] Care Coordinator  [place], [facility]  3/20/2018 4:09 PM  [phone]

## 2018-03-20 NOTE — LETTER
[place] CARE COORDINATION  [facility]  [address] [place] [address]    March 20, 2018    Alexia Hernandez [facility]  [address]  [facility] [address]      Dear Alexia,    I am a clinic care coordinator who works with Karan Smith MD at [facility]. I wanted to thank you for spending the time to talk with me.  I wanted to introduce myself and provide you with my contact information so that you can call me with questions or concerns about your health care. Below is a description of clinic care coordination and how I can further assist you.     The clinic care coordinator is a registered nurse and/or  who understand the health care system. The goal of clinic care coordination is to help you manage your health and improve access to the [facility] system in the most efficient manner. The registered nurse can assist you in meeting your health care goals by providing education, coordinating services, and strengthening the communication among your providers. The  can assist you with financial, behavioral, psychosocial, chemical dependency, counseling, and/or psychiatric resources.    Please feel free to contact me at [phone], with any questions or concerns. We at [facility] are focused on providing you with the highest-quality healthcare experience possible and that all starts with you.     Sincerely,     [facility] Care Coordinator                                                        [facility]                                                      [phone]      Enclosed: I have enclosed a copy of a 24 Hour Access Plan. This has helpful phone numbers for you to call when needed. Please keep this in an easy to access place to use as needed.

## 2018-03-20 NOTE — LETTER
Health Care Home - Access Care Plan    About Me  Patient Name:  Alexia Flor    YOB: 1955  Age:                             62 year old   [place] MRN:            [id number] Telephone Information:     Home Phone [phone]   Mobile [phone]       Address:    [address]  [facility] [address] Email address:  No e-mail address on record      Emergency Contact(s)  Name Relationship Lgl Grd Work Phone Home Phone Mobile Phone   1. KALEB GUERRERO Mother   [phone]    2. GOLD LEYVA Friend    [phone]   3. TETO FLOR Friend   none [phone]   4. KENNA GUERRERO Brother   [phone]              Health Maintenance: Routine Health maintenance Reviewed: Not assessed    My Access Plan  Medical Emergency 911   Questions or concerns during clinic hours Primary Clinic Line, I will call the clinic directly: [facility] - [phone]   24 Hour Appointment Line [phone] or  [phone]) (toll free)   24 Hour Nurse Line [phone] (toll free)   Questions or concerns outside clinic hours 24 Hour Appointment Line, I will call the after-hours on-call line:   [facility] [phone] or [phone] ([phone]) (toll-free)   Preferred Urgent Care     Preferred Hospital     Preferred Pharmacy [facility] [address] [place] [address]     Behavioral Health Crisis Line The National Suicide Prevention Lifeline at 1-346.776.7133 or 911     My Care Team Members  Patient Care Team       Relationship Specialty Notifications Start End    Karan Smith MD PCP - General Family Practice  3/22/17     Phone: [phone] Fax: [phone]         [facility] [address] [place] [address]    Christi Marques MD MD Otolaryngology  8/22/16     Phone: [phone] Fax: [phone]         [address] [facility] [address] [facility] [address]    Anya Mckeon MD     8/22/16     Phone:  [phone] Fax: [phone]         [facility] EAR NOSE THROAT [address] [facility]  [place] [address]    Christi Marques MD MD Otolaryngology  8/29/16     Phone: [phone] Fax: [phone]         [address] [place] [address] [address] [facility] [address]    Tiarra Smith LSW  Primary Care - [facility] Admissions 3/20/18     Phone: [phone]             My Medical and Care Information  Problem List   Patient Active Problem List   Diagnosis     Liver function abnormality     CARDIOVASCULAR SCREENING; LDL GOAL LESS THAN 130     Cachexia (HCC)     Anxiety     Alcoholic fatty liver     Alcoholism (H)     Arteriosclerotic vascular disease     Cholelithiasis     Diastolic dysfunction     Electrolyte imbalance     Macrocytic anemia     Major depressive disorder, recurrent episode (H)     Tubular adenoma     Cobalamin deficiency     Iron deficiency anemia due to chronic blood loss     Personal history of malignant neoplasm of breast     Health Care Home     Hypomagnesemia     Alcohol-induced acute pancreatitis, unspecified complication status     Pancreatitis     Colitis     Hypokalemia     Malignant neoplasm of base of tongue (H) squamous cell per biopsy     Current smoker     Advanced directives, counseling/discussion     Status post neck dissection      Current Medications and Allergies:  See printed Medication Report

## 2018-03-21 NOTE — PROGRESS NOTES
"Clinic Care Coordination Contact  Care Team Conversations    Update: [facility] spoke with Phu today. A nurse from [facility] did come and see pt and assess. HC nurse feels placement for rehab is appropriate and according to Phu she is working on finding placement for pt possibly at [facility]. [facility] reminded Phu that a referral was faxed to [facility] yesterday to see about bed availability as well. Phu was going to talk with the HC nurse and let her know of [facility] as a possibility as well. Phu said that pt's [facility] care coordinator told him if unable to find pt a bed then can bring pt to ED in [place]. [facility] advised that unless pt medically needs the ED that is not an appropriate option for trying to find placement. Both [facility] and [facility] nurse are assisting in finding placement for pt. Phu expressed understanding and voiced agreement saying she's \"weak and needs therapy.\"     Plan: Phu will keep [facility] updated on status of bed situation as he hears from the HC nurse. [facility] will continue to assist as needed.     [facility] Care Coordinator  [facility]  3/21/2018 3:50 PM  [phone]        "

## 2018-03-21 NOTE — TELEPHONE ENCOUNTER
RN tried number left in message-it was phu's phone number. Phu is currently at a DR appt.  He states he doesn't think Alexia needs to go to ED. She just needs therapy. He mentioned that the [facility] nurse was looking into [facility] for her.     RN spoke with Noy VOSS and Noy will reach out to Phu later today. She is also waiting to hear back on placement.    Kristyn Smith RN

## 2018-03-21 NOTE — TELEPHONE ENCOUNTER
Reason for call:  Patient reporting a symptom    Symptom or request: weakness/lack of appetite/unable to care for herself or make it to the bathroom on her own. Lizabeth from [employer] [facility] [employer] states that Alexia is extremely weak and most likely needs some sort of TCU care. Lizabeth said that [facility] was out to evaluate Alexia and they said they couldn't help her because she needs more help than they are able to give. Requesting to speak with a nurse because they feel like Alexia needs to go the ED     Duration (how long have symptoms been present): ongoing-getting worse    Have you been treated for this before? No    Additional comments:     Phone Number patient can be reached at:  Home number on file [phone] (home)    Best Time:  any    Can we leave a detailed message on this number:  YES    Call taken on 3/21/2018 at 10:46 AM by Maya Estrada

## 2018-03-23 NOTE — PROGRESS NOTES
Clinic Care Coordination Contact  Care Team Conversations    [facility] has been assisting pt friend Phu with securing a TCU placement for pt to get rehab and get stronger. Pt was evaluated by [facility] and the home care nurse felt pt needed inpatient stay. [facility] has been working with [facility]. Phu informed [facility] today that he is not able to go on vacation anymore as he has a  he needs to attend so he will be able to continue to help pt at home. Phu still would like TCU placement found as he and home care feel pt could benefit from rehab.     Plan: [facility] will continue to work on finding placement for pt for rehab. [facility] to keep Phu informed on status of TCU placement. [facility] doing regular outreach until placement is found.     [facility] Care Coordinator  [facility]  3/23/2018 11:31 AM  [phone]

## 2018-03-26 NOTE — PROGRESS NOTES
Clinic Care Coordination Contact  Care Team Conversations    [facility] continues to try to find rehab placement (TCU) for pt. Pt is living at home in her apartment in [place]. According to pt's friend Phu, pt is weak and needs rehab placement. [facility] HC did assess and felt HC was not appropriate and that pt needed rehab placement so unfortunately they did not open pt up to services. Friend Phu has been trying to assist. Phu expressed frustration today that placement has not been found. [facility] has tried to get pt accepted and she was denied at [facility], [facility], and [facility]. Pt also spoke with Katiuska at [facility] who does not feel they have an appropriate bed. [facility] is in contact and awaiting return call from [facility] about possible rehab bed. Lucia in admissions there is having nursing review the information and she states she will get back to writer. Phu states that he may just bring pt into the ED. [facility] advised Phu that if he feels pt has acute illness going on and needs medical attention then yes he should bring her into the ED. Phu states if placement is not found by tomorrow he plans to bring pt into the ED as he can't help her at her home any longer.     Plan: [facility] is diligently working on trying to secure placement for pt.     [facility] Care Coordinator  [facility]  3/26/2018 4:36 PM  [phone]

## 2018-03-26 NOTE — PROGRESS NOTES
Clinic Care Coordination Contact  Care Team Conversations    Update: [facility] spoke with [facility] and she said she does not have an appropriate bed for pt.     [facility] spoke with Phu and let him know. He is frustrated and feeling like he has no help. [facility] explained I have been trying to find placement for a week and pt primary MD here is the one that got orders for therapy to happen in the home, unfortunately the HC agency felt pt was not appropriate for HC and felt she needed to go to a rehab facility. [place] staff have been working diligently on getting more care for pt. Phu does not feel pt has acute illness just weak and needs rehab.     Plan: Unable to find a facility to take pt for short-term rehab stay. Phu is going to talk with pt and come up with a plan, will likely end up bringing pt in to the ED. [facility] to continue to try to assist.     [facility] Care Coordinator  [place], [facility]  3/26/2018 4:50 PM  [phone]

## 2018-03-27 NOTE — PROGRESS NOTES
"Clinic Care Coordination Contact  Care Team Conversations    [facility] did leave a voicemail for another facility, [facility] today and have not heard back. [facility] has not been able to find placement for pt after reaching out and sending referrals to multiple facilities. With discussions with Phu, he has been clear that pt is \"weak and needing rehab\" not that he felt there was anything acute, medical going on that would warrant need for medical attention.     [facility] has been talking with Priti pt's [facility] Care Coordinator as well regarding the situation. Both [facility] and Priti have reinforced to pt and Phu that with placement not being found, Phu should be bringing pt into the ED. Phu spoke with pt about this and pt is adamant about waiting until Thursday to go into the ED.     In talking further with Priti the Critical access hospital Care Coordinator who know pt more than [facility] and has been working with pt longer, Priti does not feel pt will follow through and go to the ED on Thursday, she feels pt is just putting it off and will refuse to go in.     Both [facility] and Priti strongly reinforced to Phu and pt of the need to go to the ED today. Phu states he will continue to care for Alexia until Thursday since that is what she wants and then will bring her to the ED then.     Plan: [facility] spoke with Priti again and we feel pt is vulnerable and needs placement today. A Vulnerable Adult report will be made today on pt. Priti plans to submit report to [facility] on pt today. [facility] to continue to assist.      [facility] Care Coordinator  [facility]  3/27/2018 3:10 PM  [phone]      "

## 2018-03-29 NOTE — PROGRESS NOTES
"Clinic Care Coordination Contact  Care Team Conversations    [facility] received phone call from Priti Pending sale to [facility] care coordinator- Priti spoke with Maddy, the Adult Protection [facility] worker who visited pt at home this afternoon. Maddy said pt was up and moving around just fine, she appeared to be doing fine, was coherent and able to track the conversation. Maddy is not going to open an Adult Protection case. Maddy suggested [facility] or Lili try [facility] care.     Lili is also going to work with [facility] on getting pt on Elderly Waiver.     [facility] phoned Sommer at [facility] [phone]. Sommer took down the referral information and sounded as if they would take the pt. Sommer then looked pt up in [facility] and stated \"we're not actually able to take this pt.\" [facility] inquired further and it sounds like there is a negative history with this pt and [facility] system. Sommer didn't elaborate just said it says they're unable to work with this pt. Sommer is declining the referral.     Plan: [facility] let Priti know at Pending sale to [facility] of the decline. [facility] to let Maddy at [facility] know as well. Will try other Home care agencies.     [facility] Care Coordinator  [facility]  3/29/2018 4:22 PM  [phone]      "

## 2018-03-29 NOTE — PROGRESS NOTES
Clinic Care Coordination Contact  Care Team Conversations    Pt called [facility] to ask about making an appointment to see Dr. Smith. [facility] provided pt with scheduling number. [facility] asked pt about the plan of her going into the ED as we were unable to find placement for her to do rehab and get stronger. Pt stated she hadn't done that yet. [facility] told her to talk with her friend Phu and that he was going to bring pt in today. Pt stated she would go in tomorrow. [facility] informed pt she may be getting a call from the [facility] to check on her. Pt states she has not received any call and that she is doing ok at home. CC talked about her needing strengthening and she agreed rehab would be good for her. [facility] tried multiple nursing homes to get pt into for rehab and exhausted all resources.    Plan: [facility] reinforced to pt again that the best thing for her to do is go to the ED for evaluation and possible placement for rehab. Pt states she will do that. [facility] to continue to assist as able.    [facility] Care Coordinator  [facility]  3/29/2018 1:57 PM  [phone]

## 2018-03-29 NOTE — PROGRESS NOTES
Clinic Care Coordination Contact  [facility]/Voicemail    Referral Source: Care Team  Clinical Data: Care Coordinator Outreach- pt had left voicemail, also gave her friend Phu's number as a number to reach her.   Outreach attempted x 2. Voicemail box was full, so unable to leave a message.   Plan: Care Coordinator mailed out care coordination introduction letter on 3/20/18. Care Coordinator will try to reach patient again in 3-5 business days.    [facility] Care Coordinator  [facility]  3/29/2018 1:32 PM  [phone]

## 2018-03-29 NOTE — PROGRESS NOTES
Clinic Care Coordination Contact  [facility]/Voicemail       Clinical Data: Care Coordinator Outreach- pt had called [facility] and left a voicemail to call pt back  Outreach attempted x 1.  Said voicemail box not set up, unable to leave a message.   Plan: Care Coordinator mailed out care coordination introduction letter on 3/20/18. Care Coordinator will try to reach patient again in 3-5 business days.    [facility] Care Coordinator  [facility]  3/29/2018 1:29 PM  [phone]

## 2018-03-30 NOTE — PROGRESS NOTES
Clinic Care Coordination Contact  Care Team Conversations    [facility] spoke with Priti at [facility] who talked with [facility] about home care agencies. [facility] was going to call [facility] home care however Priti states she had already contacted them before and they said they have a waiting list. [facility] questioned [facility] rehab, Priti confirms that [facility] has a contract with [facility] rehab.     [facility] attempted to reach [facility] re[facility]. Received a voicemail and left message requesting a return call with a referral.     [facility] called pt to update her on plan. Pt confirmed the Adult protection worker was out to see her yesterday and thinks she is ok to stay at her apartment. Adult CC let pt know that we're trying to find a home care agency to come into the home and do physical therapy with her per recommendation of Adult Protection workerMaddy. Pt is in agreement with this plan. Pt is aware that if she feels unable to care for herself or if she thinks she has something medically wrong she needs to go into the ED.     [facility] spoke with Phu, pt's friend and let him know of [facility] attempting to reach [facility] home care to have them come to pt's apartment to do home care there. Phu feels this is a better option for pt verus inpatient as she prefers to stay home. Phu is aware that the Adult Protection worker as well felt pt was safe in her apartment and appropriate to do home care. Phu states pt is running low on her medications and is needing refills. Pt uses [facility] in [place].      Plan: [facility] to await return call from [facility] and continue to coordinate home care services. Will send message to care team regarding medications.     [facility] Care Coordinator  [facility]  3/30/2018 3:45 PM  [phone]

## 2018-03-30 NOTE — TELEPHONE ENCOUNTER
Pt and friend Phu are stating she is running low on her prescriptions. Phu makes it sound as if pt is taking the medications she had while at rehab (they maybe sent them home with her) I'm uncertain if these have been filled at a pharmacy yet. Pt prefers [facility] in [place].     [facility] Care Coordinator   [facility]   3/30/2018 3:48 PM   [phone]   (Routing comment)

## 2018-04-02 NOTE — PROGRESS NOTES
"Clinic Care Coordination Contact  Care Team Conversations    [facility] has been assisting in trying to locate a home care agency for pt at home. [facility] received return phone call from Mirtha at [facility] home care and then faxed demographic information to them for review. Mirtha called [facility] back and states they are able to open her for home care services. [facility] will be starting home care services tomorrow at 10:30am.    [facility] needs \"face to face\" documentation as well as home care orders for SNV, PT, OT, speech, and HHA services faxed to them at [phone].     [facility] also spoke with Priti at [facility] this am and she stated she did put in the Elderly Waiver referral for the pt today. Pt is aware of this.     Plan: Pt will start home care services tomorrow and both pt and friend Phu are aware of the plan. [facility] will let Priti know of the home care acceptance as well. [facility] will do outreach again in approximately one month. Pt has [facility] contact information if needs arise sooner. Care Team to fax the above requested orders to [facility] home care to [phone]    [facility] Care Coordinator  [facility]  4/2/2018 11:27 AM  [phone]    "

## 2018-04-02 NOTE — TELEPHONE ENCOUNTER
"Requested Prescriptions   Pending Prescriptions Disp Refills     potassium chloride (MICRO-K) 10 MEQ CR capsule [Pharmacy Med Name: POTASSIUM CL 10MEQ ER CAPSULES] 30 capsule 0     Sig: TAKE 2 CAPSULES BY MOUTH DAILY    Potassium Supplements Protocol Failed    4/2/2018 12:07 PM       Failed - Normal serum potassium in past 12 months    Recent Labs   Lab Test  12/14/17   0502   POTASSIUM  3.3*                   Passed - Recent (12 mo) or future (30 days) visit within the authorizing provider's specialty    Patient had office visit in the last 12 months or has a visit in the next 30 days with authorizing provider or within the authorizing provider's specialty.  See \"Patient Info\" tab in inbasket, or \"Choose Columns\" in Meds & Orders section of the refill encounter.           Passed - Patient is age 18 or older         potassium chloride (MICRO-K) 10 MEQ CR capsule (Discontinued)      Just Written Prescription Date:  10/12/18  Last Fill Quantity: ?,   # refills: ?  Last Office Visit: 3/12/18  Future Office visit:       Routing refill request to provider for review/approval because:  Drug not active on patient's medication list    "

## 2018-04-02 NOTE — PROGRESS NOTES
I believe Dr. Smith will have to addend the office visit to reflect the patients need of Home care, SNV, PT, OT, speech and HHA services. The orders will also have to be placed and signed. Please fax orders to [phone].

## 2018-04-03 NOTE — PROGRESS NOTES
Clinic Care Coordination Contact  Care Team Conversations    [facility] spoke with Freedom, Home care nurse from [facility]. She opened pt up to home care services, PT, OT, SNV, and HHA earlier today. Freedom had further questions about pt and steps taken prior to this home care involvement. [facility] had let the agency know of the Adult Protection report when I made the referral, this nurse did not know about it and stated she filed another vulnerable adult report today. Freedom stated pt could barely hold the pen to sign, she was too weak to do an inhaler or nebulizer herself. Pt allegedly sits in her chair and smokes. Pt was unable to say what medications she was on. Freedom was concerned about pt safety at home so she did do a report. [facility] explained that referrals were sent out to nursing homes for short-term rehab stay for pt but were declined at multiple places and that after adult protection visited and stated pt was ok at home that is when other referrals to home care were placed again.     Freedom said [facility] will open pt to services and get a schedule set up for regular visits. She did suggest that it might not be a bad idea to go into the hospital, as she feels pt needs more care, services. Freedom stated pt was crying and really doesn't want to go to the hospital. She wants to try home care. Freedom is hoping to establish rapport with pt and build some trust.     Plan: [facility] will attempt to spread out services so a discipline is there everyday. Friend Phu plans to continue checking in and helping as well. [facility] faxed further notes regarding pt medical care for continued coordination of care to Freedom. [facility] to continue to assist.     SHANIKA [facility] Care Coordinator  [facility]  4/3/2018 4:06 PM  [phone]

## 2018-04-05 NOTE — TELEPHONE ENCOUNTER
Reason for Call:  Other Medication list     Detailed comments: Luz from [facility] called and states that she still doesn't have all of the medications nor does she have a current medication list for Alexia. She said that she plans to go to her home to set up the medication tomorrow but needs the list and the medication. The patient told her she has been out of her medication for about a week now and if Luz doesn't hear back from Dr. Smith before going out there tomorrow she won't have medication for another three days. A medication list can be faxed to [phone] Attn: Luz. Please advise.     Phone Number Patient can be reached at: Luz [phone]    Best Time: any     Can we leave a detailed message on this number? YES    Call taken on 4/5/2018 at 3:19 PM by Sedrick Navarrete

## 2018-04-06 NOTE — TELEPHONE ENCOUNTER
Freedom calling to state that she has not received the medication list for this patient.  She will be out on visits for the rest of the day and can be reached at [phone].  The fax number for [facility] is [phone].      Thank You,  Berta Wilson  Patient Representative, [facility]

## 2018-04-06 NOTE — TELEPHONE ENCOUNTER
Freedom returns call to Nata, Nata is unavailable.   Freedom states she will be unavailable to reach today and asks if Nata would please fax a current med list for Alexia to [facility] at #[phone].

## 2018-04-09 NOTE — PROGRESS NOTES
Clinic Care Coordination Contact  Care Team Conversations    Maddy Ocampo from [facility] had left a message on voice mail of MIKE West who is out of office today.  Shayy Sanchez called back.      Maddy was wondering if CCSW got involved through the hospital, county or clinic.  She also wanted to know if CCSw helps pt obtain a PCA and reviewed role of CC with Maddy.  Reviewed that generally we will educate pt on the steps of working with the county or their insurance to get the assessment for the PCA.  CC will help if needed to initiate those calls and assessments.  Maddy voiced understanding and had no further questions.      Plan:  Will route to Tiarra as an fyi and for her to follow up as she feels necessary.     SHANIKA Slater, Clinic Care Coordinator 4/9/2018   2:12 PM  [phone]

## 2018-04-10 NOTE — TELEPHONE ENCOUNTER
I received a fax that patient was in [facility] for COPD and needs to f/u with Dr. Smith.  I have the office notes on my desk if you want to look at them.

## 2018-04-13 NOTE — PROGRESS NOTES
Clinic Care Coordination Contact  Care Team Conversations    [facility] spoke with Amauri Lilly [facility] Adult Protection worker. She and Amauri Moses [facility]  have been working with pt. Pt was at [facility] from 4/6-4/8 with COPD. She discharged herself from the hospital on 4/8/18. Recommendation was for pt to go to short-term rehab and she refused. Pt is back at home and [facility] is continuing to provide home care services for pt. Pt had the [place] assessment is approved for 10.5 hours of PCA services/day. Maddy has arranged for [employer] to do PCA services for pt. and they are able to start May 1st when pt gets on straight MA. Pt is on MNSure MA and needs to be on straight MA in order to get PCA services.     Plan: Pt has follow up appointment with PCP on 4/18/18. [facility] to try to meet pt face to face that day. Pt will continue with home care services with [facility] and will start PCA services with [facility] on May 1st.     [facility]ANIKA [facility] Care Coordinator  [facility]  4/13/2018 9:03 AM  [phone]

## 2018-04-20 NOTE — PROGRESS NOTES
Clinic Care Coordination Contact  Care Team Conversations    MIKE JIMENEZ received voicemail from [facility] care coordinator for pt to just give MIKE JIMENEZ an update. Priti states that [facility] is continuing to work with pt at home for home care services and thus far pt has been agreeable. Pt was approved for Elderly waiver and should have a  assigned to her through that as well. Priti has been working with [employer] [facility] for the [facility] services for pt and they will be able to start working with pt soon. [employer] contact number is [phone].     Plan: MIKE JIMENEZ to do outreach to pt in approximately 3-4 weeks to see how she is doing.     [facility] Care Coordinator  [facility]  4/20/2018 4:10 PM  [phone]

## 2018-04-26 NOTE — PROGRESS NOTES
SUBJECTIVE:   Alexia Flor is a 62 year old female who presents to clinic today for the following health issues:    Alexia also needs a referral to [facility] for a f/u on her tongue cancer. She does not want to [place] the [place]. Patient has questions of wether or not she's taking her furosemide or not. She would like that refilled along with her anxiety medications.       Hospital Follow-up Visit:    Hospital/Nursing Home/ Rehab Facility: [facility]  Date of Admission: 04/07/2018  Date of Discharge: 04/08/2018  Reason(s) for Admission: COPD exacerbation             Problems taking medications regularly:  None       Medication changes since discharge: None       Problems adhering to non-medication therapy:  None    Summary of hospitalization:  [facility] discharge summary reviewed  CareEverywhere information obtained and reviewed  Diagnostic Tests/Treatments reviewed.  Follow up needed: Home care and oncology  Other Healthcare Providers Involved in Patient s Care:         Homecare  Update since discharge: stable.     Post Discharge Medication Reconciliation: discharge medications reconciled, continue medications without change.  Plan of care communicated with patient and friend     Coding guidelines for this visit:  Type of Medical   Decision Making Face-to-Face Visit       within 7 Days of discharge Face-to-Face Visit        within 14 days of discharge   Moderate Complexity [id number]   High Complexity [id number]                  Problem list and histories reviewed & adjusted, as indicated.  Additional history:         Reviewed and updated as needed this visit by clinical staff       Reviewed and updated as needed this visit by Provider        SUBJECTIVE:  Alexia  is a 62 year old female who presents for: Follow-up from her most recent hospitalization over at [facility] for a COPD exacerbation.  She has not been seen for follow-up on her tongue cancer.  She is back living at home.  Were getting  home care involved.  She is very vulnerable.  Got her down to very few medications.  States she is not drinking.  She needs some lab work done.  Need to get her set up for follow-up with oncology.  [place] cities[place]    Past Medical History:   Diagnosis Date     Alcohol abuse      Alcoholic hepatitis      Alcoholic pancreatitis      Anxiety      Schafer's esophagus      Breast cancer (H)     rt, s/p radiation.     Chemical dependency (H)      Congestive heart failure, unspecified      COPD (chronic obstructive pulmonary disease) (H)      Depressive disorder      History of radiation therapy      Hoarseness      Hypokalemia      Macrocytic anemia      Non-adherence to medical treatment      Tobacco abuse      Tongue cancer (H)      Past Surgical History:   Procedure Laterality Date     DISSECTION RADICAL NECK Left 11/21/2017    Procedure: DISSECTION RADICAL NECK;;  Surgeon: Heriberto George MD;  [facility]     ESOPHAGOSCOPY, GASTROSCOPY, DUODENOSCOPY (EGD), COMBINED N/A 11/9/2017    Procedure: COMBINED ENDOSCOPIC ULTRASOUND, ESOPHAGOSCOPY, GASTROSCOPY, DUODENOSCOPY (EGD), FINE NEEDLE ASPIRATE/BIOPSY;;  Surgeon: Yo Bhat MD;  Location: [facility] OR     EXPLORE NECK Left 11/23/2017    Procedure: EXPLORE NECK;  left Neck Exploration, tracheostomy , placement of nasogastric feeding tube;  Surgeon: Daisy Singh MD;  Location: [facility] OR     EXPLORE NECK Left 12/5/2017    Procedure: EXPLORE NECK;  [place] Left Neck Hematoma , excision of skin   ;  Surgeon: Stella Hernadez MD;  Location: [facility] OR     GLOSSECTOMY PARTIAL N/A 11/21/2017    Procedure: GLOSSECTOMY PARTIAL;  Left Partial Glossectomy And Left Neck Dissection, ;  Surgeon: Heriberto George MD;  [facility]: [facility]     LARYNGOSCOPY N/A 12/12/2017    Procedure: LARYNGOSCOPY;  Direct Laryngoscopy, oral cavity exporation cauterization, flexible bronschoscopy, trach exploration  ;  Surgeon: Heriberto George MD;   [facility]     LARYNGOSCOPY WITH BIOPSY(IES) N/A 11/9/2017    Procedure: LARYNGOSCOPY WITH BIOPSY(IES);  Direct Laryngoscopy,  Upper Endoscopic Ultrasound and Fine Needle Aspiration;  Surgeon: Heriberto George MD;  Location: [facility] OR     lumpectomy Rt breast  2006       TRACHEOSTOMY  11/23/2017    Procedure: TRACHEOSTOMY;;  Surgeon: Daisy Singh MD;  Location: [facility] OR     Social History   Substance Use Topics     Smoking status: Heavy Tobacco Smoker     Packs/day: 0.50     Years: 40.00     Types: Cigarettes     Smokeless tobacco: Current User     Alcohol use 0.5 oz/week      Comment: 1/2 liter per day     Current Outpatient Prescriptions   Medication Sig Dispense Refill     DULoxetine (CYMBALTA) 30 MG EC capsule Take 30 mg by mouth daily       famotidine (PEPCID) 20 MG tablet Take 20 mg by mouth 2 times daily       folic acid (FOLVITE) 400 MCG tablet TAKE 2 AND 1/2 TABLETS(1 MG) BY MOUTH DAILY 75 tablet 1     hydrOXYzine (VISTARIL) 25 MG capsule Take 1 capsule (25 mg) by mouth 3 times daily as needed for itching 90 capsule 1     ipratropium - albuterol 0.5 mg/2.5 mg/3 mL (DUONEB) 0.5-2.5 (3) MG/3ML neb solution Take 1 vial (3 mLs) by nebulization 4 times daily 360 mL      metoprolol tartrate (LOPRESSOR) 50 MG tablet Take 1 tablet (50 mg) by mouth daily 90 tablet 3     multivitamins with minerals (CERTAVITE/CEROVITE) LIQD liquid 15 mLs by Per Feeding Tube route daily 1 Bottle 3     omeprazole (PRILOSEC) 40 MG capsule TAKE 1 CAPSULE(40 MG) BY MOUTH DAILY 30 capsule 1     acetaminophen (TYLENOL) 325 MG tablet 2 tablets (650 mg) by Oral or Feeding Tube route every 8 hours as needed for mild pain (Patient not taking: Reported on 3/12/2018) 100 tablet      albuterol (2.5 MG/3ML) 0.083% neb solution Take 1 vial (2.5 mg) by nebulization every 4 hours as needed for wheezing (Patient not taking: Reported on 3/12/2018) 360 mL      albuterol (PROAIR HFA/PROVENTIL HFA/VENTOLIN HFA) 108 (90 BASE) MCG/ACT Inhaler  "Inhale 2 puffs into the lungs 4 times daily as needed for other (dyspnea)       cyanocobalamin 1000 MCG TABS 1,000 mcg by Oral or NG Tube route daily (Patient not taking: Reported on 3/12/2018) 30 tablet      fiber modular (NUTRISOURCE FIBER) packet 3 packets by Per Feeding Tube route 3 times daily (Patient not taking: Reported on 3/12/2018)       folic acid (FOLVITE) 1 MG tablet 1 tablet (1 mg) by Oral or Feeding Tube route daily 30 tablet      furosemide (LASIX) 20 MG tablet   10     melatonin 5 MG tablet 1 tablet (5 mg) by Oral or Feeding Tube route every evening (Patient not taking: Reported on 3/12/2018)       pantoprazole (PROTONIX) SUSP suspension 20 mLs (40 mg) by Oral or Feeding Tube route 2 times daily (before meals) (Patient not taking: Reported on 3/12/2018)       thiamine (VITAMIN B-1) 100 MG tablet 1 tablet (100 mg) by Oral or Feeding Tube route daily (Patient not taking: Reported on 3/12/2018)         REVIEW OF SYSTEMS:   5 point ROS negative except as noted above in HPI, including Gen., Resp, CV, GI &  system review.     OBJECTIVE:  Vitals: /58 (BP Location: Right arm, Patient Position: Chair, Cuff Size: Adult Regular)  Pulse 104  Temp 98  F (36.7  C) (Temporal)  Ht 5' 1.02\" (1.55 m)  Wt 86 lb 14.4 oz (39.4 kg)  SpO2 97%  BMI 16.41 kg/m2  BMI= Body mass index is 16.41 kg/(m^2).  She is alert.  Frail and weak appearing.  Throat is clear.  Neck supple scarring noted.  Do not feel any new nodes.  Lungs are generally clear.  Heart regular rhythm.  Abdomen thin no masses.  Extremities with no edema.  Very thin.  It is okay.    ASSESSMENT:  #1 malignant neoplasm at the base the tongue #2 alcoholism with electrolyte imbalance #3 anxiety #4 depression    PLAN:  We will get a discontinue her Lasix she is out of that.  Offer Vistaril for anxiety.  At one point she was on Cymbalta and may need something more.  Getting her set up with home care.  Get some labs and will call them with results.  She " needs to continue iron in her diet.  We will set her up with oncology in [place] at [facility]        Karan Smith MD  [facility]

## 2018-04-26 NOTE — TELEPHONE ENCOUNTER
Reason for Call: Request for an order or referral: Referral    Order or referral being requested: Referral to [facility] for f/u of her tongue cancer.  Alexia does not want to drive to .  Can this possibly be done while she is here for her appt today with Dr Smith.  Alexia will need to sign a release of information to get her records transferred to Cancer Center as well.    DrsSusan Accepting new pts at [facility]:  Dr Parmar, Dr Rivera and Dr Alejandre    Date needed: as soon as possible    Has the patient been seen by the PCP for this problem? Not Applicable    Additional comments: Lizabeth states that Alexia has finally agreed to do this follow up so they are hoping to get her set up as soon as possible    Phone number Patient can be reached at:  [phone]  - If you have any questions you can call her back    Best Time:  any    Can we leave a detailed message on this number?  YES    Call taken on 4/26/2018 at 11:13 AM by Alessia Bullard

## 2018-04-26 NOTE — NURSING NOTE
"Chief Complaint   Patient presents with     Hospital F/U     COPD exacerbation        Initial /58 (BP Location: Right arm, Patient Position: Chair, Cuff Size: Adult Regular)  Pulse 104  Temp 98  F (36.7  C) (Temporal)  Ht 5' 1.02\" (1.55 m)  Wt 86 lb 14.4 oz (39.4 kg)  SpO2 97%  BMI 16.41 kg/m2 Estimated body mass index is 16.41 kg/(m^2) as calculated from the following:    Height as of this encounter: 5' 1.02\" (1.55 m).    Weight as of this encounter: 86 lb 14.4 oz (39.4 kg).  Medication Reconciliation: complete     "

## 2018-04-26 NOTE — MR AVS SNAPSHOT
"              After Visit Summary   2018    Alexia Anthony    MRN: [id number]           Patient Information     Date Of Birth          1955        Visit Information        Provider Department      2018 2:00 PM Karan Smith MD [facility]        Today's Diagnoses     Malignant neoplasm of base of tongue (H) squamous cell per biopsy    -  1    Electrolyte imbalance        Anxiety        Episode of recurrent major depressive disorder, unspecified depression episode severity (H)           Follow-ups after your visit        Who to contact     If you have questions or need follow up information about today's clinic visit or your schedule please contact [facility] directly at [phone].  Normal or non-critical lab and imaging results will be communicated to you by MyChart, letter or phone within 4 business days after the clinic has received the results. If you do not hear from us within 7 days, please contact the clinic through MyChart or phone. If you have a critical or abnormal lab result, we will notify you by phone as soon as possible.  Submit refill requests through Urge or call your pharmacy and they will forward the refill request to us. Please allow 3 business days for your refill to be completed.          Additional Information About Your Visit        MyChart Information     Urge lets you send messages to your doctor, view your test results, renew your prescriptions, schedule appointments and more. To sign up, go to [email or web address] . Click on \"Log in\" on the left side of the screen, which will take you to the Welcome page. Then click on \"Sign up Now\" on the right side of the page.     You will be asked to enter the access code listed below, as well as some personal information. Please follow the directions to create your username and password.     Your access code is: [email or web address]  Expires: 6/3/2018 10:26 PM     Your access code will  in 90 " "days. If you need help or a new code, please call your [facility] clinic or [phone].        Care EveryWhere ID     This is your Care EveryWhere ID. This could be used by other organizations to access your [facility] medical records  [id number]        Your Vitals Were     Pulse Temperature Height Pulse Oximetry BMI (Body Mass Index)       104 98  F (36.7  C) (Temporal) 5' 1.02\" (1.55 m) 97% 16.41 kg/m2        Blood Pressure from Last 3 Encounters:   04/26/18 104/58   03/12/18 94/58   12/14/17 151/72    Weight from Last 3 Encounters:   04/26/18 86 lb 14.4 oz (39.4 kg)   03/12/18 75 lb (34 kg)   03/12/18 75 lb 4.8 oz (34.2 kg)              We Performed the Following     Basic metabolic panel  (Ca, Cl, CO2, Creat, Gluc, K, Na, BUN)     CBC with platelets differential          Today's Medication Changes          These changes are accurate as of 4/26/18 11:59 PM.  If you have any questions, ask your nurse or doctor.               Start taking these medicines.        Dose/Directions    hydrOXYzine 25 MG capsule   Commonly known as:  VISTARIL   Used for:  Anxiety   Started by:  Karan Smith MD        Dose:  25 mg   Take 1 capsule (25 mg) by mouth 3 times daily as needed for itching   Quantity:  90 capsule   Refills:  1            Where to get your medicines      These medications were sent to [id number] - [facility] [address] AT [address] [place]  [address], [facility] [address]     Phone:  [phone]     hydrOXYzine 25 MG capsule                Primary Care Provider Office Phone # Fax #    Karan Smith [phone]       [address] [facility] [address]        Goals        General    Being Active (pt-stated)     Notes - Note edited  3/30/2018  3:14 PM by Tiarra Smith LSW    I will start home care physical therapy in my apartment.       Supportive steps: [facility] is working with [facility]  in locating a home care agency to " work with pt in the home.     Advised pt and friend Phu to take pt into the ED if needing further care or having medical issues.          Equal Access to Services     BA GRAY : Polina Velasco, maria esther cobosaishaha, luanajin gagnonnelson maria del carmengeraken, waxjovita dbin hayaanae davalosemeritashagufta harris. So [facility] [phone].    ATENCIÓN: Si habla español, tiene a savage disposición servicios gratuitos de asistencia lingüística. Llame al [phone].    We comply with applicable federal civil rights laws and [place] laws. We do not discriminate on the basis of race, color, national origin, age, disability, sex, sexual orientation, or gender identity.            Thank you!     Thank you for choosing [facility]  for your care. Our goal is always to provide you with excellent care. Hearing back from our patients is one way we can continue to improve our services. Please take a few minutes to complete the written survey that you may receive in the mail after your visit with us. Thank you!             Your Updated Medication List - Protect others around you: Learn how to safely use, store and throw away your medicines at www.disposemymeds.org.          This list is accurate as of 4/26/18 11:59 PM.  Always use your most recent med list.                   Brand Name Dispense Instructions for use Diagnosis    acetaminophen 325 MG tablet    TYLENOL    100 tablet    2 tablets (650 mg) by Oral or Feeding Tube route every 8 hours as needed for mild pain    Acute post-operative pain       * albuterol (2.5 MG/3ML) 0.083% neb solution     360 mL    Take 1 vial (2.5 mg) by nebulization every 4 hours as needed for wheezing    Wheezing       * albuterol 108 (90 Base) MCG/ACT Inhaler    PROAIR HFA/PROVENTIL HFA/VENTOLIN HFA     Inhale 2 puffs into the lungs 4 times daily as needed for other (dyspnea)    Wheezing       cyanocobalamin 1000 MCG Tabs     30 tablet    1,000 mcg by Oral or NG Tube route daily    Macrocytic anemia        DULoxetine 30 MG EC capsule    CYMBALTA     Take 30 mg by mouth daily        famotidine 20 MG tablet    PEPCID     Take 20 mg by mouth 2 times daily        fiber modular packet      3 packets by Per Feeding Tube route 3 times daily    Nutritional deficiency       * folic acid 1 MG tablet    FOLVITE    30 tablet    1 tablet (1 mg) by Oral or Feeding Tube route daily    Macrocytic anemia       * folic acid 400 MCG tablet    FOLVITE    75 tablet    TAKE 2 AND 1/2 TABLETS(1 MG) BY MOUTH DAILY    Alcoholism /alcohol abuse (H)       furosemide 20 MG tablet    LASIX          hydrOXYzine 25 MG capsule    VISTARIL    90 capsule    Take 1 capsule (25 mg) by mouth 3 times daily as needed for itching    Anxiety       ipratropium - albuterol 0.5 mg/2.5 mg/3 mL 0.5-2.5 (3) MG/3ML neb solution    DUONEB    360 mL    Take 1 vial (3 mLs) by nebulization 4 times daily    Wheezing       melatonin 5 MG tablet      1 tablet (5 mg) by Oral or Feeding Tube route every evening    Insomnia, unspecified type       metoprolol tartrate 50 MG tablet    LOPRESSOR    90 tablet    Take 1 tablet (50 mg) by mouth daily    Benign hypertension       multivitamins with minerals Liqd liquid     1 Bottle    15 mLs by Per Feeding Tube route daily    Nutritional deficiency       omeprazole 40 MG capsule    priLOSEC    30 capsule    TAKE 1 CAPSULE(40 MG) BY MOUTH DAILY    Gastroesophageal reflux disease without esophagitis       pantoprazole Susp suspension    PROTONIX     20 mLs (40 mg) by Oral or Feeding Tube route 2 times daily (before meals)    Gastroesophageal reflux disease without esophagitis       thiamine 100 MG tablet      1 tablet (100 mg) by Oral or Feeding Tube route daily    Nutritional deficiency       * Notice:  This list has 4 medication(s) that are the same as other medications prescribed for you. Read the directions carefully, and ask your doctor or other care provider to review them with you.

## 2018-04-27 NOTE — TELEPHONE ENCOUNTER
Forms received from [facility] on 04/27/18.  Forms with RN to review medications.  Orders pended for provider to sign.    Forms given to Kristyn for PCP signature.

## 2018-05-03 NOTE — TELEPHONE ENCOUNTER
----- Message from Karan Smith MD sent at 5/3/2018  1:33 PM CDT -----  Potassium was normal.  You should not need potassium supplement or Lasix.  Your hemoglobin was low at 9.9.  Make sure you are getting enough iron in your diet.  Should recheck that in several weeks.

## 2018-05-03 NOTE — LETTER
May 4, 2018      Alexia Anthony  [address]   [place] [address]        Dear ,    We are writing to inform you of your test results.    Potassium was normal.  You should not need potassium supplement or Lasix.  Your hemoglobin was low at 9.9.  Make sure you are getting enough iron in your diet.  Should recheck that in several weeks.    Resulted Orders   Basic metabolic panel  (Ca, Cl, CO2, Creat, Gluc, K, Na, BUN)   Result Value Ref Range    Sodium 144 133 - 144 mmol/L    Potassium 4.5 3.4 - 5.3 mmol/L    Chloride 111 (H) 94 - 109 mmol/L    Carbon Dioxide 24 20 - 32 mmol/L    Anion Gap 9 3 - 14 mmol/L    Glucose 90 70 - 99 mg/dL    Urea Nitrogen 21 7 - 30 mg/dL    Creatinine 0.93 0.52 - 1.04 mg/dL    GFR Estimate 61 >60 mL/min/1.7m2      Comment:      Non  GFR Calc    GFR Estimate If Black 73 >60 mL/min/1.7m2      Comment:       GFR Calc    Calcium 8.6 8.5 - 10.1 mg/dL   CBC with platelets differential   Result Value Ref Range    WBC 10.1 4.0 - 11.0 10e9/L    RBC Count 2.65 (L) 3.8 - 5.2 10e12/L    Hemoglobin 9.9 (L) 11.7 - 15.7 g/dL    Hematocrit 30.6 (L) 35.0 - 47.0 %     (H) 78 - 100 fl    MCH 37.4 (H) 26.5 - 33.0 pg    MCHC 32.4 31.5 - 36.5 g/dL    RDW 15.8 (H) 10.0 - 15.0 %    Platelet Count 460 (H) 150 - 450 10e9/L    Diff Method Automated Method     % Neutrophils 74.5 %    % Lymphocytes 12.9 %    % Monocytes 9.2 %    % Eosinophils 0.7 %    % Basophils 0.5 %    % Immature Granulocytes 2.2 %    Absolute Neutrophil 7.6 1.6 - 8.3 10e9/L    Absolute Lymphocytes 1.3 0.8 - 5.3 10e9/L    Absolute Monocytes 0.9 0.0 - 1.3 10e9/L    Absolute Eosinophils 0.1 0.0 - 0.7 10e9/L    Absolute Basophils 0.1 0.0 - 0.2 10e9/L    Abs Immature Granulocytes 0.2 0 - 0.4 10e9/L       If you have any questions or concerns, please call the clinic at the number listed above.       Sincerely,        Karan Smith MD

## 2018-05-03 NOTE — PROGRESS NOTES
Potassium was normal.  You should not need potassium supplement or Lasix.  Your hemoglobin was low at 9.9.  Make sure you are getting enough iron in your diet.  Should recheck that in several weeks.

## 2018-05-09 NOTE — PROGRESS NOTES
"Clinic Care Coordination Contact  Care Team Conversations    MIKE JIMENEZ received phone call from Fe  with [facility]. Fe was wanting to check if there was any mention of pt needing to switch PCP. MIKE JIMENEZ told Fe pt had mentioned maybe seeing someone closer to where she lives but PCP has not said he would not see pt. Pt did recently have an appointment on 4/26/18 and no mention in notes of any concerns. Pt apparently is not able to see a [facility] physician but may want to look for a provider closer to [facility].     [facility] is continuing to provide home care services but will be done in a couple weeks. Fe said pt has been exhibiting some behaviors toward home care staff and her progress is starting to plateau with therapies. Pt was talking with Fe and was calm and then the next minute she was very upset and getting aggressive. Pt also has PCA services from [facility] 7 days/week from 11-7pm. Pt likes the PCA staff.  Pt is going to be switching to [facility] in [place] because they can do bubble packs to make it easier for pt and they deliver.     Plan: MIKE JIMENEZ will continue to assist pt as needed. MIKE JIMENEZ changed pharmacy preference for pt.     [facility] Care Coordinator  [facility]  5/9/2018 11:38 AM  [phone]      "

## 2018-05-17 NOTE — TELEPHONE ENCOUNTER
Forms received from  Home Care on 05/17/18.  Forms with RN to review medications.  Orders pended for provider to sign.    Forms given to Kristyn for PCP signature 05/18/2018.

## 2018-06-12 NOTE — TELEPHONE ENCOUNTER
"Requested Prescriptions   Pending Prescriptions Disp Refills     omeprazole (PRILOSEC) 40 MG capsule 90 capsule 1     Sig: TAKE 1 CAPSULE(40 MG) BY MOUTH DAILY    PPI Protocol Passed    6/12/2018 11:40 AM       Passed - Not on Clopidogrel (unless Pantoprazole ordered)       Passed - No diagnosis of osteoporosis on record       Passed - Recent (12 mo) or future (30 days) visit within the authorizing provider's specialty    Patient had office visit in the last 12 months or has a visit in the next 30 days with authorizing provider or within the authorizing provider's specialty.  See \"Patient Info\" tab in inbasket, or \"Choose Columns\" in Meds & Orders section of the refill encounter.           Passed - Patient is age 18 or older       Passed - No active pregnacy on record       Passed - No positive pregnancy test in past 12 months          Last Written Prescription Date:  4/5/18  Last Fill Quantity: 30,  # refills: 1   Last Office Visit with [facility], [facility] or [facility] prescribing provider:  4/26/18   Future Office Visit:       "

## 2018-06-12 NOTE — TELEPHONE ENCOUNTER
Prescription approved per [facility] Refill Protocol.  Kristyn Smith RN

## 2018-06-14 NOTE — PROGRESS NOTES
Clinic Care Coordination Contact  [facility]/Voicemail    Referral Source: Care Team  Clinical Data: Care Coordinator Outreach  Outreach attempted x 1.  Left message on voicemail with call back information and requested return call.  Plan: Care Coordinator mailed out care coordination introduction letter on 3/20/18. Care Coordinator will try to reach patient again in 3-5 business days.    [facility] Care Coordinator  [facility]  6/14/2018 9:25 AM  [phone]

## 2018-06-15 NOTE — PROGRESS NOTES
Clinic Care Coordination Contact    Clinic Care Coordination Contact  OUTREACH    Referral Information:  Referral Source: Care Team    Primary Diagnosis: Psychosocial    Chief Complaint   Patient presents with     Clinic Care Coordination - Follow-up             Universal Utilization: Pt states she is tired of all the appointments. Been seeing some doctors at [facility].   Clinic Utilization  Difficulty keeping appointments:: No  Utilization    Last refreshed: 6/14/2018  9:36 AM:  No Show Count (past year) 3       Last refreshed: 6/14/2018  9:36 AM:  ED visits 0       Last refreshed: 6/14/2018  9:36 AM:  Hospital admissions 3          Current as of: 6/14/2018  9:36 AM             Clinical Concerns: Pt sounded like she is feeling much better, stronger than when MIKE CC has spoken to her in the past. She said she's been getting around fine with no walker or cane and is feeling pretty good.   Current Medical Concerns:    Patient Active Problem List   Diagnosis     Liver function abnormality     CARDIOVASCULAR SCREENING; LDL GOAL LESS THAN 130     Cachexia (HCC)     Anxiety     Alcoholic fatty liver     Alcoholism (H)     Arteriosclerotic vascular disease     Cholelithiasis     Diastolic dysfunction     Electrolyte imbalance     Macrocytic anemia     Major depressive disorder, recurrent episode (H)     Tubular adenoma     Cobalamin deficiency     Iron deficiency anemia due to chronic blood loss     Personal history of malignant neoplasm of breast     Health Care Home     Hypomagnesemia     Alcohol-induced acute pancreatitis, unspecified complication status     Pancreatitis     Colitis     Hypokalemia     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 She does have a PCA and  sounds like she likes them and it is going well. They come every day during the week so 5 days/week and assist her around home as well as transportation to appointments and shopping needs.     Education Provided to patient: spend some time outside on nice days to help mood   Pain  Chronic pain (GOAL):: No  Health Maintenance Reviewed: Not assessed  Clinical Pathway: None    Medication Management:  Pt is on Cymbalta, anti-depressant     Functional Status:  Dependent ADLs::    Dependent IADLs:: Cleaning, Cooking, Laundry, Shopping, Meal Preparation, Medication Management, Transportation  Bed or wheelchair confined:: No  Mobility Status: Independent     Living Situation: Pt is doing fine independently in her apartment, with assistance from PCAs  Current living arrangement:: I live alone (pt lives in apt)  Type of residence:: Apartment    Diet/Exercise/Sleep:  Inadequate nutrition (GOAL):: No  Food Insecurity: No- pt received meals on wheels, but doesn't care for the food she said  Tube Feeding: No  Exercise:: Currently not exercising  Inadequate activity/exercise (GOAL):: No   Significant changes in sleep pattern (GOAL): No    Transportation:  Transportation concerns (GOAL):: No  Transportation means:: Friend, Other (PCA bring to appointments)     Psychosocial:  Scientology or spiritual beliefs that impact treatment:: No  Mental health DX:: Yes  Mental health DX how managed:: Medication  Mental health management concern (GOAL):: No  Informal Support system:: Friends, Family     Financial/Insurance:   Financial/Insurance concerns (GOAL):: No  [facility]     Resources and Interventions:  Current Resources:    ;   Community Resources: PCA, Meals on Wheels     Equipment Currently Used at Home: none     Referrals Placed:      Goals:   Goals        General    Being Active (pt-stated)     Notes - Note edited  6/15/2018 11:34 AM by Tiarra Smith LSW    I will start home care physical therapy in my apartment.        Supportive steps: [facility] is working with [facility]  in locating a home care agency to work with pt in the home.     Advised pt and friend Phu to take pt into the ED if needing further care or having medical issues.      6/15/18- pt worked with [facility] and is done with home care services now. Has PCA services 5 days/week. Doing well.               Patient/Caregiver understanding: yes    Outreach Frequency: monthly      Plan: Pt would like MIKE  to keep checking in to make sure she is doing ok.  MIKE CC will continue to do monthly outreaches. Pt will contact  CC sooner if needs arise.     [facility] Care Coordinator  [facility]  6/15/2018 11:42 AM  [phone]

## 2018-07-23 NOTE — PROGRESS NOTES
Clinic Care Coordination Contact    Clinic Care Coordination Contact  OUTREACH    Referral Information:  Referral Source: Care Team    Primary Diagnosis: Psychosocial    Chief Complaint   Patient presents with     Clinic Care Coordination - Follow-up             [place] Utilization:   Clinic Utilization  Difficulty keeping appointments:: No  Utilization    Last refreshed: 7/17/2018  5:22 PM:  No Show Count (past year) 3       Last refreshed: 7/17/2018  5:22 PM:  ED visits 0       Last refreshed: 7/17/2018  5:22 PM:  Hospital admissions 3          Current as of: 7/17/2018  5:22 PM           Clinical Concerns:  Patient Active Problem List   Diagnosis     Liver function abnormality     CARDIOVASCULAR SCREENING; LDL GOAL LESS THAN 130     Cachexia (HCC)     Anxiety     Alcoholic fatty liver     Alcoholism (H)     Arteriosclerotic vascular disease     Cholelithiasis     Diastolic dysfunction     Electrolyte imbalance     Macrocytic anemia     Major depressive disorder, recurrent episode (H)     Tubular adenoma     Cobalamin deficiency     Iron deficiency anemia due to chronic blood loss     Personal history of malignant neoplasm of breast     Health Care Home     Hypomagnesemia     Alcohol-induced acute pancreatitis, unspecified complication status     Pancreatitis     Colitis     Hypokalemia     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 Pt was sleeping. Amada states pt is doing well. She has an RN Coordinator with [facility] who recently came and did a Advanced Directive with pt.      Current Behavioral Concerns: Amada said pt seems to be adjusting pretty well to having PCA services. Services are now 7 days/week.     Education Provided to patient: n/a   Pain  Chronic pain (GOAL):: No  Health  Maintenance Reviewed: Not assessed  Clinical Pathway: None    Medication Management:  Not discussed. Pt changing providers so will no longer be seeing PCP in [place]. Pt lives in [place] and will be going to a physician at [facility], Dr. Hermes Patricia. Pt has an appointment with him tomorrow. Amada said that pt is no longer planning to see PCP in [place].      Functional Status:  Dependent IADLs:: Cleaning, Cooking, Laundry, Shopping, Meal Preparation, Medication Management, Transportation  Bed or wheelchair confined:: No  Mobility Status: Independent    Living Situation:  Current living arrangement:: I live alone (pt lives in [facility] [place])  Type of residence:: Apartment    Diet/Exercise/Sleep:  Inadequate nutrition (GOAL):: No  Food Insecurity: No  Tube Feeding: No  Exercise:: Currently not exercising  Inadequate activity/exercise (GOAL):: No (pt is weak and needing rehab)  Significant changes in sleep pattern (GOAL): No    Transportation:  Transportation concerns (GOAL):: No  Transportation means:: Friend, Other (PCA bring to appointments)     Psychosocial:  Buddhism or spiritual beliefs that impact treatment:: No  Mental health DX:: Yes  Mental health DX how managed:: Medication  Mental health management concern (GOAL):: No  Informal Support system:: Friends, Family     Financial/Insurance:   Financial/Insurance concerns (GOAL):: No  [facility] MA     Resources and Interventions:  Current Resources:      Community Resources: PCA, Meals on Wheels     Equipment Currently Used at Home: none        Goals: goals met- 100% in getting services in home for her- goal was completed so didn't transfer to outreach note      Patient/Caregiver understanding: yes    Outreach Frequency: monthly      Plan: Will let Dr. Smith and care team know that pt [facility] and will no longer be coming to [place]. [facility] will do no further outreaches due to  pt no longer seeing PCP at  and pt has goals met and Care Coordination with her insurance as well as PCA services.     [facility] Care Coordinator  [facility]  7/23/2018 2:09 PM  [phone]

## 2018-11-05 ENCOUNTER — TELEPHONE (OUTPATIENT)
Dept: ONCOLOGY | Facility: CLINIC | Age: 63
End: 2018-11-05

## 2018-11-05 NOTE — TELEPHONE ENCOUNTER
Tobacco treatment team attempted to reach patient. Was told by Phu that the patient had passed 8/28/18.

## 2018-11-07 ENCOUNTER — CARE COORDINATION (OUTPATIENT)
Dept: HEALTH INFORMATION MANAGEMENT | Facility: CLINIC | Age: 63
End: 2018-11-07

## 2018-11-08 NOTE — PROGRESS NOTES
Received notice of patient death.  Date of Death  8/28/18  All appointments, orders and treatment plans cancelled.  Care TEAM and HIM notified

## 2019-04-15 NOTE — PLAN OF CARE
Problem: Pain, Acute (Adult)  Goal: Identify Related Risk Factors and Signs and Symptoms  Related risk factors and signs and symptoms are identified upon initiation of Human Response Clinical Practice Guideline (CPG).   Outcome: No Change  62 y.o. F s/p partial glossectomy, L neck dissection for invasive SCC of tongue.   Neuro: intermittently confused, periodically follows commands. Very restless.   CV: HR: 's, PACs noted. BP WDL.   Resp: LS coarse. Wet cough but ineffective to remove secretions. Increased O2 to 10 Lpm overnight.   GI: TF 20 mL/hr, next increase 1500 increase to 30 mL/hr. Goal rate 40 mL/hr. Free water 30 mL/q4hr.   : incontinent of urine.   Skin: L mouth and neck incisions bleeding periodically. x2 CAMACHO drains in L neck.   Plan: Monitor closely during ETOH withdrawal. SICU/ENT for concerns.        no dysuria, no frequency, and no hematuria.

## 2019-07-08 NOTE — PROGRESS NOTES
SUBJECTIVE:                                                    Alexia Flor is a 61 year old female who presents to clinic today for the following health issues:      Chief Complaint   Patient presents with     RECHECK             Problem list and histories reviewed & adjusted, as indicated.  Additional history: as documented        Reviewed and updated as needed this visit by clinical staff  Tobacco  Allergies  Meds       Reviewed and updated as needed this visit by Provider        SUBJECTIVE:  Alexia  is a 61 year old female who presents for:  Follow-up of her multiple issues most all related to excessive alcohol use. Her last visit was several weeks ago with one of my partners. Again alcohol abstinence was preached. It hasn't happened. She does not appear intoxicated today but her SO does. She states she is feeling overall better she is taking iron pills magnesium and potassium supplement. She needs her lipase check her magnesium and her hemoglobin she has been low. Denies any abdominal pain at this time.    Past Medical History:   Diagnosis Date     Alcohol abuse      Alcoholic hepatitis      Alcoholic pancreatitis      Anxiety      Schafer's esophagus      Breast cancer (H)     rt, s/p radiation.     Congestive heart failure, unspecified      COPD (chronic obstructive pulmonary disease) (H)      Hypokalemia      Macrocytic anemia      Non-adherence to medical treatment      Tobacco abuse      Past Surgical History:   Procedure Laterality Date     lumpectomy Rt breast  2006       Social History   Substance Use Topics     Smoking status: Current Every Day Smoker     Packs/day: 0.50     Years: 44.00     Types: Cigarettes     Smokeless tobacco: Never Used     Alcohol use 0.5 oz/week     1 Glasses of wine per week      Comment: weekends (reported; smelled of alcohol during clinic visit 4/2016)     Current Outpatient Prescriptions   Medication Sig Dispense Refill     chlordiazePOXIDE (LIBRIUM) 25 MG capsule Take  Chief Complaint   Patient presents with   • Follow-up     Referred by:  Andriy Cooley MD        Harley Fox is a 68 year old male who presents today for consultation to establish care. Other pertinent history includes Coronary artery disease, Peripheral vascular disease, atrial flutter, hypertension, dyslipidemia, tobacco use. Previous Dr. Bal patient.    Today patient presents to clinic with his wife to establish care, he previously was with Dr. Bal and states their communication relationship was poor. He reports history of pacemaker and ablation per Dr. Mary. He states he feels good, but does not have the energy he used to, but notes it could be due to 50 years of smoking. 1 year since he quit smoking. He also notes SOB on exertion. Cardiac cath (2011) with Dr. Holcomb. He has neuropathy in both feet, denies DM. Patient states that his PCP, Dr. 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 Patient recalls he has a subclavian stent, which was narrowed genetically, not d/t occlusion. Denies fatigue in upper extremities with activities. Patient notes his change in lipid levels are likely due to diet. Otherwise following medication regimen and has no other complaints at this time.        Patient Active Problem List   Diagnosis   • Atrial fibrillation On Flecainide -no recurrence - changed to amiodarone    • Atrial flutter S/P ablation 4/21/2011   • High risk medication - AMIODARONE    • Hypoalphalipoproteinemia   • Subclavian artery stenosis - post PTA and stent    • Kidney stones   • Calcium oxalate kidney stones   • HLD (hyperlipidemia)   • Tobacco use disorder   • History of bladder cancer   • Absence of disease - cath 2011    • PVD (peripheral vascular disease) (CMS/HCC)   • LBBB (left bundle branch block)   • Benign prostatic hyperplasia   • Essential hypertension   • Chronic combined systolic and diastolic CHF  (congestive heart failure) (CMS/HCC)   • Chronic systolic congestive heart failure, NYHA class 2 (CMS/HCC)   • Dilated cardiomyopathy (CMS/HCC)   • LV dysfunction     Medications: reviewed in EPIC.    ALLERGIES:   Allergen Reactions   • Talwin      Somnolence     • Cefazolin NAUSEA   • Erythromycin Base GI UPSET   • Niacin ERYTHEMA       Past Surgical History:   Procedure Laterality Date   • BLADDER SURGERY      laser bladder tumors   • CARDIOVERSION  01/18/2019    unsuccessful   • COLON SURGERY  04/25/2018    Dr. Moreno Laparoscopic right hemicolectomy for proximal transverse colon cancer    • COLONOSCOPY REMOVE LESION HOT BX OR CAUTERY  01/05/2010    polyps, hemorrhoids,f/u 1 yr,Dr Turcios   • COLONOSCOPY W/ BIOPSIES  03/22/2018   • COLONOSCOPY W/ POLYPECTOMY  04/16/2019    Multiple polyps removed, diverticulosis, suboptimal prep, recall in 1 year, otherwise normal, Dr. Luis A Spear   • CYSTOSCOPY  11/07/2018    with cytology   • CYSTOSCOPY W/ URETERAL STENT REMOVAL  05/30/2018   • CYSTOSCOPY,URETEROSC,BIOPSY  02/28/2018    flex. cystoscopy   • CYSTOSCOPY,URETEROSCOPY,LITHOTRIPSY Left 05/23/2018    with stent placement   • EP ABLATION  04/12/2011    s/p ablation with no recurrence on Amiodarone   • HB ABLATION-AVN CREATING CHB  03/19/2019    with Bi-V pacer implant   • HERNIA REPAIR  1968    Left inguinal hernia repair   • KNEE SURGERY  2001    Meniscus repair both knees   • LAMINECTOMY,LUMBAR  1978    Laminectomy Lumbar   • ORCHIECTOMY  1969    undesended testicle   • PACEMAKER IMPLANT  03/19/2019    L chest, [employer]   • PTA WITH STENT Left 11/28/2009    Left Cephalic Artery   • REMOVAL OF TONSILS,<11 Y/O      tonsilectomy, adenoidectomy, bilateral Myringotomy and tubes   • SINUS SURGERY  1973    Right Frontal 1973   • URETEROSCOPY Right 09/30/2015    Cysto W/ Stent       Family History   Problem Relation Age of Onset   • Cancer Mother         colon   • Cancer Father         stomach   • Cancer Brother   50mg twice daily for 2 days, then take 25mg bid for days 3-5 25 capsule 0     furosemide (LASIX) 20 MG tablet TAKE 1 TABLET(20 MG) BY MOUTH EVERY MORNING 30 tablet 0     magnesium oxide (MAG-OX) 400 MG tablet Take 1 tablet (400 mg) by mouth 3 times daily 90 tablet 3     folic acid (FOLVITE) 400 MCG tablet Take 2.5 tablets (1 mg) by mouth daily 75 tablet 3     potassium chloride SA (K-DUR/KLOR-CON M) 20 MEQ CR tablet Take 1 tablet (20 mEq) by mouth daily 30 tablet 0     omeprazole (PRILOSEC) 40 MG capsule TAKE 1 CAPSULE(40 MG) BY MOUTH DAILY 30 capsule 11     Cyanocobalamin (B-12) 1000 MCG TBCR Take 1 tablet by mouth daily 30 tablet 3     thiamine 100 MG tablet Take 1 tablet (100 mg) by mouth daily 30 tablet 3     albuterol (PROAIR HFA, PROVENTIL HFA, VENTOLIN HFA) 108 (90 BASE) MCG/ACT inhaler Inhale 2 puffs into the lungs every 4 hours as needed for shortness of breath / dyspnea 3 Inhaler 4       REVIEW OF SYSTEMS:   5 point ROS negative except as noted above in HPI, including Gen., Resp, CV, GI &  system review.     OBJECTIVE:  Vitals: /60 (Cuff Size: Adult Regular)  Pulse 100  Temp 96.4  F (35.8  C) (Temporal)  Resp 20  Wt 90 lb (40.8 kg)  SpO2 96%  BMI 17.01 kg/m2  BMI= Body mass index is 17.01 kg/(m^2).  She is alert, appears sober today. Her lungs are clear. Heart with a regular rhythm. Abdomen with no tenderness. Extremities with no edema. Skin clear.    ASSESSMENT:  #1 acute alcohol-induced pancreatitis improving #2 iron deficiency anemia #3 hypomagnesemia.    PLAN:  We'll repeat lab work on her. We will notify her back with the results. Meantime she'll continue on the magnesium and iron pills until we get Bactrim. Again encouraged abstinence from alcohol.        Karan Smith MD  [facility]                      liver       Social History     Socioeconomic History   • Marital status: /Civil Union     Spouse name: Not on file   • Number of children: Not on file   • Years of education: Not on file   • Highest education level: Not on file   Occupational History   • Not on file   Social Needs   • Financial resource strain: Not on file   • Food insecurity:     Worry: Not on file     Inability: Not on file   • Transportation needs:     Medical: Not on file     Non-medical: Not on file   Tobacco Use   • Smoking status: Former Smoker     Packs/day: 1.00     Years: 40.00     Pack years: 40.00     Types: Cigarettes     Last attempt to quit: 2018     Years since quittin.9   • Smokeless tobacco: Never Used   Substance and Sexual Activity   • Alcohol use: Yes     Alcohol/week: 0.0 - 0.6 oz   • Drug use: No   • Sexual activity: Not on file   Lifestyle   • Physical activity:     Days per week: Not on file     Minutes per session: Not on file   • Stress: Not on file   Relationships   • Social connections:     Talks on phone: Not on file     Gets together: Not on file     Attends Zoroastrian service: Not on file     Active member of club or organization: Not on file     Attends meetings of clubs or organizations: Not on file     Relationship status: Not on file   • Intimate partner violence:     Fear of current or ex partner: Not on file     Emotionally abused: Not on file     Physically abused: Not on file     Forced sexual activity: Not on file   Other Topics Concern   • Not on file   Social History Narrative    Retired RN, worked in long term care. Lives with wife. Enjoys outdoor activities.             Visit Vitals  /60   Pulse 80   Ht 6' 4\" (1.93 m)   Wt 126.1 kg   BMI 33.84 kg/m²     Constitutional:  Well developed, well nourished, no acute distress, nontoxic appearance.  Eyes:  Conjunctivae normal.   HENT:  Atraumatic, external ears normal, nose normal,  Neck-no JVD (jugular venous distension), no carotid  bruit, no thyromegaly.  Respiratory:  No respiratory distress, normal breath sounds, no rales, no wheezing.   Cardiovascular:  Normal rate, normal rhythm, no murmurs, no gallops, no rubs. No radial pulse on L side. 2+ pedal pulses  Gastrointestinal:  Soft, nondistended, normal bowel sounds.    Musculoskeletal:  No edema, no tenderness, no deformities. Back- no tenderness  Integument:  Well hydrated, no rash.   Neurologic:  Alert and oriented x 3, no focal deficits noted.  Psychiatric:  Speech and behavior appropriate.     Labs pertinent to today's visit:   CHOLESTEROL (mg/dL)   Date Value   02/25/2019 169     HDL (mg/dL)   Date Value   02/25/2019 37 (L)     CHOL/HDL (no units)   Date Value   02/25/2019 4.6 (H)     TRIGLYCERIDE (mg/dL)   Date Value   02/25/2019 149     CALCULATED LDL (mg/dL)   Date Value   02/25/2019 102     Sodium (mmol/L)   Date Value   03/13/2019 140     Potassium (mmol/L)   Date Value   04/16/2019 4.1     Chloride (mmol/L)   Date Value   03/13/2019 105     Glucose (mg/dL)   Date Value   03/13/2019 200 (H)     CALCIUM (mg/dL)   Date Value   03/13/2019 9.2     Carbon Dioxide (mmol/L)   Date Value   03/13/2019 23     BUN (mg/dL)   Date Value   03/13/2019 14     Creatinine (mg/dL)   Date Value   04/16/2019 1.04     CARDIAC DIAGNOSTICS:    NM PET Stress (5/2015)  IMPRESSION:  1. Normal  myocardial perfusion scans at rest and following a regadenoson  injection  2. Mildly dilatation with mildly reduced left ventricular systolic  function at rest with a normal response to vasodilatation  3. Cardiac Risk Assessment:Intermediate diffusely reduced ejection fraction    Carotid US:  Interpretation Data  Normal right internal carotid artery.  50-69% stenosis of the left internal carotid artery with peak systolic   velocities to 179 cm/sec and end diastolic velocities to 18 cm/sec with an   ICA/CCA peak systolic velocity ratio of 2.15.  Mild heterogeneous   atherosclerotic plaque was noted at the proximal  segment of the left internal   carotid artery. The degree of stenosis is less than 50% based on grayscale   imaging. The higher degree of stenosis was called by velocity and ICA/CCA   ratio criteria but does not appear to correlate to grayscale findings.  Less than 50% right external carotid artery stenosis.  50 to 74% left external carotid artery stenosis with peak systolic velocities   to 233.  Antegrade vertebral artery flow bilaterally.  A triphasic waveform was seen in the right proximal subclavian artery.  A high peak monophasic waveform was seen in the left proximal subclavian   artery. Findings suggest a possible high grade stenosis/occlusion at the   proximal left subclavian artery.  The left thyroid appears enlarged.    Ablation/BiV PPM (3/19/2019)  IMPRESSION:   1.  Successful biventricular pacemaker implant via left subcutaneous venous approach.  2.  Successful radiofrequency catheter ablation of atrioventricular node with establishment of complete heart block.    Echo Limited (7/2/2019)  Severely increased left ventricular systolic volume. Moderately decreased left ventricular ejection fraction 42% regional wall motion  abnormalities (see diagram).        1. Coronary  Artery disease: cardiac cath per Dr. Holcomb (2011) and last stress test (2015) showed relatively normal scans. No testing since. Denies chest pain. Notes fatigue on exertion, but relates it to extensive h/o smoking  2. Peripheral vascular disease: s/p 50% Left Carotid and Occluded Subclavian stent left per Dr. Holcomb.  3. Atrial flutter/LBBB: [id number] 3 (age HTN PVD). Previous ablation (1//2019) unsuccessful, and most recent AV node ablation with Dr. Mary (3/19/2019)  4. Dilated nonischemic cardiomyopathy: s/p BiV pacemaker implant (3/19/2019) with Dr. Mary  5. Hypertension: controlled in clinic, 110/60  6. Dyslipidemia: most recent lipid panel (2/25/2019) CHOL 169, , HDL 37, .  7. Tobacco use: quit smoking 1 year ago.      Plan:  NM Yamini Stress Test to follow up on coronary artery disease as patient's EF is still mildly decreased despite BiV ICD. Patient will continue current medication regimen and risk factor modification. Will call with results.     Return in about 6 months (around 1/8/2020). or sooner if there are new or worsening symptoms.     Thank you, Andriy Cooley MD, for the opportunity to participate in his care. Please call me if any questions or concerns.    On 7/8/2019, IMaryann scribed the services personally performed by Nima Bowen MD    The documentation recorded by the scribe accurately and completely reflects the service(s) I personally performed and the decisions made by me.            Nima Bowen MD, FACC, [facility]  Clinical Adjunct   Department of Medicine  [employer] [employer]  [facility]

## 2020-04-20 NOTE — NURSING NOTE
"Chief Complaint   Patient presents with     Consult     feeding tube replacement        Initial Temp 97  F (36.1  C) (Temporal)  Wt 34 kg (75 lb)  SpO2 100%  BMI 14.16 kg/m2 Estimated body mass index is 14.16 kg/(m^2) as calculated from the following:    Height as of an earlier encounter on 3/12/18: 1.55 m (5' 1.02\").    Weight as of this encounter: 34 kg (75 lb).  Medication Reconciliation: complete    " Alert and oriented, no focal deficits, no motor or sensory deficits.

## 2021-10-16 NOTE — IP AVS SNAPSHOT
[address] [address]    Phone:  [phone]                                       After Visit Summary   11/21/2017    Alexia Flor    MRN: [id number]           After Visit Summary Signature Page     I have received my discharge instructions, and my questions have been answered. I have discussed any challenges I see with this plan with the nurse or doctor.    ..........................................................................................................................................  Patient/Patient Representative Signature      ..........................................................................................................................................  Patient Representative Print Name and Relationship to Patient    ..................................................               ................................................  Date                                            Time    ..........................................................................................................................................  Reviewed by Signature/Title    ...................................................              ..............................................  Date                                                            Time           Notify   RESIDENT --- OK for DC   I titrated off phenobarb, NO DTs seen (benzo prn)  if covid negative  And facility set up for IV abx as per ID request

## 2024-06-17 PROBLEM — Z71.89 ADVANCED DIRECTIVES, COUNSELING/DISCUSSION: Status: RESOLVED | Noted: 2017-01-01 | Resolved: 2024-06-17

## 2024-06-17 PROBLEM — Z76.89 HEALTH CARE HOME: Status: RESOLVED | Noted: 2017-02-09 | Resolved: 2024-06-17

## (undated) DEVICE — JELLY LUBRICATING SURGILUBE 2OZ TUBE

## (undated) DEVICE — ENDO VALVE SYR NDL KIT ULTRASOUND BRONCH NA-201SX-4022-A

## (undated) DEVICE — ESU ELEC BLADE 2.75" COATED/INSULATED E1455

## (undated) DEVICE — GLOVE PROTEXIS MICRO 7.5  2D73PM75

## (undated) DEVICE — DRAPE U SPLIT 74X120" 29440

## (undated) DEVICE — LINEN TOWEL PACK X6 WHITE 5487

## (undated) DEVICE — DRSG DRAIN 4X4" 7086

## (undated) DEVICE — ESU CORD BIPOLAR GREEN 10-4000

## (undated) DEVICE — SU VICRYL 3-0 SH 8X18" UND J864D

## (undated) DEVICE — SPONGE KITTNER 30-101

## (undated) DEVICE — RETR RING LONE STAR 14.1X14.1CM 3307G

## (undated) DEVICE — PREP POVIDONE IODINE SOLUTION 10% 120ML

## (undated) DEVICE — LINEN GOWN XLG 5407

## (undated) DEVICE — ESU PENCIL W/COATED BLADE E2450H

## (undated) DEVICE — CLIP HORIZON SM RED WIDE SLOT 001201

## (undated) DEVICE — PAD CHUX UNDERPAD 23X24" 7136

## (undated) DEVICE — SUCTION MANIFOLD DORNOCH ULTRA CART UL-CL500

## (undated) DEVICE — BLADE KNIFE SURG 15 371115

## (undated) DEVICE — GLOVE PROTEXIS MICRO 7.0  2D73PM70

## (undated) DEVICE — ESU GROUND PAD ADULT W/CORD E7507

## (undated) DEVICE — SU SILK 3-0 TIE 12X30" A304H

## (undated) DEVICE — Device

## (undated) DEVICE — SU SILK 0 TIE 6X30" A306H

## (undated) DEVICE — SU ETHILON 3-0 PS-1 18" 1663H

## (undated) DEVICE — SU VICRYL 3-0 SH CR 8X18" J774

## (undated) DEVICE — SOL NACL 0.9% IRRIG 1000ML BOTTLE 2F7124

## (undated) DEVICE — LABEL MEDICATION SYSTEM 3303-P

## (undated) DEVICE — RETR ELASTIC STAYS LONE STAR BLUNT DUAL LEAD 3550-1G

## (undated) DEVICE — ENDO BITE BLOCK ADULT OMNI-BLOC

## (undated) DEVICE — SYR 01ML 27GA 0.5" NDL TBC 309623

## (undated) DEVICE — ENDO VALVE SUCTION ULTRASOUND BRONCH MAJ-1414

## (undated) DEVICE — SU SILK 2-0 SH CR 5X18" C0125

## (undated) DEVICE — LINEN TOWEL PACK X5 5464

## (undated) DEVICE — KIT ENDO FIRST STEP DISINFECTANT 200ML W/POUCH EP-4

## (undated) DEVICE — CATH TRAY FOLEY SURESTEP 16FR W/TMP PRB STLK LATEX A319416AM

## (undated) DEVICE — DRAIN JACKSON PRATT 07MM FLAT SU130-1310

## (undated) DEVICE — NDL COUNTER 20CT 31142493

## (undated) DEVICE — ESU CORD BIPOLAR AND IRR TUBING AESCULAP US355

## (undated) DEVICE — PREP POVIDONE IODINE SCRUB 7.5% 120ML

## (undated) DEVICE — PREP SKIN SCRUB TRAY 4461A

## (undated) DEVICE — BLADE KNIFE SURG 10 371110

## (undated) DEVICE — DRAPE SHEET MED 44X70" 9355

## (undated) DEVICE — EYE PREP BETADINE 5% SOLUTION 30ML 0065-0411-30

## (undated) DEVICE — SPONGE LAP 18X18" X8435

## (undated) DEVICE — SPONGE RAY-TEC 4X8" 7318

## (undated) DEVICE — SU PROLENE 6-0 RB-2DA 30" 8711H

## (undated) DEVICE — SYR 10ML LL W/O NDL

## (undated) DEVICE — DRSG TELFA 3X8" 1238

## (undated) DEVICE — DRAIN JACKSON PRATT 10MM FLAT 4/4 PERF SU130-1311

## (undated) DEVICE — GLOVE PROTEXIS BLUE W/NEU-THERA 6.5  2D73EB65

## (undated) DEVICE — TAPE CLOTH 3" CARDINAL 3TRCL03

## (undated) DEVICE — DRAPE WARMER 66X44" ORS-300

## (undated) DEVICE — PACK SET-UP STD 9102

## (undated) DEVICE — SU SILK 2-0 TIE 12X30" A305H

## (undated) DEVICE — VESSEL LOOPS YELLOW MAXI 31145694

## (undated) DEVICE — SU PROLENE 2-0 SHDA 36" 8523H

## (undated) DEVICE — SU ETHILON 4-0 PC-3 18" 1864G

## (undated) DEVICE — DRAIN JACKSON PRATT RESERVOIR 400ML SU130-1000

## (undated) DEVICE — CLIP HORIZON MED BLUE 002200

## (undated) DEVICE — CABLE DOPPLER FLOW EXTENSION  DP-CAB01

## (undated) DEVICE — STIMULATOR NERVE NEURO-PULSE SURGICAL LOCATOR 30968-220

## (undated) DEVICE — TUBE NASOGASTRIC ENTRIFLEX 12FR 43"

## (undated) DEVICE — SOL WATER IRRIG 1000ML BOTTLE 2F7114

## (undated) DEVICE — SU SILK 4-0 TIE 12X30" A303H

## (undated) DEVICE — LINEN TOWEL PACK X30 5481

## (undated) DEVICE — SU PROLENE 5-0 RB-2DA 18" 8713H

## (undated) DEVICE — ENDO PROBE COVER ULTRASOUND BALLOON LATEX  MAJ-233

## (undated) DEVICE — SUCTION SLEEVE NEPTUNE 2 125MM 0703-005-125

## (undated) DEVICE — ESU GROUND PAD ADULT REM W/15' CORD E7507DB

## (undated) DEVICE — PACK NEURO MINOR UMMC SNE32MNMU4

## (undated) DEVICE — ESU HOLSTER PLASTIC DISP E2400

## (undated) DEVICE — VESSEL LOOPS RED MINI 31145710

## (undated) DEVICE — SU SILK 3-0 SH 30" K832H

## (undated) DEVICE — ENDO TUBING CO2 SMARTCAP STERILE DISP 100145CO2EXT

## (undated) DEVICE — DRAIN JACKSON PRATT RESERVOIR 100ML SU130-1305

## (undated) DEVICE — ESU ELEC NDL 1" COATED/INSULATED E1465

## (undated) DEVICE — GLOVE PROTEXIS MICRO 6.0  2D73PM60

## (undated) DEVICE — ESU PENCIL SMOKE EVAC W/ROCKER SWITCH 0703-047-000

## (undated) RX ORDER — FENTANYL CITRATE 50 UG/ML
INJECTION, SOLUTION INTRAMUSCULAR; INTRAVENOUS
Status: DISPENSED
Start: 2017-01-01

## (undated) RX ORDER — DIPHENHYDRAMINE HYDROCHLORIDE 50 MG/ML
INJECTION INTRAMUSCULAR; INTRAVENOUS
Status: DISPENSED
Start: 2017-01-01

## (undated) RX ORDER — LORAZEPAM 2 MG/ML
INJECTION INTRAMUSCULAR
Status: DISPENSED
Start: 2017-01-01

## (undated) RX ORDER — METOPROLOL TARTRATE 1 MG/ML
INJECTION, SOLUTION INTRAVENOUS
Status: DISPENSED
Start: 2017-01-01

## (undated) RX ORDER — HYDROMORPHONE HYDROCHLORIDE 1 MG/ML
INJECTION, SOLUTION INTRAMUSCULAR; INTRAVENOUS; SUBCUTANEOUS
Status: DISPENSED
Start: 2017-01-01

## (undated) RX ORDER — ONDANSETRON 4 MG/1
TABLET, ORALLY DISINTEGRATING ORAL
Status: DISPENSED
Start: 2017-01-01

## (undated) RX ORDER — LIDOCAINE HYDROCHLORIDE 10 MG/ML
INJECTION, SOLUTION EPIDURAL; INFILTRATION; INTRACAUDAL; PERINEURAL
Status: DISPENSED
Start: 2017-01-01

## (undated) RX ORDER — ONDANSETRON 2 MG/ML
INJECTION INTRAMUSCULAR; INTRAVENOUS
Status: DISPENSED
Start: 2017-01-01

## (undated) RX ORDER — DEXTROSE MONOHYDRATE, SODIUM CHLORIDE, AND POTASSIUM CHLORIDE 50; 1.49; 4.5 G/1000ML; G/1000ML; G/1000ML
INJECTION, SOLUTION INTRAVENOUS
Status: DISPENSED
Start: 2017-01-01

## (undated) RX ORDER — KETAMINE HYDROCHLORIDE 10 MG/ML
INJECTION, SOLUTION INTRAMUSCULAR; INTRAVENOUS
Status: DISPENSED
Start: 2017-01-01

## (undated) RX ORDER — CEFAZOLIN SODIUM 1 G/3ML
INJECTION, POWDER, FOR SOLUTION INTRAMUSCULAR; INTRAVENOUS
Status: DISPENSED
Start: 2017-01-01

## (undated) RX ORDER — CHLORHEXIDINE GLUCONATE ORAL RINSE 1.2 MG/ML
SOLUTION DENTAL
Status: DISPENSED
Start: 2017-01-01

## (undated) RX ORDER — ALBUMIN, HUMAN INJ 5% 5 %
SOLUTION INTRAVENOUS
Status: DISPENSED
Start: 2017-01-01

## (undated) RX ORDER — BACITRACIN 500 [USP'U]/G
OINTMENT OPHTHALMIC
Status: DISPENSED
Start: 2017-01-01

## (undated) RX ORDER — PHENYLEPHRINE HCL IN 0.9% NACL 1 MG/10 ML
SYRINGE (ML) INTRAVENOUS
Status: DISPENSED
Start: 2017-01-01

## (undated) RX ORDER — GLYCOPYRROLATE 0.2 MG/ML
INJECTION, SOLUTION INTRAMUSCULAR; INTRAVENOUS
Status: DISPENSED
Start: 2017-01-01

## (undated) RX ORDER — DEXAMETHASONE SODIUM PHOSPHATE 4 MG/ML
INJECTION, SOLUTION INTRA-ARTICULAR; INTRALESIONAL; INTRAMUSCULAR; INTRAVENOUS; SOFT TISSUE
Status: DISPENSED
Start: 2017-01-01

## (undated) RX ORDER — EPHEDRINE SULFATE 50 MG/ML
INJECTION, SOLUTION INTRAMUSCULAR; INTRAVENOUS; SUBCUTANEOUS
Status: DISPENSED
Start: 2017-01-01

## (undated) RX ORDER — ROCURONIUM BROMIDE 50 MG/5 ML
SYRINGE (ML) INTRAVENOUS
Status: DISPENSED
Start: 2017-01-01

## (undated) RX ORDER — PROPOFOL 10 MG/ML
INJECTION, EMULSION INTRAVENOUS
Status: DISPENSED
Start: 2017-01-01

## (undated) RX ORDER — LIDOCAINE HYDROCHLORIDE 20 MG/ML
INJECTION, SOLUTION EPIDURAL; INFILTRATION; INTRACAUDAL; PERINEURAL
Status: DISPENSED
Start: 2017-01-01